# Patient Record
Sex: MALE | Race: WHITE | NOT HISPANIC OR LATINO | Employment: UNEMPLOYED | URBAN - METROPOLITAN AREA
[De-identification: names, ages, dates, MRNs, and addresses within clinical notes are randomized per-mention and may not be internally consistent; named-entity substitution may affect disease eponyms.]

---

## 2019-02-22 ENCOUNTER — APPOINTMENT (OUTPATIENT)
Dept: RADIOLOGY | Facility: IMAGING CENTER | Age: 34
End: 2019-02-22
Attending: NURSE PRACTITIONER
Payer: COMMERCIAL

## 2019-02-22 ENCOUNTER — OFFICE VISIT (OUTPATIENT)
Dept: URGENT CARE | Facility: CLINIC | Age: 34
End: 2019-02-22
Payer: COMMERCIAL

## 2019-02-22 VITALS
SYSTOLIC BLOOD PRESSURE: 120 MMHG | TEMPERATURE: 97.7 F | DIASTOLIC BLOOD PRESSURE: 74 MMHG | BODY MASS INDEX: 37.89 KG/M2 | WEIGHT: 250 LBS | HEART RATE: 80 BPM | RESPIRATION RATE: 16 BRPM | OXYGEN SATURATION: 96 % | HEIGHT: 68 IN

## 2019-02-22 DIAGNOSIS — S86.912A KNEE STRAIN, LEFT, INITIAL ENCOUNTER: ICD-10-CM

## 2019-02-22 DIAGNOSIS — M25.562 ACUTE PAIN OF LEFT KNEE: ICD-10-CM

## 2019-02-22 DIAGNOSIS — M62.830 SPASM OF BACK MUSCLES: ICD-10-CM

## 2019-02-22 PROCEDURE — 99203 OFFICE O/P NEW LOW 30 MIN: CPT | Performed by: NURSE PRACTITIONER

## 2019-02-22 PROCEDURE — 73564 X-RAY EXAM KNEE 4 OR MORE: CPT | Mod: TC,LT | Performed by: NURSE PRACTITIONER

## 2019-02-22 RX ORDER — LISINOPRIL 40 MG/1
40 TABLET ORAL DAILY
Status: ON HOLD | COMMUNITY
End: 2019-10-16

## 2019-02-22 RX ORDER — INFLUENZA A VIRUS A/IDAHO/07/2018 (H1N1) ANTIGEN (MDCK CELL DERIVED, PROPIOLACTONE INACTIVATED, INFLUENZA A VIRUS A/INDIANA/08/2018 (H3N2) ANTIGEN (MDCK CELL DERIVED, PROPIOLACTONE INACTIVATED), INFLUENZA B VIRUS B/SINGAPORE/INFTT-16-0610/2016 ANTIGEN (MDCK CELL DERIVED, PROPIOLACTONE INACTIVATED), INFLUENZA B VIRUS B/IOWA/06/2017 ANTIGEN (MDCK CELL DERIVED, PROPIOLACTONE INACTIVATED) 15; 15; 15; 15 UG/.5ML; UG/.5ML; UG/.5ML; UG/.5ML
INJECTION, SUSPENSION INTRAMUSCULAR
Refills: 0 | COMMUNITY
Start: 2018-12-15 | End: 2019-05-23

## 2019-02-22 RX ORDER — OMEPRAZOLE 20 MG/1
20 CAPSULE, DELAYED RELEASE ORAL DAILY
COMMUNITY
End: 2019-09-19

## 2019-02-22 RX ORDER — IBUPROFEN 800 MG/1
800 TABLET ORAL EVERY 8 HOURS PRN
Qty: 90 TAB | Refills: 0 | Status: SHIPPED | OUTPATIENT
Start: 2019-02-22 | End: 2019-05-23

## 2019-02-22 RX ORDER — ATENOLOL 100 MG/1
100 TABLET ORAL DAILY
COMMUNITY

## 2019-02-22 RX ORDER — BACLOFEN 10 MG/1
20 TABLET ORAL 2 TIMES DAILY
Qty: 20 TAB | Refills: 0 | Status: SHIPPED | OUTPATIENT
Start: 2019-02-22 | End: 2019-05-23

## 2019-02-22 RX ORDER — DIAZEPAM 5 MG/1
5 TABLET ORAL SEE ADMIN INSTRUCTIONS
Refills: 1 | Status: ON HOLD | COMMUNITY
Start: 2019-02-15 | End: 2019-05-29

## 2019-02-22 ASSESSMENT — ENCOUNTER SYMPTOMS
FEVER: 0
WEAKNESS: 0
SENSORY CHANGE: 0
TINGLING: 0
NAUSEA: 0
VOMITING: 0
BACK PAIN: 1
FOCAL WEAKNESS: 0
CHILLS: 0
ABDOMINAL PAIN: 0

## 2019-02-22 NOTE — PROGRESS NOTES
"Subjective:      Cosme Cabrera is a 33 y.o. male who presents with Back Pain (lower back spasms and pain, hip pain, and (L) knee pain)            HPI New problem. 33 year old male with left knee injury for 3 weeks following twisting while getting out of hot tub. Since that time he has had improvement in pain but it is still swollen and his gait is still off, leading to his next issue. Since this morning he has developed spasms of his left lower back that he rates as 7/10. No radiation of pain and his bowel and bladder function is preserved. He has been taking ibuprofen for his knee pain and has not treatment the back yet.  Patient has no known allergies.  No current outpatient prescriptions on file prior to visit.     No current facility-administered medications on file prior to visit.      Social History     Social History   • Marital status: Single     Spouse name: N/A   • Number of children: N/A   • Years of education: N/A     Occupational History   • Not on file.     Social History Main Topics   • Smoking status: Former Smoker   • Smokeless tobacco: Never Used   • Alcohol use Not on file   • Drug use: Unknown   • Sexual activity: Not on file     Other Topics Concern   • Not on file     Social History Narrative   • No narrative on file     family history is not on file.      Review of Systems   Constitutional: Negative for chills and fever.   Gastrointestinal: Negative for abdominal pain, nausea and vomiting.   Genitourinary: Negative.  Negative for dysuria.   Musculoskeletal: Positive for back pain and joint pain.   Neurological: Negative for tingling, sensory change, focal weakness and weakness.          Objective:     /74 (BP Location: Left arm, Patient Position: Sitting, BP Cuff Size: Large adult)   Pulse 80   Temp 36.5 °C (97.7 °F) (Temporal)   Resp 16   Ht 1.727 m (5' 8\")   Wt 113.4 kg (250 lb)   SpO2 96%   BMI 38.01 kg/m²      Physical Exam   Constitutional: He is oriented to person, " place, and time. He appears well-developed and well-nourished. No distress.   Cardiovascular: Normal rate, regular rhythm and normal heart sounds.    No murmur heard.  Pulmonary/Chest: Effort normal and breath sounds normal.   Musculoskeletal:        Left knee: He exhibits decreased range of motion and swelling. Tenderness found. Medial joint line tenderness noted.        Lumbar back: He exhibits decreased range of motion, tenderness and pain. He exhibits no bony tenderness, no swelling, no edema and no spasm.        Back:    Neurological: He is alert and oriented to person, place, and time.   Skin: Skin is warm and dry.   Psychiatric: He has a normal mood and affect. His behavior is normal. Thought content normal.   Vitals reviewed.              Assessment/Plan:     1. Spasm of back muscles  baclofen (LIORESAL) 10 MG Tab    ibuprofen (MOTRIN) 800 MG Tab   2. Acute pain of left knee  DX-KNEE COMPLETE 4+ LEFT   3. Knee strain, left, initial encounter  REFERRAL TO PHYSICAL THERAPY Reason for Therapy: Eval/Treat/Report     Ibuprofen and muscle relaxer for back pain  X-ray with soft tissue swelling only. No joint effusion, fracture or dislocation  Physical therapy referral per patient request for his knee.  Stretching and ice.  Differential diagnosis, natural history, supportive care, and indications for immediate follow-up discussed at length.

## 2019-02-22 NOTE — LETTER
February 22, 2019        Cosme Cabrera  [address] [place] [address]        Cosme was seen in our clinic today and he is excused from work for Monday, Tuesday and today.  If you have any questions or concerns, please don't hesitate to call.        Sincerely,        YARELIS [email or web address].    Electronically Signed

## 2019-03-04 ENCOUNTER — APPOINTMENT (OUTPATIENT)
Dept: RADIOLOGY | Facility: MEDICAL CENTER | Age: 34
End: 2019-03-04
Attending: EMERGENCY MEDICINE
Payer: COMMERCIAL

## 2019-03-04 ENCOUNTER — HOSPITAL ENCOUNTER (EMERGENCY)
Facility: MEDICAL CENTER | Age: 34
End: 2019-03-04
Attending: EMERGENCY MEDICINE
Payer: COMMERCIAL

## 2019-03-04 VITALS
HEIGHT: 68 IN | HEART RATE: 80 BPM | RESPIRATION RATE: 16 BRPM | SYSTOLIC BLOOD PRESSURE: 157 MMHG | DIASTOLIC BLOOD PRESSURE: 101 MMHG | TEMPERATURE: 97.7 F | WEIGHT: 260.58 LBS | OXYGEN SATURATION: 99 % | BODY MASS INDEX: 39.49 KG/M2

## 2019-03-04 DIAGNOSIS — R82.5 POSITIVE URINE DRUG SCREEN: ICD-10-CM

## 2019-03-04 DIAGNOSIS — R20.2 PARESTHESIAS: ICD-10-CM

## 2019-03-04 LAB
ALBUMIN SERPL BCP-MCNC: 4.1 G/DL (ref 3.2–4.9)
ALBUMIN/GLOB SERPL: 1.4 G/DL
ALP SERPL-CCNC: 63 U/L (ref 30–99)
ALT SERPL-CCNC: 32 U/L (ref 2–50)
AMPHET UR QL SCN: NEGATIVE
ANION GAP SERPL CALC-SCNC: 11 MMOL/L (ref 0–11.9)
AST SERPL-CCNC: 27 U/L (ref 12–45)
BARBITURATES UR QL SCN: NEGATIVE
BASOPHILS # BLD AUTO: 0.3 % (ref 0–1.8)
BASOPHILS # BLD: 0.02 K/UL (ref 0–0.12)
BENZODIAZ UR QL SCN: POSITIVE
BILIRUB SERPL-MCNC: 0.5 MG/DL (ref 0.1–1.5)
BUN SERPL-MCNC: 19 MG/DL (ref 8–22)
BZE UR QL SCN: NEGATIVE
CALCIUM SERPL-MCNC: 8.9 MG/DL (ref 8.5–10.5)
CANNABINOIDS UR QL SCN: POSITIVE
CHLORIDE SERPL-SCNC: 106 MMOL/L (ref 96–112)
CO2 SERPL-SCNC: 24 MMOL/L (ref 20–33)
CREAT SERPL-MCNC: 0.75 MG/DL (ref 0.5–1.4)
EKG IMPRESSION: NORMAL
EOSINOPHIL # BLD AUTO: 0.15 K/UL (ref 0–0.51)
EOSINOPHIL NFR BLD: 2.5 % (ref 0–6.9)
ERYTHROCYTE [DISTWIDTH] IN BLOOD BY AUTOMATED COUNT: 64.9 FL (ref 35.9–50)
ETHANOL BLD-MCNC: 0 G/DL
GLOBULIN SER CALC-MCNC: 2.9 G/DL (ref 1.9–3.5)
GLUCOSE SERPL-MCNC: 126 MG/DL (ref 65–99)
HCT VFR BLD AUTO: 44.6 % (ref 42–52)
HGB BLD-MCNC: 14.9 G/DL (ref 14–18)
IMM GRANULOCYTES # BLD AUTO: 0.04 K/UL (ref 0–0.11)
IMM GRANULOCYTES NFR BLD AUTO: 0.7 % (ref 0–0.9)
LYMPHOCYTES # BLD AUTO: 1.96 K/UL (ref 1–4.8)
LYMPHOCYTES NFR BLD: 32.8 % (ref 22–41)
MAGNESIUM SERPL-MCNC: 2 MG/DL (ref 1.5–2.5)
MCH RBC QN AUTO: 30.2 PG (ref 27–33)
MCHC RBC AUTO-ENTMCNC: 33.4 G/DL (ref 33.7–35.3)
MCV RBC AUTO: 90.3 FL (ref 81.4–97.8)
METHADONE UR QL SCN: NEGATIVE
MONOCYTES # BLD AUTO: 0.63 K/UL (ref 0–0.85)
MONOCYTES NFR BLD AUTO: 10.5 % (ref 0–13.4)
NEUTROPHILS # BLD AUTO: 3.18 K/UL (ref 1.82–7.42)
NEUTROPHILS NFR BLD: 53.2 % (ref 44–72)
NRBC # BLD AUTO: 0 K/UL
NRBC BLD-RTO: 0 /100 WBC
OPIATES UR QL SCN: NEGATIVE
OXYCODONE UR QL SCN: NEGATIVE
PCP UR QL SCN: NEGATIVE
PHOSPHATE SERPL-MCNC: 3.1 MG/DL (ref 2.5–4.5)
PLATELET # BLD AUTO: 264 K/UL (ref 164–446)
PMV BLD AUTO: 8.7 FL (ref 9–12.9)
POTASSIUM SERPL-SCNC: 4 MMOL/L (ref 3.6–5.5)
PROPOXYPH UR QL SCN: NEGATIVE
PROT SERPL-MCNC: 7 G/DL (ref 6–8.2)
RBC # BLD AUTO: 4.94 M/UL (ref 4.7–6.1)
SODIUM SERPL-SCNC: 141 MMOL/L (ref 135–145)
TROPONIN I SERPL-MCNC: <0.01 NG/ML (ref 0–0.04)
WBC # BLD AUTO: 6 K/UL (ref 4.8–10.8)

## 2019-03-04 PROCEDURE — 99284 EMERGENCY DEPT VISIT MOD MDM: CPT

## 2019-03-04 PROCEDURE — 85025 COMPLETE CBC W/AUTO DIFF WBC: CPT

## 2019-03-04 PROCEDURE — 70450 CT HEAD/BRAIN W/O DYE: CPT

## 2019-03-04 PROCEDURE — 72125 CT NECK SPINE W/O DYE: CPT

## 2019-03-04 PROCEDURE — 93005 ELECTROCARDIOGRAM TRACING: CPT | Performed by: EMERGENCY MEDICINE

## 2019-03-04 PROCEDURE — 93005 ELECTROCARDIOGRAM TRACING: CPT

## 2019-03-04 PROCEDURE — 84100 ASSAY OF PHOSPHORUS: CPT

## 2019-03-04 PROCEDURE — 80053 COMPREHEN METABOLIC PANEL: CPT

## 2019-03-04 PROCEDURE — 83735 ASSAY OF MAGNESIUM: CPT

## 2019-03-04 PROCEDURE — 84484 ASSAY OF TROPONIN QUANT: CPT

## 2019-03-04 PROCEDURE — 80307 DRUG TEST PRSMV CHEM ANLYZR: CPT

## 2019-03-04 ASSESSMENT — LIFESTYLE VARIABLES: DO YOU DRINK ALCOHOL: YES

## 2019-03-04 NOTE — ED PROVIDER NOTES
"ED Provider Note    Scribed for London Echavarria M.D. by Aamir Wyatt. 3/4/2019  8:37 AM    Primary care provider: Pcp Pt States None  Means of arrival: Walk-in  History obtained from: Patient  History limited by: None    CHIEF COMPLAINT  Chief Complaint   Patient presents with   • Other       HPI  Cosme Cabrera is a 33 y.o. male with a history of atrial fibrillation who presents to the Emergency Department with multiple complaints. Per patient, since the beginning of the year he has been experiencing multiple episodes of lightheadedness, chills, and body aches. The patient reports that his body aches are generally localized to his upper chest and neck and he describes this as a \"zapping\" sensation. He notes these sensations occasionally start in his upper neck and radiate down into his shoulders, or they can sometimes start in his upper chest and radiate up into his head. He notes that when this occurs he also experiences shortness of breath and intermittently has lightheadedness. He reports that he has been evaluated for this within the last few weeks and admits to having labs, EKG, and a CT scan of his head. Patient states that the CT scan of his brain did not show a brain tumor or multiple sclerosis and his labs were mostly normal. He has not yet had an MRI though or a CT scan of his neck. He admits that he has been taking 800 mg Ibuprofen for his pain and this has been with only mild relief. He also reports that he believes taking the Ibuprofen is causing him to experience nausea but he is unsure. Patient admits that he has been increasingly stressed at work as well and admits that this could be a contributing factor to his symptoms. The patient is otherwise a healthy individual and does not have any other pertinent past medical history. Patient currently works at a marijuana processing facility and admits to working with isopropanol and ethanol regularly. He denies any recent vomiting.     REVIEW OF " "SYSTEMS  Pertinent positives include lightheadedness, chills, body aches, shortness of breath, nausea, stressed.   Pertinent negatives include no vomiting.    All other systems reviewed and negative. See HPI for further details.     PAST MEDICAL HISTORY   has a past medical history of A-fib (HCC) and Hypertension.    SURGICAL HISTORY  patient denies any surgical history    SOCIAL HISTORY  Social History   Substance Use Topics   • Smoking status: Former Smoker   • Smokeless tobacco: Never Used   • Alcohol use Yes      Comment: occ      History   Drug Use   • Types: Inhaled     Comment: marijuana       FAMILY HISTORY  History reviewed. No pertinent family history.    CURRENT MEDICATIONS  Home Medications     Reviewed by Morena Marlow R.N. (Registered Nurse) on 03/04/19 at 0739  Med List Status: Complete   Medication Last Dose Status   atenolol (TENORMIN) 100 MG Tab 3/4/2019 Active   baclofen (LIORESAL) 10 MG Tab 3/3/2019 Active   Cetirizine-Pseudoephedrine (ZYRTEC-D PO) 3/3/2019 Active   diazePAM (VALIUM) 5 MG Tab 3/4/2019 Active   FLUCELVAX QUADRIVALENT 0.5 ML Suspension Prefilled Syringe injection  Active   ibuprofen (MOTRIN) 800 MG Tab 3/3/2019 Active   lisinopril (PRINIVIL, ZESTRIL) 40 MG tablet 3/4/2019 Active   omeprazole (PRILOSEC) 20 MG delayed-release capsule 3/4/2019 Active                ALLERGIES  No Known Allergies    PHYSICAL EXAM  VITAL SIGNS: /101 Comment: second pressure 170/103  Pulse 82   Temp 36.5 °C (97.7 °F) (Temporal)   Resp 16   Ht 1.727 m (5' 8\")   Wt 118.2 kg (260 lb 9.3 oz)   SpO2 97%   BMI 39.62 kg/m²     Nursing note and vitals reviewed.  Constitutional: Well-developed and well-nourished. No distress.   HENT: Head is normocephalic and atraumatic. Oropharynx is clear and moist without exudate or erythema.   Eyes: Pupils are equal, round, and reactive to light. Conjunctiva are normal.   Cardiovascular: Normal rate and regular rhythm. No murmur heard. Normal radial " pulses.  Pulmonary/Chest: Breath sounds normal. No wheezes or rales.   Abdominal: Soft and non-tender. No distention    Musculoskeletal: Extremities exhibit normal range of motion without edema or tenderness.   Neurological: Awake, alert and oriented to person, place, and time. No focal deficits noted.  Skin: Skin is warm and dry. No rash.   Psychiatric: Normal mood and affect. Appropriate for clinical situation.    DIAGNOSTIC STUDIES / PROCEDURES    EKG Interpretation  Results for orders placed or performed during the hospital encounter of 19   EKG (NOW)   Result Value Ref Range    Report       [facility] Emergency Dept.    Test Date:  2019  Pt Name:    JANA LYNN              Department: ER  MRN:        [id number]                      Room:  Gender:     Male                         Technician: [id number]  :        1985                   Requested By:ER TRIAGE PROTOCOL  Order #:    [id number]                    Reading MD: JANESSA BARRAGAN MD    Measurements  Intervals                                Axis  Rate:       87                           P:          71  RI:         168                          QRS:        46  QRSD:       90                           T:          3  QT:         372  QTc:        448    Interpretive Statements  SINUS RHYTHM  BORDERLINE T WAVE ABNORMALITIES  BASELINE WANDER IN LEAD(S) I,III,aVL,aVF  No previous ECG available for comparison    Electronically Signed On 3-4-2019 9:03:36 PST by JANESSA BARRAGAN MD         LABS  Results for orders placed or performed during the hospital encounter of 19   CBC WITH DIFFERENTIAL   Result Value Ref Range    WBC 6.0 4.8 - 10.8 K/uL    RBC 4.94 4.70 - 6.10 M/uL    Hemoglobin 14.9 14.0 - 18.0 g/dL    Hematocrit 44.6 42.0 - 52.0 %    MCV 90.3 81.4 - 97.8 fL    MCH 30.2 27.0 - 33.0 pg    MCHC 33.4 (L) 33.7 - 35.3 g/dL    RDW 64.9 (H) 35.9 - 50.0 fL    Platelet Count 264 164 - 446 K/uL    MPV 8.7 (L) 9.0 - 12.9 fL     Neutrophils-Polys 53.20 44.00 - 72.00 %    Lymphocytes 32.80 22.00 - 41.00 %    Monocytes 10.50 0.00 - 13.40 %    Eosinophils 2.50 0.00 - 6.90 %    Basophils 0.30 0.00 - 1.80 %    Immature Granulocytes 0.70 0.00 - 0.90 %    Nucleated RBC 0.00 /100 WBC    Neutrophils (Absolute) 3.18 1.82 - 7.42 K/uL    Lymphs (Absolute) 1.96 1.00 - 4.80 K/uL    Monos (Absolute) 0.63 0.00 - 0.85 K/uL    Eos (Absolute) 0.15 0.00 - 0.51 K/uL    Baso (Absolute) 0.02 0.00 - 0.12 K/uL    Immature Granulocytes (abs) 0.04 0.00 - 0.11 K/uL    NRBC (Absolute) 0.00 K/uL   COMP METABOLIC PANEL   Result Value Ref Range    Sodium 141 135 - 145 mmol/L    Potassium 4.0 3.6 - 5.5 mmol/L    Chloride 106 96 - 112 mmol/L    Co2 24 20 - 33 mmol/L    Anion Gap 11.0 0.0 - 11.9    Glucose 126 (H) 65 - 99 mg/dL    Bun 19 8 - 22 mg/dL    Creatinine 0.75 0.50 - 1.40 mg/dL    Calcium 8.9 8.5 - 10.5 mg/dL    AST(SGOT) 27 12 - 45 U/L    ALT(SGPT) 32 2 - 50 U/L    Alkaline Phosphatase 63 30 - 99 U/L    Total Bilirubin 0.5 0.1 - 1.5 mg/dL    Albumin 4.1 3.2 - 4.9 g/dL    Total Protein 7.0 6.0 - 8.2 g/dL    Globulin 2.9 1.9 - 3.5 g/dL    A-G Ratio 1.4 g/dL   TROPONIN   Result Value Ref Range    Troponin I <0.01 0.00 - 0.04 ng/mL   DIAGNOSTIC ALCOHOL   Result Value Ref Range    Diagnostic Alcohol 0.00 0.00 g/dL   URINE DRUG SCREEN   Result Value Ref Range    Amphetamines Urine Negative Negative    Barbiturates Negative Negative    Benzodiazepines Positive (A) Negative    Cocaine Metabolite Negative Negative    Methadone Negative Negative    Opiates Negative Negative    Oxycodone Negative Negative    Phencyclidine -Pcp Negative Negative    Propoxyphene Negative Negative    Cannabinoid Metab Positive (A) Negative   MAGNESIUM   Result Value Ref Range    Magnesium 2.0 1.5 - 2.5 mg/dL   PHOSPHORUS   Result Value Ref Range    Phosphorus 3.1 2.5 - 4.5 mg/dL   ESTIMATED GFR   Result Value Ref Range    GFR If African American >60 >60 mL/min/1.73 m 2    GFR If Non African  American >60 >60 mL/min/1.73 m 2   EKG (NOW)   Result Value Ref Range    Report       [facility] Emergency Dept.    Test Date:  2019  Pt Name:    JANA LYNN              Department: ER  MRN:        [id number]                      Room:  Gender:     Male                         Technician: [id number]  :        1985                   Requested By:ER TRIAGE PROTOCOL  Order #:    [id number]                    Reading MD: JANESSA BARRAGAN MD    Measurements  Intervals                                Axis  Rate:       87                           P:          71  NM:         168                          QRS:        46  QRSD:       90                           T:          3  QT:         372  QTc:        448    Interpretive Statements  SINUS RHYTHM  BORDERLINE T WAVE ABNORMALITIES  BASELINE WANDER IN LEAD(S) I,III,aVL,aVF  No previous ECG available for comparison    Electronically Signed On 3-4-2019 9:03:36 PST by JANESSA BARRAGAN MD         All labs reviewed by me.    RADIOLOGY  CT-CSPINE WITHOUT PLUS RECONS   Final Result      No acute findings or significant degenerative disc disease identified.      CT-HEAD W/O   Final Result      No intracranial mass effect or acute hemorrhage.        The radiologist's interpretation of all radiological studies have been reviewed by me.    COURSE & MEDICAL DECISION MAKING  Nursing notes, VS, PMSFHx reviewed in chart.     8:37 AM - Patient seen and examined at bedside.  Ordered CT-head without contrast, CT-Cspine without plus recons, magnesium, phosphorus, urine drug screen, CBC with differential, CMP, troponin, diagnostic alcohol, EKG to evaluate his symptoms. The differential diagnoses include but are not limited to: electrolyte abnormality versus chemical exposure. Patient reports fairly vague symptoms. A generalized work up is warranted at this time and if labs and imaging come back normal then the patient will be referred to a specialist.     Patient  presents today with fairly vague nonspecific symptoms of paresthesias throughout the body.  No evidence of significant electrolyte abnormality.  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  He says that he also consumes cannabis on occasion.    11:32 AM - Patient was reevaluated at bedside. Discussed lab and radiology results with the patient and informed them that he can be discharged home at this time. Patient was advised to return to the ED with any new or worsening symptoms. He understands and verbalizes agreement with the plan of care and will comply.     The patient will return for new or worsening symptoms and is stable at the time of discharge.    The patient is referred to a primary physician for blood pressure management, diabetic screening, and for all other preventative health concerns.    DISPOSITION:  Patient will be discharged home in stable condition.    FOLLOW UP:  Samuel Kan M.D.  [address] [place] [id number] [phone]    Schedule an appointment as soon as possible for a visit   oncall neurologist    [facility], Emergency Dept  [address] [place] [address] [id number] [phone]    If symptoms worsen    The patient was discharged home with an information sheet on paresthesias and told to return immediately for any signs or symptoms listed.  The patient agreed to the discharge precautions and follow-up plan which is documented in EPIC.      FINAL IMPRESSION  1. Paresthesias    2. Positive urine drug screen          Aamir CARDONA (Zita), am scribing for, and in the presence of, London Echavarria M.D..    Electronically signed by: Aamir Márquez), 3/4/2019    London CARDONA M.D. personally performed the services described in this documentation, as scribed by Aamir Wyatt in my presence, and it is both accurate and complete. C.     The note accurately reflects work and decisions made by me.  London Echavarria  3/4/2019   3:32 PM

## 2019-03-04 NOTE — ED NOTES
Pt discharged to home.  IV removed and bandaged.  No noted swelling or redness.  Pt tolerated well.  Catheter intact.  Pt given discharge paperwork and all applicable prescriptions written by provider.  Pt to follow with PCP, when indicated.  Pt verbalizes understanding of discharge teaching and will return to ED if condition worsens.

## 2019-03-04 NOTE — ED NOTES
Pt verbalizes hx of afib with ETOH usage.  Pt states that he has 1/10 chest pain, states sharp sudden pains that go from his heart to his cerebellum.  Pt states seen in ED, PCP, GI, pending cardiology appointment since episodes of pain started on New Years Day.  Pt states this does not feel like anxiety or panic attack, pt states has hx of panic and anxiety attacks.

## 2019-03-04 NOTE — ED TRIAGE NOTES
Ambulates to triage  Chief Complaint   Patient presents with   • Other     Pt said New Year's Day he has been experiencing what he feels like electrical shocks that start in the back of his head and radiate out, but now he feels like it's starting in his med chest and radiating out.  Pt said it he drinks alcohol, they seem to get better in the moment, but are worse the next day.  Denies any CP or SOB, has never had anything like this before.  Denies any trauma prior to these events.  Pt said he was seen in January at [facility] for same.

## 2019-05-23 PROCEDURE — 99285 EMERGENCY DEPT VISIT HI MDM: CPT

## 2019-05-23 RX ORDER — ALPRAZOLAM 0.25 MG/1
0.25 TABLET ORAL 2 TIMES DAILY PRN
COMMUNITY
End: 2019-09-19

## 2019-05-23 RX ORDER — ASPIRIN 81 MG/1
81 TABLET ORAL DAILY
Status: ON HOLD | COMMUNITY
End: 2019-05-29

## 2019-05-23 RX ORDER — FOLIC ACID 0.8 MG
TABLET ORAL
Status: ON HOLD | COMMUNITY
End: 2019-05-25

## 2019-05-24 ENCOUNTER — HOSPITAL ENCOUNTER (EMERGENCY)
Facility: MEDICAL CENTER | Age: 34
End: 2019-05-24
Attending: EMERGENCY MEDICINE
Payer: COMMERCIAL

## 2019-05-24 VITALS
SYSTOLIC BLOOD PRESSURE: 140 MMHG | HEART RATE: 114 BPM | DIASTOLIC BLOOD PRESSURE: 101 MMHG | BODY MASS INDEX: 37.05 KG/M2 | OXYGEN SATURATION: 95 % | HEIGHT: 68 IN | TEMPERATURE: 98 F | WEIGHT: 244.49 LBS | RESPIRATION RATE: 17 BRPM

## 2019-05-24 DIAGNOSIS — F10.920 ALCOHOLIC INTOXICATION WITHOUT COMPLICATION (HCC): ICD-10-CM

## 2019-05-24 DIAGNOSIS — F10.29 ALCOHOL DEPENDENCE WITH UNSPECIFIED ALCOHOL-INDUCED DISORDER (HCC): ICD-10-CM

## 2019-05-24 LAB
EKG IMPRESSION: NORMAL
POC BREATHALIZER: 0.13 PERCENT (ref 0–0.01)
POC BREATHALIZER: 0.25 PERCENT (ref 0–0.01)

## 2019-05-24 PROCEDURE — 93005 ELECTROCARDIOGRAM TRACING: CPT | Performed by: EMERGENCY MEDICINE

## 2019-05-24 PROCEDURE — 99285 EMERGENCY DEPT VISIT HI MDM: CPT

## 2019-05-24 PROCEDURE — 302970 POC BREATHALIZER: Performed by: EMERGENCY MEDICINE

## 2019-05-24 PROCEDURE — 700111 HCHG RX REV CODE 636 W/ 250 OVERRIDE (IP): Performed by: EMERGENCY MEDICINE

## 2019-05-24 PROCEDURE — 700102 HCHG RX REV CODE 250 W/ 637 OVERRIDE(OP): Performed by: EMERGENCY MEDICINE

## 2019-05-24 PROCEDURE — A9270 NON-COVERED ITEM OR SERVICE: HCPCS | Performed by: EMERGENCY MEDICINE

## 2019-05-24 RX ORDER — CHLORDIAZEPOXIDE HYDROCHLORIDE 25 MG/1
50 CAPSULE, GELATIN COATED ORAL ONCE
Status: COMPLETED | OUTPATIENT
Start: 2019-05-24 | End: 2019-05-24

## 2019-05-24 RX ORDER — CHLORDIAZEPOXIDE HYDROCHLORIDE 25 MG/1
50 CAPSULE, GELATIN COATED ORAL 4 TIMES DAILY PRN
Qty: 18 CAP | Refills: 0 | Status: ON HOLD | OUTPATIENT
Start: 2019-05-24 | End: 2019-05-25

## 2019-05-24 RX ORDER — CHLORDIAZEPOXIDE HYDROCHLORIDE 25 MG/1
50 CAPSULE, GELATIN COATED ORAL 4 TIMES DAILY PRN
Qty: 18 CAP | Refills: 0 | Status: SHIPPED | OUTPATIENT
Start: 2019-05-24 | End: 2019-05-24

## 2019-05-24 RX ORDER — ONDANSETRON 4 MG/1
4 TABLET, ORALLY DISINTEGRATING ORAL ONCE
Status: COMPLETED | OUTPATIENT
Start: 2019-05-24 | End: 2019-05-24

## 2019-05-24 RX ADMIN — ONDANSETRON 4 MG: 4 TABLET, ORALLY DISINTEGRATING ORAL at 08:55

## 2019-05-24 RX ADMIN — CHLORDIAZEPOXIDE HYDROCHLORIDE 50 MG: 25 CAPSULE ORAL at 08:54

## 2019-05-24 NOTE — ED PROVIDER NOTES
"ED Provider Note    CHIEF COMPLAINT  Chief Complaint   Patient presents with   • Detox     last drink 3 hours PTA, drank 3 beers and \"fair amount of whiskey\" today.  relapsed one month ago and has been drinking daily since, wants to quit, is also taking valium/xanax daily       HPI  Cosme Cabrera is a 33 y.o. male here for evaluation for detox. The pt states that he has been drinking alcohol daily, for a month.  He states that he is here because ' I need some detox.'  The pt has no fever, no chills, no vomiting, and no cp/sob. The pt states that he has no suicidal ideation or homicidal ideation at this time.  He states he takes valium daily, but only does this in the am, then he drinks. He states stress exacerbates his symptoms, and nothing alleviates this.     PAST MEDICAL HISTORY   has a past medical history of A-fib (HCC); Anxiety; ETOH abuse; and Hypertension.    SOCIAL HISTORY  Social History     Social History Main Topics   • Smoking status: Former Smoker   • Smokeless tobacco: Never Used   • Alcohol use Yes      Comment: daily for last month   • Drug use: Yes     Types: Inhaled      Comment: marijuana   • Sexual activity: Not on file       SURGICAL HISTORY  patient denies any surgical history    CURRENT MEDICATIONS  Home Medications     Reviewed by Leif Garcia R.N. (Registered Nurse) on 05/23/19 at 235  Med List Status: Complete   Medication Last Dose Status   ALPRAZolam (XANAX) 0.25 MG Tab  Active   aspirin (ASA) 81 MG Chew Tab chewable tablet  Active   atenolol (TENORMIN) 100 MG Tab  Active   Cetirizine-Pseudoephedrine (ZYRTEC-D PO)  Active   diazePAM (VALIUM) 5 MG Tab  Active   lisinopril (PRINIVIL, ZESTRIL) 40 MG tablet  Active   Magnesium 500 MG Cap  Active   omeprazole (PRILOSEC) 20 MG delayed-release capsule  Active   ZINC OXIDE PO  Active                ALLERGIES  No Known Allergies    REVIEW OF SYSTEMS  See HPI for further details. Review of systems as above, otherwise all other systems " "are negative.     PHYSICAL EXAM  VITAL SIGNS: /101   Pulse 88   Temp 36.7 °C (98 °F) (Temporal)   Resp 13   Ht 1.727 m (5' 8\")   Wt 110.9 kg (244 lb 7.8 oz)   SpO2 96%   BMI 37.17 kg/m²     Constitutional: Well developed, well nourished. mild acute distress.  HEENT: Normocephalic, atraumatic. MMM  Neck: Supple, Full range of motion   Chest/Pulmonary:  No respiratory distress.  Equal expansion   Musculoskeletal: No deformity, no edema, neurovascular intact.   Neuro: slurred speech, appropriate, cooperative, cranial nerves II-XII grossly intact.  Psych: Normal mood and affect    Results for orders placed or performed during the hospital encounter of 05/24/19   POC BREATHALIZER   Result Value Ref Range    POC Breathalizer 0.250 (A) 0.00 - 0.01 Percent     PROCEDURES     MEDICAL RECORD  I have reviewed patient's medical record and pertinent results are listed above.    COURSE & MEDICAL DECISION MAKING  I have reviewed any medical record information, laboratory studies and radiographic results as noted above.    I you have had any blood pressure issues while here in the emergency department, please see your doctor for a further evaluation or work up.    3:30 AM  The pt arouses easily, and will need to be seen by [facility] for placement into a detox center.  He has valium that he takes on a regular basis, and has enough.  He has no SI/HI at this time, and will need to be evaluated for help.    Differential diagnoses include but not limited to: alcohol abuse    This patient presents with alcohol abuse .  At this time, I have counseled the patient/family regarding their medications, pain control, and follow up.  They will continue their medications, if any, as prescribed.  They will return immediately for any worsening symptoms and/or any other medical concerns.  They will see their doctor, or contact the doctor provided, in 1-2 days for follow up.       FINAL IMPRESSION  Alcohol abuse      Electronically " signed by: Julián Mathew, 5/24/2019 3:26 AM

## 2019-05-24 NOTE — DISCHARGE INSTRUCTIONS
Follow-up with primary care next week for reevaluation, medication management close blood pressure monitoring.  Follow-up with psychiatry as scheduled next week for reevaluation medication management.  Go to [place] or renal behavioral health today for detox assessment.    Librium every 6 hours as needed for alcohol withdrawal symptoms.  Do not drink alcohol with taking this medication.  Do not take Xanax or Valium while taking this medication.    Return to the emergency department for altered mental status, seizure, hallucination, vomiting, suicidal or homicidal ideation or other new concerns.

## 2019-05-24 NOTE — ED PROVIDER NOTES
ED Provider Addendum    0400 -patient signed out to be by my colleague, , for reevaluation final disposition upon clinical sobriety.  Please refer to his note for complete details.  Patient with increased alcohol use, dependence, requesting detox.  No suicidal homicidal ideation.  Plan for outpatient referral upon sobriety.    0630 -repeat breathalyzer 0.13.  Patient is clinically sober.  He is calm and cooperative.  He has been given outpatient detox resource referral from [facility] and plans to go to [place] or renal behavioral health today.  Patient's mother is flying into town and will help arrange this treatment.  Patient will loop her home when legally sober to await her arrival.  He will be given Librium now for anticipated withdrawal although he denies any history for severe withdrawal symptoms or delirium tremens.    8:52 AM patient is clinically and legally sober.  Calm and cooperative.  Denies suicidal homicidal ideation.  Denies hallucination.  No clinical evidence for acute alcohol withdrawal.  Will discharge as planned with close outpatient follow-up.

## 2019-05-24 NOTE — ED NOTES
Discharge instructions and one paper prescription given to patient; patient verbalized understanding. Vital signs WNL w/ exception of tachycardia (ERP aware) prior to discharge. Pt ambulatory with steady gait upon leaving ED.

## 2019-05-24 NOTE — ED TRIAGE NOTES
"Cosme Cabrera  33 y.o.  Chief Complaint   Patient presents with   • Detox     last drink 3 hours PTA, drank 3 beers and \"fair amount of whiskey\" today.  relapsed one month ago and has been drinking daily since, wants to quit, is also taking valium/xanax daily     Patient ambulatory with mildly unsteady gait to triage room with above complaint.  Patient appears in intoxicated.  Patient is twichy in his face which he states happens when he drinks a lot.  States that he has usually been able to detox on his own but since he has been on the valium and xanax together with ETOH he is having trouble.  Patient states he also has been feeling palpitations occasionally today.  Was seen here for the same \"buzzing\" in his chest in March.  States he has psychiatric appt on Tuesday.  Denies SI/HI.      EKG ordered.  Patient escorted to the lobby and instructed to notify staff of any changes in condition.      "

## 2019-05-24 NOTE — ED NOTES
Pt sitting up and reports nausea, tachycardia observed on monitor otherwise VSS; Breathalyzer result: .085; ERP notified.     Verbal order received for 50 librium 4 zofran.

## 2019-05-24 NOTE — DISCHARGE PLANNING
ALERT team note.  1:1 with Mr. Cabrera to offer support and information about detoxing from alcohol and benzodiazapines.  He has family support and has applied for COBRA insurance benefits so he will have the means to go to one of several facilities that offer detox and then IOP tx for rehab services.He accepted the printed resource list of the local tx providers.

## 2019-05-25 ENCOUNTER — HOSPITAL ENCOUNTER (INPATIENT)
Facility: MEDICAL CENTER | Age: 34
LOS: 4 days | DRG: 308 | End: 2019-05-29
Attending: EMERGENCY MEDICINE | Admitting: INTERNAL MEDICINE
Payer: COMMERCIAL

## 2019-05-25 ENCOUNTER — APPOINTMENT (OUTPATIENT)
Dept: RADIOLOGY | Facility: MEDICAL CENTER | Age: 34
DRG: 308 | End: 2019-05-25
Attending: EMERGENCY MEDICINE
Payer: COMMERCIAL

## 2019-05-25 DIAGNOSIS — I27.20 PULMONARY HYPERTENSION (HCC): ICD-10-CM

## 2019-05-25 DIAGNOSIS — F10.930 ALCOHOL WITHDRAWAL SYNDROME WITHOUT COMPLICATION (HCC): ICD-10-CM

## 2019-05-25 DIAGNOSIS — R79.89 ELEVATED TROPONIN: ICD-10-CM

## 2019-05-25 DIAGNOSIS — I48.91 ATRIAL FIBRILLATION WITH RVR (HCC): ICD-10-CM

## 2019-05-25 DIAGNOSIS — I26.02 ACUTE SADDLE PULMONARY EMBOLISM WITH ACUTE COR PULMONALE (HCC): ICD-10-CM

## 2019-05-25 DIAGNOSIS — I95.9 HYPOTENSION, UNSPECIFIED HYPOTENSION TYPE: ICD-10-CM

## 2019-05-25 LAB
ALBUMIN SERPL BCP-MCNC: 4.2 G/DL (ref 3.2–4.9)
ALBUMIN/GLOB SERPL: 1.4 G/DL
ALP SERPL-CCNC: 56 U/L (ref 30–99)
ALT SERPL-CCNC: 55 U/L (ref 2–50)
ANION GAP SERPL CALC-SCNC: 11 MMOL/L (ref 0–11.9)
AST SERPL-CCNC: 31 U/L (ref 12–45)
BASOPHILS # BLD AUTO: 0.6 % (ref 0–1.8)
BASOPHILS # BLD: 0.08 K/UL (ref 0–0.12)
BILIRUB SERPL-MCNC: 0.9 MG/DL (ref 0.1–1.5)
BUN SERPL-MCNC: 21 MG/DL (ref 8–22)
CALCIUM SERPL-MCNC: 9.2 MG/DL (ref 8.5–10.5)
CHLORIDE SERPL-SCNC: 101 MMOL/L (ref 96–112)
CO2 SERPL-SCNC: 20 MMOL/L (ref 20–33)
CREAT SERPL-MCNC: 1.22 MG/DL (ref 0.5–1.4)
EKG IMPRESSION: NORMAL
EKG IMPRESSION: NORMAL
EOSINOPHIL # BLD AUTO: 0.05 K/UL (ref 0–0.51)
EOSINOPHIL NFR BLD: 0.4 % (ref 0–6.9)
ERYTHROCYTE [DISTWIDTH] IN BLOOD BY AUTOMATED COUNT: 51.9 FL (ref 35.9–50)
GLOBULIN SER CALC-MCNC: 2.9 G/DL (ref 1.9–3.5)
GLUCOSE SERPL-MCNC: 98 MG/DL (ref 65–99)
HCT VFR BLD AUTO: 53.1 % (ref 42–52)
HGB BLD-MCNC: 17.8 G/DL (ref 14–18)
IMM GRANULOCYTES # BLD AUTO: 0.04 K/UL (ref 0–0.11)
IMM GRANULOCYTES NFR BLD AUTO: 0.3 % (ref 0–0.9)
LYMPHOCYTES # BLD AUTO: 3.67 K/UL (ref 1–4.8)
LYMPHOCYTES NFR BLD: 29.3 % (ref 22–41)
MAGNESIUM SERPL-MCNC: 1.9 MG/DL (ref 1.5–2.5)
MCH RBC QN AUTO: 32.1 PG (ref 27–33)
MCHC RBC AUTO-ENTMCNC: 33.5 G/DL (ref 33.7–35.3)
MCV RBC AUTO: 95.7 FL (ref 81.4–97.8)
MONOCYTES # BLD AUTO: 1.39 K/UL (ref 0–0.85)
MONOCYTES NFR BLD AUTO: 11.1 % (ref 0–13.4)
NEUTROPHILS # BLD AUTO: 7.29 K/UL (ref 1.82–7.42)
NEUTROPHILS NFR BLD: 58.3 % (ref 44–72)
NRBC # BLD AUTO: 0 K/UL
NRBC BLD-RTO: 0 /100 WBC
PHOSPHATE SERPL-MCNC: 2.1 MG/DL (ref 2.5–4.5)
PLATELET # BLD AUTO: 223 K/UL (ref 164–446)
PMV BLD AUTO: 8.4 FL (ref 9–12.9)
POTASSIUM SERPL-SCNC: 4 MMOL/L (ref 3.6–5.5)
PROT SERPL-MCNC: 7.1 G/DL (ref 6–8.2)
RBC # BLD AUTO: 5.55 M/UL (ref 4.7–6.1)
SODIUM SERPL-SCNC: 132 MMOL/L (ref 135–145)
T4 FREE SERPL-MCNC: 0.91 NG/DL (ref 0.53–1.43)
TROPONIN I SERPL-MCNC: 0.1 NG/ML (ref 0–0.04)
TSH SERPL DL<=0.005 MIU/L-ACNC: 3.84 UIU/ML (ref 0.38–5.33)
WBC # BLD AUTO: 12.5 K/UL (ref 4.8–10.8)

## 2019-05-25 PROCEDURE — 83735 ASSAY OF MAGNESIUM: CPT

## 2019-05-25 PROCEDURE — 85025 COMPLETE CBC W/AUTO DIFF WBC: CPT

## 2019-05-25 PROCEDURE — 99222 1ST HOSP IP/OBS MODERATE 55: CPT | Performed by: INTERNAL MEDICINE

## 2019-05-25 PROCEDURE — 94760 N-INVAS EAR/PLS OXIMETRY 1: CPT

## 2019-05-25 PROCEDURE — 84439 ASSAY OF FREE THYROXINE: CPT

## 2019-05-25 PROCEDURE — 84443 ASSAY THYROID STIM HORMONE: CPT

## 2019-05-25 PROCEDURE — 700105 HCHG RX REV CODE 258: Performed by: EMERGENCY MEDICINE

## 2019-05-25 PROCEDURE — 700102 HCHG RX REV CODE 250 W/ 637 OVERRIDE(OP): Performed by: EMERGENCY MEDICINE

## 2019-05-25 PROCEDURE — 80053 COMPREHEN METABOLIC PANEL: CPT

## 2019-05-25 PROCEDURE — 700111 HCHG RX REV CODE 636 W/ 250 OVERRIDE (IP): Performed by: EMERGENCY MEDICINE

## 2019-05-25 PROCEDURE — 96375 TX/PRO/DX INJ NEW DRUG ADDON: CPT

## 2019-05-25 PROCEDURE — 93005 ELECTROCARDIOGRAM TRACING: CPT

## 2019-05-25 PROCEDURE — 99291 CRITICAL CARE FIRST HOUR: CPT | Performed by: INTERNAL MEDICINE

## 2019-05-25 PROCEDURE — 96374 THER/PROPH/DIAG INJ IV PUSH: CPT

## 2019-05-25 PROCEDURE — 93005 ELECTROCARDIOGRAM TRACING: CPT | Performed by: EMERGENCY MEDICINE

## 2019-05-25 PROCEDURE — 84100 ASSAY OF PHOSPHORUS: CPT

## 2019-05-25 PROCEDURE — 99291 CRITICAL CARE FIRST HOUR: CPT

## 2019-05-25 PROCEDURE — 84484 ASSAY OF TROPONIN QUANT: CPT

## 2019-05-25 PROCEDURE — 700105 HCHG RX REV CODE 258: Performed by: STUDENT IN AN ORGANIZED HEALTH CARE EDUCATION/TRAINING PROGRAM

## 2019-05-25 PROCEDURE — 71045 X-RAY EXAM CHEST 1 VIEW: CPT

## 2019-05-25 PROCEDURE — 770022 HCHG ROOM/CARE - ICU (200)

## 2019-05-25 PROCEDURE — A9270 NON-COVERED ITEM OR SERVICE: HCPCS | Performed by: EMERGENCY MEDICINE

## 2019-05-25 PROCEDURE — 36415 COLL VENOUS BLD VENIPUNCTURE: CPT

## 2019-05-25 PROCEDURE — 700101 HCHG RX REV CODE 250: Performed by: STUDENT IN AN ORGANIZED HEALTH CARE EDUCATION/TRAINING PROGRAM

## 2019-05-25 RX ORDER — PROMETHAZINE HYDROCHLORIDE 25 MG/1
12.5-25 SUPPOSITORY RECTAL EVERY 4 HOURS PRN
Status: DISCONTINUED | OUTPATIENT
Start: 2019-05-25 | End: 2019-05-29 | Stop reason: HOSPADM

## 2019-05-25 RX ORDER — THIAMINE MONONITRATE (VIT B1) 100 MG
100 TABLET ORAL DAILY
Status: DISCONTINUED | OUTPATIENT
Start: 2019-05-26 | End: 2019-05-27

## 2019-05-25 RX ORDER — CALCIUM CHLORIDE 100 MG/ML
1 INJECTION INTRAVENOUS; INTRAVENTRICULAR ONCE
Status: DISCONTINUED | OUTPATIENT
Start: 2019-05-25 | End: 2019-05-25

## 2019-05-25 RX ORDER — BISACODYL 10 MG
10 SUPPOSITORY, RECTAL RECTAL
Status: DISCONTINUED | OUTPATIENT
Start: 2019-05-25 | End: 2019-05-29 | Stop reason: HOSPADM

## 2019-05-25 RX ORDER — PHENOBARBITAL SODIUM 130 MG/ML
130 INJECTION INTRAMUSCULAR; INTRAVENOUS
Status: DISCONTINUED | OUTPATIENT
Start: 2019-05-25 | End: 2019-05-26

## 2019-05-25 RX ORDER — CHLORDIAZEPOXIDE HYDROCHLORIDE 25 MG/1
25 CAPSULE, GELATIN COATED ORAL ONCE
Status: COMPLETED | OUTPATIENT
Start: 2019-05-25 | End: 2019-05-25

## 2019-05-25 RX ORDER — HEPARIN SODIUM 1000 [USP'U]/ML
3800 INJECTION, SOLUTION INTRAVENOUS; SUBCUTANEOUS PRN
Status: DISCONTINUED | OUTPATIENT
Start: 2019-05-25 | End: 2019-05-26

## 2019-05-25 RX ORDER — LORAZEPAM 2 MG/ML
4 INJECTION INTRAMUSCULAR
Status: DISCONTINUED | OUTPATIENT
Start: 2019-05-25 | End: 2019-05-25

## 2019-05-25 RX ORDER — FOLIC ACID 1 MG/1
1 TABLET ORAL DAILY
Status: DISCONTINUED | OUTPATIENT
Start: 2019-05-26 | End: 2019-05-27

## 2019-05-25 RX ORDER — LORAZEPAM 2 MG/ML
2 INJECTION INTRAMUSCULAR
Status: DISCONTINUED | OUTPATIENT
Start: 2019-05-25 | End: 2019-05-25

## 2019-05-25 RX ORDER — MAGNESIUM SULFATE HEPTAHYDRATE 40 MG/ML
2 INJECTION, SOLUTION INTRAVENOUS ONCE
Status: COMPLETED | OUTPATIENT
Start: 2019-05-25 | End: 2019-05-26

## 2019-05-25 RX ORDER — CETIRIZINE HYDROCHLORIDE 10 MG/1
10 TABLET ORAL DAILY
Status: ON HOLD | COMMUNITY
End: 2019-10-16

## 2019-05-25 RX ORDER — PHENOBARBITAL SODIUM 130 MG/ML
260 INJECTION INTRAMUSCULAR; INTRAVENOUS
Status: DISCONTINUED | OUTPATIENT
Start: 2019-05-25 | End: 2019-05-26

## 2019-05-25 RX ORDER — DILTIAZEM HYDROCHLORIDE 5 MG/ML
25 INJECTION INTRAVENOUS ONCE
Status: COMPLETED | OUTPATIENT
Start: 2019-05-25 | End: 2019-05-25

## 2019-05-25 RX ORDER — SODIUM CHLORIDE 9 MG/ML
1000 INJECTION, SOLUTION INTRAVENOUS ONCE
Status: COMPLETED | OUTPATIENT
Start: 2019-05-25 | End: 2019-05-26

## 2019-05-25 RX ORDER — SODIUM CHLORIDE 9 MG/ML
1000 INJECTION, SOLUTION INTRAVENOUS ONCE
Status: COMPLETED | OUTPATIENT
Start: 2019-05-25 | End: 2019-05-25

## 2019-05-25 RX ORDER — LORAZEPAM 2 MG/ML
1 INJECTION INTRAMUSCULAR
Status: DISCONTINUED | OUTPATIENT
Start: 2019-05-25 | End: 2019-05-25

## 2019-05-25 RX ORDER — SODIUM CHLORIDE, SODIUM LACTATE, POTASSIUM CHLORIDE, CALCIUM CHLORIDE 600; 310; 30; 20 MG/100ML; MG/100ML; MG/100ML; MG/100ML
INJECTION, SOLUTION INTRAVENOUS CONTINUOUS
Status: DISCONTINUED | OUTPATIENT
Start: 2019-05-25 | End: 2019-05-27

## 2019-05-25 RX ORDER — LORAZEPAM 2 MG/ML
3 INJECTION INTRAMUSCULAR
Status: DISCONTINUED | OUTPATIENT
Start: 2019-05-25 | End: 2019-05-25

## 2019-05-25 RX ORDER — PROMETHAZINE HYDROCHLORIDE 25 MG/1
12.5-25 TABLET ORAL EVERY 4 HOURS PRN
Status: DISCONTINUED | OUTPATIENT
Start: 2019-05-25 | End: 2019-05-29 | Stop reason: HOSPADM

## 2019-05-25 RX ORDER — FOLIC ACID 1 MG/1
1 TABLET ORAL DAILY
Status: DISCONTINUED | OUTPATIENT
Start: 2019-05-26 | End: 2019-05-25

## 2019-05-25 RX ORDER — POLYETHYLENE GLYCOL 3350 17 G/17G
1 POWDER, FOR SOLUTION ORAL
Status: DISCONTINUED | OUTPATIENT
Start: 2019-05-25 | End: 2019-05-29 | Stop reason: HOSPADM

## 2019-05-25 RX ORDER — AMOXICILLIN 250 MG
2 CAPSULE ORAL 2 TIMES DAILY
Status: DISCONTINUED | OUTPATIENT
Start: 2019-05-26 | End: 2019-05-29 | Stop reason: HOSPADM

## 2019-05-25 RX ORDER — ONDANSETRON 4 MG/1
4 TABLET, ORALLY DISINTEGRATING ORAL EVERY 4 HOURS PRN
Status: DISCONTINUED | OUTPATIENT
Start: 2019-05-25 | End: 2019-05-29 | Stop reason: HOSPADM

## 2019-05-25 RX ORDER — CHLORDIAZEPOXIDE HYDROCHLORIDE 25 MG/1
25 CAPSULE, GELATIN COATED ORAL 4 TIMES DAILY PRN
Status: ON HOLD | COMMUNITY
End: 2019-10-16

## 2019-05-25 RX ORDER — THIAMINE MONONITRATE (VIT B1) 100 MG
100 TABLET ORAL DAILY
Status: DISCONTINUED | OUTPATIENT
Start: 2019-05-25 | End: 2019-05-25

## 2019-05-25 RX ORDER — ONDANSETRON 2 MG/ML
4 INJECTION INTRAMUSCULAR; INTRAVENOUS EVERY 4 HOURS PRN
Status: DISCONTINUED | OUTPATIENT
Start: 2019-05-25 | End: 2019-05-29 | Stop reason: HOSPADM

## 2019-05-25 RX ORDER — HEPARIN SODIUM 1000 [USP'U]/ML
7000 INJECTION, SOLUTION INTRAVENOUS; SUBCUTANEOUS ONCE
Status: COMPLETED | OUTPATIENT
Start: 2019-05-26 | End: 2019-05-26

## 2019-05-25 RX ORDER — LABETALOL HYDROCHLORIDE 5 MG/ML
10 INJECTION, SOLUTION INTRAVENOUS EVERY 4 HOURS PRN
Status: DISCONTINUED | OUTPATIENT
Start: 2019-05-25 | End: 2019-05-29 | Stop reason: HOSPADM

## 2019-05-25 RX ORDER — HYDROXYZINE PAMOATE 25 MG/1
25-50 CAPSULE ORAL
COMMUNITY
End: 2019-09-19

## 2019-05-25 RX ADMIN — POTASSIUM PHOSPHATE, MONOBASIC AND POTASSIUM PHOSPHATE, DIBASIC 30 MMOL: 224; 236 INJECTION, SOLUTION, CONCENTRATE INTRAVENOUS at 23:53

## 2019-05-25 RX ADMIN — SODIUM CHLORIDE 1000 ML: 9 INJECTION, SOLUTION INTRAVENOUS at 20:35

## 2019-05-25 RX ADMIN — CHLORDIAZEPOXIDE HYDROCHLORIDE 25 MG: 25 CAPSULE ORAL at 19:45

## 2019-05-25 RX ADMIN — SODIUM CHLORIDE 1000 ML: 9 INJECTION, SOLUTION INTRAVENOUS at 19:51

## 2019-05-25 RX ADMIN — CALCIUM GLUCONATE 3 G: 98 INJECTION, SOLUTION INTRAVENOUS at 20:41

## 2019-05-25 RX ADMIN — SODIUM CHLORIDE 1000 ML: 900 INJECTION INTRAVENOUS at 21:30

## 2019-05-25 RX ADMIN — DILTIAZEM HYDROCHLORIDE 25 MG: 5 INJECTION INTRAVENOUS at 19:45

## 2019-05-25 ASSESSMENT — PATIENT HEALTH QUESTIONNAIRE - PHQ9
2. FEELING DOWN, DEPRESSED, IRRITABLE, OR HOPELESS: MORE THAN HALF THE DAYS
SUM OF ALL RESPONSES TO PHQ9 QUESTIONS 1 AND 2: 4
6. FEELING BAD ABOUT YOURSELF - OR THAT YOU ARE A FAILURE OR HAVE LET YOURSELF OR YOUR FAMILY DOWN: SEVERAL DAYS
9. THOUGHTS THAT YOU WOULD BE BETTER OFF DEAD, OR OF HURTING YOURSELF: NOT AT ALL
1. LITTLE INTEREST OR PLEASURE IN DOING THINGS: MORE THAN HALF THE DAYS
5. POOR APPETITE OR OVEREATING: SEVERAL DAYS
7. TROUBLE CONCENTRATING ON THINGS, SUCH AS READING THE NEWSPAPER OR WATCHING TELEVISION: SEVERAL DAYS
4. FEELING TIRED OR HAVING LITTLE ENERGY: SEVERAL DAYS
3. TROUBLE FALLING OR STAYING ASLEEP OR SLEEPING TOO MUCH: MORE THAN HALF THE DAYS
SUM OF ALL RESPONSES TO PHQ QUESTIONS 1-9: 12
8. MOVING OR SPEAKING SO SLOWLY THAT OTHER PEOPLE COULD HAVE NOTICED. OR THE OPPOSITE, BEING SO FIGETY OR RESTLESS THAT YOU HAVE BEEN MOVING AROUND A LOT MORE THAN USUAL: MORE THAN HALF THE DAYS

## 2019-05-25 ASSESSMENT — LIFESTYLE VARIABLES
AVERAGE NUMBER OF DAYS PER WEEK YOU HAVE A DRINK CONTAINING ALCOHOL: 5
DOES PATIENT WANT TO TALK TO SOMEONE ABOUT QUITTING: YES
ALCOHOL_USE: YES
TOTAL SCORE: 4
TOTAL SCORE: 4
CONSUMPTION TOTAL: INCOMPLETE
TREMOR: *
TOTAL SCORE: 4
NAUSEA AND VOMITING: NO NAUSEA AND NO VOMITING
EVER FELT BAD OR GUILTY ABOUT YOUR DRINKING: YES
HEADACHE, FULLNESS IN HEAD: NOT PRESENT
HAVE YOU EVER FELT YOU SHOULD CUT DOWN ON YOUR DRINKING: YES
AGITATION: NORMAL ACTIVITY
ON A TYPICAL DAY WHEN YOU DRINK ALCOHOL HOW MANY DRINKS DO YOU HAVE: 6
HAVE PEOPLE ANNOYED YOU BY CRITICIZING YOUR DRINKING: YES
ANXIETY: *
ORIENTATION AND CLOUDING OF SENSORIUM: ORIENTED AND CAN DO SERIAL ADDITIONS
TOTAL SCORE: 4
DOES PATIENT WANT TO STOP DRINKING: YES
EVER_SMOKED: YES
VISUAL DISTURBANCES: NOT PRESENT
EVER HAD A DRINK FIRST THING IN THE MORNING TO STEADY YOUR NERVES TO GET RID OF A HANGOVER: YES
EVER_SMOKED: YES
AUDITORY DISTURBANCES: NOT PRESENT
PAROXYSMAL SWEATS: NO SWEAT VISIBLE

## 2019-05-25 ASSESSMENT — COPD QUESTIONNAIRES
COPD SCREENING SCORE: 3
HAVE YOU SMOKED AT LEAST 100 CIGARETTES IN YOUR ENTIRE LIFE: YES
DO YOU EVER COUGH UP ANY MUCUS OR PHLEGM?: YES, A FEW DAYS A WEEK OR MONTH
DURING THE PAST 4 WEEKS HOW MUCH DID YOU FEEL SHORT OF BREATH: SOME OF THE TIME
DO YOU EVER COUGH UP ANY MUCUS OR PHLEGM?: NO/ONLY WITH OCCASIONAL COLDS OR INFECTIONS
HAVE YOU SMOKED AT LEAST 100 CIGARETTES IN YOUR ENTIRE LIFE: YES
DURING THE PAST 4 WEEKS HOW MUCH DID YOU FEEL SHORT OF BREATH: SOME OF THE TIME

## 2019-05-26 ENCOUNTER — APPOINTMENT (OUTPATIENT)
Dept: CARDIOLOGY | Facility: MEDICAL CENTER | Age: 34
DRG: 308 | End: 2019-05-26
Attending: STUDENT IN AN ORGANIZED HEALTH CARE EDUCATION/TRAINING PROGRAM
Payer: COMMERCIAL

## 2019-05-26 ENCOUNTER — APPOINTMENT (OUTPATIENT)
Dept: CARDIOLOGY | Facility: MEDICAL CENTER | Age: 34
DRG: 308 | End: 2019-05-26
Attending: HOSPITALIST
Payer: COMMERCIAL

## 2019-05-26 ENCOUNTER — APPOINTMENT (OUTPATIENT)
Dept: RADIOLOGY | Facility: MEDICAL CENTER | Age: 34
DRG: 308 | End: 2019-05-26
Attending: INTERNAL MEDICINE
Payer: COMMERCIAL

## 2019-05-26 PROBLEM — I26.92 ACUTE SADDLE PULMONARY EMBOLISM (HCC): Status: ACTIVE | Noted: 2019-05-26

## 2019-05-26 PROBLEM — I48.0 PAROXYSMAL ATRIAL FIBRILLATION (HCC): Status: ACTIVE | Noted: 2019-05-26

## 2019-05-26 PROBLEM — N17.9 AKI (ACUTE KIDNEY INJURY) (HCC): Status: ACTIVE | Noted: 2019-05-26

## 2019-05-26 PROBLEM — R79.89 ELEVATED TROPONIN: Status: ACTIVE | Noted: 2019-05-26

## 2019-05-26 PROBLEM — E87.8 ELECTROLYTE ABNORMALITY: Status: ACTIVE | Noted: 2019-05-26

## 2019-05-26 PROBLEM — F10.939 ALCOHOL WITHDRAWAL (HCC): Status: ACTIVE | Noted: 2019-05-26

## 2019-05-26 PROBLEM — I10 HYPERTENSION: Status: ACTIVE | Noted: 2019-05-26

## 2019-05-26 LAB
ALBUMIN SERPL BCP-MCNC: 4.3 G/DL (ref 3.2–4.9)
ALBUMIN/GLOB SERPL: 1.8 G/DL
ALP SERPL-CCNC: 50 U/L (ref 30–99)
ALT SERPL-CCNC: 51 U/L (ref 2–50)
ANION GAP SERPL CALC-SCNC: 10 MMOL/L (ref 0–11.9)
APTT PPP: 110 SEC (ref 24.7–36)
APTT PPP: 28.5 SEC (ref 24.7–36)
AST SERPL-CCNC: 25 U/L (ref 12–45)
BASOPHILS # BLD AUTO: 0.4 % (ref 0–1.8)
BASOPHILS # BLD: 0.05 K/UL (ref 0–0.12)
BILIRUB SERPL-MCNC: 0.8 MG/DL (ref 0.1–1.5)
BUN SERPL-MCNC: 18 MG/DL (ref 8–22)
CALCIUM SERPL-MCNC: 9.2 MG/DL (ref 8.5–10.5)
CHLORIDE SERPL-SCNC: 104 MMOL/L (ref 96–112)
CO2 SERPL-SCNC: 23 MMOL/L (ref 20–33)
CREAT SERPL-MCNC: 1.12 MG/DL (ref 0.5–1.4)
EOSINOPHIL # BLD AUTO: 0.01 K/UL (ref 0–0.51)
EOSINOPHIL NFR BLD: 0.1 % (ref 0–6.9)
ERYTHROCYTE [DISTWIDTH] IN BLOOD BY AUTOMATED COUNT: 52 FL (ref 35.9–50)
GLOBULIN SER CALC-MCNC: 2.4 G/DL (ref 1.9–3.5)
GLUCOSE BLD-MCNC: 103 MG/DL (ref 65–99)
GLUCOSE SERPL-MCNC: 104 MG/DL (ref 65–99)
HCT VFR BLD AUTO: 49.2 % (ref 42–52)
HGB BLD-MCNC: 16.9 G/DL (ref 14–18)
IMM GRANULOCYTES # BLD AUTO: 0.04 K/UL (ref 0–0.11)
IMM GRANULOCYTES NFR BLD AUTO: 0.3 % (ref 0–0.9)
LV EJECT FRACT  99904: 55
LYMPHOCYTES # BLD AUTO: 2.69 K/UL (ref 1–4.8)
LYMPHOCYTES NFR BLD: 22.3 % (ref 22–41)
MAGNESIUM SERPL-MCNC: 1.8 MG/DL (ref 1.5–2.5)
MCH RBC QN AUTO: 33.1 PG (ref 27–33)
MCHC RBC AUTO-ENTMCNC: 34.3 G/DL (ref 33.7–35.3)
MCV RBC AUTO: 96.3 FL (ref 81.4–97.8)
MONOCYTES # BLD AUTO: 0.92 K/UL (ref 0–0.85)
MONOCYTES NFR BLD AUTO: 7.6 % (ref 0–13.4)
NEUTROPHILS # BLD AUTO: 8.36 K/UL (ref 1.82–7.42)
NEUTROPHILS NFR BLD: 69.3 % (ref 44–72)
NRBC # BLD AUTO: 0 K/UL
NRBC BLD-RTO: 0 /100 WBC
PHOSPHATE SERPL-MCNC: 3.6 MG/DL (ref 2.5–4.5)
PLATELET # BLD AUTO: 196 K/UL (ref 164–446)
PMV BLD AUTO: 8.7 FL (ref 9–12.9)
POTASSIUM SERPL-SCNC: 4.2 MMOL/L (ref 3.6–5.5)
PROT SERPL-MCNC: 6.7 G/DL (ref 6–8.2)
RBC # BLD AUTO: 5.11 M/UL (ref 4.7–6.1)
SODIUM SERPL-SCNC: 137 MMOL/L (ref 135–145)
TROPONIN I SERPL-MCNC: 0.06 NG/ML (ref 0–0.04)
WBC # BLD AUTO: 12.1 K/UL (ref 4.8–10.8)

## 2019-05-26 PROCEDURE — A9270 NON-COVERED ITEM OR SERVICE: HCPCS | Performed by: STUDENT IN AN ORGANIZED HEALTH CARE EDUCATION/TRAINING PROGRAM

## 2019-05-26 PROCEDURE — 700102 HCHG RX REV CODE 250 W/ 637 OVERRIDE(OP): Performed by: INTERNAL MEDICINE

## 2019-05-26 PROCEDURE — A9270 NON-COVERED ITEM OR SERVICE: HCPCS | Performed by: INTERNAL MEDICINE

## 2019-05-26 PROCEDURE — 770022 HCHG ROOM/CARE - ICU (200)

## 2019-05-26 PROCEDURE — 99233 SBSQ HOSP IP/OBS HIGH 50: CPT | Performed by: INTERNAL MEDICINE

## 2019-05-26 PROCEDURE — 3E03317 INTRODUCTION OF OTHER THROMBOLYTIC INTO PERIPHERAL VEIN, PERCUTANEOUS APPROACH: ICD-10-PCS | Performed by: HOSPITALIST

## 2019-05-26 PROCEDURE — 85730 THROMBOPLASTIN TIME PARTIAL: CPT | Mod: 91

## 2019-05-26 PROCEDURE — 700117 HCHG RX CONTRAST REV CODE 255: Performed by: INTERNAL MEDICINE

## 2019-05-26 PROCEDURE — 700111 HCHG RX REV CODE 636 W/ 250 OVERRIDE (IP): Mod: JW | Performed by: INTERNAL MEDICINE

## 2019-05-26 PROCEDURE — 83735 ASSAY OF MAGNESIUM: CPT

## 2019-05-26 PROCEDURE — 700101 HCHG RX REV CODE 250: Performed by: INTERNAL MEDICINE

## 2019-05-26 PROCEDURE — 700105 HCHG RX REV CODE 258: Performed by: STUDENT IN AN ORGANIZED HEALTH CARE EDUCATION/TRAINING PROGRAM

## 2019-05-26 PROCEDURE — 80053 COMPREHEN METABOLIC PANEL: CPT

## 2019-05-26 PROCEDURE — 82962 GLUCOSE BLOOD TEST: CPT

## 2019-05-26 PROCEDURE — 99292 CRITICAL CARE ADDL 30 MIN: CPT | Performed by: INTERNAL MEDICINE

## 2019-05-26 PROCEDURE — 93306 TTE W/DOPPLER COMPLETE: CPT

## 2019-05-26 PROCEDURE — 93306 TTE W/DOPPLER COMPLETE: CPT | Mod: 26 | Performed by: INTERNAL MEDICINE

## 2019-05-26 PROCEDURE — 85610 PROTHROMBIN TIME: CPT

## 2019-05-26 PROCEDURE — 71275 CT ANGIOGRAPHY CHEST: CPT

## 2019-05-26 PROCEDURE — 700111 HCHG RX REV CODE 636 W/ 250 OVERRIDE (IP): Performed by: STUDENT IN AN ORGANIZED HEALTH CARE EDUCATION/TRAINING PROGRAM

## 2019-05-26 PROCEDURE — 84100 ASSAY OF PHOSPHORUS: CPT

## 2019-05-26 PROCEDURE — 84484 ASSAY OF TROPONIN QUANT: CPT

## 2019-05-26 PROCEDURE — 700111 HCHG RX REV CODE 636 W/ 250 OVERRIDE (IP): Performed by: INTERNAL MEDICINE

## 2019-05-26 PROCEDURE — 700117 HCHG RX CONTRAST REV CODE 255: Performed by: STUDENT IN AN ORGANIZED HEALTH CARE EDUCATION/TRAINING PROGRAM

## 2019-05-26 PROCEDURE — 99291 CRITICAL CARE FIRST HOUR: CPT | Performed by: INTERNAL MEDICINE

## 2019-05-26 PROCEDURE — 700102 HCHG RX REV CODE 250 W/ 637 OVERRIDE(OP): Performed by: STUDENT IN AN ORGANIZED HEALTH CARE EDUCATION/TRAINING PROGRAM

## 2019-05-26 PROCEDURE — 700105 HCHG RX REV CODE 258: Performed by: INTERNAL MEDICINE

## 2019-05-26 PROCEDURE — 85025 COMPLETE CBC W/AUTO DIFF WBC: CPT

## 2019-05-26 RX ORDER — HEPARIN SODIUM 1000 [USP'U]/ML
4000 INJECTION, SOLUTION INTRAVENOUS; SUBCUTANEOUS PRN
Status: DISCONTINUED | OUTPATIENT
Start: 2019-05-26 | End: 2019-05-27

## 2019-05-26 RX ORDER — CHLORDIAZEPOXIDE HYDROCHLORIDE 25 MG/1
50 CAPSULE, GELATIN COATED ORAL ONCE
Status: COMPLETED | OUTPATIENT
Start: 2019-05-26 | End: 2019-05-26

## 2019-05-26 RX ORDER — CHLORDIAZEPOXIDE HYDROCHLORIDE 25 MG/1
25 CAPSULE, GELATIN COATED ORAL EVERY 6 HOURS PRN
Status: DISCONTINUED | OUTPATIENT
Start: 2019-05-26 | End: 2019-05-27

## 2019-05-26 RX ORDER — MAGNESIUM SULFATE HEPTAHYDRATE 40 MG/ML
2 INJECTION, SOLUTION INTRAVENOUS ONCE
Status: COMPLETED | OUTPATIENT
Start: 2019-05-26 | End: 2019-05-26

## 2019-05-26 RX ORDER — SODIUM CHLORIDE 9 MG/ML
INJECTION, SOLUTION INTRAVENOUS ONCE
Status: COMPLETED | OUTPATIENT
Start: 2019-05-26 | End: 2019-05-26

## 2019-05-26 RX ORDER — OMEPRAZOLE 20 MG/1
20 CAPSULE, DELAYED RELEASE ORAL DAILY
Status: DISCONTINUED | OUTPATIENT
Start: 2019-05-26 | End: 2019-05-29 | Stop reason: HOSPADM

## 2019-05-26 RX ADMIN — SODIUM CHLORIDE, POTASSIUM CHLORIDE, SODIUM LACTATE AND CALCIUM CHLORIDE: 600; 310; 30; 20 INJECTION, SOLUTION INTRAVENOUS at 10:05

## 2019-05-26 RX ADMIN — THERA TABS 1 TABLET: TAB at 04:37

## 2019-05-26 RX ADMIN — IOHEXOL 100 ML: 350 INJECTION, SOLUTION INTRAVENOUS at 10:35

## 2019-05-26 RX ADMIN — PHENOBARBITAL SODIUM 260 MG: 130 INJECTION INTRAMUSCULAR; INTRAVENOUS at 04:30

## 2019-05-26 RX ADMIN — HEPARIN SODIUM 1450 UNITS: 5000 INJECTION, SOLUTION INTRAVENOUS at 03:14

## 2019-05-26 RX ADMIN — HEPARIN SODIUM 1000 UNITS/HR: 5000 INJECTION, SOLUTION INTRAVENOUS at 16:23

## 2019-05-26 RX ADMIN — MAGNESIUM SULFATE IN WATER 2 G: 40 INJECTION, SOLUTION INTRAVENOUS at 10:05

## 2019-05-26 RX ADMIN — CHLORDIAZEPOXIDE HYDROCHLORIDE 50 MG: 25 CAPSULE ORAL at 10:02

## 2019-05-26 RX ADMIN — CHLORDIAZEPOXIDE HYDROCHLORIDE 25 MG: 25 CAPSULE ORAL at 15:06

## 2019-05-26 RX ADMIN — ONDANSETRON 4 MG: 2 INJECTION INTRAMUSCULAR; INTRAVENOUS at 01:24

## 2019-05-26 RX ADMIN — PHENOBARBITAL SODIUM 130 MG: 130 INJECTION INTRAMUSCULAR; INTRAVENOUS at 03:03

## 2019-05-26 RX ADMIN — HEPARIN SODIUM 7000 UNITS: 1000 INJECTION, SOLUTION INTRAVENOUS; SUBCUTANEOUS at 02:15

## 2019-05-26 RX ADMIN — OMEPRAZOLE 20 MG: 20 CAPSULE, DELAYED RELEASE ORAL at 16:18

## 2019-05-26 RX ADMIN — ALTEPLASE 40 MG: KIT at 12:35

## 2019-05-26 RX ADMIN — THIAMINE HYDROCHLORIDE: 100 INJECTION, SOLUTION INTRAMUSCULAR; INTRAVENOUS at 01:30

## 2019-05-26 RX ADMIN — HUMAN ALBUMIN MICROSPHERES AND PERFLUTREN 3 ML: 10; .22 INJECTION, SOLUTION INTRAVENOUS at 10:56

## 2019-05-26 RX ADMIN — HEPARIN SODIUM 4000 UNITS: 1000 INJECTION, SOLUTION INTRAVENOUS; SUBCUTANEOUS at 16:22

## 2019-05-26 RX ADMIN — FOLIC ACID 1 MG: 1 TABLET ORAL at 04:37

## 2019-05-26 RX ADMIN — MAGNESIUM SULFATE IN WATER 2 G: 40 INJECTION, SOLUTION INTRAVENOUS at 01:31

## 2019-05-26 RX ADMIN — ALTEPLASE 10 MG: KIT at 12:30

## 2019-05-26 RX ADMIN — Medication 100 MG: at 04:37

## 2019-05-26 RX ADMIN — ASPIRIN 81 MG: 81 TABLET, COATED ORAL at 10:02

## 2019-05-26 RX ADMIN — SODIUM CHLORIDE: 9 INJECTION, SOLUTION INTRAVENOUS at 14:35

## 2019-05-26 RX ADMIN — SODIUM CHLORIDE, POTASSIUM CHLORIDE, SODIUM LACTATE AND CALCIUM CHLORIDE: 600; 310; 30; 20 INJECTION, SOLUTION INTRAVENOUS at 22:57

## 2019-05-26 ASSESSMENT — ENCOUNTER SYMPTOMS
NERVOUS/ANXIOUS: 0
VOMITING: 0
NAUSEA: 0
TREMORS: 1
GASTROINTESTINAL NEGATIVE: 1
SPEECH CHANGE: 0
CHILLS: 0
DEPRESSION: 0
BACK PAIN: 0
HALLUCINATIONS: 0
SHORTNESS OF BREATH: 0
WHEEZING: 0
MYALGIAS: 0
COUGH: 0
INSOMNIA: 1
DIZZINESS: 0
HEARTBURN: 0
HEADACHES: 0
NERVOUS/ANXIOUS: 1
FLANK PAIN: 0
EYES NEGATIVE: 1
SHORTNESS OF BREATH: 1
SPUTUM PRODUCTION: 0
FATIGUE: 0
WEAKNESS: 0
FOCAL WEAKNESS: 0
WEAKNESS: 1
BLURRED VISION: 0
CARDIOVASCULAR NEGATIVE: 1
FEVER: 0
BRUISES/BLEEDS EASILY: 0
PALPITATIONS: 1
ABDOMINAL PAIN: 0
SEIZURES: 0
SORE THROAT: 0

## 2019-05-26 ASSESSMENT — LIFESTYLE VARIABLES
ORIENTATION AND CLOUDING OF SENSORIUM: ORIENTED AND CAN DO SERIAL ADDITIONS
VISUAL DISTURBANCES: NOT PRESENT
PAROXYSMAL SWEATS: NO SWEAT VISIBLE
AGITATION: NORMAL ACTIVITY
TREMOR: *
TOTAL SCORE: 6
AUDITORY DISTURBANCES: NOT PRESENT
TREMOR: *
AUDITORY DISTURBANCES: NOT PRESENT
ANXIETY: *
SUBSTANCE_ABUSE: 1
ANXIETY: *
NAUSEA AND VOMITING: NO NAUSEA AND NO VOMITING
VISUAL DISTURBANCES: NOT PRESENT
ORIENTATION AND CLOUDING OF SENSORIUM: ORIENTED AND CAN DO SERIAL ADDITIONS
HEADACHE, FULLNESS IN HEAD: NOT PRESENT
PAROXYSMAL SWEATS: NO SWEAT VISIBLE
AGITATION: *
HEADACHE, FULLNESS IN HEAD: NOT PRESENT
ORIENTATION AND CLOUDING OF SENSORIUM: ORIENTED AND CAN DO SERIAL ADDITIONS
PAROXYSMAL SWEATS: NO SWEAT VISIBLE
AGITATION: *
NAUSEA AND VOMITING: NO NAUSEA AND NO VOMITING
ORIENTATION AND CLOUDING OF SENSORIUM: ORIENTED AND CAN DO SERIAL ADDITIONS
TOTAL SCORE: 4
TOTAL SCORE: 4
TREMOR: *
ANXIETY: *
ANXIETY: *
AGITATION: NORMAL ACTIVITY
PAROXYSMAL SWEATS: NO SWEAT VISIBLE
AUDITORY DISTURBANCES: NOT PRESENT
HEADACHE, FULLNESS IN HEAD: NOT PRESENT
NAUSEA AND VOMITING: NO NAUSEA AND NO VOMITING
NAUSEA AND VOMITING: NO NAUSEA AND NO VOMITING
TREMOR: *
VISUAL DISTURBANCES: NOT PRESENT
AUDITORY DISTURBANCES: NOT PRESENT
HEADACHE, FULLNESS IN HEAD: NOT PRESENT
VISUAL DISTURBANCES: NOT PRESENT
TOTAL SCORE: 8

## 2019-05-26 NOTE — PROGRESS NOTES
Cardiology Follow Up Progress Note    Date of Service  5/26/2019    Attending Physician  Luis Raza M.D.    Chief Complaint   Atrial fibrillation    Interim Events  Patient is upset this morning.  Wants to go home.  Wants to know about anticoagulation. 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  He was on aspirin as an outpatient.  Wants to be off of the heparin drip.    Patient underwent a CT scan this morning and was found to have extensive bilateral pulmonary emboli.  Patient denies any chest discomfort.    Review of Systems  Review of Systems   Constitutional: Negative for fatigue.   Respiratory: Negative for shortness of breath.    Cardiovascular: Negative for chest pain.   Gastrointestinal: Negative for abdominal pain.   Neurological: Negative for dizziness.   All other systems reviewed and are negative.    Vital signs in last 24 hours  Temp:  [36 °C (96.8 °F)-37 °C (98.6 °F)] 36.8 °C (98.2 °F)  Pulse:  [44-98] 89  Resp:  [14-46] 25  BP: ()/(42-81) 122/81  SpO2:  [88 %-100 %] 98 %    Physical Exam  Physical Exam   Constitutional: He is oriented to person, place, and time. He appears well-developed and well-nourished. No distress.   Eyes: Conjunctivae are normal.   Neck: Normal range of motion. Neck supple.   Cardiovascular: Normal rate, regular rhythm and intact distal pulses.  Exam reveals no gallop and no friction rub.    No murmur heard.  Pulmonary/Chest: Effort normal and breath sounds normal. He has no wheezes. He has no rales.   Abdominal: Soft. There is no tenderness.   Musculoskeletal: He exhibits no edema.   Neurological: He is alert and oriented to person, place, and time.   Skin: Skin is warm and dry. He is not diaphoretic.   Psychiatric: He has a normal mood and affect.   Nursing note and vitals reviewed.      Lab Review  Lab Results   Component Value Date/Time    WBC 12.1 (H) 05/26/2019 02:15 AM    RBC 5.11 05/26/2019 02:15 AM    HEMOGLOBIN 16.9 05/26/2019  02:15 AM    HEMATOCRIT 49.2 05/26/2019 02:15 AM    MCV 96.3 05/26/2019 02:15 AM    MCH 33.1 (H) 05/26/2019 02:15 AM    MCHC 34.3 05/26/2019 02:15 AM    MPV 8.7 (L) 05/26/2019 02:15 AM      Lab Results   Component Value Date/Time    SODIUM 137 05/26/2019 02:15 AM    POTASSIUM 4.2 05/26/2019 02:15 AM    CHLORIDE 104 05/26/2019 02:15 AM    CO2 23 05/26/2019 02:15 AM    GLUCOSE 104 (H) 05/26/2019 02:15 AM    BUN 18 05/26/2019 02:15 AM    CREATININE 1.12 05/26/2019 02:15 AM      Lab Results   Component Value Date/Time    ASTSGOT 25 05/26/2019 02:15 AM    ALTSGPT 51 (H) 05/26/2019 02:15 AM     Lab Results   Component Value Date/Time    TROPONINI 0.06 (H) 05/26/2019 02:15 AM       Labs reviewed and showed normal creatinine.  Normal hemoglobin.    Cardiac Imaging and Procedures Review    Telemetry reviewed and showed sinus rhythm since the patient has been in the ICU.    Echocardiogram performed today was personally reviewed and per my interpretation showed normal LV function.  Dilated RV with reduced function.  RVSP in the mid 50s.  Concern for Salcedo's sign.    Assessment/Plan    Atrial fibrillation:  Bilateral pulmonary emboli:  RV strain:  Elevated troponin:    Patient's atrial fibrillation is likely a secondary to his underlying alcoholism in the setting of bilateral acute pulmonary emboli.  I have discussed the echocardiographic findings with the [facility] resident, Dr. Lizarraga personally.  Patient does demonstrate echocardiographic evidence of right ventricular strain.  Will defer to primary team about TPA administration.  When I initially evaluated the patient, he was already in sinus rhythm.  His CHADS-Vasc score is only one for hypertension.  I therefore discontinue his heparin drip and start him on aspirin instead. With his new diagnosis of pulmonary emboli, he will be on anticoagulation and I defer this decision to the primary team.  We have discussed with the patient about the importance of quitting alcohol to  prevent recurrent atrial fibrillation.    His mildly elevated troponin are likely demand ischemia in the setting of bilateral pulmonary emboli and RV strain.  I would recommend that he be continued on aspirin while being on anticoagulation.    Will sign off. Please call with questions. Thank you.     Latasha Crook MD  Cardiologist  [facility]

## 2019-05-26 NOTE — ASSESSMENT & PLAN NOTE
Known hx afib  May need to be on long term anticoagulation  May be exac by PE  Restarted atenolol  Started xarelto

## 2019-05-26 NOTE — ASSESSMENT & PLAN NOTE
- Drinks about 1/2-3/4 of a pint of whiskey along with 2-3 beers daily for the last 2 months  - Last drink around 5 days prior to admission, agitated, no other signs of withdrawal  - Phenobarbital protocol briefly after admission  - Rally bag on admit  - CIWA discontinued as no signs of withdrawal.    Plan:  - On thiamine/folate/multivitamins  - Seizure/fall/aspiration precautions  - PCP to continue cessation counseling.

## 2019-05-26 NOTE — PROGRESS NOTES
AAO x 4. VSS. Pt demonstrtaing increased agitation as the night progresses. Pt states his facial tic/tremor that he does not recognize and is new for him. Expressing concerns about taking Ativan due to minimal effect according to patient. MD contacted, Ativan changed to phenobarbital. Pt states he wants to go home, complains of not having toilet in room. Pt encouraged to continue use of pheno and he expresses concerns, pt re-educated about purpose of med. Pt refused EKG tech when staff came to room.   Heparin infusi

## 2019-05-26 NOTE — ASSESSMENT & PLAN NOTE
- Borderline low BP, bradycardia after IV diltiazem in the ED, BP improved with fluids.  - On home atenolol, lisinopril.  - Advised the need for treatment of sleep apnea.

## 2019-05-26 NOTE — ED NOTES
Pt became diaphoretic, and cool. HR slowed down after diltiazem to 40-50 range.   ERP called to room  Repeat EKG performed.   Second PIV established.   Pt placed on pads, crash cart into room.  Pressure bags applied to fluids.   Charge RN aware of pt.  Pt aware of situation and included in POC. Pt remained A&O4.

## 2019-05-26 NOTE — ASSESSMENT & PLAN NOTE
Per cardiology, likely due to demand ischemia from atrial fibrillation in setting of alcoholism and bilateral PE.  Associated with new T wave inversions but no chest pain, troponin trended down.

## 2019-05-26 NOTE — PROGRESS NOTES
Med Rec Updated and Complete per Pt at bedside with permission to do so in front of family/visitor  Allergies Reviewed  No PO ABX last 30 days.    Pt taking LD ASA daily.    Pt knows medication history well.

## 2019-05-26 NOTE — CONSULTS
"Cardiology Consult Note    DOS: 5/25/2019    Consulting physician: Leif Hilliard    Chief complaint/Reason for consult: PAF    HPI:  Patient is a 34 yo M with history of anxiety disorder on valium, HTN and PAF on atenolol, and alcohol abuse. For last several months he has been using daily alcohol and in the last week he has been binge drinking on a daily basis. Never had alcohol withdrawal before he says when he was a social drinker. Night before last he came into the ED wanting to \"detox\" and having mild withdrawal symptoms. He was discharged with librium. He came back with symptoms of dizziness, shortness of breath, and something \"stuck in the throat\". These are symptoms typical of his AF episodes and he was found to be in AF with RVR. He was given IV dilt in the ER and subsequently stayed in AF but with slow ventricular response in the 40s, now with borderline BPs. He says this is his third episode of AF. Recently moved to [facility] where he established care recently with Paramjit Ervin cardiologist. He says both his prior episodes associated with EtOH use.    ROS (+ highlighted in red):  Constitutional: Fevers/chills/fatigue/weightloss  HEENT: Blurry vision/eye pain/sore throat/hearing loss  Respiratory: Shortness of breath/cough  Cardiovascular: Chest pain/palpitations/edema/orthopnea/syncope  GI: Nausea/vomitting/diarrhea  MSK: Arthralgias/myagias/muscle weakness  Skin: Rash/sores  Neurological: Numbness/tremors/vertigo  Endocrine: Excessive thirst/polyuria/cold intolerance/heat intolerance  Psych: Depression/anxiety    Past Medical History:   Diagnosis Date   • A-fib ([facility])    • Anxiety    • Depression    • ETOH abuse    • Hypertension        History reviewed. No pertinent surgical history.    Social History     Social History   • Marital status: Single     Spouse name: N/A   • Number of children: N/A   • Years of education: N/A     Occupational History   • Not on file.     Social History Main Topics   • " Smoking status: Former Smoker   • Smokeless tobacco: Never Used   • Alcohol use Yes      Comment: quit 2 days ago. former drinker x 12 yrs   • Drug use: Yes     Types: Inhaled      Comment: marijuana   • Sexual activity: Not on file     Other Topics Concern   • Not on file     Social History Narrative   • No narrative on file       History reviewed. No pertinent family history.    No Known Allergies    Current Facility-Administered Medications   Medication Dose Route Frequency Provider Last Rate Last Dose   • calcium GLUConate 3 g in D5W 250 mL IVPB  3 g Intravenous Once Leif Hilliard M.D.   3 g at 05/25/19 2041   • NS infusion 1,000 mL  1,000 mL Intravenous Once Leif Hilliard M.D.       • [START ON 5/26/2019] senna-docusate (PERICOLACE or SENOKOT S) 8.6-50 MG per tablet 2 Tab  2 Tab Oral BID Marielos Ramírez M.D.        And   • polyethylene glycol/lytes (MIRALAX) PACKET 1 Packet  1 Packet Oral QDAY PRN Marielos Ramírez M.D.        And   • magnesium hydroxide (MILK OF MAGNESIA) suspension 30 mL  30 mL Oral QDAY PRN Marielos Ramírez M.D.        And   • bisacodyl (DULCOLAX) suppository 10 mg  10 mg Rectal QDAY PRN Marielos Ramírez M.D.       • Respiratory Care per Protocol   Nebulization Continuous RT Marielos Ramírez M.D.       • labetalol (NORMODYNE,TRANDATE) injection 10 mg  10 mg Intravenous Q4HRS PRN Marielos Ramírez M.D.       • ondansetron (ZOFRAN) syringe/vial injection 4 mg  4 mg Intravenous Q4HRS PRN Marielos Ramírez M.D.       • ondansetron (ZOFRAN ODT) dispertab 4 mg  4 mg Oral Q4HRS PRN Marielos Ramírez M.D.       • promethazine (PHENERGAN) tablet 12.5-25 mg  12.5-25 mg Oral Q4HRS PRN Marielos Ramírez M.D.       • promethazine (PHENERGAN) suppository 12.5-25 mg  12.5-25 mg Rectal Q4HRS PRN Marielos Ramírez M.D.       • prochlorperazine (COMPAZINE) injection 5-10 mg  5-10 mg Intravenous Q4HRS PRN Marielos Ramírez M.D.       • LORazepam (ATIVAN) injection 4  mg  4 mg Intravenous Q15 MIN PRN Marielos Ramírez M.D.        Or   • LORazepam (ATIVAN) injection 3 mg  3 mg Intravenous Q15 MIN PRN Marielos Ramírez M.D.        Or   • LORazepam (ATIVAN) injection 2 mg  2 mg Intravenous Q15 MIN PRN Marielos Ramírez M.D.        Or   • LORazepam (ATIVAN) injection 1 mg  1 mg Intravenous Q15 MIN PRN Marielos Ramírez M.D.       • thiamine tablet 100 mg  100 mg Oral DAILY Marielos Ramírez M.D.        And   • [START ON 5/26/2019] multivitamin (THERAGRAN) tablet 1 Tab  1 Tab Oral DAILY Marielos Ramírez M.D.        And   • [START ON 5/26/2019] folic acid (FOLVITE) tablet 1 mg  1 mg Oral DAILY Marielos Ramírez M.D.       • magnesium oxide (MAG-OX) tablet 400 mg  400 mg Oral BID Marielos Ramírez M.D.       • lactated ringers infusion   Intravenous Continuous Marielos Ramírez M.D.       • apixaban (ELIQUIS) tablet 5 mg  5 mg Oral BID Franklyn Alvarado M.D.         Current Outpatient Prescriptions   Medication Sig Dispense Refill   • chlordiazePOXIDE (LIBRIUM) 25 MG Cap Take 2 Caps by mouth 4 times a day as needed for Anxiety for up to 3 days. 18 Cap 0   • ALPRAZolam (XANAX) 0.25 MG Tab Take 0.25 mg by mouth 2 times a day as needed for Anxiety.     • aspirin (ASA) 81 MG Chew Tab chewable tablet Take 81 mg by mouth every day.     • Magnesium 500 MG Cap Take  by mouth.     • ZINC OXIDE PO Take  by mouth.     • diazePAM (VALIUM) 5 MG Tab Take 5 mg by mouth 2 times a day as needed for Anxiety.  1   • atenolol (TENORMIN) 100 MG Tab Take 100 mg by mouth every day.     • lisinopril (PRINIVIL, ZESTRIL) 40 MG tablet Take 40 mg by mouth every day.     • Cetirizine-Pseudoephedrine (ZYRTEC-D PO) Take  by mouth.     • omeprazole (PRILOSEC) 20 MG delayed-release capsule Take 20 mg by mouth every day.         Physical Exam:  Vitals:    05/25/19 2104 05/25/19 2122 05/25/19 2130 05/25/19 2145   BP:       Pulse: (!) 51 (!) 48 (!) 53 68   Resp: (!) 46 (!) 22 (!) 26 (!) 23   Temp:        TempSrc:       SpO2: 100% 100% 100% 95%   Weight:       Height:         General appearance: NAD, conversant   Eyes: anicteric sclerae, moist conjunctivae; no lid-lag; PERRLA  HENT: Atraumatic; oropharynx clear with moist mucous membranes and no mucosal ulcerations; normal hard and soft palate  Neck: Trachea midline; FROM, supple, no thyromegaly or lymphadenopathy  Lungs: CTA, with normal respiratory effort and no intercostal retractions  CV: irregular, shai, no MRGs, no JVD   Abdomen: Soft, non-tender; no masses or HSM  Extremities: No peripheral edema or extremity lymphadenopathy  Skin: clammy, normal turgor and texture; no rash, ulcers or subcutaneous nodules  Psych: Appropriate affect, alert and oriented to person, place and time    Data:  Labs reviewed    CXR interpreted by me:  WNL    EKG interpreted by me:   AF w SVR     Prior EKG showing AF w RVR    Impression/Plan:  1)PAF with SVR  2)EtOH abuse/withdrawal  3)HTN  4)Mild LAZARO    -Agree that most of his mild withdrawal symptoms seem to be over   -Given his hemodynamically borderline SVR I would avoid anymore ronna agents  -Agree with IV fluids  -The diltiazem I think is wearing off as his BP is getting better and rate has somewhat improved  -Hopefully his AF resolves spontaneously, I would like to avoid AAD use (I.e. Amio) as it might slow him further right now  -If still in AF in AM, can consider DCCV, I will initiate OAC for him in preparation for this     Franklyn Alvarado MD

## 2019-05-26 NOTE — PROGRESS NOTES
[facility] ICU Progress Note      Admit Date: 5/25/2019  LOS: 1    Resident(s): Neel Lizarraga  Attending: ARI NEWELL/ Dr. Raza    Date & Time:   5/26/2019   1:46 PM       Patient ID:    Name:             Cosme Cabrera   YOB: 1985  Age:                 33 y.o.  male   MRN:               [id number]    ID:  Patient is a 33-year-old man who comes to the ED complaining of a few hours of not being able to swallow well.  He was just in the ED yesterday for alcohol detox.  He was discharged with Librium from the ED.  He does have a history of anxiety disorder on Valium, hypertension and paroxysmal atrial fibrillation on atenolol.  He has a history of about 2 months of daily alcohol abuse.  He drinks about 1/2-1/3 a pint of whiskey daily along with 2-3 beers.  His last drink was Thursday evening.  In the ED, patient was found to be in atrial fibrillation with RVR.  He was given IV diltiazem 25 mg IV, and subsequently his heart rate dropped to the 40s, and BP in the 70s/40s >> increased to 90s/60s with IV fluid boluses.  Patient states that he did take his atenolol this morning.  Cardiology was consulted, and if patient does not spontaneously returned to sinus rhythm, they will cardiovert in the morning.  They are starting anticoagulation in preparation.    Consultants:  PMA: Dr. Raza     Interval Events:  No signs of alcohol withdrawal  On benzo's outpatient, changed to PRN  D/C phenobarb ggt  On 5L oxymask  Checked CT-A chest, showed extensive b/l PE's  STAT echo with some right heart strain, unable to determine of acute or chronic, patient has been having left leg swelling ,chest pressure, SOB for several months now  Discussed options with patient, explained risks, opted for low dose tPa and heparin  Transition to oral anticoagulant tomorrow  Afib resolved spontaneously  Continue ICU care    ROS  Constitutional: Negative for fever and malaise/fatigue.   HENT: Negative for hearing loss.    Eyes:  "Negative for blurred vision.   Respiratory: Negative for cough and shortness of breath.    Cardiovascular: Positive for palpitations. Negative for chest pain and leg swelling.   Gastrointestinal: Negative for abdominal pain, heartburn and nausea.   Genitourinary: Negative for dysuria and frequency.   Musculoskeletal: Negative for joint pain and myalgias.   Skin: Negative for rash.   Neurological: Negative for dizziness, weakness and headaches.   Psychiatric/Behavioral: Positive for substance abuse. Negative for depression and suicidal ideas. The patient is nervous/anxious.        PHYSICAL EXAM  Vitals:    05/26/19 0800 05/26/19 1002 05/26/19 1230 05/26/19 1245   BP:  122/81     Pulse: 85 85 89 90   Resp: (!) 23 (!) 21 19 14   Temp: 36.8 °C (98.2 °F)      TempSrc: Tympanic      SpO2: 95%  97% 95%   Weight:       Height:         Body mass index is 37.36 kg/m².  /81   Pulse 90   Temp 36.8 °C (98.2 °F) (Tympanic)   Resp 14   Ht 1.73 m (5' 8.11\")   Wt 111.8 kg (246 lb 7.6 oz)   SpO2 95%   BMI 37.36 kg/m²   O2 therapy: Pulse Oximetry: 95 %, O2 (LPM): 5, O2 Delivery: Oxymask    Physical Exam  Constitutional: He is oriented to person, place, and time and well-developed, well-nourished, and in no distress. On supplemental O2 by oxymask.  Head: Normocephalic and atraumatic.   Eyes: EOM are normal. No scleral icterus.   Neck: Neck supple.   Cardiovascular:   No murmur heard.  Pulmonary/Chest: No respiratory distress. He has no wheezes. He has no rales.   Abdominal: Bowel sounds are normal. He exhibits no distension. There is no tenderness.   Musculoskeletal: He exhibits no edema.   Neurological: He is alert and oriented to person, place, and time. He exhibits normal muscle tone.   Skin: No rash noted.   Psychiatric: Mood, memory, affect and judgment normal.     Respiratory:     Respiration: 14, Pulse Oximetry: 95 %    Chest Tube Drains:          HemoDynamics:  Pulse: 90, Heart Rate (Monitored): 90 Blood Pressure: " 122/81, NIBP: 121/85        Fluids:        Intake/Output Summary (Last 24 hours) at 19 1346  Last data filed at 19 0500   Gross per 24 hour   Intake          4067.96 ml   Output             1600 ml   Net          2467.96 ml       Weight: 111.8 kg (246 lb 7.6 oz)  Body mass index is 37.36 kg/m².    Recent Labs      19   SODIUM  132*  137   POTASSIUM  4.0  4.2   CHLORIDE  101  104   CO2  20  23   BUN  21  18   CREATININE  1.22  1.12   MAGNESIUM  1.9  1.8   PHOSPHORUS  2.1*  3.6   CALCIUM  9.2  9.2       GI/Nutrition:  Recent Labs      19   ALTSGPT  55*  51*   ASTSGOT  31  25   ALKPHOSPHAT  56  50   TBILIRUBIN  0.9  0.8   GLUCOSE  98  104*       Heme:  Recent Labs      19   0813   RBC  5.55  5.11   --    HEMOGLOBIN  17.8  16.9   --    HEMATOCRIT  53.1*  49.2   --    PLATELETCT  223  196   --    APTT   --   28.5  110.0*       Infectious Disease:  Temp  Av.2 °C (97.1 °F)  Min: 36 °C (96.8 °F)  Max: 36.8 °C (98.2 °F)  Recent Labs      19   WBC  12.5*  12.1*   NEUTSPOLYS  58.30  69.30   LYMPHOCYTES  29.30  22.30   MONOCYTES  11.10  7.60   EOSINOPHILS  0.40  0.10   BASOPHILS  0.60  0.40   ASTSGOT  31  25   ALTSGPT  55*  51*   ALKPHOSPHAT  56  50   TBILIRUBIN  0.9  0.8       Meds:  • aspirin EC  81 mg     • chlordiazePOXIDE  25 mg     • alteplase  40 mg      Followed by   • NS       • heparin  4,000 Units      And   • heparin       • senna-docusate  2 Tab      And   • polyethylene glycol/lytes  1 Packet      And   • magnesium hydroxide  30 mL      And   • bisacodyl  10 mg     • Respiratory Care per Protocol       • labetalol  10 mg     • ondansetron  4 mg     • ondansetron  4 mg     • promethazine  12.5-25 mg     • promethazine  12.5-25 mg     • prochlorperazine  5-10 mg     • magnesium oxide  400 mg     • LR   Stopped (19 1006)   • Pharmacy       • thiamine  100 mg      And    • multivitamin  1 Tab      And   • folic acid  1 mg          Procedures:  tPa 5/26    Imaging:  EC-ECHOCARDIOGRAM COMPLETE W/ CONT   Final Result      CT-CTA CHEST PULMONARY ARTERY W/ RECONS   Final Result      1.  Extensive bilateral pulmonary emboli including main pulmonary artery.      2.  Elevated RV LV ratio.         Results called to Dr. Raygoza      DX-CHEST-PORTABLE (1 VIEW)   Final Result      Negative single view of the chest.          Problem and Plan:       Extensive bilateral Pulmonary Embolus   Assessment & Plan    Seen on CT-A chest  On supplemental O2   Give low dose thrombolytic therapy after telling patient of risks  Heparin ggt overnight  Transition to PO anticoagulation tomorrow        Paroxysmal atrial fibrillation (HCC)   Assessment & Plan    RESOLVED  Likely been caused by PE + alcohol  Received 20 mg IV diltiazem, and HR dropped to 40s with borderline low BP  UDS positive for cannabis and benzos  Cardiology consulted, placed on heparin ggt for possible cardiovesion  Back into NSR overnight, D/C heaprin ggt  ECHO EF 55%, increased RV pressure, hard to tell if acute or chronic     Alcohol withdrawal (HCC)   Assessment & Plan    Drinks about 1/2-3/4 of a pint of whiskey along with 2-3 beers daily for the last 2 months  Discharged from ED 1 day prior admision on Librium, to detox at home.  Phenobarbital D/C'ed  S/p Rally bag  Thiamine/folate/multivitamins  Seizure/fall/aspiration precautions  Back on home librium PRN     LAZARO (acute kidney injury) (HCC)   Assessment & Plan    Improving  2 L IV bolus  On maintenance IV fluid  Avoid nephrotoxins  Monitor     Electrolyte abnormality   Assessment & Plan    Magnesium, phosphate epleted with 2 g IV  Monitor     Elevated troponin   Assessment & Plan    Likely due to demand ischemia from atrial fibrillation and PE  Associated with new T wave inversions  D/C heparin     Hypertension   Assessment & Plan    Borderline low after IV diltiazem in the ED  Hold  atenolol  Received 2 L IV fluid bolus  IV labetalol PRN         DISPO: RICU    CODE STATUS: Full    Quality Measures:  Srivastava Catheter: None  DVT Prophylaxis: Heparin ggt  Ulcer Prophylaxis: None  Antibiotics: None  Lines: PIV

## 2019-05-26 NOTE — PROGRESS NOTES
"Critical Care Progress Note    Date of admission  5/25/2019    Chief Complaint \"feels poorly and anxious\"      Hospital Course  33 y.o. male who presented 5/25/2019 with a past medical history significant for alcohol abuse complicated by paroxysmal atrial fibrillation whenever he drinks too much, anxiety, depression, hypertension who was seen in the emergency department on Thursday night for alcohol withdrawal desiring assistance in quitting.  He was discharged on Librium and was currently in normal sinus rhythm at that time.  He went home and went to bed and when he awoke the next morning he went out to work in the yard and noticed generalized malaise, fatigue and shortness of breath with palpitations.  The shortness of breath progressively worsened to the point that he could barely walk 25 feet.  He decided to seek medical attention and presented to the emergency department where he was found to be in atrial fibrillation with rapid ventricular response.  He was given 25 mg of IV diltiazem which unfortunately dropped his blood pressure to 70/40 and his heart rate down to 40 in sinus bradycardia without complete heart block.  Patient notes that he had taken 100 mg of oral atenolol earlier in the day.  Cardiology was consulted in the ED and recommended IV fluids and anticoagulation in preparation for possible defibrillation in the morning.  Patient states he is seen cardiology in [place] for this in the past and was not started on any medications as it seemed to only occur when he was going through alcohol withdrawal.    Interval Problem Update  Reviewed last 24 hour events:  Requiring 5 L of oxygen to maintain sats of 92 this morning when walking desaturates into the mid 80s  Chest x-ray unremarkable  Based on the above I obtained a stat CT a of the chest.  Patient was noted to have submassive clot or large burden of clot as an saddle emboli.  Echocardiogram shows likely acute RV strain  Patient given low-dose " TPA 50 mg  Started on heparin with in anticipation of starting apixaban tomorrow.  Patient converted to sinus rhythm with heart rate of 80-90 no further antiarrhythmics given blood pressure 1 100-1 30 over over 70  Respiratory rate 18-24  No clinical evidence of active withdrawal, patient is a tremulous not diaphoretic afebrile not tachycardic.  However he states he feels anxious and is withdrawing from the chronic benzodiazepines he is been taking for 12 months.  Patient asked to be on a benzodiazepine as needed schedule and not given routinely  Echo as noted above RV strain some pulmonary hypertension please see full reading cardiology note    Review of Systems  Review of Systems   Constitutional: Positive for malaise/fatigue. Negative for chills and fever.   HENT: Negative.    Eyes: Negative.    Respiratory: Positive for shortness of breath.    Cardiovascular: Negative.    Gastrointestinal: Negative.    Genitourinary: Negative.    Skin: Negative.    Neurological: Positive for weakness.        She feels anxious with butterflies but no palpitations tremor history of seizures   Psychiatric/Behavioral: The patient is nervous/anxious and has insomnia.         Vital Signs for last 24 hours   Temp:  [36 °C (96.8 °F)-36.8 °C (98.2 °F)] 36.8 °C (98.2 °F)  Pulse:  [44-98] 90  Resp:  [14-46] 14  BP: ()/(42-81) 122/81  SpO2:  [88 %-100 %] 95 %    Hemodynamic parameters for last 24 hours       Respiratory Information for the last 24 hours       Physical Exam   Physical Exam   Constitutional: He is oriented to person, place, and time. He appears well-developed and well-nourished.   HENT:   Head: Normocephalic and atraumatic.   Eyes: Pupils are equal, round, and reactive to light. Conjunctivae are normal.   Neck: Neck supple.   Cardiovascular: Normal rate and regular rhythm.    Pulmonary/Chest: Effort normal and breath sounds normal.   Abdominal: Soft. Bowel sounds are normal.   Musculoskeletal: Normal range of motion.    Neurological: He is alert and oriented to person, place, and time.   Skin: Skin is warm and dry.   Psychiatric:   Patient perseverates on his benzodiazepines and sedative drugs that he has been getting for the past 12 months.  Adamant to ensure those continue.   Nursing note and vitals reviewed.      Medications  Current Facility-Administered Medications   Medication Dose Route Frequency Provider Last Rate Last Dose   • aspirin EC (ECOTRIN) tablet 81 mg  81 mg Oral DAILY Latasha Crook M.D.   81 mg at 05/26/19 1002   • chlordiazePOXIDE (LIBRIUM) capsule 25 mg  25 mg Oral Q6HRS PRN Neel Lizarraga M.D.       • alteplase (ACTIVASE) SOLR 40 mg  40 mg Intravenous Once Neel Lizarraga M.D.   40 mg at 05/26/19 1235    Followed by   • NS infusion   Intravenous Once Neel Lizarraga M.D.       • heparin injection 4,000 Units  4,000 Units Intravenous PRN Neel Lizarraga M.D.        And   • heparin infusion 25,000 units in 500 ml 0.45% nacl   Intravenous Continuous Neel Lizarraga M.D.       • senna-docusate (PERICOLACE or SENOKOT S) 8.6-50 MG per tablet 2 Tab  2 Tab Oral BID Marielos Ramírez M.D.   Stopped at 05/26/19 0600    And   • polyethylene glycol/lytes (MIRALAX) PACKET 1 Packet  1 Packet Oral QDAY PRN Marielos Ramírez M.D.        And   • magnesium hydroxide (MILK OF MAGNESIA) suspension 30 mL  30 mL Oral QDAY PRN Marielos Ramírez M.D.        And   • bisacodyl (DULCOLAX) suppository 10 mg  10 mg Rectal QDAY PRN Marielos Ramírez M.D.       • Respiratory Care per Protocol   Nebulization Continuous RT Marielos Ramírez M.D.       • labetalol (NORMODYNE,TRANDATE) injection 10 mg  10 mg Intravenous Q4HRS PRN Marielos Ramírez M.D.       • ondansetron (ZOFRAN) syringe/vial injection 4 mg  4 mg Intravenous Q4HRS PRN Marielos Ramírez M.D.   4 mg at 05/26/19 0124   • ondansetron (ZOFRAN ODT) dispertab 4 mg  4 mg Oral Q4HRS PRN Marielos Ramírez M.D.       • promethazine (PHENERGAN) tablet  12.5-25 mg  12.5-25 mg Oral Q4HRS PRN Marielos Ramírez M.D.       • promethazine (PHENERGAN) suppository 12.5-25 mg  12.5-25 mg Rectal Q4HRS PRN Marielos Ramírez M.D.       • prochlorperazine (COMPAZINE) injection 5-10 mg  5-10 mg Intravenous Q4HRS PRN Marielos Ramírez M.D.       • magnesium oxide (MAG-OX) tablet 400 mg  400 mg Oral BID Marielos Ramírez M.D.       • lactated ringers infusion   Intravenous Continuous Marielos Ramírez M.D.   Stopped at 05/26/19 1006   • Pharmacy Consult Request - Benzodiazepine review   Other PHARMACY TO DOSE Jeremy M Gonda, M.D.       • thiamine tablet 100 mg  100 mg Oral DAILY Jeremy M Gonda, M.D.   100 mg at 05/26/19 0437    And   • multivitamin (THERAGRAN) tablet 1 Tab  1 Tab Oral DAILY Jeremy M Gonda, M.D.   1 Tab at 05/26/19 0437    And   • folic acid (FOLVITE) tablet 1 mg  1 mg Oral DAILY Jeremy M Gonda, M.D.   1 mg at 05/26/19 0437       Fluids    Intake/Output Summary (Last 24 hours) at 05/26/19 1344  Last data filed at 05/26/19 0500   Gross per 24 hour   Intake          4067.96 ml   Output             1600 ml   Net          2467.96 ml       Laboratory      Recent Labs      05/25/19 1938 05/26/19 0215   TROPONINI  0.10*  0.06*     Recent Labs      05/25/19 1938 05/26/19 0215   SODIUM  132*  137   POTASSIUM  4.0  4.2   CHLORIDE  101  104   CO2  20  23   BUN  21  18   CREATININE  1.22  1.12   MAGNESIUM  1.9  1.8   PHOSPHORUS  2.1*  3.6   CALCIUM  9.2  9.2     Recent Labs      05/25/19 1938 05/26/19 0215   ALTSGPT  55*  51*   ASTSGOT  31  25   ALKPHOSPHAT  56  50   TBILIRUBIN  0.9  0.8   GLUCOSE  98  104*     Recent Labs      05/25/19 1938 05/26/19 0215   WBC  12.5*  12.1*   NEUTSPOLYS  58.30  69.30   LYMPHOCYTES  29.30  22.30   MONOCYTES  11.10  7.60   EOSINOPHILS  0.40  0.10   BASOPHILS  0.60  0.40   ASTSGOT  31  25   ALTSGPT  55*  51*   ALKPHOSPHAT  56  50   TBILIRUBIN  0.9  0.8     Recent Labs      05/25/19 1938 05/26/19   0217   05/26/19   0813   RBC  5.55  5.11   --    HEMOGLOBIN  17.8  16.9   --    HEMATOCRIT  53.1*  49.2   --    PLATELETCT  223  196   --    APTT   --   28.5  110.0*       Imaging  X-Ray:  I have personally reviewed the images and compared with prior images.    Assessment/Plan  * Paroxysmal atrial fibrillation (HCC)   Assessment & Plan    Likely due to holiday heart/alcohol -was in rapid ventricular response  Check TSH/T4  Check magnesium and replete if low  Echocardiography  Cardiology consultation  Heparin infusion with plans for for possible cardioversion in the morning  Alcohol cessation education strongly encouraged     Alcohol withdrawal (HCC)   Assessment & Plan    Phenobarbital protocol monitoring RASS  Rally bag  Alcohol cessation education/resources to be provided     LAZARO (acute kidney injury) (HCC)   Assessment & Plan    Likely secondary to perfusion/ATN  Monitor creatinine and urine output closely  IV fluid hydration  Avoid nephrotoxins     Electrolyte abnormality   Assessment & Plan    Replete potassium, phosphorus, magnesium and monitor closely     Elevated troponin   Assessment & Plan    Likely secondary to demand ischemia with new T wave inversions on EKG  Trend  Heparin drip     Hypertension   Assessment & Plan    Controlled  Resume beta-blocker when bradycardia resolves, lisinopril          VTE:  Heparin  Ulcer: Not Indicated  Lines: None    I have performed a physical exam and reviewed and updated ROS and Plan today (5/26/2019). In review of yesterday's note (5/25/2019), there are no changes except as documented above.     Discussed patient condition and risk of morbidity and/or mortality with Hospitalist, RN, RT, Pharmacy, Dietary, [employer] [facility] resident, Charge nurse / hot rounds and Patient  The patient remains critically ill.  Critical care time = 80 minutes in directly providing and coordinating critical care and extensive data review.  No time overlap and excludes procedures.

## 2019-05-26 NOTE — H&P
Internal Medicine Admitting History and Physical    Note Author: Marielos Ramírez M.D.       Name Cosme Cabrera     1985   Age/Sex 33 y.o. male   MRN [id number]   Code Status Full     After 5PM or if no immediate response to page, please call for cross-coverage  Attending/Team: Masoud/Gonda See Patient List for primary contact information  Call [phone] to page    1st Call - Dr. Lizarraga         Chief Complaint:   Lump in his throat    HPI:  Patient is a 33-year-old man who comes to the ED complaining of a few hours of not being able to swallow well.  He was just in the ED yesterday for alcohol detox.  He was discharged with Librium from the ED.  He does have a history of anxiety disorder on Valium, hypertension and paroxysmal atrial fibrillation on atenolol.  He has a history of about 2 months of daily alcohol abuse.  He drinks about 1/2-1/3 a pint of whiskey daily along with 2-3 beers.  His last drink was Thursday evening.  In the ED, patient was found to be in atrial fibrillation with RVR.  He was given IV diltiazem 25 mg IV, and subsequently his heart rate dropped to the 40s, and BP in the 70s/40s >> increased to 90s/60s with IV fluid boluses.  Patient states that he did take his atenolol this morning.  Cardiology was consulted, and if patient does not spontaneously returned to sinus rhythm, they will cardiovert in the morning.  They are starting anticoagulation in preparation.    Review of Systems   Constitutional: Negative for fever and malaise/fatigue.   HENT: Negative for hearing loss.    Eyes: Negative for blurred vision.   Respiratory: Negative for cough and shortness of breath.    Cardiovascular: Positive for palpitations. Negative for chest pain and leg swelling.        Lump in his throat   Gastrointestinal: Negative for abdominal pain, heartburn and nausea.   Genitourinary: Negative for dysuria and frequency.   Musculoskeletal: Negative for joint pain and myalgias.   Skin: Negative  for rash.   Neurological: Negative for dizziness, weakness and headaches.   Psychiatric/Behavioral: Positive for substance abuse. Negative for depression and suicidal ideas. The patient is nervous/anxious.              Past Medical History anxiety on Valium, hypertension on atenolol, paroxysmal atrial fibrillation.    Past Surgical History:  History reviewed. No pertinent surgical history.    Current Outpatient Medications:  Home Medications     Reviewed by Amrita Mesa R.N. (Registered Nurse) on 05/25/19 at 2300  Med List Status: Complete   Medication Last Dose Status   ALPRAZolam (XANAX) 0.25 MG Tab <week Active   aspirin 81 MG EC tablet 5/25/2019 Active   atenolol (TENORMIN) 100 MG Tab 5/25/2019 Active   cetirizine (ZYRTEC) 10 MG Tab 5/25/2019 Active   chlordiazePOXIDE (LIBRIUM) 25 MG Cap 5/25/2019 Active   diazePAM (VALIUM) 5 MG Tab 5/25/2019 Active   hydrOXYzine pamoate (VISTARIL) 25 MG Cap 5/25/2019 Active   lisinopril (PRINIVIL, ZESTRIL) 40 MG tablet 5/25/2019 Active   MAGNESIUM PO 5/25/2019 Active   omeprazole (PRILOSEC) 20 MG delayed-release capsule 5/25/2019 Active   ZINC OXIDE PO 5/25/2019 Active                Medication Allergy/Sensitivities:  Allergies   Allergen Reactions   • Cardizem      Bradycardia in the 30's. Reaction noted on 5/25/19 when admitted for ETOH/hypotension/tachycardia. Pt had atenolol and lisinpril same day as administration.         Family History (mandatory)   Family History   Problem Relation Age of Onset   • Diabetes Father    • Hypertension Father    • Alcohol/Drug Brother    • Hypertension Paternal Grandmother        Social History (mandatory)   Social History     Social History   • Marital status: Single     Spouse name: N/A   • Number of children: N/A   • Years of education: N/A     Occupational History   • Not on file.     Social History Main Topics   • Smoking status: Former Smoker   • Smokeless tobacco: Never Used   • Alcohol use Yes      Comment: quit 2 days ago. former  "drinker x 12 yrs   • Drug use: Yes     Types: Inhaled      Comment: marijuana   • Sexual activity: Not on file     Other Topics Concern   • Not on file     Social History Narrative   • No narrative on file     PCP : Micheal Pack M.D.    Physical Exam     Vitals:    05/25/19 2130 05/25/19 2145 05/25/19 2252 05/25/19 2321   BP:       Pulse: (!) 53 68  87   Resp: (!) 26 (!) 23  20   Temp:   36.1 °C (96.9 °F)    TempSrc:   Tympanic    SpO2: 100% 95%  95%   Weight:   111.8 kg (246 lb 7.6 oz)    Height:   1.73 m (5' 8.11\")      Body mass index is 37.36 kg/m².  BP (!) 70/42   Pulse 87   Temp 36.1 °C (96.9 °F) (Tympanic)   Resp 20   Ht 1.73 m (5' 8.11\")   Wt 111.8 kg (246 lb 7.6 oz)   SpO2 95%   BMI 37.36 kg/m²   O2 therapy: Pulse Oximetry: 95 %, O2 (LPM): 2 (oxymask), O2 Delivery: Oxymask    Physical Exam   Constitutional: He is oriented to person, place, and time and well-developed, well-nourished, and in no distress.   HENT:   Head: Normocephalic and atraumatic.   Eyes: EOM are normal. No scleral icterus.   Neck: Neck supple.   Cardiovascular:   No murmur heard.  Bradycardic.  Irregularly irregular rhythm.   Pulmonary/Chest: No respiratory distress. He has no wheezes. He has no rales.   Abdominal: Bowel sounds are normal. He exhibits no distension. There is no tenderness.   Musculoskeletal: He exhibits no edema.   Neurological: He is alert and oriented to person, place, and time. He exhibits normal muscle tone.   Skin: No rash noted.   Psychiatric: Mood, memory, affect and judgment normal.         Data Review       Old Records Request:   Completed  Current Records review/summary:       Lab Data Review:  Recent Results (from the past 24 hour(s))   EKG    Collection Time: 05/25/19  6:20 PM   Result Value Ref Range    Report       [facility] Emergency Dept.    Test Date:  2019-05-25  Pt Name:    JANA LYNN              Department: ER  MRN:        [id number]                      Room:       GR " 26  Gender:     Male                         Technician: [id number]  :        1985                   Requested By:ER TRIAGE PROTOCOL  Order #:    [id number]                    Reading MD: JEAN PLAZA MD    Measurements  Intervals                                Axis  Rate:       120                          P:  VA:                                      QRS:        49  QRSD:       95                           T:          -13  QT:         323  QTc:        457    Interpretive Statements  Atrial fibrillation  Nonspecific T abnormalities, anterior leads  Compared to ECG 2019 00:04:47  T-wave abnormality now present  Sinus tachycardia no longer present    Electronically Signed On 2019 19:27:11 PDT by JEAN PLAZA MD     CBC w/ Differential    Collection Time: 19  7:38 PM   Result Value Ref Range    WBC 12.5 (H) 4.8 - 10.8 K/uL    RBC 5.55 4.70 - 6.10 M/uL    Hemoglobin 17.8 14.0 - 18.0 g/dL    Hematocrit 53.1 (H) 42.0 - 52.0 %    MCV 95.7 81.4 - 97.8 fL    MCH 32.1 27.0 - 33.0 pg    MCHC 33.5 (L) 33.7 - 35.3 g/dL    RDW 51.9 (H) 35.9 - 50.0 fL    Platelet Count 223 164 - 446 K/uL    MPV 8.4 (L) 9.0 - 12.9 fL    Neutrophils-Polys 58.30 44.00 - 72.00 %    Lymphocytes 29.30 22.00 - 41.00 %    Monocytes 11.10 0.00 - 13.40 %    Eosinophils 0.40 0.00 - 6.90 %    Basophils 0.60 0.00 - 1.80 %    Immature Granulocytes 0.30 0.00 - 0.90 %    Nucleated RBC 0.00 /100 WBC    Neutrophils (Absolute) 7.29 1.82 - 7.42 K/uL    Lymphs (Absolute) 3.67 1.00 - 4.80 K/uL    Monos (Absolute) 1.39 (H) 0.00 - 0.85 K/uL    Eos (Absolute) 0.05 0.00 - 0.51 K/uL    Baso (Absolute) 0.08 0.00 - 0.12 K/uL    Immature Granulocytes (abs) 0.04 0.00 - 0.11 K/uL    NRBC (Absolute) 0.00 K/uL   Complete Metabolic Panel (CMP)    Collection Time: 19  7:38 PM   Result Value Ref Range    Sodium 132 (L) 135 - 145 mmol/L    Potassium 4.0 3.6 - 5.5 mmol/L    Chloride 101 96 - 112 mmol/L    Co2 20 20 - 33 mmol/L    Anion Gap  11.0 0.0 - 11.9    Glucose 98 65 - 99 mg/dL    Bun 21 8 - 22 mg/dL    Creatinine 1.22 0.50 - 1.40 mg/dL    Calcium 9.2 8.5 - 10.5 mg/dL    AST(SGOT) 31 12 - 45 U/L    ALT(SGPT) 55 (H) 2 - 50 U/L    Alkaline Phosphatase 56 30 - 99 U/L    Total Bilirubin 0.9 0.1 - 1.5 mg/dL    Albumin 4.2 3.2 - 4.9 g/dL    Total Protein 7.1 6.0 - 8.2 g/dL    Globulin 2.9 1.9 - 3.5 g/dL    A-G Ratio 1.4 g/dL   Troponin STAT    Collection Time: 19  7:38 PM   Result Value Ref Range    Troponin I 0.10 (H) 0.00 - 0.04 ng/mL   Magnesium    Collection Time: 19  7:38 PM   Result Value Ref Range    Magnesium 1.9 1.5 - 2.5 mg/dL   Phosphorus    Collection Time: 19  7:38 PM   Result Value Ref Range    Phosphorus 2.1 (L) 2.5 - 4.5 mg/dL   ESTIMATED GFR    Collection Time: 19  7:38 PM   Result Value Ref Range    GFR If African American >60 >60 mL/min/1.73 m 2    GFR If Non African American >60 >60 mL/min/1.73 m 2   TSH    Collection Time: 19  7:38 PM   Result Value Ref Range    TSH 3.840 0.380 - 5.330 uIU/mL   FREE THYROXINE    Collection Time: 19  7:38 PM   Result Value Ref Range    Free T-4 0.91 0.53 - 1.43 ng/dL   EKG    Collection Time: 19  8:17 PM   Result Value Ref Range    Report       [facility] Emergency Dept.    Test Date:  2019  Pt Name:    JANA LYNN              Department: ER  MRN:        [id number]                      Room:       RD 12  Gender:     Male                         Technician: [id number]  :        1985                   Requested By:JEAN PLAZA  Order #:    [id number]                    Reading MD: JEAN PLAZA MD    Measurements  Intervals                                Axis  Rate:       44                           P:  MD:                                      QRS:        59  QRSD:       86                           T:          0  QT:         504  QTc:        432    Interpretive Statements  ATRIAL FLUTTER, A-RATE 330  BRADYCARDIA  WITH IRREGULAR RATE  ABNORMAL T, CONSIDER ISCHEMIA, ANTERIOR LEADS  Compared to ECG 05/25/2019 18:20:27  Possible ischemia now present  Atrial fibrillation no longer present  T-wave abnormality still present    Electronically Signed On 5- 21:09:23 PDT by JEAN MELO MD         Imaging/Procedures Review:    Independant Imaging Review: Completed  DX-CHEST-PORTABLE (1 VIEW)   Final Result      Negative single view of the chest.      EC-ECHOCARDIOGRAM COMPLETE W/ CONT    (Results Pending)     EKG:   EKG Independent Review: Completed  QTc:457, HR: 120, atrial fibrillation, new T wave inversions    Records reviewed and summarized in current documentation :  Yes  [employer] teaching service handout given to patient:  No         Assessment/Plan     * Paroxysmal atrial fibrillation (HCC)   Assessment & Plan    Presented with RVR  Received 20 mg IV diltiazem, and HR dropped to 40s with borderline low BP  UDS positive for cannabis and benzos  Cardiology consulted, appreciate recs  ECHO  Ordered TSH  N.p.o. for possible cardioversion  IV heparin initiated     Alcohol withdrawal (HCC)   Assessment & Plan    Drinks about 1/2-3/4 of a pint of whiskey along with 2-3 beers daily for the last 2 months  Discharged from ED yesterday on Librium, to detox at home.  Phenobarbital protocol  Optimize electrolytes  Rally bag  Thiamine/folate/multivitamins  Seizure/fall/aspiration precautions     LAZARO (acute kidney injury) (HCC)   Assessment & Plan    2 L IV bolus  On maintenance IV fluid  Avoid nephrotoxins  Monitor     Electrolyte abnormality   Assessment & Plan    Borderline low magnesium - repleted with 2 g IV  Repleted phosphate     Elevated troponin   Assessment & Plan    Per cardiology, likely due to demand ischemia from atrial fibrillation  Associated with new T wave inversions  Trend troponin and EKG  Cardiology on board  On IV heparin for possible cardioversion     Hypertension   Assessment & Plan    Borderline low after  IV diltiazem in the ED  Hold atenolol  Received 2 L IV fluid bolus  IV labetalol PRN         Anticipated Hospital stay:  >2 midnights    Quality Measures  Quality-Core Measures   Reviewed items::  EKG reviewed, Labs reviewed, Medications reviewed and Radiology images reviewed  Srivastava catheter::  No Srivastava  DVT prophylaxis pharmacological::  Heparin    PCP: Micheal Pack M.D.

## 2019-05-26 NOTE — ED TRIAGE NOTES
"Pt recently detoxed from alcohol x 2 days ago and started on Librium. Pt states \"I woke up this morning feeling weak, dizzy, nauseous, SOB, w chest tightness.\" pt states \"last time I detoxed, I went into AFIB.\"   "

## 2019-05-26 NOTE — ED PROVIDER NOTES
ED Provider Note    Scribed for Leif Hilliard M.D. by Radha Torres. 5/25/2019, 6:55 PM.    Primary care provider: Micheal Pack M.D.  Means of arrival: Wheel chair  History obtained from: Patient  History limited by: None     CHIEF COMPLAINT  Chief Complaint   Patient presents with   • Shortness of Breath   • Nausea   • Dizziness       HPI  Cosme Cabrera is a 33 y.o. male with a past medical history of atrial fibrillation, depression, alcohol abuse, and hypertension who presents to the Emergency Department for evaluation of atrial fibrillation onset this morning. Patient reports daily alcohol use for the last 2 months. He was seen in this ED 2 days ago for severe alcohol intoxication and was discharged with Librium for alcohol withdrawal and states waking up this morning and felt he was in atrial fibrillation. Patient states his onset of atrial fibrillation is related to when he is going through alcohol withdrawal. He reports associated tremors, cold sweats, and shortness of breath. Patient states he drank a 6-pack of IPA and fifths of whiskey. Patient denies nausea, chest pain, leg swelling, or calf pain. Denies history of PE, DVT, or MI. He is followed by a cardiologist in [facility].      REVIEW OF SYSTEMS  Pertinent positives include alcohol withdrawal, atrial fibrillation, tremors, diaphoresis, shortness of breath.   Pertinent negatives include nausea, chest pain, leg swelling, or calf pain.   All other systems negative.    PAST MEDICAL HISTORY   has a past medical history of A-fib (HCC); Anxiety; At risk for sleep apnea; Depression; ETOH abuse; Hypertension; and Panic attack due to exceptional stress.    SURGICAL HISTORY  patient denies any surgical history    SOCIAL HISTORY  Social History   Substance Use Topics   • Smoking status: Former Smoker   • Smokeless tobacco: Never Used   • Alcohol use Yes      Comment: quit 2 days ago. former drinker x 12 yrs      History   Drug Use   • Types:  "Inhaled     Comment: marijuana       FAMILY HISTORY  Family History   Problem Relation Age of Onset   • Diabetes Father    • Hypertension Father    • Alcohol/Drug Brother    • Hypertension Paternal Grandmother        CURRENT MEDICATIONS  No current facility-administered medications on file prior to encounter.      Current Outpatient Prescriptions on File Prior to Encounter   Medication Sig Dispense Refill   • ALPRAZolam (XANAX) 0.25 MG Tab Take 0.25 mg by mouth 2 times a day as needed (Panic Attack).     • aspirin 81 MG EC tablet Take 81 mg by mouth every day.     • ZINC OXIDE PO Take 1 Dose by mouth every day. Unknown OTC Strength     • diazePAM (VALIUM) 5 MG Tab Take 5 mg by mouth See Admin Instructions. Take 5mg every morning  Take additional 5mg as needed for Anxiety  1   • atenolol (TENORMIN) 100 MG Tab Take 100 mg by mouth every day.     • lisinopril (PRINIVIL, ZESTRIL) 40 MG tablet Take 40 mg by mouth every day.     • omeprazole (PRILOSEC) 20 MG delayed-release capsule Take 20 mg by mouth every day.       ALLERGIES  Allergies   Allergen Reactions   • Cardizem      Bradycardia in the 30's. Reaction noted on 5/25/19 when admitted for ETOH/hypotension/tachycardia. Pt had atenolol and lisinpril same day as administration.       PHYSICAL EXAM  VITAL SIGNS: /77   Pulse 67   Temp 36.1 °C (96.9 °F) (Temporal)   Resp 18   Ht 1.727 m (5' 8\")   Wt 110.8 kg (244 lb 4.3 oz)   SpO2 90%   BMI 37.14 kg/m²     Constitutional: Well developed, Well nourished, Mild distress.   HENT: Normocephalic, Atraumatic, Oropharynx moist.   Eyes: Conjunctiva normal, No discharge.   Neck: Supple, No stridor.   Cardiovascular: Tachycardic, Irregularly irregular, No murmurs, equal pulses.   Pulmonary: Normal breath sounds, No respiratory distress, No wheezing, No rales, No rhonchi.  Chest: No chest wall tenderness or deformity.   Abdomen: Obese, Soft, No tenderness, No masses, no rebound, no guarding.   Back: No CVA tenderness. "   Musculoskeletal: No major deformities noted, No calf pain, No calf tenderness, Appears symmetric.   Skin: Warm, Dry, No erythema, No rash.   Neurologic: Alert & oriented x 3, Normal motor function,  No focal deficits noted. Slight tremors to tongue and hands.   Psychiatric: Affect normal, Judgment normal, Mood normal.     LABS  Results for orders placed or performed during the hospital encounter of 05/25/19   CBC w/ Differential   Result Value Ref Range    WBC 12.5 (H) 4.8 - 10.8 K/uL    RBC 5.55 4.70 - 6.10 M/uL    Hemoglobin 17.8 14.0 - 18.0 g/dL    Hematocrit 53.1 (H) 42.0 - 52.0 %    MCV 95.7 81.4 - 97.8 fL    MCH 32.1 27.0 - 33.0 pg    MCHC 33.5 (L) 33.7 - 35.3 g/dL    RDW 51.9 (H) 35.9 - 50.0 fL    Platelet Count 223 164 - 446 K/uL    MPV 8.4 (L) 9.0 - 12.9 fL    Neutrophils-Polys 58.30 44.00 - 72.00 %    Lymphocytes 29.30 22.00 - 41.00 %    Monocytes 11.10 0.00 - 13.40 %    Eosinophils 0.40 0.00 - 6.90 %    Basophils 0.60 0.00 - 1.80 %    Immature Granulocytes 0.30 0.00 - 0.90 %    Nucleated RBC 0.00 /100 WBC    Neutrophils (Absolute) 7.29 1.82 - 7.42 K/uL    Lymphs (Absolute) 3.67 1.00 - 4.80 K/uL    Monos (Absolute) 1.39 (H) 0.00 - 0.85 K/uL    Eos (Absolute) 0.05 0.00 - 0.51 K/uL    Baso (Absolute) 0.08 0.00 - 0.12 K/uL    Immature Granulocytes (abs) 0.04 0.00 - 0.11 K/uL    NRBC (Absolute) 0.00 K/uL   Complete Metabolic Panel (CMP)   Result Value Ref Range    Sodium 132 (L) 135 - 145 mmol/L    Potassium 4.0 3.6 - 5.5 mmol/L    Chloride 101 96 - 112 mmol/L    Co2 20 20 - 33 mmol/L    Anion Gap 11.0 0.0 - 11.9    Glucose 98 65 - 99 mg/dL    Bun 21 8 - 22 mg/dL    Creatinine 1.22 0.50 - 1.40 mg/dL    Calcium 9.2 8.5 - 10.5 mg/dL    AST(SGOT) 31 12 - 45 U/L    ALT(SGPT) 55 (H) 2 - 50 U/L    Alkaline Phosphatase 56 30 - 99 U/L    Total Bilirubin 0.9 0.1 - 1.5 mg/dL    Albumin 4.2 3.2 - 4.9 g/dL    Total Protein 7.1 6.0 - 8.2 g/dL    Globulin 2.9 1.9 - 3.5 g/dL    A-G Ratio 1.4 g/dL   Troponin STAT   Result  Value Ref Range    Troponin I 0.10 (H) 0.00 - 0.04 ng/mL   Magnesium   Result Value Ref Range    Magnesium 1.9 1.5 - 2.5 mg/dL   Phosphorus   Result Value Ref Range    Phosphorus 2.1 (L) 2.5 - 4.5 mg/dL   ESTIMATED GFR   Result Value Ref Range    GFR If African American >60 >60 mL/min/1.73 m 2    GFR If Non African American >60 >60 mL/min/1.73 m 2   TSH   Result Value Ref Range    TSH 3.840 0.380 - 5.330 uIU/mL   FREE THYROXINE   Result Value Ref Range    Free T-4 0.91 0.53 - 1.43 ng/dL   EKG   Result Value Ref Range    Report       [facility].    Test Date:  2019  Pt Name:    JANA LYNN              Department: ER  MRN:        [id number]                      Room:        26  Gender:     Male                         Technician: [id number]  :        1985                   Requested By:ER TRIAGE PROTOCOL  Order #:    [id number]                    Reading MD: JEAN PLAZA MD    Measurements  Intervals                                Axis  Rate:       120                          P:  MD:                                      QRS:        49  QRSD:       95                           T:          -13  QT:         323  QTc:        457    Interpretive Statements  Atrial fibrillation  Nonspecific T abnormalities, anterior leads  Compared to ECG 2019 00:04:47  T-wave abnormality now present  Sinus tachycardia no longer present    Electronically Signed On 2019 19:27:11 PDT by EJAN PLAZA MD     EKG   Result Value Ref Range    Report       [facility] Emergency Dept.    Test Date:  2019  Pt Name:    JANA LYNN              Department: ER  MRN:        [id number]                      Room:       RD 12  Gender:     Male                         Technician: [id number]  :        1985                   Requested By:JEAN PLAZA  Order #:    [id number]                    Reading MD: JEAN PLAZA  MD    Measurements  Intervals                                Axis  Rate:       44                           P:  RI:                                      QRS:        59  QRSD:       86                           T:          0  QT:         504  QTc:        432    Interpretive Statements  ATRIAL FLUTTER, A-RATE 330  BRADYCARDIA WITH IRREGULAR RATE  ABNORMAL T, CONSIDER ISCHEMIA, ANTERIOR LEADS  Compared to ECG 05/25/2019 18:20:27  Possible ischemia now present  Atrial fibrillation no longer present  T-wave abnormality still present    Electronically Signed On 5- 21:09:23 PDT by JEAN MELO MD     All labs reviewed by me.    EKG  12 Lead EKG interpreted by me shows atrial fibrillation at a rate of 120. Axis normal. No ST elevations. T wave inversions in V1, V2, V3, and lead III. Old EKG from 5/24/19 shows new T wave inversions in anterior leads. Final impression: Nonspecific T wave abnormality in anterior leads.    Repeat 12 Lead EKG interpreted by me shows new atrial flutter at a rate of 44. Axis normal. No ST elevations. T wave inversions in anterior leads. Old EKG from 5/24/19 shows new T wave inversions in anterior leads. Final impression: Nonspecific T wave abnormality in anterior leads with new atrial flutter.     RADIOLOGY  DX-CHEST-PORTABLE (1 VIEW)   Final Result      Negative single view of the chest.      EC-ECHOCARDIOGRAM COMPLETE W/ CONT    (Results Pending)     The radiologist's interpretation of all radiological studies have been reviewed by me.    COURSE & MEDICAL DECISION MAKING  Pertinent Labs & Imaging studies reviewed. (See chart for details)    6:55 PM Patient seen and examined at bedside. Patient will be treated with 25 mg Librium, 100 mg Cardizem in D5W 100 mL, and 25 mg Diltiazem. The patient will receive IV fluids for tachycardia. Ordered DX chest, EKG, CBC, CMP, Troponin, Magensium, and Phosphorus to evaluate his symptoms. The differential diagnoses include but are not limited  to: Paroxysmal atrial fibrillation, Alcohol withdrawal, Electrolyte abnormality, Dehydration     8:18 PM Patient reevaluated at bedside. He is bradycardic at a rate of 44 and is diaphoretic. He is mentating. Patient will be treated with 3 g calcium gluconate in D5W 250 mL and additional IV fluids. Discontinued 100 mg Diltiazem in D5W 100 mL. Paged Dr. Alvarado, Cardiology.     8:28 PM Consulted Dr. Alvarado, Cardiology, who agreed to consult patient.     9:00 PM Patient reevaluated at bedside. His heart rate has improved to the 50s. Paged [facility].     9:05 PM Consulted [facility], who agreed to admit patient.     9:43 PM Patient reevaluated at bedside. He is feeling better upon reevaluation. His vitals have improved at a heart rate of 63. He agrees with plan of care for admission.     CRITICAL CARE  I provided critical care services, which included medication orders, frequent reevaluations of the patient's condition and response to treatment, ordering and reviewing test results, and discussing the case with various consultants.  The critical care time associated with the care of the patient was 33 minutes. Review chart for interventions. This time is exclusive of any other billable procedures.     Medical Decision Making: At this point time I think the patient most likely has possible ischemic event.  He presented with A. fib with RVR unfortunately after just a standard dose of diltiazem as a loading dose to control his rate he went into bradycardia and hypotensive.  He responded to IV fluids and calcium.  He did not require any pacing.  At this point time patient patient will be admitted to ICU for careful monitoring as well as troponin trending of his troponin.  Patient does appear to be in mild alcohol withdrawal.  He was given Librium for the    DISPOSITION:  Patient will be admitted to [facility], in critical condition.     FINAL IMPRESSION  1. Atrial fibrillation with RVR (HCC)    2. Alcohol  withdrawal syndrome without complication (HCC)    3. Hypotension, unspecified hypotension type    4. Elevated troponin       Critical care time of 33 minutes. This time is exclusive of any other billable procedures.     Radha CARDONA (Zita), am scribing for, and in the presence of, Leif Hilliard M.D.  Electronically signed by: Radha Torres ([place]), 5/25/2019  Leif CARDONA M.D. personally performed the services described in this documentation, as scribed by Radha Torres in my presence, and it is both accurate and complete. C.     The note accurately reflects work and decisions made by me.  Leif Hilliard  5/26/2019  12:52 AM

## 2019-05-26 NOTE — ASSESSMENT & PLAN NOTE
Stopped drinking Thursday 5 23  Worsening tremors/diaphoresis per patient  Complicated by chronic benzos and possible withdrawal  Mvt/thiamine/folic acid  Extensive discussion with patient  sched librium as above and ciwa protocol with ativan, once stable and ensured no withdrawal will consider transition to home valium.    Psych consult

## 2019-05-26 NOTE — CONSULTS
Critical Care Consultation    Date of consult: 5/25/2019    Referring Physician  REGGIE Francis*    Reason for Consultation  Palpitations, ETOH w/d    History of Presenting Illness  33 y.o. male who presented 5/25/2019 with a past medical history significant for alcohol abuse complicated by paroxysmal atrial fibrillation whenever he drinks too much, anxiety, depression, hypertension who was seen in the emergency department on Thursday night for alcohol withdrawal desiring assistance in quitting.  He was discharged on Librium and was currently in normal sinus rhythm at that time.  He went home and went to bed and when he awoke the next morning he went out to work in the yard and noticed generalized malaise, fatigue and shortness of breath with palpitations.  The shortness of breath progressively worsened to the point that he could barely walk 25 feet.  He decided to seek medical attention and presented to the emergency department where he was found to be in atrial fibrillation with rapid ventricular response.  He was given 25 mg of IV diltiazem which unfortunately dropped his blood pressure to 70/40 and his heart rate down to 40 in sinus bradycardia without complete heart block.  Patient notes that he had taken 100 mg of oral atenolol earlier in the day.  Cardiology was consulted in the ED and recommended IV fluids and anticoagulation in preparation for possible defibrillation in the morning.  Patient states he is seen cardiology in [place] for this in the past and was not started on any medications as it seemed to only occur when he was going through alcohol withdrawal.    Code Status  Full Code    Review of Systems  Review of Systems   Constitutional: Positive for malaise/fatigue. Negative for chills and fever.   HENT: Negative for nosebleeds and sore throat.    Eyes: Negative for blurred vision.   Respiratory: Positive for shortness of breath. Negative for cough, sputum production and wheezing.     Cardiovascular: Positive for palpitations. Negative for chest pain and leg swelling.   Gastrointestinal: Negative for abdominal pain, melena, nausea and vomiting.   Genitourinary: Negative for flank pain and hematuria.   Musculoskeletal: Negative for back pain.   Skin: Negative for rash.   Neurological: Positive for tremors. Negative for dizziness, speech change, focal weakness, seizures, weakness and headaches.   Endo/Heme/Allergies: Does not bruise/bleed easily.   Psychiatric/Behavioral: Positive for substance abuse. Negative for depression and hallucinations. The patient is not nervous/anxious.    All other systems reviewed and are negative.      Past Medical History   has a past medical history of A-fib (HCC); Anxiety; Depression; ETOH abuse; and Hypertension.    Surgical History   has no past surgical history on file. -Tonsillectomy    Family History  family history is not on file. -Diabetes, hypertension, alcoholism    Social History   reports that he has quit smoking. He has never used smokeless tobacco. He reports that he drinks alcohol. He reports that he uses drugs, including Inhaled. -Alcohol use is approximately 4-8 beers per day with one point of vodka as well    Medications  Home Medications     Reviewed by Amrita Mesa R.N. (Registered Nurse) on 05/25/19 at 2300  Med List Status: Complete   Medication Last Dose Status   ALPRAZolam (XANAX) 0.25 MG Tab <week Active   aspirin 81 MG EC tablet 5/25/2019 Active   atenolol (TENORMIN) 100 MG Tab 5/25/2019 Active   cetirizine (ZYRTEC) 10 MG Tab 5/25/2019 Active   chlordiazePOXIDE (LIBRIUM) 25 MG Cap 5/25/2019 Active   diazePAM (VALIUM) 5 MG Tab 5/25/2019 Active   hydrOXYzine pamoate (VISTARIL) 25 MG Cap 5/25/2019 Active   lisinopril (PRINIVIL, ZESTRIL) 40 MG tablet 5/25/2019 Active   MAGNESIUM PO 5/25/2019 Active   omeprazole (PRILOSEC) 20 MG delayed-release capsule 5/25/2019 Active   ZINC OXIDE PO 5/25/2019 Active              Current Facility-Administered  Medications   Medication Dose Route Frequency Provider Last Rate Last Dose   • NS infusion 1,000 mL  1,000 mL Intravenous Once Leif Hilliard M.D.       • [START ON 5/26/2019] senna-docusate (PERICOLACE or SENOKOT S) 8.6-50 MG per tablet 2 Tab  2 Tab Oral BID Marielos Ramírez M.D.        And   • polyethylene glycol/lytes (MIRALAX) PACKET 1 Packet  1 Packet Oral QDAY PRN Marielos Ramírez M.D.        And   • magnesium hydroxide (MILK OF MAGNESIA) suspension 30 mL  30 mL Oral QDAY PRN Marielos Ramírez M.D.        And   • bisacodyl (DULCOLAX) suppository 10 mg  10 mg Rectal QDAY PRN Marielos Ramírez M.D.       • Respiratory Care per Protocol   Nebulization Continuous RT Marielos Ramírez M.D.       • labetalol (NORMODYNE,TRANDATE) injection 10 mg  10 mg Intravenous Q4HRS PRN Marielos Ramírez M.D.       • ondansetron (ZOFRAN) syringe/vial injection 4 mg  4 mg Intravenous Q4HRS PRN Marielos Ramírez M.D.       • ondansetron (ZOFRAN ODT) dispertab 4 mg  4 mg Oral Q4HRS PRN Marielos Ramírez M.D.       • promethazine (PHENERGAN) tablet 12.5-25 mg  12.5-25 mg Oral Q4HRS PRN Marielos Ramírez M.D.       • promethazine (PHENERGAN) suppository 12.5-25 mg  12.5-25 mg Rectal Q4HRS PRN Marielos Ramírez M.D.       • prochlorperazine (COMPAZINE) injection 5-10 mg  5-10 mg Intravenous Q4HRS PRN Marielos Ramírez M.D.       • LORazepam (ATIVAN) injection 4 mg  4 mg Intravenous Q15 MIN PRN Marielos Ramírez M.D.        Or   • LORazepam (ATIVAN) injection 3 mg  3 mg Intravenous Q15 MIN PRN Marielos Ramírez M.D.        Or   • LORazepam (ATIVAN) injection 2 mg  2 mg Intravenous Q15 MIN PRN Marielos Ramírez M.D.        Or   • LORazepam (ATIVAN) injection 1 mg  1 mg Intravenous Q15 MIN PRN Marielos Ramírez M.D.       • thiamine tablet 100 mg  100 mg Oral DAILY Marielos Ramírez M.D.        And   • [START ON 5/26/2019] multivitamin (THERAGRAN) tablet 1 Tab  1 Tab Oral DAILY Marielos Ramírez,  M.D.        And   • [START ON 5/26/2019] folic acid (FOLVITE) tablet 1 mg  1 mg Oral DAILY Marielos Ramírez M.D.       • magnesium oxide (MAG-OX) tablet 400 mg  400 mg Oral BID Marielos Ramírez M.D.       • lactated ringers infusion   Intravenous Continuous Marielos Ramírez M.D.       • apixaban (ELIQUIS) tablet 5 mg  5 mg Oral BID Franklyn Alvarado M.D.         Current Outpatient Prescriptions   Medication Sig Dispense Refill   • chlordiazePOXIDE (LIBRIUM) 25 MG Cap Take 2 Caps by mouth 4 times a day as needed for Anxiety for up to 3 days. 18 Cap 0   • ALPRAZolam (XANAX) 0.25 MG Tab Take 0.25 mg by mouth 2 times a day as needed for Anxiety.     • aspirin (ASA) 81 MG Chew Tab chewable tablet Take 81 mg by mouth every day.     • Magnesium 500 MG Cap Take  by mouth.     • ZINC OXIDE PO Take  by mouth.     • diazePAM (VALIUM) 5 MG Tab Take 5 mg by mouth 2 times a day as needed for Anxiety.  1   • atenolol (TENORMIN) 100 MG Tab Take 100 mg by mouth every day.     • lisinopril (PRINIVIL, ZESTRIL) 40 MG tablet Take 40 mg by mouth every day.     • Cetirizine-Pseudoephedrine (ZYRTEC-D PO) Take  by mouth.     • omeprazole (PRILOSEC) 20 MG delayed-release capsule Take 20 mg by mouth every day.         Allergies  No Known Allergies    Vital Signs last 24 hours  Temp:  [36.1 °C (96.9 °F)] 36.1 °C (96.9 °F)  Pulse:  [44-68] 68  Resp:  [18-46] 23  BP: ()/(42-77) 70/42  SpO2:  [90 %-100 %] 95 %    Physical Exam  Physical Exam   Constitutional: He is oriented to person, place, and time. He appears well-developed and well-nourished. No distress.   HENT:   Head: Normocephalic and atraumatic.   Nose: Nose normal.   Mouth/Throat: Oropharynx is clear and moist.   Eyes: Pupils are equal, round, and reactive to light. Conjunctivae are normal. No scleral icterus.   Neck: Neck supple. No JVD present. No tracheal deviation present.   Cardiovascular: Intact distal pulses.  An irregularly irregular rhythm present.  Occasional  extrasystoles are present. Tachycardia present.  PMI is not displaced.  Exam reveals no distant heart sounds and no decreased pulses.    No murmur heard.  Pulmonary/Chest: Effort normal and breath sounds normal. No respiratory distress. He has no wheezes. He has no rales.   Abdominal: Soft. Bowel sounds are normal. He exhibits no distension. There is no tenderness. There is no rebound.   Musculoskeletal: He exhibits no edema or tenderness.   Neurological: He is alert and oriented to person, place, and time. No cranial nerve deficit. He exhibits normal muscle tone.   Minimal tremor   Skin: Skin is warm and dry. He is not diaphoretic. No pallor.   Psychiatric: His behavior is normal. Thought content normal.   Anxious   Nursing note and vitals reviewed.      Fluids    Intake/Output Summary (Last 24 hours) at 19  Last data filed at 19   Gross per 24 hour   Intake             1000 ml   Output                0 ml   Net             1000 ml       Laboratory  Recent Results (from the past 48 hour(s))   POC BREATHALIZER    Collection Time: 19  2:01 AM   Result Value Ref Range    POC Breathalizer 0.250 (A) 0.00 - 0.01 Percent   POC BREATHALIZER    Collection Time: 19  6:13 AM   Result Value Ref Range    POC Breathalizer 0.133 (A) 0.00 - 0.01 Percent   EKG    Collection Time: 19  6:20 PM   Result Value Ref Range    Report       [facility] Emergency Dept.    Test Date:  2019  Pt Name:    JANA LYNN              Department: ER  MRN:        [id number]                      Room:       [facility]  Gender:     Male                         Technician: [id number]  :        1985                   Requested By:ER TRIAGE PROTOCOL  Order #:    [id number]                    Jaime MD: JEAN PLAZA MD    Measurements  Intervals                                Axis  Rate:       120                          P:  NE:                                      QRS:         49  QRSD:       95                           T:          -13  QT:         323  QTc:        457    Interpretive Statements  Atrial fibrillation  Nonspecific T abnormalities, anterior leads  Compared to ECG 05/24/2019 00:04:47  T-wave abnormality now present  Sinus tachycardia no longer present    Electronically Signed On 5- 19:27:11 PDT by JEAN PLAZA MD     CBC w/ Differential    Collection Time: 05/25/19  7:38 PM   Result Value Ref Range    WBC 12.5 (H) 4.8 - 10.8 K/uL    RBC 5.55 4.70 - 6.10 M/uL    Hemoglobin 17.8 14.0 - 18.0 g/dL    Hematocrit 53.1 (H) 42.0 - 52.0 %    MCV 95.7 81.4 - 97.8 fL    MCH 32.1 27.0 - 33.0 pg    MCHC 33.5 (L) 33.7 - 35.3 g/dL    RDW 51.9 (H) 35.9 - 50.0 fL    Platelet Count 223 164 - 446 K/uL    MPV 8.4 (L) 9.0 - 12.9 fL    Neutrophils-Polys 58.30 44.00 - 72.00 %    Lymphocytes 29.30 22.00 - 41.00 %    Monocytes 11.10 0.00 - 13.40 %    Eosinophils 0.40 0.00 - 6.90 %    Basophils 0.60 0.00 - 1.80 %    Immature Granulocytes 0.30 0.00 - 0.90 %    Nucleated RBC 0.00 /100 WBC    Neutrophils (Absolute) 7.29 1.82 - 7.42 K/uL    Lymphs (Absolute) 3.67 1.00 - 4.80 K/uL    Monos (Absolute) 1.39 (H) 0.00 - 0.85 K/uL    Eos (Absolute) 0.05 0.00 - 0.51 K/uL    Baso (Absolute) 0.08 0.00 - 0.12 K/uL    Immature Granulocytes (abs) 0.04 0.00 - 0.11 K/uL    NRBC (Absolute) 0.00 K/uL   Complete Metabolic Panel (CMP)    Collection Time: 05/25/19  7:38 PM   Result Value Ref Range    Sodium 132 (L) 135 - 145 mmol/L    Potassium 4.0 3.6 - 5.5 mmol/L    Chloride 101 96 - 112 mmol/L    Co2 20 20 - 33 mmol/L    Anion Gap 11.0 0.0 - 11.9    Glucose 98 65 - 99 mg/dL    Bun 21 8 - 22 mg/dL    Creatinine 1.22 0.50 - 1.40 mg/dL    Calcium 9.2 8.5 - 10.5 mg/dL    AST(SGOT) 31 12 - 45 U/L    ALT(SGPT) 55 (H) 2 - 50 U/L    Alkaline Phosphatase 56 30 - 99 U/L    Total Bilirubin 0.9 0.1 - 1.5 mg/dL    Albumin 4.2 3.2 - 4.9 g/dL    Total Protein 7.1 6.0 - 8.2 g/dL    Globulin 2.9 1.9 - 3.5 g/dL    A-G  Ratio 1.4 g/dL   Troponin STAT    Collection Time: 19  7:38 PM   Result Value Ref Range    Troponin I 0.10 (H) 0.00 - 0.04 ng/mL   Magnesium    Collection Time: 19  7:38 PM   Result Value Ref Range    Magnesium 1.9 1.5 - 2.5 mg/dL   Phosphorus    Collection Time: 19  7:38 PM   Result Value Ref Range    Phosphorus 2.1 (L) 2.5 - 4.5 mg/dL   ESTIMATED GFR    Collection Time: 19  7:38 PM   Result Value Ref Range    GFR If African American >60 >60 mL/min/1.73 m 2    GFR If Non African American >60 >60 mL/min/1.73 m 2   TSH    Collection Time: 19  7:38 PM   Result Value Ref Range    TSH 3.840 0.380 - 5.330 uIU/mL   FREE THYROXINE    Collection Time: 19  7:38 PM   Result Value Ref Range    Free T-4 0.91 0.53 - 1.43 ng/dL   EKG    Collection Time: 19  8:17 PM   Result Value Ref Range    Report       [facility] Emergency Dept.    Test Date:  2019  Pt Name:    JANA LYNN              Department: ER  MRN:        [id number]                      Room:        12  Gender:     Male                         Technician: [id number]  :        1985                   Requested By:JEAN PLAZA  Order #:    [id number]                    Reading MD: JEAN PLAZA MD    Measurements  Intervals                                Axis  Rate:       44                           P:  ME:                                      QRS:        59  QRSD:       86                           T:          0  QT:         504  QTc:        432    Interpretive Statements  ATRIAL FLUTTER, A-RATE 330  BRADYCARDIA WITH IRREGULAR RATE  ABNORMAL T, CONSIDER ISCHEMIA, ANTERIOR LEADS  Compared to ECG 2019 18:20:27  Possible ischemia now present  Atrial fibrillation no longer present  T-wave abnormality still present    Electronically Signed On 2019 21:09:23 PDT by JEAN MELO MD         Imaging  DX-CHEST-PORTABLE (1 VIEW)   Final Result      Negative single view of  the chest.      EC-ECHOCARDIOGRAM COMPLETE W/ CONT    (Results Pending)    *Personally reviewed chest x-ray and compared to prior showing enlarged cardiac silhouette but no acute cardia pulmonary process   *Personally reviewed EKG showing H fibrillation with rapid ventricular response with a heart rate of 130, inferior anterior T wave inversions   *Personally reviewed EKG #2 showing slow A. fib with heart rate of 44 with persistent T wave inversions anteriorly inferiorly    Assessment/Plan  * Paroxysmal atrial fibrillation (HCC)   Assessment & Plan    Likely due to holiday heart/alcohol -was in rapid ventricular response  Check TSH/T4  Check magnesium and replete if low  Echocardiography  Cardiology consultation  Heparin infusion with plans for for possible cardioversion in the morning  Alcohol cessation education strongly encouraged     Alcohol withdrawal (HCC)   Assessment & Plan    Phenobarbital protocol monitoring RASS  Rally bag  Alcohol cessation education/resources to be provided     LAZARO (acute kidney injury) ([facility])   Assessment & Plan    Likely secondary to perfusion/ATN  Monitor creatinine and urine output closely  IV fluid hydration  Avoid nephrotoxins     Electrolyte abnormality   Assessment & Plan    Replete potassium, phosphorus, magnesium and monitor closely     Elevated troponin   Assessment & Plan    Likely secondary to demand ischemia with new T wave inversions on EKG  Trend  Heparin drip     Hypertension   Assessment & Plan    Controlled  Resume beta-blocker when bradycardia resolves, lisinopril         Discussed patient condition and risk of morbidity and/or mortality with Family, RN, RT, Pharmacy, [employer] [facility] resident, Charge nurse / hot rounds, Patient, cardiology and Emergency physician.    The patient remains critically ill.  Critical care time = 38 minutes in directly providing and coordinating critical care and extensive data review.  No time overlap and excludes procedures.

## 2019-05-26 NOTE — ASSESSMENT & PLAN NOTE
- Presented to hospital in afib w/ RVR  - Received 20 mg IV diltiazem, and HR dropped to 40s with borderline low BP  - Spontaneously resolved   - H/o daily alcohol use, UDS positive for cannabis and benzos, patient very high anxiety  - Echo showed EF 55%, right ventricular strain, mod dilated right ventricle  - Cardiology assessed, recommended ASA along with anticoagulation in setting of concurrent bilateral PE.  - Continue Xarelto as an outpatient   - Outpatient cards follow-up.

## 2019-05-26 NOTE — ED NOTES
Pt to R12 via wilberto. Crash cart at bedside, HR 50, BP 70/40 manual.   Family at bedside. Pt appears anxious.

## 2019-05-27 PROBLEM — F41.9 ANXIETY: Status: ACTIVE | Noted: 2019-05-27

## 2019-05-27 LAB
ANION GAP SERPL CALC-SCNC: 8 MMOL/L (ref 0–11.9)
APTT PPP: 33.7 SEC (ref 24.7–36)
APTT PPP: >240 SEC (ref 24.7–36)
BASOPHILS # BLD AUTO: 0.5 % (ref 0–1.8)
BASOPHILS # BLD: 0.05 K/UL (ref 0–0.12)
BUN SERPL-MCNC: 10 MG/DL (ref 8–22)
CALCIUM SERPL-MCNC: 8.4 MG/DL (ref 8.5–10.5)
CHLORIDE SERPL-SCNC: 106 MMOL/L (ref 96–112)
CO2 SERPL-SCNC: 23 MMOL/L (ref 20–33)
CREAT SERPL-MCNC: 0.87 MG/DL (ref 0.5–1.4)
EOSINOPHIL # BLD AUTO: 0.44 K/UL (ref 0–0.51)
EOSINOPHIL NFR BLD: 4.6 % (ref 0–6.9)
ERYTHROCYTE [DISTWIDTH] IN BLOOD BY AUTOMATED COUNT: 51.7 FL (ref 35.9–50)
GLUCOSE SERPL-MCNC: 108 MG/DL (ref 65–99)
HCT VFR BLD AUTO: 45.4 % (ref 42–52)
HGB BLD-MCNC: 15.2 G/DL (ref 14–18)
IMM GRANULOCYTES # BLD AUTO: 0.03 K/UL (ref 0–0.11)
IMM GRANULOCYTES NFR BLD AUTO: 0.3 % (ref 0–0.9)
INR PPP: 1.41 (ref 0.87–1.13)
LYMPHOCYTES # BLD AUTO: 3.04 K/UL (ref 1–4.8)
LYMPHOCYTES NFR BLD: 31.5 % (ref 22–41)
MAGNESIUM SERPL-MCNC: 2 MG/DL (ref 1.5–2.5)
MCH RBC QN AUTO: 32.6 PG (ref 27–33)
MCHC RBC AUTO-ENTMCNC: 33.5 G/DL (ref 33.7–35.3)
MCV RBC AUTO: 97.4 FL (ref 81.4–97.8)
MONOCYTES # BLD AUTO: 0.88 K/UL (ref 0–0.85)
MONOCYTES NFR BLD AUTO: 9.1 % (ref 0–13.4)
NEUTROPHILS # BLD AUTO: 5.22 K/UL (ref 1.82–7.42)
NEUTROPHILS NFR BLD: 54 % (ref 44–72)
NRBC # BLD AUTO: 0 K/UL
NRBC BLD-RTO: 0 /100 WBC
PLATELET # BLD AUTO: 153 K/UL (ref 164–446)
PMV BLD AUTO: 8.7 FL (ref 9–12.9)
POTASSIUM SERPL-SCNC: 3.9 MMOL/L (ref 3.6–5.5)
PROTHROMBIN TIME: 17.4 SEC (ref 12–14.6)
RBC # BLD AUTO: 4.66 M/UL (ref 4.7–6.1)
SODIUM SERPL-SCNC: 137 MMOL/L (ref 135–145)
WBC # BLD AUTO: 9.7 K/UL (ref 4.8–10.8)

## 2019-05-27 PROCEDURE — 85025 COMPLETE CBC W/AUTO DIFF WBC: CPT

## 2019-05-27 PROCEDURE — 700102 HCHG RX REV CODE 250 W/ 637 OVERRIDE(OP): Performed by: INTERNAL MEDICINE

## 2019-05-27 PROCEDURE — A9270 NON-COVERED ITEM OR SERVICE: HCPCS | Performed by: INTERNAL MEDICINE

## 2019-05-27 PROCEDURE — 770020 HCHG ROOM/CARE - TELE (206)

## 2019-05-27 PROCEDURE — 99291 CRITICAL CARE FIRST HOUR: CPT | Performed by: INTERNAL MEDICINE

## 2019-05-27 PROCEDURE — 85730 THROMBOPLASTIN TIME PARTIAL: CPT

## 2019-05-27 PROCEDURE — 80048 BASIC METABOLIC PNL TOTAL CA: CPT

## 2019-05-27 PROCEDURE — 83735 ASSAY OF MAGNESIUM: CPT

## 2019-05-27 RX ORDER — LISINOPRIL 20 MG/1
40 TABLET ORAL DAILY
Status: DISCONTINUED | OUTPATIENT
Start: 2019-05-27 | End: 2019-05-29 | Stop reason: HOSPADM

## 2019-05-27 RX ORDER — ATENOLOL 50 MG/1
100 TABLET ORAL DAILY
Status: DISCONTINUED | OUTPATIENT
Start: 2019-05-27 | End: 2019-05-29 | Stop reason: HOSPADM

## 2019-05-27 RX ORDER — LORAZEPAM 2 MG/1
4 TABLET ORAL
Status: DISCONTINUED | OUTPATIENT
Start: 2019-05-27 | End: 2019-05-28

## 2019-05-27 RX ORDER — LORAZEPAM 2 MG/ML
2 INJECTION INTRAMUSCULAR
Status: DISCONTINUED | OUTPATIENT
Start: 2019-05-27 | End: 2019-05-28

## 2019-05-27 RX ORDER — LORAZEPAM 2 MG/ML
0.5 INJECTION INTRAMUSCULAR EVERY 4 HOURS PRN
Status: DISCONTINUED | OUTPATIENT
Start: 2019-05-27 | End: 2019-05-28

## 2019-05-27 RX ORDER — POTASSIUM CHLORIDE 20 MEQ/1
40 TABLET, EXTENDED RELEASE ORAL ONCE
Status: COMPLETED | OUTPATIENT
Start: 2019-05-27 | End: 2019-05-27

## 2019-05-27 RX ORDER — FOLIC ACID 1 MG/1
1 TABLET ORAL DAILY
Status: DISCONTINUED | OUTPATIENT
Start: 2019-05-28 | End: 2019-05-29 | Stop reason: HOSPADM

## 2019-05-27 RX ORDER — DIAZEPAM 5 MG/1
5 TABLET ORAL EVERY MORNING
Status: DISCONTINUED | OUTPATIENT
Start: 2019-05-27 | End: 2019-05-27

## 2019-05-27 RX ORDER — LORAZEPAM 2 MG/ML
1.5 INJECTION INTRAMUSCULAR
Status: DISCONTINUED | OUTPATIENT
Start: 2019-05-27 | End: 2019-05-28

## 2019-05-27 RX ORDER — LORAZEPAM 2 MG/ML
1 INJECTION INTRAMUSCULAR
Status: DISCONTINUED | OUTPATIENT
Start: 2019-05-27 | End: 2019-05-28

## 2019-05-27 RX ORDER — M-VIT,TX,IRON,MINS/CALC/FOLIC 27MG-0.4MG
1 TABLET ORAL DAILY
Status: DISCONTINUED | OUTPATIENT
Start: 2019-05-28 | End: 2019-05-29 | Stop reason: HOSPADM

## 2019-05-27 RX ORDER — LORAZEPAM 0.5 MG/1
0.5 TABLET ORAL EVERY 4 HOURS PRN
Status: DISCONTINUED | OUTPATIENT
Start: 2019-05-27 | End: 2019-05-28

## 2019-05-27 RX ORDER — THIAMINE MONONITRATE (VIT B1) 100 MG
100 TABLET ORAL DAILY
Status: DISCONTINUED | OUTPATIENT
Start: 2019-05-28 | End: 2019-05-29 | Stop reason: HOSPADM

## 2019-05-27 RX ORDER — LISINOPRIL 40 MG/1
40 TABLET ORAL DAILY
Status: DISCONTINUED | OUTPATIENT
Start: 2019-05-27 | End: 2019-05-27

## 2019-05-27 RX ORDER — HYDROXYZINE PAMOATE 25 MG/1
25-50 CAPSULE ORAL
Status: DISCONTINUED | OUTPATIENT
Start: 2019-05-27 | End: 2019-05-27

## 2019-05-27 RX ORDER — LORAZEPAM 1 MG/1
1 TABLET ORAL EVERY 4 HOURS PRN
Status: DISCONTINUED | OUTPATIENT
Start: 2019-05-27 | End: 2019-05-28

## 2019-05-27 RX ORDER — CHLORDIAZEPOXIDE HYDROCHLORIDE 25 MG/1
50 CAPSULE, GELATIN COATED ORAL EVERY 6 HOURS
Status: COMPLETED | OUTPATIENT
Start: 2019-05-27 | End: 2019-05-28

## 2019-05-27 RX ORDER — CHLORDIAZEPOXIDE HYDROCHLORIDE 25 MG/1
25 CAPSULE, GELATIN COATED ORAL EVERY 6 HOURS
Status: COMPLETED | OUTPATIENT
Start: 2019-05-28 | End: 2019-05-29

## 2019-05-27 RX ORDER — LORAZEPAM 2 MG/1
2 TABLET ORAL
Status: DISCONTINUED | OUTPATIENT
Start: 2019-05-27 | End: 2019-05-28

## 2019-05-27 RX ORDER — DIAZEPAM 5 MG/1
5 TABLET ORAL
Status: DISCONTINUED | OUTPATIENT
Start: 2019-05-27 | End: 2019-05-27

## 2019-05-27 RX ADMIN — LISINOPRIL 40 MG: 20 TABLET ORAL at 10:02

## 2019-05-27 RX ADMIN — FOLIC ACID 1 MG: 1 TABLET ORAL at 06:00

## 2019-05-27 RX ADMIN — OMEPRAZOLE 20 MG: 20 CAPSULE, DELAYED RELEASE ORAL at 06:00

## 2019-05-27 RX ADMIN — Medication 100 MG: at 06:00

## 2019-05-27 RX ADMIN — CHLORDIAZEPOXIDE HYDROCHLORIDE 50 MG: 25 CAPSULE ORAL at 18:48

## 2019-05-27 RX ADMIN — CHLORDIAZEPOXIDE HYDROCHLORIDE 25 MG: 25 CAPSULE ORAL at 00:56

## 2019-05-27 RX ADMIN — ATENOLOL 100 MG: 100 TABLET ORAL at 10:12

## 2019-05-27 RX ADMIN — CHLORDIAZEPOXIDE HYDROCHLORIDE 25 MG: 25 CAPSULE ORAL at 07:01

## 2019-05-27 RX ADMIN — RIVAROXABAN 15 MG: 15 TABLET, FILM COATED ORAL at 12:32

## 2019-05-27 RX ADMIN — RIVAROXABAN 15 MG: 15 TABLET, FILM COATED ORAL at 18:55

## 2019-05-27 RX ADMIN — HEPARIN SODIUM 4000 UNITS: 1000 INJECTION, SOLUTION INTRAVENOUS; SUBCUTANEOUS at 08:36

## 2019-05-27 RX ADMIN — POTASSIUM CHLORIDE 40 MEQ: 1500 TABLET, EXTENDED RELEASE ORAL at 10:02

## 2019-05-27 RX ADMIN — ASPIRIN 81 MG: 81 TABLET, COATED ORAL at 06:00

## 2019-05-27 RX ADMIN — CHLORDIAZEPOXIDE HYDROCHLORIDE 50 MG: 25 CAPSULE ORAL at 23:32

## 2019-05-27 RX ADMIN — CHLORDIAZEPOXIDE HYDROCHLORIDE 50 MG: 25 CAPSULE ORAL at 12:34

## 2019-05-27 RX ADMIN — THERA TABS 1 TABLET: TAB at 06:00

## 2019-05-27 ASSESSMENT — LIFESTYLE VARIABLES
ANXIETY: NO ANXIETY (AT EASE)
TOTAL SCORE: 4
EVER FELT BAD OR GUILTY ABOUT YOUR DRINKING: YES
ANXIETY: MILDLY ANXIOUS
HAVE PEOPLE ANNOYED YOU BY CRITICIZING YOUR DRINKING: YES
PAROXYSMAL SWEATS: NO SWEAT VISIBLE
ORIENTATION AND CLOUDING OF SENSORIUM: ORIENTED AND CAN DO SERIAL ADDITIONS
DOES PATIENT WANT TO TALK TO SOMEONE ABOUT QUITTING: YES
AUDITORY DISTURBANCES: NOT PRESENT
TOTAL SCORE: 0
TOTAL SCORE: 1
ON A TYPICAL DAY WHEN YOU DRINK ALCOHOL HOW MANY DRINKS DO YOU HAVE: 12
EVER HAD A DRINK FIRST THING IN THE MORNING TO STEADY YOUR NERVES TO GET RID OF A HANGOVER: YES
TREMOR: NO TREMOR
HAVE YOU EVER FELT YOU SHOULD CUT DOWN ON YOUR DRINKING: YES
HEADACHE, FULLNESS IN HEAD: NOT PRESENT
ALCOHOL_USE: YES
TOTAL SCORE: 4
PAROXYSMAL SWEATS: NO SWEAT VISIBLE
AVERAGE NUMBER OF DAYS PER WEEK YOU HAVE A DRINK CONTAINING ALCOHOL: 7
DOES PATIENT WANT TO STOP DRINKING: YES
AGITATION: NORMAL ACTIVITY
HEADACHE, FULLNESS IN HEAD: NOT PRESENT
TOTAL SCORE: 4
TREMOR: NO TREMOR
VISUAL DISTURBANCES: NOT PRESENT
CONSUMPTION TOTAL: POSITIVE
VISUAL DISTURBANCES: NOT PRESENT
ORIENTATION AND CLOUDING OF SENSORIUM: ORIENTED AND CAN DO SERIAL ADDITIONS
NAUSEA AND VOMITING: NO NAUSEA AND NO VOMITING
AUDITORY DISTURBANCES: NOT PRESENT
AGITATION: NORMAL ACTIVITY
NAUSEA AND VOMITING: NO NAUSEA AND NO VOMITING
HOW MANY TIMES IN THE PAST YEAR HAVE YOU HAD 5 OR MORE DRINKS IN A DAY: 180

## 2019-05-27 ASSESSMENT — PATIENT HEALTH QUESTIONNAIRE - PHQ9
8. MOVING OR SPEAKING SO SLOWLY THAT OTHER PEOPLE COULD HAVE NOTICED. OR THE OPPOSITE, BEING SO FIGETY OR RESTLESS THAT YOU HAVE BEEN MOVING AROUND A LOT MORE THAN USUAL: NOT AT ALL
6. FEELING BAD ABOUT YOURSELF - OR THAT YOU ARE A FAILURE OR HAVE LET YOURSELF OR YOUR FAMILY DOWN: NOT AL ALL
2. FEELING DOWN, DEPRESSED, IRRITABLE, OR HOPELESS: NOT AT ALL
1. LITTLE INTEREST OR PLEASURE IN DOING THINGS: NOT AT ALL
SUM OF ALL RESPONSES TO PHQ QUESTIONS 1-9: 0
7. TROUBLE CONCENTRATING ON THINGS, SUCH AS READING THE NEWSPAPER OR WATCHING TELEVISION: NOT AT ALL
3. TROUBLE FALLING OR STAYING ASLEEP OR SLEEPING TOO MUCH: NOT AT ALL
4. FEELING TIRED OR HAVING LITTLE ENERGY: NOT AT ALL
SUM OF ALL RESPONSES TO PHQ9 QUESTIONS 1 AND 2: 0
9. THOUGHTS THAT YOU WOULD BE BETTER OFF DEAD, OR OF HURTING YOURSELF: NOT AT ALL
5. POOR APPETITE OR OVEREATING: NOT AT ALL

## 2019-05-27 ASSESSMENT — ENCOUNTER SYMPTOMS
POLYDIPSIA: 0
STRIDOR: 0
PALPITATIONS: 0
HEMOPTYSIS: 0
HEARTBURN: 0
MYALGIAS: 0
NERVOUS/ANXIOUS: 0
FOCAL WEAKNESS: 0
TINGLING: 0
SPEECH CHANGE: 0
BRUISES/BLEEDS EASILY: 0
CHILLS: 0
DIZZINESS: 0
DIAPHORESIS: 1
HEADACHES: 0
BLURRED VISION: 0
BLOOD IN STOOL: 0
NECK PAIN: 0
PHOTOPHOBIA: 0
DOUBLE VISION: 0
SPUTUM PRODUCTION: 0
VOMITING: 0
SENSORY CHANGE: 0
BACK PAIN: 0
SHORTNESS OF BREATH: 1
WHEEZING: 0
COUGH: 0
SINUS PAIN: 0
WEIGHT LOSS: 0
DEPRESSION: 0
FEVER: 0

## 2019-05-27 ASSESSMENT — COGNITIVE AND FUNCTIONAL STATUS - GENERAL
SUGGESTED CMS G CODE MODIFIER MOBILITY: CH
SUGGESTED CMS G CODE MODIFIER DAILY ACTIVITY: CH
MOBILITY SCORE: 24
DAILY ACTIVITIY SCORE: 24

## 2019-05-27 NOTE — PROGRESS NOTES
Patient noticed bruising on left lateral chest, under breast area.  Bruising also noted on right upper arm near IV site.  Dr. Gonda notified.   Harmony Stephens R.N.

## 2019-05-27 NOTE — PROGRESS NOTES
[facility] Progress Note      Admit Date: 5/25/2019  LOS: 2    Resident(s): Neel Lizarraga  Attending: ARI NEWELL/ Dr. Almaraz    Date & Time:   5/27/2019   12:27 PM       Patient ID:    Name:             Cosme Cabrera   YOB: 1985  Age:                 33 y.o.  male   MRN:               [id number]    ID:  Patient is a 33-year-old man who comes to the ED complaining of a few hours of not being able to swallow well.  He was just in the ED yesterday for alcohol detox.  He was discharged with Librium from the ED.  He does have a history of anxiety disorder on Valium, hypertension and paroxysmal atrial fibrillation on atenolol.  He has a history of about 2 months of daily alcohol abuse.  He drinks about 1/2-1/3 a pint of whiskey daily along with 2-3 beers.  His last drink was Thursday evening.  In the ED, patient was found to be in atrial fibrillation with RVR.  He was given IV diltiazem 25 mg IV, and subsequently his heart rate dropped to the 40s, and BP in the 70s/40s >> increased to 90s/60s with IV fluid boluses.  Patient states that he did take his atenolol this morning.  Cardiology was consulted, and if patient does not spontaneously returned to sinus rhythm, they will cardiovert in the morning.  They are starting anticoagulation in preparation.    Consultants:  PMA: Dr. Raza, Dr. Almaraz     Interval Events:  No signs of alcohol withdrawal  Patient high anxiety  Down to 2L supplemental O2, from 6L  S/p low dose tPa  On heparin ggt this AM  Transition to oral anticoagulant today, pt agrees to xarelto  Restart home BP meds  Restart home anxiety meds  Still on oxygen  Needs social work    ROS  Constitutional: Negative for fever and malaise/fatigue.   HENT: Negative for hearing loss.    Eyes: Negative for blurred vision.   Respiratory: Negative for cough and shortness of breath.    Cardiovascular: Positive for palpitations. Negative for chest pain and leg swelling.   Gastrointestinal: Negative for  "abdominal pain, heartburn and nausea.   Genitourinary: Negative for dysuria and frequency.   Musculoskeletal: Negative for joint pain and myalgias.   Skin: Negative for rash.   Neurological: Negative for dizziness, weakness and headaches.   Psychiatric/Behavioral: Positive for substance abuse. Negative for depression and suicidal ideas. The patient is nervous/anxious.        PHYSICAL EXAM  Vitals:    05/27/19 0800 05/27/19 0900 05/27/19 1000 05/27/19 1100   BP:       Pulse: 92 (!) 125 (!) 103 (!) 104   Resp: (!) 21 18 (!) 24 20   Temp: 36.8 °C (98.2 °F)      TempSrc:       SpO2: 95% 92% 93% 95%   Weight:       Height:         Body mass index is 37.36 kg/m².  /81   Pulse (!) 104   Temp 36.8 °C (98.2 °F)   Resp 20   Ht 1.73 m (5' 8.11\")   Wt 111.8 kg (246 lb 7.6 oz)   SpO2 95%   BMI 37.36 kg/m²   O2 therapy: Pulse Oximetry: 95 %, O2 (LPM): 2, O2 Delivery: Oxymask    Physical Exam  Constitutional: He is oriented to person, place, and time and well-developed, well-nourished, and in no distress. On supplemental O2 by NC. Obese.  Head: Normocephalic and atraumatic.   Eyes: EOM are normal. No scleral icterus.   Neck: Neck supple.   Cardiovascular:   No murmur heard.  Pulmonary/Chest: No respiratory distress. He has no wheezes. He has no rales.   Abdominal: Bowel sounds are normal. He exhibits no distension. There is no tenderness.   Musculoskeletal: He exhibits no edema.   Neurological: He is alert and oriented to person, place, and time. He exhibits normal muscle tone.   Skin: No rash noted.   Psychiatric: Mood, memory, affect and judgment normal. Anxious.    Respiratory:     Respiration: 20, Pulse Oximetry: 95 %    Chest Tube Drains:          HemoDynamics:  Pulse: (!) 104, Heart Rate (Monitored): (!) 104 NIBP: 126/83        Fluids:    Date 05/27/19 0700 - 05/28/19 0659   Shift 3456-5943 8822-9447 2944-2879 24 Hour Total   I  N  T  A  K  E   P.O. 240   240      P.O. 240   240    I.V. 207.1   207.1      " Heparin Volume 41.1   41.1      Volume (mL) (lactated ringers infusion) 166   166    Shift Total 447.1   447.1   O  U  T  P  U  T   Shift Total       .1   447.1        Intake/Output Summary (Last 24 hours) at 19 1346  Last data filed at 19 0500   Gross per 24 hour   Intake          4067.96 ml   Output             1600 ml   Net          2467.96 ml          Body mass index is 37.36 kg/m².    Recent Labs      195   SODIUM  132*  137  137   POTASSIUM  4.0  4.2  3.9   CHLORIDE  101  104  106   CO2  20  23  23   BUN  21  18  10   CREATININE  1.22  1.12  0.87   MAGNESIUM  1.9  1.8  2.0   PHOSPHORUS  2.1*  3.6   --    CALCIUM  9.2  9.2  8.4*       GI/Nutrition:  Recent Labs      19   ALTSGPT  55*  51*   --    ASTSGOT  31  25   --    ALKPHOSPHAT  56  50   --    TBILIRUBIN  0.9  0.8   --    GLUCOSE  98  104*  108*       Heme:  Recent Labs      19   0813  19   0730   RBC  5.55   --   5.11   --    --   4.66*   --    HEMOGLOBIN  17.8   --   16.9   --    --   15.2   --    HEMATOCRIT  53.1*   --   49.2   --    --   45.4   --    PLATELETCT  223   --   196   --    --   153*   --    PROTHROMBTM   --    --    --    --   17.4*   --    --    APTT   --    < >  28.5  110.0*  >240.0*   --   33.7   INR   --    --    --    --   1.41*   --    --     < > = values in this interval not displayed.       Infectious Disease:  Temp  Av.7 °C (98 °F)  Min: 36.2 °C (97.1 °F)  Max: 37.1 °C (98.7 °F)  Recent Labs      1927/19   0405   WBC  12.5*  12.1*  9.7   NEUTSPOLYS  58.30  69.30  54.00   LYMPHOCYTES  29.30  22.30  31.50   MONOCYTES  11.10  7.60  9.10   EOSINOPHILS  0.40  0.10  4.60   BASOPHILS  0.60  0.40  0.50   ASTSGOT  31  25   --    ALTSGPT  55*  51*   --    ALKPHOSPHAT  56  50   --    TBILIRUBIN  0.9  0.8   --         Meds:  • lisinopril  40 mg     • pneumococcal vaccine  0.5 mL     • atenolol  100 mg     • [START ON 5/28/2019] folic acid  1 mg     • [START ON 5/28/2019] thiamine  100 mg     • [START ON 5/28/2019] therapeutic multivitamin-minerals  1 Tab     • diazePAM  5 mg     • hydrOXYzine pamoate  25-50 mg     • diazePAM  5 mg     • rivaroxaban  15 mg      Followed by   • [START ON 6/17/2019] rivaroxaban  20 mg     • aspirin EC  81 mg     • omeprazole  20 mg     • senna-docusate  2 Tab      And   • polyethylene glycol/lytes  1 Packet      And   • magnesium hydroxide  30 mL      And   • bisacodyl  10 mg     • Respiratory Care per Protocol       • labetalol  10 mg     • ondansetron  4 mg     • ondansetron  4 mg     • promethazine  12.5-25 mg     • promethazine  12.5-25 mg     • prochlorperazine  5-10 mg          Procedures:  tPa 5/26    Imaging:  EC-ECHOCARDIOGRAM COMPLETE W/ CONT   Final Result      CT-CTA CHEST PULMONARY ARTERY W/ RECONS   Final Result      1.  Extensive bilateral pulmonary emboli including main pulmonary artery.      2.  Elevated RV LV ratio.         Results called to Dr. Raygoza      DX-CHEST-PORTABLE (1 VIEW)   Final Result      Negative single view of the chest.          Problem and Plan:       Extensive bilateral Pulmonary Embolus   Assessment & Plan    - Seen on CT-A chest  - On supplemental O2   - Give low dose thrombolytic therapy after telling patient of risks  - Heparin ggt overnight  - Transition to PO anticoagulation tomorrow        Paroxysmal atrial fibrillation (HCC)   Assessment & Plan    - Presented to hospital in afib w/ RVR  - Received 20 mg IV diltiazem, and HR dropped to 40s with borderline low BP  - Spontaneously resolved   - UDS positive for cannabis and benzos, patietn very high anxiety  - Echo showed EF 55%, right ventricular strain, mod dilated right ventricle  - Cardiology assessed, recommended ASA, signed off     Alcohol withdrawal (HCC)   Assessment & Plan    - Drinks about  1/2-3/4 of a pint of whiskey along with 2-3 beers daily for the last 2 months  - Last drink around 5 days prior, agitated, no other signs withdrawal  - Phenobarbital protocol briefly after admission  - Rally bag on admit  - Give thiamine/folate/multivitamins  - Seizure/fall/aspiration precautions     LAZARO (acute kidney injury) (HCC)   Assessment & Plan    2 L IV bolus  On maintenance IV fluid  Avoid nephrotoxins  Monitor        Elevated troponin   Assessment & Plan    - Per cardiology, likely due to demand ischemia from atrial fibrillation  - Associated with new T wave inversions  - Trend troponin and EKG  - Cardiology on board  - On IV heparin for possible cardioversion     Hypertension   Assessment & Plan    - Borderline low after IV diltiazem in the ED  - Lisinopril, atenolol held on admission due to bradycardia, hypotension  - Received 2 L IV fluid bolus in ED  - Receiving fluids with heparin ggt  - Restart home meds  - IV labetalol PRN         DISPO: RICU    CODE STATUS: Full    Quality Measures:  Srivastava Catheter: None  DVT Prophylaxis: Heparin ggt, xarelto  Ulcer Prophylaxis: None  Antibiotics: None  Lines: PIV

## 2019-05-27 NOTE — ASSESSMENT & PLAN NOTE
Extensive discussion with patien tregarding outpt regimen for anxiety  Changed to sched valium as he takes at home, patient deferred, says not adequate for currently level of anxiety and withdrawal.  Will scheduled librium 50 mg qid for 24 hours, then 25 mg qid for 24 hours (started 5/27/19)  ciwa with ativan prn  Stop valium  Hold vistaril and xanax, per discussion seems that patient has been escalating vistaril recently  Follows with [place] psychiatrist, he is ok with being seen by psychiatrist here for adjustment of medications especially in regards to discharge

## 2019-05-27 NOTE — DISCHARGE PLANNING
Medical Social Work    Referral: Rx(s)    Intervention: Jesús faxed rx for Xarelto to pt's pharmacy, BAC ON [facility] off [address], requesting co-pay amount. [facility] prescriptions placed back in pt's chart.     Plan: Await response from pt's pharmacy and verify prior authorization is not required.

## 2019-05-27 NOTE — PROGRESS NOTES
2 RN skin check complete with BOWEN [place].   Devices in place EKG leads, BP cuff, pulse ox, oxymask, SCDs, PIV x 3.  Skin assessed under devices: yes.  Confirmed pressure ulcers found on: none.  Bruising on left lateral chest under breast, RUE near IV site.  New potential pressure ulcers noted on: none.  The following interventions in place: Q shift 2 RN skin check, turn Q 2 (or have patient turn self to relieve pressure), rotate device sites, pillows for positioning.  Harmony Stephens R.N.

## 2019-05-27 NOTE — PROGRESS NOTES
At 1345 neuro check, pt noted with 5mm R pupil and 4mm L pupil, pupils previously equal during neuro checks. Dr Raza notified. No other neuro deficits noted

## 2019-05-27 NOTE — ASSESSMENT & PLAN NOTE
- Submassive PE with large clot burden on CT-A  - Some right heart strain seen on echo, unable to distinguish acute vs chronic  - Given low dose tPa 5/26/2019  - Transitioned from heparin ggt to Xarelto  - O2 requirement decreased, but still requires O2 on walk test.  - Continue Xarelto and Oxygen as an outpatient  - PCP, cards to follow up to determine duration of anticoagulation, further testing for possible secondary cause.  - F/u with pulmonology. He needs a repeat Echo 3 months post discharge

## 2019-05-27 NOTE — PROGRESS NOTES
[facility] ICU Transfer Note                                                                              Attending: Dr. Rufina Ceja  Resident: Neel Lizarraga  Date of admission: 5/25/2019  Date of transferring out from ICU:  05/27/19        Primary Diagnosis:   Extensive bilateral pulmonary embolus     Secondary diagnoses:  Paroxysmal a fib  Alcohol withdrawal  Elevated troponin  Hypertension  Anxiety        HPI and ICU Course Summary (Brief Narrative):  33 year old man with a PMH of anxiety disorder on valium, hypertension and paroxysmal atrial fibrillation on atenolol presented to the ED complaining of not being able to swallow well. Patient has been known to abuse alcohol and recently presented to the ED with complaints of alcohol detox.  He was discharged with Librium from the ED at that time. In the ED, patient was found to be in atrial fibrillation with RVR, given IV diltiazem 25 mg IV with HR subsequently dropping to the 40s, and BP in the 70s/40s. Cardiology assessed patient on admission and place him on a heparin ggt in case cardioversion was needed, however, patient spontaneously cardioverted back into normal sinus rhtyhm. He was found to be significantly hypoxic on hospital day 2 and a CT-A chest was performed which showed extensive bilateral pulmonary emboli. An echo revealed right heart strain and his troponins were elevated. Patient was offered low dose tPa and heparin ggt and he elected for the tPa which was given on 5/26. On 5/27 the patient was transitioned to xarelto with pre-authorization scripts pending (help from social work). An outpatient anticoagulation clinic referral was placed. Patient continued to require oxygen, but this was improved from 6L by NC to 2L by NC. He will likely need a home oxygen evaluation prior to discharge. Anti-hypertensive medications were continued prior to transfer from ICU to floor.        Important Events in the ICU:   - Central Line: None  - Intubation: NA  -  Extubated NA  - Pressors: None  - Srivastava catheter: None  - Tube feeding: None  - Antibiotics: None  - Other procedures: Thrombolytic 5/26        Labs and imaging studies to be continued with their indications:  - CBC: Monitor hgb, watch anemia, platelets signs bleeding on xarelto  - CMP or BMP: Monitor kidney function     Things to follow:   - H&H daily  - Make sure xarelto is covered  - Watch for signs of withdrawal  - Assess oxygen needs, may need oxygen walking test

## 2019-05-27 NOTE — CARE PLAN
Problem: Communication  Goal: The ability to communicate needs accurately and effectively will improve  Outcome: PROGRESSING AS EXPECTED  Patient less irritable, able to voice feelings and communicate in effective ways.      Problem: Knowledge Deficit  Goal: Knowledge of the prescribed therapeutic regimen will improve  Outcome: PROGRESSING AS EXPECTED  Patient understands the therapeutic regimen at this time.  Heparin drip, monitored with labs.

## 2019-05-27 NOTE — CARE PLAN
Problem: Safety  Goal: Will remain free from injury  Outcome: PROGRESSING AS EXPECTED  Bed is locked and in lowest position with treaded socks on and call light within reach. Patient educated regarding safety. Verbalizes understanding and calls appropriately for assistance. Educated on risks related to tpa    Problem: Knowledge Deficit  Goal: Knowledge of disease process/condition, treatment plan, diagnostic tests, and medications will improve  Outcome: PROGRESSING AS EXPECTED  Pt educated on POC, current medications and doses and disease process. All questions answered.

## 2019-05-27 NOTE — ASSESSMENT & PLAN NOTE
-Patient planning on tapering off via Librium, has supply at home from ED visit earlier in May 2019.  -Outpatient follow-up with psych NP Nieves Mercado on 5/30/2019.

## 2019-05-27 NOTE — PROGRESS NOTES
"Critical Care Progress Note    Date of admission  5/25/2019    Chief Complaint \"feels poorly and anxious\"      Hospital Course  33 y.o. male who presented 5/25/2019 with a past medical history significant for alcohol abuse complicated by paroxysmal atrial fibrillation whenever he drinks too much, anxiety, depression, hypertension who was seen in the emergency department on Thursday night for alcohol withdrawal desiring assistance in quitting.  He was discharged on Librium and was currently in normal sinus rhythm at that time.  He went home and went to bed and when he awoke the next morning he went out to work in the yard and noticed generalized malaise, fatigue and shortness of breath with palpitations.  The shortness of breath progressively worsened to the point that he could barely walk 25 feet.  He decided to seek medical attention and presented to the emergency department where he was found to be in atrial fibrillation with rapid ventricular response.  He was given 25 mg of IV diltiazem which unfortunately dropped his blood pressure to 70/40 and his heart rate down to 40 in sinus bradycardia without complete heart block.  Patient notes that he had taken 100 mg of oral atenolol earlier in the day.  Cardiology was consulted in the ED and recommended IV fluids and anticoagulation in preparation for possible defibrillation in the morning.  Patient states he is seen cardiology in [place] for this in the past and was not started on any medications as it seemed to only occur when he was going through alcohol withdrawal.    Interval Problem Update  Reviewed last 24 hour events:  Heparin 500 uhr  Afebrile  ptt low  On o2 supplementation  Start oral ac  xarelto  prilosec  LR at 83 ml/hr  Stop mg ox  tpa window at 12:30 pm  afib hx  ? Withdrawals  Atenolol added  Extensive discussion with patient regarding outpt regimen for anxiety  Changed to sched valium for rounds, patient deferred.  Will scheduled librium 50 mg qid " for 24 hours, then 25 mg qid for 24 hours  ciwa with ativan prn  Stop valium  Hold vistaril and xanax, per discussion seems that patient has been escalating vistaril recently  Follows with [place] psychiatrist, he is ok with being seen by psychiatrist here for adjustment of medications especially in regards to discharge    Review of Systems  Review of Systems   Constitutional: Positive for diaphoresis. Negative for chills, fever, malaise/fatigue and weight loss.   HENT: Negative for congestion and sinus pain.    Eyes: Negative for blurred vision, double vision and photophobia.   Respiratory: Positive for shortness of breath. Negative for cough, hemoptysis, sputum production, wheezing and stridor.    Cardiovascular: Negative for chest pain, palpitations and leg swelling.   Gastrointestinal: Negative for blood in stool, heartburn, melena and vomiting.   Genitourinary: Negative for dysuria and urgency.   Musculoskeletal: Negative for back pain, myalgias and neck pain.   Skin: Negative for itching and rash.   Neurological: Negative for dizziness, tingling, sensory change, speech change, focal weakness and headaches.   Endo/Heme/Allergies: Negative for polydipsia. Does not bruise/bleed easily.   Psychiatric/Behavioral: Negative for depression. The patient is not nervous/anxious.         Anxiety        Vital Signs for last 24 hours   Temp:  [36.2 °C (97.1 °F)-37.1 °C (98.7 °F)] 36.2 °C (97.1 °F)  Pulse:  [] 90  Resp:  [13-44] 22  BP: (122)/(81) 122/81  SpO2:  [89 %-99 %] 96 %    Hemodynamic parameters for last 24 hours       Respiratory Information for the last 24 hours       Physical Exam   Physical Exam   Constitutional: He is oriented to person, place, and time. He appears distressed.   HENT:   Head: Normocephalic and atraumatic.   Right Ear: External ear normal.   Left Ear: External ear normal.   Mouth/Throat: No oropharyngeal exudate.   Eyes: Pupils are equal, round, and reactive to light. Conjunctivae and  EOM are normal. Right eye exhibits no discharge. Left eye exhibits no discharge.   Neck: No JVD present. No tracheal deviation present.   Cardiovascular: Normal rate, regular rhythm and normal heart sounds.    No murmur heard.  Pulmonary/Chest: Breath sounds normal. No stridor. He is in respiratory distress. He has no wheezes. He has no rales. He exhibits no tenderness.   On oxygen   Abdominal: He exhibits no mass. There is no tenderness. There is no rebound and no guarding.   Musculoskeletal: He exhibits no edema, tenderness or deformity.   Neurological: He is alert and oriented to person, place, and time. He displays normal reflexes. No cranial nerve deficit. Coordination normal.   Bilateral hand tremors   Skin: Skin is warm. No rash noted. He is diaphoretic. No erythema.   Nursing note and vitals reviewed.      Medications  Current Facility-Administered Medications   Medication Dose Route Frequency Provider Last Rate Last Dose   • aspirin EC (ECOTRIN) tablet 81 mg  81 mg Oral DAILY Latasha Crook M.D.   81 mg at 05/27/19 0600   • chlordiazePOXIDE (LIBRIUM) capsule 25 mg  25 mg Oral Q6HRS PRN Neel Lizarraga M.D.   25 mg at 05/27/19 0701   • heparin injection 4,000 Units  4,000 Units Intravenous PRN Neel Lizarraga M.D.   4,000 Units at 05/26/19 1622    And   • heparin infusion 25,000 units in 500 ml 0.45% nacl   Intravenous Continuous Neel Lizarraga M.D. 16 mL/hr at 05/27/19 0100 800 Units/hr at 05/27/19 0100   • omeprazole (PRILOSEC) capsule 20 mg  20 mg Oral DAILY Neel Lizarraga M.D.   20 mg at 05/27/19 0600   • senna-docusate (PERICOLACE or SENOKOT S) 8.6-50 MG per tablet 2 Tab  2 Tab Oral BID Marielos Ramírez M.D.   Stopped at 05/26/19 0600    And   • polyethylene glycol/lytes (MIRALAX) PACKET 1 Packet  1 Packet Oral QDAY PRN Marielos Ramírez M.D.        And   • magnesium hydroxide (MILK OF MAGNESIA) suspension 30 mL  30 mL Oral QDAY PRN Marielos Ramírez M.D.        And   • bisacodyl  (DULCOLAX) suppository 10 mg  10 mg Rectal QDAY PRN Marielos Ramírez M.D.       • Respiratory Care per Protocol   Nebulization Continuous RT Marielos Ramírez M.D.       • labetalol (NORMODYNE,TRANDATE) injection 10 mg  10 mg Intravenous Q4HRS PRN Marielos Ramírez M.D.       • ondansetron (ZOFRAN) syringe/vial injection 4 mg  4 mg Intravenous Q4HRS PRN Marielos Ramírez M.D.   4 mg at 05/26/19 0124   • ondansetron (ZOFRAN ODT) dispertab 4 mg  4 mg Oral Q4HRS PRN Marielos Ramírez M.D.       • promethazine (PHENERGAN) tablet 12.5-25 mg  12.5-25 mg Oral Q4HRS PRN Marielos Ramírez M.D.       • promethazine (PHENERGAN) suppository 12.5-25 mg  12.5-25 mg Rectal Q4HRS PRN Marielos Ramírez M.D.       • prochlorperazine (COMPAZINE) injection 5-10 mg  5-10 mg Intravenous Q4HRS PRN Marielos Ramírez M.D.       • magnesium oxide (MAG-OX) tablet 400 mg  400 mg Oral BID Marielos Ramríez M.D.   Stopped at 05/27/19 0600   • lactated ringers infusion   Intravenous Continuous Marielos Ramírez M.D. 83 mL/hr at 05/26/19 2257     • Pharmacy Consult Request - Benzodiazepine review   Other PHARMACY TO DOSE Jeremy M Gonda, M.D.       • thiamine tablet 100 mg  100 mg Oral DAILY Jeremy M Gonda, M.D.   100 mg at 05/27/19 0600    And   • multivitamin (THERAGRAN) tablet 1 Tab  1 Tab Oral DAILY Jeremy M Gonda, M.D.   1 Tab at 05/27/19 0600    And   • folic acid (FOLVITE) tablet 1 mg  1 mg Oral DAILY Jeremy M Gonda, M.D.   1 mg at 05/27/19 0600       Fluids    Intake/Output Summary (Last 24 hours) at 05/27/19 0714  Last data filed at 05/27/19 0600   Gross per 24 hour   Intake          3553.33 ml   Output             1575 ml   Net          1978.33 ml       Laboratory      Recent Labs      05/25/19 1938 05/26/19 0215   TROPONINI  0.10*  0.06*     Recent Labs      05/25/19 1938 05/26/19 0215 05/27/19   0405   SODIUM  132*  137  137   POTASSIUM  4.0  4.2  3.9   CHLORIDE  101  104  106   CO2  20  23  23   BUN  21   18  10   CREATININE  1.22  1.12  0.87   MAGNESIUM  1.9  1.8  2.0   PHOSPHORUS  2.1*  3.6   --    CALCIUM  9.2  9.2  8.4*     Recent Labs      05/25/19 1938 05/26/19 0215 05/27/19   0405   ALTSGPT  55*  51*   --    ASTSGOT  31  25   --    ALKPHOSPHAT  56  50   --    TBILIRUBIN  0.9  0.8   --    GLUCOSE  98  104*  108*     Recent Labs      05/25/19 1938 05/26/19 0215 05/27/19   0405   WBC  12.5*  12.1*  9.7   NEUTSPOLYS  58.30  69.30  54.00   LYMPHOCYTES  29.30  22.30  31.50   MONOCYTES  11.10  7.60  9.10   EOSINOPHILS  0.40  0.10  4.60   BASOPHILS  0.60  0.40  0.50   ASTSGOT  31  25   --    ALTSGPT  55*  51*   --    ALKPHOSPHAT  56  50   --    TBILIRUBIN  0.9  0.8   --      Recent Labs      05/25/19 1938 05/26/19 0215 05/26/19 0813 05/26/19 2224 05/27/19   0405   RBC  5.55  5.11   --    --   4.66*   HEMOGLOBIN  17.8  16.9   --    --   15.2   HEMATOCRIT  53.1*  49.2   --    --   45.4   PLATELETCT  223  196   --    --   153*   PROTHROMBTM   --    --    --   17.4*   --    APTT   --   28.5  110.0*  >240.0*   --    INR   --    --    --   1.41*   --        Imaging  X-Ray:  I have personally reviewed the images and compared with prior images.    Assessment/Plan  * Paroxysmal atrial fibrillation (HCC)   Assessment & Plan    Known hx afib  May need to be on long term anticoagulation  May be exac by PE  Restarted atenolol  Started xarelto     Alcohol withdrawal (HCC)   Assessment & Plan    Stopped drinking Thursday 5 23  Worsening tremors/diaphoresis per patient  Complicated by chronic benzos and possible withdrawal  Mvt/thiamine/folic acid  Extensive discussion with patient  sched librium as above and ciwa protocol with ativan, once stable and ensured no withdrawal will consider transition to home valium.    Psych consult       Anxiety   Assessment & Plan    Extensive discussion with patien tregarding outpt regimen for anxiety  Changed to sched valium as he takes at home, patient deferred, says not  adequate for currently level of anxiety and withdrawal.  Will scheduled librium 50 mg qid for 24 hours, then 25 mg qid for 24 hours (started 5/27/19)  ciwa with ativan prn  Stop valium  Hold vistaril and xanax, per discussion seems that patient has been escalating vistaril recently  Follows with [place] psychiatrist, he is ok with being seen by psychiatrist here for adjustment of medications especially in regards to discharge       Acute saddle pulmonary embolism (HCC)   Assessment & Plan    Received tpa  24 hour window this afternoon  On heparin drip  No current signs of bleeding     LAZARO (acute kidney injury) (HCC)   Assessment & Plan    Follow closely  Daily chem  Avoid nephrotoxic medication     Electrolyte abnormality   Assessment & Plan    Replete potassium, phosphorus, magnesium and monitor closely     Elevated troponin   Assessment & Plan    Submassive PE  Likely demand ischemia  On heparin drip     Hypertension   Assessment & Plan    Restarted atenolol          VTE:  Heparin  Ulcer: Not Indicated  Lines: None    I have performed a physical exam and reviewed and updated ROS and Plan today (5/27/2019). In review of yesterday's note (5/26/2019), there are no changes except as documented above.     Discussed patient condition and risk of morbidity and/or mortality with Hospitalist, RN, RT, Pharmacy, Dietary, [employer] resident, Charge nurse / hot rounds and Patient     The patient remains critically ill.  On heparin drip for submassive PE within 24 hours post TPA window, high risk for bleeding or respiratory failure without critical care management and monitoring.  Managing heparin drip with sequential ptt labs and adjusting appropriately.  Critical care time = 38 minutes in directly providing and coordinating critical care and extensive data review.  No time overlap and excludes procedures.

## 2019-05-27 NOTE — DIETARY
"Nutrition services: Day 2 of admit.  Cosme Cabrera is a 33 y.o. male with admitting DX of atrial fibrillation and alcohol withdrawal.  Consult received for poor PO intake and unintended weight loss.     Visited pt bedside - family member was present. Pt appeared well-nourished, with no visible signs of muscle wasting. Pt seemed irritated and was very unhappy with his restroom situation - stating that he does not like using a bed pan, but that he isn't allowed to get up to use the restroom either. Pt reported that he frequents the restroom at home, and that he is limiting his PO intake while here, because he can't get up to use the restroom. Otherwise, pt reported currently having an appetite. PTA pt was eating one meal per day. Pt reported a usual body weight between 240-250#, but that his weight normally fluctuates up/down. Pt did not want any snacks sent, as his family member is bringing him snacks.     Assessment:  Height: 173 cm (5' 8.11\")  Weight: 111.8 kg (246 lb 7.6 oz)  Body mass index is 37.36 kg/m². (Class II Obesity)  Diet/Intake: Regular diet/ PO intake 50-75% meals per ADL's    Evaluation:   1. Hx of anxiety, hypertension, paroxysmal atrial fibrillation   2. Labs: Glucose 108   3. Meds: folic acid, multivitamin, thiamine, magnesium oxide, Zofran   4. Pt admit weight of 110.8 kg (244#) is within reported usual body weight range of 240-250#. Pt noted fluid +4.5L since admit.  5. No wounds noted in chart.      Malnutrition Risk: No risk identified at this time.    Recommendations/Plan:  1. Encourage intake of meals.  2. Document intake of all meals as % taken in ADL's to provide interdisciplinary communication across all shifts.   3. Monitor weight.  4. Nutrition rep will continue to see patient for ongoing meal and snack preferences.     RD available as needed.       "

## 2019-05-27 NOTE — CARE PLAN
Problem: Infection  Goal: Will remain free from infection    Intervention: Implement standard precautions and perform hand washing before and after patient contact  In place      Problem: Bowel/Gastric:  Goal: Normal bowel function is maintained or improved    Intervention: Educate patient and significant other/support system about diet, fluid intake, medications and activity to promote bowel function  Pt provided education

## 2019-05-28 ENCOUNTER — PATIENT OUTREACH (OUTPATIENT)
Dept: HEALTH INFORMATION MANAGEMENT | Facility: OTHER | Age: 34
End: 2019-05-28

## 2019-05-28 PROBLEM — I27.20 PULMONARY HYPERTENSION (HCC): Status: ACTIVE | Noted: 2019-05-28

## 2019-05-28 LAB
ANION GAP SERPL CALC-SCNC: 8 MMOL/L (ref 0–11.9)
BASOPHILS # BLD AUTO: 0.4 % (ref 0–1.8)
BASOPHILS # BLD: 0.05 K/UL (ref 0–0.12)
BUN SERPL-MCNC: 10 MG/DL (ref 8–22)
CALCIUM SERPL-MCNC: 8.6 MG/DL (ref 8.5–10.5)
CHLORIDE SERPL-SCNC: 107 MMOL/L (ref 96–112)
CO2 SERPL-SCNC: 23 MMOL/L (ref 20–33)
CREAT SERPL-MCNC: 0.96 MG/DL (ref 0.5–1.4)
EOSINOPHIL # BLD AUTO: 0.41 K/UL (ref 0–0.51)
EOSINOPHIL NFR BLD: 3.6 % (ref 0–6.9)
ERYTHROCYTE [DISTWIDTH] IN BLOOD BY AUTOMATED COUNT: 51.4 FL (ref 35.9–50)
GLUCOSE SERPL-MCNC: 116 MG/DL (ref 65–99)
HCT VFR BLD AUTO: 43.4 % (ref 42–52)
HGB BLD-MCNC: 14.5 G/DL (ref 14–18)
IMM GRANULOCYTES # BLD AUTO: 0.05 K/UL (ref 0–0.11)
IMM GRANULOCYTES NFR BLD AUTO: 0.4 % (ref 0–0.9)
LYMPHOCYTES # BLD AUTO: 2.8 K/UL (ref 1–4.8)
LYMPHOCYTES NFR BLD: 24.7 % (ref 22–41)
MCH RBC QN AUTO: 32.6 PG (ref 27–33)
MCHC RBC AUTO-ENTMCNC: 33.4 G/DL (ref 33.7–35.3)
MCV RBC AUTO: 97.5 FL (ref 81.4–97.8)
MONOCYTES # BLD AUTO: 0.95 K/UL (ref 0–0.85)
MONOCYTES NFR BLD AUTO: 8.4 % (ref 0–13.4)
NEUTROPHILS # BLD AUTO: 7.06 K/UL (ref 1.82–7.42)
NEUTROPHILS NFR BLD: 62.5 % (ref 44–72)
NRBC # BLD AUTO: 0 K/UL
NRBC BLD-RTO: 0 /100 WBC
PLATELET # BLD AUTO: 168 K/UL (ref 164–446)
PMV BLD AUTO: 8.9 FL (ref 9–12.9)
POTASSIUM SERPL-SCNC: 4 MMOL/L (ref 3.6–5.5)
RBC # BLD AUTO: 4.45 M/UL (ref 4.7–6.1)
SODIUM SERPL-SCNC: 138 MMOL/L (ref 135–145)
WBC # BLD AUTO: 11.3 K/UL (ref 4.8–10.8)

## 2019-05-28 PROCEDURE — A9270 NON-COVERED ITEM OR SERVICE: HCPCS | Performed by: INTERNAL MEDICINE

## 2019-05-28 PROCEDURE — 700102 HCHG RX REV CODE 250 W/ 637 OVERRIDE(OP): Performed by: INTERNAL MEDICINE

## 2019-05-28 PROCEDURE — 770020 HCHG ROOM/CARE - TELE (206)

## 2019-05-28 PROCEDURE — 36415 COLL VENOUS BLD VENIPUNCTURE: CPT

## 2019-05-28 PROCEDURE — 99232 SBSQ HOSP IP/OBS MODERATE 35: CPT | Performed by: INTERNAL MEDICINE

## 2019-05-28 PROCEDURE — 80048 BASIC METABOLIC PNL TOTAL CA: CPT

## 2019-05-28 PROCEDURE — 85025 COMPLETE CBC W/AUTO DIFF WBC: CPT

## 2019-05-28 PROCEDURE — 99233 SBSQ HOSP IP/OBS HIGH 50: CPT | Mod: GC | Performed by: HOSPITALIST

## 2019-05-28 RX ADMIN — Medication 100 MG: at 05:02

## 2019-05-28 RX ADMIN — MULTIPLE VITAMINS W/ MINERALS TAB 1 TABLET: TAB at 05:02

## 2019-05-28 RX ADMIN — FOLIC ACID 1 MG: 1 TABLET ORAL at 05:02

## 2019-05-28 RX ADMIN — ATENOLOL 100 MG: 100 TABLET ORAL at 05:02

## 2019-05-28 RX ADMIN — LISINOPRIL 40 MG: 20 TABLET ORAL at 05:02

## 2019-05-28 RX ADMIN — CHLORDIAZEPOXIDE HYDROCHLORIDE 25 MG: 25 CAPSULE ORAL at 23:36

## 2019-05-28 RX ADMIN — CHLORDIAZEPOXIDE HYDROCHLORIDE 50 MG: 25 CAPSULE ORAL at 05:02

## 2019-05-28 RX ADMIN — OMEPRAZOLE 20 MG: 20 CAPSULE, DELAYED RELEASE ORAL at 05:02

## 2019-05-28 RX ADMIN — RIVAROXABAN 15 MG: 15 TABLET, FILM COATED ORAL at 18:29

## 2019-05-28 RX ADMIN — CHLORDIAZEPOXIDE HYDROCHLORIDE 25 MG: 25 CAPSULE ORAL at 11:38

## 2019-05-28 RX ADMIN — CHLORDIAZEPOXIDE HYDROCHLORIDE 25 MG: 25 CAPSULE ORAL at 18:29

## 2019-05-28 RX ADMIN — RIVAROXABAN 15 MG: 15 TABLET, FILM COATED ORAL at 09:22

## 2019-05-28 ASSESSMENT — LIFESTYLE VARIABLES
VISUAL DISTURBANCES: NOT PRESENT
AGITATION: NORMAL ACTIVITY
VISUAL DISTURBANCES: NOT PRESENT
TOTAL SCORE: 0
AUDITORY DISTURBANCES: NOT PRESENT
AGITATION: NORMAL ACTIVITY
ORIENTATION AND CLOUDING OF SENSORIUM: ORIENTED AND CAN DO SERIAL ADDITIONS
ANXIETY: NO ANXIETY (AT EASE)
TREMOR: NO TREMOR
ORIENTATION AND CLOUDING OF SENSORIUM: ORIENTED AND CAN DO SERIAL ADDITIONS
AUDITORY DISTURBANCES: NOT PRESENT
PAROXYSMAL SWEATS: NO SWEAT VISIBLE
HEADACHE, FULLNESS IN HEAD: NOT PRESENT
NAUSEA AND VOMITING: NO NAUSEA AND NO VOMITING
PAROXYSMAL SWEATS: NO SWEAT VISIBLE
NAUSEA AND VOMITING: NO NAUSEA AND NO VOMITING
HEADACHE, FULLNESS IN HEAD: NOT PRESENT
TREMOR: NO TREMOR
NAUSEA AND VOMITING: NO NAUSEA AND NO VOMITING
PAROXYSMAL SWEATS: NO SWEAT VISIBLE
ANXIETY: NO ANXIETY (AT EASE)
TOTAL SCORE: 0
AGITATION: NORMAL ACTIVITY
TOTAL SCORE: 0
VISUAL DISTURBANCES: NOT PRESENT
TREMOR: NO TREMOR
HEADACHE, FULLNESS IN HEAD: NOT PRESENT
ORIENTATION AND CLOUDING OF SENSORIUM: ORIENTED AND CAN DO SERIAL ADDITIONS
AUDITORY DISTURBANCES: NOT PRESENT
ANXIETY: NO ANXIETY (AT EASE)

## 2019-05-28 ASSESSMENT — ENCOUNTER SYMPTOMS
HEMOPTYSIS: 0
NECK PAIN: 0
HEADACHES: 0
SHORTNESS OF BREATH: 1
PHOTOPHOBIA: 0
HALLUCINATIONS: 0
CHILLS: 0
BLURRED VISION: 0
SINUS PAIN: 0
WHEEZING: 0
NERVOUS/ANXIOUS: 0
MYALGIAS: 0
SPEECH CHANGE: 0
DIZZINESS: 0
NAUSEA: 0
POLYDIPSIA: 0
FEVER: 0
BLOOD IN STOOL: 0
VOMITING: 0
BRUISES/BLEEDS EASILY: 0
HEARTBURN: 0
TINGLING: 0
DEPRESSION: 0
ABDOMINAL PAIN: 0
DIAPHORESIS: 0
INSOMNIA: 0
BACK PAIN: 0
COUGH: 0
PALPITATIONS: 0
STRIDOR: 0
WEIGHT LOSS: 0
DOUBLE VISION: 0
EYE PAIN: 0
SPUTUM PRODUCTION: 0
SENSORY CHANGE: 0
SORE THROAT: 0
FOCAL WEAKNESS: 0

## 2019-05-28 NOTE — FACE TO FACE
"Face to Face Note  -  Durable Medical Equipment    Tamia Larson M.D. - NPI: [id number]  I certify that this patient is under my care and that they had a durable medical equipment(DME)face to face encounter by myself that meets the physician DME face-to-face encounter requirements with this patient on:    Date of encounter:   Patient:                    MRN:                       YOB: 2019  Cosme Cabrera  [id number]  1985     The encounter with the patient was in whole, or in part, for the following medical condition, which is the primary reason for durable medical equipment:  Other - Pulmonary hypertension    I certify that, based on my findings, the following durable medical equipment is medically necessary:  Oxygen.    HOME O2 Saturation Measurements:(Values must be present for Home Oxygen orders)  Room air sat at rest: 88  Room air sat with amb: 85  With liters of O2: 1, O2 sat at rest with O2: 94  With Liters of O2: 2, O2 sat with amb with O2 : 92  Is the patient mobile?: Yes    My Clinical findings support the need for the above equipment due to:  Hypoxia    Supporting Symptoms: The patient requires supplemental oxygen, as the following interventions have been tried with limited or no improvement: \"Ambulation with oximetry and \"Incentive spirometry    "

## 2019-05-28 NOTE — DISCHARGE SUMMARY
Internal Medicine Discharge Summary  Note Author: Rodney Youngblood M.D.       Name Cosme Cabrera 1985   Age/Sex 33 y.o. male   MRN [id number]         Admit Date:  2019       Discharge Date:   19  Service:   [facility] and [facility] yellow Team  Attending Physician(s):   Edvin Almaraz MD/ Marcelo Young M.D.  Senior Resident(s):   Rodney Youngblood MD  S[place]vester Resident(s):   Tamia Larson MD  PCP: Micheal Pack M.D.      Primary Diagnosis:   Extensive bilateral pulmonary embolism    Secondary Diagnoses:                Principal Problem:    Paroxysmal atrial fibrillation (HCC) POA: Unknown  Active Problems:    Alcohol withdrawal (HCC) POA: Unknown    Hypertension POA: Unknown    Elevated troponin POA: Unknown    Electrolyte abnormality POA: Unknown    LAZARO (acute kidney injury) (HCC) POA: Unknown    Acute saddle pulmonary embolism (HCC) POA: Unknown      Overview: Submassive PE with large clot burden burden in the pulmonary arteries plus       acute right heart strain.  Patient meets indications for low-dose TPA.  He       has no hemodynamic compromise and only mild to moderate respiratory       compromise with hypoxia.  He does not meet criteria for full dose TPA with       his hemodynamic instability.  He does not appear to have any notable risk       factors for bleeding.  After a detailed discussion of risks and benefits       and discussing the options of watchful waiting low-dose TPA versus EKOS       patient elected for low-dose TPA which I support.  If patient's clinical       course is uneventful post TPA he will get the heparin per protocol and       then tomorrow he can be switched over to apixaban for outpatient       management.  Given there was no clear precipitant to this PE patient may       be a good candidate for lifelong anticoagulation.    Anxiety POA: Unknown  Resolved Problems:    * No resolved hospital problems. *      Hospital Summary (Brief Narrative):        Cosme Cabrera is a 33 year old man with a PMH of anxiety disorder on valium, hypertension and paroxysmal atrial fibrillation on atenolol presented to the ED complaining of not being able to swallow well. Patient has been known to abuse alcohol and recently presented to the ED with complaints of alcohol detox.  He was discharged with Librium from the ED at that time. In the ED, patient was found to be in atrial fibrillation with RVR, given IV diltiazem 25 mg IV with HR subsequently dropping to the 40s, and BP in the 70s/40s. Cardiology assessed patient on admission and place him on a heparin ggt in case cardioversion was needed, however, [facility] He was found to be significantly hypoxic on hospital day 2 and a CT-A chest was performed which showed extensive bilateral pulmonary emboli. An echo revealed right heart strain and his troponins were elevated. Patient was offered low dose tPa and heparin ggt and he elected for the tPa which was given on 5/26. On 5/27 the patient was transitioned to xarelto with pre-authorization scripts pending (help from social work). An outpatient anticoagulation clinic referral was placed. Patient continued to require oxygen, but this was improved from 6L by NC to 2L by NC. He will likely need a home oxygen evaluation prior to discharge. Anti-hypertensive medications were continued prior to transfer from ICU to floor.    On medical floor, he continue to improve clinically, however he continue to require oxygen especially upon ambulation, which was conformed through home oxygen evaluation. His vitals remained stable as he had an uneventful course while on medical floor pending approval of Rivaroxaban and home oxygen. He was discharged home in stable conditions on home oxygen and Xarelto and advised to follow with his PCP, Cardiology and Pulmonology closely.       Patient /Hospital Summary (Details -- Problem Oriented) :           Acute saddle pulmonary embolism (HCC)   Assessment & Plan    - Submassive PE with large clot burden on CT-A  - Some right heart strain seen on echo, unable to distinguish acute vs chronic  - Given low dose tPa 5/26/2019  - Transitioned from heparin ggt to Xarelto  - O2 requirement decreased, but still requires O2 on walk test.  - Continue Xarelto and Oxygen as an outpatient  - PCP, cards to follow up to determine duration of anticoagulation, further testing for possible secondary cause.  - F/u with pulmonology. He needs a repeat Echo 3 months post discharge       * Paroxysmal atrial fibrillation (HCC)   Assessment & Plan    - Presented to hospital in afib w/ RVR  - Received 20 mg IV diltiazem, and HR dropped to 40s with borderline low BP  - Spontaneously resolved   - H/o daily alcohol use, UDS positive for cannabis and benzos, patient very high anxiety  - Echo showed EF 55%, right ventricular strain, mod dilated right ventricle  - Cardiology assessed, recommended ASA along with anticoagulation in setting of concurrent bilateral PE.  - Continue Xarelto as an outpatient   - Outpatient cards follow-up.     Pulmonary hypertension (HCC)   Assessment & Plan    -RVSP 51 mmHg on echo in setting of extensive bilateral PE.  -Requiring O2 on walk test.  -Discharge on home oxygen,.     Anxiety   Assessment & Plan    -Patient planning on tapering off via Librium, has supply at home from ED visit earlier in May 2019.  -Outpatient follow-up with psych NP Nieves Mercado on 5/30/2019.     Electrolyte abnormality   Assessment & Plan    Replete PRN - goal K>4, Mg>2, phos>2.5.     Elevated troponin   Assessment & Plan    Per cardiology, likely due to demand ischemia from atrial fibrillation in setting of alcoholism and bilateral PE.  Associated with new T wave inversions but no chest pain, troponin trended down.     Alcohol withdrawal (HCC)   Assessment & Plan    - Drinks about 1/2-3/4 of a pint of whiskey along with 2-3 beers  daily for the last 2 months  - Last drink around 5 days prior to admission, agitated, no other signs of withdrawal  - Phenobarbital protocol briefly after admission  - Rally bag on admit  - CIWA discontinued as no signs of withdrawal.    Plan:  - On thiamine/folate/multivitamins  - Seizure/fall/aspiration precautions  - PCP to continue cessation counseling.     Hypertension   Assessment & Plan    - Borderline low BP, bradycardia after IV diltiazem in the ED, BP improved with fluids.  - On home atenolol, lisinopril.  - Advised the need for treatment of sleep apnea.      LAZARO (acute kidney injury) (HCC)   Assessment & Plan    Resolved with rehydration.         Consultants:     Pulmonology  Cardiology    Procedures:        None    Imaging/ Testing:      EC-ECHOCARDIOGRAM COMPLETE W/ CONT   Final Result      CT-CTA CHEST PULMONARY ARTERY W/ RECONS   Final Result      1.  Extensive bilateral pulmonary emboli including main pulmonary artery.      2.  Elevated RV LV ratio.         Results called to Dr. Idalmis GÓMEZ-CHEST-PORTABLE (1 VIEW)   Final Result      Negative single view of the chest.          Discharge Medications:         Medication Reconciliation: Completed       Medication List      START taking these medications      Instructions   folic acid 1 MG Tabs  Start taking on:  5/30/2019  Commonly known as:  FOLVITE   Take 1 Tab by mouth every day.  Dose:  1 mg     rivaroxaban 20 MG Tabs tablet  Start taking on:  6/17/2019  Commonly known as:  XARELTO   Take 1 Tab by mouth with dinner.  Dose:  20 mg     therapeutic multivitamin-minerals Tabs  Start taking on:  5/30/2019   Take 1 Tab by mouth every day.  Dose:  1 Tab     thiamine 100 MG tablet  Start taking on:  5/30/2019  Commonly known as:  THIAMINE   Take 1 Tab by mouth every day.  Dose:  100 mg        CONTINUE taking these medications      Instructions   ALPRAZolam 0.25 MG Tabs  Commonly known as:  XANAX   Take 0.25 mg by mouth 2 times a day as needed (Panic  Attack).  Dose:  0.25 mg     atenolol 100 MG Tabs  Commonly known as:  TENORMIN   Take 100 mg by mouth every day.  Dose:  100 mg     cetirizine 10 MG Tabs  Commonly known as:  ZYRTEC   Take 10 mg by mouth every day.  Dose:  10 mg     chlordiazePOXIDE 25 MG Caps  Commonly known as:  LIBRIUM   Take 50 mg by mouth 4 times a day as needed for Anxiety.  Dose:  50 mg     hydrOXYzine pamoate 25 MG Caps  Commonly known as:  VISTARIL   Take 25-50 mg by mouth 1 time daily as needed (Sleep or Anxiety).  Dose:  25-50 mg     lisinopril 40 MG tablet  Commonly known as:  PRINIVIL, ZESTRIL   Take 40 mg by mouth every day.  Dose:  40 mg     MAGNESIUM PO   Take 1 Dose by mouth every day. Unknown OTC Strength  Dose:  1 Dose     omeprazole 20 MG delayed-release capsule  Commonly known as:  PRILOSEC   Take 20 mg by mouth every day.  Dose:  20 mg     ZINC OXIDE PO   Take 1 Dose by mouth every day. Unknown OTC Strength  Dose:  1 Dose        STOP taking these medications    aspirin 81 MG EC tablet     diazePAM 5 MG Tabs  Commonly known as:  VALIUM              Disposition:   Home    Diet:   Regular    Activity:   As tolerated     Instructions:      The patient was instructed to return to the ER in the event of worsening symptoms. I have counseled the patient on the importance of compliance and the patient has agreed to proceed with all medical recommendations and follow up plan indicated above.   The patient understands that all medications come with benefits and risks. Risks may include permanent injury or death and these risks can be minimized with close reassessment and monitoring.        Primary Care Provider:    Micheal Pack M.D.  Discharge summary faxed to primary care provider:  Completed  Copy of discharge summary given to the patient: Completed      Follow up appointment details :      Future Appointments  Date Time Provider Department Center   5/29/2019 10:00 AM [facility] [address]   5/30/2019 7:40 AM  Renetta Arenas M.D. [address]   6/7/2019 7:40 AM Shira Winkler P.A.-C. [facility] None   8/20/2019 7:20 AM John Sosa M.D. [address]     [facility]   [phone]    The office will be calling you directly to schedule a new patient appointment. If you do not receive a call within 2 days please call to arrange an appointment.       Pending Studies:        None    Time spent on discharge day patient visit, preparing discharge paperwork and arranging for patient follow up.    Summary of follow up issues:   Follow up with psychiatry for managing anxiety medication.   F/u with cardiology for Afib  PCP to address hypercoagulability workup      Discharge Time (Minutes) :    30 mins   Hospital Course Type:  Inpatient Stay >2 midnights      Condition on Discharge    ______________________________________________________________________    Interval history/exam for day of discharge:    Patient seen and examined at bedside. No new complaints overnight. Stated improving oxygenation and mobility. Home oxygen and Xarelto were approved. Vitals remained stable. He is to be discharged today.     Physical Exam   Constitutional: He is oriented to person, place, and time. No distress.   Obese   HENT:   Head: Normocephalic and atraumatic.   Eyes: Conjunctivae and EOM are normal.   Neck: Normal range of motion. Neck supple.   Cardiovascular: Normal rate and regular rhythm.  Exam reveals no gallop and no friction rub.    No murmur heard.  Pulmonary/Chest: Effort normal and breath sounds normal. No respiratory distress. He has no wheezes. He has no rales.   Abdominal: Soft. There is no tenderness. There is no rebound and no guarding.   Musculoskeletal: Normal range of motion. He exhibits no edema or tenderness.   Neurological: He is alert and oriented to person, place, and time.   Skin: Skin is warm and dry. He is not diaphoretic.   Psychiatric: Mood and affect normal.     Most Recent Labs:    Lab  Results   Component Value Date/Time    WBC 11.3 (H) 05/28/2019 02:51 AM    RBC 4.45 (L) 05/28/2019 02:51 AM    HEMOGLOBIN 14.5 05/28/2019 02:51 AM    HEMATOCRIT 43.4 05/28/2019 02:51 AM    MCV 97.5 05/28/2019 02:51 AM    MCH 32.6 05/28/2019 02:51 AM    MCHC 33.4 (L) 05/28/2019 02:51 AM    MPV 8.9 (L) 05/28/2019 02:51 AM    NEUTSPOLYS 62.50 05/28/2019 02:51 AM    LYMPHOCYTES 24.70 05/28/2019 02:51 AM    MONOCYTES 8.40 05/28/2019 02:51 AM    EOSINOPHILS 3.60 05/28/2019 02:51 AM    BASOPHILS 0.40 05/28/2019 02:51 AM      Lab Results   Component Value Date/Time    SODIUM 138 05/28/2019 02:51 AM    POTASSIUM 4.0 05/28/2019 02:51 AM    CHLORIDE 107 05/28/2019 02:51 AM    CO2 23 05/28/2019 02:51 AM    GLUCOSE 116 (H) 05/28/2019 02:51 AM    BUN 10 05/28/2019 02:51 AM    CREATININE 0.96 05/28/2019 02:51 AM      Lab Results   Component Value Date/Time    ALTSGPT 51 (H) 05/26/2019 02:15 AM    ASTSGOT 25 05/26/2019 02:15 AM    ALKPHOSPHAT 50 05/26/2019 02:15 AM    TBILIRUBIN 0.8 05/26/2019 02:15 AM    ALBUMIN 4.3 05/26/2019 02:15 AM    GLOBULIN 2.4 05/26/2019 02:15 AM    INR 1.41 (H) 05/26/2019 10:24 PM     Lab Results   Component Value Date/Time    PROTHROMBTM 17.4 (H) 05/26/2019 10:24 PM    INR 1.41 (H) 05/26/2019 10:24 PM

## 2019-05-28 NOTE — ASSESSMENT & PLAN NOTE
-RVSP 51 mmHg on echo in setting of extensive bilateral PE.  -Requiring O2 on walk test.  -Discharge on home oxygen,.

## 2019-05-28 NOTE — PROGRESS NOTES
Internal Medicine Interval Note  Note Author: Tamia Larson M.D.     Name Cosme Cabrera     1985   Age/Sex 33 y.o. male   MRN [id number]   Code Status Full     After 5PM or if no immediate response to page, please call for cross-coverage  Attending/Team: Dr. Young / Janna See Patient List for primary contact information  Call [phone] to page    1st Call - Day Intern (R1):   Dr. Larson 2nd Call - Day Sr. Resident (R2/R3):   Dr. Youngblood         Reason for interval visit  (Principal Problem)   Extensive bilateral pulmonary embolus      Interval Problem Daily Status Update  (24 hours, problem oriented, brief subjective history, new lab/imaging data pertinent to that problem)   -No acute events overnight, transferred from ICU in stable condition.  Patient continues to feel improved, still on 1L O2.  Home O2 walk test perform, patient still requires O2 on exertion.  SW assisting with Xarelto approval, O2 set up.  -Patient states trying to wean self off of anxiety meds via Librium - states has 16 tabs Librium 25 mg (from ED visit several days ago), Xanax 3 tabs, Valium 12 tabs at home.  Counseled regarding dangers of mixing medications, patient states plans to only continuing taking Librium to taper off, has appointment with outpatient psych NP Nieves Mercado on 2019.  Is requesting transfer of PCP, cards, psych care to [place] though since moved here -  has assisted with scheduling future appointments / providing info.  -Patient states has been feeling unwell since 2019 - noticed LLE sensation changes back in 2018, progressive shortness of breath since January.  States some weight loss since that time due to aforementioned symptoms / malaise, but denies night sweats, chills, fever.  Uncle has lymphoma, sister has Crohn's, grandmother had blood clots after prolonged air travel, but otherwise no prevalent cancer, autoimmune disease or clotting disease in family.    Review of  Systems   Constitutional: Negative for chills and fever.   HENT: Negative for sinus pain and sore throat.    Eyes: Negative for photophobia and pain.   Respiratory: Negative for cough.         Shortness of breath, improving   Cardiovascular: Negative for chest pain and palpitations.   Gastrointestinal: Negative for abdominal pain, nausea and vomiting.   Genitourinary: Negative for dysuria and hematuria.   Musculoskeletal: Negative for myalgias and neck pain.   Skin: Negative for itching and rash.   Neurological: Negative for dizziness and headaches.   Psychiatric/Behavioral: Negative for hallucinations. The patient does not have insomnia.        Disposition/Barriers to discharge:   Clinical improvement    Consultants/Specialty  Cardiology  Critical care  PCP: Micheal Pack M.D.      Quality Measures  Quality-Core Measures   Reviewed items::  Labs reviewed, Radiology images reviewed, Medications reviewed and EKG reviewed  Srivastava catheter::  No Srivastava  DVT: Xarelto.          Physical Exam       Vitals:    05/27/19 1800 05/28/19 0000 05/28/19 0400 05/28/19 0800   BP: 124/77 126/79 122/80 148/90   Pulse: 97 76 88 91   Resp: 20 18 18 20   Temp: 36.5 °C (97.7 °F) 35.9 °C (96.7 °F) 36.3 °C (97.4 °F) 36.7 °C (98.1 °F)   TempSrc: Temporal Temporal Temporal Temporal   SpO2: 97% 96% 92% 95%   Weight:       Height:         Body mass index is 37.36 kg/m².    Oxygen Therapy:  Pulse Oximetry: 95 %, O2 (LPM): 1, O2 Delivery: None (Room Air)    Physical Exam   Constitutional: He is oriented to person, place, and time. No distress.   Obese   HENT:   Head: Normocephalic and atraumatic.   Eyes: Conjunctivae and EOM are normal.   Neck: Normal range of motion. Neck supple.   Cardiovascular: Normal rate and regular rhythm.  Exam reveals no gallop and no friction rub.    No murmur heard.  Pulmonary/Chest: Effort normal and breath sounds normal. No respiratory distress. He has no wheezes. He has no rales.   Abdominal: Soft. There is no  tenderness. There is no rebound and no guarding.   Musculoskeletal: Normal range of motion. He exhibits no edema or tenderness.   Neurological: He is alert and oriented to person, place, and time.   Skin: Skin is warm and dry. He is not diaphoretic.   Psychiatric: Mood and affect normal.       Assessment/Plan     * Paroxysmal atrial fibrillation (HCC)   Assessment & Plan    - Presented to hospital in afib w/ RVR  - Received 20 mg IV diltiazem, and HR dropped to 40s with borderline low BP  - Spontaneously resolved   - H/o daily alcohol use, UDS positive for cannabis and benzos, patient very high anxiety  - Echo showed EF 55%, right ventricular strain, mod dilated right ventricle  - Cardiology assessed, recommended ASA along with anticoagulation in setting of concurrent bilateral PE.  - On Xarelto - SW assisting with Xarelto approval.  - Outpatient cards follow-up.     Acute saddle pulmonary embolism (HCC)   Assessment & Plan    - Submassive PE with large clot burden on CT-A  - Some right heart strain seen on echo, unable to distinguish acute vs chronic  - Given low dose tPa 5/26/2019  - Transitioned from heparin ggt to Xarelto  - O2 requirement decreased, but still requires O2 on walk test.  - SW assisting with Xarelto approval, O2 set up.  - PCP, cards to follow up to determine duration of anticoagulation, further testing for possible secondary cause.       Pulmonary hypertension (HCC)- (present on admission)   Assessment & Plan    -RVSP 51 mmHg on echo in setting of extensive bilateral PE.  -Requiring O2 on walk test.  -SW assisting with O2 approval.     Anxiety   Assessment & Plan    -Patient planning on tapering off via Librium, has supply at home from ED visit earlier in May 2019.  -Outpatient follow-up with psych NP Nieves Mercado on 5/30/2019.     Electrolyte abnormality   Assessment & Plan    Replete PRN - goal K>4, Mg>2, phos>2.5.     Elevated troponin   Assessment & Plan    Per cardiology, likely due to  demand ischemia from atrial fibrillation in setting of alcoholism and bilateral PE.  Associated with new T wave inversions but no chest pain, troponin trended down.     Alcohol withdrawal (HCC)   Assessment & Plan    - Drinks about 1/2-3/4 of a pint of whiskey along with 2-3 beers daily for the last 2 months  - Last drink around 5 days prior to admission, agitated, no other signs of withdrawal  - Phenobarbital protocol briefly after admission  - Rally bag on admit  - CIWA discontinued as no signs of withdrawal.    Plan:  - On thiamine/folate/multivitamins  - Seizure/fall/aspiration precautions  - PCP to continue cessation counseling.     Hypertension   Assessment & Plan    - Borderline low BP, bradycardia after IV diltiazem in the ED, BP improved with fluids.  - On home atenolol, lisinopril.     LAZARO (acute kidney injury) (HCC)   Assessment & Plan    Resolved with rehydration.

## 2019-05-28 NOTE — DISCHARGE PLANNING
Received Choice form at [phone]  Agency/Facility Name: [facility]  Referral sent per Choice form @ [phone]

## 2019-05-28 NOTE — CARE PLAN
Problem: Communication  Goal: The ability to communicate needs accurately and effectively will improve    Intervention: Educate patient and significant other/support system about the plan of care, procedures, treatments, medications and allow for questions  Patient is educated of disease process and condition. Treatment team has included patient in plan of care. All medications indications and side effects are explained. Patient is encouraged to ask questions. Patient indicates understanding.

## 2019-05-28 NOTE — PROGRESS NOTES
Assumed care of patient, bedside report received from day shift RN.   Patient denies CP, no SOB. Patient is stable with no complaints at this time.   All needs met, fall precautions in place, bed low and locked, call light in reach.  Discussed plan of care with patient, answered any questions.  Will continue to monitor patient closely.

## 2019-05-28 NOTE — DISCHARGE PLANNING
Pt and bedside nurse notified of samples for Xarelto scripts waiting for pt at [facility] pharmacy on [address]. Prescriptions given to mother. Approximate cash price of oxygen per month, about $180, told to pt. Pt states he is going to call someone he works with about the oxygen.

## 2019-05-29 VITALS
HEIGHT: 68 IN | SYSTOLIC BLOOD PRESSURE: 135 MMHG | WEIGHT: 248.9 LBS | HEART RATE: 74 BPM | TEMPERATURE: 97.4 F | RESPIRATION RATE: 16 BRPM | DIASTOLIC BLOOD PRESSURE: 99 MMHG | OXYGEN SATURATION: 98 % | BODY MASS INDEX: 37.72 KG/M2

## 2019-05-29 LAB
ANION GAP SERPL CALC-SCNC: 9 MMOL/L (ref 0–11.9)
BASOPHILS # BLD AUTO: 0.5 % (ref 0–1.8)
BASOPHILS # BLD: 0.05 K/UL (ref 0–0.12)
BUN SERPL-MCNC: 14 MG/DL (ref 8–22)
CALCIUM SERPL-MCNC: 9 MG/DL (ref 8.5–10.5)
CHLORIDE SERPL-SCNC: 107 MMOL/L (ref 96–112)
CO2 SERPL-SCNC: 23 MMOL/L (ref 20–33)
CREAT SERPL-MCNC: 0.84 MG/DL (ref 0.5–1.4)
EOSINOPHIL # BLD AUTO: 0.31 K/UL (ref 0–0.51)
EOSINOPHIL NFR BLD: 3.2 % (ref 0–6.9)
ERYTHROCYTE [DISTWIDTH] IN BLOOD BY AUTOMATED COUNT: 50.4 FL (ref 35.9–50)
GLUCOSE SERPL-MCNC: 108 MG/DL (ref 65–99)
HCT VFR BLD AUTO: 44.2 % (ref 42–52)
HGB BLD-MCNC: 14.7 G/DL (ref 14–18)
IMM GRANULOCYTES # BLD AUTO: 0.03 K/UL (ref 0–0.11)
IMM GRANULOCYTES NFR BLD AUTO: 0.3 % (ref 0–0.9)
LYMPHOCYTES # BLD AUTO: 2.89 K/UL (ref 1–4.8)
LYMPHOCYTES NFR BLD: 29.7 % (ref 22–41)
MAGNESIUM SERPL-MCNC: 1.8 MG/DL (ref 1.5–2.5)
MCH RBC QN AUTO: 32.1 PG (ref 27–33)
MCHC RBC AUTO-ENTMCNC: 33.3 G/DL (ref 33.7–35.3)
MCV RBC AUTO: 96.5 FL (ref 81.4–97.8)
MONOCYTES # BLD AUTO: 0.99 K/UL (ref 0–0.85)
MONOCYTES NFR BLD AUTO: 10.2 % (ref 0–13.4)
NEUTROPHILS # BLD AUTO: 5.45 K/UL (ref 1.82–7.42)
NEUTROPHILS NFR BLD: 56.1 % (ref 44–72)
NRBC # BLD AUTO: 0.02 K/UL
NRBC BLD-RTO: 0.2 /100 WBC
PLATELET # BLD AUTO: 176 K/UL (ref 164–446)
PMV BLD AUTO: 8.9 FL (ref 9–12.9)
POTASSIUM SERPL-SCNC: 3.7 MMOL/L (ref 3.6–5.5)
RBC # BLD AUTO: 4.58 M/UL (ref 4.7–6.1)
SODIUM SERPL-SCNC: 139 MMOL/L (ref 135–145)
WBC # BLD AUTO: 9.7 K/UL (ref 4.8–10.8)

## 2019-05-29 PROCEDURE — 700102 HCHG RX REV CODE 250 W/ 637 OVERRIDE(OP): Performed by: INTERNAL MEDICINE

## 2019-05-29 PROCEDURE — 99239 HOSP IP/OBS DSCHRG MGMT >30: CPT | Mod: GC | Performed by: HOSPITALIST

## 2019-05-29 PROCEDURE — A9270 NON-COVERED ITEM OR SERVICE: HCPCS | Performed by: STUDENT IN AN ORGANIZED HEALTH CARE EDUCATION/TRAINING PROGRAM

## 2019-05-29 PROCEDURE — 700102 HCHG RX REV CODE 250 W/ 637 OVERRIDE(OP): Performed by: STUDENT IN AN ORGANIZED HEALTH CARE EDUCATION/TRAINING PROGRAM

## 2019-05-29 PROCEDURE — A9270 NON-COVERED ITEM OR SERVICE: HCPCS | Performed by: INTERNAL MEDICINE

## 2019-05-29 PROCEDURE — 85025 COMPLETE CBC W/AUTO DIFF WBC: CPT

## 2019-05-29 PROCEDURE — 83735 ASSAY OF MAGNESIUM: CPT

## 2019-05-29 PROCEDURE — 80048 BASIC METABOLIC PNL TOTAL CA: CPT

## 2019-05-29 PROCEDURE — 36415 COLL VENOUS BLD VENIPUNCTURE: CPT

## 2019-05-29 RX ORDER — FOLIC ACID 1 MG/1
1 TABLET ORAL DAILY
Qty: 30 TAB | Refills: 3 | Status: SHIPPED | OUTPATIENT
Start: 2019-05-30 | End: 2019-09-19

## 2019-05-29 RX ORDER — LANOLIN ALCOHOL/MO/W.PET/CERES
100 CREAM (GRAM) TOPICAL DAILY
Qty: 30 TAB | Refills: 5 | Status: SHIPPED | OUTPATIENT
Start: 2019-05-30 | End: 2019-09-19

## 2019-05-29 RX ORDER — HYDROXYZINE HYDROCHLORIDE 25 MG/1
25 TABLET, FILM COATED ORAL
Status: COMPLETED | OUTPATIENT
Start: 2019-05-29 | End: 2019-05-29

## 2019-05-29 RX ORDER — M-VIT,TX,IRON,MINS/CALC/FOLIC 27MG-0.4MG
1 TABLET ORAL DAILY
Qty: 30 TAB | Refills: 3 | Status: SHIPPED | OUTPATIENT
Start: 2019-05-30 | End: 2019-09-19

## 2019-05-29 RX ADMIN — HYDROXYZINE HYDROCHLORIDE 25 MG: 25 TABLET ORAL at 01:02

## 2019-05-29 RX ADMIN — CHLORDIAZEPOXIDE HYDROCHLORIDE 25 MG: 25 CAPSULE ORAL at 05:36

## 2019-05-29 RX ADMIN — OMEPRAZOLE 20 MG: 20 CAPSULE, DELAYED RELEASE ORAL at 05:36

## 2019-05-29 RX ADMIN — RIVAROXABAN 15 MG: 15 TABLET, FILM COATED ORAL at 05:35

## 2019-05-29 RX ADMIN — Medication 100 MG: at 05:36

## 2019-05-29 RX ADMIN — MULTIPLE VITAMINS W/ MINERALS TAB 1 TABLET: TAB at 05:36

## 2019-05-29 RX ADMIN — FOLIC ACID 1 MG: 1 TABLET ORAL at 05:36

## 2019-05-29 RX ADMIN — ATENOLOL 100 MG: 100 TABLET ORAL at 05:36

## 2019-05-29 RX ADMIN — LISINOPRIL 40 MG: 20 TABLET ORAL at 05:35

## 2019-05-29 ASSESSMENT — LIFESTYLE VARIABLES
ANXIETY: NO ANXIETY (AT EASE)
AUDITORY DISTURBANCES: NOT PRESENT
TREMOR: NO TREMOR
PAROXYSMAL SWEATS: NO SWEAT VISIBLE
TOTAL SCORE: 0
HEADACHE, FULLNESS IN HEAD: NOT PRESENT
AGITATION: NORMAL ACTIVITY
VISUAL DISTURBANCES: NOT PRESENT
ORIENTATION AND CLOUDING OF SENSORIUM: ORIENTED AND CAN DO SERIAL ADDITIONS
NAUSEA AND VOMITING: NO NAUSEA AND NO VOMITING

## 2019-05-29 ASSESSMENT — ENCOUNTER SYMPTOMS
SINUS PAIN: 0
PALPITATIONS: 0
NECK PAIN: 0
NAUSEA: 0
SORE THROAT: 0
FEVER: 0
CHILLS: 0
HEADACHES: 0
COUGH: 0
INSOMNIA: 0
HALLUCINATIONS: 0
ABDOMINAL PAIN: 0
DIZZINESS: 0
EYE PAIN: 0
MYALGIAS: 0
VOMITING: 0
PHOTOPHOBIA: 0

## 2019-05-29 NOTE — PROGRESS NOTES
Internal Medicine Interval Note  Note Author: Rodney Youngblood M.D.     Name Cosme Cabrera     1985   Age/Sex 33 y.o. male   MRN [id number]   Code Status Full     After 5PM or if no immediate response to page, please call for cross-coverage  Attending/Team: Dr. Young / Janna See Patient List for primary contact information  Call [phone] to page    1st Call - Day Intern (R1):   Dr. Larson 2nd Call - Day Sr. Resident (R2/R3):   Dr. Youngblood         Reason for interval visit  (Principal Problem)   Extensive bilateral pulmonary embolus      Interval Problem Daily Status Update  (24 hours, problem oriented, brief subjective history, new lab/imaging data pertinent to that problem)   - No acute events overnight. Still requiring 1 L o2 overnight, however oxygenation is improving.  - Couple anxiety attacks medicated per MAR.   - Evaluated by pulmonology yesterday and advised for close outpatient follow ups.  - Oxygen and Xarelto arranged. Plan for DC today.     Review of Systems   Constitutional: Negative for chills and fever.   HENT: Negative for sinus pain and sore throat.    Eyes: Negative for photophobia and pain.   Respiratory: Negative for cough.         Shortness of breath, improving   Cardiovascular: Negative for chest pain and palpitations.   Gastrointestinal: Negative for abdominal pain, nausea and vomiting.   Genitourinary: Negative for dysuria and hematuria.   Musculoskeletal: Negative for myalgias and neck pain.   Skin: Negative for itching and rash.   Neurological: Negative for dizziness and headaches.   Psychiatric/Behavioral: Negative for hallucinations. The patient does not have insomnia.        Disposition/Barriers to discharge:   Clinical improvement    Consultants/Specialty  Cardiology  Critical care  PCP: Micheal Pack M.D.      Quality Measures  Quality-Core Measures   Reviewed items::  Labs reviewed, Radiology images reviewed, Medications reviewed and EKG reviewed  Srivastava catheter::   No Srivastava  DVT: Xarelto.          Physical Exam       Vitals:    05/28/19 1833 05/28/19 2000 05/29/19 0000 05/29/19 0400   BP:  147/99 141/93 148/96   Pulse:  99 97 90   Resp: 16 18 18 18   Temp:  36.4 °C (97.6 °F) 36.8 °C (98.3 °F) 36.8 °C (98.2 °F)   TempSrc:  Temporal Temporal Temporal   SpO2: 93% 98% 95% 97%   Weight:  112.9 kg (248 lb 14.4 oz)     Height:         Body mass index is 37.72 kg/m². Weight: 112.9 kg (248 lb 14.4 oz)  Oxygen Therapy:  Pulse Oximetry: 97 %, O2 (LPM): 1, O2 Delivery: Oxymask    Physical Exam   Constitutional: He is oriented to person, place, and time. No distress.   Obese   HENT:   Head: Normocephalic and atraumatic.   Eyes: Conjunctivae and EOM are normal.   Neck: Normal range of motion. Neck supple.   Cardiovascular: Normal rate and regular rhythm.  Exam reveals no gallop and no friction rub.    No murmur heard.  Pulmonary/Chest: Effort normal and breath sounds normal. No respiratory distress. He has no wheezes. He has no rales.   Abdominal: Soft. There is no tenderness. There is no rebound and no guarding.   Musculoskeletal: Normal range of motion. He exhibits no edema or tenderness.   Neurological: He is alert and oriented to person, place, and time.   Skin: Skin is warm and dry. He is not diaphoretic.   Psychiatric: Mood and affect normal.       Assessment/Plan     * Paroxysmal atrial fibrillation (HCC)   Assessment & Plan    - Presented to hospital in afib w/ RVR  - Received 20 mg IV diltiazem, and HR dropped to 40s with borderline low BP  - Spontaneously resolved   - H/o daily alcohol use, UDS positive for cannabis and benzos, patient very high anxiety  - Echo showed EF 55%, right ventricular strain, mod dilated right ventricle  - Cardiology assessed, recommended ASA along with anticoagulation in setting of concurrent bilateral PE.  - Continue Xarelto as an outpatient   - Outpatient cards follow-up.     Acute saddle pulmonary embolism (HCC)   Assessment & Plan    - Submassive PE  with large clot burden on CT-A  - Some right heart strain seen on echo, unable to distinguish acute vs chronic  - Given low dose tPa 5/26/2019  - Transitioned from heparin ggt to Xarelto  - O2 requirement decreased, but still requires O2 on walk test.  - Continue Xarelto and Oxygen as an outpatient  - PCP, cards to follow up to determine duration of anticoagulation, further testing for possible secondary cause.  - F/u with pulmonology. He needs a repeat Echo 3 months post discharge       Pulmonary hypertension (HCC)- (present on admission)   Assessment & Plan    -RVSP 51 mmHg on echo in setting of extensive bilateral PE.  -Requiring O2 on walk test.  -Discharge on home oxygen,.     Anxiety   Assessment & Plan    -Patient planning on tapering off via Librium, has supply at home from ED visit earlier in May 2019.  -Outpatient follow-up with psych NP Nieves Mercado on 5/30/2019.     Electrolyte abnormality   Assessment & Plan    Replete PRN - goal K>4, Mg>2, phos>2.5.     Elevated troponin   Assessment & Plan    Per cardiology, likely due to demand ischemia from atrial fibrillation in setting of alcoholism and bilateral PE.  Associated with new T wave inversions but no chest pain, troponin trended down.     Alcohol withdrawal (HCC)   Assessment & Plan    - Drinks about 1/2-3/4 of a pint of whiskey along with 2-3 beers daily for the last 2 months  - Last drink around 5 days prior to admission, agitated, no other signs of withdrawal  - Phenobarbital protocol briefly after admission  - Rally bag on admit  - CIWA discontinued as no signs of withdrawal.    Plan:  - On thiamine/folate/multivitamins  - Seizure/fall/aspiration precautions  - PCP to continue cessation counseling.     Hypertension   Assessment & Plan    - Borderline low BP, bradycardia after IV diltiazem in the ED, BP improved with fluids.  - On home atenolol, lisinopril.  - Advised the need for treatment of sleep apnea.      LAZARO (acute kidney injury) ([facility])    Assessment & Plan    Resolved with rehydration.

## 2019-05-29 NOTE — PROGRESS NOTES
"Critical Care Progress Note    Date of admission  5/25/2019    Chief Complaint \"feels poorly and anxious\"      Hospital Course  33 y.o. male who presented 5/25/2019 with a past medical history significant for alcohol abuse complicated by paroxysmal atrial fibrillation whenever he drinks too much, anxiety, depression, hypertension who was seen in the emergency department on Thursday night for alcohol withdrawal desiring assistance in quitting.  He was discharged on Librium and was currently in normal sinus rhythm at that time.  He went home and went to bed and when he awoke the next morning he went out to work in the yard and noticed generalized malaise, fatigue and shortness of breath with palpitations.  The shortness of breath progressively worsened to the point that he could barely walk 25 feet.  He decided to seek medical attention and presented to the emergency department where he was found to be in atrial fibrillation with rapid ventricular response.  He was given 25 mg of IV diltiazem which unfortunately dropped his blood pressure to 70/40 and his heart rate down to 40 in sinus bradycardia without complete heart block.  Patient notes that he had taken 100 mg of oral atenolol earlier in the day.  Cardiology was consulted in the ED and recommended IV fluids and anticoagulation in preparation for possible defibrillation in the morning.  Patient states he is seen cardiology in [place] for this in the past and was not started on any medications as it seemed to only occur when he was going through alcohol withdrawal.    Interval Problem Update  Reviewed last 24 hour events:  Transferred out of icu   On 0 to 1 lNC  sats 97% at rest but on ambulation drops down to 85% RA and needs 2 L  Only wants to follow up with his new PCP  Very frustrated with the medical system and delay in the dx of his PE    Review of Systems  Review of Systems   Constitutional: Negative for chills, diaphoresis, fever, malaise/fatigue and " weight loss.   HENT: Negative for congestion and sinus pain.    Eyes: Negative for blurred vision, double vision and photophobia.   Respiratory: Positive for shortness of breath. Negative for cough, hemoptysis, sputum production, wheezing and stridor.    Cardiovascular: Negative for chest pain, palpitations and leg swelling.   Gastrointestinal: Negative for blood in stool, heartburn, melena and vomiting.   Genitourinary: Negative for dysuria and urgency.   Musculoskeletal: Negative for back pain, myalgias and neck pain.   Skin: Negative for itching and rash.   Neurological: Negative for dizziness, tingling, sensory change, speech change, focal weakness and headaches.   Endo/Heme/Allergies: Negative for polydipsia. Does not bruise/bleed easily.   Psychiatric/Behavioral: Negative for depression. The patient is not nervous/anxious.         Vital Signs for last 24 hours   Temp:  [35.9 °C (96.7 °F)-36.7 °C (98.1 °F)] 36.4 °C (97.6 °F)  Pulse:  [75-99] 99  Resp:  [16-22] 18  BP: (122-148)/(60-99) 147/99  SpO2:  [92 %-98 %] 98 %    Physical Exam   Physical Exam   Constitutional: He is oriented to person, place, and time. No distress.   HENT:   Head: Normocephalic and atraumatic.   Right Ear: External ear normal.   Left Ear: External ear normal.   Mouth/Throat: No oropharyngeal exudate.   Eyes: Conjunctivae and EOM are normal. Right eye exhibits no discharge. Left eye exhibits no discharge.   Pupils very dilated 7 mm   Neck: No JVD present. No tracheal deviation present.   Cardiovascular: Normal rate, regular rhythm and normal heart sounds.    No murmur heard.  Pulmonary/Chest: Breath sounds normal. No stridor. No respiratory distress. He has no wheezes. He has no rales. He exhibits no tenderness.   On oxygen   Abdominal: He exhibits no mass. There is no tenderness. There is no rebound and no guarding.   Musculoskeletal: He exhibits no edema, tenderness or deformity.   Neurological: He is alert and oriented to person,  place, and time. He displays normal reflexes. No cranial nerve deficit. Coordination normal.   Skin: Skin is warm. No rash noted. He is not diaphoretic. No erythema.   Nursing note and vitals reviewed.      Medications  Current Facility-Administered Medications   Medication Dose Route Frequency Provider Last Rate Last Dose   • lisinopril (PRINIVIL) tablet 40 mg  40 mg Oral DAILY eNel Lizarraga M.D.   40 mg at 05/28/19 0502   • pneumococcal vaccine (PNEUMOVAX-23) injection 25 mcg  0.5 mL Intramuscular Once PRN Sergei Almaraz       • atenolol (TENORMIN) tablet 100 mg  100 mg Oral DAILY Neel Lizarraga M.D.   100 mg at 05/28/19 0502   • folic acid (FOLVITE) tablet 1 mg  1 mg Oral DAILY Neel Lizarraga M.D.   1 mg at 05/28/19 0502   • thiamine tablet 100 mg  100 mg Oral DAILY REGGIE HermosilloDCornell   100 mg at 05/28/19 0502   • therapeutic multivitamin-minerals (THERAGRAN-M) tablet 1 Tab  1 Tab Oral DAILY Neel Lizarraga M.D.   1 Tab at 05/28/19 0502   • rivaroxaban (XARELTO) tablet 15 mg  15 mg Oral BID WITH MEALS Neel Lizarraga M.D.   15 mg at 05/28/19 1829    Followed by   • [START ON 6/17/2019] rivaroxaban (XARELTO) tablet 20 mg  20 mg Oral PM MEAL Neel Lizarraga M.D.       • chlordiazePOXIDE (LIBRIUM) capsule 25 mg  25 mg Oral Q6HRS Sergei Almaraz   25 mg at 05/28/19 1829   • omeprazole (PRILOSEC) capsule 20 mg  20 mg Oral DAILY Neel Lizarraga M.D.   20 mg at 05/28/19 0502   • senna-docusate (PERICOLACE or SENOKOT S) 8.6-50 MG per tablet 2 Tab  2 Tab Oral BID Marielos Ramírez M.D.   Stopped at 05/26/19 0600    And   • polyethylene glycol/lytes (MIRALAX) PACKET 1 Packet  1 Packet Oral QDAY PRN Marielos Ramírez M.D.        And   • magnesium hydroxide (MILK OF MAGNESIA) suspension 30 mL  30 mL Oral QDAY PRN Marielos Ramírez M.D.        And   • bisacodyl (DULCOLAX) suppository 10 mg  10 mg Rectal QDAY PRN Marielos Ramírez M.D.       • Respiratory Care per Protocol   Nebulization  Continuous RT Marielos Ramírez M.D.       • labetalol (NORMODYNE,TRANDATE) injection 10 mg  10 mg Intravenous Q4HRS PRN Marielos Ramírez M.D.       • ondansetron (ZOFRAN) syringe/vial injection 4 mg  4 mg Intravenous Q4HRS PRN Marielos Ramírez M.D.   4 mg at 05/26/19 0124   • ondansetron (ZOFRAN ODT) dispertab 4 mg  4 mg Oral Q4HRS PRN Marielos Ramírez M.D.       • promethazine (PHENERGAN) tablet 12.5-25 mg  12.5-25 mg Oral Q4HRS PRN Marielos Ramírez M.D.       • promethazine (PHENERGAN) suppository 12.5-25 mg  12.5-25 mg Rectal Q4HRS PRN Marielos Ramírez M.D.       • prochlorperazine (COMPAZINE) injection 5-10 mg  5-10 mg Intravenous Q4HRS PRN Marielos Ramírez M.D.           Fluids  No intake or output data in the 24 hours ending 05/28/19 2102    Laboratory      Recent Labs      05/26/19 0215   TROPONINI  0.06*     Recent Labs      05/26/19 0215 05/27/19 0405 05/28/19 0251   SODIUM  137  137  138   POTASSIUM  4.2  3.9  4.0   CHLORIDE  104  106  107   CO2  23  23  23   BUN  18  10  10   CREATININE  1.12  0.87  0.96   MAGNESIUM  1.8  2.0   --    PHOSPHORUS  3.6   --    --    CALCIUM  9.2  8.4*  8.6     Recent Labs      05/26/19 0215 05/27/19 0405 05/28/19   0251   ALTSGPT  51*   --    --    ASTSGOT  25   --    --    ALKPHOSPHAT  50   --    --    TBILIRUBIN  0.8   --    --    GLUCOSE  104*  108*  116*     Recent Labs      05/26/19 0215 05/27/19 0405 05/28/19   0251   WBC  12.1*  9.7  11.3*   NEUTSPOLYS  69.30  54.00  62.50   LYMPHOCYTES  22.30  31.50  24.70   MONOCYTES  7.60  9.10  8.40   EOSINOPHILS  0.10  4.60  3.60   BASOPHILS  0.40  0.50  0.40   ASTSGOT  25   --    --    ALTSGPT  51*   --    --    ALKPHOSPHAT  50   --    --    TBILIRUBIN  0.8   --    --      Recent Labs      05/26/19   0215  05/26/19   0813  05/26/19   2224  05/27/19   0405  05/27/19   0730  05/28/19   0251   RBC  5.11   --    --   4.66*   --   4.45*   HEMOGLOBIN  16.9   --    --   15.2   --   14.5    HEMATOCRIT  49.2   --    --   45.4   --   43.4   PLATELETCT  196   --    --   153*   --   168   PROTHROMBTM   --    --   17.4*   --    --    --    APTT  28.5  110.0*  >240.0*   --   33.7   --    INR   --    --   1.41*   --    --    --        Imaging  X-Ray:  I have personally reviewed the images and compared with prior images.    Assessment/Plan  * Paroxysmal atrial fibrillation (HCC)   Assessment & Plan    Known hx afib  May be exac by PE  atenolol  Started xarelto   Acute saddle pulmonary embolism (HCC)   Assessment & Plan    Received tpa  Transitioned to xarelto  Has made it clear that he only wants one physician and needs to be his PCP  Explained the follow up and repeat ECHO that should be done at 3 months  Off anticoagulation, Unprovoked PE so yearly risk is 5-7% off anticoagulation  10% the first year      Pulmonary could follow him up as outpt but pt has declined and wants follow up with his PCP only.  Recommendations as above  Recommend to stay on O2 and reassess in 2 weeks with a napoles walk    All the above explained to the patient and his mother    I have performed a physical exam and reviewed and updated ROS and Plan today (5/28/2019). In review of yesterday's note (5/27/2019), there are no changes except as documented above.

## 2019-05-29 NOTE — PROGRESS NOTES
Handoff report received. Assumed patient care. Patient sitting up in bed with MD at bedside.  Denied pain. Patient not in distress, AAOX 4.  Safety precautions in place. Call light and personal belongings within reach. Educated to call for assistance if needed.

## 2019-05-29 NOTE — PROGRESS NOTES
Pt became very upset after speaking with his medical team this morning because he felt they did not listen to him and were keeping him another day.    Walking O2, pt desatted to 85 on room air, had to titirate up to 2L to get him above 88 while walking. Pt foing IS, up to 3000 when commercials come on.    Calmer this afternoon.    No Acute events    SR 84-89    WA 0.14  QRS 0.10  QT 0.32

## 2019-05-29 NOTE — PROGRESS NOTES
Pt discharged to home with relative. Discharge instructions reviewed, and signed. Pt escorted out with RN and home oxygen.

## 2019-05-29 NOTE — DISCHARGE INSTRUCTIONS
Discharge Instructions    Discharged to home by car with relative. Discharged via wheelchair, hospital escort: Yes.  Special equipment needed: Oxygen    Be sure to schedule a follow-up appointment with your primary care doctor or any specialists as instructed.     Discharge Plan:   Diet Plan: Discussed  Activity Level: Discussed  Smoking Cessation Offered: Patient Refused  Confirmed Follow up Appointment: Appointment Scheduled  Confirmed Symptoms Management: Discussed  Medication Reconciliation Updated: Yes  Pneumococcal Vaccine Administered/Refused: Not given - Patient refused pneumococcal vaccine  Influenza Vaccine Indication: Not indicated: Previously immunized this influenza season and > 8 years of age    I understand that a diet low in cholesterol, fat, and sodium is recommended for good health. Unless I have been given specific instructions below for another diet, I accept this instruction as my diet prescription.       Special Instructions: None    · Is patient discharged on Warfarin / Coumadin?   No     Depression / Suicide Risk    As you are discharged from this [facility] facility, it is important to learn how to keep safe from harming yourself.    Recognize the warning signs:  · Abrupt changes in personality, positive or negative- including increase in energy   · Giving away possessions  · Change in eating patterns- significant weight changes-  positive or negative  · Change in sleeping patterns- unable to sleep or sleeping all the time   · Unwillingness or inability to communicate  · Depression  · Unusual sadness, discouragement and loneliness  · Talk of wanting to die  · Neglect of personal appearance   · Rebelliousness- reckless behavior  · Withdrawal from people/activities they love  · Confusion- inability to concentrate     If you or a loved one observes any of these behaviors or has concerns about self-harm, here's what you can do:  · Talk about it- your feelings and reasons for harming  yourself  · Remove any means that you might use to hurt yourself (examples: pills, rope, extension cords, firearm)  · Get professional help from the community (Mental Health, Substance Abuse, psychological counseling)  · Do not be alone:Call your Safe Contact- someone whom you trust who will be there for you.  · Call your local CRISIS HOTLINE 343-9666 or 860-992-4283  · Call your local Children's Mobile Crisis Response Team [place] (618) 914-4078 or wwwReclutec  · Call the toll free National Suicide Prevention Hotlines   · National Suicide Prevention Lifeline 523-595-IJNX (4946)  National Hope Line Network 800-SUICIDE (272-6651)      Atrial Fibrillation  Introduction  Atrial fibrillation is a type of heartbeat that is irregular or fast (rapid). If you have this condition, your heart keeps quivering in a weird (chaotic) way. This condition can make it so your heart cannot pump blood normally. Having this condition gives a person more risk for stroke, heart failure, and other heart problems. There are different types of atrial fibrillation. Talk with your doctor to learn about the type that you have.  Follow these instructions at home:  · Take over-the-counter and prescription medicines only as told by your doctor.  · If your doctor prescribed a blood-thinning medicine, take it exactly as told. Taking too much of it can cause bleeding. If you do not take enough of it, you will not have the protection that you need against stroke and other problems.  · Do not use any tobacco products. These include cigarettes, chewing tobacco, and e-cigarettes. If you need help quitting, ask your doctor.  · If you have apnea (obstructive sleep apnea), manage it as told by your doctor.  · Do not drink alcohol.  · Do not drink beverages that have caffeine. These include coffee, soda, and tea.  · Maintain a healthy weight. Do not use diet pills unless your doctor says they are safe for you. Diet pills may make heart  problems worse.  · Follow diet instructions as told by your doctor.  · Exercise regularly as told by your doctor.  · Keep all follow-up visits as told by your doctor. This is important.  Contact a doctor if:  · You notice a change in the speed, rhythm, or strength of your heartbeat.  · You are taking a blood-thinning medicine and you notice more bruising.  · You get tired more easily when you move or exercise.  Get help right away if:  · You have pain in your chest or your belly (abdomen).  · You have sweating or weakness.  · You feel sick to your stomach (nauseous).  · You notice blood in your throw up (vomit), poop (stool), or pee (urine).  · You are short of breath.  · You suddenly have swollen feet and ankles.  · You feel dizzy.  · Your suddenly get weak or numb in your face, arms, or legs, especially if it happens on one side of your body.  · You have trouble talking, trouble understanding, or both.  · Your face or your eyelid droops on one side.  These symptoms may be an emergency. Do not wait to see if the symptoms will go away. Get medical help right away. Call your local emergency services (911 in the [place] Do not drive yourself to the hospital.   This information is not intended to replace advice given to you by your health care provider. Make sure you discuss any questions you have with your health care provider.  Document Released: 09/26/2009 Document Revised: 05/25/2017 Document Reviewed: 04/13/2016  © 2017 [employer]      Pulmonary Embolism  A pulmonary embolism (PE) is a sudden blockage or decrease of blood flow in one lung or both lungs. Most blockages come from a blood clot that travels from the legs or the pelvis to the lungs. PE is a dangerous and potentially life-threatening condition if it is not treated right away.  What are the causes?  A pulmonary embolism occurs most commonly when a blood clot travels from one of your veins to your lungs. Rarely, PE is caused by air, fat, amniotic fluid, or  part of a tumor traveling through your veins to your lungs.  What increases the risk?  A PE is more likely to develop in:  · People who smoke.  · People who are older, especially over 60 years of age.  · People who are overweight (obese).  · People who sit or lie still for a long time, such as during long-distance travel (over 4 hours), bed rest, hospitalization, or during recovery from certain medical conditions like a stroke.  · People who do not engage in much physical activity (sedentary lifestyle).  · People who have chronic breathing disorders.  · People who have a personal or family history of blood clots or blood clotting disease.  · People who have peripheral vascular disease (PVD), diabetes, or some types of cancer.  · People who have heart disease, especially if the person had a recent heart attack or has congestive heart failure.  · People who have neurological diseases that affect the legs (leg paresis).  · People who have had a traumatic injury, such as breaking a hip or leg.  · People who have recently had major or lengthy surgery, especially on the hip, knee, or abdomen.  · People who have had a central line placed inside a large vein.  · People who take medicines that contain the hormone estrogen. These include birth control pills and hormone replacement therapy.  · Pregnancy or during childbirth or the postpartum period.  What are the signs or symptoms?  The symptoms of a PE usually start suddenly and include:  · Shortness of breath while active or at rest.  · Coughing or coughing up blood or blood-tinged mucus.  · Chest pain that is often worse with deep breaths.  · Rapid or irregular heartbeat.  · Feeling light-headed or dizzy.  · Fainting.  · Feeling anxious.  · Sweating.  There may also be pain and swelling in a leg if that is where the blood clot started.  These symptoms may represent a serious problem that is an emergency. Do not wait to see if the symptoms will go away. Get medical help  right away. Call your local emergency services (911 in the [place] Do not drive yourself to the hospital.   How is this diagnosed?  Your health care provider will take a medical history and perform a physical exam. You may also have other tests, including:  · Blood tests to assess the clotting properties of your blood, assess oxygen levels in your blood, and find blood clots.  · Imaging tests, such as CT, ultrasound, MRI, X-ray, and other tests to see if you have clots anywhere in your body.  · An electrocardiogram (ECG) to look for heart strain from blood clots in the lungs.  How is this treated?  The main goals of PE treatment are:  · To stop a blood clot from growing larger.  · To stop new blood clots from forming.  The type of treatment that you receive depends on many factors, such as the cause of your PE, your risk for bleeding or developing more clots, and other medical conditions that you have. Sometimes, a combination of treatments is necessary.  This condition may be treated with:  · Medicines, including newer oral blood thinners (anticoagulants), warfarin, low molecular weight heparins, thrombolytics, or heparins.  · Wearing compression stockings or using different types of devices.  · Surgery (rare) to remove the blood clot or to place a filter in your abdomen to stop the blood clot from traveling to your lungs.  Treatments for a PE are often divided into immediate treatment, long-term treatment (up to 3 months after PE), and extended treatment (more than 3 months after PE). Your treatment may continue for several months. This is called maintenance therapy, and it is used to prevent the forming of new blood clots. You can work with your health care provider to choose the treatment program that is best for you.  What are anticoagulants?   Anticoagulants are medicines that treat PEs. They can stop current blood clots from growing and stop new clots from forming. They cannot dissolve existing clots. Your  body dissolves clots by itself over time. Anticoagulants are given by mouth, by injection, or through an IV tube.  What are thrombolytics?   Thrombolytics are clot-dissolving medicines that are used to dissolve a PE. They carry a high risk of bleeding, so they tend to be used only in severe cases or if you have very low blood pressure.  Follow these instructions at home:  If you are taking a newer oral anticoagulant:  · Take the medicine every single day at the same time each day.  · Understand what foods and drugs interact with this medicine.  · Understand that there are no regular blood tests required when using this medicine.  · Understand the side effects of this medicine, including excessive bruising or bleeding. Ask your health care provider or pharmacist about other possible side effects.  If you are taking warfarin:  · Understand how to take warfarin and know which foods can affect how warfarin works in your body.  · Understand that it is dangerous to take too much or too little warfarin. Too much warfarin increases the risk of bleeding. Too little warfarin continues to allow the risk for blood clots.  · Follow your PT and INR blood testing schedule. The PT and INR results allow your health care provider to adjust your dose of warfarin. It is very important that you have your PT and INR tested as often as told by your health care provider.  · Avoid major changes in your diet, or tell your health care provider before you change your diet. Arrange a visit with a registered dietitian to answer your questions. Many foods, especially foods that are high in vitamin K, can interfere with warfarin and affect the PT and INR results. Eat a consistent amount of foods that are high in vitamin K, such as:  ¨ Spinach, kale, broccoli, cabbage, nasreen greens, turnip greens, [place] sprouts, peas, cauliflower, seaweed, and parsley.  ¨ Beef liver and pork liver.  ¨ Green tea.  ¨ Soybean oil.  · Tell your health care  provider about any and all medicines, vitamins, and supplements that you take, including aspirin and other over-the-counter anti-inflammatory medicines. Be especially cautious with aspirin and anti-inflammatory medicines. Do not take those before you ask your health care provider if it is safe to do so. This is important because many medicines can interfere with warfarin and affect the PT and INR results.  · Do not start or stop taking any over-the-counter or prescription medicine unless your health care provider or pharmacist tells you to do so.  If you take warfarin, you will also need to do these things:  · Hold pressure over cuts for longer than usual.  · Tell your dentist and other health care providers that you are taking warfarin before you have any procedures in which bleeding may occur.  · Avoid alcohol or drink very small amounts. Tell your health care provider if you change your alcohol intake.  · Do not use tobacco products, including cigarettes, chewing tobacco, and e-cigarettes. If you need help quitting, ask your health care provider.  · Avoid contact sports.  General instructions  · Take over-the-counter and prescription medicines only as told by your health care provider. Anticoagulant medicines can have side effects, including easy bruising and difficulty stopping bleeding. If you are prescribed an anticoagulant, you will also need to do these things:  ¨ Hold pressure over cuts for longer than usual.  ¨ Tell your dentist and other health care providers that you are taking anticoagulants before you have any procedures in which bleeding may occur.  ¨ Avoid contact sports.  · Wear a medical alert bracelet or carry a medical alert card that says you have had a PE.  · Ask your health care provider how soon you can go back to your normal activities. Stay active to prevent new blood clots from forming.  · Make sure to exercise while traveling or when you have been sitting or standing for a long period of  time. It is very important to exercise. Exercise your legs by walking or by tightening and relaxing your leg muscles often. Take frequent walks.  · Wear compression stockings as told by your health care provider to help prevent more blood clots from forming.  · Do not use tobacco products, including cigarettes, chewing tobacco, and e-cigarettes. If you need help quitting, ask your health care provider.  · Keep all follow-up appointments with your health care provider. This is important.  How is this prevented?  Take these actions to decrease your risk of developing another PE:  · Exercise regularly. For at least 30 minutes every day, engage in:  ¨ Activity that involves moving your arms and legs.  ¨ Activity that encourages good blood flow through your body by increasing your heart rate.  · Exercise your arms and legs every hour during long-distance travel (over 4 hours). Drink plenty of water and avoid drinking alcohol while traveling.  · Avoid sitting or lying in bed for long periods of time without moving your legs.  · Maintain a weight that is appropriate for your height. Ask your health care provider what weight is healthy for you.  · If you are a woman who is over 35 years of age, avoid unnecessary use of medicines that contain estrogen. These include birth control pills.  · Do not smoke, especially if you take estrogen medicines. If you need help quitting, ask your health care provider.  · If you are at very high risk for PE, wear compression stockings.  · If you recently had a PE, have regularly scheduled ultrasound testing on your legs to check for new blood clots.  If you are hospitalized, prevention measures may include:  · Early walking after surgery, as soon as your health care provider says that it is safe.  · Receiving anticoagulants to prevent blood clots. If you cannot take anticoagulants, other options may be available, such as wearing compression stockings or using different types of devices.  Get  help right away if:  · You have new or increased pain, swelling, or redness in an arm or leg.  · You have numbness or tingling in an arm or leg.  · You have shortness of breath while active or at rest.  · You have chest pain.  · You have a rapid or irregular heartbeat.  · You feel light-headed or dizzy.  · You cough up blood.  · You notice blood in your vomit, bowel movement, or urine.  · You have a fever.  These symptoms may represent a serious problem that is an emergency. Do not wait to see if the symptoms will go away. Get medical help right away. Call your local emergency services (911 in the [place] Do not drive yourself to the hospital.   This information is not intended to replace advice given to you by your health care provider. Make sure you discuss any questions you have with your health care provider.  Document Released: 12/15/2001 Document Revised: 05/25/2017 Document Reviewed: 04/13/2016  [employer] Patient Education © 2017 [employer]      Oxygen Use at Home  Oxygen can be prescribed for home use. The prescription will show the flow rate. This is how much oxygen is to be used per minute. This will be listed in liters per minute (LPM or L/M). A liter is a metric measurement of volume.  You will use oxygen therapy as directed. It can be used while exercising, sleeping, or at rest. You may need oxygen continuously. Your health care provider may order a blood oxygen test (arterial blood gas or pulse oximetry test) that will show what your oxygen level is. Your health care provider will use these measurements to learn about your needs and follow your progress.  Home oxygen therapy is commonly used on patients with various lung (pulmonary) related conditions. Some of these conditions include:  · Asthma.  · Lung cancer.  · Pneumonia.  · Emphysema.  · Chronic bronchitis.  · Cystic fibrosis.  · Other lung diseases.  · Pulmonary fibrosis.  · Occupational lung disease.  · Heart failure.  · Chronic  obstructive pulmonary disease (COPD).  3 COMMON WAYS OF PROVIDING OXYGEN THERAPY  · Gas: The gas form of oxygen is put into variously sized cylinders or tanks. The cylinders or oxygen tanks contain compressed oxygen. The cylinder is equipped with a regulator that controls the flow rate. Because the flow of oxygen out of the cylinder is constant, an oxygen conserving device may be attached to the system to avoid waste. This device releases the gas only when you inhale and cuts it off when you exhale. Oxygen can be provided in a small cylinder that can be carried with you. Large tanks are heavy and are only for stationary use. After use, empty tanks must be exchanged for full tanks.  · Liquid: The liquid form of oxygen is put into a container similar to a thermos. When released, the liquid converts to a gas and you breathe it in just like the compressed gas. This storage method takes up less space than the compressed gas cylinder, and you can transfer the liquid to a small, portable vessel at home. Liquid oxygen is more expensive than the compressed gas, and the vessel vents when not in use. An oxygen conserving device may be built into the vessel to conserve the oxygen. Liquid oxygen is very cold, around 297° below zero.  · Oxygen concentrator: This medical device filters oxygen from room air and gives almost 100% oxygen to the patient. Oxygen concentrators are powered by electricity. Benefits of this system are:  ¨ It does not need to be resupplied.  ¨ It is not as costly as liquid oxygen.  ¨ Extra tubing permits the user to move around easier.  There are several types of small, portable oxygen systems available which can help you remain active and mobile. You must have a cylinder of oxygen as a backup in the event of a power failure. Advise your electric power company that you are on oxygen therapy in order to get priority service when there is a power failure.  OXYGEN DELIVERY DEVICES  There are 3 common ways to  "deliver oxygen to your body.  · Nasal cannula. This is a 2-pronged device inserted in the nostrils that is connected to tubing carrying the oxygen. The tubing can rest on the ears or be attached to the frame of eyeglasses.  · Mask. People who need a high flow of oxygen generally use a mask.  · Transtracheal catheter. Transtracheal oxygen therapy requires the insertion of a small, flexible tube (catheter) in the windpipe (trachea). This catheter is held in place by a necklace. Since transtracheal oxygen bypasses the mouth, nose, and throat, a humidifier is absolutely required at flow rates of 1 LPM or greater.  OXYGEN USE SAFETY TIPS  · Never smoke while using oxygen. Oxygen does not burn or explode, but flammable materials will burn faster in the presence of oxygen.  · Keep a fire extinguisher close by. Let your fire department know that you have oxygen in your home.  · Warn visitors not to smoke near you when you are using oxygen. Put up \"no smoking\" signs in your home where you most often use the oxygen.  · When you go to a restaurant with your portable oxygen source, ask to be seated in the nonsmoking section.  · Stay at least 5 feet away from gas stoves, candles, lighted fireplaces, or other heat sources.  · Do not use materials that burn easily (flammable) while using your oxygen.  · If you use an oxygen cylinder, make sure it is secured to some fixed object or in a stand. If you use liquid oxygen, make sure the vessel is kept upright to keep the oxygen from pouring out. Liquid oxygen is so cold it can hurt your skin.  · If you use an oxygen concentrator, call your electric company so you will be given priority service if your power goes out. Avoid using extension cords, if possible.  · Regularly test your smoke detectors at home to make sure they work. If you receive care in your home from a nurse or other health care provider, he or she may also check to make sure your smoke detectors work.  GUIDELINES FOR " CLEANING YOUR EQUIPMENT  · Wash the nasal prongs with a liquid soap. Thoroughly rinse them once or twice a week.  · Replace the prongs every 2 to 4 weeks. If you have an infection (cold, pneumonia) change them when you are well.  · Your health care provider will give you instructions on how to clean your transtracheal catheter.  · The humidifier bottle should be washed with soap and warm water and rinsed thoroughly between each refill. Air-dry the bottle before filling it with sterile or distilled water. The bottle and its top should be disinfected after they are cleaned.  · If you use an oxygen concentrator, unplug the unit. Then wipe down the cabinet with a damp cloth and dry it daily. The air filter should be cleaned at least twice a week.  · Follow your home [employer] equipment and service company's directions for cleaning the compressor filter.  HOME CARE INSTRUCTIONS   · Do not change the flow of oxygen unless directed by your health care provider.  · Do not use alcohol or other sedating drugs unless instructed. They slow your breathing rate.  · Do not use materials that burn easily (flammable) while using your oxygen.  · Always keep a spare tank of oxygen. Plan ahead for holidays when you may not be able to get a prescription filled.  · Use water-based lubricants on your lips or nostrils. Do not use an oil-based product like petroleum jelly.  · To prevent your cheeks or the skin behind your ears from becoming irritated, tuck some gauze under the tubing.  · If you have persistent redness under your nose, call your health care provider.  · When you no longer need oxygen, your doctor will have the oxygen discontinued. Oxygen is not addicting or habit forming.  · Use the oxygen as instructed. Too much oxygen can be harmful and too little will not give you the benefit you need.  · Shortness of breath is not always from a lack of oxygen. If your oxygen level is not the cause of your shortness of breath, taking oxygen  will not help.  SEEK MEDICAL CARE IF:   · You have frequent headaches.  · You have shortness of breath or a lasting cough.  · You have anxiety.  · You are confused.  · You are drowsy or sleepy all the time.  · You develop an illness which aggravates your breathing.  · You cannot exercise.  · You are restless.  · You have blue lips or fingernails.  · You have difficult or irregular breathing and it is getting worse.  · You have a fever.     This information is not intended to replace advice given to you by your health care provider. Make sure you discuss any questions you have with your health care provider.     Document Released: 03/09/2005 Document Revised: 01/08/2016 Document Reviewed: 07/30/2014  [employer] Interactive Patient Education ©2016 [employer]

## 2019-05-30 ENCOUNTER — TELEPHONE (OUTPATIENT)
Dept: VASCULAR LAB | Facility: MEDICAL CENTER | Age: 34
End: 2019-05-30

## 2019-05-30 NOTE — TELEPHONE ENCOUNTER
Left VM for pt regarding referral to anticoagulation from  Dr. Lizarraga for Xarelto. Asked pt to please call back to establish care.     Sudha Nguyen, JujuD

## 2019-05-31 ENCOUNTER — PATIENT OUTREACH (OUTPATIENT)
Dept: HEALTH INFORMATION MANAGEMENT | Facility: OTHER | Age: 34
End: 2019-05-31

## 2019-06-06 ENCOUNTER — OFFICE VISIT (OUTPATIENT)
Dept: MEDICAL GROUP | Facility: MEDICAL CENTER | Age: 34
End: 2019-06-06
Payer: COMMERCIAL

## 2019-06-06 VITALS
DIASTOLIC BLOOD PRESSURE: 86 MMHG | TEMPERATURE: 97.4 F | RESPIRATION RATE: 16 BRPM | HEIGHT: 68 IN | HEART RATE: 75 BPM | BODY MASS INDEX: 38.65 KG/M2 | OXYGEN SATURATION: 95 % | SYSTOLIC BLOOD PRESSURE: 134 MMHG | WEIGHT: 255 LBS

## 2019-06-06 DIAGNOSIS — F10.11 HISTORY OF ALCOHOL ABUSE: ICD-10-CM

## 2019-06-06 DIAGNOSIS — I48.0 PAROXYSMAL ATRIAL FIBRILLATION (HCC): ICD-10-CM

## 2019-06-06 DIAGNOSIS — I10 ESSENTIAL HYPERTENSION: ICD-10-CM

## 2019-06-06 DIAGNOSIS — Z79.01 ANTICOAGULATED BY ANTICOAGULATION TREATMENT: ICD-10-CM

## 2019-06-06 DIAGNOSIS — Z09 HOSPITAL DISCHARGE FOLLOW-UP: ICD-10-CM

## 2019-06-06 DIAGNOSIS — I26.92 ACUTE SADDLE PULMONARY EMBOLISM WITHOUT ACUTE COR PULMONALE (HCC): ICD-10-CM

## 2019-06-06 DIAGNOSIS — F41.9 ANXIETY: ICD-10-CM

## 2019-06-06 PROCEDURE — 99204 OFFICE O/P NEW MOD 45 MIN: CPT | Performed by: FAMILY MEDICINE

## 2019-06-06 RX ORDER — TRAZODONE HYDROCHLORIDE 50 MG/1
50 TABLET ORAL 2 TIMES DAILY
COMMUNITY
End: 2019-09-19

## 2019-06-06 ASSESSMENT — PATIENT HEALTH QUESTIONNAIRE - PHQ9: CLINICAL INTERPRETATION OF PHQ2 SCORE: 0

## 2019-06-06 NOTE — PATIENT INSTRUCTIONS
If you need further assistance, or have any questions; concerns or lingering symptoms before seeing your Primary Care Provider or specialist.     Do not hesitate to contact us.     Please contact us at the Post-Hospital Follow Up Program at [phone] and ask for the Medical Assistant for Dr Arenas.   Our offices hours are Monday-Friday 8 am-5 pm.

## 2019-06-06 NOTE — PROGRESS NOTES
"Chief Complaint   Patient presents with   • Hospital Follow-up       Subjective:   Csome Cabrera is a 33 y.o. male who presents today for hospital follow-up.  Chronic illnesses include hypertension and paroxysmal atrial fibrillation, CHADSVASc 1, episodes are associated with heavy alcohol use.    Hospitalized 5/25 for atrial fibrillation with RVR, bilateral pulmonary emboli with evidence of right heart strain on echocardiogram requiring tPA.  He was discharged on Xarelto and home oxygen.    Today he says his breathing has improved, is \"barely winded going up stairs now\". Walking during the day he has very minimal SOB. Has not had any palpitations or throat tightness which is his normal symptoms when PAF episodes. HR in 80-70s. Pulse ox at home runs usually above 94%, he has supplemental O2 at home but not using.     He denies history of blood clots, his grandmother had a blood clot but sounds like it was provoked. No other family history of hypercoagulable diseases.     In addition to the above he has a history of sleep apnea, used to use CPAP, but lost weight and stopped using CPAP.     He has been drinking daily, >1drink per day, for the last year. Hasn't had a drink since 5/24/19. Uses cannabis daily.       Past Medical History:   Diagnosis Date   • A-fib (HCC)    • Anxiety    • At risk for sleep apnea    • Depression    • ETOH abuse    • Hypertension    • Panic attack due to exceptional stress      History reviewed. No pertinent surgical history.  Family History   Problem Relation Age of Onset   • Diabetes Father    • Hypertension Father    • Alcohol/Drug Brother    • Hypertension Paternal Grandmother      Social History     Social History   • Marital status: Single     Spouse name: N/A   • Number of children: N/A   • Years of education: N/A     Occupational History   • Not on file.     Social History Main Topics   • Smoking status: Former Smoker   • Smokeless tobacco: Never Used   • Alcohol use Yes      " Comment: quit 2 days ago. former drinker x 12 yrs   • Drug use: Yes     Types: Inhaled      Comment: marijuana   • Sexual activity: Not on file     Other Topics Concern   • Not on file     Social History Narrative   • No narrative on file     Allergies   Allergen Reactions   • Cardizem      Bradycardia in the 30's. Reaction noted on 5/25/19 when admitted for ETOH/hypotension/tachycardia. Pt had atenolol and lisinpril same day as administration.     Outpatient Encounter Prescriptions as of 6/7/2019   Medication Sig Dispense Refill   • traZODone (DESYREL) 50 MG Tab Take 50 mg by mouth 2 times a day.     • [START ON 6/17/2019] rivaroxaban (XARELTO) 20 MG Tab tablet Take 1 Tab by mouth with dinner. 30 Tab 5   • folic acid (FOLVITE) 1 MG Tab Take 1 Tab by mouth every day. 30 Tab 3   • therapeutic multivitamin-minerals (THERAGRAN-M) Tab Take 1 Tab by mouth every day. 30 Tab 3   • thiamine (THIAMINE) 100 MG tablet Take 1 Tab by mouth every day. 30 Tab 5   • cetirizine (ZYRTEC) 10 MG Tab Take 10 mg by mouth every day.     • chlordiazePOXIDE (LIBRIUM) 25 MG Cap Take 50 mg by mouth 4 times a day as needed for Anxiety.     • MAGNESIUM PO Take 1 Dose by mouth every day. Unknown OTC Strength     • hydrOXYzine pamoate (VISTARIL) 25 MG Cap Take 25-50 mg by mouth 1 time daily as needed (Sleep or Anxiety).     • ALPRAZolam (XANAX) 0.25 MG Tab Take 0.25 mg by mouth 2 times a day as needed (Panic Attack).     • ZINC OXIDE PO Take 1 Dose by mouth every day. Unknown OTC Strength     • atenolol (TENORMIN) 100 MG Tab Take 100 mg by mouth every day.     • lisinopril (PRINIVIL, ZESTRIL) 40 MG tablet Take 40 mg by mouth every day.     • omeprazole (PRILOSEC) 20 MG delayed-release capsule Take 20 mg by mouth every day.       No facility-administered encounter medications on file as of 6/7/2019.      Review of Systems   Constitutional: Negative for chills, fever and weight loss.   HENT: Positive for nosebleeds.    Respiratory: Positive for  "shortness of breath (improving). Negative for cough, hemoptysis and sputum production.    Cardiovascular: Negative for chest pain, palpitations, orthopnea, leg swelling and PND.   Gastrointestinal: Negative for blood in stool, melena and nausea.   Genitourinary: Negative for hematuria.   Neurological: Negative for dizziness and loss of consciousness.   Endo/Heme/Allergies: Does not bruise/bleed easily.   Psychiatric/Behavioral: Positive for substance abuse (history of ETOH abuse).        Objective:   /76 (BP Location: Left arm, Patient Position: Sitting, BP Cuff Size: Adult)   Pulse 80   Ht 1.727 m (5' 8\")   Wt 116.5 kg (256 lb 12.8 oz)   SpO2 95%   BMI 39.05 kg/m²     Physical Exam   Constitutional: He is oriented to person, place, and time. He appears well-developed and well-nourished. No distress.   Well-appearing male, obese BMI 39   HENT:   Head: Normocephalic and atraumatic.   Mouth/Throat: Oropharynx is clear and moist.   Eyes: Conjunctivae are normal.   Neck: No JVD (JVP at clavicle) present.   Cardiovascular: Normal rate, regular rhythm and intact distal pulses.    No murmur heard.  Pulmonary/Chest: Effort normal and breath sounds normal. No respiratory distress. He has no wheezes. He has no rales.   Good air movement throughout lung fields   Abdominal: Soft. Bowel sounds are normal. He exhibits no distension. There is no tenderness.   Large abdominal circumference   Musculoskeletal: He exhibits no edema.   Neurological: He is alert and oriented to person, place, and time. No cranial nerve deficit.   Skin: Skin is warm and dry. He is not diaphoretic. No cyanosis. There is pallor. Nails show no clubbing.   Psychiatric: Judgment normal.   Vitals reviewed.       CTA 5/26/19  1.  Extensive bilateral pulmonary emboli including main pulmonary artery.  2.  Elevated RV LV ratio.    Transthoracic echocardiogram 5/25/2019  CONCLUSIONS  No prior study is available for comparison.   Left ventricular systolic " function is normal. Left ventricular ejection   fraction is visually estimated to be 55%.  Indeterminate diastolic function.  Flattened septum in systole and diastole consistent with RV pressure   and volume overload.  Moderately dilated right ventricle. Reduced right ventricular systolic   function.  Evidence of Salcedo's sign with hyperdynamic apex and free wall   dysfunction suggestive of RV strain as seen with acute pulmonary   Embolism.  Echocardiographic criteria for RV strain is present.    The left ventricle was normal in size and thickness. Left ventricular   systolic function is normal. Flattened septum in systole and diastole   consistent with RV pressure and volume overload. Left ventricular   ejection fraction is visually estimated to be 55%. Indeterminate   diastolic function.  3 mL of contrast was used to enhance visualization   of the endocardial border. Existing IV was used.    Moderately dilated right ventricle. Reduced right ventricular systolic   function.evidence of Salcedo's sign with hyperdynamic apex and free   wall dysfunction suggestive of RV strain as seen with acute pulmonary   embolism.    Enlarged right atrium. Inferior vena cava is not well visualized.    Left atrium not well visualized.    Structurally normal mitral valve without significant stenosis or regurgitation.    Aortic sclerosis without stenosis. No aortic insufficiency.    Structurally normal tricuspid valve without significant stenosis. Trace   tricuspid regurgitation. Estimated right ventricular systolic pressure   is 51 mmHg + JVP.    The pulmonic valve is not well visualized. No pulmonic insufficiency.    Normal pericardium without effusion.    The aortic root is normal.    Assessment:     1. Acute saddle pulmonary embolism with acute cor pulmonale (HCC)  EC-ECHOCARDIOGRAM LTD W/O CONT   2. Pulmonary hypertension (HCC)  EC-ECHOCARDIOGRAM LTD W/O CONT   3. Paroxysmal atrial fibrillation (HCC)     4. Alcohol withdrawal  syndrome without complication (HCC)     5. Essential hypertension     6. Class 2 obesity due to excess calories without serious comorbidity with body mass index (BMI) of 39.0 to 39.9 in adult     7. ALLI (obstructive sleep apnea)         Medical Decision Making:  Today's Assessment / Status / Plan:   History of saddle pulmonary embolism with right heart strain   -Diagnosed 5/26/2019  - shows clinical improvement today  - continue Xarelto, OAC and possible hematologic work up managed by Dr. Pack, PCP  - No clinical signs of CHF, repeat echo in 4mo to monitor right heart    Atrial fibrillation, paroxysmal  -episodes associated with ETOH use  -[id number] 1 for hypertension  - does not need to continue anticoagulation for PAF once course is completed for PE    Hypertension, controlled  - lisinopril 40mg  - atenolol 100mg. Discussed switching his to metoprolol succinate, he is more comfortable with atenolol, we can readdress this at next visit    Obesity, BMI 39  - declines weight management referral, would like to try on his own at this time.    ALLI  - has CPAP at home, wants to continue to lose weight.   - repeat echo    History of ETOH abuse  - encouraged continued cessation, he follows with a mental health provider    Plan: echo and follow up in 6mo, sooner if problems.     Collaborating MD: Dr. Elliott.

## 2019-06-06 NOTE — PROGRESS NOTES
Subjective:     Cosme Cabrera is a 33 y.o. male who presents for Hospital Follow-up.  Chart and discharge summary reviewed.   Date of discharge 5/29/2019.  48- hour post discharge RN call completed on 5/31/2019 and documented in the medical record by Jeannie Eddy RN:   POST DISCHARGE CALL:  Discharge Date:5/29/2019   Date of Outreach Call: 5/31/2019  8:46 AM  Now that you're home, how are you doing? Good  Comment:Pt reports he is feeling better. Denies any  current problems. States he is wearing oxygen as directed.  Do you have questions about your medications? No    Did you fill your medications? Yes    Do you have a follow-up appointment scheduled?Yes  Comment:Post discharge program 6/6/19    Discharging Department: Telemetry 7    Number of Attempts: 1  Current or previous attempts completed within two business days of discharge? Yes  Provided education regarding treatment plan, medication, self-management, ADLs? Yes  Has patient completed Advance Directive? If yes, advise them to bring to appointment. No  Care Manager phone number provided? Yes  Is there anything else I can help you with? No        HPI: Recently hospitalized for atrial fibrillation with rapid ventricular response and hypotensive and bradycardic response to IV diltiazem.  He also had shortness of breath and was found to be hypoxic on hospital day 2.  CTA of the chest showed extensive bilateral pulmonary emboli.  He was treated with TPA on 5/26/2019.  The following day he was transitioned to Xarelto.  The patient had also been seen in the ER the day prior to admission and treated for alcohol detox.  He has a history of significant anxiety and is treated by a psychiatrist.  He notes that he was using the alcohol to numb the pain in his chest that he had been having for several months and had sought help from various doctors.  He was discharged on oxygen but has been using it as needed.  He keeps close tabs on his oxygen saturation  levels.  Other medications include atenolol as well as lisinopril for hypertension.    Since returning home, patient reports feeling better.  He reports no trouble staying off of alcohol.  He takes Librium.  His psychiatrist recently added trazodone which she says has decreased his need for hydroxyzine.  He has Xanax available but only takes it as needed.  The patient is well educated with a PhD in organic/physical chemistry.  He works in the cannabis industry.    The patient has questions regarding hospitalization or discharge: All of his questions were answered  The patient denies weakness; denies difficulty taking care of self at home.  Patient reports taking medications as instructed.    Current Outpatient Prescriptions   Medication Sig Dispense Refill   • traZODone (DESYREL) 50 MG Tab Take 50 mg by mouth 2 times a day.     • [START ON 6/17/2019] rivaroxaban (XARELTO) 20 MG Tab tablet Take 1 Tab by mouth with dinner. 30 Tab 5   • folic acid (FOLVITE) 1 MG Tab Take 1 Tab by mouth every day. 30 Tab 3   • therapeutic multivitamin-minerals (THERAGRAN-M) Tab Take 1 Tab by mouth every day. 30 Tab 3   • thiamine (THIAMINE) 100 MG tablet Take 1 Tab by mouth every day. 30 Tab 5   • cetirizine (ZYRTEC) 10 MG Tab Take 10 mg by mouth every day.     • chlordiazePOXIDE (LIBRIUM) 25 MG Cap Take 50 mg by mouth 4 times a day as needed for Anxiety.     • MAGNESIUM PO Take 1 Dose by mouth every day. Unknown OTC Strength     • hydrOXYzine pamoate (VISTARIL) 25 MG Cap Take 25-50 mg by mouth 1 time daily as needed (Sleep or Anxiety).     • ALPRAZolam (XANAX) 0.25 MG Tab Take 0.25 mg by mouth 2 times a day as needed (Panic Attack).     • ZINC OXIDE PO Take 1 Dose by mouth every day. Unknown OTC Strength     • atenolol (TENORMIN) 100 MG Tab Take 100 mg by mouth every day.     • lisinopril (PRINIVIL, ZESTRIL) 40 MG tablet Take 40 mg by mouth every day.     • omeprazole (PRILOSEC) 20 MG delayed-release capsule Take 20 mg by mouth every  "day.       No current facility-administered medications for this visit.        Allergies:   Cardizem    Social History:  Social History   Substance Use Topics   • Smoking status: Former Smoker   • Smokeless tobacco: Never Used   • Alcohol use Yes      Comment: quit 2 days ago. former drinker x 12 yrs        Family History:  Family History   Problem Relation Age of Onset   • Diabetes Father    • Hypertension Father    • Alcohol/Drug Brother    • Hypertension Paternal Grandmother        Past Medical History:   Diagnosis Date   • A-fib (HCC)    • Anxiety    • At risk for sleep apnea    • Depression    • ETOH abuse    • Hypertension    • Panic attack due to exceptional stress        Surgical History  History reviewed. No pertinent surgical history.    ROS:    Constitutional:  Denies fever, chills, night sweats, fatigue or malaise  HENT: Denies head congestion, ear pain or drainage, decreased hearing, sore throat  Eyes: Denies visual changes, eye drainage or redness, eye pain  Neck: Denies pain, swollen glands, decreased range of motion  Lungs: Denies shortness of breath, wheezing, cough  Cardiovascular: Denies chest pain, orthopnea, lower extremity edema, palpitations  Abdominal: Denies abdominal pain, change in bowel or bladder habits, nausea or vomiting  Musculoskeletal: Denies back pain, joint pains, decreased range of motion  Endocrine: Denies unexplained weight changes, heat or cold intolerance, hair loss, polyuria or polydipsia  Neurological: Denies dizziness, headache, confusion, focal weakness or numbness, memory loss  Psychiatric: He is treated for anxiety.       Objective:     /86 (BP Location: Left arm, Patient Position: Sitting, BP Cuff Size: Adult)   Pulse 75   Temp 36.3 °C (97.4 °F) (Temporal)   Resp 16   Ht 1.727 m (5' 8\")   Wt 115.7 kg (255 lb)   SpO2 95%      Physical Exam:    GEN:  Alert, oriented, in no distress  HEENT:  PERRLA, EOMI  NECK;  Supple without cervical adenopathy   LUNGS:  " Clear to auscultation without rales, rhonci, or wheezes.  CV:  Heart RRR without murmurs or S3 or S4  EXT:  Without cyanosis, clubbing, or edema.  NEURO:  Cranial nerves II through XII intact.  Motor function and sensation intact.  SKIN: No rashes or suspicious lesions.  PSYCH:  Behavior is appropriate.      Assessment and Plan:     1. Hospital follow-up:   Hospitalization and results reviewed with patient. High risk conditions requiring teaching or care coordination were identified and addressed.The patient demonstrate understanding of admission and underlying conditions. The patient understands discharge instructions and when to seek medical attention. Medications reviewed including instructions regarding high risk medications, dosing and side effects.    The patient is able to safely adhere to ADL/IADL, treatment and medication regimen, self-manage of high-risk conditions? Yes  The patient requires physical therapy/home health/DME referral? No   The patient requires referral to care coordination/behavioral health/social work?  No   Patient requires referral for pharmacy consult? No      2. Acute saddle pulmonary embolism without acute cor pulmonale (HCC)  -He is on day 9 of Xarelto 15 mg twice a day and will be switching to 20 mg after day 21.  He has an appointment with vascular medicine on 6/11/2019    3. Paroxysmal atrial fibrillation (HCC)  -Currently in sinus rhythm.  He is continuing Xarelto, atenolol  -He has a cardiology follow-up tomorrow.    4. Anticoagulated by anticoagulation treatment  -On Xarelto as noted above.  He reports no problems with this.    5. Essential hypertension  -He is continuing lisinopril.    6. Anxiety  -He is continuing psychiatric treatment.    7. History of Alcohol Abuse  -He does not feel he will have a problem staying off of alcohol since he was using it primarily for pain control and says otherwise he does not even like it.  He continues to work with his  psychiatrist.      Medication Reconciliation  Medication list at end of encounter:   Current Outpatient Prescriptions   Medication Sig Dispense Refill   • traZODone (DESYREL) 50 MG Tab Take 50 mg by mouth 2 times a day.     • [START ON 6/17/2019] rivaroxaban (XARELTO) 20 MG Tab tablet Take 1 Tab by mouth with dinner. 30 Tab 5   • folic acid (FOLVITE) 1 MG Tab Take 1 Tab by mouth every day. 30 Tab 3   • therapeutic multivitamin-minerals (THERAGRAN-M) Tab Take 1 Tab by mouth every day. 30 Tab 3   • thiamine (THIAMINE) 100 MG tablet Take 1 Tab by mouth every day. 30 Tab 5   • cetirizine (ZYRTEC) 10 MG Tab Take 10 mg by mouth every day.     • chlordiazePOXIDE (LIBRIUM) 25 MG Cap Take 50 mg by mouth 4 times a day as needed for Anxiety.     • MAGNESIUM PO Take 1 Dose by mouth every day. Unknown OTC Strength     • hydrOXYzine pamoate (VISTARIL) 25 MG Cap Take 25-50 mg by mouth 1 time daily as needed (Sleep or Anxiety).     • ALPRAZolam (XANAX) 0.25 MG Tab Take 0.25 mg by mouth 2 times a day as needed (Panic Attack).     • ZINC OXIDE PO Take 1 Dose by mouth every day. Unknown OTC Strength     • atenolol (TENORMIN) 100 MG Tab Take 100 mg by mouth every day.     • lisinopril (PRINIVIL, ZESTRIL) 40 MG tablet Take 40 mg by mouth every day.     • omeprazole (PRILOSEC) 20 MG delayed-release capsule Take 20 mg by mouth every day.       No current facility-administered medications for this visit.        Primary care follow-up:    Recommended followup:  with Micheal Pack M.D. He says he has an appointment at the end of the month and will stick with him as his PCP even though he lives in [place].    Future Appointments       Provider Department Center    6/7/2019 7:40 AM Shira Winkler P.A.-C. [facility]     6/11/2019 7:45 AM [facility] EXAM 4 [facility]      8/20/2019 7:20 AM John Sosa M.D. [facility] [address] [address]          Patient Instruction  Patient provided with educational material on discharge diagnosis and management of symptoms/red flags. Patient instructed to keep follow-up appointments and to bring written questions and and actual medications to each office visit. Patient instructed to call PCP/specialist with any problems/questions/concerns. Patient verbalizes understanding and has no further questions at this time.

## 2019-06-07 ENCOUNTER — OFFICE VISIT (OUTPATIENT)
Dept: CARDIOLOGY | Facility: MEDICAL CENTER | Age: 34
End: 2019-06-07
Payer: COMMERCIAL

## 2019-06-07 VITALS
DIASTOLIC BLOOD PRESSURE: 76 MMHG | WEIGHT: 256.8 LBS | BODY MASS INDEX: 38.92 KG/M2 | OXYGEN SATURATION: 95 % | HEIGHT: 68 IN | HEART RATE: 80 BPM | SYSTOLIC BLOOD PRESSURE: 128 MMHG

## 2019-06-07 DIAGNOSIS — F10.930 ALCOHOL WITHDRAWAL SYNDROME WITHOUT COMPLICATION (HCC): ICD-10-CM

## 2019-06-07 DIAGNOSIS — I27.20 PULMONARY HYPERTENSION (HCC): ICD-10-CM

## 2019-06-07 DIAGNOSIS — E66.09 CLASS 2 OBESITY DUE TO EXCESS CALORIES WITHOUT SERIOUS COMORBIDITY WITH BODY MASS INDEX (BMI) OF 39.0 TO 39.9 IN ADULT: ICD-10-CM

## 2019-06-07 DIAGNOSIS — G47.33 OSA (OBSTRUCTIVE SLEEP APNEA): ICD-10-CM

## 2019-06-07 DIAGNOSIS — I48.0 PAROXYSMAL ATRIAL FIBRILLATION (HCC): ICD-10-CM

## 2019-06-07 DIAGNOSIS — I10 ESSENTIAL HYPERTENSION: ICD-10-CM

## 2019-06-07 DIAGNOSIS — I26.02 ACUTE SADDLE PULMONARY EMBOLISM WITH ACUTE COR PULMONALE (HCC): ICD-10-CM

## 2019-06-07 PROCEDURE — 99214 OFFICE O/P EST MOD 30 MIN: CPT | Performed by: PHYSICIAN ASSISTANT

## 2019-06-07 ASSESSMENT — ENCOUNTER SYMPTOMS
DIZZINESS: 0
HEMOPTYSIS: 0
SPUTUM PRODUCTION: 0
BRUISES/BLEEDS EASILY: 0
SHORTNESS OF BREATH: 1
CHILLS: 0
FEVER: 0
WEIGHT LOSS: 0
BLOOD IN STOOL: 0
LOSS OF CONSCIOUSNESS: 0
NAUSEA: 0
PALPITATIONS: 0
COUGH: 0
ORTHOPNEA: 0
PND: 0

## 2019-06-07 ASSESSMENT — LIFESTYLE VARIABLES: SUBSTANCE_ABUSE: 1

## 2019-06-07 NOTE — LETTER
"     [facility]   [address], [address]  [place] [address]  Phone: [phone]  Fax: [phone]              Cosme Cabrera  1985    Encounter Date: 6/7/2019    Shira Winkler P.A.-C.          PROGRESS NOTE:  No chief complaint on file.      Subjective:   Cosme Cabrera is a 33 y.o. male who presents today for hospital follow-up.  Chronic illnesses include hypertension and paroxysmal atrial fibrillation, CHADSVASc 1, episodes are associated with heavy alcohol use.    Hospitalized 5/25 for atrial fibrillation with RVR, bilateral pulmonary emboli with evidence of right heart strain on echocardiogram requiring tPA.  He was discharged on Xarelto and home oxygen.    Today he says his breathing has improved, is \"barely winded going up stairs now\". Walking during the day he has very minimal SOB. Has not had any palpitations or throat tightness which is his normal symptoms when PAF episodes. HR in 80-70s. Pulse ox at home runs usually above 94%, he has supplemental O2 at home but not using.     He denies history of blood clots, his grandmother had a blood clot but sounds like it was provoked. No other family history of hypercoagulable diseases.     In addition to the above he has a history of sleep apnea, used to use CPAP, but lost weight and stopped using CPAP.     He has been drinking daily, >1drink per day, for the last year. Hasn't had a drink since 5/24/19. Uses cannabis daily.       Past Medical History:   Diagnosis Date   • A-fib (HCC)    • Anxiety    • At risk for sleep apnea    • Depression    • ETOH abuse    • Hypertension    • Panic attack due to exceptional stress      No past surgical history on file.  Family History   Problem Relation Age of Onset   • Diabetes Father    • Hypertension Father    • Alcohol/Drug Brother    • Hypertension Paternal Grandmother      Social History     Social History   • Marital status: Single     Spouse name: N/A   • " Number of children: N/A   • Years of education: N/A     Occupational History   • Not on file.     Social History Main Topics   • Smoking status: Former Smoker   • Smokeless tobacco: Never Used   • Alcohol use Yes      Comment: quit 2 days ago. former drinker x 12 yrs   • Drug use: Yes     Types: Inhaled      Comment: marijuana   • Sexual activity: Not on file     Other Topics Concern   • Not on file     Social History Narrative   • No narrative on file     Allergies   Allergen Reactions   • Cardizem      Bradycardia in the 30's. Reaction noted on 5/25/19 when admitted for ETOH/hypotension/tachycardia. Pt had atenolol and lisinpril same day as administration.     Outpatient Encounter Prescriptions as of 6/7/2019   Medication Sig Dispense Refill   • traZODone (DESYREL) 50 MG Tab Take 50 mg by mouth 2 times a day.     • [START ON 6/17/2019] rivaroxaban (XARELTO) 20 MG Tab tablet Take 1 Tab by mouth with dinner. 30 Tab 5   • folic acid (FOLVITE) 1 MG Tab Take 1 Tab by mouth every day. 30 Tab 3   • therapeutic multivitamin-minerals (THERAGRAN-M) Tab Take 1 Tab by mouth every day. 30 Tab 3   • thiamine (THIAMINE) 100 MG tablet Take 1 Tab by mouth every day. 30 Tab 5   • cetirizine (ZYRTEC) 10 MG Tab Take 10 mg by mouth every day.     • chlordiazePOXIDE (LIBRIUM) 25 MG Cap Take 50 mg by mouth 4 times a day as needed for Anxiety.     • MAGNESIUM PO Take 1 Dose by mouth every day. Unknown OTC Strength     • hydrOXYzine pamoate (VISTARIL) 25 MG Cap Take 25-50 mg by mouth 1 time daily as needed (Sleep or Anxiety).     • ALPRAZolam (XANAX) 0.25 MG Tab Take 0.25 mg by mouth 2 times a day as needed (Panic Attack).     • ZINC OXIDE PO Take 1 Dose by mouth every day. Unknown OTC Strength     • atenolol (TENORMIN) 100 MG Tab Take 100 mg by mouth every day.     • lisinopril (PRINIVIL, ZESTRIL) 40 MG tablet Take 40 mg by mouth every day.     • omeprazole (PRILOSEC) 20 MG delayed-release capsule Take 20 mg by mouth every day.       No  facility-administered encounter medications on file as of 6/7/2019.      Review of Systems   Constitutional: Negative for chills, fever and weight loss.   HENT: Positive for nosebleeds.    Respiratory: Positive for shortness of breath (improving). Negative for cough, hemoptysis and sputum production.    Cardiovascular: Negative for chest pain, palpitations, orthopnea, leg swelling and PND.   Gastrointestinal: Negative for blood in stool, melena and nausea.   Genitourinary: Negative for hematuria.   Neurological: Negative for dizziness and loss of consciousness.   Endo/Heme/Allergies: Does not bruise/bleed easily.   Psychiatric/Behavioral: Positive for substance abuse (history of ETOH abuse).        Objective:   There were no vitals taken for this visit.    Physical Exam   Constitutional: He is oriented to person, place, and time. He appears well-developed and well-nourished. No distress.   Well-appearing male, obese BMI 39   HENT:   Head: Normocephalic and atraumatic.   Mouth/Throat: Oropharynx is clear and moist.   Eyes: Conjunctivae are normal.   Neck: No JVD (JVP at clavicle) present.   Cardiovascular: Normal rate, regular rhythm and intact distal pulses.    No murmur heard.  Pulmonary/Chest: Effort normal and breath sounds normal. No respiratory distress. He has no wheezes. He has no rales.   Good air movement throughout lung fields   Abdominal: Soft. Bowel sounds are normal. He exhibits no distension. There is no tenderness.   Large abdominal circumference   Musculoskeletal: He exhibits no edema.   Neurological: He is alert and oriented to person, place, and time. No cranial nerve deficit.   Skin: Skin is warm and dry. He is not diaphoretic. No cyanosis. There is pallor. Nails show no clubbing.   Psychiatric: Judgment normal.   Vitals reviewed.       CTA 5/26/19  1.  Extensive bilateral pulmonary emboli including main pulmonary artery.  2.  Elevated RV LV ratio.    Transthoracic echocardiogram  5/25/2019  CONCLUSIONS  No prior study is available for comparison.   Left ventricular systolic function is normal. Left ventricular ejection   fraction is visually estimated to be 55%.  Indeterminate diastolic function.  Flattened septum in systole and diastole consistent with RV pressure   and volume overload.  Moderately dilated right ventricle. Reduced right ventricular systolic   function.  Evidence of Salcedo's sign with hyperdynamic apex and free wall   dysfunction suggestive of RV strain as seen with acute pulmonary   Embolism.  Echocardiographic criteria for RV strain is present.    The left ventricle was normal in size and thickness. Left ventricular   systolic function is normal. Flattened septum in systole and diastole   consistent with RV pressure and volume overload. Left ventricular   ejection fraction is visually estimated to be 55%. Indeterminate   diastolic function.  3 mL of contrast was used to enhance visualization   of the endocardial border. Existing IV was used.    Moderately dilated right ventricle. Reduced right ventricular systolic   function.evidence of Salcedo's sign with hyperdynamic apex and free   wall dysfunction suggestive of RV strain as seen with acute pulmonary   embolism.    Enlarged right atrium. Inferior vena cava is not well visualized.    Left atrium not well visualized.    Structurally normal mitral valve without significant stenosis or regurgitation.    Aortic sclerosis without stenosis. No aortic insufficiency.    Structurally normal tricuspid valve without significant stenosis. Trace   tricuspid regurgitation. Estimated right ventricular systolic pressure   is 51 mmHg + JVP.    The pulmonic valve is not well visualized. No pulmonic insufficiency.    Normal pericardium without effusion.    The aortic root is normal.    Assessment:     1. Acute saddle pulmonary embolism with acute cor pulmonale (HCC)     2. Pulmonary hypertension (HCC)     3. Paroxysmal atrial  fibrillation (HCC)     4. Alcohol withdrawal syndrome without complication (HCC)     5. Essential hypertension         Medical Decision Making:  Today's Assessment / Status / Plan:   History of saddle pulmonary embolism with right heart strain   -Diagnosed 5/26/2019  - shows clinical improvement today  - continue Xarelto, OAC and possible hematologic work up managed by Dr. Pack, PCP  - No clinical signs of CHF, repeat echo in 4mo to monitor right heart    Atrial fibrillation, paroxysmal  -episodes associated with ETOH use  -[id number] 1 for hypertension  - does not need to continue anticoagulation for PAF once course is completed for PE    Hypertension, controlled  - lisinopril 40mg  - atenolol 100mg. Discussed switching his to metoprolol succinate, he is more comfortable with atenolol, we can readdress this at next visit    Obesity, BMI 39  - declines weight management referral, would like to try on his own at this time.    ALLI  - has CPAP at home, wants to continue to lose weight.   - repeat echo    History of ETOH abuse  - encouraged continued cessation, he follows with a mental health provider    Plan: echo and follow up in 6mo, sooner if problems.     Collaborating MD: Dr. Elliott.       Micheal Pack M.D.  [address] [place] [id number]  VIA Facsimile: [phone]

## 2019-09-19 ENCOUNTER — APPOINTMENT (OUTPATIENT)
Dept: CARDIOLOGY | Facility: MEDICAL CENTER | Age: 34
DRG: 175 | End: 2019-09-19
Attending: EMERGENCY MEDICINE
Payer: COMMERCIAL

## 2019-09-19 ENCOUNTER — APPOINTMENT (OUTPATIENT)
Dept: RADIOLOGY | Facility: MEDICAL CENTER | Age: 34
DRG: 175 | End: 2019-09-19
Attending: INTERNAL MEDICINE
Payer: COMMERCIAL

## 2019-09-19 ENCOUNTER — APPOINTMENT (OUTPATIENT)
Dept: RADIOLOGY | Facility: MEDICAL CENTER | Age: 34
DRG: 175 | End: 2019-09-19
Attending: EMERGENCY MEDICINE
Payer: COMMERCIAL

## 2019-09-19 ENCOUNTER — HOSPITAL ENCOUNTER (INPATIENT)
Facility: MEDICAL CENTER | Age: 34
LOS: 27 days | DRG: 175 | End: 2019-10-16
Attending: EMERGENCY MEDICINE | Admitting: INTERNAL MEDICINE
Payer: COMMERCIAL

## 2019-09-19 DIAGNOSIS — F10.10 ETOH ABUSE: ICD-10-CM

## 2019-09-19 DIAGNOSIS — I26.02 ACUTE SADDLE PULMONARY EMBOLISM WITH ACUTE COR PULMONALE (HCC): ICD-10-CM

## 2019-09-19 DIAGNOSIS — F19.951 SUBSTANCE-INDUCED PSYCHOTIC DISORDER WITH HALLUCINATIONS (HCC): ICD-10-CM

## 2019-09-19 DIAGNOSIS — R53.1 WEAKNESS: ICD-10-CM

## 2019-09-19 DIAGNOSIS — F25.9 SCHIZOAFFECTIVE DISORDER, UNSPECIFIED TYPE (HCC): ICD-10-CM

## 2019-09-19 DIAGNOSIS — I27.20 PULMONARY HYPERTENSION (HCC): ICD-10-CM

## 2019-09-19 LAB
ALBUMIN SERPL BCP-MCNC: 3.9 G/DL (ref 3.2–4.9)
ALBUMIN/GLOB SERPL: 1.1 G/DL
ALP SERPL-CCNC: 57 U/L (ref 30–99)
ALT SERPL-CCNC: 42 U/L (ref 2–50)
AMMONIA PLAS-SCNC: 37 UMOL/L (ref 11–45)
AMPHET UR QL SCN: NEGATIVE
ANION GAP SERPL CALC-SCNC: 10 MMOL/L (ref 0–11.9)
APPEARANCE UR: CLEAR
APTT PPP: 30.3 SEC (ref 24.7–36)
APTT PPP: 69.4 SEC (ref 24.7–36)
AST SERPL-CCNC: 18 U/L (ref 12–45)
BARBITURATES UR QL SCN: NEGATIVE
BASOPHILS # BLD AUTO: 0 % (ref 0–1.8)
BASOPHILS # BLD: 0 K/UL (ref 0–0.12)
BENZODIAZ UR QL SCN: POSITIVE
BILIRUB SERPL-MCNC: 0.6 MG/DL (ref 0.1–1.5)
BILIRUB UR QL STRIP.AUTO: NEGATIVE
BUN SERPL-MCNC: 27 MG/DL (ref 8–22)
BZE UR QL SCN: NEGATIVE
CALCIUM SERPL-MCNC: 9.2 MG/DL (ref 8.5–10.5)
CANNABINOIDS UR QL SCN: POSITIVE
CHLORIDE SERPL-SCNC: 110 MMOL/L (ref 96–112)
CHOLEST SERPL-MCNC: 173 MG/DL (ref 100–199)
CK SERPL-CCNC: 304 U/L (ref 0–154)
CO2 SERPL-SCNC: 22 MMOL/L (ref 20–33)
COLOR UR: YELLOW
CREAT SERPL-MCNC: 0.99 MG/DL (ref 0.5–1.4)
EKG IMPRESSION: NORMAL
EOSINOPHIL # BLD AUTO: 0.1 K/UL (ref 0–0.51)
EOSINOPHIL NFR BLD: 1 % (ref 0–6.9)
ERYTHROCYTE [DISTWIDTH] IN BLOOD BY AUTOMATED COUNT: 68.1 FL (ref 35.9–50)
EST. AVERAGE GLUCOSE BLD GHB EST-MCNC: 143 MG/DL
ETHANOL BLD-MCNC: 0 G/DL
GLOBULIN SER CALC-MCNC: 3.4 G/DL (ref 1.9–3.5)
GLUCOSE SERPL-MCNC: 97 MG/DL (ref 65–99)
GLUCOSE UR STRIP.AUTO-MCNC: NEGATIVE MG/DL
HBA1C MFR BLD: 6.6 % (ref 0–5.6)
HCT VFR BLD AUTO: 42.4 % (ref 42–52)
HDLC SERPL-MCNC: 22 MG/DL
HGB BLD-MCNC: 14.3 G/DL (ref 14–18)
INR PPP: 1.15 (ref 0.87–1.13)
KETONES UR STRIP.AUTO-MCNC: ABNORMAL MG/DL
LACTATE BLD-SCNC: 1.6 MMOL/L (ref 0.5–2)
LDLC SERPL CALC-MCNC: 126 MG/DL
LEUKOCYTE ESTERASE UR QL STRIP.AUTO: NEGATIVE
LV EJECT FRACT MOD 4C 99902: 62.41
LYMPHOCYTES # BLD AUTO: 3.84 K/UL (ref 1–4.8)
LYMPHOCYTES NFR BLD: 40 % (ref 22–41)
MAGNESIUM SERPL-MCNC: 2.4 MG/DL (ref 1.5–2.5)
MANUAL DIFF BLD: NORMAL
MCH RBC QN AUTO: 31.5 PG (ref 27–33)
MCHC RBC AUTO-ENTMCNC: 33.7 G/DL (ref 33.7–35.3)
MCV RBC AUTO: 93.4 FL (ref 81.4–97.8)
METHADONE UR QL SCN: NEGATIVE
MICRO URNS: ABNORMAL
MONOCYTES # BLD AUTO: 0.19 K/UL (ref 0–0.85)
MONOCYTES NFR BLD AUTO: 2 % (ref 0–13.4)
MORPHOLOGY BLD-IMP: NORMAL
NEUTROPHILS # BLD AUTO: 5.47 K/UL (ref 1.82–7.42)
NEUTROPHILS NFR BLD: 57 % (ref 44–72)
NITRITE UR QL STRIP.AUTO: NEGATIVE
NRBC # BLD AUTO: 0 K/UL
NRBC BLD-RTO: 0 /100 WBC
NT-PROBNP SERPL IA-MCNC: 1821 PG/ML (ref 0–125)
OPIATES UR QL SCN: NEGATIVE
OXYCODONE UR QL SCN: NEGATIVE
PCP UR QL SCN: NEGATIVE
PH UR STRIP.AUTO: 6 [PH] (ref 5–8)
PHOSPHATE SERPL-MCNC: 3.7 MG/DL (ref 2.5–4.5)
PLATELET # BLD AUTO: 308 K/UL (ref 164–446)
PLATELET BLD QL SMEAR: NORMAL
PMV BLD AUTO: 9.2 FL (ref 9–12.9)
POTASSIUM SERPL-SCNC: 4.1 MMOL/L (ref 3.6–5.5)
PROPOXYPH UR QL SCN: NEGATIVE
PROT SERPL-MCNC: 7.3 G/DL (ref 6–8.2)
PROT UR QL STRIP: NEGATIVE MG/DL
PROTHROMBIN TIME: 14.9 SEC (ref 12–14.6)
RBC # BLD AUTO: 4.54 M/UL (ref 4.7–6.1)
RBC BLD AUTO: NORMAL
RBC UR QL AUTO: NEGATIVE
SODIUM SERPL-SCNC: 142 MMOL/L (ref 135–145)
SP GR UR STRIP.AUTO: >=1.045
TRIGL SERPL-MCNC: 126 MG/DL (ref 0–149)
TROPONIN T SERPL-MCNC: 45 NG/L (ref 6–19)
TSH SERPL DL<=0.005 MIU/L-ACNC: 1.95 UIU/ML (ref 0.38–5.33)
UROBILINOGEN UR STRIP.AUTO-MCNC: 1 MG/DL
WBC # BLD AUTO: 9.6 K/UL (ref 4.8–10.8)

## 2019-09-19 PROCEDURE — 51798 US URINE CAPACITY MEASURE: CPT

## 2019-09-19 PROCEDURE — 700117 HCHG RX CONTRAST REV CODE 255: Performed by: EMERGENCY MEDICINE

## 2019-09-19 PROCEDURE — 83880 ASSAY OF NATRIURETIC PEPTIDE: CPT

## 2019-09-19 PROCEDURE — 99223 1ST HOSP IP/OBS HIGH 75: CPT | Performed by: INTERNAL MEDICINE

## 2019-09-19 PROCEDURE — 700105 HCHG RX REV CODE 258: Performed by: INTERNAL MEDICINE

## 2019-09-19 PROCEDURE — 70450 CT HEAD/BRAIN W/O DYE: CPT

## 2019-09-19 PROCEDURE — 82550 ASSAY OF CK (CPK): CPT

## 2019-09-19 PROCEDURE — 94760 N-INVAS EAR/PLS OXIMETRY 1: CPT

## 2019-09-19 PROCEDURE — 81003 URINALYSIS AUTO W/O SCOPE: CPT

## 2019-09-19 PROCEDURE — 85610 PROTHROMBIN TIME: CPT

## 2019-09-19 PROCEDURE — 700102 HCHG RX REV CODE 250 W/ 637 OVERRIDE(OP): Performed by: EMERGENCY MEDICINE

## 2019-09-19 PROCEDURE — 71045 X-RAY EXAM CHEST 1 VIEW: CPT

## 2019-09-19 PROCEDURE — 80061 LIPID PANEL: CPT

## 2019-09-19 PROCEDURE — 93005 ELECTROCARDIOGRAM TRACING: CPT | Performed by: EMERGENCY MEDICINE

## 2019-09-19 PROCEDURE — 700111 HCHG RX REV CODE 636 W/ 250 OVERRIDE (IP): Performed by: INTERNAL MEDICINE

## 2019-09-19 PROCEDURE — 85730 THROMBOPLASTIN TIME PARTIAL: CPT

## 2019-09-19 PROCEDURE — 82140 ASSAY OF AMMONIA: CPT

## 2019-09-19 PROCEDURE — 93970 EXTREMITY STUDY: CPT

## 2019-09-19 PROCEDURE — 700102 HCHG RX REV CODE 250 W/ 637 OVERRIDE(OP): Performed by: STUDENT IN AN ORGANIZED HEALTH CARE EDUCATION/TRAINING PROGRAM

## 2019-09-19 PROCEDURE — 99223 1ST HOSP IP/OBS HIGH 75: CPT | Mod: GC | Performed by: PSYCHIATRY & NEUROLOGY

## 2019-09-19 PROCEDURE — A9270 NON-COVERED ITEM OR SERVICE: HCPCS | Performed by: STUDENT IN AN ORGANIZED HEALTH CARE EDUCATION/TRAINING PROGRAM

## 2019-09-19 PROCEDURE — 36415 COLL VENOUS BLD VENIPUNCTURE: CPT

## 2019-09-19 PROCEDURE — 700105 HCHG RX REV CODE 258: Performed by: EMERGENCY MEDICINE

## 2019-09-19 PROCEDURE — 84100 ASSAY OF PHOSPHORUS: CPT

## 2019-09-19 PROCEDURE — 700102 HCHG RX REV CODE 250 W/ 637 OVERRIDE(OP): Performed by: INTERNAL MEDICINE

## 2019-09-19 PROCEDURE — 304561 HCHG STAT O2

## 2019-09-19 PROCEDURE — 84443 ASSAY THYROID STIM HORMONE: CPT

## 2019-09-19 PROCEDURE — A9270 NON-COVERED ITEM OR SERVICE: HCPCS | Performed by: INTERNAL MEDICINE

## 2019-09-19 PROCEDURE — 83605 ASSAY OF LACTIC ACID: CPT

## 2019-09-19 PROCEDURE — 71275 CT ANGIOGRAPHY CHEST: CPT

## 2019-09-19 PROCEDURE — 84484 ASSAY OF TROPONIN QUANT: CPT

## 2019-09-19 PROCEDURE — 80307 DRUG TEST PRSMV CHEM ANLYZR: CPT

## 2019-09-19 PROCEDURE — A9270 NON-COVERED ITEM OR SERVICE: HCPCS | Performed by: EMERGENCY MEDICINE

## 2019-09-19 PROCEDURE — 85007 BL SMEAR W/DIFF WBC COUNT: CPT

## 2019-09-19 PROCEDURE — 99291 CRITICAL CARE FIRST HOUR: CPT | Performed by: INTERNAL MEDICINE

## 2019-09-19 PROCEDURE — 85027 COMPLETE CBC AUTOMATED: CPT

## 2019-09-19 PROCEDURE — 99291 CRITICAL CARE FIRST HOUR: CPT

## 2019-09-19 PROCEDURE — 83036 HEMOGLOBIN GLYCOSYLATED A1C: CPT

## 2019-09-19 PROCEDURE — 93308 TTE F-UP OR LMTD: CPT | Mod: 26 | Performed by: INTERNAL MEDICINE

## 2019-09-19 PROCEDURE — 770022 HCHG ROOM/CARE - ICU (200)

## 2019-09-19 PROCEDURE — 93308 TTE F-UP OR LMTD: CPT

## 2019-09-19 PROCEDURE — 80053 COMPREHEN METABOLIC PANEL: CPT

## 2019-09-19 PROCEDURE — 83735 ASSAY OF MAGNESIUM: CPT

## 2019-09-19 RX ORDER — FOLIC ACID 1 MG/1
1 TABLET ORAL DAILY
Status: DISCONTINUED | OUTPATIENT
Start: 2019-09-19 | End: 2019-10-16 | Stop reason: HOSPADM

## 2019-09-19 RX ORDER — HEPARIN SODIUM 1000 [USP'U]/ML
7000 INJECTION, SOLUTION INTRAVENOUS; SUBCUTANEOUS ONCE
Status: COMPLETED | OUTPATIENT
Start: 2019-09-19 | End: 2019-09-19

## 2019-09-19 RX ORDER — SODIUM CHLORIDE 9 MG/ML
1000 INJECTION, SOLUTION INTRAVENOUS ONCE
Status: COMPLETED | OUTPATIENT
Start: 2019-09-19 | End: 2019-09-19

## 2019-09-19 RX ORDER — ACETAMINOPHEN 325 MG/1
650 TABLET ORAL EVERY 6 HOURS PRN
Status: DISCONTINUED | OUTPATIENT
Start: 2019-09-19 | End: 2019-10-16 | Stop reason: HOSPADM

## 2019-09-19 RX ORDER — HEPARIN SODIUM 5000 [USP'U]/100ML
INJECTION, SOLUTION INTRAVENOUS CONTINUOUS
Status: DISCONTINUED | OUTPATIENT
Start: 2019-09-19 | End: 2019-09-21

## 2019-09-19 RX ORDER — LORAZEPAM 2 MG/ML
1 INJECTION INTRAMUSCULAR EVERY 4 HOURS PRN
Status: DISCONTINUED | OUTPATIENT
Start: 2019-09-19 | End: 2019-09-20

## 2019-09-19 RX ORDER — RISPERIDONE 0.5 MG/1
0.5 TABLET ORAL 2 TIMES DAILY
Status: DISCONTINUED | OUTPATIENT
Start: 2019-09-19 | End: 2019-09-20

## 2019-09-19 RX ORDER — SODIUM CHLORIDE, SODIUM LACTATE, POTASSIUM CHLORIDE, CALCIUM CHLORIDE 600; 310; 30; 20 MG/100ML; MG/100ML; MG/100ML; MG/100ML
INJECTION, SOLUTION INTRAVENOUS CONTINUOUS
Status: DISCONTINUED | OUTPATIENT
Start: 2019-09-19 | End: 2019-09-21

## 2019-09-19 RX ORDER — RISPERIDONE 1 MG/1
1 TABLET ORAL ONCE
Status: COMPLETED | OUTPATIENT
Start: 2019-09-19 | End: 2019-09-19

## 2019-09-19 RX ORDER — ONDANSETRON 2 MG/ML
4 INJECTION INTRAMUSCULAR; INTRAVENOUS EVERY 4 HOURS PRN
Status: DISCONTINUED | OUTPATIENT
Start: 2019-09-19 | End: 2019-10-16 | Stop reason: HOSPADM

## 2019-09-19 RX ORDER — HEPARIN SODIUM 1000 [USP'U]/ML
3800 INJECTION, SOLUTION INTRAVENOUS; SUBCUTANEOUS PRN
Status: DISCONTINUED | OUTPATIENT
Start: 2019-09-19 | End: 2019-09-21

## 2019-09-19 RX ORDER — ONDANSETRON 4 MG/1
4 TABLET, ORALLY DISINTEGRATING ORAL EVERY 4 HOURS PRN
Status: DISCONTINUED | OUTPATIENT
Start: 2019-09-19 | End: 2019-10-16 | Stop reason: HOSPADM

## 2019-09-19 RX ORDER — LABETALOL HYDROCHLORIDE 5 MG/ML
10 INJECTION, SOLUTION INTRAVENOUS EVERY 4 HOURS PRN
Status: DISCONTINUED | OUTPATIENT
Start: 2019-09-19 | End: 2019-10-15

## 2019-09-19 RX ADMIN — RISPERIDONE 1 MG: 1 TABLET ORAL at 14:51

## 2019-09-19 RX ADMIN — SODIUM CHLORIDE 1000 ML: 9 INJECTION, SOLUTION INTRAVENOUS at 12:18

## 2019-09-19 RX ADMIN — RISPERIDONE 0.5 MG: 0.5 TABLET, FILM COATED ORAL at 17:41

## 2019-09-19 RX ADMIN — HEPARIN SODIUM 1450 UNITS/HR: 5000 INJECTION, SOLUTION INTRAVENOUS at 17:16

## 2019-09-19 RX ADMIN — THERA TABS 1 TABLET: TAB at 17:41

## 2019-09-19 RX ADMIN — SODIUM CHLORIDE, POTASSIUM CHLORIDE, SODIUM LACTATE AND CALCIUM CHLORIDE: 600; 310; 30; 20 INJECTION, SOLUTION INTRAVENOUS at 17:33

## 2019-09-19 RX ADMIN — FOLIC ACID 1 MG: 1 TABLET ORAL at 17:41

## 2019-09-19 RX ADMIN — HEPARIN SODIUM 7000 UNITS: 1000 INJECTION, SOLUTION INTRAVENOUS; SUBCUTANEOUS at 17:17

## 2019-09-19 RX ADMIN — THIAMINE HYDROCHLORIDE 500 MG: 100 INJECTION, SOLUTION INTRAMUSCULAR; INTRAVENOUS at 17:48

## 2019-09-19 RX ADMIN — IOHEXOL 60 ML: 350 INJECTION, SOLUTION INTRAVENOUS at 14:30

## 2019-09-19 ASSESSMENT — ENCOUNTER SYMPTOMS
CONSTITUTIONAL NEGATIVE: 1
STRIDOR: 0
EYE DISCHARGE: 0
MEMORY LOSS: 1
FOCAL WEAKNESS: 0
NERVOUS/ANXIOUS: 1
HALLUCINATIONS: 1
NAUSEA: 1
DOUBLE VISION: 0
FALLS: 0
BACK PAIN: 0
DIZZINESS: 1
BLURRED VISION: 0
EYE REDNESS: 0
RESPIRATORY NEGATIVE: 1
SPUTUM PRODUCTION: 0
HEADACHES: 0
BLOOD IN STOOL: 0
WHEEZING: 0
WEAKNESS: 0
FLANK PAIN: 0
NAUSEA: 0
NECK PAIN: 0
MUSCULOSKELETAL NEGATIVE: 1
DIARRHEA: 1
EYES NEGATIVE: 1
SPEECH CHANGE: 0
CONSTIPATION: 0
INSOMNIA: 1
LOSS OF CONSCIOUSNESS: 1
DIAPHORESIS: 0
MYALGIAS: 0
DIAPHORESIS: 1
EYE PAIN: 0
DEPRESSION: 1
TREMORS: 0
TINGLING: 0
SHORTNESS OF BREATH: 1
SENSORY CHANGE: 0
FEVER: 0
SEIZURES: 0
ABDOMINAL PAIN: 1
NEUROLOGICAL NEGATIVE: 1
BRUISES/BLEEDS EASILY: 0
VOMITING: 0
CHILLS: 0
HEMOPTYSIS: 0
FALLS: 1
COUGH: 0
ABDOMINAL PAIN: 0
HEARTBURN: 0
PND: 0
PALPITATIONS: 0

## 2019-09-19 ASSESSMENT — PATIENT HEALTH QUESTIONNAIRE - PHQ9
3. TROUBLE FALLING OR STAYING ASLEEP OR SLEEPING TOO MUCH: NEARLY EVERY DAY
SUM OF ALL RESPONSES TO PHQ9 QUESTIONS 1 AND 2: 5
5. POOR APPETITE OR OVEREATING: MORE THAN HALF THE DAYS
1. LITTLE INTEREST OR PLEASURE IN DOING THINGS: NEARLY EVERY DAY
8. MOVING OR SPEAKING SO SLOWLY THAT OTHER PEOPLE COULD HAVE NOTICED. OR THE OPPOSITE, BEING SO FIGETY OR RESTLESS THAT YOU HAVE BEEN MOVING AROUND A LOT MORE THAN USUAL: SEVERAL DAYS
7. TROUBLE CONCENTRATING ON THINGS, SUCH AS READING THE NEWSPAPER OR WATCHING TELEVISION: NEARLY EVERY DAY
6. FEELING BAD ABOUT YOURSELF - OR THAT YOU ARE A FAILURE OR HAVE LET YOURSELF OR YOUR FAMILY DOWN: SEVERAL DAYS
2. FEELING DOWN, DEPRESSED, IRRITABLE, OR HOPELESS: MORE THAN HALF THE DAYS
4. FEELING TIRED OR HAVING LITTLE ENERGY: NEARLY EVERY DAY
9. THOUGHTS THAT YOU WOULD BE BETTER OFF DEAD, OR OF HURTING YOURSELF: NOT AT ALL
SUM OF ALL RESPONSES TO PHQ QUESTIONS 1-9: 18

## 2019-09-19 ASSESSMENT — LIFESTYLE VARIABLES
DOES PATIENT WANT TO STOP DRINKING: NO
AVERAGE NUMBER OF DAYS PER WEEK YOU HAVE A DRINK CONTAINING ALCOHOL: 0
HAVE YOU EVER FELT YOU SHOULD CUT DOWN ON YOUR DRINKING: NO
TOTAL SCORE: 0
ALCOHOL_USE: NO
DOES PATIENT WANT TO STOP DRINKING: NO
HOW MANY TIMES IN THE PAST YEAR HAVE YOU HAD 5 OR MORE DRINKS IN A DAY: 0
HAVE PEOPLE ANNOYED YOU BY CRITICIZING YOUR DRINKING: NO
DO YOU DRINK ALCOHOL: NO
ON A TYPICAL DAY WHEN YOU DRINK ALCOHOL HOW MANY DRINKS DO YOU HAVE: 0
EVER HAD A DRINK FIRST THING IN THE MORNING TO STEADY YOUR NERVES TO GET RID OF A HANGOVER: NO
EVER FELT BAD OR GUILTY ABOUT YOUR DRINKING: NO
TOTAL SCORE: 0
SUBSTANCE_ABUSE: 1
TOTAL SCORE: 0
CONSUMPTION TOTAL: NEGATIVE

## 2019-09-19 ASSESSMENT — COGNITIVE AND FUNCTIONAL STATUS - GENERAL
SUGGESTED CMS G CODE MODIFIER MOBILITY: CH
DAILY ACTIVITIY SCORE: 24
SUGGESTED CMS G CODE MODIFIER DAILY ACTIVITY: CH
MOBILITY SCORE: 24
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
## 2019-09-19 NOTE — H&P
19.Hospital Medicine History & Physical Note    Date of Service  9/19/2019    Primary Care Physician  Micheal Pack M.D.    Consultants  Pulmonology / Critical care  Psychiatry     Code Status  FULL CODE     Chief Complaint  Psychiatric issues     History of Presenting Illness  34 y.o. male who presented 9/19/2019 for psychiatric evaluation.     Patient has history of acute pulmonary embolism requiring hospitalization in May 2019.  Previously history of anxiety disorder, hypertension and paroxysmal atrial fibrillation requiring beta-blocker therapy.  History of alcoholism and alcohol abuse.  During previous hospitalization because of his submassive PE, he received low-dose TPA.  He was discharged on oral Xarelto.  Following discharge I see that he did follow-up with cardiology team and PCP in June 2019.  I am uncertain if he maintains subsequent follow-up or not.  Today patient was brought to the emergency department because of underlying psychiatric concerns at home.  He has been using mushrooms, alcohol and marijuana.  Hospital medicine consulted because of underlying psychiatric issues and findings of submassive PE again.     Upon evaluation in the emergency department, patient is aware of being in the hospital, aware of the month, not completely aware of the date, year.  He is intermittently shaking.  Patient reports being seen altered, having abnormal thoughts.  He is noted to have a very abnormal affect.  He reports that he has not been this goofy.  Reports that he has a PhD in organic chemistry.  He reports that he has not been compliant with his Xarelto, has been sporadically taking it and missing doses very frequently.  He has been having hallucinations at home, seeing people who have been following him around the home and trying to have sexual activity with him.  He has never had any psychiatric history in the past.  Reports that over the last few days he has been very wobbly, unable to walk straight and  has had a tendency to fall.  He denies having any fever or chills.  No expressive aphasia, dysarthria.  No headaches.  He does report having chest pain, this worsens with breathing in.  Strong pleuritic component to his chest pain.  Overall a very poor historian with respect to characterizing his pain, rating it on a 10 point scale, providing further input regarding his pain.  He is noted to be hypoxemic requiring oxygen.  He denies having any cough or hemoptysis.  Reports having intermittent abdominal pain and some diarrhea for the last 2 weeks.  He does feel short of breath.  Denies using methamphetamines, intravenous drugs.    Overall a very poor historian.    I have discussed the case with the emergency room physician, consulting physician Dr. Lepe from pulmonology and critical care and with nursing staff.  It appears that this is not normal for patient, usually is a very well organized ely and this presentation is completely abnormal for him.  Because of his abnormal behavior at home, EMS was called and he was brought to the emergency department.    Review of Systems  Review of Systems   Constitutional: Positive for diaphoresis and malaise/fatigue. Negative for chills and fever.   HENT: Negative for hearing loss and tinnitus.    Eyes: Negative for blurred vision and double vision.   Respiratory: Positive for shortness of breath. Negative for cough, hemoptysis, sputum production and wheezing.    Cardiovascular: Positive for chest pain. Negative for palpitations and PND.   Gastrointestinal: Positive for abdominal pain and diarrhea. Negative for blood in stool, constipation, heartburn, melena, nausea and vomiting.   Genitourinary: Negative for dysuria, flank pain, frequency, hematuria and urgency.   Musculoskeletal: Negative for back pain, falls, joint pain, myalgias and neck pain.   Skin: Negative for itching and rash.   Neurological: Positive for dizziness and loss of consciousness. Negative for tingling,  tremors, sensory change, speech change, focal weakness, seizures, weakness and headaches.   Psychiatric/Behavioral: Positive for depression, hallucinations, memory loss and substance abuse. Negative for suicidal ideas. The patient is nervous/anxious and has insomnia.        Past Medical History   has a past medical history of A-fib (HCC), Anxiety, At risk for sleep apnea, Depression, ETOH abuse, Hypertension, and Panic attack due to exceptional stress.    Surgical History  Negative     Family History  family history includes Alcohol/Drug in his brother; Diabetes in his father; Hypertension in his father and paternal grandmother.     Social History   reports that he has quit smoking. He has never used smokeless tobacco. He reports that he drinks alcohol. He reports that he has current or past drug history. Drug: Inhaled.    Allergies  Allergies   Allergen Reactions   • Cardizem      Bradycardia in the 30's. Reaction noted on 5/25/19 when admitted for ETOH/hypotension/tachycardia. Pt had atenolol and lisinpril same day as administration.       Medications  Prior to Admission Medications   Prescriptions Last Dose Informant Patient Reported? Taking?   atenolol (TENORMIN) 100 MG Tab 9/19/2019 at AM Patient's Home Pharmacy Yes No   Sig: Take 100 mg by mouth every day.   cetirizine (ZYRTEC) 10 MG Tab 9/19/2019 at AM Patient's Home Pharmacy Yes No   Sig: Take 10 mg by mouth every day.   chlordiazePOXIDE (LIBRIUM) 25 MG Cap FEW DAYS AGO at [facility] Patient's Home Pharmacy Yes No   Sig: Take 25 mg by mouth 4 times a day as needed for Anxiety.   lisinopril (PRINIVIL, ZESTRIL) 40 MG tablet 9/19/2019 at AM Patient's Home Pharmacy Yes No   Sig: Take 40 mg by mouth every day.   rivaroxaban (XARELTO) 20 MG Tab tablet 9/17/2019 at PM Patient's Home Pharmacy No No   Sig: Take 1 Tab by mouth with dinner.      Facility-Administered Medications: None       Physical Exam  Temp:  [37.3 °C (99.1 °F)] 37.3 °C (99.1 °F)  Pulse:  [85-87] 87  Resp:   [18-20] 20  BP: (114-139)/(60-93) 139/93  SpO2:  [91 %-95 %] 95 %    Physical Exam   Constitutional: He appears well-developed and well-nourished. No distress.   HENT:   Head: Normocephalic.   Mouth/Throat: Oropharynx is clear and moist. No oropharyngeal exudate.   Eyes: Pupils are equal, round, and reactive to light. Conjunctivae and EOM are normal. No scleral icterus.   Neck: JVD (minimal) present. No thyromegaly present.   Cardiovascular: Normal rate, regular rhythm and normal heart sounds.   No murmur heard.  Pulmonary/Chest: No stridor. No respiratory distress. He has no wheezes.   Diminished in bases   Abdominal: Soft. Bowel sounds are normal. He exhibits no distension. There is no tenderness. There is no rebound.   Musculoskeletal: He exhibits edema (minimal). He exhibits no tenderness.   Lymphadenopathy:     He has no cervical adenopathy.   Neurological: He is alert. He has normal reflexes. No cranial nerve deficit.   Abnormal affect.  Intermittently shaking.  Follows command.  Nearly alert and oriented x4 with help.  No focal neurological deficits.   Skin: Skin is warm and dry. He is not diaphoretic. No erythema.   Psychiatric:   Profoundly anxious, shaking with abnormal effect.  No active delusions, hallucinations seen.       Laboratory:  Recent Labs     09/19/19  1200   WBC 9.6   RBC 4.54*   HEMOGLOBIN 14.3   HEMATOCRIT 42.4   MCV 93.4   MCH 31.5   MCHC 33.7   RDW 68.1*   PLATELETCT 308   MPV 9.2     Recent Labs     09/19/19  1200   SODIUM 142   POTASSIUM 4.1   CHLORIDE 110   CO2 22   GLUCOSE 97   BUN 27*   CREATININE 0.99   CALCIUM 9.2     Recent Labs     09/19/19  1200   ALTSGPT 42   ASTSGOT 18   ALKPHOSPHAT 57   TBILIRUBIN 0.6   GLUCOSE 97     Recent Labs     09/19/19  1200   APTT 30.3   INR 1.15*     Recent Labs     09/19/19  1200   NTPROBNP 1821*         Recent Labs     09/19/19  1200   TROPONINT 45*       Urinalysis:    No results found     Imaging:  EC-ECHOCARDIOGRAM LTD W/O CONT   Final Result     "  CT-CTA CHEST PULMONARY ARTERY W/ RECONS   Final Result   Addendum 1 of 1   These findings were discussed with HEATHER MELGOZA on 9/19/2019 2:16 PM.      Final      DX-CHEST-PORTABLE (1 VIEW)   Final Result      No acute cardiopulmonary abnormality.      CT-HEAD W/O   Final Result      No CT evidence of acute infarct, hemorrhage or mass. No acute intracranial injury.      US-EXTREMITY VENOUS LOWER BILAT    (Results Pending)   MR-BRAIN-W/O    (Results Pending)         Assessment/Plan:  I anticipate this patient will require at least two midnights for appropriate medical management, necessitating inpatient admission.    Acute saddle pulmonary embolism (HCC)- (present on admission)  Assessment & Plan  Recurrent, in the setting of noncompliance with anticoagulation therapy    CTA PE revealing \"Extensive acute bilateral pulmonary emboli with saddle embolus noted, as well as findings concerning for RV strain\"    Pending echocardiogram for right ventricular function at this time    Consultation to pulmonology, critical care for evaluation of TPA, catheter guided thrombolytic therapy    Patient has been falling at home, history of paroxysmal A. fib, drug use and unable to rule out an acute stroke or traumatic/hemorrhagic process.  Uncertain regarding the benefits of administration of TPA, catheter guided TPA therapy given unable to completely assess the underlying risk associated with hemorrhage.  I would defer this decision to pulmonology/critical care team, at this time I have discussed with Dr. Lepe and no plans for administration of TPA, he would further review the case.    Meanwhile we will initiate the patient on intravenous heparin with bolus and re-bolus doses as clinically appropriate.    Patient will be admitted in critical condition to the intensive care unit  Echocardiogram, DVT duplex, MRI brain has been requested by me    For now we will avoid antihypertensive therapy    Will need age-appropriate cancer " screening and hypercoagulable work-up with vascular medicine, hematology in 3 months of hospital discharge in the outpatient setting.  Lifelong anticoagulation will be recommended.    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  Q waves in inferior leads.    Likely from underlying pulmonary embolism, right ventricular strain.    Echocardiogram requested for further evaluation  Interval troponin tomorrow  Close clinical monitoring in the intensive care unit  Patient on anticoagulation    Pulmonary hypertension (HCC)- (present on admission)  Assessment & Plan  Known history of, now with recurrent PE  Echocardiogram pending    Anxiety- (present on admission)  Assessment & Plan  On Librium at home, for now will order IV as needed Ativan    Essential hypertension- (present on admission)  Assessment & Plan  Holding antihypertensive therapy in the setting of acute pulmonary embolism      VTE prophylaxis: Therapeutic anticoagulation

## 2019-09-19 NOTE — ASSESSMENT & PLAN NOTE
History of alcohol abuse in the past with alcohol withdrawal  Supplement vitamins  Observe for evidence of withdrawal, okay so far, paranoia more from a psychiatric standpoint then EtOH withdrawal suspected  Consider dexmedetomidine infusion for DTs as a first choice

## 2019-09-19 NOTE — CONSULTS
"PULMONARY AND CRITICAL CARE MEDICINE CONSULTATION    Date of Consultation:  9/19/2019    Requesting Physician:  Moon Roman MD    Consulting Physician:  James Hou MD    Reason for Consultation:  Evaluation and management of acute submassive pulmonary embolism    Chief Complaint:  Shortness of breath and chest pain    History of Present Illness:    I was kindly asked to see and evaluate Cosme Cabrera, a 34 y.o. male for evaluation and management of the above problem.    The history is obtained from the patient as well as from healthcare providers and the medical record.  This gentleman does not provide the best history due to disorientation and psychosis.  This gentleman has a history of submassive pulmonary embolism in May 2019, anxiety, hypertension, paroxysmal atrial fibrillation and alcohol abuse with documented blood alcohol levels as high as 0.25 in the past.  He was brought into the emergency room today with psychosis.  He states that people have been following him around at his home with an agenda.  That agenda is to have sexual activity with him.  He is aware that his thinking has been \"off.\"  He admits that he has been using psychedelic mushrooms and whippits as well as marijuana.  He is disoriented to time and cannot really give me a history of his duration of use or how long his kunz has been going on, but it has been several days at least.  He is complaining of some shortness of breath and right-sided pleuritic chest pain.  He is supposed to be taking rivaroxaban for history of pulmonary embolism, but admits that he is noncompliant with this medication and takes it only intermittently.  He believes that he is missed at least 5 days of therapy in the last week.  He also tells me that for several weeks he has been unsteady and has been falling.  He admits that he has hit his head on several falls and further admits that sometime in the last couple of weeks, he hit his head so hard that " he lost consciousness.  CT imaging on presentation today reveals submassive pulmonary embolism with a saddle pulmonary embolism.    Medications Prior to Admission:    No current facility-administered medications on file prior to encounter.      Current Outpatient Medications on File Prior to Encounter   Medication Sig Dispense Refill   • rivaroxaban (XARELTO) 20 MG Tab tablet Take 1 Tab by mouth with dinner. 30 Tab 5   • cetirizine (ZYRTEC) 10 MG Tab Take 10 mg by mouth every day.     • chlordiazePOXIDE (LIBRIUM) 25 MG Cap Take 25 mg by mouth 4 times a day as needed for Anxiety.     • atenolol (TENORMIN) 100 MG Tab Take 100 mg by mouth every day.     • lisinopril (PRINIVIL, ZESTRIL) 40 MG tablet Take 40 mg by mouth every day.         Current Medications:      Current Facility-Administered Medications:   •  risperiDONE (RISPERDAL) tablet 0.5 mg, 0.5 mg, Oral, BID, Luis Rodriguez M.D.  •  heparin injection 7,000 Units, 7,000 Units, Intravenous, Once **AND** heparin injection 3,800 Units, 3,800 Units, Intravenous, PRN **AND** heparin infusion 25,000 units in 500 mL 0.45% NACL, , Intravenous, Continuous **AND** Protocol 440 Heparin Weight Based DO NOT GIVE ANY HEPARIN BOLUS TO STROKE PATIENT, , , CONTINUOUS **AND** Protocol 440 Heparin Weight Based Discontinue Enoxaparin (Lovenox), Dabigatran (Pradaxa), Rivaroxaban (Xarelto), Apixaban (Eliquis), Edoxaban (Savaysa, Lixiana), Fondaparinux (Arixtra) and Argatroban prior to heparin administration, , , CONTINUOUS **AND** Protocol 440 Heparin Weight Based Draw baseline aPTT, PT, and platelet count if not already done, , , CONTINUOUS **AND** Protocol 440 Heparin Weight Based Draw aPTT 6 hours after beginning infusion. , , , CONTINUOUS **AND** Protocol 440 Heparin Weight Based Draw Platelet count every three days. Contact MD if platelet is 50% lower than baseline count., , , CONTINUOUS **AND** Protocol 440 Heparin Weight Based Record Patient Data, , , CONTINUOUS **AND**  Protocol 440 Heparin Weight Based INSTRUCTIONS, , , CONTINUOUS **AND** Protocol 440 Heparin Weight Based Review aPTT results 6 hours after infusion is begun as detailed, , , CONTINUOUS **AND** Protocol 440 Heparin Weight Based Adjust heparin to maintain aPTT between 55-96 sec, , , CONTINUOUS **AND** Protocol 440 Heparin Weight Based Order aPTT 6 hours after any rate change or hold until aPTT is therapeutic (55-96 seconds), , , CONTINUOUS **AND** Protocol 440 Heparin Weight Based Documentation and verification, , , CONTINUOUS, Lita Brantley M.D.  •  Respiratory Care per Protocol, , Nebulization, Continuous RT, Lita Brantley M.D.  •  lactated ringers infusion, , Intravenous, Continuous, Lita Brantley M.D.  •  acetaminophen (TYLENOL) tablet 650 mg, 650 mg, Oral, Q6HRS PRN, Lita Brantley M.D.  •  labetalol (NORMODYNE,TRANDATE) injection 10 mg, 10 mg, Intravenous, Q4HRS PRN, Lita Brantley M.D.  •  ondansetron (ZOFRAN) syringe/vial injection 4 mg, 4 mg, Intravenous, Q4HRS PRN, Lita Brantley M.D.  •  ondansetron (ZOFRAN ODT) dispertab 4 mg, 4 mg, Oral, Q4HRS PRN, Lita Brantley M.D.  •  LORazepam (ATIVAN) injection 1 mg, 1 mg, Intravenous, Q4HRS PRN, Lita Brantley M.D.    Current Outpatient Medications:   •  rivaroxaban (XARELTO) 20 MG Tab tablet, Take 1 Tab by mouth with dinner., Disp: 30 Tab, Rfl: 5  •  cetirizine (ZYRTEC) 10 MG Tab, Take 10 mg by mouth every day., Disp: , Rfl:   •  chlordiazePOXIDE (LIBRIUM) 25 MG Cap, Take 25 mg by mouth 4 times a day as needed for Anxiety., Disp: , Rfl:   •  atenolol (TENORMIN) 100 MG Tab, Take 100 mg by mouth every day., Disp: , Rfl:   •  lisinopril (PRINIVIL, ZESTRIL) 40 MG tablet, Take 40 mg by mouth every day., Disp: , Rfl:     Allergies:    Cardizem    Past Surgical History:    No past surgical history on file.    Past Medical History:    Past Medical History:   Diagnosis Date   • A-fib (HCC)    • Anxiety    • At risk for sleep apnea    • Depression    • ETOH abuse    • Hypertension    •  Panic attack due to exceptional stress        Social History:    Social History     Socioeconomic History   • Marital status: Single     Spouse name: Not on file   • Number of children: Not on file   • Years of education: Not on file   • Highest education level: Not on file   Occupational History   • Not on file   Social Needs   • Financial resource strain: Not on file   • Food insecurity:     Worry: Not on file     Inability: Not on file   • Transportation needs:     Medical: Not on file     Non-medical: Not on file   Tobacco Use   • Smoking status: Former Smoker   • Smokeless tobacco: Never Used   Substance and Sexual Activity   • Alcohol use: Yes     Comment: quit 2 days ago. former drinker x 12 yrs   • Drug use: Yes     Types: Inhaled     Comment: marijuana   • Sexual activity: Not on file   Lifestyle   • Physical activity:     Days per week: Not on file     Minutes per session: Not on file   • Stress: Not on file   Relationships   • Social connections:     Talks on phone: Not on file     Gets together: Not on file     Attends Gnosticist service: Not on file     Active member of club or organization: Not on file     Attends meetings of clubs or organizations: Not on file     Relationship status: Not on file   • Intimate partner violence:     Fear of current or ex partner: Not on file     Emotionally abused: Not on file     Physically abused: Not on file     Forced sexual activity: Not on file   Other Topics Concern   • Not on file   Social History Narrative   • Not on file       Family History:    Family History   Problem Relation Age of Onset   • Diabetes Father    • Hypertension Father    • Alcohol/Drug Brother    • Hypertension Paternal Grandmother        Review of System:    Review of Systems   Constitutional: Negative for chills, diaphoresis and fever.   HENT: Negative for ear discharge and tinnitus.    Eyes: Negative for pain, discharge and redness.   Respiratory: Positive for shortness of breath. Negative  "for hemoptysis and stridor.    Cardiovascular: Positive for chest pain. Negative for leg swelling.   Gastrointestinal: Negative for abdominal pain, nausea and vomiting.   Genitourinary: Negative for dysuria, hematuria and urgency.   Musculoskeletal: Positive for falls. Negative for myalgias and neck pain.   Skin: Negative for rash.   Neurological: Negative for speech change, focal weakness and headaches.   Endo/Heme/Allergies: Does not bruise/bleed easily.   Psychiatric/Behavioral: Positive for hallucinations and substance abuse.       Physical Examination:    /76   Pulse 80   Temp 37.3 °C (99.1 °F) (Tympanic)   Resp 20   Ht 1.727 m (5' 8\")   Wt 113.4 kg (250 lb)   SpO2 96%   BMI 38.01 kg/m²   Physical Exam   HENT:   Head: Normocephalic and atraumatic.   Right Ear: External ear normal.   Left Ear: External ear normal.   Nose: Nose normal.   Mouth/Throat: No oropharyngeal exudate.   Eyes: Pupils are equal, round, and reactive to light. Conjunctivae are normal. Right eye exhibits no discharge. Left eye exhibits no discharge.   Neck: Normal range of motion. Neck supple. No tracheal deviation present.   Cardiovascular: Intact distal pulses. Exam reveals no gallop.   No murmur heard.  Sinus rhythm   Pulmonary/Chest: No stridor. He has no wheezes. He has no rales.   Abdominal: Soft. Bowel sounds are normal. He exhibits no distension. There is no tenderness. There is no rebound.   Musculoskeletal: He exhibits no edema or tenderness.   No clubbing or cyanosis   Neurological:   He is awake and alert.  He demonstrates paranoia.  He is hypervigilant.  He moves all 4 extremities.   Skin: Skin is warm and dry. No rash noted. He is not diaphoretic. No erythema.   Psychiatric:   He demonstrates paranoia and hypervigilance.       Laboratory Data:        Recent Labs     09/19/19  1200   WBC 9.6   RBC 4.54*   HEMOGLOBIN 14.3   HEMATOCRIT 42.4   MCV 93.4   MCH 31.5   MCHC 33.7   RDW 68.1*   PLATELETCT 308   MPV 9.2 "     Recent Labs     09/19/19  1200   SODIUM 142   POTASSIUM 4.1   CHLORIDE 110   CO2 22   GLUCOSE 97   BUN 27*   CREATININE 0.99   CALCIUM 9.2         Recent Labs     09/19/19  1200   CPKTOTAL 304*           Imaging:    I personally viewed the CXR and CT scan images as well as reviewed the radiology interpretation reports.    EC-ECHOCARDIOGRAM LTD W/O CONT   Final Result      CT-CTA CHEST PULMONARY ARTERY W/ RECONS   Final Result   Addendum 1 of 1   These findings were discussed with HEATHER MELGOZA on 9/19/2019 2:16 PM.      Final      DX-CHEST-PORTABLE (1 VIEW)   Final Result      No acute cardiopulmonary abnormality.      CT-HEAD W/O   Final Result      No CT evidence of acute infarct, hemorrhage or mass. No acute intracranial injury.      US-EXTREMITY VENOUS LOWER BILAT    (Results Pending)   MR-BRAIN-W/O    (Results Pending)       Assessment and Plan:    Acute saddle pulmonary embolism (HCC)- (present on admission)  Assessment & Plan  Acute submassive pulmonary embolism - no hypotension has been present  He is noncompliant with his rivaroxaban  Echocardiogram with evidence of right heart strain  Elevated troponin  He reports recent multiple falls and striking his head - he reports losing consciousness as a result at least once in the last couple of weeks - CT scan of the head negative for acute pathology  In my opinion, the risks of intracranial hemorrhage outweigh the benefits of a submassive dose of alteplase or catheter directed thrombolytic therapy with EKOS  Also, given his altered mental status, I feel he would have increased difficulty being compliant with proper positioning during EKOS therapy  Start full anticoagulation with heparin on the weight-based protocol  Check lower extremity venous Doppler study for DVT    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)- (present on admission)  Assessment & Plan  He has been on quite a little kunz of psychedelic mushrooms, marijuana and  "\"whippits\" (inhaled nitrous oxide)  Check urine drug screen  Blood alcohol negative  History of EtOH abuse - observe for evidence of alcohol withdrawal    Elevated troponin- (present on admission)  Assessment & Plan  Due to acute pulmonary embolism    ETOH abuse- (present on admission)  Assessment & Plan  History of alcohol abuse in the past with alcohol withdrawal  Supplement vitamins  Observe for evidence of withdrawal    Essential hypertension- (present on admission)  Assessment & Plan  Hold atenolol, lisinopril for now  Monitor blood pressure    This gentleman will be admitted to the hospital with an acute submassive pulmonary embolism with associated right ventricular dysfunction.  Ideally, he would receive 50 mg of IV alteplase or catheter directed thrombolysis using EKOS.  This gentleman has been on a several day kunz using psychedelic mushrooms, \"whippits\" and marijuana.  He admits to multiple falls striking his head.  He has even lost consciousness at least once in the last couple of weeks as a result of hitting his head.  Even though his CT scan of the head shows no acute pathology, I am not willing to risk his life with a fatal intracranial hemorrhage with thrombolytic therapy.  Primum non nocere.  He will be admitted to the ICU and fully anticoagulated with heparin on the weight-based protocol.  He requires very close observation for the development of hypotension and worsening cardiovascular system dysfunction.  I have assessed and reassessed his respiratory status, blood pressure, hemodynamics, cardiovascular status and neurologic status.  He is at increased risk for worsening cardiovascular and respiratory system dysfunction.    High risk of deterioration and worsening vital organ dysfunction and death without the above critical care interventions.    Thank you for allowing me to participate in the care of this gentleman.  I will continue to follow him with great interest.    Critical Care Time: 35 " minutes  [id number]  No time overlap  Time excludes procedures  Discussed with RN, RT, ER physician, hospitalist    James Hou MD  Pulmonary and Critical Care Medicine

## 2019-09-19 NOTE — ASSESSMENT & PLAN NOTE
Patient with acute psychosis prior to presentation  Believed to be secondary to the use of mushrooms and marijuana  Psychiatry team following.  MRI brain negative  Legal  Hold lifted by psychiatry  Hallucination resolved.

## 2019-09-19 NOTE — ED TRIAGE NOTES
"Pt arrived EMS after his mom called. Pt states he's been up for 3 days and has blacked out and can not remember much. He states he was doing mushrooms and \"whipits\" and has drank some but can't remember exactly what happened. Pt is intermittently shaking and states \"this has been going on for a while now\".       ..  Vitals:    09/19/19 0951   BP: 114/60   Pulse: 85   Resp: 18   Temp: 37.3 °C (99.1 °F)   SpO2: 93%             "

## 2019-09-19 NOTE — ASSESSMENT & PLAN NOTE
Hold atenolol, lisinopril for now  Monitor blood pressure  Resume BP meds when clinically appropriate

## 2019-09-19 NOTE — ASSESSMENT & PLAN NOTE
"He has been on quite a little kunz of psychedelic mushrooms, marijuana and \"whippits\" (inhaled nitrous oxide)  UDS positive for benzodiazepines and cannabinoids  Blood alcohol negative  History of EtOH abuse - observe for evidence of alcohol withdrawal  Clinically improved  If does develop DTs consider dexmedetomidine infusion  Ongoing Risperdal therapy with higher dose at bedtime  Follow-up with psychiatry in a.m.  "

## 2019-09-19 NOTE — ASSESSMENT & PLAN NOTE
Intermediate/high risk pulmonary embolism, hemodynamically stable with right heart strain first identified in 5/2019. Provoking factors are sedentary lifestyle, states he can spend 20+ hours in bed or longer when recovering from intoxication. Inconsistently compliant with his rivaroxaban. Patient currently on Xarelto. Lower extremity venous Doppler study for DVT is negative  Plan  - Patient planning to relocate to [place] when acute issues resolve  - Informed him and mother that will need full-dose therapeutic anticoagulation for additional three months, then transition to half-dose Xarelto based on [facility]-CHOICE study for long-term VTE prophylaxis as VTE was relatively unprovoked  - Will need high altitude simulation study prior to air travel  - Repeat TTE in 3-4 months, if persistently symptomatic and easily dyspneic will need VQ scan to eval for CTEPH

## 2019-09-19 NOTE — ASSESSMENT & PLAN NOTE
On Librium previously  Psych consulted.   9/25 risperdal stopped for possible NMS, his anxiety is now increased  Psych adjusting seroquel and cogentin.   Probably causing tachycardia.

## 2019-09-19 NOTE — ASSESSMENT & PLAN NOTE
"Recurrent, in the setting of noncompliance with anticoagulation therapy  CTA PE revealing \"Extensive acute bilateral pulmonary emboli with saddle embolus noted, as well as findings concerning for RV strain\"  Recurrence likely secondary to poor compliance of medication  continue Xarelto  Echocardiogram with RV strain, DVT study negative  "

## 2019-09-19 NOTE — ED NOTES
Med Rec completed per patient and home pharmacy ([facility])   Allergies reviewed  No ORAL antibiotics in last 14 days
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
## 2019-09-19 NOTE — ASSESSMENT & PLAN NOTE
Abnormal EKG with right ventricular dysfunction, ST depressions and T wave inversions from V1 to V4.  Q waves in inferior leads.    Likely from underlying pulmonary embolism, right ventricular strain.    Close clinical monitoring in the intensive care unit  Patient on anticoagulation

## 2019-09-20 ENCOUNTER — APPOINTMENT (OUTPATIENT)
Dept: RADIOLOGY | Facility: MEDICAL CENTER | Age: 34
DRG: 175 | End: 2019-09-20
Attending: INTERNAL MEDICINE
Payer: COMMERCIAL

## 2019-09-20 PROBLEM — E66.01 MORBID OBESITY (HCC): Status: ACTIVE | Noted: 2019-09-20

## 2019-09-20 PROBLEM — J96.01 ACUTE HYPOXEMIC RESPIRATORY FAILURE (HCC): Status: ACTIVE | Noted: 2019-09-20

## 2019-09-20 PROBLEM — G47.33 OSA (OBSTRUCTIVE SLEEP APNEA): Status: ACTIVE | Noted: 2019-09-20

## 2019-09-20 LAB
ACTION RANGE TRIGGERED IACRT: NO
ALBUMIN SERPL BCP-MCNC: 3.9 G/DL (ref 3.2–4.9)
ALBUMIN/GLOB SERPL: 1.6 G/DL
ALP SERPL-CCNC: 52 U/L (ref 30–99)
ALT SERPL-CCNC: 32 U/L (ref 2–50)
ANION GAP SERPL CALC-SCNC: 10 MMOL/L (ref 0–11.9)
APTT PPP: 46.3 SEC (ref 24.7–36)
APTT PPP: 47.2 SEC (ref 24.7–36)
APTT PPP: 72.1 SEC (ref 24.7–36)
AST SERPL-CCNC: 16 U/L (ref 12–45)
BASE EXCESS BLDA CALC-SCNC: -2 MMOL/L (ref -4–3)
BASOPHILS # BLD AUTO: 0.3 % (ref 0–1.8)
BASOPHILS # BLD: 0.03 K/UL (ref 0–0.12)
BILIRUB SERPL-MCNC: 0.4 MG/DL (ref 0.1–1.5)
BODY TEMPERATURE: ABNORMAL DEGREES
BUN SERPL-MCNC: 20 MG/DL (ref 8–22)
CALCIUM SERPL-MCNC: 8.7 MG/DL (ref 8.5–10.5)
CHLORIDE SERPL-SCNC: 111 MMOL/L (ref 96–112)
CK SERPL-CCNC: 207 U/L (ref 0–154)
CO2 BLDA-SCNC: 23 MMOL/L (ref 20–33)
CO2 SERPL-SCNC: 21 MMOL/L (ref 20–33)
CREAT SERPL-MCNC: 0.86 MG/DL (ref 0.5–1.4)
EOSINOPHIL # BLD AUTO: 0.09 K/UL (ref 0–0.51)
EOSINOPHIL NFR BLD: 0.9 % (ref 0–6.9)
ERYTHROCYTE [DISTWIDTH] IN BLOOD BY AUTOMATED COUNT: 71.1 FL (ref 35.9–50)
GLOBULIN SER CALC-MCNC: 2.4 G/DL (ref 1.9–3.5)
GLUCOSE SERPL-MCNC: 89 MG/DL (ref 65–99)
HCO3 BLDA-SCNC: 22.2 MMOL/L (ref 17–25)
HCT VFR BLD AUTO: 40.6 % (ref 42–52)
HGB BLD-MCNC: 12.8 G/DL (ref 14–18)
HOROWITZ INDEX BLDA+IHG-RTO: 281 MM[HG]
IMM GRANULOCYTES # BLD AUTO: 0.11 K/UL (ref 0–0.11)
IMM GRANULOCYTES NFR BLD AUTO: 1.2 % (ref 0–0.9)
INST. QUALIFIED PATIENT IIQPT: YES
LYMPHOCYTES # BLD AUTO: 3.16 K/UL (ref 1–4.8)
LYMPHOCYTES NFR BLD: 33.1 % (ref 22–41)
MAGNESIUM SERPL-MCNC: 2.1 MG/DL (ref 1.5–2.5)
MCH RBC QN AUTO: 30.5 PG (ref 27–33)
MCHC RBC AUTO-ENTMCNC: 31.5 G/DL (ref 33.7–35.3)
MCV RBC AUTO: 96.9 FL (ref 81.4–97.8)
MONOCYTES # BLD AUTO: 1.07 K/UL (ref 0–0.85)
MONOCYTES NFR BLD AUTO: 11.2 % (ref 0–13.4)
NEUTROPHILS # BLD AUTO: 5.08 K/UL (ref 1.82–7.42)
NEUTROPHILS NFR BLD: 53.3 % (ref 44–72)
NRBC # BLD AUTO: 0.03 K/UL
NRBC BLD-RTO: 0.3 /100 WBC
O2/TOTAL GAS SETTING VFR VENT: 36 %
PCO2 BLDA: 35.4 MMHG (ref 26–37)
PH BLDA: 7.41 [PH] (ref 7.4–7.5)
PHOSPHATE SERPL-MCNC: 4 MG/DL (ref 2.5–4.5)
PLATELET # BLD AUTO: 299 K/UL (ref 164–446)
PMV BLD AUTO: 9.1 FL (ref 9–12.9)
PO2 BLDA: 101 MMHG (ref 64–87)
POTASSIUM SERPL-SCNC: 4.1 MMOL/L (ref 3.6–5.5)
PROT SERPL-MCNC: 6.3 G/DL (ref 6–8.2)
RBC # BLD AUTO: 4.19 M/UL (ref 4.7–6.1)
SAO2 % BLDA: 98 % (ref 93–99)
SODIUM SERPL-SCNC: 142 MMOL/L (ref 135–145)
SPECIMEN DRAWN FROM PATIENT: ABNORMAL
TROPONIN T SERPL-MCNC: 49 NG/L (ref 6–19)
WBC # BLD AUTO: 9.5 K/UL (ref 4.8–10.8)

## 2019-09-20 PROCEDURE — A9270 NON-COVERED ITEM OR SERVICE: HCPCS | Performed by: PSYCHIATRY & NEUROLOGY

## 2019-09-20 PROCEDURE — 36600 WITHDRAWAL OF ARTERIAL BLOOD: CPT

## 2019-09-20 PROCEDURE — 83735 ASSAY OF MAGNESIUM: CPT

## 2019-09-20 PROCEDURE — 82803 BLOOD GASES ANY COMBINATION: CPT

## 2019-09-20 PROCEDURE — 70551 MRI BRAIN STEM W/O DYE: CPT

## 2019-09-20 PROCEDURE — 700111 HCHG RX REV CODE 636 W/ 250 OVERRIDE (IP): Performed by: INTERNAL MEDICINE

## 2019-09-20 PROCEDURE — 80053 COMPREHEN METABOLIC PANEL: CPT

## 2019-09-20 PROCEDURE — 82550 ASSAY OF CK (CPK): CPT

## 2019-09-20 PROCEDURE — 700102 HCHG RX REV CODE 250 W/ 637 OVERRIDE(OP): Performed by: INTERNAL MEDICINE

## 2019-09-20 PROCEDURE — 700102 HCHG RX REV CODE 250 W/ 637 OVERRIDE(OP): Performed by: STUDENT IN AN ORGANIZED HEALTH CARE EDUCATION/TRAINING PROGRAM

## 2019-09-20 PROCEDURE — A9270 NON-COVERED ITEM OR SERVICE: HCPCS | Performed by: INTERNAL MEDICINE

## 2019-09-20 PROCEDURE — 700101 HCHG RX REV CODE 250: Performed by: INTERNAL MEDICINE

## 2019-09-20 PROCEDURE — A9270 NON-COVERED ITEM OR SERVICE: HCPCS | Performed by: STUDENT IN AN ORGANIZED HEALTH CARE EDUCATION/TRAINING PROGRAM

## 2019-09-20 PROCEDURE — 770022 HCHG ROOM/CARE - ICU (200)

## 2019-09-20 PROCEDURE — 700111 HCHG RX REV CODE 636 W/ 250 OVERRIDE (IP)

## 2019-09-20 PROCEDURE — 99233 SBSQ HOSP IP/OBS HIGH 50: CPT | Performed by: HOSPITALIST

## 2019-09-20 PROCEDURE — 93970 EXTREMITY STUDY: CPT | Mod: 26 | Performed by: INTERNAL MEDICINE

## 2019-09-20 PROCEDURE — 84484 ASSAY OF TROPONIN QUANT: CPT

## 2019-09-20 PROCEDURE — 700105 HCHG RX REV CODE 258: Performed by: INTERNAL MEDICINE

## 2019-09-20 PROCEDURE — 71045 X-RAY EXAM CHEST 1 VIEW: CPT

## 2019-09-20 PROCEDURE — 85025 COMPLETE CBC W/AUTO DIFF WBC: CPT

## 2019-09-20 PROCEDURE — 84100 ASSAY OF PHOSPHORUS: CPT

## 2019-09-20 PROCEDURE — 99291 CRITICAL CARE FIRST HOUR: CPT | Performed by: INTERNAL MEDICINE

## 2019-09-20 PROCEDURE — 99232 SBSQ HOSP IP/OBS MODERATE 35: CPT | Performed by: PSYCHIATRY & NEUROLOGY

## 2019-09-20 PROCEDURE — 700102 HCHG RX REV CODE 250 W/ 637 OVERRIDE(OP): Performed by: PSYCHIATRY & NEUROLOGY

## 2019-09-20 PROCEDURE — 85730 THROMBOPLASTIN TIME PARTIAL: CPT | Mod: 91

## 2019-09-20 RX ORDER — DIPHENHYDRAMINE HYDROCHLORIDE 50 MG/ML
25 INJECTION INTRAMUSCULAR; INTRAVENOUS ONCE
Status: COMPLETED | OUTPATIENT
Start: 2019-09-20 | End: 2019-09-20

## 2019-09-20 RX ORDER — RISPERIDONE 0.5 MG/1
0.5 TABLET ORAL EVERY MORNING
Status: DISCONTINUED | OUTPATIENT
Start: 2019-09-21 | End: 2019-09-22

## 2019-09-20 RX ORDER — HALOPERIDOL 5 MG/ML
INJECTION INTRAMUSCULAR
Status: COMPLETED
Start: 2019-09-20 | End: 2019-09-20

## 2019-09-20 RX ORDER — RISPERIDONE 1 MG/1
1 TABLET ORAL EVERY EVENING
Status: DISCONTINUED | OUTPATIENT
Start: 2019-09-20 | End: 2019-09-22

## 2019-09-20 RX ORDER — HALOPERIDOL 5 MG/ML
5 INJECTION INTRAMUSCULAR ONCE
Status: COMPLETED | OUTPATIENT
Start: 2019-09-20 | End: 2019-09-20

## 2019-09-20 RX ADMIN — HEPARIN SODIUM 3800 UNITS: 1000 INJECTION, SOLUTION INTRAVENOUS; SUBCUTANEOUS at 05:50

## 2019-09-20 RX ADMIN — ONDANSETRON 4 MG: 2 INJECTION INTRAMUSCULAR; INTRAVENOUS at 19:05

## 2019-09-20 RX ADMIN — FOLIC ACID 1 MG: 1 TABLET ORAL at 06:00

## 2019-09-20 RX ADMIN — LORAZEPAM 1 MG: 2 INJECTION INTRAMUSCULAR; INTRAVENOUS at 06:33

## 2019-09-20 RX ADMIN — LORAZEPAM 1 MG: 2 INJECTION INTRAMUSCULAR; INTRAVENOUS at 02:30

## 2019-09-20 RX ADMIN — RISPERIDONE 1 MG: 1 TABLET ORAL at 17:38

## 2019-09-20 RX ADMIN — RISPERIDONE 0.5 MG: 0.5 TABLET, FILM COATED ORAL at 06:00

## 2019-09-20 RX ADMIN — THIAMINE HYDROCHLORIDE 500 MG: 100 INJECTION, SOLUTION INTRAMUSCULAR; INTRAVENOUS at 05:09

## 2019-09-20 RX ADMIN — HEPARIN SODIUM 3800 UNITS: 1000 INJECTION, SOLUTION INTRAVENOUS; SUBCUTANEOUS at 13:53

## 2019-09-20 RX ADMIN — HEPARIN SODIUM 1650 UNITS/HR: 5000 INJECTION, SOLUTION INTRAVENOUS at 09:42

## 2019-09-20 RX ADMIN — ACETAMINOPHEN 650 MG: 325 TABLET, FILM COATED ORAL at 19:05

## 2019-09-20 RX ADMIN — HALOPERIDOL 5 MG: 5 INJECTION INTRAMUSCULAR at 03:40

## 2019-09-20 RX ADMIN — HALOPERIDOL LACTATE 5 MG: 5 INJECTION, SOLUTION INTRAMUSCULAR at 03:40

## 2019-09-20 RX ADMIN — HEPARIN SODIUM 1850 UNITS/HR: 5000 INJECTION, SOLUTION INTRAVENOUS at 22:48

## 2019-09-20 RX ADMIN — THERA TABS 1 TABLET: TAB at 06:00

## 2019-09-20 RX ADMIN — LABETALOL HYDROCHLORIDE 10 MG: 5 INJECTION INTRAVENOUS at 19:06

## 2019-09-20 RX ADMIN — DIPHENHYDRAMINE HYDROCHLORIDE 25 MG: 50 INJECTION INTRAMUSCULAR; INTRAVENOUS at 02:54

## 2019-09-20 RX ADMIN — SODIUM CHLORIDE, POTASSIUM CHLORIDE, SODIUM LACTATE AND CALCIUM CHLORIDE: 600; 310; 30; 20 INJECTION, SOLUTION INTRAVENOUS at 11:00

## 2019-09-20 ASSESSMENT — COPD QUESTIONNAIRES
DURING THE PAST 4 WEEKS HOW MUCH DID YOU FEEL SHORT OF BREATH: MOST  OR ALL OF THE TIME
DO YOU EVER COUGH UP ANY MUCUS OR PHLEGM?: YES, A FEW DAYS A WEEK OR MONTH
COPD SCREENING SCORE: 7
HAVE YOU SMOKED AT LEAST 100 CIGARETTES IN YOUR ENTIRE LIFE: YES

## 2019-09-20 ASSESSMENT — LIFESTYLE VARIABLES
SUBSTANCE_ABUSE: 1
EVER_SMOKED: YES

## 2019-09-20 ASSESSMENT — ENCOUNTER SYMPTOMS
HEADACHES: 0
COUGH: 1
VOMITING: 0
NAUSEA: 0
SHORTNESS OF BREATH: 1
NERVOUS/ANXIOUS: 1
ABDOMINAL PAIN: 0

## 2019-09-20 NOTE — PROGRESS NOTES
[facility] Medicine Daily Progress Note    Date of Service  9/20/2019    Chief Complaint  34 y.o. male admitted 9/19/2019 with history of psychiatric disease, pulmonary embolism, substance abuse, alcohol abuse, obstructive sleep apnea, obesity, initially being evaluated for psychiatric reasons, referred to admission secondary to chest pain, dyspnea.  Patient found to have extensive pulmonary embolism recurrence.    Hospital Course    Admitted with pulmonary embolism, dyspnea, substance abuse      Interval Problem Update  Patient seen and examined today. ICU Care  Care and plan discussed in IDT/Hot rounds.  Lines and assistive devices reviewed.    Patient tolerating treatment and therapies.  All Data, Medication data reviewed.  Case discussed with nursing as available.  Plan of Care reviewed with patient and notified of changes.  9/20 patient found in ICU, somnolent, mother at bedside, received Ativan earlier, MRI negative for acute changes secondary to recent falls, sinus tachycardia in the 120s, blood pressure 100-1 60, T-max 99.5, tolerating diet, still tachypnea and dyspnea on 4 L nasal cannula.  Psychiatric evaluation noted.  Consultants/Specialty  Pulmonary critical care, psychiatry    Code Status  Full code    Disposition  ICU until further improved    Review of Systems  Review of Systems   Unable to perform ROS: Medical condition   Respiratory: Positive for cough and shortness of breath.    Cardiovascular: Positive for chest pain.   Psychiatric/Behavioral: Positive for substance abuse. The patient is nervous/anxious.         Physical Exam  Temp:  [36.5 °C (97.7 °F)-37.4 °C (99.4 °F)] 36.9 °C (98.5 °F)  Pulse:  [] 110  Resp:  [15-41] 24  BP: (100-161)/() 139/78  SpO2:  [90 %-99 %] 93 %    Physical Exam   Constitutional: He appears well-developed and well-nourished. He appears lethargic. He has a sickly appearance. Nasal cannula in place.   HENT:   Head: Normocephalic.   Eyes: Pupils are equal, round,  and reactive to light.   Neck: Normal range of motion.   Cardiovascular: Normal rate, regular rhythm, normal heart sounds and intact distal pulses.   Capillary refill <3 secs   Pulmonary/Chest: Effort normal. Tachypnea noted. No respiratory distress. He has rhonchi.   Abdominal: Soft. Bowel sounds are normal.   Genitourinary: Rectum normal and penis normal.   Musculoskeletal: Normal range of motion.   Neurological: He appears lethargic. No cranial nerve deficit or sensory deficit.   Skin: Skin is warm and dry. No cyanosis.   Psychiatric: His affect is blunt. His speech is delayed. He is slowed. Cognition and memory are impaired.   Nursing note and vitals reviewed.      Fluids    Intake/Output Summary (Last 24 hours) at 9/20/2019 1640  Last data filed at 9/20/2019 1600  Gross per 24 hour   Intake 4264.65 ml   Output 2600 ml   Net 1664.65 ml       Laboratory  Recent Labs     09/19/19  1200 09/20/19  0356   WBC 9.6 9.5   RBC 4.54* 4.19*   HEMOGLOBIN 14.3 12.8*   HEMATOCRIT 42.4 40.6*   MCV 93.4 96.9   MCH 31.5 30.5   MCHC 33.7 31.5*   RDW 68.1* 71.1*   PLATELETCT 308 299   MPV 9.2 9.1     Recent Labs     09/19/19  1200 09/20/19  0356   SODIUM 142 142   POTASSIUM 4.1 4.1   CHLORIDE 110 111   CO2 22 21   GLUCOSE 97 89   BUN 27* 20   CREATININE 0.99 0.86   CALCIUM 9.2 8.7     Recent Labs     09/19/19  1200 09/19/19  2253 09/20/19  0453 09/20/19  1215   APTT 30.3 69.4* 46.3* 47.2*   INR 1.15*  --   --   --          Recent Labs     09/19/19  1737   TRIGLYCERIDE 126   HDL 22*   *       Imaging  DX-CHEST-PORTABLE (1 VIEW)   Final Result         1.  Pulmonary edema and/or infiltrates.   2.  Cardiomegaly      MR-BRAIN-W/O   Final Result      1.  No acute intracranial abnormality.   2.  Sphenoid and posterior right ethmoid sinus disease.      US-EXTREMITY VENOUS LOWER BILAT   Final Result      EC-ECHOCARDIOGRAM LTD W/O CONT   Final Result      CT-CTA CHEST PULMONARY ARTERY W/ RECONS   Final Result   Addendum 1 of 1  "  These findings were discussed with HEATHER MELGOZA on 9/19/2019 2:16 PM.      Final      DX-CHEST-PORTABLE (1 VIEW)   Final Result      No acute cardiopulmonary abnormality.      CT-HEAD W/O   Final Result      No CT evidence of acute infarct, hemorrhage or mass. No acute intracranial injury.           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  Q waves in inferior leads.    Likely from underlying pulmonary embolism, right ventricular strain.    Close clinical monitoring in the intensive care unit  Patient on anticoagulation    ETOH abuse- (present on admission)  Assessment & Plan  History of, monitor for withdrawal    ALLI (obstructive sleep apnea)  Assessment & Plan  History of, pulmonary following    Morbid obesity (HCC)  Assessment & Plan  Outpatient weight loss program would be indicated    Pulmonary hypertension (HCC)- (present on admission)  Assessment & Plan  Known history of, now with recurrent " PE  Echocardiogram noted    Anxiety- (present on admission)  Assessment & Plan  On Librium at home, for now will order IV as needed Ativan    Essential hypertension- (present on admission)  Assessment & Plan  Holding antihypertensive therapy in the setting of acute pulmonary embolism     Plan  Continue with heparin, may change to Lovenox  Close observation of respiratory status and cardiac status  Watch for withdrawal symptoms  Psychiatry evaluation on follow-up  See orders  Acute complex high risk  VTE prophylaxis: Lovenox    I have performed a physical exam and reviewed and updated ROS and Plan today . In review of yesterday's note , there are no changes except as documented above.

## 2019-09-20 NOTE — ASSESSMENT & PLAN NOTE
Patient obese and leads a sedentary life style. Mentions history of ALLI and been on CPAP but not using it.  Plan  -Outpatient sleep study   -Pulmonary follow up

## 2019-09-20 NOTE — DIETARY
NUTRITION SERVICES: BMI - Pt with BMI >40 (=Body mass index is 40.09 kg/m².), morbid obesity. Weight loss counseling not appropriate in acute care setting. RECOMMEND - Referral to outpatient nutrition services for weight management after D/C.

## 2019-09-20 NOTE — CARE PLAN
Problem: Knowledge Deficit  Goal: Knowledge of disease process/condition, treatment plan, diagnostic tests, and medications will improve  Outcome: PROGRESSING AS EXPECTED  Note:   Pt and Family oriented to unit routine. Pt and family educated regarding activity, diet, meds and plan of care; questions answered; verbalized understanding. Pt and Family denies having further needs at this time.      Problem: Fluid Volume:  Goal: Will maintain balanced intake and output  Outcome: PROGRESSING AS EXPECTED     Problem: Psychosocial Needs:  Goal: Level of anxiety will decrease  Outcome: PROGRESSING AS EXPECTED  Intervention: Identify and develop with patient strategies to cope with anxiety triggers  Note:   Documented in flowsheet

## 2019-09-20 NOTE — ASSESSMENT & PLAN NOTE
Secondary to pulmonary embolus, recurrent. RVSP moderate to severely elevated in May of this year. Follow up ECHO did not calculate RVSP due to strain  Plan  - TTE after 3-4 months of therapeutic anticoagulation  - VQ scan to evaluate for CTEPH if persistently symptomatic and/or TTE remains abnormal

## 2019-09-20 NOTE — ASSESSMENT & PLAN NOTE
BMI 40.1  Sedentary lifestyle  ROS for ALLI positive  Monitor for sleep apnea and the need for auto BiPAP  Weight loss to be encouraged long-term  Dietary consultation prior to discharge if he is willing to participate  Patient states he has had a prior sleep test which he flunked and was started on CPAP which he is noncompliant with

## 2019-09-20 NOTE — PSYCHIATRY
"PSYCHIATRIC FOLLOW-UP:(established)  Reason for admission: Psychosis                   Reason for consult: Psychosis       Requesting Physician/APN:            *HPI:  Patient was found awake, but drowsy, having breakfast. He does not remember being agitated last night and receiving medications. He says he continues to feel that there is a group of people watching and wanting to have sex with him, which is making him scared. During evaluation pt was seen playing with something imaginary in his hand, throwing it on his tray and then looking for it. When asked what was in his had his said \"sand\". Although pt is scared, he feels safe in the hospital. Denied any AVH. Denied any SI/HI.           Medical ROS (as pertinent): pt is endorsing some SOB and palpitations, fatigue and weakness. denied any other symptoms at this time.                        *Psychiatric Examination:  Vitals:   Vitals:    09/20/19 1500   BP: 139/78   Pulse: (!) 110   Resp: (!) 24   Temp:    SpO2: 93%       General Appearance: 34 y.o.male appears stated age, appearing drowsy, calm, cooperative  Moderately kempt and clean.  Behavior: Calm, cooperative and engaged with interview.  Appropriate eye contact.  No aggressive behaviors.  Abnormal Movements: No abnormal movements, muscle spasms or choreiform movements.  Mild tremors are present.  Gait and Posture: lying in bed  Speech: slurred  Thought Process:  linear  Associations: None  Abnormal or Psychotic Thoughts: Denies AH, reports VH; delusional   Judgement and Insight: poor/poor  Orientation: not fully assessed, oriented to person and place  Recent and Remote Memory: Diminished  Attention Span and Concentration: intact during evaluation despite of feeling drowsy   Language: English  Fund of Knowledge: Appropriate  Mood:\"scared\"  Affect: drowsy, Dysthymic. Guarded. congruent with stated mood.  SI/HI: Denies SI and HI.     Current Facility-Administered Medications   Medication Dose Route " Frequency Provider Last Rate Last Dose   • risperiDONE (RISPERDAL) tablet 0.5 mg  0.5 mg Oral BID Luis Rodriguez M.D.   0.5 mg at 09/20/19 0600   • heparin injection 3,800 Units  3,800 Units Intravenous PRN Lita Brantley M.D.   3,800 Units at 09/20/19 1353    And   • heparin infusion 25,000 units in 500 mL 0.45% NACL   Intravenous Continuous Lita Brantley M.D. 37 mL/hr at 09/20/19 1354 1,850 Units/hr at 09/20/19 1354   • Respiratory Care per Protocol   Nebulization Continuous RT Lita Brantley M.D.       • lactated ringers infusion   Intravenous Continuous Vinh Armstrong M.D. 30 mL/hr at 09/20/19 1100     • acetaminophen (TYLENOL) tablet 650 mg  650 mg Oral Q6HRS PRN Lita Brantley M.D.       • labetalol (NORMODYNE,TRANDATE) injection 10 mg  10 mg Intravenous Q4HRS PRN Lita Brantley M.D.       • ondansetron (ZOFRAN) syringe/vial injection 4 mg  4 mg Intravenous Q4HRS PRN Lita Brantley M.D.       • ondansetron (ZOFRAN ODT) dispertab 4 mg  4 mg Oral Q4HRS PRN Lita Brantley M.D.       • thiamine (B-1) 500 mg in D5W 100 mL IVPB  500 mg Intravenous DAILY James Hou M.D. 200 mL/hr at 09/20/19 0509 500 mg at 09/20/19 0509   • folic acid (FOLVITE) tablet 1 mg  1 mg Oral DAILY James Hou M.D.   1 mg at 09/20/19 0600   • multivitamin (THERAGRAN) tablet 1 Tab  1 Tab Oral DAILY James Hou M.D.   1 Tab at 09/20/19 0600         Recent Results (from the past 24 hour(s))   MAGNESIUM    Collection Time: 09/19/19  5:37 PM   Result Value Ref Range    Magnesium 2.4 1.5 - 2.5 mg/dL   PHOSPHORUS    Collection Time: 09/19/19  5:37 PM   Result Value Ref Range    Phosphorus 3.7 2.5 - 4.5 mg/dL   TSH    Collection Time: 09/19/19  5:37 PM   Result Value Ref Range    TSH 1.950 0.380 - 5.330 uIU/mL   Lipid Profile    Collection Time: 09/19/19  5:37 PM   Result Value Ref Range    Cholesterol,Tot 173 100 - 199 mg/dL    Triglycerides 126 0 - 149 mg/dL    HDL 22 (A) >=40 mg/dL     (H) <100 mg/dL   URINALYSIS  (UA)    Collection Time: 09/19/19  7:55 PM   Result Value Ref Range    Color Yellow     Character Clear     Specific Gravity >=1.045 (A) <1.035    Ph 6.0 5.0 - 8.0    Glucose Negative Negative mg/dL    Ketones Trace (A) Negative mg/dL    Protein Negative Negative mg/dL    Bilirubin Negative Negative    Urobilinogen, Urine 1.0 Negative    Nitrite Negative Negative    Leukocyte Esterase Negative Negative    Occult Blood Negative Negative    Micro Urine Req see below    URINE DRUG SCREEN    Collection Time: 09/19/19  7:55 PM   Result Value Ref Range    Amphetamines Urine Negative Negative    Barbiturates Negative Negative    Benzodiazepines Positive (A) Negative    Cocaine Metabolite Negative Negative    Methadone Negative Negative    Opiates Negative Negative    Oxycodone Negative Negative    Phencyclidine -Pcp Negative Negative    Propoxyphene Negative Negative    Cannabinoid Metab Positive (A) Negative   APTT    Collection Time: 09/19/19 10:53 PM   Result Value Ref Range    APTT 69.4 (H) 24.7 - 36.0 sec   TROPONIN    Collection Time: 09/20/19  3:56 AM   Result Value Ref Range    Troponin T 49 (H) 6 - 19 ng/L   CBC with Differential    Collection Time: 09/20/19  3:56 AM   Result Value Ref Range    WBC 9.5 4.8 - 10.8 K/uL    RBC 4.19 (L) 4.70 - 6.10 M/uL    Hemoglobin 12.8 (L) 14.0 - 18.0 g/dL    Hematocrit 40.6 (L) 42.0 - 52.0 %    MCV 96.9 81.4 - 97.8 fL    MCH 30.5 27.0 - 33.0 pg    MCHC 31.5 (L) 33.7 - 35.3 g/dL    RDW 71.1 (H) 35.9 - 50.0 fL    Platelet Count 299 164 - 446 K/uL    MPV 9.1 9.0 - 12.9 fL    Neutrophils-Polys 53.30 44.00 - 72.00 %    Lymphocytes 33.10 22.00 - 41.00 %    Monocytes 11.20 0.00 - 13.40 %    Eosinophils 0.90 0.00 - 6.90 %    Basophils 0.30 0.00 - 1.80 %    Immature Granulocytes 1.20 (H) 0.00 - 0.90 %    Nucleated RBC 0.30 /100 WBC    Neutrophils (Absolute) 5.08 1.82 - 7.42 K/uL    Lymphs (Absolute) 3.16 1.00 - 4.80 K/uL    Monos (Absolute) 1.07 (H) 0.00 - 0.85 K/uL    Eos (Absolute) 0.09  0.00 - 0.51 K/uL    Baso (Absolute) 0.03 0.00 - 0.12 K/uL    Immature Granulocytes (abs) 0.11 0.00 - 0.11 K/uL    NRBC (Absolute) 0.03 K/uL   Comp Metabolic Panel (CMP)    Collection Time: 09/20/19  3:56 AM   Result Value Ref Range    Sodium 142 135 - 145 mmol/L    Potassium 4.1 3.6 - 5.5 mmol/L    Chloride 111 96 - 112 mmol/L    Co2 21 20 - 33 mmol/L    Anion Gap 10.0 0.0 - 11.9    Glucose 89 65 - 99 mg/dL    Bun 20 8 - 22 mg/dL    Creatinine 0.86 0.50 - 1.40 mg/dL    Calcium 8.7 8.5 - 10.5 mg/dL    AST(SGOT) 16 12 - 45 U/L    ALT(SGPT) 32 2 - 50 U/L    Alkaline Phosphatase 52 30 - 99 U/L    Total Bilirubin 0.4 0.1 - 1.5 mg/dL    Albumin 3.9 3.2 - 4.9 g/dL    Total Protein 6.3 6.0 - 8.2 g/dL    Globulin 2.4 1.9 - 3.5 g/dL    A-G Ratio 1.6 g/dL   Magnesium    Collection Time: 09/20/19  3:56 AM   Result Value Ref Range    Magnesium 2.1 1.5 - 2.5 mg/dL   PHOSPHORUS    Collection Time: 09/20/19  3:56 AM   Result Value Ref Range    Phosphorus 4.0 2.5 - 4.5 mg/dL   CREATINE KINASE    Collection Time: 09/20/19  3:56 AM   Result Value Ref Range    CPK Total 207 (H) 0 - 154 U/L   ESTIMATED GFR    Collection Time: 09/20/19  3:56 AM   Result Value Ref Range    GFR If African American >60 >60 mL/min/1.73 m 2    GFR If Non African American >60 >60 mL/min/1.73 m 2   APTT    Collection Time: 09/20/19  4:53 AM   Result Value Ref Range    APTT 46.3 (H) 24.7 - 36.0 sec   ISTAT ARTERIAL BLOOD GAS    Collection Time: 09/20/19  5:01 AM   Result Value Ref Range    Ph 7.405 7.400 - 7.500    Pco2 35.4 26.0 - 37.0 mmHg    Po2 101 (H) 64 - 87 mmHg    Tco2 23 20 - 33 mmol/L    S02 98 93 - 99 %    Hco3 22.2 17.0 - 25.0 mmol/L    BE -2 -4 - 3 mmol/L    Body Temp see below degrees    O2 Therapy 36 %    iPF Ratio 281     Specimen Arterial     Action Range Triggered NO     Inst. Qualified Patient YES    APTT    Collection Time: 09/20/19 12:15 PM   Result Value Ref Range    APTT 47.2 (H) 24.7 - 36.0 sec     Assessment: Continues to be  psychotic    Dx:Unspecified psychosis     Medical:  Paroxysmal A. fib, pulmonary embolus, ethanol withdrawal possibly.       Plan:  Legal hold n/a  Increase Risperdal to 0.5mg PO AM and 1.5m PO PM for psychosis  Will continue to follow

## 2019-09-20 NOTE — PROGRESS NOTES
"Critical Care Progress Note    Date of admission  9/19/2019    Chief Complaint  34 y.o. male admitted 9/19/2019 with psychosis, SOB and R sided CP.    Hospital Course    The history is obtained from the patient as well as from healthcare providers and the medical record.  This gentleman does not provide the best history due to disorientation and psychosis.  This gentleman has a history of submassive pulmonary embolism in May 2019, anxiety, hypertension, paroxysmal atrial fibrillation and alcohol abuse with documented blood alcohol levels as high as 0.25 in the past.  He was brought into the emergency room today with psychosis.  He states that people have been following him around at his home with an agenda.  That agenda is to have sexual activity with him.  He is aware that his thinking has been \"off.\"  He admits that he has been using psychedelic mushrooms and whippits as well as marijuana.  He is disoriented to time and cannot really give me a history of his duration of use or how long his kunz has been going on, but it has been several days at least.  He is complaining of some shortness of breath and right-sided pleuritic chest pain.  He is supposed to be taking rivaroxaban for history of pulmonary embolism, but admits that he is noncompliant with this medication and takes it only intermittently.  He believes that he is missed at least 5 days of therapy in the last week.  He also tells me that for several weeks he has been unsteady and has been falling.  He admits that he has hit his head on several falls and further admits that sometime in the last couple of weeks, he hit his head so hard that he lost consciousness.  CT imaging on presentation today reveals submassive pulmonary embolism with a saddle pulmonary embolism.      Interval Problem Update  Reviewed last 24 hour events:    Somnolent this AM - received one dose Ativan IV an hour ago  Arouses with stimuli - lethargic Ox2  Admits to snoring and EDS - ALLI " ?  Sats 93 on RA  ET CO2   High 20s  ABG 7.41/35/101  MRI brain done at midnight revealed no acute intracranial abnormality, there was some sphenoid and posterior right ethmoid sinus disease  ST 120s  SBp 100-160s  Tm 99.5  Reg diet ok  Srivastava for retention - UO good    Heparin 1650  4 lpm NC  ABG noted  CXR SSLLLA - poor quality film  Noninvasive vascular studies of lower extremities do not reveal DVT    Change to Lovenox full dose  Assess what long-term oral agent we need to use in the next day or 2 and balance that versus his risk of bleeding complication or noncompliance she already is exhibited  Reduce IVF  Repeat chest x-ray in a.m.  Blood gas as needed  Monitor for need for intubation    Neurologic status is improved overall he is now responding and talking although he still dyspneic at times  End-tidal CO2 unchanged  Oxygen saturation unchanged still low flow O2  Work of breathing still increased but acceptable, he does much better when he is upright but likes to slide down flat in bed  I repositioned the patient in bed with assistance of the nurse    Mother arrived flying in from the [place]  Updated her Re: His condition and current treatment plan at great length    Review of Systems  Review of Systems   Respiratory: Positive for shortness of breath.    Cardiovascular: Negative for chest pain.   Gastrointestinal: Negative for abdominal pain, nausea (resolved) and vomiting.   Neurological: Negative for headaches.   Sleep:  Positive for EDS and Snoring    Lethargic but answered some questions    Vital Signs for last 24 hours   Temp:  [36.5 °C (97.7 °F)-37.4 °C (99.4 °F)] 36.9 °C (98.5 °F)  Pulse:  [] 118  Resp:  [15-35] 21  BP: (100-156)/() 138/91  SpO2:  [90 %-99 %] 95 %    Hemodynamic parameters for last 24 hours       Respiratory Information for the last 24 hours       Physical Exam   Physical Exam   Constitutional: He appears well-developed and well-nourished. He appears lethargic. No  distress.   HENT:   Head: Normocephalic.   Mouth/Throat: Oropharynx is clear and moist.   M&P IV airway   Eyes: Pupils are equal, round, and reactive to light. EOM are normal. No scleral icterus.   Neck: Neck supple. No JVD present.   Obese neck   Cardiovascular: Normal rate, regular rhythm and intact distal pulses. Exam reveals no gallop and no friction rub.   No murmur heard.  Pulmonary/Chest: No respiratory distress. He has no wheezes. He has rhonchi (few scattered). He has no rales.   Abdominal: Soft. Bowel sounds are normal. There is no tenderness. There is no rigidity, no guarding and no CVA tenderness.   obese   Neurological: He appears lethargic. He displays no atrophy. No cranial nerve deficit or sensory deficit. He exhibits normal muscle tone. GCS eye subscore is 4. GCS verbal subscore is 4. GCS motor subscore is 6.   Skin: He is not diaphoretic. No cyanosis. Nails show no clubbing.   Psychiatric: His mood appears not anxious. His speech is delayed. He is not agitated.       Medications  Current Facility-Administered Medications   Medication Dose Route Frequency Provider Last Rate Last Dose   • risperiDONE (RISPERDAL) tablet 0.5 mg  0.5 mg Oral BID Luis Rodriguez M.D.   0.5 mg at 09/20/19 0600   • heparin injection 3,800 Units  3,800 Units Intravenous PRN Lita Brantley M.D.   3,800 Units at 09/20/19 0550    And   • heparin infusion 25,000 units in 500 mL 0.45% NACL   Intravenous Continuous Lita Brantley M.D. 33 mL/hr at 09/20/19 0942 1,650 Units/hr at 09/20/19 0942   • Respiratory Care per Protocol   Nebulization Continuous RT Lita Brantley M.D.       • lactated ringers infusion   Intravenous Continuous Vinh Armstrong M.D. 30 mL/hr at 09/20/19 1100     • acetaminophen (TYLENOL) tablet 650 mg  650 mg Oral Q6HRS PRN Lita Brantley M.D.       • labetalol (NORMODYNE,TRANDATE) injection 10 mg  10 mg Intravenous Q4HRS PRN Lita Brantley M.D.       • ondansetron (ZOFRAN) syringe/vial injection 4 mg  4 mg  Intravenous Q4HRS PRN Lita Brantley M.D.       • ondansetron (ZOFRAN ODT) dispertab 4 mg  4 mg Oral Q4HRS PRN Lita Brantley M.D.       • thiamine (B-1) 500 mg in D5W 100 mL IVPB  500 mg Intravenous DAILY James Hou M.D. 200 mL/hr at 09/20/19 0509 500 mg at 09/20/19 0509   • folic acid (FOLVITE) tablet 1 mg  1 mg Oral DAILY James oHu M.D.   1 mg at 09/20/19 0600   • multivitamin (THERAGRAN) tablet 1 Tab  1 Tab Oral DAILY James Hou M.D.   1 Tab at 09/20/19 0600       Fluids    Intake/Output Summary (Last 24 hours) at 9/20/2019 1312  Last data filed at 9/20/2019 1200  Gross per 24 hour   Intake 4944.25 ml   Output 2450 ml   Net 2494.25 ml       Laboratory  Recent Labs     09/20/19  0501   ISTATAPH 7.405   ISTATAPCO2 35.4   ISTATAPO2 101*   ISTATATCO2 23   JGOLIBG8UNP 98   ISTATARTHCO3 22.2   ISTATARTBE -2   ISTATTEMP see below   ISTATFIO2 36   ISTATSPEC Arterial     Recent Labs     09/19/19  1200 09/20/19  0356   CPKTOTAL 304* 207*     Recent Labs     09/19/19  1200 09/19/19  1737 09/20/19  0356   SODIUM 142  --  142   POTASSIUM 4.1  --  4.1   CHLORIDE 110  --  111   CO2 22  --  21   BUN 27*  --  20   CREATININE 0.99  --  0.86   MAGNESIUM  --  2.4 2.1   PHOSPHORUS  --  3.7 4.0   CALCIUM 9.2  --  8.7     Recent Labs     09/19/19  1200 09/20/19  0356   ALTSGPT 42 32   ASTSGOT 18 16   ALKPHOSPHAT 57 52   TBILIRUBIN 0.6 0.4   GLUCOSE 97 89     Recent Labs     09/19/19  1200 09/20/19  0356   WBC 9.6 9.5   NEUTSPOLYS 57.00 53.30   LYMPHOCYTES 40.00 33.10   MONOCYTES 2.00 11.20   EOSINOPHILS 1.00 0.90   BASOPHILS 0.00 0.30   ASTSGOT 18 16   ALTSGPT 42 32   ALKPHOSPHAT 57 52   TBILIRUBIN 0.6 0.4     Recent Labs     09/19/19  1200 09/19/19  2253 09/20/19  0356 09/20/19  0453 09/20/19  1215   RBC 4.54*  --  4.19*  --   --    HEMOGLOBIN 14.3  --  12.8*  --   --    HEMATOCRIT 42.4  --  40.6*  --   --    PLATELETCT 308  --  299  --   --    PROTHROMBTM 14.9*  --   --   --   --    APTT 30.3 69.4*   --  46.3* 47.2*   INR 1.15*  --   --   --   --        Imaging  X-Ray:  I have personally reviewed the images and compared with prior images.  EKG:  I have personally reviewed the images and compared with prior images.  CT:    Reviewed  Echo:   Reviewed    Assessment/Plan  * Acute saddle pulmonary embolism (HCC)- (present on admission)  Assessment & Plan  Acute submassive pulmonary embolism - the pressure remains acceptable without any hypotension  He is noncompliant with his rivaroxaban  Echocardiogram with evidence of right heart strain  Elevated troponin  He reported in the ER a history of recent multiple falls and striking his head - he reports losing consciousness as a result at least once in the last couple of weeks - CT scan of the head negative for acute pathology as well as MRI  In my opinion, the risks of intracranial hemorrhage outweigh the benefits of a submassive dose of alteplase or catheter directed thrombolytic therapy with EKOS  Also, given his altered mental status, I feel he would have increased difficulty being compliant with proper positioning during EKOS therapy  Start full anticoagulation with heparin on the weight-based protocol, convert to full dose therapeutic Lovenox when current bag heparin complete  Lower extremity venous Doppler study for DVT is negative    Acute hypoxemic respiratory failure (HCC)  Assessment & Plan  Secondary to acute pulmonary embolus with pulmonary hypertension query acute on chronic and multifactorial  History of PE 5/2019  History of compliance issues with rivaroxaban and falls per patient  RT protocols  Keep patient upright at 45 to 60 degrees, he seems to do better at that position  Monitor for the need for intubation and mechanical ventilation  Follow-up chest x-ray as needed  Monitor for need for auto BiPAP for ALLI she is contributing    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)- (present on admission)  Assessment & Plan  He has been on  "quite a little kunz of psychedelic mushrooms, marijuana and \"whippits\" (inhaled nitrous oxide)  UDS positive for benzodiazepines and cannabinoids  Blood alcohol negative  History of EtOH abuse - observe for evidence of alcohol withdrawal    Elevated troponin- (present on admission)  Assessment & Plan  Due to acute pulmonary embolism  Monitor    ETOH abuse- (present on admission)  Assessment & Plan  History of alcohol abuse in the past with alcohol withdrawal  Supplement vitamins  Observe for evidence of withdrawal    ALLI (obstructive sleep apnea)  Assessment & Plan  History and exam suggestive  Monitor for the need for auto BiPAP  After discharge he probably would benefit from a sleep evaluation if he is willing to participate  Weight loss to be encouraged    Morbid obesity (HCC)  Assessment & Plan  BMI 40.1  Sedentary lifestyle  ROS for ALLI positive  Monitor for sleep apnea need for auto BiPAP  Weight loss to be encouraged  Dietary consultation prior to discharge if he is willing to participate    Pulmonary hypertension (HCC)- (present on admission)  Assessment & Plan  Secondary to pulmonary embolus  RVSP moderate to severely elevated in May of this year, not estimated with 9/19 echo right-sided abnormalities including strain and Salcedo sign are noted again  Query component of ALLI and chronic hypoxemia contributing?    Anxiety- (present on admission)  Assessment & Plan  Acute on chronic  Query related to substance use  Query underlying neuropsychiatric process  Over reaction to Ativan, this medication has been removed from the MAR  Monitor for the need for psychiatric evaluation    Essential hypertension- (present on admission)  Assessment & Plan  Hold atenolol, lisinopril for now  Monitor blood pressure  Minutes clinically appropriate       VTE:  Heparin  Ulcer: Not Indicated  Lines: None    I have performed a physical exam and reviewed and updated ROS and Plan today (9/20/2019). In review of yesterday's note " (9/19/2019), there are no changes except as documented above.     Discussed patient condition and risk of morbidity and/or mortality with Hospitalist, Family, RN, RT, Pharmacy, , Charge nurse / hot rounds and Patient     The patient remains critically ill.  Critical care time = 65 minutes in directly providing and coordinating critical care and extensive data review.  No time overlap and excludes procedures.

## 2019-09-20 NOTE — PROGRESS NOTES
1950 - Made call to Dr. Ceja regarding patient having acute urinary retention.  Stood at bedside with patient several times attempting to use urinal and patient was unable to pee.  States he is very uncomfortable.  Bladder scan result showed >999mL in bladder.  Gave Dr. Ceja brief description of patient situation, at this time MD ordered manzano catheter to be placed.

## 2019-09-20 NOTE — PROGRESS NOTES
Two RN skin check completed with BOWEN Nesbitt.  Large scab / abrasion with open and dry patches on the mid upper back. JUANY.   Large scab over the right lower quadrant of abdomen extending into the back/rib area with a large bruise around it.   Scabs on the right foot and abrasions scattered over the body.  Contact dermatitis over the upper body due to medical equipment/electrodes.   Pt turns / repositions in bed on his own.

## 2019-09-20 NOTE — PROGRESS NOTES
Dr. Ceja at bedside to assess patient still with respiratory distress and extreme agitation.  At this time MD orders ABG and chest x-ray STAT

## 2019-09-20 NOTE — PROGRESS NOTES
12 Hour Chart Check  Monitor Summary - Sinus Rhythm/Sinus Tachycardia 80 - 120s 0.20 / 0.08 / 0.38

## 2019-09-20 NOTE — CARE PLAN
Problem: Knowledge Deficit  Goal: Knowledge of disease process/condition, treatment plan, diagnostic tests, and medications will improve  Outcome: PROGRESSING SLOWER THAN EXPECTED  Goal: Knowledge of the prescribed therapeutic regimen will improve  Outcome: PROGRESSING SLOWER THAN EXPECTED     At this time patient A&O x4 but has been intermittently forgetful of teaching and requiring reorientation to items like call light.  Continuing to reassess education needs and retention of information.    Problem: Urinary Elimination:  Goal: Ability to reestablish a normal urinary elimination pattern will improve  Outcome: PROGRESSING SLOWER THAN EXPECTED     Patient experienced acute urinary retention without relief after several attempts to stand and void.  Pt required manzano catheter placement per MD order.  At this time patient more comfortable.    Problem: Communication  Goal: The ability to communicate needs accurately and effectively will improve  Outcome: PROGRESSING AS EXPECTED     Problem: Safety  Goal: Will remain free from injury  Outcome: PROGRESSING AS EXPECTED  Goal: Will remain free from falls  Outcome: PROGRESSING AS EXPECTED     Problem: Infection  Goal: Will remain free from infection  Outcome: PROGRESSING AS EXPECTED     Problem: Pain Management  Goal: Pain level will decrease to patient's comfort goal  Outcome: PROGRESSING AS EXPECTED

## 2019-09-20 NOTE — ASSESSMENT & PLAN NOTE
Acute on chronic  Query related to substance use  Definite underlying neuropsychiatric process with significant component of paranoia today again  Over reaction to Ativan, this medication has been removed from the MAR  Trazodone may have worsened his paranoia as well  Anxiety still better but paranoia fernanda the issue today and last night  Psychiatric follow-up today, call out to team does try to see him early and assisted pharmacological management  Continue adjusting Risperdal dose, consider increasing dose  Avoid benzodiazepines for sleep even his ALLI and big reaction to Ativan  Patient uses hydroxyzine or Benadryl at home and apparently that helps

## 2019-09-20 NOTE — PROGRESS NOTES
Pt arrived to the unit via gurney escorted by this RN on ZOLL. VSS. Assessment complete. A&O x 4. No signs of distress noted at this time. Pt presents very paranoid, nervous.  SR 80's. 's.   Heparin drip started. MRI ordered. Questionnaire complete.   Pt states RLQ pain. Pt is oriented to the unit, call light, phone system. Fall precautions in place and appropriate signs in place. Call light within reach. Bed is locked and in the lowest position. Bed alarm in place. Pt is educated regarding fall precautions and importance of calling the stuff for assistance. Pt denies any additional needs at this time. Will continue to monitor.

## 2019-09-20 NOTE — PROGRESS NOTES
"0240 - MD paged to bedside regarding patient now getting more and more agitated.  Patient adamantly stating he wants to go home.  Patient is confused to events and situation around him, currently blaming staff for giving him potassium in his IV; which is heparin and BOWEN Ledesma and I show this to patient and he states \"yes I read heparin\" yet still insists we are giving him potassium.  Pt makes call to his mother regarding leaving the hospital. I gave mother brief description of patient's current condition at the hospital and we end the conversation.  MD at bedside, pt is more appropriate, received IV ativan.  Patient stating he does not want to go home now, MD orders x1 25mg Bendaryl to be given IV now as patient quite tremulous and anxious.      0336 - Paged MD again as patient continues being severely agitated, confused to everything but his name and birthday, now requiring restraints for his own safety. Patient was continually attempting to pull out lines, pull out manzano catheter, obviously hallucinating and confused to surroundings and events. Patient tachypneic rate 35+, intermittently tachycardic with high blood pressure.  Breathing is labored, grunting at times, EtCO2 reads 25, fighting all interventions by nursing staff. O2 sats >94% on 5L oxymask. MD orders x1 IV haldol to be given now and order for restraints.      "

## 2019-09-20 NOTE — ASSESSMENT & PLAN NOTE
Stable. Secondary to intermediate/high risk pulmonary embolus with pulmonary hypertension. History of compliance issues with rivaroxaban and falls when intoxicated  Plan  -RT/O2 protocols  -Keep patient upright at 45 to 60 degrees, he seems to do better at that position, continue  -Continue to encourage mobilization, OOB, incentive spirometry  -agree with PT/OT referral, will likely need SNF placement for PT given deconditioning

## 2019-09-21 LAB
ALBUMIN SERPL BCP-MCNC: 3.3 G/DL (ref 3.2–4.9)
ALBUMIN/GLOB SERPL: 1 G/DL
ALP SERPL-CCNC: 58 U/L (ref 30–99)
ALT SERPL-CCNC: 33 U/L (ref 2–50)
ANION GAP SERPL CALC-SCNC: 13 MMOL/L (ref 0–11.9)
APTT PPP: 58.9 SEC (ref 24.7–36)
AST SERPL-CCNC: 24 U/L (ref 12–45)
BASOPHILS # BLD AUTO: 0.3 % (ref 0–1.8)
BASOPHILS # BLD: 0.03 K/UL (ref 0–0.12)
BILIRUB SERPL-MCNC: 0.3 MG/DL (ref 0.1–1.5)
BUN SERPL-MCNC: 11 MG/DL (ref 8–22)
CALCIUM SERPL-MCNC: 9.1 MG/DL (ref 8.4–10.2)
CHLORIDE SERPL-SCNC: 108 MMOL/L (ref 96–112)
CO2 SERPL-SCNC: 23 MMOL/L (ref 20–33)
CREAT SERPL-MCNC: 0.82 MG/DL (ref 0.5–1.4)
EOSINOPHIL # BLD AUTO: 0.3 K/UL (ref 0–0.51)
EOSINOPHIL NFR BLD: 3.2 % (ref 0–6.9)
ERYTHROCYTE [DISTWIDTH] IN BLOOD BY AUTOMATED COUNT: 67.3 FL (ref 35.9–50)
GLOBULIN SER CALC-MCNC: 3.3 G/DL (ref 1.9–3.5)
GLUCOSE SERPL-MCNC: 114 MG/DL (ref 65–99)
HCT VFR BLD AUTO: 41 % (ref 42–52)
HGB BLD-MCNC: 13.1 G/DL (ref 14–18)
IMM GRANULOCYTES # BLD AUTO: 0.09 K/UL (ref 0–0.11)
IMM GRANULOCYTES NFR BLD AUTO: 1 % (ref 0–0.9)
LYMPHOCYTES # BLD AUTO: 2.77 K/UL (ref 1–4.8)
LYMPHOCYTES NFR BLD: 29.3 % (ref 22–41)
MAGNESIUM SERPL-MCNC: 2.3 MG/DL (ref 1.5–2.5)
MCH RBC QN AUTO: 30.1 PG (ref 27–33)
MCHC RBC AUTO-ENTMCNC: 32 G/DL (ref 33.7–35.3)
MCV RBC AUTO: 94.3 FL (ref 81.4–97.8)
MONOCYTES # BLD AUTO: 0.97 K/UL (ref 0–0.85)
MONOCYTES NFR BLD AUTO: 10.3 % (ref 0–13.4)
NEUTROPHILS # BLD AUTO: 5.29 K/UL (ref 1.82–7.42)
NEUTROPHILS NFR BLD: 55.9 % (ref 44–72)
NRBC # BLD AUTO: 0 K/UL
NRBC BLD-RTO: 0 /100 WBC
PHOSPHATE SERPL-MCNC: 4.4 MG/DL (ref 2.5–4.5)
PLATELET # BLD AUTO: 246 K/UL (ref 164–446)
PMV BLD AUTO: 9.1 FL (ref 9–12.9)
POTASSIUM SERPL-SCNC: 5 MMOL/L (ref 3.6–5.5)
PROT SERPL-MCNC: 6.6 G/DL (ref 6–8.2)
RBC # BLD AUTO: 4.35 M/UL (ref 4.7–6.1)
SODIUM SERPL-SCNC: 144 MMOL/L (ref 135–145)
WBC # BLD AUTO: 9.5 K/UL (ref 4.8–10.8)

## 2019-09-21 PROCEDURE — 80053 COMPREHEN METABOLIC PANEL: CPT

## 2019-09-21 PROCEDURE — 84100 ASSAY OF PHOSPHORUS: CPT

## 2019-09-21 PROCEDURE — 83735 ASSAY OF MAGNESIUM: CPT

## 2019-09-21 PROCEDURE — 700102 HCHG RX REV CODE 250 W/ 637 OVERRIDE(OP): Performed by: INTERNAL MEDICINE

## 2019-09-21 PROCEDURE — 99233 SBSQ HOSP IP/OBS HIGH 50: CPT | Performed by: INTERNAL MEDICINE

## 2019-09-21 PROCEDURE — 700111 HCHG RX REV CODE 636 W/ 250 OVERRIDE (IP): Performed by: INTERNAL MEDICINE

## 2019-09-21 PROCEDURE — 700102 HCHG RX REV CODE 250 W/ 637 OVERRIDE(OP): Performed by: PSYCHIATRY & NEUROLOGY

## 2019-09-21 PROCEDURE — 85025 COMPLETE CBC W/AUTO DIFF WBC: CPT

## 2019-09-21 PROCEDURE — 770020 HCHG ROOM/CARE - TELE (206)

## 2019-09-21 PROCEDURE — A9270 NON-COVERED ITEM OR SERVICE: HCPCS | Performed by: INTERNAL MEDICINE

## 2019-09-21 PROCEDURE — 700105 HCHG RX REV CODE 258: Performed by: INTERNAL MEDICINE

## 2019-09-21 PROCEDURE — 99233 SBSQ HOSP IP/OBS HIGH 50: CPT | Performed by: HOSPITALIST

## 2019-09-21 PROCEDURE — A9270 NON-COVERED ITEM OR SERVICE: HCPCS | Performed by: PSYCHIATRY & NEUROLOGY

## 2019-09-21 PROCEDURE — 85730 THROMBOPLASTIN TIME PARTIAL: CPT

## 2019-09-21 RX ADMIN — THERA TABS 1 TABLET: TAB at 05:14

## 2019-09-21 RX ADMIN — ENOXAPARIN SODIUM 120 MG: 150 INJECTION SUBCUTANEOUS at 17:11

## 2019-09-21 RX ADMIN — FOLIC ACID 1 MG: 1 TABLET ORAL at 05:14

## 2019-09-21 RX ADMIN — RISPERIDONE 1 MG: 1 TABLET ORAL at 18:24

## 2019-09-21 RX ADMIN — RISPERIDONE 0.5 MG: 0.5 TABLET ORAL at 05:14

## 2019-09-21 RX ADMIN — ENOXAPARIN SODIUM 120 MG: 150 INJECTION SUBCUTANEOUS at 11:13

## 2019-09-21 RX ADMIN — THIAMINE HYDROCHLORIDE 500 MG: 100 INJECTION, SOLUTION INTRAMUSCULAR; INTRAVENOUS at 05:14

## 2019-09-21 ASSESSMENT — ENCOUNTER SYMPTOMS
ABDOMINAL PAIN: 0
HEMOPTYSIS: 0
VOMITING: 0
SENSORY CHANGE: 0
PALPITATIONS: 0
GASTROINTESTINAL NEGATIVE: 1
BACK PAIN: 0
NERVOUS/ANXIOUS: 1
EYES NEGATIVE: 1
HEADACHES: 0
COUGH: 0
FOCAL WEAKNESS: 0
MUSCULOSKELETAL NEGATIVE: 1
NAUSEA: 0
CHILLS: 0
MEMORY LOSS: 1
SPEECH CHANGE: 0
FEVER: 0
SORE THROAT: 0
SHORTNESS OF BREATH: 1
NEUROLOGICAL NEGATIVE: 1
COUGH: 1

## 2019-09-21 ASSESSMENT — LIFESTYLE VARIABLES: SUBSTANCE_ABUSE: 1

## 2019-09-21 NOTE — CARE PLAN
Problem: Pain Management  Goal: Pain level will decrease to patient's comfort goal  Outcome: PROGRESSING AS EXPECTED  Intervention: Educate and implement non-pharmacologic comfort measures. Examples: relaxation, distration, play therapy, activity therapy, massage, etc.  Note:   Pt denies pain. Will reassess pain level at least Q4H and as needed.       Problem: Mobility  Goal: Risk for activity intolerance will decrease  Outcome: PROGRESSING AS EXPECTED  Intervention: Assess and monitor signs of activity intolerance  Note:   Pt tolerated mobilizing and sitting in chair today

## 2019-09-21 NOTE — PROGRESS NOTES
"Critical Care Progress Note    Date of admission  9/19/2019    Chief Complaint  34 y.o. male admitted 9/19/2019 with psychosis, SOB and R sided CP.    Hospital Course    The history is obtained from the patient as well as from healthcare providers and the medical record.  This gentleman does not provide the best history due to disorientation and psychosis.  This gentleman has a history of submassive pulmonary embolism in May 2019, anxiety, hypertension, paroxysmal atrial fibrillation and alcohol abuse with documented blood alcohol levels as high as 0.25 in the past.  He was brought into the emergency room today with psychosis.  He states that people have been following him around at his home with an agenda.  That agenda is to have sexual activity with him.  He is aware that his thinking has been \"off.\"  He admits that he has been using psychedelic mushrooms and whippits as well as marijuana.  He is disoriented to time and cannot really give me a history of his duration of use or how long his kunz has been going on, but it has been several days at least.  He is complaining of some shortness of breath and right-sided pleuritic chest pain.  He is supposed to be taking rivaroxaban for history of pulmonary embolism, but admits that he is noncompliant with this medication and takes it only intermittently.  He believes that he is missed at least 5 days of therapy in the last week.  He also tells me that for several weeks he has been unsteady and has been falling.  He admits that he has hit his head on several falls and further admits that sometime in the last couple of weeks, he hit his head so hard that he lost consciousness.  CT imaging on presentation today reveals submassive pulmonary embolism with a saddle pulmonary embolism.      Interval Problem Update  Reviewed last 24 hour events:    A&O x3-4  SR/ST  SBp 90-160s  One PRN Bp med  Afebrile  Taking PO  Srivastava - retention  Good UO  Heparin 1850  LR 50  Mobilized  No " bleeding clinically  2 lpm NC  No IS yet    HeplocK IV fluids  Mobilize  Lovenox full dose, stop heparin, discontinue after Shaista loaded  Start NOAC, monitor for safety  For safety keep in ICU 1 more day     YESTERDAY  Somnolent this AM - received one dose Ativan IV an hour ago  Arouses with stimuli - lethargic Ox2  Admits to snoring and EDS - ALLI ?  Sats 93 on RA  ET CO2   High 20s  ABG 7.41/35/101  MRI brain done at midnight revealed no acute intracranial abnormality, there was some sphenoid and posterior right ethmoid sinus disease  ST 120s  SBp 100-160s  Tm 99.5  Reg diet ok  Srivastava for retention - UO good    Heparin 1650  4 lpm NC  ABG noted  CXR SSLLLA - poor quality film  Noninvasive vascular studies of lower extremities do not reveal DVT    Change to Lovenox full dose  Assess what long-term oral agent we need to use in the next day or 2 and balance that versus his risk of bleeding complication or noncompliance she already is exhibited  Reduce IVF  Repeat chest x-ray in a.m.  Blood gas as needed  Monitor for need for intubation    Neurologic status is improved overall he is now responding and talking although he still dyspneic at times  End-tidal CO2 unchanged  Oxygen saturation unchanged still low flow O2  Work of breathing still increased but acceptable, he does much better when he is upright but likes to slide down flat in bed  I repositioned the patient in bed with assistance of the nurse    Mother arrived [place] in from the [place]  Updated her Re: His condition and current treatment plan at great length    Review of Systems  Review of Systems   Constitutional: Positive for malaise/fatigue. Negative for chills and fever.   HENT: Negative for congestion and sore throat.    Respiratory: Positive for shortness of breath. Negative for cough and hemoptysis.    Cardiovascular: Negative for chest pain and palpitations.   Gastrointestinal: Negative for abdominal pain, nausea (resolved) and vomiting.    Genitourinary: Negative.    Musculoskeletal: Negative for back pain.   Neurological: Negative for sensory change, speech change, focal weakness and headaches.   Psychiatric/Behavioral: Positive for memory loss and substance abuse. The patient is nervous/anxious.    Sleep:  Positive for EDS and Snoring    Lethargic but answered some questions    Vital Signs for last 24 hours   Temp:  [36.6 °C (97.8 °F)-37.7 °C (99.9 °F)] 37.7 °C (99.9 °F)  Pulse:  [] 93  Resp:  [19-28] 24  BP: (109-169)/(58-99) 160/93  SpO2:  [91 %-99 %] 95 %    Hemodynamic parameters for last 24 hours       Respiratory Information for the last 24 hours       Physical Exam   Physical Exam   Constitutional: He appears well-developed and well-nourished. He appears lethargic. He is cooperative.  Non-toxic appearance. No distress. Nasal cannula in place.   HENT:   Head: Normocephalic and atraumatic.   Mouth/Throat: Oropharynx is clear and moist.   M&P IV airway   Eyes: Pupils are equal, round, and reactive to light. EOM are normal. No scleral icterus.   Neck: Neck supple. No JVD present.   Obese neck   Cardiovascular: Normal rate, regular rhythm and intact distal pulses.  No extrasystoles are present. Exam reveals no gallop and no friction rub.   No murmur heard.  Pulmonary/Chest: No respiratory distress. He has no wheezes. He has rhonchi (Improved). He has no rales.   Abdominal: Soft. Bowel sounds are normal. There is no tenderness. There is no rigidity, no guarding and no CVA tenderness.   obese   Musculoskeletal: He exhibits no edema.   Neurological: He appears lethargic. He displays no atrophy. No cranial nerve deficit or sensory deficit. He exhibits normal muscle tone. GCS eye subscore is 4. GCS verbal subscore is 4. GCS motor subscore is 6.   Skin: Skin is warm and dry. No rash noted. He is not diaphoretic. No cyanosis. Nails show no clubbing.   Psychiatric: His speech is normal. His mood appears not anxious. He is not agitated. Cognition  and memory are impaired.       Medications  Current Facility-Administered Medications   Medication Dose Route Frequency Provider Last Rate Last Dose   • enoxaparin (LOVENOX) inj 120 mg  1 mg/kg Subcutaneous Q12HRS Vinh Armstrong M.D.   120 mg at 09/21/19 1711   • risperiDONE (RISPERDAL) tablet 1 mg  1 mg Oral Q EVENING Lauren Nelson M.D.   Stopped at 09/21/19 2100   • risperiDONE (RISPERDAL) tablet 0.5 mg  0.5 mg Oral QAM Lauren Nelson M.D.   0.5 mg at 09/21/19 0514   • Respiratory Care per Protocol   Nebulization Continuous RT Lita Brantley M.D.       • acetaminophen (TYLENOL) tablet 650 mg  650 mg Oral Q6HRS PRN Lita Brantley M.D.   650 mg at 09/20/19 1905   • labetalol (NORMODYNE,TRANDATE) injection 10 mg  10 mg Intravenous Q4HRS PRN Lita Brantley M.D.   10 mg at 09/20/19 1906   • ondansetron (ZOFRAN) syringe/vial injection 4 mg  4 mg Intravenous Q4HRS PRN Lita Brantley M.D.   4 mg at 09/20/19 1905   • ondansetron (ZOFRAN ODT) dispertab 4 mg  4 mg Oral Q4HRS PRN Lita Brantley M.D.       • folic acid (FOLVITE) tablet 1 mg  1 mg Oral DAILY James Hou M.D.   1 mg at 09/21/19 0514   • multivitamin (THERAGRAN) tablet 1 Tab  1 Tab Oral DAILY James Hou M.D.   1 Tab at 09/21/19 0514       Fluids    Intake/Output Summary (Last 24 hours) at 9/21/2019 2115  Last data filed at 9/21/2019 1800  Gross per 24 hour   Intake 1730.55 ml   Output 3325 ml   Net -1594.45 ml       Laboratory  Recent Labs     09/20/19  0501   ISTATAPH 7.405   ISTATAPCO2 35.4   ISTATAPO2 101*   ISTATATCO2 23   IYUKWFS7SSB 98   ISTATARTHCO3 22.2   ISTATARTBE -2   ISTATTEMP see below   ISTATFIO2 36   ISTATSPEC Arterial     Recent Labs     09/19/19  1200 09/20/19  0356   CPKTOTAL 304* 207*     Recent Labs     09/19/19  1200 09/19/19  1737 09/20/19  0356 09/21/19  0524   SODIUM 142  --  142 144   POTASSIUM 4.1  --  4.1 5.0   CHLORIDE 110  --  111 108   CO2 22  --  21 23   BUN 27*  --  20 11   CREATININE 0.99  --  0.86  0.82   MAGNESIUM  --  2.4 2.1 2.3   PHOSPHORUS  --  3.7 4.0 4.4   CALCIUM 9.2  --  8.7 9.1     Recent Labs     09/19/19  1200 09/20/19  0356 09/21/19  0524   ALTSGPT 42 32 33   ASTSGOT 18 16 24   ALKPHOSPHAT 57 52 58   TBILIRUBIN 0.6 0.4 0.3   GLUCOSE 97 89 114*     Recent Labs     09/19/19  1200 09/20/19  0356 09/21/19  0524   WBC 9.6 9.5 9.5   NEUTSPOLYS 57.00 53.30 55.90   LYMPHOCYTES 40.00 33.10 29.30   MONOCYTES 2.00 11.20 10.30   EOSINOPHILS 1.00 0.90 3.20   BASOPHILS 0.00 0.30 0.30   ASTSGOT 18 16 24   ALTSGPT 42 32 33   ALKPHOSPHAT 57 52 58   TBILIRUBIN 0.6 0.4 0.3     Recent Labs     09/19/19  1200  09/20/19  0356  09/20/19  1215 09/20/19  1911 09/21/19  0130 09/21/19  0524   RBC 4.54*  --  4.19*  --   --   --   --  4.35*   HEMOGLOBIN 14.3  --  12.8*  --   --   --   --  13.1*   HEMATOCRIT 42.4  --  40.6*  --   --   --   --  41.0*   PLATELETCT 308  --  299  --   --   --   --  246   PROTHROMBTM 14.9*  --   --   --   --   --   --   --    APTT 30.3   < >  --    < > 47.2* 72.1* 58.9*  --    INR 1.15*  --   --   --   --   --   --   --     < > = values in this interval not displayed.       Imaging  X-Ray:  I have personally reviewed the images and compared with prior images.  EKG:  I have personally reviewed the images and compared with prior images.  CT:    Reviewed  Echo:   Reviewed    Assessment/Plan  * Acute saddle pulmonary embolism (HCC)- (present on admission)  Assessment & Plan  Acute submassive pulmonary embolism - blood pressure remains acceptable without any hypotension  He has been noncompliant with his rivaroxaban, mother [place] until he has improved  Echocardiogram with evidence of right heart strain, suggest follow-up in a few months  Elevated troponin, secondary to demand ischemia  He reported in the ER a history of recent multiple falls and striking his head - he reports losing consciousness as a result at least once in the last couple of weeks - CT scan of  "the head negative for acute pathology as well as MRI  In my opinion, the risks of intracranial hemorrhage outweigh the benefits of a submassive dose of alteplase or catheter directed thrombolytic therapy with EKOS  Also, given his altered mental status, I feel he would have increased difficulty being compliant with proper positioning during EKOS therapy  Continue full anticoagulation with heparin on the weight-based protocol, convert to full dose therapeutic Lovenox when current bag heparin complete, shortage of heparin per pharmacy  Start NOAC and bridge with Lovenox  Lower extremity venous Doppler study for DVT is negative  Ongoing daily safety eval to assess appropriateness of chronic anticoagulation, patient is at risk for noncompliance issues but bleeding issues, reviewed with patient and his mother    Acute hypoxemic respiratory failure (HCC)  Assessment & Plan  Secondary to acute pulmonary embolus with pulmonary hypertension query acute on chronic and multifactorial  History of PE 5/2019  History of compliance issues with rivaroxaban and falls per patient  RT protocols  Keep patient upright at 45 to 60 degrees, he seems to do better at that position, continue  Monitor for the need for intubation and mechanical ventilation, looks improved  Follow-up chest x-ray as needed  Monitor for need for auto BiPAP for ALLI is contributing    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)- (present on admission)  Assessment & Plan  He has been on quite a little kunz of psychedelic mushrooms, marijuana and \"whippits\" (inhaled nitrous oxide)  UDS positive for benzodiazepines and cannabinoids  Blood alcohol negative  History of EtOH abuse - observe for evidence of alcohol withdrawal  Clinically improved  If does develop DTs consider dexmedetomidine infusion    Elevated troponin- (present on admission)  Assessment & Plan  Due to acute pulmonary embolism/demand ischemia  Monitor    ETOH abuse- (present on " admission)  Assessment & Plan  History of alcohol abuse in the past with alcohol withdrawal  Supplement vitamins  Observe for evidence of withdrawal  Consider dexmedetomidine infusion for DTs as a first choice    ALLI (obstructive sleep apnea)  Assessment & Plan  History and exam suggestive  Monitor for the need for auto BiPAP  Patient states he had a prior sleep test and was set up on CPAP but he does not use it  After discharge he probably would benefit from a sleep evaluation if he is willing to participate as a follow-up to reinitiate therapy  Weight loss to be encouraged    Morbid obesity (HCC)  Assessment & Plan  BMI 40.1  Sedentary lifestyle  ROS for ALLI positive  Monitor for sleep apnea and the need for auto BiPAP  Weight loss to be encouraged long-term  Dietary consultation prior to discharge if he is willing to participate  Patient states he has had a prior sleep test which he flunked and was started on CPAP which she is noncompliant with    Pulmonary hypertension (HCC)- (present on admission)  Assessment & Plan  Secondary to pulmonary embolus  RVSP moderate to severely elevated in May of this year, not estimated with 9/19 echo right-sided abnormalities including strain and Salcedo sign are noted again  Query component of ALLI and chronic hypoxemia contributing?  Given abnormal findings would suggest follow-up echocardiogram in a couple of months and if neurologically and clinically improved but still has pulmonary hypertension patient may need further therapy although at this point he would not be a candidate in my mind for referral to [facility] for embolectomy    Anxiety- (present on admission)  Assessment & Plan  Acute on chronic  Query related to substance use  Query underlying neuropsychiatric process  Over reaction to Ativan, this medication has been removed from the MAR  Improved  Monitor for the need for psychiatric evaluation    Essential hypertension- (present on admission)  Assessment & Plan  Hold  atenolol, lisinopril for now  Monitor blood pressure  Resume BP meds when clinically appropriate       VTE:  Heparin  Ulcer: Not Indicated  Lines: None    I have performed a physical exam and reviewed and updated ROS and Plan today (9/21/2019). In review of yesterday's note (9/20/2019), there are no changes except as documented above.     Discussed patient condition and risk of morbidity and/or mortality with Hospitalist, RN, RT, Pharmacy, Charge nurse / hot rounds and Patient

## 2019-09-21 NOTE — PROGRESS NOTES
[facility] Medicine Daily Progress Note    Date of Service  9/21/2019    Chief Complaint  34 y.o. male admitted 9/19/2019 with history of psychiatric disease, pulmonary embolism, substance abuse, alcohol abuse, obstructive sleep apnea, obesity, initially being evaluated for psychiatric reasons, referred to admission secondary to chest pain, dyspnea.  Patient found to have extensive pulmonary embolism recurrence.    Hospital Course    Admitted with pulmonary embolism, dyspnea, substance abuse      Interval Problem Update  Patient seen and examined today. ICU Care  Care and plan discussed in IDT/Hot rounds.  Lines and assistive devices reviewed.    Patient tolerating treatment and therapies.  All Data, Medication data reviewed.  Case discussed with nursing as available.  Plan of Care reviewed with patient and notified of changes.  9/20 patient found in ICU, somnolent, mother at bedside, received Ativan earlier, MRI negative for acute changes secondary to recent falls, sinus tachycardia in the 120s, blood pressure 100-1 60, T-max 99.5, tolerating diet, still tachypnea and dyspnea on 4 L nasal cannula.  Psychiatric evaluation noted.  9/21, the patient is improved, sinus rhythm sinus tach, blood pressure between 90 and 160, afebrile, eating better, had Srivastava for retention, good urine output, remains on heparin drip, 2 L nasal cannula, less tachypnea, discussed options for ongoing anticoagulation with the patient and mom at the bedside.  Plans are to move to [place] with the parents.  Consultants/Specialty  Pulmonary critical care, psychiatry    Code Status  Full code    Disposition  ICU, improved for telemetry transfer    Review of Systems  Review of Systems   Constitutional: Positive for malaise/fatigue.   HENT: Negative.    Eyes: Negative.    Respiratory: Positive for cough and shortness of breath.    Cardiovascular: Positive for chest pain.   Gastrointestinal: Negative.    Genitourinary: Negative.    Musculoskeletal:  Negative.    Skin: Negative.    Neurological: Negative.    Endo/Heme/Allergies: Negative.    Psychiatric/Behavioral: Positive for substance abuse. The patient is nervous/anxious.    All other systems reviewed and are negative.       Physical Exam  Temp:  [36.6 °C (97.8 °F)-37.7 °C (99.9 °F)] 37.7 °C (99.9 °F)  Pulse:  [] 93  Resp:  [19-29] 24  BP: (109-179)/(57-99) 160/93  SpO2:  [91 %-99 %] 95 %    Physical Exam   Constitutional: He is oriented to person, place, and time. He appears well-developed and well-nourished. He appears lethargic. He has a sickly appearance. Nasal cannula in place.   HENT:   Head: Normocephalic.   Eyes: Pupils are equal, round, and reactive to light.   Neck: Normal range of motion.   Cardiovascular: Normal rate, regular rhythm, normal heart sounds and intact distal pulses.   Capillary refill <3 secs   Pulmonary/Chest: Effort normal. Tachypnea noted. No respiratory distress. He has rhonchi.   Abdominal: Soft. Bowel sounds are normal.   Genitourinary: Rectum normal and penis normal.   Musculoskeletal: Normal range of motion.   Neurological: He is oriented to person, place, and time. He appears lethargic. No cranial nerve deficit or sensory deficit.   Skin: Skin is warm and dry. No cyanosis.   Psychiatric: His affect is blunt. His speech is delayed. He is slowed. Cognition and memory are impaired.   Nursing note and vitals reviewed.      Fluids    Intake/Output Summary (Last 24 hours) at 9/21/2019 1627  Last data filed at 9/21/2019 1600  Gross per 24 hour   Intake 1998.55 ml   Output 2935 ml   Net -936.45 ml       Laboratory  Recent Labs     09/19/19  1200 09/20/19  0356 09/21/19  0524   WBC 9.6 9.5 9.5   RBC 4.54* 4.19* 4.35*   HEMOGLOBIN 14.3 12.8* 13.1*   HEMATOCRIT 42.4 40.6* 41.0*   MCV 93.4 96.9 94.3   MCH 31.5 30.5 30.1   MCHC 33.7 31.5* 32.0*   RDW 68.1* 71.1* 67.3*   PLATELETCT 308 299 246   MPV 9.2 9.1 9.1     Recent Labs     09/19/19  1200 09/20/19  0356 09/21/19  0524  "  SODIUM 142 142 144   POTASSIUM 4.1 4.1 5.0   CHLORIDE 110 111 108   CO2 22 21 23   GLUCOSE 97 89 114*   BUN 27* 20 11   CREATININE 0.99 0.86 0.82   CALCIUM 9.2 8.7 9.1     Recent Labs     09/19/19  1200  09/20/19  1215 09/20/19  1911 09/21/19  0130   APTT 30.3   < > 47.2* 72.1* 58.9*   INR 1.15*  --   --   --   --     < > = values in this interval not displayed.         Recent Labs     09/19/19  1737   TRIGLYCERIDE 126   HDL 22*   *       Imaging  DX-CHEST-PORTABLE (1 VIEW)   Final Result         1.  Pulmonary edema and/or infiltrates.   2.  Cardiomegaly      MR-BRAIN-W/O   Final Result      1.  No acute intracranial abnormality.   2.  Sphenoid and posterior right ethmoid sinus disease.      US-EXTREMITY VENOUS LOWER BILAT   Final Result      EC-ECHOCARDIOGRAM LTD W/O CONT   Final Result      CT-CTA CHEST PULMONARY ARTERY W/ RECONS   Final Result   Addendum 1 of 1   These findings were discussed with HEATHER MELGOZA on 9/19/2019 2:16 PM.      Final      DX-CHEST-PORTABLE (1 VIEW)   Final Result      No acute cardiopulmonary abnormality.      CT-HEAD W/O   Final Result      No CT evidence of acute infarct, hemorrhage or mass. No acute intracranial injury.           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  Q waves in inferior leads.    Likely from underlying pulmonary embolism, right ventricular strain.    Close clinical monitoring in the intensive care unit  Patient on anticoagulation    ETOH abuse- (present on admission)  Assessment & Plan  History of, monitor for withdrawal    ALLI (obstructive sleep apnea)  Assessment & Plan  History of, pulmonary following    Morbid obesity (HCC)  Assessment & Plan  Outpatient weight loss program would be indicated    Pulmonary hypertension (HCC)- (present on admission)  Assessment & Plan  Known history of, now with recurrent PE  Echocardiogram noted    Anxiety- (present on admission)  Assessment & Plan  On Librium at home, for now will order IV as needed Ativan    Essential hypertension- (present on admission)  Assessment & Plan  Holding antihypertensive therapy in the setting of acute pulmonary embolism     Plan  Change to Lovenox and eventually to oral anticoagulation  Close observation of respiratory status and cardiac status  Watch for withdrawal symptoms, psychiatry following  Psychiatry evaluation and follow-up, continue Risperdal  See orders  Acute complex high risk  Medically stabilized for transfer to telemetry  VTE prophylaxis: Lovenox    I have performed a physical exam and reviewed and updated ROS and Plan today . In review of yesterday's note , there are no changes except as documented above.

## 2019-09-21 NOTE — CARE PLAN
Problem: Communication  Goal: The ability to communicate needs accurately and effectively will improve  Outcome: PROGRESSING SLOWER THAN EXPECTED     Patient with intermittent confusion to events/situation, not always able to clearly communicate needs.  Continuing to reassess patient's understanding and reinforcing teaching as well as reminding patient of events and current plan of care.  Ensuring pt's needs are met and that he feels secure.     Problem: Respiratory:  Goal: Respiratory status will improve  Outcome: PROGRESSING SLOWER THAN EXPECTED     Patient complains of dyspnea on exertion, intermittent labored breathing, frequent tachypnea.  Lung fields diminished throughout.  Encouraging patient to do deep breathing/coughing and to notify staff if breathing becomes more labored.  Encouraging patient to allow staff to assist patient in activities that could cause extreme exertion.  On 4L nasal cannula.     Problem: Safety  Goal: Will remain free from injury  Outcome: PROGRESSING AS EXPECTED  Goal: Will remain free from falls  Outcome: PROGRESSING AS EXPECTED     Problem: Infection  Goal: Will remain free from infection  Outcome: PROGRESSING AS EXPECTED     Problem: Bowel/Gastric:  Goal: Normal bowel function is maintained or improved  Outcome: PROGRESSING AS EXPECTED  Goal: Will not experience complications related to bowel motility  Outcome: PROGRESSING AS EXPECTED     Problem: Fluid Volume:  Goal: Will maintain balanced intake and output  Outcome: PROGRESSING AS EXPECTED

## 2019-09-21 NOTE — PROGRESS NOTES
Monitor Summary : Sinus Rhythm - Sinus Tachycardia 80 - 110s 0.16 / 0.08 / 0.34  12 hour chart check

## 2019-09-22 LAB
ALBUMIN SERPL BCP-MCNC: 4.1 G/DL (ref 3.2–4.9)
ALBUMIN/GLOB SERPL: 1.5 G/DL
ALP SERPL-CCNC: 52 U/L (ref 30–99)
ALT SERPL-CCNC: 33 U/L (ref 2–50)
ANION GAP SERPL CALC-SCNC: 9 MMOL/L (ref 0–11.9)
AST SERPL-CCNC: 16 U/L (ref 12–45)
BASOPHILS # BLD AUTO: 0.2 % (ref 0–1.8)
BASOPHILS # BLD: 0.02 K/UL (ref 0–0.12)
BILIRUB SERPL-MCNC: 0.6 MG/DL (ref 0.1–1.5)
BUN SERPL-MCNC: 11 MG/DL (ref 8–22)
CALCIUM SERPL-MCNC: 9.6 MG/DL (ref 8.5–10.5)
CHLORIDE SERPL-SCNC: 106 MMOL/L (ref 96–112)
CO2 SERPL-SCNC: 26 MMOL/L (ref 20–33)
CREAT SERPL-MCNC: 0.75 MG/DL (ref 0.5–1.4)
EOSINOPHIL # BLD AUTO: 0.25 K/UL (ref 0–0.51)
EOSINOPHIL NFR BLD: 2.4 % (ref 0–6.9)
ERYTHROCYTE [DISTWIDTH] IN BLOOD BY AUTOMATED COUNT: 65.2 FL (ref 35.9–50)
GLOBULIN SER CALC-MCNC: 2.7 G/DL (ref 1.9–3.5)
GLUCOSE SERPL-MCNC: 102 MG/DL (ref 65–99)
HCT VFR BLD AUTO: 43.5 % (ref 42–52)
HGB BLD-MCNC: 14.3 G/DL (ref 14–18)
IMM GRANULOCYTES # BLD AUTO: 0.06 K/UL (ref 0–0.11)
IMM GRANULOCYTES NFR BLD AUTO: 0.6 % (ref 0–0.9)
LYMPHOCYTES # BLD AUTO: 2.47 K/UL (ref 1–4.8)
LYMPHOCYTES NFR BLD: 23.5 % (ref 22–41)
MAGNESIUM SERPL-MCNC: 2.1 MG/DL (ref 1.5–2.5)
MCH RBC QN AUTO: 30.8 PG (ref 27–33)
MCHC RBC AUTO-ENTMCNC: 32.9 G/DL (ref 33.7–35.3)
MCV RBC AUTO: 93.5 FL (ref 81.4–97.8)
MONOCYTES # BLD AUTO: 1.02 K/UL (ref 0–0.85)
MONOCYTES NFR BLD AUTO: 9.7 % (ref 0–13.4)
NEUTROPHILS # BLD AUTO: 6.71 K/UL (ref 1.82–7.42)
NEUTROPHILS NFR BLD: 63.6 % (ref 44–72)
NRBC # BLD AUTO: 0 K/UL
NRBC BLD-RTO: 0 /100 WBC
PHOSPHATE SERPL-MCNC: 4 MG/DL (ref 2.5–4.5)
PLATELET # BLD AUTO: 317 K/UL (ref 164–446)
PMV BLD AUTO: 8.7 FL (ref 9–12.9)
POTASSIUM SERPL-SCNC: 4.2 MMOL/L (ref 3.6–5.5)
PROT SERPL-MCNC: 6.8 G/DL (ref 6–8.2)
RBC # BLD AUTO: 4.65 M/UL (ref 4.7–6.1)
SODIUM SERPL-SCNC: 141 MMOL/L (ref 135–145)
WBC # BLD AUTO: 10.5 K/UL (ref 4.8–10.8)

## 2019-09-22 PROCEDURE — 99232 SBSQ HOSP IP/OBS MODERATE 35: CPT | Performed by: HOSPITALIST

## 2019-09-22 PROCEDURE — 700102 HCHG RX REV CODE 250 W/ 637 OVERRIDE(OP): Performed by: FAMILY MEDICINE

## 2019-09-22 PROCEDURE — 700102 HCHG RX REV CODE 250 W/ 637 OVERRIDE(OP): Performed by: PSYCHIATRY & NEUROLOGY

## 2019-09-22 PROCEDURE — A9270 NON-COVERED ITEM OR SERVICE: HCPCS | Performed by: INTERNAL MEDICINE

## 2019-09-22 PROCEDURE — 700102 HCHG RX REV CODE 250 W/ 637 OVERRIDE(OP): Performed by: HOSPITALIST

## 2019-09-22 PROCEDURE — 84100 ASSAY OF PHOSPHORUS: CPT

## 2019-09-22 PROCEDURE — 80053 COMPREHEN METABOLIC PANEL: CPT

## 2019-09-22 PROCEDURE — 700111 HCHG RX REV CODE 636 W/ 250 OVERRIDE (IP): Performed by: INTERNAL MEDICINE

## 2019-09-22 PROCEDURE — A9270 NON-COVERED ITEM OR SERVICE: HCPCS | Performed by: HOSPITALIST

## 2019-09-22 PROCEDURE — 83735 ASSAY OF MAGNESIUM: CPT

## 2019-09-22 PROCEDURE — 99233 SBSQ HOSP IP/OBS HIGH 50: CPT | Performed by: INTERNAL MEDICINE

## 2019-09-22 PROCEDURE — 700102 HCHG RX REV CODE 250 W/ 637 OVERRIDE(OP): Performed by: INTERNAL MEDICINE

## 2019-09-22 PROCEDURE — A9270 NON-COVERED ITEM OR SERVICE: HCPCS | Performed by: PSYCHIATRY & NEUROLOGY

## 2019-09-22 PROCEDURE — A9270 NON-COVERED ITEM OR SERVICE: HCPCS | Performed by: FAMILY MEDICINE

## 2019-09-22 PROCEDURE — 85025 COMPLETE CBC W/AUTO DIFF WBC: CPT

## 2019-09-22 PROCEDURE — 770020 HCHG ROOM/CARE - TELE (206)

## 2019-09-22 RX ORDER — THIAMINE MONONITRATE (VIT B1) 100 MG
100 TABLET ORAL DAILY
Status: DISCONTINUED | OUTPATIENT
Start: 2019-09-22 | End: 2019-09-23

## 2019-09-22 RX ORDER — HYDROXYZINE HYDROCHLORIDE 10 MG/1
30 TABLET, FILM COATED ORAL
Status: ON HOLD | COMMUNITY
End: 2019-10-16

## 2019-09-22 RX ORDER — RISPERIDONE 1 MG/1
1 TABLET ORAL EVERY MORNING
Status: DISCONTINUED | OUTPATIENT
Start: 2019-09-23 | End: 2019-09-24

## 2019-09-22 RX ORDER — HALOPERIDOL 5 MG/1
5 TABLET ORAL EVERY 6 HOURS PRN
Status: DISCONTINUED | OUTPATIENT
Start: 2019-09-22 | End: 2019-09-23

## 2019-09-22 RX ORDER — RISPERIDONE 1 MG/1
2 TABLET ORAL EVERY EVENING
Status: DISCONTINUED | OUTPATIENT
Start: 2019-09-22 | End: 2019-09-24

## 2019-09-22 RX ORDER — DIPHENHYDRAMINE HYDROCHLORIDE 50 MG/ML
25 INJECTION INTRAMUSCULAR; INTRAVENOUS
Status: DISCONTINUED | OUTPATIENT
Start: 2019-09-22 | End: 2019-09-27

## 2019-09-22 RX ORDER — TRAZODONE HYDROCHLORIDE 50 MG/1
50-100 TABLET ORAL
Status: DISCONTINUED | OUTPATIENT
Start: 2019-09-22 | End: 2019-09-23

## 2019-09-22 RX ORDER — RISPERIDONE 1 MG/1
1 TABLET ORAL 2 TIMES DAILY
Status: DISCONTINUED | OUTPATIENT
Start: 2019-09-22 | End: 2019-09-22

## 2019-09-22 RX ADMIN — RISPERIDONE 0.5 MG: 0.5 TABLET ORAL at 05:28

## 2019-09-22 RX ADMIN — TRAZODONE HYDROCHLORIDE 100 MG: 50 TABLET ORAL at 21:51

## 2019-09-22 RX ADMIN — RISPERIDONE 2 MG: 1 TABLET ORAL at 21:51

## 2019-09-22 RX ADMIN — THERA TABS 1 TABLET: TAB at 05:28

## 2019-09-22 RX ADMIN — ENOXAPARIN SODIUM 120 MG: 150 INJECTION SUBCUTANEOUS at 05:28

## 2019-09-22 RX ADMIN — Medication 100 MG: at 07:57

## 2019-09-22 RX ADMIN — DIPHENHYDRAMINE HYDROCHLORIDE 25 MG: 50 INJECTION INTRAMUSCULAR; INTRAVENOUS at 23:34

## 2019-09-22 RX ADMIN — ENOXAPARIN SODIUM 120 MG: 150 INJECTION SUBCUTANEOUS at 17:57

## 2019-09-22 RX ADMIN — FOLIC ACID 1 MG: 1 TABLET ORAL at 05:28

## 2019-09-22 ASSESSMENT — ENCOUNTER SYMPTOMS
VOMITING: 0
SENSORY CHANGE: 0
ABDOMINAL PAIN: 0
FOCAL WEAKNESS: 0
NAUSEA: 0
HEMOPTYSIS: 0
MEMORY LOSS: 1
HEADACHES: 0
HALLUCINATIONS: 0
MUSCULOSKELETAL NEGATIVE: 1
SHORTNESS OF BREATH: 1
COUGH: 1
PALPITATIONS: 0
NERVOUS/ANXIOUS: 1
EYES NEGATIVE: 1
SORE THROAT: 0
FEVER: 0
NEUROLOGICAL NEGATIVE: 1
COUGH: 0
BACK PAIN: 0
CHILLS: 0
SPEECH CHANGE: 0
GASTROINTESTINAL NEGATIVE: 1

## 2019-09-22 ASSESSMENT — LIFESTYLE VARIABLES: SUBSTANCE_ABUSE: 1

## 2019-09-22 NOTE — PROGRESS NOTES
"Educated patient on necessity to remove manzano catheter due to infection risk.  Patient became verbally aggressive stating, \"no, you will not remove it\" with an assertive demeanor.  Patient stated that he feels he is urinating too frequently to have it removed.  This RN informed him that the catheter may create the sensation of needing to urinate, despite his relatively normal urine output.  Patient began stating \"this is fucked up.\" Patients mother remained bedside quietly.  Manzano catheter was not removed due to refusal by patient.  "

## 2019-09-22 NOTE — PROGRESS NOTES
"Critical Care Progress Note    Date of admission  9/19/2019    Chief Complaint  34 y.o. male admitted 9/19/2019 with psychosis, SOB and R sided CP.    Hospital Course    The history is obtained from the patient as well as from healthcare providers and the medical record.  This gentleman does not provide the best history due to disorientation and psychosis.  This gentleman has a history of submassive pulmonary embolism in May 2019, anxiety, hypertension, paroxysmal atrial fibrillation and alcohol abuse with documented blood alcohol levels as high as 0.25 in the past.  He was brought into the emergency room today with psychosis.  He states that people have been following him around at his home with an agenda.  That agenda is to have sexual activity with him.  He is aware that his thinking has been \"off.\"  He admits that he has been using psychedelic mushrooms and whippits as well as marijuana.  He is disoriented to time and cannot really give me a history of his duration of use or how long his kunz has been going on, but it has been several days at least.  He is complaining of some shortness of breath and right-sided pleuritic chest pain.  He is supposed to be taking rivaroxaban for history of pulmonary embolism, but admits that he is noncompliant with this medication and takes it only intermittently.  He believes that he is missed at least 5 days of therapy in the last week.  He also tells me that for several weeks he has been unsteady and has been falling.  He admits that he has hit his head on several falls and further admits that sometime in the last couple of weeks, he hit his head so hard that he lost consciousness.  CT imaging on presentation today reveals submassive pulmonary embolism with a saddle pulmonary embolism.      Interval Problem Update  Reviewed last 24 hour events:    A&O x4  Confused/anxiousat times  SR 70-90s  SBp 130-140s  UO good  I/Os neg 240cc  Tm 99.9  WBC 10.5  plt 317, Hgb 14.3  3.5 lpm " NC O2 - no desats with sleep?  IS 1250  Risperdal  Full dose Lovenox    Thiamine repletion  Start NOAC?  PT/OT eval and treat  Psych F/u tomorrow    D/w Mom at length times several during the afternoon at the bedside  Patient's paranoia is waxed and waned today, requesting a sleeping pill, apparently takes hydroxyzine at home?,  Med not on med rec form  Increase Risperdal dosing    Mother still planning on moving the patient back to [place] to assist in his recovery and care     YESTERDAY  A&O x3-4  SR/ST  SBp 90-160s  One PRN Bp med  Afebrile  Taking PO  Srivastava - retention  Good UO  Heparin 1850  LR 50  Mobilized  No bleeding clinically  2 lpm NC  No IS yet    HeplocK IV fluids  Mobilize  Lovenox full dose, stop heparin, discontinue after Shaista loaded  Start NOAC, monitor for safety  For safety keep in ICU 1 more day    Review of Systems  Review of Systems   Constitutional: Positive for malaise/fatigue (better). Negative for chills and fever.   HENT: Negative for congestion, nosebleeds and sore throat.    Respiratory: Positive for shortness of breath (resolving). Negative for cough and hemoptysis.    Cardiovascular: Negative for chest pain and palpitations.   Gastrointestinal: Negative for abdominal pain, nausea (resolved) and vomiting.   Genitourinary: Negative.    Musculoskeletal: Negative for back pain.   Neurological: Negative for sensory change, speech change, focal weakness and headaches.   Psychiatric/Behavioral: Positive for memory loss and substance abuse. Negative for hallucinations (denies). The patient is nervous/anxious.    Sleep:  Positive for EDS and Snoring      Vital Signs for last 24 hours   Temp:  [36.5 °C (97.7 °F)] 36.5 °C (97.7 °F)  Pulse:  [76-96] 78  Resp:  [18-28] 22  BP: (137-148)/(88-93) 148/93  SpO2:  [90 %-96 %] 96 %    Hemodynamic parameters for last 24 hours       Respiratory Information for the last 24 hours       Physical Exam   Physical Exam   Constitutional: He appears  well-developed and well-nourished. He is cooperative.  Non-toxic appearance. No distress. Nasal cannula in place.   HENT:   Head: Normocephalic and atraumatic.   Mouth/Throat: Oropharynx is clear and moist.   M&P IV airway   Eyes: Pupils are equal, round, and reactive to light. EOM are normal. No scleral icterus.   Neck: Neck supple. No JVD present.   Obese neck   Cardiovascular: Normal rate, regular rhythm and intact distal pulses.  No extrasystoles are present. Exam reveals no gallop and no friction rub.   No murmur heard.  Pulmonary/Chest: No respiratory distress. He has no wheezes. He has rhonchi (Improved). He has no rales.   Abdominal: Soft. Bowel sounds are normal. There is no tenderness. There is no rigidity, no guarding and no CVA tenderness.   obese   Musculoskeletal: He exhibits no edema.   Neurological: He is alert. He displays no atrophy. No cranial nerve deficit or sensory deficit. He exhibits normal muscle tone. GCS eye subscore is 4. GCS verbal subscore is 5. GCS motor subscore is 6.   Skin: Skin is warm and dry. No rash noted. He is not diaphoretic. No cyanosis. Nails show no clubbing.   Psychiatric: His speech is normal. His mood appears not anxious. He is not agitated. Thought content is paranoid (???). Cognition and memory are impaired.   Verbally aggressive   Vitals reviewed.      Medications  Current Facility-Administered Medications   Medication Dose Route Frequency Provider Last Rate Last Dose   • thiamine tablet 100 mg  100 mg Oral DAILY iVnh Armstrong M.D.   100 mg at 09/22/19 0757   • [START ON 9/23/2019] risperiDONE (RISPERDAL) tablet 1 mg  1 mg Oral QAM Rainer Butt M.D.       • risperiDONE (RISPERDAL) tablet 2 mg  2 mg Oral Q EVENING Rainer Butt M.D.       • haloperidol (HALDOL) tablet 5 mg  5 mg Oral Q6HRS PRN Rainer Butt M.D.       • enoxaparin (LOVENOX) inj 120 mg  1 mg/kg Subcutaneous Q12HRS Vinh Armstrong M.D.   120 mg at 09/22/19 0584   • Respiratory  Care per Protocol   Nebulization Continuous RT Lita Brantley M.D.       • acetaminophen (TYLENOL) tablet 650 mg  650 mg Oral Q6HRS PRN Lita Brantley M.D.   650 mg at 09/20/19 1905   • labetalol (NORMODYNE,TRANDATE) injection 10 mg  10 mg Intravenous Q4HRS PRN Lita Brantley M.D.   10 mg at 09/20/19 1906   • ondansetron (ZOFRAN) syringe/vial injection 4 mg  4 mg Intravenous Q4HRS PRN Lita Brantley M.D.   4 mg at 09/20/19 1905   • ondansetron (ZOFRAN ODT) dispertab 4 mg  4 mg Oral Q4HRS PRN Lita Brantley M.D.       • folic acid (FOLVITE) tablet 1 mg  1 mg Oral DAILY James Hou M.D.   1 mg at 09/22/19 0528   • multivitamin (THERAGRAN) tablet 1 Tab  1 Tab Oral DAILY James Hou M.D.   1 Tab at 09/22/19 0528       Fluids    Intake/Output Summary (Last 24 hours) at 9/22/2019 1635  Last data filed at 9/22/2019 1400  Gross per 24 hour   Intake --   Output 2675 ml   Net -2675 ml       Laboratory  Recent Labs     09/20/19  0501   ISTATAPH 7.405   ISTATAPCO2 35.4   ISTATAPO2 101*   ISTATATCO2 23   KSEFYDG3DPH 98   ISTATARTHCO3 22.2   ISTATARTBE -2   ISTATTEMP see below   ISTATFIO2 36   ISTATSPEC Arterial     Recent Labs     09/20/19  0356   CPKTOTAL 207*     Recent Labs     09/20/19  0356 09/21/19  0524 09/22/19  0540   SODIUM 142 144 141   POTASSIUM 4.1 5.0 4.2   CHLORIDE 111 108 106   CO2 21 23 26   BUN 20 11 11   CREATININE 0.86 0.82 0.75   MAGNESIUM 2.1 2.3 2.1   PHOSPHORUS 4.0 4.4 4.0   CALCIUM 8.7 9.1 9.6     Recent Labs     09/20/19  0356 09/21/19  0524 09/22/19  0540   ALTSGPT 32 33 33   ASTSGOT 16 24 16   ALKPHOSPHAT 52 58 52   TBILIRUBIN 0.4 0.3 0.6   GLUCOSE 89 114* 102*     Recent Labs     09/20/19  0356 09/21/19  0524 09/22/19  0540   WBC 9.5 9.5 10.5   NEUTSPOLYS 53.30 55.90 63.60   LYMPHOCYTES 33.10 29.30 23.50   MONOCYTES 11.20 10.30 9.70   EOSINOPHILS 0.90 3.20 2.40   BASOPHILS 0.30 0.30 0.20   ASTSGOT 16 24 16   ALTSGPT 32 33 33   ALKPHOSPHAT 52 58 52   TBILIRUBIN 0.4 0.3 0.6     Recent Labs      09/20/19  0356  09/20/19  1215 09/20/19  1911 09/21/19  0130 09/21/19  0524 09/22/19  0540   RBC 4.19*  --   --   --   --  4.35* 4.65*   HEMOGLOBIN 12.8*  --   --   --   --  13.1* 14.3   HEMATOCRIT 40.6*  --   --   --   --  41.0* 43.5   PLATELETCT 299  --   --   --   --  246 317   APTT  --    < > 47.2* 72.1* 58.9*  --   --     < > = values in this interval not displayed.       Imaging  X-Ray:  I have personally reviewed the images and compared with prior images.  EKG:  I have personally reviewed the images and compared with prior images.  CT:    Reviewed  Echo:   Reviewed    Assessment/Plan  * Acute saddle pulmonary embolism (HCC)- (present on admission)  Assessment & Plan  Acute submassive pulmonary embolism - blood pressure remains acceptable without any hypotension  Noncompliant with his rivaroxaban, mother [place] until he has improved  Echocardiogram with evidence of right heart strain, suggest follow-up in a few months  Elevated troponin, secondary to demand ischemia  He reported in the ER a history of recent multiple falls and striking his head - he reports losing consciousness as a result at least once in the last couple of weeks - CT scan of the head negative for acute pathology as well as MRI  In my opinion, the risks of intracranial hemorrhage outweigh the benefits of a submassive dose of alteplase or catheter directed thrombolytic therapy with EKOS  Also, given his altered mental status, I feel he would have increased difficulty being compliant with proper positioning during EKOS therapy  Continue full anticoagulation with heparin on the weight-based protocol, convert to full dose therapeutic Lovenox when current bag heparin complete, shortage of heparin per pharmacy  Start NOAC and bridge with Lovenox  Lower extremity venous Doppler study for DVT is negative  Ongoing daily safety eval to assess appropriateness of chronic anticoagulation, patient is at risk for  "noncompliance issues but bleeding issues, reviewed with patient and his mother again    Acute hypoxemic respiratory failure (HCC)  Assessment & Plan  Secondary to acute pulmonary embolus with pulmonary hypertension query acute on chronic and multifactorial  Patient remains on O2, increase requirements today, suspect related to immobility/atelectasis  History of PE 5/2019  History of compliance issues with rivaroxaban and falls per patient  RT protocols  Keep patient upright at 45 to 60 degrees, he seems to do better at that position, continue  Monitor for the need for intubation and mechanical ventilation, looks improved  Follow-up chest x-ray as needed  Monitor for need for auto BiPAP for ALLI is contributing  Encourage mobilization  Push incentive spirometry and PEP for atelectasis    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    Elevated troponin- (present on admission)  Assessment & Plan  Due to acute pulmonary embolism/demand ischemia  Monitor    ETOH abuse- (present on admission)  Assessment & Plan  History of alcohol abuse in the past with alcohol withdrawal  Supplement vitamins  Observe for evidence of withdrawal, okay so far  Consider dexmedetomidine infusion for DTs as a first choice    ALLI (obstructive sleep apnea)  Assessment & Plan  History and exam suggestive  Monitor for the need for auto BiPAP  Patient states he had a prior sleep test and was set up on CPAP but he does not use it  After discharge he probably would benefit " from a sleep evaluation if he is willing to participate as a follow-up to reinitiate therapy  Weight loss encouraged at length    Morbid obesity (HCC)  Assessment & Plan  BMI 40.1  Sedentary lifestyle  ROS for ALLI positive  Monitor for sleep apnea and the need for auto BiPAP  Weight loss to be encouraged long-term  Dietary consultation prior to discharge if he is willing to participate  Patient states he has had a prior sleep test which he flunked and was started on CPAP which he is noncompliant with    Pulmonary hypertension (HCC)- (present on admission)  Assessment & Plan  Secondary to pulmonary embolus, recurrent  RVSP moderate to severely elevated in May of this year, not estimated with 9/19 echo right-sided abnormalities including strain and Salcedo sign are noted again  Query component of ALLI and chronic hypoxemia contributing?  Strongly suspect   Given abnormal findings would suggest follow-up echocardiogram in a couple of months and if neurologically and clinically improved but still has pulmonary hypertension patient may need further therapy although at this point he would not be a candidate in my mind for referral to [facility] for embolectomy    Anxiety- (present on admission)  Assessment & Plan  Acute on chronic  Query related to substance use  Definite underlying neuropsychiatric process with significant component of paranoia today  Over reaction to Ativan, this medication has been removed from the MAR  Anxiety still better but paranoia the issue today  Psychiatric follow-up tomorrow  Continue adjusting Risperdal dose  They need additional agent for sleep at bedtime, with his respiratory and sleep apnea issues would avoid benzodiazepines    Essential hypertension- (present on admission)  Assessment & Plan  Hold atenolol, lisinopril for now  Monitor blood pressure  Resume BP meds when clinically appropriate       VTE:  Heparin  Ulcer: Not Indicated  Lines: None    I have performed a physical exam and  reviewed and updated ROS and Plan today (9/22/2019). In review of yesterday's note (9/21/2019), there are no changes except as documented above.     Discussed patient condition and risk of morbidity and/or mortality with Hospitalist, RN, RT, Pharmacy, Charge nurse / hot rounds and Patient

## 2019-09-22 NOTE — PROGRESS NOTES
[facility] Medicine Daily Progress Note    Date of Service  9/22/2019    Chief Complaint  34 y.o. male admitted 9/19/2019 with history of psychiatric disease, pulmonary embolism, substance abuse, alcohol abuse, obstructive sleep apnea, obesity, initially being evaluated for psychiatric reasons, referred to admission secondary to chest pain, dyspnea.  Patient found to have extensive pulmonary embolism recurrence.    Hospital Course    Admitted with pulmonary embolism, dyspnea, substance abuse      Interval Problem Update  Patient seen and examined today. ICU Care  Care and plan discussed in IDT/Hot rounds.  Lines and assistive devices reviewed.    Patient tolerating treatment and therapies.  All Data, Medication data reviewed.  Case discussed with nursing as available.  Plan of Care reviewed with patient and notified of changes.  9/20 patient found in ICU, somnolent, mother at bedside, received Ativan earlier, MRI negative for acute changes secondary to recent falls, sinus tachycardia in the 120s, blood pressure 100-1 60, T-max 99.5, tolerating diet, still tachypnea and dyspnea on 4 L nasal cannula.  Psychiatric evaluation noted.  9/21, the patient is improved, sinus rhythm sinus tach, blood pressure between 90 and 160, afebrile, eating better, had Srivastava for retention, good urine output, remains on heparin drip, 2 L nasal cannula, less tachypnea, discussed options for ongoing anticoagulation with the patient and mom at the bedside.  Plans are to move to [place] with the parents.  9/22 the patient awake and alert, significantly anxious and confused at times, sinus rhythm in the 70s to 90s, blood pressure between 130 and 140, urine output adequate, afebrile, I-S at 1250, on Risperdal, full dose Lovenox, adjusted psychotropic medication, PRN Haldol  Consultants/Specialty  Pulmonary critical care, psychiatry    Code Status  Full code    Disposition  ICU, improved for telemetry transfer    Review of Systems  Review of  Systems   Constitutional: Positive for malaise/fatigue.   HENT: Negative.    Eyes: Negative.    Respiratory: Positive for cough and shortness of breath.    Cardiovascular: Positive for chest pain.   Gastrointestinal: Negative.    Genitourinary: Negative.    Musculoskeletal: Negative.    Skin: Negative.    Neurological: Negative.    Endo/Heme/Allergies: Negative.    Psychiatric/Behavioral: Positive for substance abuse. The patient is nervous/anxious.    All other systems reviewed and are negative.       Physical Exam  Temp:  [36.5 °C (97.7 °F)] 36.5 °C (97.7 °F)  Pulse:  [76-96] 78  Resp:  [18-28] 22  BP: (137-148)/(88-93) 148/93  SpO2:  [90 %-96 %] 96 %    Physical Exam   Constitutional: He is oriented to person, place, and time. He appears well-developed and well-nourished. He appears lethargic. He has a sickly appearance. Nasal cannula in place.   HENT:   Head: Normocephalic.   Eyes: Pupils are equal, round, and reactive to light.   Neck: Normal range of motion.   Cardiovascular: Normal rate, regular rhythm, normal heart sounds and intact distal pulses.   Capillary refill <3 secs   Pulmonary/Chest: Effort normal. Tachypnea noted. No respiratory distress. He has rhonchi.   Abdominal: Soft. Bowel sounds are normal.   Genitourinary: Rectum normal and penis normal.   Musculoskeletal: Normal range of motion.   Neurological: He is oriented to person, place, and time. He appears lethargic. No cranial nerve deficit or sensory deficit.   Skin: Skin is warm and dry. No cyanosis.   Psychiatric: His mood appears anxious. His affect is blunt. His speech is rapid and/or pressured. Cognition and memory are impaired.   Nursing note and vitals reviewed.      Fluids    Intake/Output Summary (Last 24 hours) at 9/22/2019 1607  Last data filed at 9/22/2019 1400  Gross per 24 hour   Intake --   Output 2675 ml   Net -2675 ml       Laboratory  Recent Labs     09/20/19  0356 09/21/19  0524 09/22/19  0540   WBC 9.5 9.5 10.5   RBC 4.19*  "4.35* 4.65*   HEMOGLOBIN 12.8* 13.1* 14.3   HEMATOCRIT 40.6* 41.0* 43.5   MCV 96.9 94.3 93.5   MCH 30.5 30.1 30.8   MCHC 31.5* 32.0* 32.9*   RDW 71.1* 67.3* 65.2*   PLATELETCT 299 246 317   MPV 9.1 9.1 8.7*     Recent Labs     09/20/19  0356 09/21/19  0524 09/22/19  0540   SODIUM 142 144 141   POTASSIUM 4.1 5.0 4.2   CHLORIDE 111 108 106   CO2 21 23 26   GLUCOSE 89 114* 102*   BUN 20 11 11   CREATININE 0.86 0.82 0.75   CALCIUM 8.7 9.1 9.6     Recent Labs     09/20/19  1215 09/20/19  1911 09/21/19  0130   APTT 47.2* 72.1* 58.9*         Recent Labs     09/19/19  1737   TRIGLYCERIDE 126   HDL 22*   *       Imaging  DX-CHEST-PORTABLE (1 VIEW)   Final Result         1.  Pulmonary edema and/or infiltrates.   2.  Cardiomegaly      MR-BRAIN-W/O   Final Result      1.  No acute intracranial abnormality.   2.  Sphenoid and posterior right ethmoid sinus disease.      US-EXTREMITY VENOUS LOWER BILAT   Final Result      EC-ECHOCARDIOGRAM LTD W/O CONT   Final Result      CT-CTA CHEST PULMONARY ARTERY W/ RECONS   Final Result   Addendum 1 of 1   These findings were discussed with HEATHER MELGOZA on 9/19/2019 2:16 PM.      Final      DX-CHEST-PORTABLE (1 VIEW)   Final Result      No acute cardiopulmonary abnormality.      CT-HEAD W/O   Final Result      No CT evidence of acute infarct, hemorrhage or mass. No acute intracranial injury.           Assessment/Plan  * Acute saddle pulmonary embolism (HCC)- (present on admission)  Assessment & Plan  Recurrent, in the setting of noncompliance with anticoagulation therapy  CTA PE revealing \"Extensive acute bilateral pulmonary emboli with saddle embolus noted, as well as findings concerning for RV strain\"  Recurrence likely secondary to poor compliance of medication  Heparin drip  Echocardiogram noted  DVT study negative    Acute hypoxemic respiratory failure (HCC)  Assessment & Plan  Secondary acute pulmonary embolism  Heparinization  Titrate oxygen as tolerated    Substance-induced " psychotic disorder with hallucinations (HCC)- (present on admission)  Assessment & Plan  Patient with acute psychosis prior to presentation  Believed to be secondary to the use of mushrooms and marijuana    Psychiatry team consulted, further evaluations and medical management per psychiatric team  MRI brain negative    Elevated troponin- (present on admission)  Assessment & Plan  Abnormal EKG with right ventricular dysfunction, ST depressions and T wave inversions from V1 to V4.  Q waves in inferior leads.    Likely from underlying pulmonary embolism, right ventricular strain.    Close clinical monitoring in the intensive care unit  Patient on anticoagulation    ETOH abuse- (present on admission)  Assessment & Plan  History of, monitor for withdrawal    ALLI (obstructive sleep apnea)  Assessment & Plan  History of, pulmonary following    Morbid obesity (HCC)  Assessment & Plan  Outpatient weight loss program would be indicated    Pulmonary hypertension (HCC)- (present on admission)  Assessment & Plan  Known history of, now with recurrent PE  Echocardiogram noted    Anxiety- (present on admission)  Assessment & Plan  On Librium at home,   Psychiatry evaluating, thought to have degree of psychosis, adjust Risperdal  Haldol as needed    Essential hypertension- (present on admission)  Assessment & Plan  Holding antihypertensive therapy in the setting of acute pulmonary embolism     Plan  Change to Lovenox and eventually to oral anticoagulation  Close observation of respiratory status and cardiac status  Watch for withdrawal symptoms, psychiatry following  Psychiatry evaluation and follow-up, continue Risperdal, titrate, Haldol  Consider anxiolytics  See orders    Medically stabilized for transfer to telemetry  VTE prophylaxis: Lovenox    I have performed a physical exam and reviewed and updated ROS and Plan today . In review of yesterday's note , there are no changes except as documented above.

## 2019-09-22 NOTE — PROGRESS NOTES
"Patient given shower per patient request.  This RN and tech were present.  Came off leads with the permission of Dr. Thornton.  During shower, patient began to express the delusions that the shower water was urine.  This RN rationalized with him and explained that it was clean water.  At the end of the shower, the patient stated that the shower water was \"maple syrup\".  "

## 2019-09-22 NOTE — PROGRESS NOTES
"Patient anxious and paranoid, stating people are going to \"come out of the bathroom\".  Attempting to climb out of bed and telling individuals to \"get away\".  Pointed at another patient and stated that he \"was going to come out of the bathroom\".  Mother called to bedside, patients mentation improving with her presence.  States that he \"feels better\" with her there.  "

## 2019-09-22 NOTE — CARE PLAN
Problem: Communication  Goal: The ability to communicate needs accurately and effectively will improve  Outcome: PROGRESSING AS EXPECTED  Note:   Pt oriented to call light, able to communicate needs effectively.       Problem: Safety  Goal: Will remain free from falls  Outcome: PROGRESSING AS EXPECTED  Note:   Pt remains free from falls at this time.  HD fall risk assessment completed, appropriate fall precautions in place.

## 2019-09-23 LAB
ALBUMIN SERPL BCP-MCNC: 4 G/DL (ref 3.2–4.9)
ALBUMIN/GLOB SERPL: 1.5 G/DL
ALP SERPL-CCNC: 54 U/L (ref 30–99)
ALT SERPL-CCNC: 43 U/L (ref 2–50)
ANION GAP SERPL CALC-SCNC: 10 MMOL/L (ref 0–11.9)
AST SERPL-CCNC: 21 U/L (ref 12–45)
BASOPHILS # BLD AUTO: 0.2 % (ref 0–1.8)
BASOPHILS # BLD: 0.02 K/UL (ref 0–0.12)
BILIRUB SERPL-MCNC: 0.4 MG/DL (ref 0.1–1.5)
BUN SERPL-MCNC: 19 MG/DL (ref 8–22)
CALCIUM SERPL-MCNC: 9.4 MG/DL (ref 8.5–10.5)
CHLORIDE SERPL-SCNC: 105 MMOL/L (ref 96–112)
CO2 SERPL-SCNC: 27 MMOL/L (ref 20–33)
CREAT SERPL-MCNC: 0.86 MG/DL (ref 0.5–1.4)
EOSINOPHIL # BLD AUTO: 0.14 K/UL (ref 0–0.51)
EOSINOPHIL NFR BLD: 1.4 % (ref 0–6.9)
ERYTHROCYTE [DISTWIDTH] IN BLOOD BY AUTOMATED COUNT: 65.9 FL (ref 35.9–50)
GLOBULIN SER CALC-MCNC: 2.7 G/DL (ref 1.9–3.5)
GLUCOSE SERPL-MCNC: 104 MG/DL (ref 65–99)
HCT VFR BLD AUTO: 42.8 % (ref 42–52)
HGB BLD-MCNC: 14 G/DL (ref 14–18)
IMM GRANULOCYTES # BLD AUTO: 0.07 K/UL (ref 0–0.11)
IMM GRANULOCYTES NFR BLD AUTO: 0.7 % (ref 0–0.9)
LYMPHOCYTES # BLD AUTO: 2.56 K/UL (ref 1–4.8)
LYMPHOCYTES NFR BLD: 25 % (ref 22–41)
MAGNESIUM SERPL-MCNC: 2.3 MG/DL (ref 1.5–2.5)
MCH RBC QN AUTO: 31.1 PG (ref 27–33)
MCHC RBC AUTO-ENTMCNC: 32.7 G/DL (ref 33.7–35.3)
MCV RBC AUTO: 95.1 FL (ref 81.4–97.8)
MONOCYTES # BLD AUTO: 0.98 K/UL (ref 0–0.85)
MONOCYTES NFR BLD AUTO: 9.6 % (ref 0–13.4)
NEUTROPHILS # BLD AUTO: 6.47 K/UL (ref 1.82–7.42)
NEUTROPHILS NFR BLD: 63.1 % (ref 44–72)
NRBC # BLD AUTO: 0 K/UL
NRBC BLD-RTO: 0 /100 WBC
PHOSPHATE SERPL-MCNC: 4.1 MG/DL (ref 2.5–4.5)
PLATELET # BLD AUTO: 318 K/UL (ref 164–446)
PMV BLD AUTO: 8.7 FL (ref 9–12.9)
POTASSIUM SERPL-SCNC: 4.3 MMOL/L (ref 3.6–5.5)
PROT SERPL-MCNC: 6.7 G/DL (ref 6–8.2)
RBC # BLD AUTO: 4.5 M/UL (ref 4.7–6.1)
SODIUM SERPL-SCNC: 142 MMOL/L (ref 135–145)
WBC # BLD AUTO: 10.2 K/UL (ref 4.8–10.8)

## 2019-09-23 PROCEDURE — 99233 SBSQ HOSP IP/OBS HIGH 50: CPT | Performed by: HOSPITALIST

## 2019-09-23 PROCEDURE — 700111 HCHG RX REV CODE 636 W/ 250 OVERRIDE (IP): Performed by: INTERNAL MEDICINE

## 2019-09-23 PROCEDURE — 84100 ASSAY OF PHOSPHORUS: CPT

## 2019-09-23 PROCEDURE — 80053 COMPREHEN METABOLIC PANEL: CPT

## 2019-09-23 PROCEDURE — 51798 US URINE CAPACITY MEASURE: CPT

## 2019-09-23 PROCEDURE — 700101 HCHG RX REV CODE 250: Performed by: INTERNAL MEDICINE

## 2019-09-23 PROCEDURE — 770020 HCHG ROOM/CARE - TELE (206)

## 2019-09-23 PROCEDURE — 85025 COMPLETE CBC W/AUTO DIFF WBC: CPT

## 2019-09-23 PROCEDURE — 83735 ASSAY OF MAGNESIUM: CPT

## 2019-09-23 PROCEDURE — 97162 PT EVAL MOD COMPLEX 30 MIN: CPT

## 2019-09-23 PROCEDURE — 99233 SBSQ HOSP IP/OBS HIGH 50: CPT | Performed by: INTERNAL MEDICINE

## 2019-09-23 PROCEDURE — 700102 HCHG RX REV CODE 250 W/ 637 OVERRIDE(OP): Performed by: INTERNAL MEDICINE

## 2019-09-23 PROCEDURE — A9270 NON-COVERED ITEM OR SERVICE: HCPCS | Performed by: INTERNAL MEDICINE

## 2019-09-23 PROCEDURE — 700102 HCHG RX REV CODE 250 W/ 637 OVERRIDE(OP): Performed by: HOSPITALIST

## 2019-09-23 PROCEDURE — A9270 NON-COVERED ITEM OR SERVICE: HCPCS | Performed by: HOSPITALIST

## 2019-09-23 RX ORDER — BISACODYL 10 MG
10 SUPPOSITORY, RECTAL RECTAL
Status: DISCONTINUED | OUTPATIENT
Start: 2019-09-23 | End: 2019-09-24

## 2019-09-23 RX ORDER — TAMSULOSIN HYDROCHLORIDE 0.4 MG/1
0.4 CAPSULE ORAL
Status: DISCONTINUED | OUTPATIENT
Start: 2019-09-23 | End: 2019-10-16 | Stop reason: HOSPADM

## 2019-09-23 RX ORDER — POLYETHYLENE GLYCOL 3350 17 G/17G
1 POWDER, FOR SOLUTION ORAL
Status: DISCONTINUED | OUTPATIENT
Start: 2019-09-23 | End: 2019-09-24

## 2019-09-23 RX ORDER — AMOXICILLIN 250 MG
2 CAPSULE ORAL 2 TIMES DAILY
Status: DISCONTINUED | OUTPATIENT
Start: 2019-09-23 | End: 2019-09-24

## 2019-09-23 RX ADMIN — RISPERIDONE 2 MG: 1 TABLET ORAL at 22:44

## 2019-09-23 RX ADMIN — TAMSULOSIN HYDROCHLORIDE 0.4 MG: 0.4 CAPSULE ORAL at 15:19

## 2019-09-23 RX ADMIN — LABETALOL HYDROCHLORIDE 10 MG: 5 INJECTION INTRAVENOUS at 03:06

## 2019-09-23 RX ADMIN — FOLIC ACID 1 MG: 1 TABLET ORAL at 05:55

## 2019-09-23 RX ADMIN — SENNOSIDES, DOCUSATE SODIUM 2 TABLET: 50; 8.6 TABLET, FILM COATED ORAL at 12:31

## 2019-09-23 RX ADMIN — THERA TABS 1 TABLET: TAB at 05:55

## 2019-09-23 RX ADMIN — ACETAMINOPHEN 650 MG: 325 TABLET, FILM COATED ORAL at 12:31

## 2019-09-23 RX ADMIN — DIPHENHYDRAMINE HYDROCHLORIDE 25 MG: 50 INJECTION INTRAMUSCULAR; INTRAVENOUS at 22:44

## 2019-09-23 RX ADMIN — RISPERIDONE 1 MG: 1 TABLET ORAL at 05:55

## 2019-09-23 RX ADMIN — ENOXAPARIN SODIUM 120 MG: 150 INJECTION SUBCUTANEOUS at 05:55

## 2019-09-23 RX ADMIN — LABETALOL HYDROCHLORIDE 10 MG: 5 INJECTION INTRAVENOUS at 23:32

## 2019-09-23 RX ADMIN — SENNOSIDES, DOCUSATE SODIUM 2 TABLET: 50; 8.6 TABLET, FILM COATED ORAL at 17:39

## 2019-09-23 RX ADMIN — Medication 100 MG: at 05:55

## 2019-09-23 RX ADMIN — LABETALOL HYDROCHLORIDE 10 MG: 5 INJECTION INTRAVENOUS at 12:16

## 2019-09-23 RX ADMIN — RIVAROXABAN 15 MG: 15 TABLET, FILM COATED ORAL at 17:39

## 2019-09-23 ASSESSMENT — ENCOUNTER SYMPTOMS
FOCAL WEAKNESS: 0
SORE THROAT: 0
GASTROINTESTINAL NEGATIVE: 1
FEVER: 0
MUSCULOSKELETAL NEGATIVE: 1
NAUSEA: 0
CHILLS: 0
HEMOPTYSIS: 0
HEADACHES: 0
HALLUCINATIONS: 0
EYES NEGATIVE: 1
BACK PAIN: 0
SENSORY CHANGE: 0
PALPITATIONS: 0
NERVOUS/ANXIOUS: 1
COUGH: 0
VOMITING: 0
NEUROLOGICAL NEGATIVE: 1
SHORTNESS OF BREATH: 1
COUGH: 1
ABDOMINAL PAIN: 0
SPEECH CHANGE: 0
MEMORY LOSS: 1
INSOMNIA: 1

## 2019-09-23 ASSESSMENT — LIFESTYLE VARIABLES: SUBSTANCE_ABUSE: 1

## 2019-09-23 ASSESSMENT — COGNITIVE AND FUNCTIONAL STATUS - GENERAL
MOVING FROM LYING ON BACK TO SITTING ON SIDE OF FLAT BED: A LITTLE
MOBILITY SCORE: 17
STANDING UP FROM CHAIR USING ARMS: A LITTLE
SUGGESTED CMS G CODE MODIFIER MOBILITY: CK
TURNING FROM BACK TO SIDE WHILE IN FLAT BAD: A LITTLE
WALKING IN HOSPITAL ROOM: A LITTLE
MOVING TO AND FROM BED TO CHAIR: A LITTLE
CLIMB 3 TO 5 STEPS WITH RAILING: A LOT

## 2019-09-23 ASSESSMENT — GAIT ASSESSMENTS
GAIT LEVEL OF ASSIST: MINIMAL ASSIST
ASSISTIVE DEVICE: FRONT WHEEL WALKER
DISTANCE (FEET): 4

## 2019-09-23 NOTE — THERAPY
"Pt admitted w/ saddle pulmonary embolism.  He has a significant hx of ETOH and recent drug use, including mushrooms.  He was living alone in [place], but plan is to d/c home to [place] w/ his mother.  Today he needs assist to sit edge of bed.  Once sitting he can maintain w/o assist.  Min assist to stand.  In standing he is able to walk in place and perform small knee bends, w/ his knees consistently buckling w/ each step/therex.  Fortunately he is able to correct and prevent falling w/ the fww.  He was returned to supine.  PT will follow and address goals to improve functional mobility and indep.    Physical Therapy Evaluation completed.   Bed Mobility:  Supine to Sit: (P) Minimal Assist  Transfers: Sit to Stand: (P) Minimal Assist  Gait: Level Of Assist: (P) Minimal Assist with Front-Wheel Walker       Plan of Care: Will benefit from Physical Therapy 3 times per week  Discharge Recommendations: Equipment: Will Continue to Assess for Equipment Needs. Post-acute therapy   Recommend home health transitional care for continued physical therapy services.     See \"Rehab Therapy-Acute\" Patient Summary Report for complete documentation.     "

## 2019-09-23 NOTE — PROGRESS NOTES
"Critical Care Progress Note    Date of admission  9/19/2019    Chief Complaint  34 y.o. male admitted 9/19/2019 with psychosis, SOB and R sided CP.    Hospital Course    The history is obtained from the patient as well as from healthcare providers and the medical record.  This gentleman does not provide the best history due to disorientation and psychosis.  This gentleman has a history of submassive pulmonary embolism in May 2019, anxiety, hypertension, paroxysmal atrial fibrillation and alcohol abuse with documented blood alcohol levels as high as 0.25 in the past.  He was brought into the emergency room today with psychosis.  He states that people have been following him around at his home with an agenda.  That agenda is to have sexual activity with him.  He is aware that his thinking has been \"off.\"  He admits that he has been using psychedelic mushrooms and whippits as well as marijuana.  He is disoriented to time and cannot really give me a history of his duration of use or how long his kunz has been going on, but it has been several days at least.  He is complaining of some shortness of breath and right-sided pleuritic chest pain.  He is supposed to be taking rivaroxaban for history of pulmonary embolism, but admits that he is noncompliant with this medication and takes it only intermittently.  He believes that he is missed at least 5 days of therapy in the last week.  He also tells me that for several weeks he has been unsteady and has been falling.  He admits that he has hit his head on several falls and further admits that sometime in the last couple of weeks, he hit his head so hard that he lost consciousness.  CT imaging on presentation today reveals submassive pulmonary embolism with a saddle pulmonary embolism.      Interval Problem Update  Reviewed last 24 hour events:    A&O x4  Worse paranoia and insomnia last night - better this AM  SR/ST 90-100s  SBp 130-170s  UO ok  Tm 99  O2 3-4 lpm NC  IS " 1250  Risperdal  Full dose Lovenox  Rxn to trazadone?    Continue trazodone  Discontinue Haldol  Call to psychiatry to get them to urgently see him this a.m. to help participate in pharmacological management for this patient's psychosis/paranoia    Long discussion with patient and his mother are he current diagnoses and treatment plans.  We also discussed options to modify therapy for sleep, paranoia force ALLI and for pulmonary emboli.  Plan is still for the patient to move back [facility] with his mother when he is medically ready to get out of the hospital.     YESTERDAY  A&O x4  Confused/anxiousat times  SR 70-90s  SBp 130-140s  UO good  I/Os neg 240cc  Tm 99.9  WBC 10.5  plt 317, Hgb 14.3  3.5 lpm NC O2 - no desats with sleep?  IS 1250  Risperdal  Full dose Lovenox    Thiamine repletion  Start NOAC?  PT/OT eval and treat  Psych F/u tomorrow    D/w Mom at length times several during the afternoon at the bedside  Patient's paranoia is waxed and waned today, requesting a sleeping pill, apparently takes hydroxyzine at home?,  Med not on med rec form  Increase Risperdal dosing    Mother still planning on moving the patient back to [place] to assist in his recovery and care    Review of Systems  Review of Systems   Constitutional: Positive for malaise/fatigue (Did not sleep well last night). Negative for chills and fever.   HENT: Negative for congestion, nosebleeds and sore throat.    Respiratory: Positive for shortness of breath (Approaching baseline). Negative for cough and hemoptysis.    Cardiovascular: Negative for chest pain and palpitations.   Gastrointestinal: Negative for abdominal pain, nausea (resolved) and vomiting.   Genitourinary: Negative.    Musculoskeletal: Negative for back pain.   Neurological: Negative for sensory change, speech change, focal weakness and headaches.   Psychiatric/Behavioral: Positive for memory loss and substance abuse. Negative for hallucinations (denies) and suicidal ideas.  The patient is nervous/anxious and has insomnia.         Fearful/experiencing significant paranoia   Sleep:  Positive for EDS and Snoring      Vital Signs for last 24 hours   Temp:  [36.9 °C (98.4 °F)-37.2 °C (99 °F)] 36.9 °C (98.4 °F)  Pulse:  [] 84  Resp:  [14-28] 16  BP: (130-177)/() 177/112  SpO2:  [92 %-95 %] 93 %    Hemodynamic parameters for last 24 hours       Respiratory Information for the last 24 hours       Physical Exam   Physical Exam   Constitutional: He appears well-developed and well-nourished. He is cooperative.  Non-toxic appearance. No distress. Nasal cannula in place.   HENT:   Head: Normocephalic and atraumatic.   Mouth/Throat: Oropharynx is clear and moist.   M&P IV airway   Eyes: Pupils are equal, round, and reactive to light. EOM are normal. No scleral icterus.   Neck: Neck supple. No JVD present.   Obese neck   Cardiovascular: Normal rate, regular rhythm and intact distal pulses.  No extrasystoles are present. Exam reveals no gallop and no friction rub.   No murmur heard.  Sinus rhythm   Pulmonary/Chest: No respiratory distress. He has no wheezes. He has rhonchi (Improved again). He has no rales.   Abdominal: Soft. Bowel sounds are normal. There is no tenderness. There is no rigidity, no guarding and no CVA tenderness.   obese   Musculoskeletal: He exhibits no edema.   Neurological: He is alert. He displays no atrophy. No cranial nerve deficit or sensory deficit. He exhibits normal muscle tone. GCS eye subscore is 4. GCS verbal subscore is 5. GCS motor subscore is 6.   Skin: Skin is warm and dry. No rash noted. He is not diaphoretic. No cyanosis. Nails show no clubbing.   Psychiatric: His speech is normal. His mood appears not anxious. He is not agitated. Thought content is paranoid (???). Cognition and memory are impaired (Improved).   Tearful   Vitals reviewed.      Medications  Current Facility-Administered Medications   Medication Dose Route Frequency Provider Last Rate Last  Dose   • senna-docusate (PERICOLACE or SENOKOT S) 8.6-50 MG per tablet 2 Tab  2 Tab Oral BID Rainer Butt M.D.   2 Tab at 09/23/19 1231    And   • polyethylene glycol/lytes (MIRALAX) PACKET 1 Packet  1 Packet Oral QDAY PRN Rainer Butt M.D.        And   • magnesium hydroxide (MILK OF MAGNESIA) suspension 30 mL  30 mL Oral QDAY PRN Rainer Butt M.D.        And   • bisacodyl (DULCOLAX) suppository 10 mg  10 mg Rectal QDAY PRN Rainer Butt M.D.       • rivaroxaban (XARELTO) tablet 15 mg  15 mg Oral BID WITH MEALS Rainer Butt M.D.        Followed by   • [START ON 10/14/2019] rivaroxaban (XARELTO) tablet 20 mg  20 mg Oral PM MEAL Rainer Butt M.D.       • tamsulosin (FLOMAX) capsule 0.4 mg  0.4 mg Oral AFTER BREAKFAST Rainer Butt M.D.       • risperiDONE (RISPERDAL) tablet 1 mg  1 mg Oral QAM Rainer Butt M.D.   1 mg at 09/23/19 0555   • risperiDONE (RISPERDAL) tablet 2 mg  2 mg Oral Q EVENING Rainer Butt M.D.   2 mg at 09/22/19 2151   • diphenhydrAMINE (BENADRYL) injection 25 mg  25 mg Intravenous QHS PRN Domingo Ceja M.D.   25 mg at 09/22/19 2334   • Respiratory Care per Protocol   Nebulization Continuous RT Lita Brantley M.D.       • acetaminophen (TYLENOL) tablet 650 mg  650 mg Oral Q6HRS PRN Lita Brantley M.D.   650 mg at 09/23/19 1231   • labetalol (NORMODYNE,TRANDATE) injection 10 mg  10 mg Intravenous Q4HRS PRN Lita Brantley M.D.   10 mg at 09/23/19 1216   • ondansetron (ZOFRAN) syringe/vial injection 4 mg  4 mg Intravenous Q4HRS PRN Lita Brantley M.D.   4 mg at 09/20/19 1905   • ondansetron (ZOFRAN ODT) dispertab 4 mg  4 mg Oral Q4HRS PRN Lita Brantley M.D.       • folic acid (FOLVITE) tablet 1 mg  1 mg Oral DAILY James Hou M.D.   1 mg at 09/23/19 0555   • multivitamin (THERAGRAN) tablet 1 Tab  1 Tab Oral DAILY James Hou M.D.   1 Tab at 09/23/19 0555       Fluids    Intake/Output Summary (Last 24 hours) at 9/23/2019  1442  Last data filed at 9/23/2019 1300  Gross per 24 hour   Intake --   Output 400 ml   Net -400 ml       Laboratory          Recent Labs     09/21/19 0524 09/22/19  0540 09/23/19  0600   SODIUM 144 141 142   POTASSIUM 5.0 4.2 4.3   CHLORIDE 108 106 105   CO2 23 26 27   BUN 11 11 19   CREATININE 0.82 0.75 0.86   MAGNESIUM 2.3 2.1 2.3   PHOSPHORUS 4.4 4.0 4.1   CALCIUM 9.1 9.6 9.4     Recent Labs     09/21/19 0524 09/22/19  0540 09/23/19  0600   ALTSGPT 33 33 43   ASTSGOT 24 16 21   ALKPHOSPHAT 58 52 54   TBILIRUBIN 0.3 0.6 0.4   GLUCOSE 114* 102* 104*     Recent Labs     09/21/19 0524 09/22/19  0540 09/23/19  0600   WBC 9.5 10.5 10.2   NEUTSPOLYS 55.90 63.60 63.10   LYMPHOCYTES 29.30 23.50 25.00   MONOCYTES 10.30 9.70 9.60   EOSINOPHILS 3.20 2.40 1.40   BASOPHILS 0.30 0.20 0.20   ASTSGOT 24 16 21   ALTSGPT 33 33 43   ALKPHOSPHAT 58 52 54   TBILIRUBIN 0.3 0.6 0.4     Recent Labs     09/20/19  1911 09/21/19  0130 09/21/19 0524 09/22/19  0540 09/23/19  0600   RBC  --   --  4.35* 4.65* 4.50*   HEMOGLOBIN  --   --  13.1* 14.3 14.0   HEMATOCRIT  --   --  41.0* 43.5 42.8   PLATELETCT  --   --  246 317 318   APTT 72.1* 58.9*  --   --   --        Imaging  X-Ray:  I have personally reviewed the images and compared with prior images.  EKG:  I have personally reviewed the images and compared with prior images.  CT:    Reviewed  Echo:   Reviewed    Assessment/Plan  * Acute saddle pulmonary embolism (HCC)- (present on admission)  Assessment & Plan  Acute submassive pulmonary embolism - blood pressure remains acceptable without any hypotension  Noncompliant with his rivaroxaban, mother [place] he has improved  Echocardiogram with evidence of right heart strain, suggest follow-up in a few months  Elevated troponin, secondary to demand ischemia  He reported in the ER a history of recent multiple falls and striking his head - he reports losing consciousness as a result at least once  "in the last couple of weeks - CT scan of the head negative for acute pathology as well as MRI  In my opinion, the risks of intracranial hemorrhage outweigh the benefits of a submassive dose of alteplase or catheter directed thrombolytic therapy with EKOS  Also, given his altered mental status, I feel he would have increased difficulty being compliant with proper positioning during EKOS therapy  Continue full anticoagulation with heparin on the weight-based protocol, convert to full dose therapeutic Lovenox when current bag heparin complete, shortage of heparin per pharmacy  Start NOAC and bridge with Lovenox  Lower extremity venous Doppler study for DVT is negative  Ongoing daily safety eval to assess appropriateness of chronic anticoagulation, patient is at risk for noncompliance issues but bleeding issues, reviewed with patient and his mother again    Acute hypoxemic respiratory failure (HCC)  Assessment & Plan  Secondary to acute pulmonary embolus with pulmonary hypertension query acute on chronic and multifactorial  History of PE 5/2019  Atelectasis contributing  History of compliance issues with rivaroxaban and falls per patient  RT protocols  Keep patient upright at 45 to 60 degrees, he seems to do better at that position, continue  Follow-up chest x-ray as needed  Monitor for need for auto BiPAP for ALLI, significant desats have not been seen so far  Encourage mobilization  Push incentive spirometry and PEP for atelectasis    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    Elevated troponin- (present on admission)  Assessment & Plan  Due to acute pulmonary embolism/demand ischemia  Monitor    ETOH abuse- (present on admission)  Assessment & Plan  History of alcohol abuse in the past with alcohol withdrawal  Supplement vitamins  Observe for evidence of withdrawal, okay so far, paranoia more from a psychiatric standpoint then EtOH withdrawal suspected  Consider dexmedetomidine infusion for DTs as a first choice    ALLI (obstructive sleep apnea)  Assessment & Plan  History and exam suggestive  Monitor for the need for auto BiPAP  Patient states he had a prior sleep test and was set up on CPAP but he does not use it  After discharge he probably would benefit from a sleep evaluation if he is willing to participate as a follow-up to reinitiate therapy  Weight loss encouraged at length    Morbid obesity (HCC)  Assessment & Plan  BMI 40.1  Sedentary lifestyle  ROS for ALLI positive  Monitor for sleep apnea and the need for auto BiPAP  Weight loss to be encouraged long-term  Dietary consultation prior to discharge if he is willing to participate  Patient states he has had a prior sleep test which he flunked and was started on CPAP which he is noncompliant with    Pulmonary hypertension (HCC)- (present on admission)  Assessment & Plan  Secondary to pulmonary embolus, recurrent  RVSP moderate to severely elevated in May of this year, not estimated with 9/19 echo right-sided abnormalities including strain and Salcedo sign are noted again  Query component of ALLI and chronic hypoxemia contributing?  Strongly suspect   Given abnormal findings would suggest follow-up echocardiogram in a couple of months and if neurologically and clinically improved but still has pulmonary hypertension patient may need further therapy although at this point he would not be a candidate in my mind for referral to [facility] for embolectomy    Anxiety- (present on  admission)  Assessment & Plan  Acute on chronic  Query related to substance use  Definite underlying neuropsychiatric process with significant component of paranoia today again  Over reaction to Ativan, this medication has been removed from the MAR  Trazodone may have worsened his paranoia as well  Anxiety still better but paranoia fernanda the issue today and last night  Psychiatric follow-up today, call out to team does try to see him early and assisted pharmacological management  Continue adjusting Risperdal dose, consider increasing dose  Avoid benzodiazepines for sleep even his ALLI and big reaction to Ativan  Patient uses hydroxyzine or Benadryl at home and apparently that helps    Essential hypertension- (present on admission)  Assessment & Plan  Hold atenolol, lisinopril for now  Monitor blood pressure  Resume BP meds when clinically appropriate       VTE:  Heparin  Ulcer: Not Indicated  Lines: None    I have performed a physical exam and reviewed and updated ROS and Plan today (9/23/2019). In review of yesterday's note (9/22/2019), there are no changes except as documented above.     Discussed patient condition and risk of morbidity and/or mortality with Hospitalist, RN, RT, Pharmacy, Charge nurse / hot rounds and Patient

## 2019-09-24 PROBLEM — K59.01 SLOW TRANSIT CONSTIPATION: Status: ACTIVE | Noted: 2019-09-24

## 2019-09-24 PROBLEM — E11.9 TYPE 2 DIABETES MELLITUS WITHOUT COMPLICATION, WITHOUT LONG-TERM CURRENT USE OF INSULIN (HCC): Status: ACTIVE | Noted: 2019-09-24

## 2019-09-24 LAB
ALBUMIN SERPL BCP-MCNC: 4 G/DL (ref 3.2–4.9)
ALBUMIN/GLOB SERPL: 1.5 G/DL
ALP SERPL-CCNC: 51 U/L (ref 30–99)
ALT SERPL-CCNC: 45 U/L (ref 2–50)
ANION GAP SERPL CALC-SCNC: 9 MMOL/L (ref 0–11.9)
AST SERPL-CCNC: 22 U/L (ref 12–45)
BASOPHILS # BLD AUTO: 0.2 % (ref 0–1.8)
BASOPHILS # BLD: 0.02 K/UL (ref 0–0.12)
BILIRUB SERPL-MCNC: 0.6 MG/DL (ref 0.1–1.5)
BUN SERPL-MCNC: 17 MG/DL (ref 8–22)
CALCIUM SERPL-MCNC: 9.2 MG/DL (ref 8.5–10.5)
CHLORIDE SERPL-SCNC: 105 MMOL/L (ref 96–112)
CK SERPL-CCNC: 60 U/L (ref 0–154)
CO2 SERPL-SCNC: 26 MMOL/L (ref 20–33)
CREAT SERPL-MCNC: 0.84 MG/DL (ref 0.5–1.4)
EOSINOPHIL # BLD AUTO: 0.17 K/UL (ref 0–0.51)
EOSINOPHIL NFR BLD: 1.7 % (ref 0–6.9)
ERYTHROCYTE [DISTWIDTH] IN BLOOD BY AUTOMATED COUNT: 65.6 FL (ref 35.9–50)
GLOBULIN SER CALC-MCNC: 2.6 G/DL (ref 1.9–3.5)
GLUCOSE SERPL-MCNC: 115 MG/DL (ref 65–99)
HCT VFR BLD AUTO: 42.7 % (ref 42–52)
HGB BLD-MCNC: 13.9 G/DL (ref 14–18)
IMM GRANULOCYTES # BLD AUTO: 0.06 K/UL (ref 0–0.11)
IMM GRANULOCYTES NFR BLD AUTO: 0.6 % (ref 0–0.9)
LYMPHOCYTES # BLD AUTO: 2.25 K/UL (ref 1–4.8)
LYMPHOCYTES NFR BLD: 22 % (ref 22–41)
MAGNESIUM SERPL-MCNC: 2.3 MG/DL (ref 1.5–2.5)
MCH RBC QN AUTO: 30.8 PG (ref 27–33)
MCHC RBC AUTO-ENTMCNC: 32.6 G/DL (ref 33.7–35.3)
MCV RBC AUTO: 94.5 FL (ref 81.4–97.8)
MONOCYTES # BLD AUTO: 1.03 K/UL (ref 0–0.85)
MONOCYTES NFR BLD AUTO: 10.1 % (ref 0–13.4)
NEUTROPHILS # BLD AUTO: 6.7 K/UL (ref 1.82–7.42)
NEUTROPHILS NFR BLD: 65.4 % (ref 44–72)
NRBC # BLD AUTO: 0 K/UL
NRBC BLD-RTO: 0 /100 WBC
PHOSPHATE SERPL-MCNC: 3.9 MG/DL (ref 2.5–4.5)
PLATELET # BLD AUTO: 299 K/UL (ref 164–446)
PMV BLD AUTO: 8.6 FL (ref 9–12.9)
POTASSIUM SERPL-SCNC: 4.5 MMOL/L (ref 3.6–5.5)
PROT SERPL-MCNC: 6.6 G/DL (ref 6–8.2)
RBC # BLD AUTO: 4.52 M/UL (ref 4.7–6.1)
SODIUM SERPL-SCNC: 140 MMOL/L (ref 135–145)
WBC # BLD AUTO: 10.2 K/UL (ref 4.8–10.8)

## 2019-09-24 PROCEDURE — A9270 NON-COVERED ITEM OR SERVICE: HCPCS | Performed by: INTERNAL MEDICINE

## 2019-09-24 PROCEDURE — 700102 HCHG RX REV CODE 250 W/ 637 OVERRIDE(OP): Performed by: HOSPITALIST

## 2019-09-24 PROCEDURE — 36415 COLL VENOUS BLD VENIPUNCTURE: CPT

## 2019-09-24 PROCEDURE — 99232 SBSQ HOSP IP/OBS MODERATE 35: CPT | Performed by: PSYCHIATRY & NEUROLOGY

## 2019-09-24 PROCEDURE — 770020 HCHG ROOM/CARE - TELE (206)

## 2019-09-24 PROCEDURE — 82550 ASSAY OF CK (CPK): CPT

## 2019-09-24 PROCEDURE — A9270 NON-COVERED ITEM OR SERVICE: HCPCS | Performed by: HOSPITALIST

## 2019-09-24 PROCEDURE — 700111 HCHG RX REV CODE 636 W/ 250 OVERRIDE (IP): Performed by: INTERNAL MEDICINE

## 2019-09-24 PROCEDURE — 83735 ASSAY OF MAGNESIUM: CPT

## 2019-09-24 PROCEDURE — 99232 SBSQ HOSP IP/OBS MODERATE 35: CPT | Performed by: INTERNAL MEDICINE

## 2019-09-24 PROCEDURE — 80053 COMPREHEN METABOLIC PANEL: CPT

## 2019-09-24 PROCEDURE — 99232 SBSQ HOSP IP/OBS MODERATE 35: CPT | Mod: GC | Performed by: INTERNAL MEDICINE

## 2019-09-24 PROCEDURE — 700102 HCHG RX REV CODE 250 W/ 637 OVERRIDE(OP): Performed by: INTERNAL MEDICINE

## 2019-09-24 PROCEDURE — 84100 ASSAY OF PHOSPHORUS: CPT

## 2019-09-24 PROCEDURE — 85025 COMPLETE CBC W/AUTO DIFF WBC: CPT

## 2019-09-24 RX ORDER — LISINOPRIL 10 MG/1
10 TABLET ORAL
Status: DISCONTINUED | OUTPATIENT
Start: 2019-09-24 | End: 2019-10-16 | Stop reason: HOSPADM

## 2019-09-24 RX ORDER — AMOXICILLIN 250 MG
2 CAPSULE ORAL 2 TIMES DAILY
Status: DISCONTINUED | OUTPATIENT
Start: 2019-09-24 | End: 2019-10-16 | Stop reason: HOSPADM

## 2019-09-24 RX ORDER — POLYETHYLENE GLYCOL 3350 17 G/17G
1 POWDER, FOR SOLUTION ORAL DAILY
Status: DISCONTINUED | OUTPATIENT
Start: 2019-09-24 | End: 2019-10-16 | Stop reason: HOSPADM

## 2019-09-24 RX ORDER — BISACODYL 10 MG
10 SUPPOSITORY, RECTAL RECTAL
Status: DISCONTINUED | OUTPATIENT
Start: 2019-09-24 | End: 2019-10-16 | Stop reason: HOSPADM

## 2019-09-24 RX ADMIN — LABETALOL HYDROCHLORIDE 10 MG: 5 INJECTION INTRAVENOUS at 08:31

## 2019-09-24 RX ADMIN — POLYETHYLENE GLYCOL 3350 1 PACKET: 17 POWDER, FOR SOLUTION ORAL at 13:46

## 2019-09-24 RX ADMIN — TAMSULOSIN HYDROCHLORIDE 0.4 MG: 0.4 CAPSULE ORAL at 08:21

## 2019-09-24 RX ADMIN — RIVAROXABAN 15 MG: 15 TABLET, FILM COATED ORAL at 08:21

## 2019-09-24 RX ADMIN — FOLIC ACID 1 MG: 1 TABLET ORAL at 05:49

## 2019-09-24 RX ADMIN — RISPERIDONE 1 MG: 1 TABLET ORAL at 05:49

## 2019-09-24 RX ADMIN — DIPHENHYDRAMINE HYDROCHLORIDE 25 MG: 50 INJECTION INTRAMUSCULAR; INTRAVENOUS at 23:41

## 2019-09-24 RX ADMIN — THERA TABS 1 TABLET: TAB at 05:49

## 2019-09-24 RX ADMIN — SENNOSIDES, DOCUSATE SODIUM 2 TABLET: 50; 8.6 TABLET, FILM COATED ORAL at 17:17

## 2019-09-24 RX ADMIN — RIVAROXABAN 15 MG: 15 TABLET, FILM COATED ORAL at 17:18

## 2019-09-24 RX ADMIN — SENNOSIDES, DOCUSATE SODIUM 2 TABLET: 50; 8.6 TABLET, FILM COATED ORAL at 05:49

## 2019-09-24 RX ADMIN — LISINOPRIL 10 MG: 10 TABLET ORAL at 13:44

## 2019-09-24 ASSESSMENT — ENCOUNTER SYMPTOMS
NAUSEA: 0
HALLUCINATIONS: 0
CARDIOVASCULAR NEGATIVE: 1
EYES NEGATIVE: 1
CONSTIPATION: 1
VOMITING: 0
GASTROINTESTINAL NEGATIVE: 1
CONSTITUTIONAL NEGATIVE: 1
ABDOMINAL PAIN: 1
NEUROLOGICAL NEGATIVE: 1
SHORTNESS OF BREATH: 1
TINGLING: 1
NERVOUS/ANXIOUS: 1
FOCAL WEAKNESS: 0

## 2019-09-24 NOTE — CARE PLAN
Problem: Infection  Goal: Will remain free from infection  Outcome: PROGRESSING AS EXPECTED  Note:   Standard precautions in place. Education provided to patient. Hand hygiene being preformed correctly prior to and after patient contact by staff.       Problem: Skin Integrity  Goal: Risk for impaired skin integrity will decrease  Outcome: PROGRESSING AS EXPECTED  Note:   Education provided to patient regarding interventions in place to prevent skin breakdown. Patient verbalizes understanding.   Interventions in place: pressure redistribution mattress, pillows in use for support/positioning, moisturizer, silicone NC tubing, and wound care

## 2019-09-24 NOTE — PSYCHIATRY
"PSYCHIATRIC FOLLOW-UP:(established)  Reason for admission: Psychosis                   Reason for consult: Psychosis       Requesting Physician/APN:            *HPI:    Mr. Cabrera is a 34 y.o. Male, with a history of major depressive disorder, PTSD, alcohol use disorder and pulmonary embolism who presents  with heavy use of mushrooms and nitrous oxide in the past week, paranoid delusions and a new pulmonary embolism.     Interval history  Last seen by psych on 9/20. He was still paranoid at that time and risperdal was increased. Had intermittent confusion on 9/20. Remained disoriented. Was complaining of CP and SOB at that time. He revealed had been falling and hit his head so hard he had LOC within the last few weeks.   9/21: was lethargic, sickly appearing. Coughing and still c/o CP and SOB. Tachycardia noted.   9/22 more awake and alert. CP and SOB improving. Paranoia was noted by staff. He became aggressive when staff wanted to remove the manzano stating he was urinating too much and needed it. It was not removed. By midday he started thinking people were \"coming out of the bathroom\". He tried to get out of bed to make them go away. He had a delusion that the shower water was uring and RN intervened, by the end of the shower he stated it was \"maple syrup\". Trazodone thought to be worsening his paranoia and anxiety. Risperdal increased to 1m qam and 2mg qhs.   9/23 Manzano had to be reinserted due to urinary retention.       Today he is diaphoretic and sick appearing. He has some mild rigidity.  This is not how he appeared the first time I saw him, it is very concerning. Ordering CPK for the rigidity, possible reaction to risperdal.       Soc hx:   Mom is planning to move him back to [place] when he recovers.       *Psychiatric Examination:  Vitals:   Vitals:    09/24/19 0800   BP: (!) 176/106   Pulse: 80   Resp: 18   Temp: 37.7 °C (99.8 °F)   SpO2: 91%       General Appearance: 34 y.o.male appears stated " "age, looks terrified. He is diaphoretic.   Behavior: anxious, staring, intense eye contact.   Abnormal Movements: No abnormal movements, muscle spasms or choreiform movements.  Mild rigidity. No tremors   Gait and Posture: lying in bed  Speech: sparse. Sounds scared   Thought Process:  seems more confused.   Associations: No loose associations   Abnormal or Psychotic Thoughts: Denies AH, reports VH; delusional   Judgement and Insight: poor/poor  Orientation: not fully assessed, oriented to person and place  Recent and Remote Memory: Diminished  Attention Span and Concentration: intact during evaluation despite of feeling drowsy   Language: English  Fund of Knowledge: Appropriate  Mood:\"scared\"  Affect: drowsy, Dysthymic. Guarded. congruent with stated mood.  SI/HI: Denies SI and HI.     Current Facility-Administered Medications   Medication Dose Route Frequency Provider Last Rate Last Dose   • senna-docusate (PERICOLACE or SENOKOT S) 8.6-50 MG per tablet 2 Tab  2 Tab Oral BID Rainer Butt M.D.   2 Tab at 09/24/19 0549    And   • polyethylene glycol/lytes (MIRALAX) PACKET 1 Packet  1 Packet Oral QDAY PRN Rainer Butt M.D.        And   • magnesium hydroxide (MILK OF MAGNESIA) suspension 30 mL  30 mL Oral QDAY PRN Rainer Butt M.D.        And   • bisacodyl (DULCOLAX) suppository 10 mg  10 mg Rectal QDAY PRN Rainer Butt M.D.       • rivaroxaban (XARELTO) tablet 15 mg  15 mg Oral BID WITH MEALS Rainer Butt M.D.   15 mg at 09/24/19 0821    Followed by   • [START ON 10/14/2019] rivaroxaban (XARELTO) tablet 20 mg  20 mg Oral PM MEAL Rainer Butt M.D.       • tamsulosin (FLOMAX) capsule 0.4 mg  0.4 mg Oral AFTER BREAKFAST Rainer Butt M.D.   0.4 mg at 09/24/19 0821   • risperiDONE (RISPERDAL) tablet 1 mg  1 mg Oral QAM Rainer Butt M.D.   1 mg at 09/24/19 0549   • risperiDONE (RISPERDAL) tablet 2 mg  2 mg Oral Q EVENING Rainer Butt M.D.   2 mg at 09/23/19 6468 "   • diphenhydrAMINE (BENADRYL) injection 25 mg  25 mg Intravenous QHS PRN Domingo Ceja M.D.   25 mg at 09/23/19 2244   • Respiratory Care per Protocol   Nebulization Continuous RT Lita Brantley M.D.       • acetaminophen (TYLENOL) tablet 650 mg  650 mg Oral Q6HRS PRN Lita Brantley M.D.   650 mg at 09/23/19 1231   • labetalol (NORMODYNE,TRANDATE) injection 10 mg  10 mg Intravenous Q4HRS PRN Lita Brantley M.D.   10 mg at 09/24/19 0831   • ondansetron (ZOFRAN) syringe/vial injection 4 mg  4 mg Intravenous Q4HRS PRN Lita Brantley M.D.   4 mg at 09/20/19 1905   • ondansetron (ZOFRAN ODT) dispertab 4 mg  4 mg Oral Q4HRS PRN Lita Brantley M.D.       • folic acid (FOLVITE) tablet 1 mg  1 mg Oral DAILY James Hou M.D.   1 mg at 09/24/19 0549   • multivitamin (THERAGRAN) tablet 1 Tab  1 Tab Oral DAILY James Hou M.D.   1 Tab at 09/24/19 0549         Recent Results (from the past 24 hour(s))   CBC with Differential    Collection Time: 09/24/19  2:48 AM   Result Value Ref Range    WBC 10.2 4.8 - 10.8 K/uL    RBC 4.52 (L) 4.70 - 6.10 M/uL    Hemoglobin 13.9 (L) 14.0 - 18.0 g/dL    Hematocrit 42.7 42.0 - 52.0 %    MCV 94.5 81.4 - 97.8 fL    MCH 30.8 27.0 - 33.0 pg    MCHC 32.6 (L) 33.7 - 35.3 g/dL    RDW 65.6 (H) 35.9 - 50.0 fL    Platelet Count 299 164 - 446 K/uL    MPV 8.6 (L) 9.0 - 12.9 fL    Neutrophils-Polys 65.40 44.00 - 72.00 %    Lymphocytes 22.00 22.00 - 41.00 %    Monocytes 10.10 0.00 - 13.40 %    Eosinophils 1.70 0.00 - 6.90 %    Basophils 0.20 0.00 - 1.80 %    Immature Granulocytes 0.60 0.00 - 0.90 %    Nucleated RBC 0.00 /100 WBC    Neutrophils (Absolute) 6.70 1.82 - 7.42 K/uL    Lymphs (Absolute) 2.25 1.00 - 4.80 K/uL    Monos (Absolute) 1.03 (H) 0.00 - 0.85 K/uL    Eos (Absolute) 0.17 0.00 - 0.51 K/uL    Baso (Absolute) 0.02 0.00 - 0.12 K/uL    Immature Granulocytes (abs) 0.06 0.00 - 0.11 K/uL    NRBC (Absolute) 0.00 K/uL   Comp Metabolic Panel (CMP)    Collection Time: 09/24/19  2:48 AM    Result Value Ref Range    Sodium 140 135 - 145 mmol/L    Potassium 4.5 3.6 - 5.5 mmol/L    Chloride 105 96 - 112 mmol/L    Co2 26 20 - 33 mmol/L    Anion Gap 9.0 0.0 - 11.9    Glucose 115 (H) 65 - 99 mg/dL    Bun 17 8 - 22 mg/dL    Creatinine 0.84 0.50 - 1.40 mg/dL    Calcium 9.2 8.5 - 10.5 mg/dL    AST(SGOT) 22 12 - 45 U/L    ALT(SGPT) 45 2 - 50 U/L    Alkaline Phosphatase 51 30 - 99 U/L    Total Bilirubin 0.6 0.1 - 1.5 mg/dL    Albumin 4.0 3.2 - 4.9 g/dL    Total Protein 6.6 6.0 - 8.2 g/dL    Globulin 2.6 1.9 - 3.5 g/dL    A-G Ratio 1.5 g/dL   Magnesium    Collection Time: 09/24/19  2:48 AM   Result Value Ref Range    Magnesium 2.3 1.5 - 2.5 mg/dL   PHOSPHORUS    Collection Time: 09/24/19  2:48 AM   Result Value Ref Range    Phosphorus 3.9 2.5 - 4.5 mg/dL   ESTIMATED GFR    Collection Time: 09/24/19  2:48 AM   Result Value Ref Range    GFR If African American >60 >60 mL/min/1.73 m 2    GFR If Non African American >60 >60 mL/min/1.73 m 2     Assessment: Continues to be psychotic    Dx:Unspecified psychosis  Polysubstance abuse  Alcohol use disorder      Medical:  Paroxysmal A. fib,   pulmonary embolus,   Acute hypoxemic respiratory failure  ethanol withdrawal  Elevated troponin  ALLI    Plan:  Very concerned about his presentation today. He is hot, sweaty, some rigidity, having trouble using his hands and speaking.   Could be infectious vs early symptoms of NMS vs continued withdrawal  Withdrawal unikely at this point, his WBC is also not elevated.     Ordering CPK.   Holding risperdal for now.     Will evaluate tomorrow.

## 2019-09-24 NOTE — PROGRESS NOTES
Handoff report received. Assumed patient care. Patient resting in bed with mother at bedside.  Denied current pain. Patient not in distress, AAOX 4. Bed alarm is on. Safety precautions in place. Call light and personal belongings within reach. Educated to call for assistance if needed.

## 2019-09-24 NOTE — PROGRESS NOTES
[facility] Medicine Daily Progress Note    Date of Service  9/24/2019    Chief Complaint  34 y.o. male admitted 9/19/2019 with hallucinations    Hospital Course    Past medical history of depression, PTSD, anxiety, substance abuse of mushrooms, nitrous oxide, marijuana, alcohol, paroxysmal A. fib, recent diagnosis of PE May 2019.  He presented with hallucinations and had stopped taking his medications including Xarelto.  He had also fallen multiple times.  He was found hypoxic and CTA showed extensive acute bilateral pulmonary emboli with saddle embolus.  He was admitted to the ICU and felt his risk of intracranial hemorrhage outweighed submassive dose of alteplase or EKOS.  He was started on heparin infusion.  Lower extremity Doppler was negative for DVT.  TTE showed severe right heart strain.  Psychiatry was consulted for his hallucinations and he was started on Risperdal with improvement.  Patient remained stabilized and transitioned to Xarelto.  He was hypertensive requiring IV medications so he was started on a lower lower dose of lisinopril and his atenolol was stopped, given his right heart strain.      Interval Problem Update  Sinus on telemetry  Hypertensive requiring multiple doses of IV labetalol.  I will start on low-dose lisinopril and monitor blood pressure.  He says his shortness of breath and chest pain are better.  He complains of numbness in his hands and feet for last several weeks, denies weakness or pain.  He also complains of constipation and abdominal cramping and bloating.  His mom says hallucinations are improved.    Consultants/Specialty  Psychiatry  Critical care  Pulmonology    Code Status  Full Code      Disposition  Case coordination to discuss discharge planning with mother  Will need home O2 ordered  Will need anticoagulation clinic  PT OT    Review of Systems  Review of Systems   Constitutional: Positive for malaise/fatigue.   HENT: Positive for congestion.    Respiratory: Positive for  shortness of breath (improved).    Cardiovascular: Negative for chest pain.   Gastrointestinal: Positive for abdominal pain and constipation. Negative for nausea and vomiting.   Genitourinary: Negative for dysuria.   Neurological: Positive for tingling. Negative for focal weakness.   Psychiatric/Behavioral: Negative for hallucinations. The patient is nervous/anxious.         Physical Exam  Temp:  [36.4 °C (97.6 °F)-37.7 °C (99.8 °F)] 37 °C (98.6 °F)  Pulse:  [] 107  Resp:  [18-22] 22  BP: (153-176)/(106-112) 160/112  SpO2:  [90 %-95 %] 90 %    Physical Exam   Constitutional: He is oriented to person, place, and time. He appears well-developed. No distress.   Obesity   HENT:   Head: Normocephalic.   Mouth/Throat: Oropharynx is clear and moist.   Eyes: Conjunctivae are normal. Right eye exhibits no discharge. Left eye exhibits no discharge.   Cardiovascular: Normal rate and regular rhythm.   Pulmonary/Chest: He has no wheezes. He has no rales.   Diminished breath sounds throughout   Abdominal: Soft. There is no tenderness. There is no rebound and no guarding.   Musculoskeletal: He exhibits no edema.   Neurological: He is alert and oriented to person, place, and time.   Skin: Skin is dry. He is not diaphoretic.   Mildly diaphoretic   Psychiatric:   Flat affect   Nursing note and vitals reviewed.      Fluids    Intake/Output Summary (Last 24 hours) at 9/24/2019 1609  Last data filed at 9/24/2019 1507  Gross per 24 hour   Intake 480 ml   Output 2000 ml   Net -1520 ml       Laboratory  Recent Labs     09/22/19  0540 09/23/19  0600 09/24/19  0248   WBC 10.5 10.2 10.2   RBC 4.65* 4.50* 4.52*   HEMOGLOBIN 14.3 14.0 13.9*   HEMATOCRIT 43.5 42.8 42.7   MCV 93.5 95.1 94.5   MCH 30.8 31.1 30.8   MCHC 32.9* 32.7* 32.6*   RDW 65.2* 65.9* 65.6*   PLATELETCT 317 318 299   MPV 8.7* 8.7* 8.6*     Recent Labs     09/22/19  0540 09/23/19  0600 09/24/19  0248   SODIUM 141 142 140   POTASSIUM 4.2 4.3 4.5   CHLORIDE 106 105 105   CO2  "26 27 26   GLUCOSE 102* 104* 115*   BUN 11 19 17   CREATININE 0.75 0.86 0.84   CALCIUM 9.6 9.4 9.2                   Imaging  DX-CHEST-PORTABLE (1 VIEW)   Final Result         1.  Pulmonary edema and/or infiltrates.   2.  Cardiomegaly      MR-BRAIN-W/O   Final Result      1.  No acute intracranial abnormality.   2.  Sphenoid and posterior right ethmoid sinus disease.      US-EXTREMITY VENOUS LOWER BILAT   Final Result      EC-ECHOCARDIOGRAM LTD W/O CONT   Final Result      CT-CTA CHEST PULMONARY ARTERY W/ RECONS   Final Result   Addendum 1 of 1   These findings were discussed with HEATHER MELGOZA on 9/19/2019 2:16 PM.      Final      DX-CHEST-PORTABLE (1 VIEW)   Final Result      No acute cardiopulmonary abnormality.      CT-HEAD W/O   Final Result      No CT evidence of acute infarct, hemorrhage or mass. No acute intracranial injury.           Assessment/Plan  * Acute saddle pulmonary embolism (HCC)- (present on admission)  Assessment & Plan  Recurrent, in the setting of noncompliance with anticoagulation therapy  CTA PE revealing \"Extensive acute bilateral pulmonary emboli with saddle embolus noted, as well as findings concerning for RV strain\"  Recurrence likely secondary to poor compliance of medication  Heparin drip transitioned to Xarelto  Echocardiogram with RV strain, caution aggressive blood pressure changes  DVT study negative    Acute hypoxemic respiratory failure (HCC)  Assessment & Plan  Secondary acute pulmonary embolism  Heparinization  Titrate oxygen as tolerated    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  Q waves in inferior leads.    Likely from underlying pulmonary embolism, right ventricular strain.    Close clinical monitoring in the intensive care unit  Patient on anticoagulation    ETOH abuse- (present on admission)  Assessment & Plan  History of, monitor for withdrawal    Type 2 diabetes mellitus without complication, without long-term current use of insulin (HCC)  Assessment & Plan  New diagnosis, A1c 6.6%  Diabetes education ordered  Could be contributing to peripheral neuropathy, will also check vitamin B12    Slow transit constipation  Assessment & Plan  Daily Senokot and MiraLAX  Bowel regimen ordered    ALLI (obstructive sleep apnea)  Assessment & Plan  History of, pulmonary following    Morbid obesity (HCC)  Assessment & Plan  Outpatient weight loss program would be indicated    Pulmonary hypertension (HCC)- (present on admission)  Assessment & Plan  Known history of, now with recurrent PE  Echocardiogram noted    Anxiety- (present on admission)  Assessment & Plan  On Librium at home,   Psychiatry evaluating, thought to have degree of psychosis, adjust Risperdal  Haldol as needed    Essential hypertension- (present on admission)  Assessment & Plan  Hypertensive requiring multiple doses of IV labetalol.  I will start on low-dose lisinopril and monitor blood pressure.        VTE prophylaxis: xarelto

## 2019-09-24 NOTE — PROGRESS NOTES
Pt transported to [facility] on cardiac monitoring via wheelchair with this RN and tech.  Report given to receiving RN, STEVEN at this time, all questions answered by this RN.  Patient mother present.  Bed alarm set.

## 2019-09-24 NOTE — PROGRESS NOTES
Bladder scan >999. Hospitalist paged. Per Dr. Jimmy Chacko, insert manzano cath for urinary retention. Will follow orders appropriately.

## 2019-09-24 NOTE — PROGRESS NOTES
Bedside report received from day RN. Patient's plan of care reviewed. Patient found resting in bed, family at bedside. A&Ox4. VSS, on 3 L via silicone NC. No signs of distress, declines pain at this time. All needs met. Safety precautions in place. Tele box on. Bed in lowest/locked position. Bed alarm on. Call light and personal belongings within reach. Will continue to monitor.

## 2019-09-24 NOTE — PROGRESS NOTES
"Pulmonary Critical Care Progress Note        Chief Complaint:     \"34 y.o. male admitted 9/19/2019 with psychosis, SOB and R sided CP.\"    History of Present Illness:     \"The history is obtained from the patient as well as from healthcare providers and the medical record. This gentleman does not provide the best history due to disorientation and psychosis. This gentleman has a history of submassive pulmonary embolism in May 2019, anxiety, hypertension, paroxysmal atrial fibrillation and alcohol abuse with documented blood alcohol levels as high as 0.25 in the past. He was brought into the emergency room today with psychosis. He states that people have been following him around at his home with an agenda. That agenda is to have sexual activity with him. He is aware that his thinking has been \"off.\" He admits that he has been using psychedelic mushrooms and whippits as well as marijuana. He is disoriented to time and cannot really give me a history of his duration of use or how long his kunz has been going on, but it has been several days at least. He is complaining of some shortness of breath and right-sided pleuritic chest pain. He is supposed to be taking rivaroxaban for history of pulmonary embolism, but admits that he is noncompliant with this medication and takes it only intermittently. He believes that he is missed at least 5 days of therapy in the last week. He also tells me that for several weeks he has been unsteady and has been falling. He admits that he has hit his head on several falls and further admits that sometime in the last couple of weeks, he hit his head so hard that he lost consciousness. CT imaging on presentation today reveals submassive pulmonary embolism with a saddle pulmonary embolism. \"     Interval Events:  24 hour interval history reviewed    Patient still appears to be slightly delayed in responses. Patient currently on Xarelto and tolerating well. Oxygen requirements " decreasing.    Review of Systems   Constitutional: Negative.    HENT: Negative.    Eyes: Negative.    Respiratory: Positive for shortness of breath.    Cardiovascular: Negative.    Gastrointestinal: Negative.    Genitourinary: Negative.    Skin: Negative.    Neurological: Negative.      Physical Exam   Constitutional: He is oriented to person, place, and time. He appears well-developed and well-nourished. He is active and cooperative. He has a sickly appearance. No distress. Nasal cannula in place.   HENT:   Head: Normocephalic and atraumatic.   Eyes: EOM are normal.   Cardiovascular: Normal rate, regular rhythm and normal heart sounds.   Pulmonary/Chest: Effort normal and breath sounds normal. No stridor. No respiratory distress.   Abdominal: Soft. Bowel sounds are normal.   Musculoskeletal: He exhibits no edema.   Neurological: He is alert and oriented to person, place, and time. No cranial nerve deficit. Coordination normal.   Skin: He is diaphoretic.         Fluids:  Intake/Output       09/22/19 0700 - 09/23/19 0659 09/23/19 0700 - 09/24/19 0659 09/24/19 0700 - 09/25/19 0659      [id number] 9923-0046 Total 2523-5411 4565-0566 Total 1858-0334 4781-9265 Total       Intake    P.O.  --  -- --  --  480 480  --  -- --    P.O. -- -- -- -- 480 480 -- -- --    Total Intake -- -- -- -- 480 480 -- -- --       Output    Urine  2475  -- 2475  400  1500 1900  --  -- --    Urine Void (mL) 250 -- 250 400 -- 400 -- -- --    Output (mL) ([REMOVED] Urethral Catheter Temperature probe) 2225 -- 2225 -- -- -- -- -- --    Output (mL) (Urethral Catheter Latex 16 Fr.) -- -- -- -- 1500 1500 -- -- --    Total Output 2475 -- 2475 400 1500 1900 -- -- --       Net I/O     -2475 -- -2475 -400 -1020 -1420 -- -- --        Weight: 120.2 kg (264 lb 15.9 oz)  Recent Labs     09/22/19  0540 09/23/19  0600 09/24/19  0248   SODIUM 141 142 140   POTASSIUM 4.2 4.3 4.5   CHLORIDE 106 105 105   CO2 26 27 26   BUN 11 19 17   CREATININE 0.75 0.86 0.84    MAGNESIUM 2.1 2.3 2.3   PHOSPHORUS 4.0 4.1 3.9   CALCIUM 9.6 9.4 9.2     Liver Function  Recent Labs     19  0540 19  0619  0248   ALTSGPT 33 43 45   ASTSGOT 16 21 22   ALKPHOSPHAT 52 54 51   TBILIRUBIN 0.6 0.4 0.6   GLUCOSE 102* 104* 115*       Heme:  Recent Labs     19  0519  0248   RBC 4.65* 4.50* 4.52*   HEMOGLOBIN 14.3 14.0 13.9*   HEMATOCRIT 43.5 42.8 42.7   PLATELETCT 317 318 299       Infectious Disease:  Temp  Av.9 °C (98.4 °F)  Min: 36.4 °C (97.6 °F)  Max: 37.7 °C (99.8 °F)  Micro: reviewed  Recent Labs     19  0248   WBC 10.5 10.2 10.2   NEUTSPOLYS 63.60 63.10 65.40   LYMPHOCYTES 23.50 25.00 22.00   MONOCYTES 9.70 9.60 10.10   EOSINOPHILS 2.40 1.40 1.70   BASOPHILS 0.20 0.20 0.20   ASTSGOT 16 21 22   ALTSGPT 33 43 45   ALKPHOSPHAT 52 54 51   TBILIRUBIN 0.6 0.4 0.6     Current Facility-Administered Medications   Medication Dose Frequency Provider Last Rate Last Dose   • lisinopril (PRINIVIL) 10 MG tablet 10 mg  10 mg Q DAY Edwardo Dillon M.D.   10 mg at 19 1344   • polyethylene glycol/lytes (MIRALAX) PACKET 1 Packet  1 Packet DAILY Edwardo Dillon M.D.   1 Packet at 19 1346    And   • senna-docusate (PERICOLACE or SENOKOT S) 8.6-50 MG per tablet 2 Tab  2 Tab BID Edwardo Dillon M.D.        And   • magnesium hydroxide (MILK OF MAGNESIA) suspension 30 mL  30 mL QDAY PRN Edwardo Dillon M.D.        And   • bisacodyl (DULCOLAX) suppository 10 mg  10 mg QDAY PRN Edwardo Dillon M.D.       • rivaroxaban (XARELTO) tablet 15 mg  15 mg BID WITH MEALS Rainer Butt M.D.   15 mg at 19 08    Followed by   • [START ON 10/14/2019] rivaroxaban (XARELTO) tablet 20 mg  20 mg PM MEAL Rainer Butt M.D.       • tamsulosin (FLOMAX) capsule 0.4 mg  0.4 mg AFTER BREAKFAST Rainer Butt M.D.   0.4 mg at 19 08   • risperiDONE (RISPERDAL) tablet 1 mg  1 mg QAM Rainer Butt M.D.   1 mg at 19 0549   •  risperiDONE (RISPERDAL) tablet 2 mg  2 mg Q EVENING Rainer Butt M.D.   2 mg at 09/23/19 2244   • diphenhydrAMINE (BENADRYL) injection 25 mg  25 mg QHS PRN Domingo Ceja M.D.   25 mg at 09/23/19 2244   • Respiratory Care per Protocol   Continuous RT Lita Brantley M.D.       • acetaminophen (TYLENOL) tablet 650 mg  650 mg Q6HRS PRN Lita Brantley M.D.   650 mg at 09/23/19 1231   • labetalol (NORMODYNE,TRANDATE) injection 10 mg  10 mg Q4HRS PRN Lita Brantley M.D.   10 mg at 09/24/19 0831   • ondansetron (ZOFRAN) syringe/vial injection 4 mg  4 mg Q4HRS PRN Lita Brantley M.D.   4 mg at 09/20/19 1905   • ondansetron (ZOFRAN ODT) dispertab 4 mg  4 mg Q4HRS PRN Lita Brantley M.D.       • folic acid (FOLVITE) tablet 1 mg  1 mg DAILY James Hou M.D.   1 mg at 09/24/19 0549   • multivitamin (THERAGRAN) tablet 1 Tab  1 Tab DAILY James Hou M.D.   1 Tab at 09/24/19 0549     Last reviewed on 9/19/2019  3:42 PM by Sudha Benoit, [facility]      Assessment / Plan  * Acute saddle pulmonary embolism (HCC)- (present on admission)  Assessment & Plan  Submassive pulmonary embolism, hemodynamically stable with right heart strain. Noncompliant with his rivaroxaban. PAtient currently on Xarelto. Lower extremity venous Doppler study for DVT is negative  Plan  -Patient to continue anticoagulation  -Follow up in pulmonary clinic in 10 weeks    Acute hypoxemic respiratory failure (HCC)  Assessment & Plan  Improving. Secondary to submassive pulmonary embolus with pulmonary hypertension. History of compliance issues with rivaroxaban and falls per patient.   Plan  -RT/O2 protocols  -Keep patient upright at 45 to 60 degrees, he seems to do better at that position, continue  -IS   -Encourage mobilization    Elevated troponin- (present on admission)  Assessment & Plan  Type II MI, demand ischemia    ALLI (obstructive sleep apnea)  Assessment & Plan  Patient obese and leads a sedentary life style. Mentions history of ALLI and been  on CPAP but not using it.  Plan  -Outpatient sleep study   -Pulmonary follow up    Pulmonary hypertension (HCC)- (present on admission)  Assessment & Plan  Secondary to pulmonary embolus, recurrent. RVSP moderate to severely elevated in May of this year. Follow up ECHO did not calculate RVSP due to strain  Plan  -Patient would likely benefit from ECHO in the future

## 2019-09-24 NOTE — PROGRESS NOTES
[facility] Medicine Daily Progress Note    Date of Service  9/23/2019    Chief Complaint  34 y.o. male admitted 9/19/2019 with history of psychiatric disease, pulmonary embolism, substance abuse, alcohol abuse, obstructive sleep apnea, obesity, initially being evaluated for psychiatric reasons, referred to admission secondary to chest pain, dyspnea.  Patient found to have extensive pulmonary embolism recurrence.    Hospital Course    Admitted with pulmonary embolism, dyspnea, substance abuse      Interval Problem Update  Patient seen and examined today. ICU Care  Care and plan discussed in IDT/Hot rounds.  Lines and assistive devices reviewed.    Patient tolerating treatment and therapies.  All Data, Medication data reviewed.  Case discussed with nursing as available.  Plan of Care reviewed with patient and notified of changes.  9/20 patient found in ICU, somnolent, mother at bedside, received Ativan earlier, MRI negative for acute changes secondary to recent falls, sinus tachycardia in the 120s, blood pressure 100-1 60, T-max 99.5, tolerating diet, still tachypnea and dyspnea on 4 L nasal cannula.  Psychiatric evaluation noted.  9/21, the patient is improved, sinus rhythm sinus tach, blood pressure between 90 and 160, afebrile, eating better, had Srivastava for retention, good urine output, remains on heparin drip, 2 L nasal cannula, less tachypnea, discussed options for ongoing anticoagulation with the patient and mom at the bedside.  Plans are to move to [place] with the parents.  9/22 the patient awake and alert, significantly anxious and confused at times, sinus rhythm in the 70s to 90s, blood pressure between 130 and 140, urine output adequate, afebrile, I-S at 1250, on Risperdal, full dose Lovenox, adjusted psychotropic medication, PRN Haldol  9/23 the patient is afebrile, alert and oriented x4 but confused at times and paranoid and anxious, sinus rhythm, blood pressure 130-140, good urine output, on 3.5 L per  nasal cannula, no dyspnea, no chest pain, on Risperdal, transitioning to oral anticoagulation, PT OT eval, psych evaluation and follow-up requested.  Consultants/Specialty  Pulmonary critical care, psychiatry    Code Status  Full code    Disposition  ICU, improved for telemetry transfer    Review of Systems  Review of Systems   Constitutional: Positive for malaise/fatigue.   HENT: Negative.    Eyes: Negative.    Respiratory: Positive for cough and shortness of breath.    Cardiovascular: Positive for chest pain.   Gastrointestinal: Negative.    Genitourinary: Negative.    Musculoskeletal: Negative.    Skin: Negative.    Neurological: Negative.    Endo/Heme/Allergies: Negative.    Psychiatric/Behavioral: Positive for substance abuse. The patient is nervous/anxious.    All other systems reviewed and are negative.       Physical Exam  Temp:  [36.7 °C (98 °F)-37.2 °C (99 °F)] 36.9 °C (98.4 °F)  Pulse:  [] 101  Resp:  [16-28] 24  BP: (130-177)/() 153/99  SpO2:  [92 %-96 %] 95 %    Physical Exam   Constitutional: He is oriented to person, place, and time. He appears well-developed and well-nourished. He appears lethargic. He has a sickly appearance. Nasal cannula in place.   HENT:   Head: Normocephalic.   Eyes: Pupils are equal, round, and reactive to light.   Neck: Normal range of motion.   Cardiovascular: Normal rate, regular rhythm, normal heart sounds and intact distal pulses.   Capillary refill <3 secs   Pulmonary/Chest: Effort normal. Tachypnea noted. No respiratory distress. He has rhonchi.   Abdominal: Soft. Bowel sounds are normal.   Genitourinary: Rectum normal and penis normal.   Musculoskeletal: Normal range of motion.   Neurological: He is oriented to person, place, and time. He appears lethargic. No cranial nerve deficit or sensory deficit.   Skin: Skin is warm and dry. No cyanosis.   Psychiatric: His mood appears anxious. His affect is blunt. His speech is rapid and/or pressured. Cognition and  "memory are impaired.   Nursing note and vitals reviewed.      Fluids    Intake/Output Summary (Last 24 hours) at 9/23/2019 1711  Last data filed at 9/23/2019 1300  Gross per 24 hour   Intake --   Output 400 ml   Net -400 ml       Laboratory  Recent Labs     09/21/19  0524 09/22/19  0540 09/23/19  0600   WBC 9.5 10.5 10.2   RBC 4.35* 4.65* 4.50*   HEMOGLOBIN 13.1* 14.3 14.0   HEMATOCRIT 41.0* 43.5 42.8   MCV 94.3 93.5 95.1   MCH 30.1 30.8 31.1   MCHC 32.0* 32.9* 32.7*   RDW 67.3* 65.2* 65.9*   PLATELETCT 246 317 318   MPV 9.1 8.7* 8.7*     Recent Labs     09/21/19  0524 09/22/19  0540 09/23/19  0600   SODIUM 144 141 142   POTASSIUM 5.0 4.2 4.3   CHLORIDE 108 106 105   CO2 23 26 27   GLUCOSE 114* 102* 104*   BUN 11 11 19   CREATININE 0.82 0.75 0.86   CALCIUM 9.1 9.6 9.4     Recent Labs     09/20/19  1911 09/21/19  0130   APTT 72.1* 58.9*               Imaging  DX-CHEST-PORTABLE (1 VIEW)   Final Result         1.  Pulmonary edema and/or infiltrates.   2.  Cardiomegaly      MR-BRAIN-W/O   Final Result      1.  No acute intracranial abnormality.   2.  Sphenoid and posterior right ethmoid sinus disease.      US-EXTREMITY VENOUS LOWER BILAT   Final Result      EC-ECHOCARDIOGRAM LTD W/O CONT   Final Result      CT-CTA CHEST PULMONARY ARTERY W/ RECONS   Final Result   Addendum 1 of 1   These findings were discussed with HEATHER MELGOZA on 9/19/2019 2:16 PM.      Final      DX-CHEST-PORTABLE (1 VIEW)   Final Result      No acute cardiopulmonary abnormality.      CT-HEAD W/O   Final Result      No CT evidence of acute infarct, hemorrhage or mass. No acute intracranial injury.           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  Q waves in inferior leads.    Likely from underlying pulmonary embolism, right ventricular strain.    Close clinical monitoring in the intensive care unit  Patient on anticoagulation    ETOH abuse- (present on admission)  Assessment & Plan  History of, monitor for withdrawal    ALLI (obstructive sleep apnea)  Assessment & Plan  History of, pulmonary following    Morbid obesity (HCC)  Assessment & Plan  Outpatient weight loss program would be indicated    Pulmonary hypertension (HCC)- (present on admission)  Assessment & Plan  Known history of, now with recurrent PE  Echocardiogram noted    Anxiety- (present on admission)  Assessment & Plan  On Librium at home,   Psychiatry evaluating, thought to have degree of psychosis, adjust Risperdal  Haldol as needed    Essential hypertension- (present on admission)  Assessment & Plan  Holding antihypertensive therapy in the setting of acute pulmonary embolism     Plan  Change to Xarelto  Close observation of respiratory status, respiratory follow-up  Significant anxiety, paranoia, psych follow-up requested  Prolonged QT, careful with affecting medication regimen  Urinary retention likely transient, controlled Flomax  See orders    Medically stabilized for  transfer to telemetry  VTE prophylaxis: Xarelto    I have performed a physical exam and reviewed and updated ROS and Plan today . In review of yesterday's note , there are no changes except as documented above.

## 2019-09-24 NOTE — CARE PLAN
Problem: Venous Thromboembolism (VTW)/Deep Vein Thrombosis (DVT) Prevention:  Goal: Patient will participate in Venous Thrombosis (VTE)/Deep Vein Thrombosis (DVT)Prevention Measures  Outcome: PROGRESSING AS EXPECTED   Monitoring for bleeds and clots. No signs of bleeding. Will continuing administering xarelto.    Problem: Knowledge Deficit  Goal: Knowledge of disease process/condition, treatment plan, diagnostic tests, and medications will improve  Outcome: PROGRESSING AS EXPECTED  Pt educated on POC for the day, disease process, upcoming tests, and medication information. Will continue to answer questions and educate pt as necessary.

## 2019-09-25 ENCOUNTER — APPOINTMENT (OUTPATIENT)
Dept: RADIOLOGY | Facility: MEDICAL CENTER | Age: 34
DRG: 175 | End: 2019-09-25
Attending: INTERNAL MEDICINE
Payer: COMMERCIAL

## 2019-09-25 PROBLEM — R53.1 WEAKNESS: Status: ACTIVE | Noted: 2019-09-25

## 2019-09-25 PROBLEM — E53.8 VITAMIN B12 DEFICIENCY: Status: ACTIVE | Noted: 2019-09-25

## 2019-09-25 LAB
ALBUMIN SERPL BCP-MCNC: 4.1 G/DL (ref 3.2–4.9)
ALBUMIN/GLOB SERPL: 1.6 G/DL
ALP SERPL-CCNC: 51 U/L (ref 30–99)
ALT SERPL-CCNC: 57 U/L (ref 2–50)
ANION GAP SERPL CALC-SCNC: 8 MMOL/L (ref 0–11.9)
ANISOCYTOSIS BLD QL SMEAR: ABNORMAL
AST SERPL-CCNC: 21 U/L (ref 12–45)
BASOPHILS # BLD AUTO: 0.2 % (ref 0–1.8)
BASOPHILS # BLD: 0.03 K/UL (ref 0–0.12)
BILIRUB SERPL-MCNC: 0.6 MG/DL (ref 0.1–1.5)
BUN SERPL-MCNC: 15 MG/DL (ref 8–22)
CALCIUM SERPL-MCNC: 9.4 MG/DL (ref 8.5–10.5)
CHLORIDE SERPL-SCNC: 101 MMOL/L (ref 96–112)
CO2 SERPL-SCNC: 26 MMOL/L (ref 20–33)
COMMENT 1642: NORMAL
CREAT SERPL-MCNC: 0.79 MG/DL (ref 0.5–1.4)
EOSINOPHIL # BLD AUTO: 0.19 K/UL (ref 0–0.51)
EOSINOPHIL NFR BLD: 1.6 % (ref 0–6.9)
ERYTHROCYTE [DISTWIDTH] IN BLOOD BY AUTOMATED COUNT: 65.2 FL (ref 35.9–50)
GLOBULIN SER CALC-MCNC: 2.6 G/DL (ref 1.9–3.5)
GLUCOSE SERPL-MCNC: 116 MG/DL (ref 65–99)
HCT VFR BLD AUTO: 44.9 % (ref 42–52)
HGB BLD-MCNC: 14.7 G/DL (ref 14–18)
IMM GRANULOCYTES # BLD AUTO: 0.09 K/UL (ref 0–0.11)
IMM GRANULOCYTES NFR BLD AUTO: 0.7 % (ref 0–0.9)
LYMPHOCYTES # BLD AUTO: 2.04 K/UL (ref 1–4.8)
LYMPHOCYTES NFR BLD: 16.9 % (ref 22–41)
MAGNESIUM SERPL-MCNC: 2.2 MG/DL (ref 1.5–2.5)
MCH RBC QN AUTO: 30.8 PG (ref 27–33)
MCHC RBC AUTO-ENTMCNC: 32.7 G/DL (ref 33.7–35.3)
MCV RBC AUTO: 93.9 FL (ref 81.4–97.8)
MONOCYTES # BLD AUTO: 1.19 K/UL (ref 0–0.85)
MONOCYTES NFR BLD AUTO: 9.8 % (ref 0–13.4)
MORPHOLOGY BLD-IMP: NORMAL
NEUTROPHILS # BLD AUTO: 8.56 K/UL (ref 1.82–7.42)
NEUTROPHILS NFR BLD: 70.8 % (ref 44–72)
NRBC # BLD AUTO: 0 K/UL
NRBC BLD-RTO: 0 /100 WBC
PHOSPHATE SERPL-MCNC: 3.7 MG/DL (ref 2.5–4.5)
PLATELET # BLD AUTO: 312 K/UL (ref 164–446)
PLATELET BLD QL SMEAR: NORMAL
PMV BLD AUTO: 8.6 FL (ref 9–12.9)
POTASSIUM SERPL-SCNC: 4.5 MMOL/L (ref 3.6–5.5)
PROT SERPL-MCNC: 6.7 G/DL (ref 6–8.2)
RBC # BLD AUTO: 4.78 M/UL (ref 4.7–6.1)
RBC BLD AUTO: PRESENT
SODIUM SERPL-SCNC: 135 MMOL/L (ref 135–145)
VIT B12 SERPL-MCNC: 129 PG/ML (ref 211–911)
WBC # BLD AUTO: 12.1 K/UL (ref 4.8–10.8)

## 2019-09-25 PROCEDURE — 71045 X-RAY EXAM CHEST 1 VIEW: CPT

## 2019-09-25 PROCEDURE — 84100 ASSAY OF PHOSPHORUS: CPT

## 2019-09-25 PROCEDURE — A9270 NON-COVERED ITEM OR SERVICE: HCPCS | Performed by: INTERNAL MEDICINE

## 2019-09-25 PROCEDURE — 99232 SBSQ HOSP IP/OBS MODERATE 35: CPT | Mod: GC | Performed by: INTERNAL MEDICINE

## 2019-09-25 PROCEDURE — 770020 HCHG ROOM/CARE - TELE (206)

## 2019-09-25 PROCEDURE — 36415 COLL VENOUS BLD VENIPUNCTURE: CPT

## 2019-09-25 PROCEDURE — 99232 SBSQ HOSP IP/OBS MODERATE 35: CPT | Performed by: PSYCHIATRY & NEUROLOGY

## 2019-09-25 PROCEDURE — 700111 HCHG RX REV CODE 636 W/ 250 OVERRIDE (IP): Performed by: INTERNAL MEDICINE

## 2019-09-25 PROCEDURE — 85025 COMPLETE CBC W/AUTO DIFF WBC: CPT

## 2019-09-25 PROCEDURE — 99232 SBSQ HOSP IP/OBS MODERATE 35: CPT | Performed by: INTERNAL MEDICINE

## 2019-09-25 PROCEDURE — A9270 NON-COVERED ITEM OR SERVICE: HCPCS | Performed by: HOSPITALIST

## 2019-09-25 PROCEDURE — 80053 COMPREHEN METABOLIC PANEL: CPT

## 2019-09-25 PROCEDURE — 700102 HCHG RX REV CODE 250 W/ 637 OVERRIDE(OP): Performed by: PSYCHIATRY & NEUROLOGY

## 2019-09-25 PROCEDURE — 82607 VITAMIN B-12: CPT

## 2019-09-25 PROCEDURE — 83735 ASSAY OF MAGNESIUM: CPT

## 2019-09-25 PROCEDURE — 700102 HCHG RX REV CODE 250 W/ 637 OVERRIDE(OP): Performed by: INTERNAL MEDICINE

## 2019-09-25 PROCEDURE — 700111 HCHG RX REV CODE 636 W/ 250 OVERRIDE (IP): Performed by: PSYCHIATRY & NEUROLOGY

## 2019-09-25 PROCEDURE — A9270 NON-COVERED ITEM OR SERVICE: HCPCS | Performed by: PSYCHIATRY & NEUROLOGY

## 2019-09-25 PROCEDURE — 700102 HCHG RX REV CODE 250 W/ 637 OVERRIDE(OP): Performed by: HOSPITALIST

## 2019-09-25 RX ORDER — BENZTROPINE MESYLATE 1 MG/ML
1 INJECTION INTRAMUSCULAR; INTRAVENOUS 2 TIMES DAILY PRN
Status: DISCONTINUED | OUTPATIENT
Start: 2019-09-25 | End: 2019-09-26

## 2019-09-25 RX ORDER — CYANOCOBALAMIN 1000 UG/ML
1000 INJECTION, SOLUTION INTRAMUSCULAR; SUBCUTANEOUS
Status: DISCONTINUED | OUTPATIENT
Start: 2019-09-25 | End: 2019-10-16 | Stop reason: HOSPADM

## 2019-09-25 RX ORDER — CHOLECALCIFEROL (VITAMIN D3) 125 MCG
1000 CAPSULE ORAL DAILY
Status: DISCONTINUED | OUTPATIENT
Start: 2019-09-25 | End: 2019-09-25

## 2019-09-25 RX ORDER — BENZTROPINE MESYLATE 1 MG/ML
1 INJECTION INTRAMUSCULAR; INTRAVENOUS ONCE
Status: COMPLETED | OUTPATIENT
Start: 2019-09-25 | End: 2019-09-25

## 2019-09-25 RX ORDER — CHOLECALCIFEROL (VITAMIN D3) 125 MCG
1000 CAPSULE ORAL DAILY
Status: DISCONTINUED | OUTPATIENT
Start: 2019-09-26 | End: 2019-09-25

## 2019-09-25 RX ORDER — QUETIAPINE FUMARATE 25 MG/1
50 TABLET, FILM COATED ORAL NIGHTLY
Status: DISCONTINUED | OUTPATIENT
Start: 2019-09-25 | End: 2019-09-26

## 2019-09-25 RX ADMIN — DIPHENHYDRAMINE HYDROCHLORIDE 25 MG: 50 INJECTION INTRAMUSCULAR; INTRAVENOUS at 22:15

## 2019-09-25 RX ADMIN — LABETALOL HYDROCHLORIDE 10 MG: 5 INJECTION INTRAVENOUS at 03:57

## 2019-09-25 RX ADMIN — FOLIC ACID 1 MG: 1 TABLET ORAL at 05:00

## 2019-09-25 RX ADMIN — QUETIAPINE FUMARATE 50 MG: 25 TABLET ORAL at 22:14

## 2019-09-25 RX ADMIN — BENZTROPINE MESYLATE 1 MG: 1 INJECTION INTRAMUSCULAR; INTRAVENOUS at 16:52

## 2019-09-25 RX ADMIN — RIVAROXABAN 15 MG: 15 TABLET, FILM COATED ORAL at 18:29

## 2019-09-25 RX ADMIN — MAGNESIUM HYDROXIDE 30 ML: 400 SUSPENSION ORAL at 05:00

## 2019-09-25 RX ADMIN — LISINOPRIL 10 MG: 10 TABLET ORAL at 05:00

## 2019-09-25 RX ADMIN — CYANOCOBALAMIN 1000 MCG: 1000 INJECTION, SOLUTION INTRAMUSCULAR; SUBCUTANEOUS at 18:29

## 2019-09-25 RX ADMIN — LABETALOL HYDROCHLORIDE 10 MG: 5 INJECTION INTRAVENOUS at 04:57

## 2019-09-25 RX ADMIN — TAMSULOSIN HYDROCHLORIDE 0.4 MG: 0.4 CAPSULE ORAL at 09:11

## 2019-09-25 RX ADMIN — THERA TABS 1 TABLET: TAB at 05:00

## 2019-09-25 RX ADMIN — RIVAROXABAN 15 MG: 15 TABLET, FILM COATED ORAL at 09:11

## 2019-09-25 RX ADMIN — CYANOCOBALAMIN TAB 500 MCG 1000 MCG: 500 TAB at 13:42

## 2019-09-25 ASSESSMENT — ENCOUNTER SYMPTOMS
EYES NEGATIVE: 1
VOMITING: 0
DIAPHORESIS: 1
NAUSEA: 0
NEUROLOGICAL NEGATIVE: 1
GASTROINTESTINAL NEGATIVE: 1
RESPIRATORY NEGATIVE: 1
HALLUCINATIONS: 1
ABDOMINAL PAIN: 1
CONSTITUTIONAL NEGATIVE: 1
CONSTIPATION: 1
TINGLING: 1
FOCAL WEAKNESS: 0
NERVOUS/ANXIOUS: 1
CARDIOVASCULAR NEGATIVE: 1
SHORTNESS OF BREATH: 0

## 2019-09-25 NOTE — PROGRESS NOTES
Hospital Medicine Daily Progress Note    Date of Service  9/25/2019    Chief Complaint  34 y.o. male admitted 9/19/2019 with hallucinations    Hospital Course    Past medical history of depression, PTSD, anxiety, substance abuse of mushrooms, nitrous oxide, marijuana, alcohol, paroxysmal A. fib, recent diagnosis of PE May 2019.  He presented with hallucinations and had stopped taking his medications including Xarelto.  He had also fallen multiple times.  He was found hypoxic and CTA showed extensive acute bilateral pulmonary emboli with saddle embolus.  He was admitted to the ICU and felt his risk of intracranial hemorrhage outweighed submassive dose of alteplase or EKOS.  He was started on heparin infusion.  Lower extremity Doppler was negative for DVT.  TTE showed severe right heart strain.  Psychiatry was consulted for his hallucinations and he was started on Risperdal with improvement.  Patient remained stabilized and transitioned to Xarelto.  He was hypertensive requiring IV medications so he was started on a lower lower dose of lisinopril and his atenolol was stopped, given his right heart strain.      Interval Problem Update  Hypertension improving  He was able to sit at his bed for meals but had to lay down after, still weak.  He still feels sweaty.  Mother says his hallucinations and paranoia is returning.  He denies chest pain or shortness of breath.  He still has not had a bowel movement.  He would like to try an enema.    Consultants/Specialty  Psychiatry  Critical care  Pulmonology    Code Status  Full Code      Disposition  Case coordination to discuss discharge planning with mother  Will need home O2 ordered  Will need anticoagulation clinic  OT ordered  PT - HH    Review of Systems  Review of Systems   Constitutional: Positive for diaphoresis and malaise/fatigue.   HENT: Positive for congestion.    Respiratory: Negative for shortness of breath.    Cardiovascular: Negative for chest pain.    Gastrointestinal: Positive for abdominal pain and constipation. Negative for nausea and vomiting.   Genitourinary: Negative for dysuria.   Neurological: Positive for tingling. Negative for focal weakness.   Psychiatric/Behavioral: Positive for hallucinations. The patient is nervous/anxious.         Physical Exam  Temp:  [36.2 °C (97.1 °F)-36.8 °C (98.3 °F)] 36.8 °C (98.3 °F)  Pulse:  [] 112  Resp:  [17-20] 17  BP: (138-160)/() 138/91  SpO2:  [91 %-96 %] 96 %    Physical Exam   Constitutional: He is oriented to person, place, and time. He appears well-developed. No distress.   Obesity   HENT:   Head: Normocephalic.   Mouth/Throat: Oropharynx is clear and moist.   Eyes: Conjunctivae are normal.   Cardiovascular: Normal rate and regular rhythm.   Pulmonary/Chest: He has no wheezes. He has no rales.   Diminished breath sounds throughout   Abdominal: Soft. There is no tenderness. There is no rebound and no guarding.   Musculoskeletal: He exhibits no edema.   Neurological: He is alert and oriented to person, place, and time.   Skin: Skin is warm. He is diaphoretic.   Psychiatric:   Flat affect   Nursing note and vitals reviewed.      Fluids    Intake/Output Summary (Last 24 hours) at 9/25/2019 1706  Last data filed at 9/25/2019 0900  Gross per 24 hour   Intake 360 ml   Output 690 ml   Net -330 ml       Laboratory  Recent Labs     09/23/19  0600 09/24/19  0248 09/25/19  0305   WBC 10.2 10.2 12.1*   RBC 4.50* 4.52* 4.78   HEMOGLOBIN 14.0 13.9* 14.7   HEMATOCRIT 42.8 42.7 44.9   MCV 95.1 94.5 93.9   MCH 31.1 30.8 30.8   MCHC 32.7* 32.6* 32.7*   RDW 65.9* 65.6* 65.2*   PLATELETCT 318 299 312   MPV 8.7* 8.6* 8.6*     Recent Labs     09/23/19  0600 09/24/19  0248 09/25/19  0305   SODIUM 142 140 135   POTASSIUM 4.3 4.5 4.5   CHLORIDE 105 105 101   CO2 27 26 26   GLUCOSE 104* 115* 116*   BUN 19 17 15   CREATININE 0.86 0.84 0.79   CALCIUM 9.4 9.2 9.4                   Imaging  DX-CHEST-PORTABLE (1 VIEW)   Final  "Result         1.  Pulmonary edema and/or infiltrates.   2.  Cardiomegaly      MR-BRAIN-W/O   Final Result      1.  No acute intracranial abnormality.   2.  Sphenoid and posterior right ethmoid sinus disease.      US-EXTREMITY VENOUS LOWER BILAT   Final Result      EC-ECHOCARDIOGRAM LTD W/O CONT   Final Result      CT-CTA CHEST PULMONARY ARTERY W/ RECONS   Final Result   Addendum 1 of 1   These findings were discussed with HEATHER MELGOZA on 9/19/2019 2:16 PM.      Final      DX-CHEST-PORTABLE (1 VIEW)   Final Result      No acute cardiopulmonary abnormality.      CT-HEAD W/O   Final Result      No CT evidence of acute infarct, hemorrhage or mass. No acute intracranial injury.           Assessment/Plan  * Acute saddle pulmonary embolism (HCC)- (present on admission)  Assessment & Plan  Recurrent, in the setting of noncompliance with anticoagulation therapy  CTA PE revealing \"Extensive acute bilateral pulmonary emboli with saddle embolus noted, as well as findings concerning for RV strain\"  Recurrence likely secondary to poor compliance of medication  Heparin drip transitioned to Xarelto  Echocardiogram with RV strain, caution aggressive blood pressure changes  DVT study negative    Acute hypoxemic respiratory failure (HCC)  Assessment & Plan  Secondary acute pulmonary embolism  Titrate oxygen as tolerated    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  Q waves in inferior leads.    Likely from underlying pulmonary embolism, right ventricular strain.    Close clinical " monitoring in the intensive care unit  Patient on anticoagulation    ETOH abuse- (present on admission)  Assessment & Plan  History of, monitor for withdrawal    Weakness  Assessment & Plan  I spoke with psychiatry, who notes patient's current weakness has progressed from when he first admitted.  CPK was normal  Psychiatry will try Cogentin  Possible toxicity from the nitrous oxide.  I spoke with Dr. Byrd with neurology, he agreed with continuing with vitamin B12 supplementation.  He can have EMG testing done as outpatient after 2 weeks of stopping nitrous oxide.  PT OT ordered for reevaluation    Vitamin B12 deficiency  Assessment & Plan  With reported peripheral tingling  Supplement ordered    Type 2 diabetes mellitus without complication, without long-term current use of insulin (HCC)  Assessment & Plan  New diagnosis, A1c 6.6%  Diabetes education ordered  Could be contributing to peripheral neuropathy    Slow transit constipation  Assessment & Plan  Daily Senokot and MiraLAX  Bowel regimen ordered  Enema PRN    ALLI (obstructive sleep apnea)  Assessment & Plan  History of, pulmonary following    Morbid obesity (HCC)  Assessment & Plan  Outpatient weight loss program would be indicated    Pulmonary hypertension (HCC)- (present on admission)  Assessment & Plan  Known history of, now with recurrent PE  Echocardiogram noted    Anxiety- (present on admission)  Assessment & Plan  On Librium previously  Psychiatry evaluating  9/25 risperdal stopped for possible NMS, his anxiety is now increased    Essential hypertension- (present on admission)  Assessment & Plan  9/24 - Hypertensive requiring multiple doses of IV labetalol.  I will start on low-dose lisinopril and monitor blood pressure.   9/25 - HTN improving       VTE prophylaxis: xarelto

## 2019-09-25 NOTE — CARE PLAN
Problem: Communication  Goal: The ability to communicate needs accurately and effectively will improve  Outcome: PROGRESSING AS EXPECTED     Problem: Safety  Goal: Will remain free from falls  Outcome: PROGRESSING AS EXPECTED     Problem: Infection  Goal: Will remain free from infection  Outcome: PROGRESSING AS EXPECTED     Problem: Venous Thromboembolism (VTW)/Deep Vein Thrombosis (DVT) Prevention:  Goal: Patient will participate in Venous Thrombosis (VTE)/Deep Vein Thrombosis (DVT)Prevention Measures  Outcome: PROGRESSING AS EXPECTED     Problem: Safety  Goal: Will remain free from injury  Outcome: PROGRESSING SLOWER THAN EXPECTED     Problem: Bowel/Gastric:  Goal: Normal bowel function is maintained or improved  Outcome: PROGRESSING SLOWER THAN EXPECTED  Goal: Will not experience complications related to bowel motility  Outcome: PROGRESSING SLOWER THAN EXPECTED

## 2019-09-25 NOTE — PROGRESS NOTES
BP 1 hour recheck 151/102, HR 98. Second dose of labetalol given per orders. Will continue to monitor and recheck BP.
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
## 2019-09-25 NOTE — CARE PLAN
Problem: Communication  Goal: The ability to communicate needs accurately and effectively will improve  Outcome: PROGRESSING AS EXPECTED  Note:   Patient encouraged to voice all feelings/questions/concerns. All needs met at this time. Call light within reach.      Problem: Bowel/Gastric:  Goal: Normal bowel function is maintained or improved  Note:   Patient's last BM was 09/19/2019. Has received multiple doses of stool softener, advanced to miralax per bowl protocol. Will monitor for BM or advance to milk of mag with morning medications. Patient encourage to sit at edge of bed or chair and ambulate to help with bowel motility, as well as drinking fluids. Will continue to monitor.

## 2019-09-25 NOTE — PROGRESS NOTES
Bedside report received from day RN. Patient's plan of care reviewed. Patient found resting in bed, A&Ox4, slow to respond. Family at bedside. VSS, on 1.5L via NC. No signs of distress, declines pain at this time. All needs met. Safety precautions in place. Tele box on. Bed in lowest/locked position. Bed alarm on. Call light and personal belongings within reach. Will continue to monitor.
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
## 2019-09-26 ENCOUNTER — APPOINTMENT (OUTPATIENT)
Dept: RADIOLOGY | Facility: MEDICAL CENTER | Age: 34
DRG: 175 | End: 2019-09-26
Attending: INTERNAL MEDICINE
Payer: COMMERCIAL

## 2019-09-26 LAB
ALBUMIN SERPL BCP-MCNC: 4.3 G/DL (ref 3.2–4.9)
ALBUMIN/GLOB SERPL: 1.9 G/DL
ALP SERPL-CCNC: 49 U/L (ref 30–99)
ALT SERPL-CCNC: 46 U/L (ref 2–50)
ANION GAP SERPL CALC-SCNC: 10 MMOL/L (ref 0–11.9)
AST SERPL-CCNC: 16 U/L (ref 12–45)
BASOPHILS # BLD AUTO: 0.3 % (ref 0–1.8)
BASOPHILS # BLD: 0.04 K/UL (ref 0–0.12)
BILIRUB SERPL-MCNC: 0.7 MG/DL (ref 0.1–1.5)
BUN SERPL-MCNC: 18 MG/DL (ref 8–22)
CALCIUM SERPL-MCNC: 9.1 MG/DL (ref 8.5–10.5)
CHLORIDE SERPL-SCNC: 101 MMOL/L (ref 96–112)
CO2 SERPL-SCNC: 26 MMOL/L (ref 20–33)
CREAT SERPL-MCNC: 0.71 MG/DL (ref 0.5–1.4)
EOSINOPHIL # BLD AUTO: 0.2 K/UL (ref 0–0.51)
EOSINOPHIL NFR BLD: 1.6 % (ref 0–6.9)
ERYTHROCYTE [DISTWIDTH] IN BLOOD BY AUTOMATED COUNT: 65.8 FL (ref 35.9–50)
GLOBULIN SER CALC-MCNC: 2.3 G/DL (ref 1.9–3.5)
GLUCOSE SERPL-MCNC: 101 MG/DL (ref 65–99)
HCT VFR BLD AUTO: 43.9 % (ref 42–52)
HGB BLD-MCNC: 14.5 G/DL (ref 14–18)
IMM GRANULOCYTES # BLD AUTO: 0.09 K/UL (ref 0–0.11)
IMM GRANULOCYTES NFR BLD AUTO: 0.7 % (ref 0–0.9)
LYMPHOCYTES # BLD AUTO: 3.01 K/UL (ref 1–4.8)
LYMPHOCYTES NFR BLD: 24.5 % (ref 22–41)
MCH RBC QN AUTO: 31.2 PG (ref 27–33)
MCHC RBC AUTO-ENTMCNC: 33 G/DL (ref 33.7–35.3)
MCV RBC AUTO: 94.4 FL (ref 81.4–97.8)
MONOCYTES # BLD AUTO: 1.38 K/UL (ref 0–0.85)
MONOCYTES NFR BLD AUTO: 11.2 % (ref 0–13.4)
NEUTROPHILS # BLD AUTO: 7.59 K/UL (ref 1.82–7.42)
NEUTROPHILS NFR BLD: 61.7 % (ref 44–72)
NRBC # BLD AUTO: 0 K/UL
NRBC BLD-RTO: 0 /100 WBC
PLATELET # BLD AUTO: 284 K/UL (ref 164–446)
PMV BLD AUTO: 8.7 FL (ref 9–12.9)
POTASSIUM SERPL-SCNC: 4.3 MMOL/L (ref 3.6–5.5)
PROT SERPL-MCNC: 6.6 G/DL (ref 6–8.2)
RBC # BLD AUTO: 4.65 M/UL (ref 4.7–6.1)
SODIUM SERPL-SCNC: 137 MMOL/L (ref 135–145)
WBC # BLD AUTO: 12.3 K/UL (ref 4.8–10.8)

## 2019-09-26 PROCEDURE — A9270 NON-COVERED ITEM OR SERVICE: HCPCS | Performed by: HOSPITALIST

## 2019-09-26 PROCEDURE — 99232 SBSQ HOSP IP/OBS MODERATE 35: CPT | Performed by: INTERNAL MEDICINE

## 2019-09-26 PROCEDURE — 97535 SELF CARE MNGMENT TRAINING: CPT

## 2019-09-26 PROCEDURE — A9270 NON-COVERED ITEM OR SERVICE: HCPCS | Performed by: INTERNAL MEDICINE

## 2019-09-26 PROCEDURE — 700102 HCHG RX REV CODE 250 W/ 637 OVERRIDE(OP): Performed by: HOSPITALIST

## 2019-09-26 PROCEDURE — 770020 HCHG ROOM/CARE - TELE (206)

## 2019-09-26 PROCEDURE — 85025 COMPLETE CBC W/AUTO DIFF WBC: CPT

## 2019-09-26 PROCEDURE — 99231 SBSQ HOSP IP/OBS SF/LOW 25: CPT | Mod: GC | Performed by: INTERNAL MEDICINE

## 2019-09-26 PROCEDURE — A9270 NON-COVERED ITEM OR SERVICE: HCPCS | Performed by: STUDENT IN AN ORGANIZED HEALTH CARE EDUCATION/TRAINING PROGRAM

## 2019-09-26 PROCEDURE — 700102 HCHG RX REV CODE 250 W/ 637 OVERRIDE(OP): Performed by: STUDENT IN AN ORGANIZED HEALTH CARE EDUCATION/TRAINING PROGRAM

## 2019-09-26 PROCEDURE — 36415 COLL VENOUS BLD VENIPUNCTURE: CPT

## 2019-09-26 PROCEDURE — 99232 SBSQ HOSP IP/OBS MODERATE 35: CPT | Mod: GC | Performed by: PSYCHIATRY & NEUROLOGY

## 2019-09-26 PROCEDURE — 700111 HCHG RX REV CODE 636 W/ 250 OVERRIDE (IP): Performed by: STUDENT IN AN ORGANIZED HEALTH CARE EDUCATION/TRAINING PROGRAM

## 2019-09-26 PROCEDURE — 700102 HCHG RX REV CODE 250 W/ 637 OVERRIDE(OP): Performed by: INTERNAL MEDICINE

## 2019-09-26 PROCEDURE — 97166 OT EVAL MOD COMPLEX 45 MIN: CPT

## 2019-09-26 PROCEDURE — 80053 COMPREHEN METABOLIC PANEL: CPT

## 2019-09-26 PROCEDURE — 700111 HCHG RX REV CODE 636 W/ 250 OVERRIDE (IP): Performed by: PSYCHIATRY & NEUROLOGY

## 2019-09-26 PROCEDURE — 97530 THERAPEUTIC ACTIVITIES: CPT

## 2019-09-26 RX ORDER — BENZTROPINE MESYLATE 1 MG/ML
1 INJECTION INTRAMUSCULAR; INTRAVENOUS 2 TIMES DAILY
Status: DISCONTINUED | OUTPATIENT
Start: 2019-09-26 | End: 2019-09-27

## 2019-09-26 RX ORDER — QUETIAPINE FUMARATE 25 MG/1
100 TABLET, FILM COATED ORAL 2 TIMES DAILY
Status: DISCONTINUED | OUTPATIENT
Start: 2019-09-26 | End: 2019-09-28

## 2019-09-26 RX ADMIN — SENNOSIDES, DOCUSATE SODIUM 2 TABLET: 50; 8.6 TABLET, FILM COATED ORAL at 05:28

## 2019-09-26 RX ADMIN — QUETIAPINE FUMARATE 100 MG: 25 TABLET ORAL at 17:18

## 2019-09-26 RX ADMIN — THERA TABS 1 TABLET: TAB at 05:28

## 2019-09-26 RX ADMIN — TAMSULOSIN HYDROCHLORIDE 0.4 MG: 0.4 CAPSULE ORAL at 09:29

## 2019-09-26 RX ADMIN — BENZTROPINE MESYLATE 1 MG: 1 INJECTION INTRAMUSCULAR; INTRAVENOUS at 05:35

## 2019-09-26 RX ADMIN — RIVAROXABAN 15 MG: 15 TABLET, FILM COATED ORAL at 17:19

## 2019-09-26 RX ADMIN — FOLIC ACID 1 MG: 1 TABLET ORAL at 05:28

## 2019-09-26 RX ADMIN — LISINOPRIL 10 MG: 10 TABLET ORAL at 05:28

## 2019-09-26 RX ADMIN — RIVAROXABAN 15 MG: 15 TABLET, FILM COATED ORAL at 09:29

## 2019-09-26 RX ADMIN — SENNOSIDES, DOCUSATE SODIUM 2 TABLET: 50; 8.6 TABLET, FILM COATED ORAL at 17:18

## 2019-09-26 RX ADMIN — BENZTROPINE MESYLATE 1 MG: 1 INJECTION INTRAMUSCULAR; INTRAVENOUS at 17:27

## 2019-09-26 ASSESSMENT — ENCOUNTER SYMPTOMS
TINGLING: 1
NAUSEA: 0
GASTROINTESTINAL NEGATIVE: 1
CARDIOVASCULAR NEGATIVE: 1
NERVOUS/ANXIOUS: 1
VOMITING: 0
EYES NEGATIVE: 1
HALLUCINATIONS: 1
ABDOMINAL PAIN: 1
RESPIRATORY NEGATIVE: 1
NEUROLOGICAL NEGATIVE: 1
CONSTITUTIONAL NEGATIVE: 1
DIAPHORESIS: 1
FOCAL WEAKNESS: 0
SHORTNESS OF BREATH: 0

## 2019-09-26 ASSESSMENT — COGNITIVE AND FUNCTIONAL STATUS - GENERAL
PERSONAL GROOMING: A LOT
MOVING FROM LYING ON BACK TO SITTING ON SIDE OF FLAT BED: UNABLE
CLIMB 3 TO 5 STEPS WITH RAILING: A LOT
TURNING FROM BACK TO SIDE WHILE IN FLAT BAD: A LITTLE
DRESSING REGULAR LOWER BODY CLOTHING: A LOT
STANDING UP FROM CHAIR USING ARMS: A LITTLE
HELP NEEDED FOR BATHING: A LOT
MOBILITY SCORE: 14
EATING MEALS: A LITTLE
SUGGESTED CMS G CODE MODIFIER DAILY ACTIVITY: CL
MOVING TO AND FROM BED TO CHAIR: A LITTLE
SUGGESTED CMS G CODE MODIFIER MOBILITY: CL
TOILETING: A LOT
DRESSING REGULAR UPPER BODY CLOTHING: A LOT
DAILY ACTIVITIY SCORE: 13
WALKING IN HOSPITAL ROOM: A LOT

## 2019-09-26 ASSESSMENT — GAIT ASSESSMENTS
DEVIATION: SHUFFLED GAIT
GAIT LEVEL OF ASSIST: MINIMAL ASSIST
ASSISTIVE DEVICE: FRONT WHEEL WALKER
DISTANCE (FEET): 3

## 2019-09-26 ASSESSMENT — ACTIVITIES OF DAILY LIVING (ADL): TOILETING: INDEPENDENT

## 2019-09-26 NOTE — PROGRESS NOTES
Bedside report received from day RN. Patient's plan of care reviewed. Patient found resting in bed, A&Ox4. Family at bedside. VSS on 1.5L via NC. No signs of distress, declines pain at this time. All needs met. Safety precautions in place. Tele box on. Bed in lowest/locked position. Bed alarm on. Call light and personal belongings within reach. Will continue to monitor.

## 2019-09-26 NOTE — PSYCHIATRY
PSYCHIATRIC FOLLOW UP:    Reason for Admission: Psychiatric Issues, Paranoia    Requesting Physician: Edwardo Dillon M.D.  Supervising Physician:Sahra Hartmann M.D.    Subjective:  Patient is a 34 y.o. male with history of MDD, PTSD, alcohol use disorder, and pulmonary embolism who presents to the hospital after heavy use of mushrooms and nitrous oxide resulting in paranoid delusions.    On interview, patient is minimally engaged he is sitting in a chair looking forward, patient was then engaged in physical therapy exercises which she exhibited full range of motion in all limbs.  He reports that he is physically still feeling very poorly and was awoken this morning at 3 AM for blood draw and was unable to go back to sleep.  Patient's mom is in the room she reports that he was significantly stiffer this morning prior to be given Cogentin.  She states that he had a rough night and was talking to himself throughout the night.  She also reports a worsening of his paranoia, he does not want her to leave the room.    Patient continues to be very weak.  strength is significantly decreased       Medical Review of Systems:  Constitutional: Positive for diaphoresis and malaise/fatigue   HENT: Negative for hearing loss, sore throat, neck pain and positive for congestion   Eyes: Negative for blurred vision, pain, redness.   Respiratory: Negative for cough, shortness of breath, wheezing   Cardiovascular: Negative for chest pain, palpitations  Gastrointestinal: Postive for abdominal pain and constipation. Negative for nausea and vomiting  Genitourinary: Negative for dysuria, urgency, frequency, hematuria  Musculoskeletal: Negative for myalgias,  joint pain  Skin: Negative for itching and rash.  Neurological: Lightheadness upon standing.   Psychiatric/Behavioral: See above for psych review of systems      Psychiatric Examination:   Vitals: /92   Pulse 100   Temp 36.4 °C (97.5 °F) (Temporal)   Resp 18   Ht  "1.727 m (5' 8\")   Wt 118 kg (260 lb 2.3 oz)   SpO2 96%   BMI 39.55 kg/m²   Musculoskeletal: Decreased  strength, when trying to pull patients arm he immediately stopped resisting despite multiple instructions  Appearance: well-developed, well-nourished, appears stated age, disheveled and unkempt, disorganized, poor eye contact and irritable  Thoughts: Patient did not report paranoa but mom reports paranoia and responding to internal stimuli , linear, incoherent, not goal-oriented and disorganized  Speech: Prosody noted, used short sentences.   Mood: \"Like shit\"   Affect: dysthymic, flat and congruent with mood  SI/HI: Denies SI and HI  Alert/Oriented: Alert   Memory: no gross impairment in immediate, recent, or remote memory  Fund of Knowledge: adequate  Insight/Judgement into symptoms: poor  Neurological Testing: MMSE not performed during this encounter    Medications (currently prescribed at [facility]):    Current Facility-Administered Medications:   •  cyanocobalamin (VITAMIN B-12) injection 1,000 mcg, 1,000 mcg, Intramuscular, Q30 DAYS, Edwardo Dillon M.D., 1,000 mcg at 09/25/19 1829  •  benztropine mesylate (COGENTIN) injection 1 mg, 1 mg, Intravenous, BID PRN, Sahra Hartmann M.D., 1 mg at 09/26/19 0535  •  QUEtiapine (SEROQUEL) tablet 50 mg, 50 mg, Oral, Nightly, Sahra Hartmann M.D., 50 mg at 09/25/19 2214  •  lisinopril (PRINIVIL) 10 MG tablet 10 mg, 10 mg, Oral, Q DAY, Edwardo Dillon M.D., 10 mg at 09/26/19 0528  •  senna-docusate (PERICOLACE or SENOKOT S) 8.6-50 MG per tablet 2 Tab, 2 Tab, Oral, BID, 2 Tab at 09/26/19 0528 **AND** polyethylene glycol/lytes (MIRALAX) PACKET 1 Packet, 1 Packet, Oral, DAILY, 1 Packet at 09/24/19 1346 **AND** magnesium hydroxide (MILK OF MAGNESIA) suspension 30 mL, 30 mL, Oral, QDAY PRN, 30 mL at 09/25/19 0500 **AND** bisacodyl (DULCOLAX) suppository 10 mg, 10 mg, Rectal, QDAY PRN, Edwardo Dillon M.D.  •  rivaroxaban (XARELTO) tablet 15 mg, 15 mg, Oral, BID " WITH MEALS, 15 mg at 09/26/19 0929 **FOLLOWED BY** [START ON 10/14/2019] rivaroxaban (XARELTO) tablet 20 mg, 20 mg, Oral, PM MEAL, Rainer Butt M.D.  •  tamsulosin (FLOMAX) capsule 0.4 mg, 0.4 mg, Oral, AFTER BREAKFAST, Rainer Butt M.D., 0.4 mg at 09/26/19 0929  •  diphenhydrAMINE (BENADRYL) injection 25 mg, 25 mg, Intravenous, QHS PRN, Domingo Ceja M.D., 25 mg at 09/25/19 2215  •  Respiratory Care per Protocol, , Nebulization, Continuous RT, Lita Brantley M.D.  •  acetaminophen (TYLENOL) tablet 650 mg, 650 mg, Oral, Q6HRS PRN, Lita Brantley M.D., 650 mg at 09/23/19 1231  •  labetalol (NORMODYNE,TRANDATE) injection 10 mg, 10 mg, Intravenous, Q4HRS PRN, Edwardo Dillon M.D., 10 mg at 09/25/19 0457  •  ondansetron (ZOFRAN) syringe/vial injection 4 mg, 4 mg, Intravenous, Q4HRS PRN, Lita Brantley M.D., 4 mg at 09/20/19 1905  •  ondansetron (ZOFRAN ODT) dispertab 4 mg, 4 mg, Oral, Q4HRS PRN, Lita Brantley M.D.  •  folic acid (FOLVITE) tablet 1 mg, 1 mg, Oral, DAILY, James Hou M.D., 1 mg at 09/26/19 0528  •  multivitamin (THERAGRAN) tablet 1 Tab, 1 Tab, Oral, DAILY, James Hou M.D., 1 Tab at 09/26/19 0528  Labs:  Recent Labs     09/24/19  0248 09/25/19  0305 09/26/19  0310   WBC 10.2 12.1* 12.3*   RBC 4.52* 4.78 4.65*   HEMOGLOBIN 13.9* 14.7 14.5   HEMATOCRIT 42.7 44.9 43.9   MCV 94.5 93.9 94.4   MCH 30.8 30.8 31.2   MCHC 32.6* 32.7* 33.0*   RDW 65.6* 65.2* 65.8*   PLATELETCT 299 312 284   MPV 8.6* 8.6* 8.7*     Recent Labs     09/24/19  0248 09/25/19  0305 09/26/19  0310   SODIUM 140 135 137   POTASSIUM 4.5 4.5 4.3   CHLORIDE 105 101 101   CO2 26 26 26   GLUCOSE 115* 116* 101*   BUN 17 15 18   CREATININE 0.84 0.79 0.71   CALCIUM 9.2 9.4 9.1                           Assessment:  Mr. Cabrrea is a 34-year-old male with a history of pulmonary embolus and alcohol use disorder admitted for psychosis unspecified.  He is not capacitated to leave AGAINST MEDICAL ADVICE due to fluctuating  cognition.       Plan:  1.  Psychosis unspecified  -Increase to Quetiapine 100mg PO BID with cogentin   -Will schedule Cogentin 1mg PO BID with Quetiapine     -Patient incapacitated to leave AGAINST MEDICAL ADVICE due to fluctuating cognition. Will start legal hold.  -Given heart condition, would consider using Haldol and benzodiazepines sparingly due to fear of exacerbating his condition.             2.  Medical issues: Paroxysmal A. fib, pulmonary embolus, ethanol withdrawal possibly.     -Defer to medical team for further treatment.                Legal hold: Started legal hold 9/26/19  Other:              Sitter: yes if mother is not present               Visitors: Mother is okay               Phone calls: Per nursing discretion              Personal items (specify): Per nursing discretion  *Medication risk/benefit/expections discussed  *Will Follow  *Thank you for the consult
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
## 2019-09-26 NOTE — THERAPY
"Pt seen for PT tx session, Mom present throughout therapy session and very supportive. Pt presented somewhat fidgety with increased RR. Pt required Mod - Min A to complete functional mobility and transfer to bedside chair. Unable to progress ambulation beyond bedside chair due to impaired balance and and safety awareness. Pt required frequent seated rest breaks during mobility and ther ex tasks and prompting to focus on slow, deep breaths. Pt will continue to benefit from acute PT interventions.     Physical Therapy Treatment completed.   Bed Mobility:  Supine to Sit: Minimal Assist(HOB elevated, mostly for safety due to impulsivity)  Transfers: Sit to Stand: Minimal Assist; Mod A to complete safe transfer to bedside chair  Gait: Level Of Assist: Minimal Assist with Front-Wheel Walker       Plan of Care: Will benefit from Physical Therapy 3 times per week  Discharge Recommendations: Equipment: Will Continue to Assess for Equipment Needs. Post-acute therapy: Recommend post-acute placement for continued physical therapy services prior to discharge home. Patient can tolerate post-acute therapies at a 5x/week frequency.          See \"Rehab Therapy-Acute\" Patient Summary Report for complete documentation.       "

## 2019-09-26 NOTE — PROGRESS NOTES
Diabetes education: Met with mom and pt briefly. Please see consult note.  Plan: Pt not appropriate for more discussion at this time. No medications nor finger sticks and blood sugars more inline with pre diabetes. Handouts not given as pt was anxious and not appropriate for discussion. Please reorder diabetes education if needed and appropriate.

## 2019-09-26 NOTE — CONSULTS
"Diabetes education: Met with pt and mother this afternoon. Pt had just met with psych and per Mom was \"paranoid\".   Pt was admitted with blood sugar of 97 and Hg a1c of 6.6%.  Pt has glucose reading of 97, 89, 114, 102, 104, 115, and 116. Pt is not getting finger sticks. Pt does not meet the criteria for diabetes with only A1c of 6.6%. More like pre diabetes.  Mom states his dad has type two diabetes. Reviewed the difference between type two and pre diabetes, and needs to manage.  Plan: Pt not appropriate for more discussion at this time. No medications nor finger sticks and blood sugars more inline with pre diabetes. Handouts not given as pt was anxious and not appropriate for discussion. Please reorder diabetes education if needed and appropriate.  "

## 2019-09-26 NOTE — CARE PLAN
Problem: Safety  Goal: Will remain free from injury  Outcome: PROGRESSING AS EXPECTED  Goal: Will remain free from falls  Outcome: PROGRESSING AS EXPECTED     Problem: Infection  Goal: Will remain free from infection  Outcome: PROGRESSING AS EXPECTED     Problem: Communication  Goal: The ability to communicate needs accurately and effectively will improve  Outcome: PROGRESSING SLOWER THAN EXPECTED     Problem: Bowel/Gastric:  Goal: Normal bowel function is maintained or improved  Outcome: PROGRESSING SLOWER THAN EXPECTED  Goal: Will not experience complications related to bowel motility  Outcome: PROGRESSING SLOWER THAN EXPECTED

## 2019-09-26 NOTE — CARE PLAN
Problem: Safety  Goal: Will remain free from falls  Outcome: PROGRESSING AS EXPECTED  Note:   Safety precautions in place. Education provided to patient, verbalized understanding. Bed in lowest/locked position. Bed alarm on. Room close to nurses station. Call light and personal belongings within reach. Will continue to monitor.       Problem: Venous Thromboembolism (VTW)/Deep Vein Thrombosis (DVT) Prevention:  Goal: Patient will participate in Venous Thrombosis (VTE)/Deep Vein Thrombosis (DVT)Prevention Measures  Outcome: PROGRESSING AS EXPECTED  Note:   Patient educated on VTE prophylaxis including a pharmacologic method. Ambulation and range of motion encouraged and medication orders followed. Education on the importance of VTE prophylaxis due to decreased mobility while in the hospital. Verbalized understanding, no additional questions at this time.

## 2019-09-26 NOTE — PROGRESS NOTES
Hospital Medicine Daily Progress Note    Date of Service  9/26/2019    Chief Complaint  34 y.o. male admitted 9/19/2019 with hallucinations    Hospital Course    Past medical history of depression, PTSD, anxiety, substance abuse of mushrooms, nitrous oxide, marijuana, alcohol, paroxysmal A. fib, recent diagnosis of PE May 2019.  He presented with hallucinations and had stopped taking his medications including Xarelto.  He had also fallen multiple times.  He was found hypoxic and CTA showed extensive acute bilateral pulmonary emboli with saddle embolus.  He was admitted to the ICU and felt his risk of intracranial hemorrhage outweighed submassive dose of alteplase or EKOS.  He was started on heparin infusion.  Lower extremity Doppler was negative for DVT.  TTE showed severe right heart strain.  Psychiatry was consulted for his hallucinations and he was started on Risperdal with improvement.  Patient remained stabilized and transitioned to Xarelto.  He was hypertensive requiring IV medications so he was started on a lower lower dose of lisinopril and his atenolol was stopped, given his right heart strain.      Interval Problem Update  He still feels very paranoid, he does not want his mother to leave his side.  They declined a sitter at this time.    He was given Cogentin this morning for some hand cramps, somewhat improved.  He says his sweating feels less.  He was able to ambulate to a bedside chair with PT today.  He had several bowel movements yesterday but complains of abdominal pain mostly to his right side today.  Denies vomiting, he has been eating fine.  He is okay with voiding trial today.    Consultants/Specialty  Psychiatry  Critical care  Pulmonology    Code Status  Full Code      Disposition  Case coordination to discuss discharge planning with mother  SNF  Will need anticoagulation clinic    Review of Systems  Review of Systems   Constitutional: Positive for diaphoresis and malaise/fatigue.   HENT:  Positive for congestion.    Respiratory: Negative for shortness of breath.    Cardiovascular: Negative for chest pain.   Gastrointestinal: Positive for abdominal pain. Negative for nausea and vomiting.   Genitourinary: Negative for dysuria.   Neurological: Positive for tingling. Negative for focal weakness.   Psychiatric/Behavioral: Positive for hallucinations. The patient is nervous/anxious.         Physical Exam  Temp:  [36.2 °C (97.1 °F)-36.9 °C (98.4 °F)] 36.6 °C (97.8 °F)  Pulse:  [100-118] 101  Resp:  [18-20] 18  BP: (135-160)/() 143/83  SpO2:  [95 %-97 %] 96 %    Physical Exam   Constitutional: He is oriented to person, place, and time. He appears well-developed. No distress.   Obesity   HENT:   Head: Normocephalic.   Mouth/Throat: Oropharynx is clear and moist.   Eyes: Conjunctivae are normal.   Cardiovascular: Normal rate and regular rhythm.   Pulmonary/Chest: He has no wheezes. He has no rales.   Tachypneic, diminished breath sounds throughout   Abdominal: Soft. Bowel sounds are normal. He exhibits no distension (Mild to moderate). There is tenderness (Left lower quadrant). There is no rebound and no guarding.   Musculoskeletal: He exhibits no edema.   Neurological: He is alert and oriented to person, place, and time.   4 out of 5 strength upper and lower extremities   Skin: Skin is warm. He is diaphoretic.   Psychiatric: His mood appears anxious.   Flat affect   Nursing note and vitals reviewed.      Fluids    Intake/Output Summary (Last 24 hours) at 9/26/2019 1336  Last data filed at 9/26/2019 1130  Gross per 24 hour   Intake 600 ml   Output 3050 ml   Net -2450 ml       Laboratory  Recent Labs     09/24/19  0248 09/25/19  0305 09/26/19  0310   WBC 10.2 12.1* 12.3*   RBC 4.52* 4.78 4.65*   HEMOGLOBIN 13.9* 14.7 14.5   HEMATOCRIT 42.7 44.9 43.9   MCV 94.5 93.9 94.4   MCH 30.8 30.8 31.2   MCHC 32.6* 32.7* 33.0*   RDW 65.6* 65.2* 65.8*   PLATELETCT 299 312 284   MPV 8.6* 8.6* 8.7*     Recent Labs      "09/24/19  0248 09/25/19  0305 09/26/19  0310   SODIUM 140 135 137   POTASSIUM 4.5 4.5 4.3   CHLORIDE 105 101 101   CO2 26 26 26   GLUCOSE 115* 116* 101*   BUN 17 15 18   CREATININE 0.84 0.79 0.71   CALCIUM 9.2 9.4 9.1                   Imaging  DX-CHEST-PORTABLE (1 VIEW)   Final Result      Mild lung base atelectasis with edema not excluded.      DX-CHEST-PORTABLE (1 VIEW)   Final Result         1.  Pulmonary edema and/or infiltrates.   2.  Cardiomegaly      MR-BRAIN-W/O   Final Result      1.  No acute intracranial abnormality.   2.  Sphenoid and posterior right ethmoid sinus disease.      US-EXTREMITY VENOUS LOWER BILAT   Final Result      EC-ECHOCARDIOGRAM LTD W/O CONT   Final Result      CT-CTA CHEST PULMONARY ARTERY W/ RECONS   Final Result   Addendum 1 of 1   These findings were discussed with HEATHER MELGOZA on 9/19/2019 2:16 PM.      Final      DX-CHEST-PORTABLE (1 VIEW)   Final Result      No acute cardiopulmonary abnormality.      CT-HEAD W/O   Final Result      No CT evidence of acute infarct, hemorrhage or mass. No acute intracranial injury.      [id number] VIEW    (Results Pending)        Assessment/Plan  * Acute saddle pulmonary embolism (HCC)- (present on admission)  Assessment & Plan  Recurrent, in the setting of noncompliance with anticoagulation therapy  CTA PE revealing \"Extensive acute bilateral pulmonary emboli with saddle embolus noted, as well as findings concerning for RV strain\"  Recurrence likely secondary to poor compliance of medication  Heparin drip transitioned to Xarelto  Echocardiogram with RV strain, caution aggressive blood pressure changes  DVT study negative    Acute hypoxemic respiratory failure (HCC)  Assessment & Plan  Secondary acute pulmonary embolism  Titrate oxygen as tolerated    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  Q waves in inferior leads.    Likely from underlying pulmonary embolism, right ventricular strain.    Close clinical monitoring in the intensive care unit  Patient on anticoagulation    ETOH abuse- (present on admission)  Assessment & Plan  History of, monitor for withdrawal    Weakness  Assessment & Plan  I spoke with psychiatry, who notes patient's current weakness has progressed from when he first admitted.  CPK was normal  Psychiatry stopped risperidone, had some improvement with Cogentin  Possible toxicity from the nitrous oxide.  I spoke with Dr. Byrd with neurology, he agreed with continuing with vitamin B12 supplementation.  He can have EMG testing done as outpatient after 2 weeks of stopping nitrous oxide.  PT OT ordered for reevaluation, plan for SNF    Vitamin B12 deficiency  Assessment & Plan  With reported peripheral tingling  Supplement ordered    Type 2 diabetes mellitus without complication, without long-term current use of insulin ([facility])  Assessment & Plan  New diagnosis, A1c 6.6%  Diabetes education ordered  Could be contributing to peripheral neuropathy    Slow transit constipation  Assessment & Plan  Daily Senokot and MiraLAX  Bowel regimen ordered  Enema PRN  9/26 -ongoing abdominal pain, KUB ordered    ALLI (obstructive sleep apnea)  Assessment & Plan  History of, pulmonary following    Morbid obesity (HCC)  Assessment & Plan  Outpatient weight loss program would be indicated    Pulmonary hypertension (HCC)- (present on admission)  Assessment & Plan  Known history of, now with recurrent PE  Echocardiogram noted    Anxiety- (present on  admission)  Assessment & Plan  On Librium previously  Psychiatry evaluating, he was placed on a hold.  Bedside sitter as needed.  9/25 risperdal stopped for possible NMS, his anxiety is now increased    Essential hypertension- (present on admission)  Assessment & Plan  9/24 - Hypertensive requiring multiple doses of IV labetalol.  I will start on low-dose lisinopril and monitor blood pressure.   9/25 - HTN improving       VTE prophylaxis: xarelto

## 2019-09-26 NOTE — PROGRESS NOTES
Patient becoming very paranoid and anxiuos this morning, able to identify the door being left open during medication administration and being woken up multiple times over the night for labs and vitals increased patient feeling of not being safe in the hospital. This RN and patient's mother spend time calming patient, cueing deep, slow breathing, and discussing triggers with solutions. Patient still feeling paranoid and anxious, however improved. Will leave note outside patient's door to contact nurse prior to entering in order to cluster care better and monitor patient.

## 2019-09-26 NOTE — DISCHARGE PLANNING
LSW contacted [facility] to request a Medicaid screening. Pt has been unemployed for approximately 3 months and might qualify for Medicaid.

## 2019-09-26 NOTE — DISCHARGE PLANNING
Anticipated Discharge Disposition: SNF    Action: LSW met with Pt and Pts mother, Aga at bedside to complete SNF Choice.  Pt and Aga chose to do a blanket Choice for [facility] and then make their decision once they have found who has accepted the Pt.     LSW faxed SNF Choice to [facility]      Barriers to Discharge: Medical clearance, SNF acceptance    Plan: LSW will continue to follow and assist as needed

## 2019-09-26 NOTE — PROGRESS NOTES
"Pulmonary Critical Care Progress Note        Chief Complaint:     \"34 y.o. male admitted 9/19/2019 with psychosis, SOB and R sided CP.\"    History of Present Illness:     \"The history is obtained from the patient as well as from healthcare providers and the medical record. This gentleman does not provide the best history due to disorientation and psychosis. This gentleman has a history of submassive pulmonary embolism in May 2019, anxiety, hypertension, paroxysmal atrial fibrillation and alcohol abuse with documented blood alcohol levels as high as 0.25 in the past. He was brought into the emergency room today with psychosis. He states that people have been following him around at his home with an agenda. That agenda is to have sexual activity with him. He is aware that his thinking has been \"off.\" He admits that he has been using psychedelic mushrooms and whippits as well as marijuana. He is disoriented to time and cannot really give me a history of his duration of use or how long his kunz has been going on, but it has been several days at least. He is complaining of some shortness of breath and right-sided pleuritic chest pain. He is supposed to be taking rivaroxaban for history of pulmonary embolism, but admits that he is noncompliant with this medication and takes it only intermittently. He believes that he is missed at least 5 days of therapy in the last week. He also tells me that for several weeks he has been unsteady and has been falling. He admits that he has hit his head on several falls and further admits that sometime in the last couple of weeks, he hit his head so hard that he lost consciousness. CT imaging on presentation today reveals submassive pulmonary embolism with a saddle pulmonary embolism. \"      Interval Events:    9/24: atient still appears to be slightly delayed in responses. Patient currently on Xarelto and tolerating well. Oxygen requirements decreasing.    9/25 : Patient appears " diaphoretic and sickly, he states he has no issues breathing today. Mother is concerned about breathing due to moaning. Patient attempting to color but has difficulty holding color pencil in hand.     9/26: Patient appears the same as yesterday, slight diaphoresis but describes no breathing abnormality.     Review of Systems   Constitutional: Negative.    HENT: Negative.    Eyes: Negative.    Respiratory: Negative.    Cardiovascular: Negative.    Gastrointestinal: Negative.    Genitourinary: Negative.    Skin: Negative.    Neurological: Negative.      Physical Exam   Constitutional: He is oriented to person, place, and time. He appears well-developed and well-nourished. He is active and cooperative. He has a sickly appearance. No distress. Nasal cannula in place.   HENT:   Head: Normocephalic and atraumatic.   Eyes: EOM are normal.   Cardiovascular: Normal rate, regular rhythm and normal heart sounds.   Pulmonary/Chest: Effort normal and breath sounds normal. No stridor. No respiratory distress.   Abdominal: Soft. Bowel sounds are normal.   Musculoskeletal: He exhibits no edema.   Neurological: He is alert and oriented to person, place, and time. No cranial nerve deficit. Coordination normal.   Constructional apraxia   Skin: He is diaphoretic.   Psychiatric: His affect is labile. His speech is delayed. He is slowed.         Fluids:  Intake/Output       09/24/19 0700 - 09/25/19 0659 09/25/19 0700 - 09/26/19 0659 09/26/19 0700 - 09/27/19 0659      5933-4523 6560-2033 Total 0833-9305 3493-8570 Total 9868-5581 0369-6094 Total       Intake    P.O.  --  120 120  240  240 480  360  -- 360    P.O. -- 120 120 240 240 480 360 -- 360    Total Intake -- 120 120 240 240 480 360 -- 360       Output    Urine  1500  690 2190  1250  1350 2600  450  -- 450    Urine Void (mL) 500 -- 500 -- -- -- -- -- --    Output (mL) ([REMOVED] Urethral Catheter Latex 16 Fr.) 2142 753 8342 1250 1350 2600 450 -- 450    Stool  --  -- --  --  -- --  --   -- --    Number of Times Stooled -- -- -- 3 x -- 3 x -- -- --    Total Output 8968 353 5093 1250 1350 2600 450 -- 450       Net I/O     -1500 -570 -2070 -1010 -1110 - -90 -- -90           Recent Labs     19   SODIUM 140 135 137   POTASSIUM 4.5 4.5 4.3   CHLORIDE 105 101 101   CO2    BUN 17 15 18   CREATININE 0.84 0.79 0.71   MAGNESIUM 2.3 2.2  --    PHOSPHORUS 3.9 3.7  --    CALCIUM 9.2 9.4 9.1     Liver Function  Recent Labs     19   ALTSGPT 45 57* 46   ASTSGOT 22 21 16   ALKPHOSPHAT 51 51 49   TBILIRUBIN 0.6 0.6 0.7   GLUCOSE 115* 116* 101*       Heme:  Recent Labs     19   RBC 4.52* 4.78 4.65*   HEMOGLOBIN 13.9* 14.7 14.5   HEMATOCRIT 42.7 44.9 43.9   PLATELETCT 299 312 284       Infectious Disease:  Temp  Av.5 °C (97.7 °F)  Min: 36.2 °C (97.1 °F)  Max: 36.9 °C (98.4 °F)  Micro: reviewed  Recent Labs     19   WBC 10.2 12.1* 12.3*   NEUTSPOLYS 65.40 70.80 61.70   LYMPHOCYTES 22.00 16.90* 24.50   MONOCYTES 10.10 9.80 11.20   EOSINOPHILS 1.70 1.60 1.60   BASOPHILS 0.20 0.20 0.30   ASTSGOT 22 21 16   ALTSGPT 45 57* 46   ALKPHOSPHAT 51 51 49   TBILIRUBIN 0.6 0.6 0.7     Current Facility-Administered Medications   Medication Dose Frequency Provider Last Rate Last Dose   • cyanocobalamin (VITAMIN B-12) injection 1,000 mcg  1,000 mcg Q30 DAYS Edwardo Dillon M.D.   1,000 mcg at 19 1829   • benztropine mesylate (COGENTIN) injection 1 mg  1 mg BID PRN Sahra Hartmann M.D.   1 mg at 19 0535   • QUEtiapine (SEROQUEL) tablet 50 mg  50 mg Nightly Sahra Hartmann M.D.   50 mg at 19 2214   • lisinopril (PRINIVIL) 10 MG tablet 10 mg  10 mg Q DAY Edwardo Dillon M.D.   10 mg at 19 0528   • polyethylene glycol/lytes (MIRALAX) PACKET 1 Packet  1 Packet DAILY Edwardo Dillon M.D.   1 Packet at 19 1346    And    • senna-docusate (PERICOLACE or SENOKOT S) 8.6-50 MG per tablet 2 Tab  2 Tab BID Edwardo Dillon M.D.   2 Tab at 09/26/19 0528    And   • magnesium hydroxide (MILK OF MAGNESIA) suspension 30 mL  30 mL QDAY PRN Edwardo Dillon M.D.   30 mL at 09/25/19 0500    And   • bisacodyl (DULCOLAX) suppository 10 mg  10 mg QDAY PRN Edwardo Dillon M.D.       • rivaroxaban (XARELTO) tablet 15 mg  15 mg BID WITH MEALS Rainer Butt M.D.   15 mg at 09/26/19 0929    Followed by   • [START ON 10/14/2019] rivaroxaban (XARELTO) tablet 20 mg  20 mg PM MEAL Rainer Butt M.D.       • tamsulosin (FLOMAX) capsule 0.4 mg  0.4 mg AFTER BREAKFAST Rainer Butt M.D.   0.4 mg at 09/26/19 0929   • diphenhydrAMINE (BENADRYL) injection 25 mg  25 mg QHS PRN Domingo Ceja M.D.   25 mg at 09/25/19 2215   • Respiratory Care per Protocol   Continuous RT Lita Brantley M.D.       • acetaminophen (TYLENOL) tablet 650 mg  650 mg Q6HRS PRN Lita Brantley M.D.   650 mg at 09/23/19 1231   • labetalol (NORMODYNE,TRANDATE) injection 10 mg  10 mg Q4HRS PRN Edwardo Dillon M.D.   10 mg at 09/25/19 0457   • ondansetron (ZOFRAN) syringe/vial injection 4 mg  4 mg Q4HRS PRN Lita Brantley M.D.   4 mg at 09/20/19 1905   • ondansetron (ZOFRAN ODT) dispertab 4 mg  4 mg Q4HRS PRN Lita Brantley M.D.       • folic acid (FOLVITE) tablet 1 mg  1 mg DAILY James Hou M.D.   1 mg at 09/26/19 0528   • multivitamin (THERAGRAN) tablet 1 Tab  1 Tab DAILY James Hou M.D.   1 Tab at 09/26/19 0528     Last reviewed on 9/19/2019  3:42 PM by Sudha Benoit, T      Assessment / Plan  * Acute saddle pulmonary embolism (HCC)- (present on admission)  Assessment & Plan  Submassive pulmonary embolism, hemodynamically stable with right heart strain. Noncompliant with his rivaroxaban. Patient currently on Xarelto. Lower extremity venous Doppler study for DVT is negative  Plan  -Patient to continue anticoagulation for 3 months  -Follow up in pulmonary clinic in 10  weeks    Acute hypoxemic respiratory failure (HCC)  Assessment & Plan  Stable. Secondary to submassive pulmonary embolus with pulmonary hypertension. History of compliance issues with rivaroxaban and falls  Plan  -RT/O2 protocols  -Keep patient upright at 45 to 60 degrees, he seems to do better at that position, continue  -IS   -Encourage mobilization    Elevated troponin- (present on admission)  Assessment & Plan  Type II MI, demand ischemia    ALLI (obstructive sleep apnea)  Assessment & Plan  Patient obese and leads a sedentary life style. Mentions history of ALLI and been on CPAP but not using it.  Plan  -Outpatient sleep study   -Pulmonary follow up    Pulmonary hypertension (HCC)- (present on admission)  Assessment & Plan  Secondary to pulmonary embolus, recurrent. RVSP moderate to severely elevated in May of this year. Follow up ECHO did not calculate RVSP due to strain  Plan  -Patient would likely benefit from ECHO in the future

## 2019-09-26 NOTE — THERAPY
"Occupational Therapy Evaluation completed.   Functional Status:  Pt is a 35yo male admitted for psychosis found to have saddle PE. PMHx of PEs, HTN, anxiety, afib, noncompliance with meds, and alcohol/mushroom/marijuana/whip-it use. Pt appeared fearful and req mother be w/in eyesight throughout session. Per psych, continues to have hallucinations. Supine>sit with min A and HOB elevated. Max A for LB dressing. Sit>stand with min A and FWW. Txf to chair with mod A and x2ppl for safety; demo'd poor technique and safety awareness req v/cs. Educated on UB AROM exercises and demo'd with good understanding req v/cs for proper technique; provided with handout. Also provided with yellow sponge for hand strengthening; educated on  exercise and finger isolation exercises. Demo'd difficulty with BUE coordination and strength; so provided with red built up  for using utensils, req min A for eating. Left seated in chair with mother in room. Req extra time and demo'd delayed responses throughout session; groans when breathing throughout session, but no c/o SOB or pain. Per mother, pt will be living with mother upon d/c in [place]. Currently limited by decreased functional mobility, activity tolerance, cognition, sensation, strength, coordination, balance which are currently affecting pt's ability to complete ADLs/IADLs at baseline. Currently recommend acute OT, and Recommend post-acute placement for additional occupational therapy services prior to discharge home. Patient can tolerate post-acute therapies at a 5x/week frequency.    Plan of Care: Will benefit from Occupational Therapy 3 times per week  Discharge Recommendations:  Equipment: Will Continue to Assess for Equipment Needs. Post-acute therapy: Recommend post-acute placement for additional occupational therapy services prior to discharge home. Patient can tolerate post-acute therapies at a 5x/week frequency.      See \"Rehab Therapy-Acute\" Patient Summary Report " for complete documentation.

## 2019-09-26 NOTE — ASSESSMENT & PLAN NOTE
I spoke with psychiatry, who notes patient's current weakness has progressed from when he first admitted.  CPK was normal  Psychiatry stopped risperidone, had some improvement with Cogentin  Possible toxicity from the nitrous oxide. per Dr. Byrd  neurology, he agreed with continuing with vitamin B12 supplementation.  He can have EMG testing done as outpatient after 2 weeks of stopping nitrous oxide    Legal hold off  Pending acute inpatient rehab acceptance by insurance  Stable.

## 2019-09-26 NOTE — DISCHARGE PLANNING
Received Choice form at 1300  [facility]  Referral sent per Choice form @ 1300

## 2019-09-27 LAB
ALBUMIN SERPL BCP-MCNC: 4.2 G/DL (ref 3.2–4.9)
ALBUMIN/GLOB SERPL: 1.6 G/DL
ALP SERPL-CCNC: 49 U/L (ref 30–99)
ALT SERPL-CCNC: 42 U/L (ref 2–50)
ANION GAP SERPL CALC-SCNC: 8 MMOL/L (ref 0–11.9)
AST SERPL-CCNC: 12 U/L (ref 12–45)
BASOPHILS # BLD AUTO: 0.2 % (ref 0–1.8)
BASOPHILS # BLD: 0.03 K/UL (ref 0–0.12)
BILIRUB SERPL-MCNC: 0.6 MG/DL (ref 0.1–1.5)
BUN SERPL-MCNC: 23 MG/DL (ref 8–22)
CALCIUM SERPL-MCNC: 9.3 MG/DL (ref 8.5–10.5)
CHLORIDE SERPL-SCNC: 104 MMOL/L (ref 96–112)
CO2 SERPL-SCNC: 23 MMOL/L (ref 20–33)
CREAT SERPL-MCNC: 0.69 MG/DL (ref 0.5–1.4)
EOSINOPHIL # BLD AUTO: 0.19 K/UL (ref 0–0.51)
EOSINOPHIL NFR BLD: 1.4 % (ref 0–6.9)
ERYTHROCYTE [DISTWIDTH] IN BLOOD BY AUTOMATED COUNT: 66.8 FL (ref 35.9–50)
GLOBULIN SER CALC-MCNC: 2.6 G/DL (ref 1.9–3.5)
GLUCOSE SERPL-MCNC: 107 MG/DL (ref 65–99)
HCT VFR BLD AUTO: 44.1 % (ref 42–52)
HGB BLD-MCNC: 14.1 G/DL (ref 14–18)
IMM GRANULOCYTES # BLD AUTO: 0.1 K/UL (ref 0–0.11)
IMM GRANULOCYTES NFR BLD AUTO: 0.7 % (ref 0–0.9)
LYMPHOCYTES # BLD AUTO: 2.68 K/UL (ref 1–4.8)
LYMPHOCYTES NFR BLD: 19.6 % (ref 22–41)
MCH RBC QN AUTO: 30.4 PG (ref 27–33)
MCHC RBC AUTO-ENTMCNC: 32 G/DL (ref 33.7–35.3)
MCV RBC AUTO: 95 FL (ref 81.4–97.8)
MONOCYTES # BLD AUTO: 1.41 K/UL (ref 0–0.85)
MONOCYTES NFR BLD AUTO: 10.3 % (ref 0–13.4)
NEUTROPHILS # BLD AUTO: 9.29 K/UL (ref 1.82–7.42)
NEUTROPHILS NFR BLD: 67.8 % (ref 44–72)
NRBC # BLD AUTO: 0 K/UL
NRBC BLD-RTO: 0 /100 WBC
PLATELET # BLD AUTO: 283 K/UL (ref 164–446)
PMV BLD AUTO: 8.5 FL (ref 9–12.9)
POTASSIUM SERPL-SCNC: 4.1 MMOL/L (ref 3.6–5.5)
PROCALCITONIN SERPL-MCNC: 0.08 NG/ML
PROT SERPL-MCNC: 6.8 G/DL (ref 6–8.2)
RBC # BLD AUTO: 4.64 M/UL (ref 4.7–6.1)
SODIUM SERPL-SCNC: 135 MMOL/L (ref 135–145)
WBC # BLD AUTO: 13.7 K/UL (ref 4.8–10.8)

## 2019-09-27 PROCEDURE — 700111 HCHG RX REV CODE 636 W/ 250 OVERRIDE (IP): Performed by: INTERNAL MEDICINE

## 2019-09-27 PROCEDURE — 84145 PROCALCITONIN (PCT): CPT

## 2019-09-27 PROCEDURE — 700111 HCHG RX REV CODE 636 W/ 250 OVERRIDE (IP): Performed by: STUDENT IN AN ORGANIZED HEALTH CARE EDUCATION/TRAINING PROGRAM

## 2019-09-27 PROCEDURE — 770020 HCHG ROOM/CARE - TELE (206)

## 2019-09-27 PROCEDURE — A9270 NON-COVERED ITEM OR SERVICE: HCPCS | Performed by: HOSPITALIST

## 2019-09-27 PROCEDURE — 80053 COMPREHEN METABOLIC PANEL: CPT

## 2019-09-27 PROCEDURE — 99232 SBSQ HOSP IP/OBS MODERATE 35: CPT | Mod: GC | Performed by: PSYCHIATRY & NEUROLOGY

## 2019-09-27 PROCEDURE — A9270 NON-COVERED ITEM OR SERVICE: HCPCS | Performed by: INTERNAL MEDICINE

## 2019-09-27 PROCEDURE — 36415 COLL VENOUS BLD VENIPUNCTURE: CPT

## 2019-09-27 PROCEDURE — 700102 HCHG RX REV CODE 250 W/ 637 OVERRIDE(OP): Performed by: HOSPITALIST

## 2019-09-27 PROCEDURE — 99232 SBSQ HOSP IP/OBS MODERATE 35: CPT | Performed by: INTERNAL MEDICINE

## 2019-09-27 PROCEDURE — 700102 HCHG RX REV CODE 250 W/ 637 OVERRIDE(OP): Performed by: STUDENT IN AN ORGANIZED HEALTH CARE EDUCATION/TRAINING PROGRAM

## 2019-09-27 PROCEDURE — A9270 NON-COVERED ITEM OR SERVICE: HCPCS | Performed by: STUDENT IN AN ORGANIZED HEALTH CARE EDUCATION/TRAINING PROGRAM

## 2019-09-27 PROCEDURE — 85025 COMPLETE CBC W/AUTO DIFF WBC: CPT

## 2019-09-27 PROCEDURE — 700102 HCHG RX REV CODE 250 W/ 637 OVERRIDE(OP): Performed by: INTERNAL MEDICINE

## 2019-09-27 PROCEDURE — 51798 US URINE CAPACITY MEASURE: CPT

## 2019-09-27 RX ORDER — BENZTROPINE MESYLATE 2 MG/1
2 TABLET ORAL NIGHTLY
Status: DISCONTINUED | OUTPATIENT
Start: 2019-09-27 | End: 2019-09-28

## 2019-09-27 RX ORDER — GABAPENTIN 300 MG/1
300 CAPSULE ORAL NIGHTLY PRN
Status: DISCONTINUED | OUTPATIENT
Start: 2019-09-27 | End: 2019-10-16 | Stop reason: HOSPADM

## 2019-09-27 RX ORDER — GABAPENTIN 100 MG/1
CAPSULE ORAL 3 TIMES DAILY
Status: CANCELLED | OUTPATIENT
Start: 2019-09-27

## 2019-09-27 RX ORDER — BENZTROPINE MESYLATE 1 MG/1
1 TABLET ORAL DAILY
Status: DISCONTINUED | OUTPATIENT
Start: 2019-09-27 | End: 2019-09-28

## 2019-09-27 RX ADMIN — BENZTROPINE MESYLATE 1 MG: 1 TABLET ORAL at 15:54

## 2019-09-27 RX ADMIN — DIPHENHYDRAMINE HYDROCHLORIDE 25 MG: 50 INJECTION INTRAMUSCULAR; INTRAVENOUS at 02:26

## 2019-09-27 RX ADMIN — QUETIAPINE FUMARATE 100 MG: 25 TABLET ORAL at 17:25

## 2019-09-27 RX ADMIN — RIVAROXABAN 15 MG: 15 TABLET, FILM COATED ORAL at 08:56

## 2019-09-27 RX ADMIN — TAMSULOSIN HYDROCHLORIDE 0.4 MG: 0.4 CAPSULE ORAL at 08:56

## 2019-09-27 RX ADMIN — RIVAROXABAN 15 MG: 15 TABLET, FILM COATED ORAL at 17:25

## 2019-09-27 RX ADMIN — THERA TABS 1 TABLET: TAB at 03:49

## 2019-09-27 RX ADMIN — BENZTROPINE MESYLATE 1 MG: 1 INJECTION INTRAMUSCULAR; INTRAVENOUS at 03:48

## 2019-09-27 RX ADMIN — BENZTROPINE MESYLATE 2 MG: 2 TABLET ORAL at 21:46

## 2019-09-27 RX ADMIN — FOLIC ACID 1 MG: 1 TABLET ORAL at 03:49

## 2019-09-27 RX ADMIN — SENNOSIDES, DOCUSATE SODIUM 2 TABLET: 50; 8.6 TABLET, FILM COATED ORAL at 03:50

## 2019-09-27 RX ADMIN — QUETIAPINE FUMARATE 100 MG: 25 TABLET ORAL at 03:49

## 2019-09-27 RX ADMIN — SENNOSIDES, DOCUSATE SODIUM 2 TABLET: 50; 8.6 TABLET, FILM COATED ORAL at 17:25

## 2019-09-27 ASSESSMENT — ENCOUNTER SYMPTOMS
NERVOUS/ANXIOUS: 1
NAUSEA: 0
HALLUCINATIONS: 1
DIAPHORESIS: 1
DIARRHEA: 0
TINGLING: 1
COUGH: 0
FOCAL WEAKNESS: 0
VOMITING: 0
ABDOMINAL PAIN: 1
SHORTNESS OF BREATH: 0

## 2019-09-27 NOTE — DISCHARGE PLANNING
[facility]ency[facility]  Outcome: Left message, awaiting call back.    Agency/Facility Name: [facility]  Spoke To: Jaxon  Outcome: Onsite with rafaela to see patient.    @1325  Agency/Facility Name: [facility]  Spoke To: Jaxon  Outcome: Follow up with Rafaela.    Agency[facility]  Spoke To: Indu  Outcome: Accepted bed available Monday or Tuesday.

## 2019-09-27 NOTE — PROGRESS NOTES
Mother and pt refusing the bladder scan. Mother states that pt was woken up too abruptly, and she needed to calm him down and would not let anyone do anything to him, including vitals, until he was calmed down. Educated both mother and pt on importance of the bladder scan and potential damage to his bladder and kidneys and why it was needed, but both Mother and pt still refused. Pt still has not complaints of pain or pressure. Dr. Mari notified.

## 2019-09-27 NOTE — PROGRESS NOTES
Both RNs and CNA with patient to pass morning medications and take VS. Doctor Dillon in room to discuss possibility of sitter while patient's mother is away. No assessment performed on patient because of doctor's prior assessment and patient's ongoing stated paranoia.

## 2019-09-27 NOTE — PROGRESS NOTES
Assumed care this am from night shift RN. Will cluster care today, CNA notified of limited involvement with patient and mother. Will assess patient and pass morning medications together.

## 2019-09-27 NOTE — PROGRESS NOTES
Pt care assumed, VSS, pt assessment complete. Pt AAOx4, but slow to respond and needs prompting from mother, mother at bedside, On 1.5L NC. No signs of acute distress noted at this time. POC discussed with pt and verbalizes no questions. Pt denies any additional needs at this time. Bed in lowest position, bed alarm on, pt educated on fall risk and verbalized understanding, call light within reach, hourly rounding initiated.

## 2019-09-27 NOTE — PROGRESS NOTES
Imaging called due to pt refusing to have x-ray of abdomen. Pt and mother both still refusing and asked to try again tomorrow to see if med adjustment worked.

## 2019-09-27 NOTE — CARE PLAN
Problem: Communication  Goal: The ability to communicate needs accurately and effectively will improve  Outcome: PROGRESSING AS EXPECTED  Note:   Pt will communicate needs. Mother at bedside and helps with communications. Mother has been using the call light effectively to communicate needs to staff. POC discussed and all questions answered.      Problem: Infection  Goal: Will remain free from infection  Outcome: PROGRESSING AS EXPECTED  Note:   Standard precautions implemented. Mother and pt educated on standard precautions and performing hand hygiene upon entering and exiting pt room and before and after pt contact. Verbalized understanding.

## 2019-09-27 NOTE — PROGRESS NOTES
"[facility] Medicine Daily Progress Note    Date of Service  9/27/2019    Chief Complaint  34 y.o. male admitted 9/19/2019 with hallucinations    Hospital Course    Past medical history of depression, PTSD, anxiety, substance abuse of mushrooms, nitrous oxide, marijuana, alcohol, paroxysmal A. fib, recent diagnosis of PE May 2019.  He presented with hallucinations and had stopped taking his medications including Xarelto.  He had also fallen multiple times.  He was found hypoxic and CTA showed extensive acute bilateral pulmonary emboli with saddle embolus.  He was admitted to the ICU and felt his risk of intracranial hemorrhage outweighed submassive dose of alteplase or EKOS.  He was started on heparin infusion.  Lower extremity Doppler was negative for DVT.  TTE showed severe right heart strain.  Psychiatry was consulted for his hallucinations and he was started on Risperdal with improvement.  Patient remained stabilized and transitioned to Xarelto.  He was hypertensive requiring IV medications so he was started on a lower lower dose of lisinopril and his atenolol was stopped, given his right heart strain.      Interval Problem Update  Failed voiding trial, manzano cath placed.   Says he's breathing \"great\"  He still has some abd pain but does not want KUB today  WBC rising. No fever, cough, dysuria, diarrhea.  He still feels very paranoid    Consultants/Specialty  Psychiatry  Critical care  Pulmonology    Code Status  Full Code      Disposition  Case coordination to discuss discharge planning with mother  SNF  Will need anticoagulation clinic    Review of Systems  Review of Systems   Constitutional: Positive for diaphoresis and malaise/fatigue.   HENT: Positive for congestion.    Respiratory: Negative for cough and shortness of breath.    Cardiovascular: Negative for chest pain.   Gastrointestinal: Positive for abdominal pain. Negative for diarrhea, nausea and vomiting.   Genitourinary: Negative for dysuria. "   Neurological: Positive for tingling. Negative for focal weakness.   Psychiatric/Behavioral: Positive for hallucinations. The patient is nervous/anxious.         Physical Exam  Temp:  [36.1 °C (97 °F)-36.7 °C (98.1 °F)] 36.3 °C (97.4 °F)  Pulse:  [] 100  Resp:  [18-22] 18  BP: (102-136)/(80-91) 136/85  SpO2:  [91 %-96 %] 95 %    Physical Exam   Constitutional: He is oriented to person, place, and time. He appears well-developed. No distress.   Obesity   HENT:   Head: Normocephalic.   Mouth/Throat: Oropharynx is clear and moist.   Eyes: Conjunctivae are normal.   Cardiovascular: Normal rate and regular rhythm.   Pulmonary/Chest: He has no wheezes. He has no rales.   Tachypneic, diminished breath sounds throughout   Abdominal: Soft. Bowel sounds are normal. He exhibits no distension (Mild to moderate). There is tenderness (RLQ). There is no rebound and no guarding.   Musculoskeletal: He exhibits no edema.   Neurological: He is alert and oriented to person, place, and time.   4 out of 5 strength upper and lower extremities   Skin: Skin is warm. He is diaphoretic.   Psychiatric: His mood appears anxious.   Nursing note and vitals reviewed.      Fluids    Intake/Output Summary (Last 24 hours) at 9/27/2019 1519  Last data filed at 9/27/2019 0400  Gross per 24 hour   Intake 400 ml   Output 1200 ml   Net -800 ml       Laboratory  Recent Labs     09/25/19  0305 09/26/19  0310 09/27/19  0304   WBC 12.1* 12.3* 13.7*   RBC 4.78 4.65* 4.64*   HEMOGLOBIN 14.7 14.5 14.1   HEMATOCRIT 44.9 43.9 44.1   MCV 93.9 94.4 95.0   MCH 30.8 31.2 30.4   MCHC 32.7* 33.0* 32.0*   RDW 65.2* 65.8* 66.8*   PLATELETCT 312 284 283   MPV 8.6* 8.7* 8.5*     Recent Labs     09/25/19  0305 09/26/19  0310 09/27/19  0304   SODIUM 135 137 135   POTASSIUM 4.5 4.3 4.1   CHLORIDE 101 101 104   CO2 26 26 23   GLUCOSE 116* 101* 107*   BUN 15 18 23*   CREATININE 0.79 0.71 0.69   CALCIUM 9.4 9.1 9.3                   Imaging  DX-CHEST-PORTABLE (1 VIEW)  "  Final Result      Mild lung base atelectasis with edema not excluded.      DX-CHEST-PORTABLE (1 VIEW)   Final Result         1.  Pulmonary edema and/or infiltrates.   2.  Cardiomegaly      MR-BRAIN-W/O   Final Result      1.  No acute intracranial abnormality.   2.  Sphenoid and posterior right ethmoid sinus disease.      US-EXTREMITY VENOUS LOWER BILAT   Final Result      EC-ECHOCARDIOGRAM LTD W/O CONT   Final Result      CT-CTA CHEST PULMONARY ARTERY W/ RECONS   Final Result   Addendum 1 of 1   These findings were discussed with HEATHER MELGOZA on 9/19/2019 2:16 PM.      Final      DX-CHEST-PORTABLE (1 VIEW)   Final Result      No acute cardiopulmonary abnormality.      CT-HEAD W/O   Final Result      No CT evidence of acute infarct, hemorrhage or mass. No acute intracranial injury.           Assessment/Plan  * Acute saddle pulmonary embolism (HCC)- (present on admission)  Assessment & Plan  Recurrent, in the setting of noncompliance with anticoagulation therapy  CTA PE revealing \"Extensive acute bilateral pulmonary emboli with saddle embolus noted, as well as findings concerning for RV strain\"  Recurrence likely secondary to poor compliance of medication  Heparin drip transitioned to Xarelto  Echocardiogram with RV strain, caution aggressive blood pressure changes  DVT study negative    Acute hypoxemic respiratory failure (HCC)  Assessment & Plan  Secondary acute pulmonary embolism  Titrate oxygen as tolerated    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  Q waves in inferior leads.    Likely from underlying pulmonary embolism, right ventricular strain.    Close clinical monitoring in the intensive care unit  Patient on anticoagulation    ETOH abuse- (present on admission)  Assessment & Plan  History of, monitor for withdrawal    Weakness  Assessment & Plan  I spoke with psychiatry, who notes patient's current weakness has progressed from when he first admitted.  CPK was normal  Psychiatry stopped risperidone, had some improvement with Cogentin  Possible toxicity from the nitrous oxide.  I spoke with Dr. Byrd with neurology, he agreed with continuing with vitamin B12 supplementation.  He can have EMG testing done as outpatient after 2 weeks of stopping nitrous oxide.  PT OT ordered for reevaluation, plan for SNF    Vitamin B12 deficiency  Assessment & Plan  With reported peripheral tingling  Supplement ordered    Type 2 diabetes mellitus without complication, without long-term current use of insulin (HCC)  Assessment & Plan  New diagnosis, A1c 6.6%  Diabetes education ordered  Could be contributing to peripheral neuropathy    Slow transit constipation  Assessment & Plan  Daily Senokot and MiraLAX  Bowel regimen ordered  Enema PRN  9/26 -ongoing abdominal pain, KUB ordered  9/27 patient declines KUB today    ALLI (obstructive sleep apnea)  Assessment & Plan  History of, pulmonary following    Morbid obesity (HCC)  Assessment & Plan  Outpatient weight loss program would be indicated    Pulmonary hypertension (HCC)- (present on admission)  Assessment & Plan  Known history of, now with recurrent PE  Echocardiogram noted    Anxiety- (present on admission)  Assessment & Plan  On Librium previously  Now uncontrolled  Psychiatry evaluating, he was placed on a hold.  Bedside sitter as needed.  9/25 risperdal stopped for possible NMS, his anxiety is now increased  9/27 Psych adjusting seroquel and cogentin.     Essential hypertension- (present  on admission)  Assessment & Plan  9/24 - Hypertensive requiring multiple doses of IV labetalol.  I will start on low-dose lisinopril and monitor blood pressure.   9/25 - HTN improving       VTE prophylaxis: xarelto

## 2019-09-27 NOTE — PROGRESS NOTES
Upon doing my hourly rounding, pt states he has not urinated since they pulled the manzano out. I educated pt on why that was not good. We tried exercises and sitting on the side of the bed, but there was no output detected. Bladder scan ordered to see about retention. Pt has no complaints of pain at this time and states he does not feel any pressure. Traction notified of order. Will monitor.

## 2019-09-27 NOTE — PROGRESS NOTES
Spoke at length with Mother over plan of care. Mother and son agreed to have all labs, meds, vitals, and the bladder scan done around 0300. Mother, son, CNA, and this RN all agreed on plan. Communicated plan to lab and traction.

## 2019-09-27 NOTE — DISCHARGE PLANNING
RYDERW sent a tiger text to Dr. Dillon and reported the Pt has been accepted to [facility], but they do not have a bed available until Monday or Tuesday.

## 2019-09-27 NOTE — DISCHARGE PLANNING
LSW faxed Pts legal hold paperwork to [employer].  LSW put Pts legal hold paperwork back in hard chart.

## 2019-09-27 NOTE — CARE PLAN
Problem: Psychosocial Needs:  Goal: Level of anxiety will decrease  Outcome: PROGRESSING AS EXPECTED  Intervention: Identify and develop with patient strategies to cope with anxiety triggers  Note:   Patient being seen by psychiatry and patient's mother present as support system.     Problem: Urinary Elimination:  Goal: Ability to reestablish a normal urinary elimination pattern will improve  Outcome: PROGRESSING AS EXPECTED  Intervention: Evaluate need to continue indwelling urinary catheter  Note:   Patient's acute urinary retention warrants need for Srivastava catheter

## 2019-09-28 ENCOUNTER — APPOINTMENT (OUTPATIENT)
Dept: RADIOLOGY | Facility: MEDICAL CENTER | Age: 34
DRG: 175 | End: 2019-09-28
Attending: INTERNAL MEDICINE
Payer: COMMERCIAL

## 2019-09-28 PROBLEM — R50.9 FEVER: Status: ACTIVE | Noted: 2019-09-28

## 2019-09-28 LAB
ALBUMIN SERPL BCP-MCNC: 4.1 G/DL (ref 3.2–4.9)
ALBUMIN/GLOB SERPL: 1.6 G/DL
ALP SERPL-CCNC: 50 U/L (ref 30–99)
ALT SERPL-CCNC: 36 U/L (ref 2–50)
ANION GAP SERPL CALC-SCNC: 11 MMOL/L (ref 0–11.9)
APPEARANCE UR: CLEAR
AST SERPL-CCNC: 13 U/L (ref 12–45)
BACTERIA #/AREA URNS HPF: ABNORMAL /HPF
BASOPHILS # BLD AUTO: 0.3 % (ref 0–1.8)
BASOPHILS # BLD: 0.04 K/UL (ref 0–0.12)
BILIRUB SERPL-MCNC: 0.5 MG/DL (ref 0.1–1.5)
BILIRUB UR QL STRIP.AUTO: NEGATIVE
BUN SERPL-MCNC: 22 MG/DL (ref 8–22)
CALCIUM SERPL-MCNC: 9.4 MG/DL (ref 8.5–10.5)
CHLORIDE SERPL-SCNC: 105 MMOL/L (ref 96–112)
CO2 SERPL-SCNC: 22 MMOL/L (ref 20–33)
COLOR UR: YELLOW
CREAT SERPL-MCNC: 0.88 MG/DL (ref 0.5–1.4)
EOSINOPHIL # BLD AUTO: 0.3 K/UL (ref 0–0.51)
EOSINOPHIL NFR BLD: 2.4 % (ref 0–6.9)
EPI CELLS #/AREA URNS HPF: NEGATIVE /HPF
ERYTHROCYTE [DISTWIDTH] IN BLOOD BY AUTOMATED COUNT: 65.6 FL (ref 35.9–50)
GLOBULIN SER CALC-MCNC: 2.6 G/DL (ref 1.9–3.5)
GLUCOSE SERPL-MCNC: 101 MG/DL (ref 65–99)
GLUCOSE UR STRIP.AUTO-MCNC: NEGATIVE MG/DL
HCT VFR BLD AUTO: 43.7 % (ref 42–52)
HGB BLD-MCNC: 14.2 G/DL (ref 14–18)
HYALINE CASTS #/AREA URNS LPF: ABNORMAL /LPF
IMM GRANULOCYTES # BLD AUTO: 0.07 K/UL (ref 0–0.11)
IMM GRANULOCYTES NFR BLD AUTO: 0.6 % (ref 0–0.9)
KETONES UR STRIP.AUTO-MCNC: NEGATIVE MG/DL
LEUKOCYTE ESTERASE UR QL STRIP.AUTO: ABNORMAL
LYMPHOCYTES # BLD AUTO: 2.75 K/UL (ref 1–4.8)
LYMPHOCYTES NFR BLD: 22.3 % (ref 22–41)
MCH RBC QN AUTO: 31.1 PG (ref 27–33)
MCHC RBC AUTO-ENTMCNC: 32.5 G/DL (ref 33.7–35.3)
MCV RBC AUTO: 95.8 FL (ref 81.4–97.8)
MICRO URNS: ABNORMAL
MONOCYTES # BLD AUTO: 1.21 K/UL (ref 0–0.85)
MONOCYTES NFR BLD AUTO: 9.8 % (ref 0–13.4)
NEUTROPHILS # BLD AUTO: 7.95 K/UL (ref 1.82–7.42)
NEUTROPHILS NFR BLD: 64.6 % (ref 44–72)
NITRITE UR QL STRIP.AUTO: POSITIVE
NRBC # BLD AUTO: 0 K/UL
NRBC BLD-RTO: 0 /100 WBC
PH UR STRIP.AUTO: 6.5 [PH] (ref 5–8)
PLATELET # BLD AUTO: 278 K/UL (ref 164–446)
PMV BLD AUTO: 8.8 FL (ref 9–12.9)
POTASSIUM SERPL-SCNC: 4.4 MMOL/L (ref 3.6–5.5)
PROT SERPL-MCNC: 6.7 G/DL (ref 6–8.2)
PROT UR QL STRIP: NEGATIVE MG/DL
RBC # BLD AUTO: 4.56 M/UL (ref 4.7–6.1)
RBC # URNS HPF: ABNORMAL /HPF
RBC UR QL AUTO: ABNORMAL
SODIUM SERPL-SCNC: 138 MMOL/L (ref 135–145)
SP GR UR STRIP.AUTO: 1.02
UROBILINOGEN UR STRIP.AUTO-MCNC: 0.2 MG/DL
WBC # BLD AUTO: 12.3 K/UL (ref 4.8–10.8)
WBC #/AREA URNS HPF: ABNORMAL /HPF

## 2019-09-28 PROCEDURE — 700102 HCHG RX REV CODE 250 W/ 637 OVERRIDE(OP): Performed by: STUDENT IN AN ORGANIZED HEALTH CARE EDUCATION/TRAINING PROGRAM

## 2019-09-28 PROCEDURE — A9270 NON-COVERED ITEM OR SERVICE: HCPCS | Performed by: STUDENT IN AN ORGANIZED HEALTH CARE EDUCATION/TRAINING PROGRAM

## 2019-09-28 PROCEDURE — 71045 X-RAY EXAM CHEST 1 VIEW: CPT

## 2019-09-28 PROCEDURE — A9270 NON-COVERED ITEM OR SERVICE: HCPCS | Performed by: INTERNAL MEDICINE

## 2019-09-28 PROCEDURE — 700111 HCHG RX REV CODE 636 W/ 250 OVERRIDE (IP): Performed by: INTERNAL MEDICINE

## 2019-09-28 PROCEDURE — 700105 HCHG RX REV CODE 258: Performed by: INTERNAL MEDICINE

## 2019-09-28 PROCEDURE — 700102 HCHG RX REV CODE 250 W/ 637 OVERRIDE(OP): Performed by: HOSPITALIST

## 2019-09-28 PROCEDURE — 85025 COMPLETE CBC W/AUTO DIFF WBC: CPT

## 2019-09-28 PROCEDURE — 99232 SBSQ HOSP IP/OBS MODERATE 35: CPT | Mod: GC | Performed by: PSYCHIATRY & NEUROLOGY

## 2019-09-28 PROCEDURE — 87186 SC STD MICRODIL/AGAR DIL: CPT

## 2019-09-28 PROCEDURE — A9270 NON-COVERED ITEM OR SERVICE: HCPCS | Performed by: HOSPITALIST

## 2019-09-28 PROCEDURE — 700102 HCHG RX REV CODE 250 W/ 637 OVERRIDE(OP): Performed by: INTERNAL MEDICINE

## 2019-09-28 PROCEDURE — 87077 CULTURE AEROBIC IDENTIFY: CPT

## 2019-09-28 PROCEDURE — 36415 COLL VENOUS BLD VENIPUNCTURE: CPT

## 2019-09-28 PROCEDURE — 99232 SBSQ HOSP IP/OBS MODERATE 35: CPT | Performed by: INTERNAL MEDICINE

## 2019-09-28 PROCEDURE — 80053 COMPREHEN METABOLIC PANEL: CPT

## 2019-09-28 PROCEDURE — 700101 HCHG RX REV CODE 250: Performed by: INTERNAL MEDICINE

## 2019-09-28 PROCEDURE — 770020 HCHG ROOM/CARE - TELE (206)

## 2019-09-28 PROCEDURE — 81001 URINALYSIS AUTO W/SCOPE: CPT

## 2019-09-28 PROCEDURE — 87086 URINE CULTURE/COLONY COUNT: CPT

## 2019-09-28 RX ORDER — QUETIAPINE FUMARATE 200 MG/1
200 TABLET, FILM COATED ORAL EVERY EVENING
Status: DISCONTINUED | OUTPATIENT
Start: 2019-09-28 | End: 2019-10-04

## 2019-09-28 RX ORDER — ENEMA 19; 7 G/133ML; G/133ML
1 ENEMA RECTAL ONCE
Status: COMPLETED | OUTPATIENT
Start: 2019-09-28 | End: 2019-09-28

## 2019-09-28 RX ADMIN — RIVAROXABAN 15 MG: 15 TABLET, FILM COATED ORAL at 08:55

## 2019-09-28 RX ADMIN — CEFTRIAXONE SODIUM 2 G: 2 INJECTION, POWDER, FOR SOLUTION INTRAMUSCULAR; INTRAVENOUS at 15:57

## 2019-09-28 RX ADMIN — SODIUM PHOSPHATE 133 ML: 7; 19 ENEMA RECTAL at 17:43

## 2019-09-28 RX ADMIN — BENZTROPINE MESYLATE 1 MG: 1 TABLET ORAL at 03:27

## 2019-09-28 RX ADMIN — QUETIAPINE FUMARATE 100 MG: 25 TABLET ORAL at 03:27

## 2019-09-28 RX ADMIN — RIVAROXABAN 15 MG: 15 TABLET, FILM COATED ORAL at 17:39

## 2019-09-28 RX ADMIN — SENNOSIDES, DOCUSATE SODIUM 2 TABLET: 50; 8.6 TABLET, FILM COATED ORAL at 03:28

## 2019-09-28 RX ADMIN — TAMSULOSIN HYDROCHLORIDE 0.4 MG: 0.4 CAPSULE ORAL at 08:55

## 2019-09-28 RX ADMIN — QUETIAPINE FUMARATE 200 MG: 200 TABLET, FILM COATED ORAL at 17:38

## 2019-09-28 RX ADMIN — THERA TABS 1 TABLET: TAB at 03:28

## 2019-09-28 RX ADMIN — GABAPENTIN 300 MG: 300 CAPSULE ORAL at 22:22

## 2019-09-28 RX ADMIN — POLYETHYLENE GLYCOL 3350 1 PACKET: 17 POWDER, FOR SOLUTION ORAL at 03:24

## 2019-09-28 RX ADMIN — SENNOSIDES, DOCUSATE SODIUM 2 TABLET: 50; 8.6 TABLET, FILM COATED ORAL at 17:39

## 2019-09-28 RX ADMIN — FOLIC ACID 1 MG: 1 TABLET ORAL at 03:26

## 2019-09-28 ASSESSMENT — ENCOUNTER SYMPTOMS
HALLUCINATIONS: 1
TINGLING: 1
ORTHOPNEA: 0
ABDOMINAL PAIN: 0
NAUSEA: 0
HEADACHES: 0
SPUTUM PRODUCTION: 0
NERVOUS/ANXIOUS: 1
FOCAL WEAKNESS: 0
PSYCHIATRIC NEGATIVE: 1
MYALGIAS: 0
DIZZINESS: 0
WEAKNESS: 1
STRIDOR: 0
WHEEZING: 0
SINUS PAIN: 0
SENSORY CHANGE: 0
COUGH: 0
LOSS OF CONSCIOUSNESS: 0
CHILLS: 0
EYE PAIN: 0
FEVER: 0
DIARRHEA: 0
WEIGHT LOSS: 0
SORE THROAT: 0
SHORTNESS OF BREATH: 0
DIAPHORESIS: 1
VOMITING: 0
EYE DISCHARGE: 0

## 2019-09-28 NOTE — PROGRESS NOTES
Assumed care this am from night shift RN. Will continue to cluster patient care in coordination with CNA. Patient's mother still in room. Will continue to monitor patient's status. Hourly rounding in place.

## 2019-09-28 NOTE — PROGRESS NOTES
[facility] Medicine Daily Progress Note    Date of Service  9/28/2019    Chief Complaint  34 y.o. male admitted 9/19/2019 with hallucinations    Hospital Course    Past medical history of depression, PTSD, anxiety, substance abuse of mushrooms, nitrous oxide, marijuana, alcohol, paroxysmal A. fib, recent diagnosis of PE May 2019.  He presented with hallucinations and had stopped taking his medications including Xarelto.  He had also fallen multiple times.  He was found hypoxic and CTA showed extensive acute bilateral pulmonary emboli with saddle embolus.  He was admitted to the ICU and felt his risk of intracranial hemorrhage outweighed submassive dose of alteplase or EKOS.  He was started on heparin infusion.  Lower extremity Doppler was negative for DVT.  TTE showed severe right heart strain.  Psychiatry was consulted for his hallucinations and he was started on Risperdal with improvement.  Patient remained stabilized and transitioned to Xarelto.  He was hypertensive requiring IV medications so he was started on a lower lower dose of lisinopril and his atenolol was stopped, given his right heart strain.      Interval Problem Update  He says he still feels very anxious and paranoid.  He has itching on his back, mild rash was seen.  Likely from diaphoresis, I discussed with him with sitting up more often.  His strength is improved but still feels weak.  Fever noted, urine has looked dark.  UA and chest x-ray ordered.  He has not had a BM yet.  He denies abdominal pain.    Consultants/Specialty  Psychiatry  Critical care  Pulmonology    Code Status  Full Code      Disposition  Case coordination to discuss discharge planning with mother  SNF  Will need anticoagulation clinic    Review of Systems  Review of Systems   Constitutional: Positive for diaphoresis and malaise/fatigue.   HENT: Negative for congestion and sore throat.    Respiratory: Negative for cough and shortness of breath.    Cardiovascular: Negative for  chest pain.   Gastrointestinal: Negative for abdominal pain, diarrhea, nausea and vomiting.   Genitourinary: Negative for dysuria.   Neurological: Positive for tingling and weakness. Negative for focal weakness.   Psychiatric/Behavioral: Positive for hallucinations. The patient is nervous/anxious.         Physical Exam  Temp:  [36.3 °C (97.3 °F)-38.2 °C (100.7 °F)] 38.2 °C (100.7 °F)  Pulse:  [] 109  Resp:  [16-20] 20  BP: (106-141)/() 110/78  SpO2:  [92 %-98 %] 94 %    Physical Exam   Constitutional: He is oriented to person, place, and time. He appears well-developed. No distress.   Obesity   HENT:   Head: Normocephalic.   Mouth/Throat: Oropharynx is clear and moist.   Eyes: Conjunctivae are normal.   Cardiovascular: Normal rate and regular rhythm.   Pulmonary/Chest: He has no wheezes. He has no rales.   Tachypneic and grunting due to anxiety per mother, diminished breath sounds throughout   Abdominal: Soft. Bowel sounds are normal. He exhibits no distension (Mild to moderate). There is tenderness (Left upper quadrant, suprapubic). There is no rebound and no guarding.   Musculoskeletal: He exhibits no edema.   Neurological: He is alert and oriented to person, place, and time.   4 out of 5 strength upper and lower extremities   Skin: Skin is warm. He is diaphoretic.   Macular rash isolated to his backside.   Psychiatric: His mood appears anxious.   Nursing note and vitals reviewed.      Fluids    Intake/Output Summary (Last 24 hours) at 9/28/2019 1302  Last data filed at 9/28/2019 0900  Gross per 24 hour   Intake 120 ml   Output 2300 ml   Net -2180 ml       Laboratory  Recent Labs     09/26/19  0310 09/27/19  0304 09/28/19  0320   WBC 12.3* 13.7* 12.3*   RBC 4.65* 4.64* 4.56*   HEMOGLOBIN 14.5 14.1 14.2   HEMATOCRIT 43.9 44.1 43.7   MCV 94.4 95.0 95.8   MCH 31.2 30.4 31.1   MCHC 33.0* 32.0* 32.5*   RDW 65.8* 66.8* 65.6*   PLATELETCT 284 283 278   MPV 8.7* 8.5* 8.8*     Recent Labs     09/26/19 0317  "09/27/19  0304 09/28/19  0320   SODIUM 137 135 138   POTASSIUM 4.3 4.1 4.4   CHLORIDE 101 104 105   CO2 26 23 22   GLUCOSE 101* 107* 101*   BUN 18 23* 22   CREATININE 0.71 0.69 0.88   CALCIUM 9.1 9.3 9.4                   Imaging  DX-CHEST-PORTABLE (1 VIEW)   Final Result      Mild lung base atelectasis with edema not excluded.      DX-CHEST-PORTABLE (1 VIEW)   Final Result         1.  Pulmonary edema and/or infiltrates.   2.  Cardiomegaly      MR-BRAIN-W/O   Final Result      1.  No acute intracranial abnormality.   2.  Sphenoid and posterior right ethmoid sinus disease.      US-EXTREMITY VENOUS LOWER BILAT   Final Result      EC-ECHOCARDIOGRAM LTD W/O CONT   Final Result      CT-CTA CHEST PULMONARY ARTERY W/ RECONS   Final Result   Addendum 1 of 1   These findings were discussed with HEATHER MELGOZA on 9/19/2019 2:16 PM.      Final      DX-CHEST-PORTABLE (1 VIEW)   Final Result      No acute cardiopulmonary abnormality.      CT-HEAD W/O   Final Result      No CT evidence of acute infarct, hemorrhage or mass. No acute intracranial injury.      [id number] VIEW    (Results Pending)   DX-CHEST-PORTABLE (1 VIEW)    (Results Pending)        Assessment/Plan  * Acute saddle pulmonary embolism (HCC)- (present on admission)  Assessment & Plan  Recurrent, in the setting of noncompliance with anticoagulation therapy  CTA PE revealing \"Extensive acute bilateral pulmonary emboli with saddle embolus noted, as well as findings concerning for RV strain\"  Recurrence likely secondary to poor compliance of medication  Heparin drip transitioned to Xarelto  Echocardiogram with RV strain, caution aggressive blood pressure changes  DVT study negative    Acute hypoxemic respiratory failure (HCC)  Assessment & Plan  Secondary acute pulmonary embolism  Titrate oxygen as tolerated    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  Q waves in inferior leads.    Likely from underlying pulmonary embolism, right ventricular strain.    Close clinical monitoring in the intensive care unit  Patient on anticoagulation    ETOH abuse- (present on admission)  Assessment & Plan  History of, monitor for withdrawal    Fever  Assessment & Plan  9/28 - Has had leukocytosis, now fever 100.7. He has no specific symptoms. CXR and urine ordered    Weakness  Assessment & Plan  I spoke with psychiatry, who notes patient's current weakness has progressed from when he first admitted.  CPK was normal  Psychiatry stopped risperidone, had some improvement with Cogentin  Possible toxicity from the nitrous oxide.  I spoke with Dr. Byrd with neurology, he agreed with continuing with vitamin B12 supplementation.  He can have EMG testing done as outpatient after 2 weeks of stopping nitrous oxide.  PT OT ordered for reevaluation, plan for SNF    Vitamin B12 deficiency  Assessment & Plan  With reported peripheral tingling  Supplement ordered    Type 2 diabetes mellitus without complication, without long-term current use of insulin ([facility])  Assessment & Plan  New diagnosis, A1c 6.6%  Diabetes education ordered  Could be contributing to peripheral neuropathy    Slow transit constipation  Assessment & Plan  Daily Senokot and MiraLAX  Bowel regimen ordered  Enema PRN  9/26 -ongoing abdominal pain, KUB ordered  9/27 patient declines KUB today  9/28 - unable to do KUB portable apparently    ALLI (obstructive sleep apnea)  Assessment & Plan  History of, pulmonary following    Morbid obesity  (HCC)  Assessment & Plan  Outpatient weight loss program would be indicated    Pulmonary hypertension (HCC)- (present on admission)  Assessment & Plan  Known history of, now with recurrent PE  Echocardiogram noted    Anxiety- (present on admission)  Assessment & Plan  On Librium previously  Now uncontrolled  Psychiatry evaluating, he was placed on a hold.  Bedside sitter as needed.  9/25 risperdal stopped for possible NMS, his anxiety is now increased  Psych adjusting seroquel and cogentin.     Essential hypertension- (present on admission)  Assessment & Plan  9/24 - Hypertensive requiring multiple doses of IV labetalol.  I will start on low-dose lisinopril and monitor blood pressure.   9/25 - HTN improving  9/28 - BP decreasing, lisinopril has been held       VTE prophylaxis: xarelto

## 2019-09-28 NOTE — PROGRESS NOTES
Pulmonary Progress Note    Date of admission  9/19/2019    Chief Complaint  34 y.o. male admitted 9/19/2019 with right-sided chest pain and psychosis.  History of unprovoked intermediate/high risk submassive saddle pulmonary embolism 5/2019 for which he received TPA, paroxysmal atrial fibrillation, ALLI, alcohol and polysubstance abuse.  Brought into the emergency room on 9/19 for psychosis, reporting that people have been following him around at his home.  He admitted to have been using psychedelic mushrooms, whippets and marijuana.  On his last hospitalization, he was discharged on Xarelto which he has not been consistent taking.  He refused follow-up with pulmonary on last hospitalization, stating that he only wanted to follow-up with his PCP.    Hospital Course    Urine drug screen positive for cannabinoids and benzodiazepines on admission.  Serum alcohol level admission.  Repeat CT chest showed persistent bilateral pulmonary emboli.  Lower extremity ultrasound for DVTs was negative.  Has been seen by psychiatry and has been started on antipsychotics and Cogentin and has been deemed incapacitated to leave AGAINST MEDICAL ADVICE.   Patient was restarted on Xarelto downtrending oxygen requirements.     Interval Problem Update  Reviewed last 24 hour events:  Supplemental oxygen off, maintaining adequate saturations greater than 92% on room air  Still having episodes of intermittent sinus tachycardia  Hemodynamically stable  No pain    Review of Systems  Review of Systems   Constitutional: Negative for chills, fever and weight loss.   HENT: Negative for congestion, sinus pain and sore throat.    Eyes: Negative for pain and discharge.   Respiratory: Negative for cough, sputum production, shortness of breath, wheezing and stridor.    Cardiovascular: Negative for chest pain, orthopnea and leg swelling.   Gastrointestinal: Negative for abdominal pain, diarrhea, nausea and vomiting.   Genitourinary: Negative for dysuria,  frequency and urgency.   Musculoskeletal: Negative for myalgias.   Skin: Negative for rash.   Neurological: Negative for dizziness, sensory change, focal weakness, loss of consciousness and headaches.   Psychiatric/Behavioral: Negative.    All other systems reviewed and are negative.     Vital Signs for last 24 hours   Temp:  [36.3 °C (97.3 °F)-36.6 °C (97.9 °F)] 36.6 °C (97.9 °F)  Pulse:  [] 110  Resp:  [16-20] 16  BP: (106-141)/() 136/102  SpO2:  [92 %-98 %] 98 %    Physical Exam   Physical Exam   Constitutional: He is oriented to person, place, and time. He appears well-developed and well-nourished. No distress.   Well-groomed today   HENT:   Mouth/Throat: Oropharynx is clear and moist. No oropharyngeal exudate.   Eyes: Conjunctivae are normal. Right eye exhibits no discharge. Left eye exhibits no discharge. No scleral icterus.   Neck: Normal range of motion. No JVD present. No tracheal deviation present. No thyromegaly present.   Cardiovascular: Normal rate, regular rhythm and normal heart sounds. Exam reveals no friction rub.   No murmur heard.  Pulmonary/Chest: Effort normal. No stridor. No respiratory distress. He has no wheezes. He has no rales.   Rales right base   Abdominal: Soft. Bowel sounds are normal. He exhibits no distension. There is no tenderness. There is no rebound.   Musculoskeletal: Normal range of motion. He exhibits no edema.   Lymphadenopathy:     He has no cervical adenopathy.   Neurological: He is alert and oriented to person, place, and time.   Skin: Skin is warm. No rash noted. No erythema.   Nursing note and vitals reviewed.      Medications  Current Facility-Administered Medications   Medication Dose Route Frequency Provider Last Rate Last Dose   • benztropine (COGENTIN) tablet 1 mg  1 mg Oral DAILY Malou Guillen M.D.   1 mg at 09/28/19 0327   • benztropine (COGENTIN) tablet 2 mg  2 mg Oral Nightly Malou Guillen M.D.   2 mg at 09/27/19 1976   •  gabapentin (NEURONTIN) capsule 300 mg  300 mg Oral HS PRN Malou Guillen M.D.       • QUEtiapine (SEROQUEL) tablet 100 mg  100 mg Oral BID Malou Guillen M.D.   100 mg at 09/28/19 0327   • cyanocobalamin (VITAMIN B-12) injection 1,000 mcg  1,000 mcg Intramuscular Q30 DAYS Edwardo Dillon M.D.   1,000 mcg at 09/25/19 1829   • lisinopril (PRINIVIL) 10 MG tablet 10 mg  10 mg Oral Q DAY Edwardo Dillon M.D.   Stopped at 09/27/19 0600   • polyethylene glycol/lytes (MIRALAX) PACKET 1 Packet  1 Packet Oral DAILY Edwardo Dillon M.D.   1 Packet at 09/28/19 0324    And   • senna-docusate (PERICOLACE or SENOKOT S) 8.6-50 MG per tablet 2 Tab  2 Tab Oral BID Edwardo Dillon M.D.   2 Tab at 09/28/19 0328    And   • magnesium hydroxide (MILK OF MAGNESIA) suspension 30 mL  30 mL Oral QDAY PRN Edwardo Dillon M.D.   30 mL at 09/25/19 0500    And   • bisacodyl (DULCOLAX) suppository 10 mg  10 mg Rectal QDAY PRN Edwardo Dillon M.D.       • rivaroxaban (XARELTO) tablet 15 mg  15 mg Oral BID WITH MEALS Rainer Butt M.D.   15 mg at 09/28/19 0855    Followed by   • [START ON 10/14/2019] rivaroxaban (XARELTO) tablet 20 mg  20 mg Oral PM MEAL Rainer Butt M.D.       • tamsulosin (FLOMAX) capsule 0.4 mg  0.4 mg Oral AFTER BREAKFAST Rainer Butt M.D.   0.4 mg at 09/28/19 0855   • Respiratory Care per Protocol   Nebulization Continuous RT Lita Brantley M.D.       • acetaminophen (TYLENOL) tablet 650 mg  650 mg Oral Q6HRS PRN Lita Brantley M.D.   650 mg at 09/23/19 1231   • labetalol (NORMODYNE,TRANDATE) injection 10 mg  10 mg Intravenous Q4HRS PRN Edwardo Dillon M.D.   10 mg at 09/25/19 0457   • ondansetron (ZOFRAN) syringe/vial injection 4 mg  4 mg Intravenous Q4HRS PRN Lita Brantley M.D.   4 mg at 09/20/19 1905   • ondansetron (ZOFRAN ODT) dispertab 4 mg  4 mg Oral Q4HRS PRN Lita Brantley M.D.       • folic acid (FOLVITE) tablet 1 mg  1 mg Oral DAILY James Hou M.D.   1 mg at 09/28/19 0326   • multivitamin (THERAGRAN)  tablet 1 Tab  1 Tab Oral DAILY James Hou M.D.   1 Tab at 09/28/19 0328     Fluids    Intake/Output Summary (Last 24 hours) at 9/28/2019 1138  Last data filed at 9/28/2019 0900  Gross per 24 hour   Intake 120 ml   Output 2300 ml   Net -2180 ml     Laboratory          Recent Labs     09/26/19  0310 09/27/19  0304 09/28/19  0320   SODIUM 137 135 138   POTASSIUM 4.3 4.1 4.4   CHLORIDE 101 104 105   CO2 26 23 22   BUN 18 23* 22   CREATININE 0.71 0.69 0.88   CALCIUM 9.1 9.3 9.4     Recent Labs     09/26/19  0310 09/27/19  0304 09/28/19  0320   ALTSGPT 46 42 36   ASTSGOT 16 12 13   ALKPHOSPHAT 49 49 50   TBILIRUBIN 0.7 0.6 0.5   GLUCOSE 101* 107* 101*     Recent Labs     09/26/19  0310 09/27/19  0304 09/28/19  0320   WBC 12.3* 13.7* 12.3*   NEUTSPOLYS 61.70 67.80 64.60   LYMPHOCYTES 24.50 19.60* 22.30   MONOCYTES 11.20 10.30 9.80   EOSINOPHILS 1.60 1.40 2.40   BASOPHILS 0.30 0.20 0.30   ASTSGOT 16 12 13   ALTSGPT 46 42 36   ALKPHOSPHAT 49 49 50   TBILIRUBIN 0.7 0.6 0.5     Recent Labs     09/26/19  0310 09/27/19  0304 09/28/19  0320   RBC 4.65* 4.64* 4.56*   HEMOGLOBIN 14.5 14.1 14.2   HEMATOCRIT 43.9 44.1 43.7   PLATELETCT 284 283 278     Imaging  CT:    Reviewed from 9/19 and compared to 5/26/2019: persistent saddle pulm emboli with likely RML and RLL pulm infarct  Rad read:  1.  Extensive acute bilateral pulmonary emboli with saddle embolus noted, as well as findings concerning for RV strain.  2.  Probable pulmonary infarct in the lateral basal RIGHT lower lobe anterior basal RIGHT middle lobe.  Follow-up recommended to ensure resolution.  3.  Minimal RIGHT pleural effusion.  4.  Fatty infiltration of liver.    Echo:   Reviewed 5/25/2019:  Left ventricular systolic function is normal. Left ventricular ejection   fraction is visually estimated to be 55%.  Indeterminate diastolic function.  Flattened septum in systole and diastole consistent with RV pressure   and volume overload.  Moderately dilated right  ventricle. Reduced right ventricular systolic   function.  Evidence of Salcedo's sign with hyperdynamic apex and free wall   dysfunction suggestive of RV strain as seen with acute pulmonary   embolism.    [facility] 9/20/2019: no DVT    Assessment/Plan  * Acute saddle pulmonary embolism (HCC)- (present on admission)  Assessment & Plan  Intermediate/high risk pulmonary embolism, hemodynamically stable with right heart strain first identified in 5/2019. Provoking factors are sedentary lifestyle, states he can spend 20+ hours in bed or longer when recovering from intoxication. Inconsistently compliant with his rivaroxaban. Patient currently on Xarelto. Lower extremity venous Doppler study for DVT is negative  Plan  - Patient planning to relocate to [place] when acute issues resolve  - Informed him and mother that will need therapeutic anticoagulation for additional three months  - Will likely need to transition to half-dose Xarelto after three months based on [facility]-CHOICE study for long-term VTE prophylaxis   - Outpatient high altitude simulation study prior to flying back to [place]  - Repeat TTE in 3-4 months, if persistently symptomatic and easily dyspneic will need VQ scan to eval for CTEPH    Acute hypoxemic respiratory failure (HCC)  Assessment & Plan  Improving. Secondary to intermediate/high risk pulmonary embolus with pulmonary hypertension. History of compliance issues with rivaroxaban and falls when intoxicated  Plan  -RT/O2 protocols  -Keep patient upright at 45 to 60 degrees, he seems to do better at that position, continue  -IS   -Encourage mobilization    Elevated troponin- (present on admission)  Assessment & Plan  Type II MI, demand ischemia    ALLI (obstructive sleep apnea)  Assessment & Plan  Patient obese and leads a sedentary life style. Mentions history of ALLI and been on CPAP but not using it.  Plan  -Outpatient sleep study   -Pulmonary follow up    Pulmonary hypertension (HCC)- (present on  admission)  Assessment & Plan  Secondary to pulmonary embolus, recurrent. RVSP moderate to severely elevated in May of this year. Follow up ECHO did not calculate RVSP due to strain  Plan  - TTE after 3-4 months of therapeutic anticoagulation  - VQ scan to evaluate for CTEPH if persistently symptomatic and/or TTE remains abnormal     VTE:  NOAC  Ulcer: Not Indicated  Lines: None    I have performed a physical exam and reviewed and updated ROS and Plan today (9/28/2019). In review of yesterday's note (9/27/2019), there are no changes except as documented above.     Discussed patient condition and risk of morbidity and/or mortality with RN and mother.  __________  Priyank Tatum MD  Pulmonary and Critical Care Medicine  [facility]

## 2019-09-28 NOTE — PROGRESS NOTES
Discussed with patient and patient's mother plan of care today. Patient is refusing abdominal x-ray, education given regarding necessity of it. Patient weaned off of O2. Patient also wants a sponge bath and to get up to chair.

## 2019-09-28 NOTE — PROGRESS NOTES
Received bedside report from RN, pt care assumed, VSS, pt assessment complete. Pt AAOx4, answered questions with Mother's help but was very slow to answer, On 1.5L NC. No signs of acute distress noted at this time. POC discussed with mother and pt at length so the night could be planned. Mother wants care clustered. Bed in lowest position, bed alarm on, call light within reach.

## 2019-09-28 NOTE — PSYCHIATRY
"PSYCHIATRIC FOLLOW UP:    Reason for Admission: Psychiatric Issues, Paranoia   Requesting Physician: Edwardo Dillon M.D.  Supervising Physician:Sahra Hartmann M.D.    Subjective:  Patient is a 34 y.o. male with history of MDD, PTSD, alcohol use disorder, and pulmonary embolism who presents to the hospital after heavy use of mushrooms and nitrous oxide resulting in paranoid delusions.    On interview, patient continues to report a significant level of paranoia.  He reports that he only trust his mom and Dr. Sandoval however, he is willing to talk to this provider.  In addition to his paranoia he complains of problems with sleep he would like to be able to sleep at night.  Throughout the interview he was making grunting sounds that made it sound like he was having difficulty breathing when asked about his breathing he states that he does it when he feels anxious but is able to take full deep breaths with no problems.  He is upset because recovery is taking longer than he would like however he is willing to remain.  Patient was not upset when he was told he was on a legal hold      Medical Review of Systems:  Constitutional: Negative for fever, chills, weight loss  HENT: Negative for hearing loss, sore throat, neck pain  Eyes: Negative for blurred vision, pain, redness.   Respiratory: Negative for cough, shortness of breath, wheezing   Cardiovascular: Negative for chest pain, palpitations  Gastrointestinal: Negative for nausea, vomiting, diarrhea, constipation, blood in stool  Genitourinary: Negative for dysuria, urgency, frequency, hematuria  Musculoskeletal: Negative for myalgias,  joint pain  Skin: Negative for itching and rash.  Neurological: Positive for tingling.  Negative for focal weakness.  Psychiatric/Behavioral: See above for psych review of systems      Psychiatric Examination:   Vitals: /84   Pulse 97   Temp 36.3 °C (97.4 °F) (Temporal)   Resp 18   Ht 1.727 m (5' 8\")   Wt 118.2 kg (260 " "lb 9.3 oz)   SpO2 92%   BMI 39.62 kg/m²   Musculoskeletal:  strength is improving, he no longer collapses against resistance and is able to resist significantly.  Appearance: well-developed, well-nourished, appears stated age and Appears very anxious, cooperative, engaged, guarded and poor eye contact  Thoughts: paranoid delusions and patient denies SI and HI, linear, coherent, goal-oriented and organized  Speech: Patient speaks in short sentences, prosody is noted  Mood: \"Like Shit\"  Affect: dysthymic, flat and congruent with mood  SI/HI: Denies SI and HI  Alert/Oriented: alert and oriented  Memory: no gross impairment in immediate, recent, or remote memory  Fund of Knowledge: adequate  Insight/Judgement into symptoms: fair  Neurological Testing: MMSE not performed during this encounter    Medications (currently prescribed at [facility]):    Current Facility-Administered Medications:   •  benztropine (COGENTIN) tablet 1 mg, 1 mg, Oral, DAILY, Malou Guillen M.D., 1 mg at 09/27/19 1554  •  benztropine (COGENTIN) tablet 2 mg, 2 mg, Oral, Nightly, Malou Guillen M.D.  •  gabapentin (NEURONTIN) capsule 300 mg, 300 mg, Oral, HS PRN, Malou Guillen M.D.  •  QUEtiapine (SEROQUEL) tablet 100 mg, 100 mg, Oral, BID, Malou Guillen M.D., 100 mg at 09/27/19 1725  •  cyanocobalamin (VITAMIN B-12) injection 1,000 mcg, 1,000 mcg, Intramuscular, Q30 DAYS, Edwardo Dillon M.D., 1,000 mcg at 09/25/19 1829  •  lisinopril (PRINIVIL) 10 MG tablet 10 mg, 10 mg, Oral, Q DAY, Edwardo Dillon M.D., Stopped at 09/27/19 0600  •  senna-docusate (PERICOLACE or SENOKOT S) 8.6-50 MG per tablet 2 Tab, 2 Tab, Oral, BID, 2 Tab at 09/27/19 1725 **AND** polyethylene glycol/lytes (MIRALAX) PACKET 1 Packet, 1 Packet, Oral, DAILY, 1 Packet at 09/24/19 1346 **AND** magnesium hydroxide (MILK OF MAGNESIA) suspension 30 mL, 30 mL, Oral, QDAY PRN, 30 mL at 09/25/19 0500 **AND** bisacodyl (DULCOLAX) suppository 10 mg, 10 mg, " Rectal, QDAY PRN, Edwardo Dillon M.D.  •  rivaroxaban (XARELTO) tablet 15 mg, 15 mg, Oral, BID WITH MEALS, 15 mg at 09/27/19 1725 **FOLLOWED BY** [START ON 10/14/2019] rivaroxaban (XARELTO) tablet 20 mg, 20 mg, Oral, PM MEAL, Rainer Butt M.D.  •  tamsulosin (FLOMAX) capsule 0.4 mg, 0.4 mg, Oral, AFTER BREAKFAST, Rainer Butt M.D., 0.4 mg at 09/27/19 0856  •  Respiratory Care per Protocol, , Nebulization, Continuous RT, Lita Brantley M.D.  •  acetaminophen (TYLENOL) tablet 650 mg, 650 mg, Oral, Q6HRS PRN, Lita Brantley M.D., 650 mg at 09/23/19 1231  •  labetalol (NORMODYNE,TRANDATE) injection 10 mg, 10 mg, Intravenous, Q4HRS PRN, Edwardo Dillon M.D., 10 mg at 09/25/19 0457  •  ondansetron (ZOFRAN) syringe/vial injection 4 mg, 4 mg, Intravenous, Q4HRS PRN, Lita Brantley M.D., 4 mg at 09/20/19 1905  •  ondansetron (ZOFRAN ODT) dispertab 4 mg, 4 mg, Oral, Q4HRS PRN, Lita Brantley M.D.  •  folic acid (FOLVITE) tablet 1 mg, 1 mg, Oral, DAILY, James Hou M.D., 1 mg at 09/27/19 0349  •  multivitamin (THERAGRAN) tablet 1 Tab, 1 Tab, Oral, DAILY, James Hou M.D., 1 Tab at 09/27/19 0349  Labs:  Recent Labs     09/25/19  0305 09/26/19  0310 09/27/19  0304   WBC 12.1* 12.3* 13.7*   RBC 4.78 4.65* 4.64*   HEMOGLOBIN 14.7 14.5 14.1   HEMATOCRIT 44.9 43.9 44.1   MCV 93.9 94.4 95.0   MCH 30.8 31.2 30.4   MCHC 32.7* 33.0* 32.0*   RDW 65.2* 65.8* 66.8*   PLATELETCT 312 284 283   MPV 8.6* 8.7* 8.5*     Recent Labs     09/25/19  0305 09/26/19  0310 09/27/19  0304   SODIUM 135 137 135   POTASSIUM 4.5 4.3 4.1   CHLORIDE 101 101 104   CO2 26 26 23   GLUCOSE 116* 101* 107*   BUN 15 18 23*   CREATININE 0.79 0.71 0.69   CALCIUM 9.4 9.1 9.3                           Assessment:  Assessment:  Mr. Cabrera is a 34-year-old male with a history of pulmonary embolus and alcohol use disorder admitted for psychosis unspecified.  He is not capacitated to leave AGAINST MEDICAL ADVICE due to fluctuating  cognition.         Plan:  1.  Psychosis unspecified  -Continue quetiapine 100mg PO BID with cogentin   -Changed Cogentin to 1 mg every morning and 2 mg nightly to be given with  quetiapine  -Started gabapentin 300 mg p.o. at night as needed for insomnia     -Patient incapacitated to leave AGAINST MEDICAL ADVICE due to fluctuating cognition. Will start legal hold.  -Given heart condition, would consider using Haldol and benzodiazepines sparingly due to fear of exacerbating his condition.        2.  Medical issues: Paroxysmal A. fib, pulmonary embolus, ethanol withdrawal possibly.     -Defer to medical team for further treatment.     Legal hold: Started legal hold 9/26/19, extended  Other:              Sitter: yes if mother is not present               Visitors: Mother is okay               Phone calls: Per nursing discretion              Personal items (specify): Per nursing discretion  *Medication risk/benefit/expections discussed  *Will Follow  *Thank you for the consult

## 2019-09-28 NOTE — PROGRESS NOTES
"Pt was woken up to give cogentin on MAR. Pt became very agitated and started yelling at his mother and myself. Tried to calm pt down by doing breathing exercises, but pt stated that \"the nurse is irritating me and needs to leave.\" Mother and son then both stated that they did not want anyone else coming into the room until labs needed to be drawn in the morning and they wanted all care clustered at that time including vitals and any morning medications. Educated both regarding midnight vitals and timed medications but both wanted care clustered and to be left alone. Mother stated she needed time to calm her son down and did not want any interruptions for a while. This RN left room with Mother's approval. CNA made aware of situation.    "

## 2019-09-28 NOTE — CARE PLAN
Problem: Safety  Goal: Will remain free from falls  Outcome: PROGRESSING AS EXPECTED  Note:   Pt is a high fall risk. Mother sits with pt in room at all times due to paranoia. Mother is aware of fall risk. Bed alarm on. Bed locked and in lowest position.      Problem: Psychosocial Needs:  Goal: Level of anxiety will decrease  Outcome: PROGRESSING AS EXPECTED  Intervention: Identify and develop with patient strategies to cope with anxiety triggers  Note:   Discussed plan for night with pt and mother. All parties agreed upon plan. Meds in place to help with psychosocial needs. Mother at bedside for support.

## 2019-09-28 NOTE — PSYCHIATRY
"PSYCHIATRIC FOLLOW UP:    Reason for Admission: Psychiatric Issues, Paranoia   Requesting Physician: Edwardo Dillon M.D.  Supervising Physician:Sahra Hartmann M.D.    Subjective:  Patient is a 34 y.o. male with history of MDD, PTSD, alcohol use disorder, and pulmonary embolism who presents to the hospital after heavy use of mushrooms and nitrous oxide resulting in paranoid delusions.    On interview, patient continues to report a significant level of paranoia. He reports that he felt somewhat better. He was able to sleep better last night. He continues to make grunting sounds because he feels anxious but is able to take full deep breaths with no problems.  He feels like he is getting stronger.     Medical Review of Systems:  Constitutional: Negative for fever, chills, weight loss  HENT: Negative for hearing loss, sore throat, neck pain  Eyes: Negative for blurred vision, pain, redness.   Respiratory: Negative for cough, shortness of breath, wheezing   Cardiovascular: Negative for chest pain, palpitations  Gastrointestinal: Negative for nausea, vomiting, diarrhea, constipation, blood in stool  Genitourinary: Negative for dysuria, urgency, frequency, hematuria  Musculoskeletal: Negative for myalgias,  joint pain  Skin: Negative for itching and rash.  Neurological: Positive for tingling.  Negative for focal weakness.  Psychiatric/Behavioral: See above for psych review of systems      Psychiatric Examination:   Vitals: /78   Pulse (!) 109 Comment: RN notified  Temp (!) 38.2 °C (100.7 °F) (Temporal) Comment: RN notified  Resp 20   Ht 1.727 m (5' 8\")   Wt 118.2 kg (260 lb 9.3 oz)   SpO2 94%   BMI 39.62 kg/m²   Musculoskeletal:  strength is improving, 4/5 upper and lower extremity strength, he no longer collapses against resistance and is able to resist significantly.  Appearance: well-developed, well-nourished, appears stated age and Appears very anxious, cooperative, engaged, guarded and poor " "eye contact  Thoughts: paranoid delusions and patient denies SI and HI, linear, coherent, goal-oriented and organized  Speech: Patient speaks in short sentences, prosody is noted  Mood: \"ok\"  Affect: dysthymic, flat and congruent with mood  SI/HI: Denies SI and HI  Alert/Oriented: alert and oriented  Memory: no gross impairment in immediate, recent, or remote memory  Fund of Knowledge: adequate  Insight/Judgement into symptoms: fair  Neurological Testing: MMSE not performed during this encounter    Medications (currently prescribed at [facility]):    Current Facility-Administered Medications:   •  cefTRIAXone (ROCEPHIN) 2 g in  mL IVPB, 2 g, Intravenous, Q24HRS, Edwardo Dillon M.D.  •  benztropine (COGENTIN) tablet 1 mg, 1 mg, Oral, DAILY, Malou Guillen M.D., 1 mg at 09/28/19 0327  •  benztropine (COGENTIN) tablet 2 mg, 2 mg, Oral, Nightly, Malou Guillen M.D., 2 mg at 09/27/19 2146  •  gabapentin (NEURONTIN) capsule 300 mg, 300 mg, Oral, HS PRN, Malou Guillen M.D.  •  QUEtiapine (SEROQUEL) tablet 100 mg, 100 mg, Oral, BID, Malou Guillen M.D., 100 mg at 09/28/19 0327  •  cyanocobalamin (VITAMIN B-12) injection 1,000 mcg, 1,000 mcg, Intramuscular, Q30 DAYS, Edwardo Dillon M.D., 1,000 mcg at 09/25/19 1829  •  lisinopril (PRINIVIL) 10 MG tablet 10 mg, 10 mg, Oral, Q DAY, Edwardo Dillon M.D., Stopped at 09/27/19 0600  •  senna-docusate (PERICOLACE or SENOKOT S) 8.6-50 MG per tablet 2 Tab, 2 Tab, Oral, BID, 2 Tab at 09/28/19 0328 **AND** polyethylene glycol/lytes (MIRALAX) PACKET 1 Packet, 1 Packet, Oral, DAILY, 1 Packet at 09/28/19 0324 **AND** magnesium hydroxide (MILK OF MAGNESIA) suspension 30 mL, 30 mL, Oral, QDAY PRN, 30 mL at 09/25/19 0500 **AND** bisacodyl (DULCOLAX) suppository 10 mg, 10 mg, Rectal, QDAY PRN, Edwardo Dillon M.D.  •  rivaroxaban (XARELTO) tablet 15 mg, 15 mg, Oral, BID WITH MEALS, 15 mg at 09/28/19 0855 **FOLLOWED BY** [START ON 10/14/2019] rivaroxaban (XARELTO) " tablet 20 mg, 20 mg, Oral, PM MEAL, Rainer Butt M.D.  •  tamsulosin (FLOMAX) capsule 0.4 mg, 0.4 mg, Oral, AFTER BREAKFAST, Rainer Butt M.D., 0.4 mg at 09/28/19 0855  •  Respiratory Care per Protocol, , Nebulization, Continuous RT, Lita Brantley M.D.  •  acetaminophen (TYLENOL) tablet 650 mg, 650 mg, Oral, Q6HRS PRN, Lita Brantley M.D., 650 mg at 09/23/19 1231  •  labetalol (NORMODYNE,TRANDATE) injection 10 mg, 10 mg, Intravenous, Q4HRS PRN, Edwardo Dillon M.D., 10 mg at 09/25/19 0457  •  ondansetron (ZOFRAN) syringe/vial injection 4 mg, 4 mg, Intravenous, Q4HRS PRN, Lita Brantley M.D., 4 mg at 09/20/19 1905  •  ondansetron (ZOFRAN ODT) dispertab 4 mg, 4 mg, Oral, Q4HRS PRN, Lita Brantley M.D.  •  folic acid (FOLVITE) tablet 1 mg, 1 mg, Oral, DAILY, James Hou M.D., 1 mg at 09/28/19 0326  •  multivitamin (THERAGRAN) tablet 1 Tab, 1 Tab, Oral, DAILY, James Hou M.D., 1 Tab at 09/28/19 0328  Labs:  Recent Labs     09/26/19  0310 09/27/19  0304 09/28/19  0320   WBC 12.3* 13.7* 12.3*   RBC 4.65* 4.64* 4.56*   HEMOGLOBIN 14.5 14.1 14.2   HEMATOCRIT 43.9 44.1 43.7   MCV 94.4 95.0 95.8   MCH 31.2 30.4 31.1   MCHC 33.0* 32.0* 32.5*   RDW 65.8* 66.8* 65.6*   PLATELETCT 284 283 278   MPV 8.7* 8.5* 8.8*     Recent Labs     09/26/19  0310 09/27/19  0304 09/28/19  0320   SODIUM 137 135 138   POTASSIUM 4.3 4.1 4.4   CHLORIDE 101 104 105   CO2 26 23 22   GLUCOSE 101* 107* 101*   BUN 18 23* 22   CREATININE 0.71 0.69 0.88   CALCIUM 9.1 9.3 9.4                           Assessment:  Mr. Cabrera is a 34-year-old male with a history of pulmonary embolus and alcohol use disorder admitted for psychosis unspecified.  He is not capacitated to leave AGAINST MEDICAL ADVICE due to fluctuating cognition.      There was concern about the use of cogentin to help with sleep. Patient is no longer exhibiting signs of extrapyramidal effects.     Patient is concerned about his sleep and paranoia. Will increase  seroquel to from 100 to 150mg during the day and 200mg to help with the sleep and psychosis.      Plan:  1.  Psychosis unspecified  -Increase to quetiapine 150mg PO Qam and 200mg PO nightly with cogentin   -Discontinue cogentin   -Continue gabapentin 300 mg p.o. at night as needed for insomnia     -Patient incapacitated to leave AGAINST MEDICAL ADVICE due to fluctuating cognition. Will start legal hold.  -Given heart condition, would consider using Haldol and benzodiazepines sparingly due to fear of exacerbating his condition.        2.  Medical issues: Paroxysmal A. fib, pulmonary embolus, UTI     -Defer to medical team for further treatment.     Legal hold: Started legal hold 9/26/19, extended  Other:              Sitter: yes if mother is not present               Visitors: Mother is okay               Phone calls: Per nursing discretion              Personal items (specify): Per nursing discretion  *Medication risk/benefit/expections discussed  *Will Follow  *Thank you for the consult

## 2019-09-29 PROBLEM — R79.89 ELEVATED TROPONIN: Status: RESOLVED | Noted: 2019-05-26 | Resolved: 2019-09-29

## 2019-09-29 LAB
ALBUMIN SERPL BCP-MCNC: 3.9 G/DL (ref 3.2–4.9)
ALBUMIN/GLOB SERPL: 1.4 G/DL
ALP SERPL-CCNC: 50 U/L (ref 30–99)
ALT SERPL-CCNC: 35 U/L (ref 2–50)
ANION GAP SERPL CALC-SCNC: 9 MMOL/L (ref 0–11.9)
AST SERPL-CCNC: 13 U/L (ref 12–45)
BASOPHILS # BLD AUTO: 0.5 % (ref 0–1.8)
BASOPHILS # BLD: 0.06 K/UL (ref 0–0.12)
BILIRUB SERPL-MCNC: 0.6 MG/DL (ref 0.1–1.5)
BUN SERPL-MCNC: 16 MG/DL (ref 8–22)
CALCIUM SERPL-MCNC: 9.3 MG/DL (ref 8.5–10.5)
CHLORIDE SERPL-SCNC: 107 MMOL/L (ref 96–112)
CO2 SERPL-SCNC: 22 MMOL/L (ref 20–33)
CREAT SERPL-MCNC: 0.78 MG/DL (ref 0.5–1.4)
EOSINOPHIL # BLD AUTO: 0.25 K/UL (ref 0–0.51)
EOSINOPHIL NFR BLD: 2 % (ref 0–6.9)
ERYTHROCYTE [DISTWIDTH] IN BLOOD BY AUTOMATED COUNT: 61.1 FL (ref 35.9–50)
GLOBULIN SER CALC-MCNC: 2.7 G/DL (ref 1.9–3.5)
GLUCOSE SERPL-MCNC: 98 MG/DL (ref 65–99)
HCT VFR BLD AUTO: 41.8 % (ref 42–52)
HGB BLD-MCNC: 13.6 G/DL (ref 14–18)
IMM GRANULOCYTES # BLD AUTO: 0.05 K/UL (ref 0–0.11)
IMM GRANULOCYTES NFR BLD AUTO: 0.4 % (ref 0–0.9)
LYMPHOCYTES # BLD AUTO: 2.34 K/UL (ref 1–4.8)
LYMPHOCYTES NFR BLD: 18.7 % (ref 22–41)
MCH RBC QN AUTO: 30.2 PG (ref 27–33)
MCHC RBC AUTO-ENTMCNC: 32.5 G/DL (ref 33.7–35.3)
MCV RBC AUTO: 92.9 FL (ref 81.4–97.8)
MONOCYTES # BLD AUTO: 0.99 K/UL (ref 0–0.85)
MONOCYTES NFR BLD AUTO: 7.9 % (ref 0–13.4)
NEUTROPHILS # BLD AUTO: 8.83 K/UL (ref 1.82–7.42)
NEUTROPHILS NFR BLD: 70.5 % (ref 44–72)
NRBC # BLD AUTO: 0 K/UL
NRBC BLD-RTO: 0 /100 WBC
PLATELET # BLD AUTO: 276 K/UL (ref 164–446)
PMV BLD AUTO: 8.6 FL (ref 9–12.9)
POTASSIUM SERPL-SCNC: 4.4 MMOL/L (ref 3.6–5.5)
PROT SERPL-MCNC: 6.6 G/DL (ref 6–8.2)
RBC # BLD AUTO: 4.5 M/UL (ref 4.7–6.1)
SODIUM SERPL-SCNC: 138 MMOL/L (ref 135–145)
WBC # BLD AUTO: 12.5 K/UL (ref 4.8–10.8)

## 2019-09-29 PROCEDURE — 770020 HCHG ROOM/CARE - TELE (206)

## 2019-09-29 PROCEDURE — A9270 NON-COVERED ITEM OR SERVICE: HCPCS | Performed by: INTERNAL MEDICINE

## 2019-09-29 PROCEDURE — 99232 SBSQ HOSP IP/OBS MODERATE 35: CPT | Performed by: INTERNAL MEDICINE

## 2019-09-29 PROCEDURE — 700105 HCHG RX REV CODE 258: Performed by: INTERNAL MEDICINE

## 2019-09-29 PROCEDURE — A9270 NON-COVERED ITEM OR SERVICE: HCPCS | Performed by: STUDENT IN AN ORGANIZED HEALTH CARE EDUCATION/TRAINING PROGRAM

## 2019-09-29 PROCEDURE — 700102 HCHG RX REV CODE 250 W/ 637 OVERRIDE(OP): Performed by: INTERNAL MEDICINE

## 2019-09-29 PROCEDURE — 700102 HCHG RX REV CODE 250 W/ 637 OVERRIDE(OP): Performed by: HOSPITALIST

## 2019-09-29 PROCEDURE — 700111 HCHG RX REV CODE 636 W/ 250 OVERRIDE (IP): Performed by: INTERNAL MEDICINE

## 2019-09-29 PROCEDURE — 80053 COMPREHEN METABOLIC PANEL: CPT

## 2019-09-29 PROCEDURE — 36415 COLL VENOUS BLD VENIPUNCTURE: CPT

## 2019-09-29 PROCEDURE — A9270 NON-COVERED ITEM OR SERVICE: HCPCS | Performed by: HOSPITALIST

## 2019-09-29 PROCEDURE — 85025 COMPLETE CBC W/AUTO DIFF WBC: CPT

## 2019-09-29 PROCEDURE — 700102 HCHG RX REV CODE 250 W/ 637 OVERRIDE(OP): Performed by: STUDENT IN AN ORGANIZED HEALTH CARE EDUCATION/TRAINING PROGRAM

## 2019-09-29 RX ORDER — SULFAMETHOXAZOLE AND TRIMETHOPRIM 800; 160 MG/1; MG/1
1 TABLET ORAL EVERY 12 HOURS
Status: COMPLETED | OUTPATIENT
Start: 2019-09-29 | End: 2019-10-04

## 2019-09-29 RX ORDER — CALCIUM CARBONATE 500 MG/1
500 TABLET, CHEWABLE ORAL 3 TIMES DAILY PRN
Status: DISCONTINUED | OUTPATIENT
Start: 2019-09-29 | End: 2019-10-16 | Stop reason: HOSPADM

## 2019-09-29 RX ADMIN — QUETIAPINE FUMARATE 200 MG: 200 TABLET, FILM COATED ORAL at 18:00

## 2019-09-29 RX ADMIN — RIVAROXABAN 15 MG: 15 TABLET, FILM COATED ORAL at 11:24

## 2019-09-29 RX ADMIN — ANTACID TABLETS 500 MG: 500 TABLET, CHEWABLE ORAL at 11:25

## 2019-09-29 RX ADMIN — SENNOSIDES, DOCUSATE SODIUM 2 TABLET: 50; 8.6 TABLET, FILM COATED ORAL at 04:54

## 2019-09-29 RX ADMIN — TAMSULOSIN HYDROCHLORIDE 0.4 MG: 0.4 CAPSULE ORAL at 11:24

## 2019-09-29 RX ADMIN — SULFAMETHOXAZOLE AND TRIMETHOPRIM 1 TABLET: 800; 160 TABLET ORAL at 18:15

## 2019-09-29 RX ADMIN — FOLIC ACID 1 MG: 1 TABLET ORAL at 04:55

## 2019-09-29 RX ADMIN — LISINOPRIL 10 MG: 10 TABLET ORAL at 04:55

## 2019-09-29 RX ADMIN — SENNOSIDES, DOCUSATE SODIUM 2 TABLET: 50; 8.6 TABLET, FILM COATED ORAL at 17:42

## 2019-09-29 RX ADMIN — ANTACID TABLETS 500 MG: 500 TABLET, CHEWABLE ORAL at 21:31

## 2019-09-29 RX ADMIN — RIVAROXABAN 15 MG: 15 TABLET, FILM COATED ORAL at 21:16

## 2019-09-29 RX ADMIN — QUETIAPINE FUMARATE 150 MG: 200 TABLET ORAL at 04:55

## 2019-09-29 RX ADMIN — GABAPENTIN 300 MG: 300 CAPSULE ORAL at 21:30

## 2019-09-29 RX ADMIN — THERA TABS 1 TABLET: TAB at 04:55

## 2019-09-29 RX ADMIN — CEFTRIAXONE SODIUM 2 G: 2 INJECTION, POWDER, FOR SOLUTION INTRAMUSCULAR; INTRAVENOUS at 15:00

## 2019-09-29 ASSESSMENT — ENCOUNTER SYMPTOMS
ABDOMINAL PAIN: 0
EYE PAIN: 0
SPUTUM PRODUCTION: 0
WHEEZING: 0
HEARTBURN: 1
PSYCHIATRIC NEGATIVE: 1
HALLUCINATIONS: 1
WEAKNESS: 1
SINUS PAIN: 0
DIZZINESS: 0
VOMITING: 0
EYE DISCHARGE: 0
CHILLS: 0
HEADACHES: 0
SHORTNESS OF BREATH: 0
FOCAL WEAKNESS: 0
NERVOUS/ANXIOUS: 1
MYALGIAS: 0
ORTHOPNEA: 0
SORE THROAT: 0
LOSS OF CONSCIOUSNESS: 0
SENSORY CHANGE: 0
COUGH: 0
NAUSEA: 0
FEVER: 0
WEIGHT LOSS: 0
DIARRHEA: 0
DIAPHORESIS: 1
TINGLING: 1
STRIDOR: 0

## 2019-09-29 NOTE — PROGRESS NOTES
Pt. Anxious, received bedside report. Pt. mother at bedside providing emotional support. Pt. Anxious with CNA and RN in room, displayed anxiety increased RR and HR. Pt. And family asked staff to leave the room and return. Pt. Addressed calmly, pt. And family refusing 0000 nuero and vital signs check. Pt. And family reviewed care plan.

## 2019-09-29 NOTE — PROGRESS NOTES
Assumed care this am from night shift RN. Patient asleep with mother in the room. Call light and personal items within reach, bed locked in low position.Hourly rounding in place.   Patient refused 0800 neuro checks

## 2019-09-29 NOTE — PROGRESS NOTES
Pulmonary Progress Note    Date of admission  9/19/2019    Chief Complaint  34 y.o. male admitted 9/19/2019 with right-sided chest pain and psychosis.  History of unprovoked intermediate/high risk submassive saddle pulmonary embolism 5/2019 for which he received TPA, paroxysmal atrial fibrillation, ALLI, alcohol and polysubstance abuse.  Brought into the emergency room on 9/19 for psychosis, reporting that people have been following him around at his home.  He admitted to have been using psychedelic mushrooms, whippets and marijuana.  On his last hospitalization, he was discharged on Xarelto which he has not been consistent taking.  He refused follow-up with pulmonary on last hospitalization, stating that he only wanted to follow-up with his PCP.    Hospital Course    Urine drug screen positive for cannabinoids and benzodiazepines on admission.  Serum alcohol level admission.  Repeat CT chest showed persistent bilateral pulmonary emboli.  Lower extremity ultrasound for DVTs was negative.  Has been seen by psychiatry and has been started on antipsychotics and Cogentin and has been deemed incapacitated to leave AGAINST MEDICAL ADVICE.   Patient was restarted on Xarelto downtrending oxygen requirements.     Interval Problem Update  Reviewed last 24 hour events:  Supplemental oxygen minimal between room air and 1LNC, maintaining adequate saturations greater than 92%  Still having episodes of intermittent sinus tachycardia  Hemodynamically stable  Antipsychotics adjusted  No pain, reporting weakness in the lower extremities, only able to ambulate between bed and chair    Review of Systems  Review of Systems   Constitutional: Negative for chills, fever and weight loss.   HENT: Negative for congestion, sinus pain and sore throat.    Eyes: Negative for pain and discharge.   Respiratory: Negative for cough, sputum production, shortness of breath, wheezing and stridor.    Cardiovascular: Negative for chest pain, orthopnea and  leg swelling.   Gastrointestinal: Negative for abdominal pain, diarrhea, nausea and vomiting.   Genitourinary: Negative for dysuria, frequency and urgency.   Musculoskeletal: Negative for myalgias.   Skin: Negative for rash.   Neurological: Negative for dizziness, sensory change, focal weakness, loss of consciousness and headaches.   Psychiatric/Behavioral: Negative.    All other systems reviewed and are negative.     Vital Signs for last 24 hours   Temp:  [36.5 °C (97.7 °F)-38.2 °C (100.7 °F)] 37.1 °C (98.8 °F)  Pulse:  [] 99  Resp:  [20-24] 22  BP: (110-139)/() 138/98  SpO2:  [94 %-98 %] 98 %    Physical Exam   Physical Exam   Constitutional: He is oriented to person, place, and time. He appears well-developed and well-nourished. No distress.   Lying in bed today  Mild diaphoresis   HENT:   Mouth/Throat: Oropharynx is clear and moist. No oropharyngeal exudate.   Eyes: Conjunctivae are normal. Right eye exhibits no discharge. Left eye exhibits no discharge. No scleral icterus.   Neck: Normal range of motion. No JVD present. No tracheal deviation present. No thyromegaly present.   Cardiovascular: Normal rate, regular rhythm and normal heart sounds. Exam reveals no friction rub.   No murmur heard.  Pulmonary/Chest: Effort normal. No stridor. No respiratory distress. He has no wheezes. He has no rales.   Rales right base  Grunting during expiration   Abdominal: Soft. Bowel sounds are normal. He exhibits no distension. There is no tenderness. There is no rebound.   Musculoskeletal: Normal range of motion. He exhibits no edema.   Lymphadenopathy:     He has no cervical adenopathy.   Neurological: He is alert and oriented to person, place, and time.   Skin: Skin is warm. No rash noted. No erythema.   Nursing note and vitals reviewed.      Medications  Current Facility-Administered Medications   Medication Dose Route Frequency Provider Last Rate Last Dose   • calcium carbonate (TUMS) chewable tab 500 mg  500  mg Oral TID PRN Edwardo Dillon M.D.       • cefTRIAXone (ROCEPHIN) 2 g in  mL IVPB  2 g Intravenous Q24HRS Edwardo Dillon M.D.   Stopped at 09/28/19 1627   • QUEtiapine (SEROQUEL) tablet 200 mg  200 mg Oral Q EVENING Malou Guillen M.D.   200 mg at 09/28/19 1738   • QUEtiapine (SEROQUEL) tablet 150 mg  150 mg Oral QAM Malou Guillen M.D.   150 mg at 09/29/19 0455   • gabapentin (NEURONTIN) capsule 300 mg  300 mg Oral HS PRN Malou Guillen M.D.   300 mg at 09/28/19 2222   • cyanocobalamin (VITAMIN B-12) injection 1,000 mcg  1,000 mcg Intramuscular Q30 DAYS Edwardo Dillon M.D.   1,000 mcg at 09/25/19 1829   • lisinopril (PRINIVIL) 10 MG tablet 10 mg  10 mg Oral Q DAY Edwardo Dillon M.D.   10 mg at 09/29/19 0455   • polyethylene glycol/lytes (MIRALAX) PACKET 1 Packet  1 Packet Oral DAILY Edwardo Dillon M.D.   1 Packet at 09/28/19 0324    And   • senna-docusate (PERICOLACE or SENOKOT S) 8.6-50 MG per tablet 2 Tab  2 Tab Oral BID Edwardo Dillon M.D.   2 Tab at 09/29/19 0454    And   • magnesium hydroxide (MILK OF MAGNESIA) suspension 30 mL  30 mL Oral QDAY PRN Edwardo Dillon M.D.   30 mL at 09/25/19 0500    And   • bisacodyl (DULCOLAX) suppository 10 mg  10 mg Rectal QDAY PRN Edwardo Dillon M.D.   Stopped at 09/28/19 1531   • rivaroxaban (XARELTO) tablet 15 mg  15 mg Oral BID WITH MEALS Rainer Butt M.D.   15 mg at 09/28/19 1739    Followed by   • [START ON 10/14/2019] rivaroxaban (XARELTO) tablet 20 mg  20 mg Oral PM MEAL Rainer Butt M.D.       • tamsulosin (FLOMAX) capsule 0.4 mg  0.4 mg Oral AFTER BREAKFAST Rainer Butt M.D.   0.4 mg at 09/28/19 0855   • Respiratory Care per Protocol   Nebulization Continuous RT Lita Brantley M.D.       • acetaminophen (TYLENOL) tablet 650 mg  650 mg Oral Q6HRS PRN Lita Brantley M.D.   650 mg at 09/23/19 1231   • labetalol (NORMODYNE,TRANDATE) injection 10 mg  10 mg Intravenous Q4HRS PRN Edwardo Dillon M.D.   10 mg at 09/25/19 0457   • ondansetron (ZOFRAN)  syringe/vial injection 4 mg  4 mg Intravenous Q4HRS PRN Lita Brantley M.D.   4 mg at 09/20/19 1905   • ondansetron (ZOFRAN ODT) dispertab 4 mg  4 mg Oral Q4HRS PRN Lita Brantley M.D.       • folic acid (FOLVITE) tablet 1 mg  1 mg Oral DAILY James Hou M.D.   1 mg at 09/29/19 0455   • multivitamin (THERAGRAN) tablet 1 Tab  1 Tab Oral DAILY James Hou M.D.   1 Tab at 09/29/19 0455     Fluids    Intake/Output Summary (Last 24 hours) at 9/29/2019 1017  Last data filed at 9/29/2019 0500  Gross per 24 hour   Intake 1000 ml   Output 2500 ml   Net -1500 ml     Laboratory          Recent Labs     09/27/19  0304 09/28/19  0320 09/29/19  0508   SODIUM 135 138 138   POTASSIUM 4.1 4.4 4.4   CHLORIDE 104 105 107   CO2 23 22 22   BUN 23* 22 16   CREATININE 0.69 0.88 0.78   CALCIUM 9.3 9.4 9.3     Recent Labs     09/27/19  0304 09/28/19  0320 09/29/19  0508   ALTSGPT 42 36 35   ASTSGOT 12 13 13   ALKPHOSPHAT 49 50 50   TBILIRUBIN 0.6 0.5 0.6   GLUCOSE 107* 101* 98     Recent Labs     09/27/19  0304 09/28/19  0320 09/29/19  0508   WBC 13.7* 12.3* 12.5*   NEUTSPOLYS 67.80 64.60 70.50   LYMPHOCYTES 19.60* 22.30 18.70*   MONOCYTES 10.30 9.80 7.90   EOSINOPHILS 1.40 2.40 2.00   BASOPHILS 0.20 0.30 0.50   ASTSGOT 12 13 13   ALTSGPT 42 36 35   ALKPHOSPHAT 49 50 50   TBILIRUBIN 0.6 0.5 0.6     Recent Labs     09/27/19  0304 09/28/19  0320 09/29/19  0508   RBC 4.64* 4.56* 4.50*   HEMOGLOBIN 14.1 14.2 13.6*   HEMATOCRIT 44.1 43.7 41.8*   PLATELETCT 283 278 276     Imaging  CT:    Reviewed from 9/19 and compared to 5/26/2019: persistent saddle pulm emboli with likely RML and RLL pulm infarct  Rad read:  1.  Extensive acute bilateral pulmonary emboli with saddle embolus noted, as well as findings concerning for RV strain.  2.  Probable pulmonary infarct in the lateral basal RIGHT lower lobe anterior basal RIGHT middle lobe.  Follow-up recommended to ensure resolution.  3.  Minimal RIGHT pleural effusion.  4.  Fatty  infiltration of liver.    Echo:   Reviewed 5/25/2019:  Left ventricular systolic function is normal. Left ventricular ejection   fraction is visually estimated to be 55%.  Indeterminate diastolic function.  Flattened septum in systole and diastole consistent with RV pressure   and volume overload.  Moderately dilated right ventricle. Reduced right ventricular systolic   function.  Evidence of Salcedo's sign with hyperdynamic apex and free wall   dysfunction suggestive of RV strain as seen with acute pulmonary   embolism.    [facility] 9/20/2019: no DVT    Assessment/Plan  * Acute saddle pulmonary embolism (HCC)- (present on admission)  Assessment & Plan  Intermediate/high risk pulmonary embolism, hemodynamically stable with right heart strain first identified in 5/2019. Provoking factors are sedentary lifestyle, states he can spend 20+ hours in bed or longer when recovering from intoxication. Inconsistently compliant with his rivaroxaban. Patient currently on Xarelto. Lower extremity venous Doppler study for DVT is negative  Plan  - Patient planning to relocate to [place] when acute issues resolve  - Informed him and mother that will need full-dose therapeutic anticoagulation for additional three months, then transition to half-dose Xarelto based on [facility]-CHOICE study for long-term VTE prophylaxis as VTE was relatively unprovoked  - Will need high altitude simulation study prior to air travel  - Repeat TTE in 3-4 months, if persistently symptomatic and easily dyspneic will need VQ scan to eval for CTEPH    Acute hypoxemic respiratory failure (HCC)  Assessment & Plan  Stable. Secondary to intermediate/high risk pulmonary embolus with pulmonary hypertension. History of compliance issues with rivaroxaban and falls when intoxicated  Plan  -RT/O2 protocols  -Keep patient upright at 45 to 60 degrees, he seems to do better at that position, continue  -Continue to encourage mobilization, OOB, incentive spirometry  -agree  with PT/OT referral, will likely need SNF placement for PT given deconditioning    ALLI (obstructive sleep apnea)  Assessment & Plan  Patient obese and leads a sedentary life style. Mentions history of ALLI and been on CPAP but not using it.  Plan  -Outpatient sleep study   -Pulmonary follow up    Pulmonary hypertension (HCC)- (present on admission)  Assessment & Plan  Secondary to pulmonary embolus, recurrent. RVSP moderate to severely elevated in May of this year. Follow up ECHO did not calculate RVSP due to strain  Plan  - TTE after 3-4 months of therapeutic anticoagulation  - VQ scan to evaluate for CTEPH if persistently symptomatic and/or TTE remains abnormal     VTE:  NOAC  Ulcer: Not Indicated  Lines: None    I have performed a physical exam and reviewed and updated ROS and Plan today (9/29/2019). In review of yesterday's note (9/28/2019), there are no changes except as documented above.     Discussed patient condition and risk of morbidity and/or mortality with RN and mother.  __________  Priyank Tatum MD  Pulmonary and Critical Care Medicine  [facility]

## 2019-09-29 NOTE — PROGRESS NOTES
"Hospital Medicine Daily Progress Note    Date of Service  9/29/2019    Chief Complaint  34 y.o. male admitted 9/19/2019 with hallucinations    Hospital Course    Past medical history of depression, PTSD, anxiety, substance abuse of mushrooms, nitrous oxide, marijuana, alcohol, paroxysmal A. fib, recent diagnosis of PE May 2019.  He presented with hallucinations and had stopped taking his medications including Xarelto.  He had also fallen multiple times.  He was found hypoxic and CTA showed extensive acute bilateral pulmonary emboli with saddle embolus.  He was admitted to the ICU and felt his risk of intracranial hemorrhage outweighed submassive dose of alteplase or EKOS.  He was started on heparin infusion.  Lower extremity Doppler was negative for DVT.  TTE showed severe right heart strain.  Psychiatry was consulted for his hallucinations and he was started on Risperdal with improvement.  Patient remained stabilized and transitioned to Xarelto.  He was hypertensive requiring IV medications so he was started on a lower lower dose of lisinopril and his atenolol was stopped, given his right heart strain.      Interval Problem Update  He had enema yesterday with good BM. He denies abd pain.  He complains of heartburn. When I asked to describe it, he says \"heartburn\" He denies chest pain or SOB.  He says his night was terrible, mother notes ongoing paranoia.    Consultants/Specialty  Psychiatry  Critical care  Pulmonology    Code Status  Full Code      Disposition  Case coordination to discuss discharge planning with mother  SNF  Will need anticoagulation clinic    Review of Systems  Review of Systems   Constitutional: Positive for diaphoresis and malaise/fatigue.   HENT: Negative for congestion and sore throat.    Respiratory: Negative for cough and shortness of breath.    Cardiovascular: Negative for chest pain.   Gastrointestinal: Positive for heartburn. Negative for abdominal pain, diarrhea, nausea and vomiting. "   Genitourinary: Negative for dysuria.   Neurological: Positive for tingling and weakness. Negative for focal weakness.   Psychiatric/Behavioral: Positive for hallucinations. The patient is nervous/anxious.         Physical Exam  Temp:  [36.5 °C (97.7 °F)-37.6 °C (99.7 °F)] 36.6 °C (97.8 °F)  Pulse:  [] 118  Resp:  [19-24] 19  BP: (130-139)/() 134/77  SpO2:  [96 %-98 %] 96 %    Physical Exam   Constitutional: He is oriented to person, place, and time. He appears well-developed. No distress.   Obesity   HENT:   Head: Normocephalic.   Mouth/Throat: Oropharynx is clear and moist.   Eyes: Conjunctivae are normal.   Cardiovascular: Normal rate and regular rhythm.   Pulmonary/Chest: He has no wheezes. He has no rales. He exhibits no tenderness.   Tachypneic and grunting due to anxiety per mother, diminished breath sounds throughout   Abdominal: Soft. Bowel sounds are normal. He exhibits no distension (Mild to moderate). There is tenderness (mild, suprapubic). There is no rebound and no guarding.   Musculoskeletal: He exhibits no edema.   Neurological: He is alert and oriented to person, place, and time.   4 out of 5 strength upper and lower extremities   Skin: Skin is warm. He is diaphoretic (decreased).   Macular rash isolated to his backside.   Psychiatric: His mood appears anxious.   Nursing note and vitals reviewed.      Fluids    Intake/Output Summary (Last 24 hours) at 9/29/2019 1353  Last data filed at 9/29/2019 0500  Gross per 24 hour   Intake 1000 ml   Output 2500 ml   Net -1500 ml       Laboratory  Recent Labs     09/27/19  0304 09/28/19  0320 09/29/19  0508   WBC 13.7* 12.3* 12.5*   RBC 4.64* 4.56* 4.50*   HEMOGLOBIN 14.1 14.2 13.6*   HEMATOCRIT 44.1 43.7 41.8*   MCV 95.0 95.8 92.9   MCH 30.4 31.1 30.2   MCHC 32.0* 32.5* 32.5*   RDW 66.8* 65.6* 61.1*   PLATELETCT 283 278 276   MPV 8.5* 8.8* 8.6*     Recent Labs     09/27/19  0304 09/28/19  0320 09/29/19  0508   SODIUM 135 138 138   POTASSIUM 4.1 4.4  "4.4   CHLORIDE 104 105 107   CO2 23 22 22   GLUCOSE 107* 101* 98   BUN 23* 22 16   CREATININE 0.69 0.88 0.78   CALCIUM 9.3 9.4 9.3                   Imaging  DX-CHEST-PORTABLE (1 VIEW)   Final Result         No acute cardiac or pulmonary abnormality is identified.      DX-CHEST-PORTABLE (1 VIEW)   Final Result      Mild lung base atelectasis with edema not excluded.      DX-CHEST-PORTABLE (1 VIEW)   Final Result         1.  Pulmonary edema and/or infiltrates.   2.  Cardiomegaly      MR-BRAIN-W/O   Final Result      1.  No acute intracranial abnormality.   2.  Sphenoid and posterior right ethmoid sinus disease.      US-EXTREMITY VENOUS LOWER BILAT   Final Result      EC-ECHOCARDIOGRAM LTD W/O CONT   Final Result      CT-CTA CHEST PULMONARY ARTERY W/ RECONS   Final Result   Addendum 1 of 1   These findings were discussed with HEATHER MELGOZA on 9/19/2019 2:16 PM.      Final      DX-CHEST-PORTABLE (1 VIEW)   Final Result      No acute cardiopulmonary abnormality.      CT-HEAD W/O   Final Result      No CT evidence of acute infarct, hemorrhage or mass. No acute intracranial injury.           Assessment/Plan  * Acute saddle pulmonary embolism (HCC)- (present on admission)  Assessment & Plan  Recurrent, in the setting of noncompliance with anticoagulation therapy  CTA PE revealing \"Extensive acute bilateral pulmonary emboli with saddle embolus noted, as well as findings concerning for RV strain\"  Recurrence likely secondary to poor compliance of medication  Heparin drip transitioned to Xarelto  Echocardiogram with RV strain, caution aggressive blood pressure changes  DVT study negative    Acute hypoxemic respiratory failure (HCC)  Assessment & Plan  Secondary acute pulmonary embolism  Titrate oxygen as tolerated    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 He has no specific symptoms. CXR and urine ordered  9/29 - Continue ceftriaxone, urine culture pending    Weakness  Assessment & Plan  I spoke with psychiatry, who notes patient's current weakness has progressed from when he first admitted.  CPK was normal  Psychiatry stopped risperidone, had some improvement with Cogentin  Possible toxicity from the nitrous oxide.  I spoke with Dr. Byrd with neurology, he agreed with continuing with vitamin B12 supplementation.  He can have EMG testing done as outpatient after 2 weeks of stopping nitrous oxide.  PT OT ordered for reevaluation, plan for SNF    Vitamin B12 deficiency  Assessment & Plan  With reported peripheral tingling  Supplement ordered    Type 2 diabetes mellitus without complication, without long-term current use of insulin ([facility])  Assessment & Plan  New diagnosis, A1c 6.6%  Diabetes education ordered  Could be contributing to peripheral neuropathy    Slow transit constipation  Assessment & Plan  Daily Senokot and MiraLAX  Bowel regimen ordered  Enema PRN  9/26 -ongoing abdominal pain, KUB ordered  9/27 patient declines KUB today  9/28 - unable to do KUB portable apparently  9/29 - improving with enema    ALLI (obstructive sleep apnea)  Assessment & Plan  History of, pulmonary following    Morbid obesity (HCC)  Assessment & Plan  Outpatient weight loss program would be indicated    Pulmonary hypertension (HCC)- (present on admission)  Assessment & Plan  Known history of, now with recurrent PE  Echocardiogram noted    Anxiety- (present on admission)  Assessment & Plan  On Librium previously  Now uncontrolled  Psychiatry evaluating, he  was placed on a hold.  Bedside sitter as needed.  9/25 risperdal stopped for possible NMS, his anxiety is now increased  Psych adjusting seroquel and cogentin.     Essential hypertension- (present on admission)  Assessment & Plan  9/24 - Hypertensive requiring multiple doses of IV labetalol.  I will start on low-dose lisinopril and monitor blood pressure.   9/25 - HTN improving  9/28 - BP decreasing, lisinopril has been held  9/29 - BP stable       VTE prophylaxis: xarelto

## 2019-09-29 NOTE — PROGRESS NOTES
Pt. Mother in room with patient at all times, sleeping in. Pt. Asleep at this time. MD aware of pt. Refusals for midnight. Coordinated plan of care agreed on by the patient and family.

## 2019-09-29 NOTE — CARE PLAN
Problem: Communication  Goal: The ability to communicate needs accurately and effectively will improve  Outcome: PROGRESSING AS EXPECTED  Intervention: [facility] patient and significant other/support system to call light to alert staff of needs  Note:   Clustering care for patient in conjunction with patient and patient's mother.     Problem: Safety  Goal: Will remain free from falls  Outcome: PROGRESSING AS EXPECTED  Intervention: Implement fall precautions  Note:   Patient's strip alarm on, mother in room with patient and FWW for ambulation/movement     Problem: Bowel/Gastric:  Goal: Normal bowel function is maintained or improved  Outcome: PROGRESSING AS EXPECTED  Note:   Enema given yesterday which provoked several bowel movements.

## 2019-09-29 NOTE — CARE PLAN
Problem: Bowel/Gastric:  Goal: Normal bowel function is maintained or improved  Note:   Patient complaining of constipation. Fleet enema given with good results.      Problem: Knowledge Deficit  Goal: Knowledge of disease process/condition, treatment plan, diagnostic tests, and medications will improve  Note:   Patient aware of plan of care at this time. Family at bedside. All questions answered.

## 2019-09-29 NOTE — PROGRESS NOTES
RN able to perform Neuro assessment, check vital signs, and empty manzano with care.  able to draw vital signs after. Pt. Continued to show elevated anxiety alleviated by the presence of his mom who is still in the room at this time. Pt. Becomes distressed when more than 1 medical professional is in the room at a time. His mom states that she is worried about him having another episode of paranoia.

## 2019-09-30 LAB
BACTERIA UR CULT: ABNORMAL
BACTERIA UR CULT: ABNORMAL
BASOPHILS # BLD AUTO: 0.3 % (ref 0–1.8)
BASOPHILS # BLD: 0.04 K/UL (ref 0–0.12)
EOSINOPHIL # BLD AUTO: 0.2 K/UL (ref 0–0.51)
EOSINOPHIL NFR BLD: 1.4 % (ref 0–6.9)
ERYTHROCYTE [DISTWIDTH] IN BLOOD BY AUTOMATED COUNT: 61.1 FL (ref 35.9–50)
HCT VFR BLD AUTO: 43.8 % (ref 42–52)
HGB BLD-MCNC: 14.2 G/DL (ref 14–18)
IMM GRANULOCYTES # BLD AUTO: 0.07 K/UL (ref 0–0.11)
IMM GRANULOCYTES NFR BLD AUTO: 0.5 % (ref 0–0.9)
LYMPHOCYTES # BLD AUTO: 2.2 K/UL (ref 1–4.8)
LYMPHOCYTES NFR BLD: 15.7 % (ref 22–41)
MCH RBC QN AUTO: 30.1 PG (ref 27–33)
MCHC RBC AUTO-ENTMCNC: 32.4 G/DL (ref 33.7–35.3)
MCV RBC AUTO: 93 FL (ref 81.4–97.8)
MONOCYTES # BLD AUTO: 1 K/UL (ref 0–0.85)
MONOCYTES NFR BLD AUTO: 7.1 % (ref 0–13.4)
NEUTROPHILS # BLD AUTO: 10.49 K/UL (ref 1.82–7.42)
NEUTROPHILS NFR BLD: 75 % (ref 44–72)
NRBC # BLD AUTO: 0 K/UL
NRBC BLD-RTO: 0 /100 WBC
PLATELET # BLD AUTO: 317 K/UL (ref 164–446)
PMV BLD AUTO: 8.4 FL (ref 9–12.9)
RBC # BLD AUTO: 4.71 M/UL (ref 4.7–6.1)
SIGNIFICANT IND 70042: ABNORMAL
SITE SITE: ABNORMAL
SOURCE SOURCE: ABNORMAL
WBC # BLD AUTO: 14 K/UL (ref 4.8–10.8)

## 2019-09-30 PROCEDURE — 36415 COLL VENOUS BLD VENIPUNCTURE: CPT

## 2019-09-30 PROCEDURE — A9270 NON-COVERED ITEM OR SERVICE: HCPCS | Performed by: STUDENT IN AN ORGANIZED HEALTH CARE EDUCATION/TRAINING PROGRAM

## 2019-09-30 PROCEDURE — A9270 NON-COVERED ITEM OR SERVICE: HCPCS | Performed by: HOSPITALIST

## 2019-09-30 PROCEDURE — 97535 SELF CARE MNGMENT TRAINING: CPT

## 2019-09-30 PROCEDURE — A9270 NON-COVERED ITEM OR SERVICE: HCPCS | Performed by: INTERNAL MEDICINE

## 2019-09-30 PROCEDURE — 97116 GAIT TRAINING THERAPY: CPT

## 2019-09-30 PROCEDURE — 99232 SBSQ HOSP IP/OBS MODERATE 35: CPT | Performed by: INTERNAL MEDICINE

## 2019-09-30 PROCEDURE — 700102 HCHG RX REV CODE 250 W/ 637 OVERRIDE(OP): Performed by: STUDENT IN AN ORGANIZED HEALTH CARE EDUCATION/TRAINING PROGRAM

## 2019-09-30 PROCEDURE — 770020 HCHG ROOM/CARE - TELE (206)

## 2019-09-30 PROCEDURE — 700102 HCHG RX REV CODE 250 W/ 637 OVERRIDE(OP): Performed by: HOSPITALIST

## 2019-09-30 PROCEDURE — 700102 HCHG RX REV CODE 250 W/ 637 OVERRIDE(OP): Performed by: INTERNAL MEDICINE

## 2019-09-30 PROCEDURE — 85025 COMPLETE CBC W/AUTO DIFF WBC: CPT

## 2019-09-30 RX ORDER — DIPHENHYDRAMINE HCL 25 MG
25 TABLET ORAL ONCE
Status: COMPLETED | OUTPATIENT
Start: 2019-09-30 | End: 2019-09-30

## 2019-09-30 RX ORDER — TRAZODONE HYDROCHLORIDE 50 MG/1
50 TABLET ORAL ONCE
Status: COMPLETED | OUTPATIENT
Start: 2019-09-30 | End: 2019-09-30

## 2019-09-30 RX ADMIN — GABAPENTIN 300 MG: 300 CAPSULE ORAL at 20:42

## 2019-09-30 RX ADMIN — DIPHENHYDRAMINE HCL 25 MG: 25 TABLET ORAL at 14:29

## 2019-09-30 RX ADMIN — QUETIAPINE FUMARATE 200 MG: 200 TABLET, FILM COATED ORAL at 17:20

## 2019-09-30 RX ADMIN — RIVAROXABAN 15 MG: 15 TABLET, FILM COATED ORAL at 17:19

## 2019-09-30 RX ADMIN — QUETIAPINE FUMARATE 150 MG: 200 TABLET ORAL at 05:11

## 2019-09-30 RX ADMIN — SENNOSIDES, DOCUSATE SODIUM 2 TABLET: 50; 8.6 TABLET, FILM COATED ORAL at 05:10

## 2019-09-30 RX ADMIN — FOLIC ACID 1 MG: 1 TABLET ORAL at 05:10

## 2019-09-30 RX ADMIN — LISINOPRIL 10 MG: 10 TABLET ORAL at 05:11

## 2019-09-30 RX ADMIN — ANTACID TABLETS 500 MG: 500 TABLET, CHEWABLE ORAL at 22:42

## 2019-09-30 RX ADMIN — SULFAMETHOXAZOLE AND TRIMETHOPRIM 1 TABLET: 800; 160 TABLET ORAL at 17:19

## 2019-09-30 RX ADMIN — THERA TABS 1 TABLET: TAB at 05:11

## 2019-09-30 RX ADMIN — POLYETHYLENE GLYCOL 3350 1 PACKET: 17 POWDER, FOR SOLUTION ORAL at 06:00

## 2019-09-30 RX ADMIN — RIVAROXABAN 15 MG: 15 TABLET, FILM COATED ORAL at 09:08

## 2019-09-30 RX ADMIN — SENNOSIDES, DOCUSATE SODIUM 2 TABLET: 50; 8.6 TABLET, FILM COATED ORAL at 18:00

## 2019-09-30 RX ADMIN — TAMSULOSIN HYDROCHLORIDE 0.4 MG: 0.4 CAPSULE ORAL at 09:08

## 2019-09-30 RX ADMIN — TRAZODONE HYDROCHLORIDE 50 MG: 50 TABLET ORAL at 20:42

## 2019-09-30 RX ADMIN — ACETAMINOPHEN 650 MG: 325 TABLET, FILM COATED ORAL at 17:18

## 2019-09-30 RX ADMIN — SULFAMETHOXAZOLE AND TRIMETHOPRIM 1 TABLET: 800; 160 TABLET ORAL at 05:11

## 2019-09-30 ASSESSMENT — ENCOUNTER SYMPTOMS
SINUS PAIN: 0
DIAPHORESIS: 0
DIARRHEA: 0
WEIGHT LOSS: 0
SHORTNESS OF BREATH: 0
NAUSEA: 0
HEARTBURN: 0
FOCAL WEAKNESS: 0
SPUTUM PRODUCTION: 0
CHILLS: 0
EYE PAIN: 0
TINGLING: 1
SORE THROAT: 0
ORTHOPNEA: 0
FEVER: 1
HEADACHES: 0
VOMITING: 0
STRIDOR: 0
WHEEZING: 0
FEVER: 0
COUGH: 0
ABDOMINAL PAIN: 0
SENSORY CHANGE: 0
EYE DISCHARGE: 0
MYALGIAS: 0
WEAKNESS: 1
LOSS OF CONSCIOUSNESS: 0
PSYCHIATRIC NEGATIVE: 1
NERVOUS/ANXIOUS: 1
DIZZINESS: 0
HALLUCINATIONS: 0

## 2019-09-30 ASSESSMENT — COGNITIVE AND FUNCTIONAL STATUS - GENERAL
TOILETING: A LITTLE
HELP NEEDED FOR BATHING: A LOT
STANDING UP FROM CHAIR USING ARMS: A LITTLE
MOVING FROM LYING ON BACK TO SITTING ON SIDE OF FLAT BED: UNABLE
CLIMB 3 TO 5 STEPS WITH RAILING: TOTAL
DRESSING REGULAR LOWER BODY CLOTHING: A LOT
DAILY ACTIVITIY SCORE: 16
SUGGESTED CMS G CODE MODIFIER DAILY ACTIVITY: CK
DRESSING REGULAR UPPER BODY CLOTHING: A LITTLE
PERSONAL GROOMING: A LITTLE
MOBILITY SCORE: 13
EATING MEALS: A LITTLE
WALKING IN HOSPITAL ROOM: A LOT
MOVING TO AND FROM BED TO CHAIR: A LITTLE
SUGGESTED CMS G CODE MODIFIER MOBILITY: CL
TURNING FROM BACK TO SIDE WHILE IN FLAT BAD: A LITTLE

## 2019-09-30 ASSESSMENT — GAIT ASSESSMENTS
ASSISTIVE DEVICE: FRONT WHEEL WALKER
GAIT LEVEL OF ASSIST: MINIMAL ASSIST
DEVIATION: SHUFFLED GAIT;ATAXIC

## 2019-09-30 NOTE — PROGRESS NOTES
Mother at bedside with pt. continuosly.  Patient continues to be very anxious.  Patient oriented and follows commands. Denies pain.  Complained of some mild heartburn and requested TUMS, which seemed to be effective, pt. No longer complained of heartburn after TUMS was administered.

## 2019-09-30 NOTE — CARE PLAN
Problem: Psychosocial Needs:  Goal: Level of anxiety will decrease  Outcome: PROGRESSING SLOWER THAN EXPECTED  Note:   Pt. Still reporting a high level of anxiety. Though pt. is able to rest comfortably and slept well throughout the night     Problem: Urinary Elimination:  Goal: Ability to reestablish a normal urinary elimination pattern will improve  Outcome: PROGRESSING SLOWER THAN EXPECTED  Note:   Srivastava cath still present

## 2019-09-30 NOTE — PROGRESS NOTES
Pt's mother Aga left to run some errands. JOSE G Burrell at patient's bedside as sitter, no signs of distress, pt resting comfortably in bed. Will continue to monitor.

## 2019-09-30 NOTE — PROGRESS NOTES
Hospital Medicine Daily Progress Note    Date of Service  9/30/2019    Chief Complaint  34 y.o. male admitted 9/19/2019 with hallucinations    Hospital Course    Past medical history of depression, PTSD, anxiety, substance abuse of mushrooms, nitrous oxide, marijuana, alcohol, paroxysmal A. fib, recent diagnosis of PE May 2019.  He presented with hallucinations and had stopped taking his medications including Xarelto.  He had also fallen multiple times.  He was found hypoxic and CTA showed extensive acute bilateral pulmonary emboli with saddle embolus.  He was admitted to the ICU and felt his risk of intracranial hemorrhage outweighed submassive dose of alteplase or EKOS.  He was started on heparin infusion.  Lower extremity Doppler was negative for DVT.  TTE showed severe right heart strain.  Patient remained stabilized and transitioned to Xarelto.  Psychiatry was consulted for his hallucinations and he was started on Risperdal.  However he developed neurological changes concerning for NMS and Risperdal was stopped and he was given Cogentin with some improvement.  Psychiatry started him on Seroquel and has been titrating medication.  He was hypertensive requiring IV medications so he was started on a lower lower dose of lisinopril and his atenolol was stopped, given his right heart strain.  He developed leukocytosis and fever.  Urine culture grew coag negative staph and he was started on course of Bactrim.  Srivastava catheter was removed but he failed voiding trial and was replaced.  He is continued on Flomax.  PT OT evaluated him and recommended SNF.      Interval Problem Update  Patient says he feels much better today.  His hallucinations have improved, though he still feels very anxious.  He is off oxygen and denies shortness of breath.  He denies abdominal pain.  He feels less diaphoretic.  Mother is worried about him going to rehab since she will not be able to stay with  him.    Consultants/Specialty  Psychiatry  Critical care  Pulmonology    Code Status  Full Code      Disposition  Case coordination to discuss discharge planning with mother  SNF once cleared by psychiatry  Will need anticoagulation clinic and follow-up with pulmonology    Review of Systems  Review of Systems   Constitutional: Positive for malaise/fatigue. Negative for diaphoresis.   HENT: Negative for congestion and sore throat.    Respiratory: Negative for cough and shortness of breath.    Cardiovascular: Negative for chest pain.   Gastrointestinal: Negative for abdominal pain, diarrhea, heartburn, nausea and vomiting.   Genitourinary: Negative for dysuria.   Neurological: Positive for tingling and weakness. Negative for focal weakness.   Psychiatric/Behavioral: Negative for hallucinations. The patient is nervous/anxious.         Physical Exam  Temp:  [36.4 °C (97.6 °F)-37.8 °C (100 °F)] 36.4 °C (97.6 °F)  Pulse:  [] 109  Resp:  [16-19] 19  BP: (106-138)/(73-89) 118/80  SpO2:  [93 %-97 %] 96 %    Physical Exam   Constitutional: He is oriented to person, place, and time. He appears well-developed. No distress.   Obesity   HENT:   Head: Normocephalic.   Mouth/Throat: Oropharynx is clear and moist.   Eyes: Conjunctivae are normal.   Cardiovascular: Normal rate and regular rhythm.   Pulmonary/Chest: He has no wheezes. He has no rales. He exhibits no tenderness.   Mildly tachypneic and grunting due to anxiety per mother, diminished breath sounds throughout   Abdominal: Soft. Bowel sounds are normal. He exhibits no distension (Mild to moderate). There is no tenderness. There is no rebound and no guarding.   Musculoskeletal: He exhibits no edema.   Neurological: He is alert and oriented to person, place, and time.   4 out of 5 strength upper and lower extremities   Skin: Skin is warm. He is not diaphoretic.   Decreased macular rash isolated to his backside.   Psychiatric: His mood appears anxious.   Nursing note  "and vitals reviewed.      Fluids    Intake/Output Summary (Last 24 hours) at 9/30/2019 1225  Last data filed at 9/30/2019 0900  Gross per 24 hour   Intake 1360 ml   Output 3000 ml   Net -1640 ml       Laboratory  Recent Labs     09/28/19  0320 09/29/19  0508 09/30/19  0532   WBC 12.3* 12.5* 14.0*   RBC 4.56* 4.50* 4.71   HEMOGLOBIN 14.2 13.6* 14.2   HEMATOCRIT 43.7 41.8* 43.8   MCV 95.8 92.9 93.0   MCH 31.1 30.2 30.1   MCHC 32.5* 32.5* 32.4*   RDW 65.6* 61.1* 61.1*   PLATELETCT 278 276 317   MPV 8.8* 8.6* 8.4*     Recent Labs     09/28/19  0320 09/29/19  0508   SODIUM 138 138   POTASSIUM 4.4 4.4   CHLORIDE 105 107   CO2 22 22   GLUCOSE 101* 98   BUN 22 16   CREATININE 0.88 0.78   CALCIUM 9.4 9.3                   Imaging  DX-CHEST-PORTABLE (1 VIEW)   Final Result         No acute cardiac or pulmonary abnormality is identified.      DX-CHEST-PORTABLE (1 VIEW)   Final Result      Mild lung base atelectasis with edema not excluded.      DX-CHEST-PORTABLE (1 VIEW)   Final Result         1.  Pulmonary edema and/or infiltrates.   2.  Cardiomegaly      MR-BRAIN-W/O   Final Result      1.  No acute intracranial abnormality.   2.  Sphenoid and posterior right ethmoid sinus disease.      US-EXTREMITY VENOUS LOWER BILAT   Final Result      EC-ECHOCARDIOGRAM LTD W/O CONT   Final Result      CT-CTA CHEST PULMONARY ARTERY W/ RECONS   Final Result   Addendum 1 of 1   These findings were discussed with HEATHER MELGOZA on 9/19/2019 2:16 PM.      Final      DX-CHEST-PORTABLE (1 VIEW)   Final Result      No acute cardiopulmonary abnormality.      CT-HEAD W/O   Final Result      No CT evidence of acute infarct, hemorrhage or mass. No acute intracranial injury.           Assessment/Plan  * Acute saddle pulmonary embolism (HCC)- (present on admission)  Assessment & Plan  Recurrent, in the setting of noncompliance with anticoagulation therapy  CTA PE revealing \"Extensive acute bilateral pulmonary emboli with saddle embolus noted, as well as " "findings concerning for RV strain\"  Recurrence likely secondary to poor compliance of medication  Heparin drip transitioned to Xarelto  Echocardiogram with RV strain, caution aggressive blood pressure changes  DVT study negative    Acute hypoxemic respiratory failure (HCC)  Assessment & Plan  Secondary acute pulmonary embolism  Titrate oxygen as tolerated    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 He has no specific symptoms. CXR and urine ordered  9/29 - Continue ceftriaxone, urine culture pending.  Srivastava cath was changed  9/30 -urine growing coag negative staph change to complete course of Bactrim    Weakness  Assessment & Plan  I spoke with psychiatry, who notes patient's current weakness has progressed from when he first admitted.  CPK was normal  Psychiatry stopped risperidone, had some improvement with Cogentin  Possible toxicity from the nitrous oxide.  I spoke with Dr. Byrd with neurology, he agreed with continuing with vitamin B12 supplementation.  He can have EMG testing done as outpatient after 2 weeks of stopping nitrous oxide.  PT OT ordered for reevaluation, plan for SNF    Vitamin B12 deficiency  Assessment & Plan  With reported peripheral tingling  Supplement ordered    Type 2 diabetes mellitus without complication, without long-term current use of insulin ([facility])  Assessment & Plan  New diagnosis, A1c 6.6%  Diabetes education ordered  Could be contributing to peripheral neuropathy    Slow transit " constipation  Assessment & Plan  Daily Senokot and MiraLAX  Bowel regimen ordered  Enema PRN  9/26 -ongoing abdominal pain, KUB ordered  9/27 patient declines KUB today  9/28 - unable to do KUB portable apparently  9/29 - improving with enema    ALLI (obstructive sleep apnea)  Assessment & Plan  History of, pulmonary following    Morbid obesity (HCC)  Assessment & Plan  Outpatient weight loss program would be indicated    Pulmonary hypertension (HCC)- (present on admission)  Assessment & Plan  Known history of, now with recurrent PE  Echocardiogram noted    Anxiety- (present on admission)  Assessment & Plan  On Librium previously  Now uncontrolled  Psychiatry evaluating, he was placed on a hold.  Bedside sitter as needed.  9/25 risperdal stopped for possible NMS, his anxiety is now increased  Psych adjusting seroquel and cogentin.   Dr. Sandoval to see patient tomorrow    Essential hypertension- (present on admission)  Assessment & Plan  9/24 - Hypertensive requiring multiple doses of IV labetalol.  I will start on low-dose lisinopril and monitor blood pressure.   9/25 - HTN improving  9/28 - BP decreasing, lisinopril has been held  9/29 - BP stable  9/30 - BP stable       VTE prophylaxis: xarelto

## 2019-09-30 NOTE — PROGRESS NOTES
Report received from night shift RN, assumed care of pt. Pt asleep in bed at this time with mother at bedside. Tele box on, rhythm verified. Call light within reach, bed locked and in lowest position. All fall precautions and hourly rounding in place. Will continue to monitor.

## 2019-09-30 NOTE — PROGRESS NOTES
12 HR CC   Principal Discharge DX:	Seizure Principal Discharge DX:	Seizure  Secondary Diagnosis:	UTI (urinary tract infection)

## 2019-09-30 NOTE — CARE PLAN
Problem: Safety  Goal: Will remain free from falls  Outcome: PROGRESSING AS EXPECTED  Note:   Pt fall risk assessment completed, pt a moderate fall risk. Pt educated on fall program rationale and verbalized understanding. Bed is locked and in lowest position, bed alarm on, non-skid socks in place. All fall precautions and hourly rounding in place.      Problem: Infection  Goal: Will remain free from infection  Outcome: PROGRESSING AS EXPECTED  Note:   Hand hygiene completed before and after patient care. Pt educated about infection prevention and verbalized understanding. RN follows all protocols for infection prevention.

## 2019-09-30 NOTE — DISCHARGE PLANNING
SW followed up with [facility]. All SNF denied but [facility]. [facility] to follow up on Monday.    Patient is still not medically clear from psych, possible clearance Tuesday by Dr. Sandoval. Family has been informed that [facility] accepted patient.     Patient's mother requested anticipated cost of SNF. SW to follow up with [facility].     [facility] [place] provided SW with [employer] phone number [phone] and SW provided this number to family.

## 2019-10-01 PROBLEM — N30.00 ACUTE CYSTITIS: Status: ACTIVE | Noted: 2019-10-01

## 2019-10-01 LAB
BASOPHILS # BLD AUTO: 0.3 % (ref 0–1.8)
BASOPHILS # BLD: 0.04 K/UL (ref 0–0.12)
EOSINOPHIL # BLD AUTO: 0.11 K/UL (ref 0–0.51)
EOSINOPHIL NFR BLD: 0.7 % (ref 0–6.9)
ERYTHROCYTE [DISTWIDTH] IN BLOOD BY AUTOMATED COUNT: 61.9 FL (ref 35.9–50)
HCT VFR BLD AUTO: 43.5 % (ref 42–52)
HGB BLD-MCNC: 14.5 G/DL (ref 14–18)
IMM GRANULOCYTES # BLD AUTO: 0.09 K/UL (ref 0–0.11)
IMM GRANULOCYTES NFR BLD AUTO: 0.6 % (ref 0–0.9)
LYMPHOCYTES # BLD AUTO: 1.32 K/UL (ref 1–4.8)
LYMPHOCYTES NFR BLD: 8.7 % (ref 22–41)
MCH RBC QN AUTO: 31.3 PG (ref 27–33)
MCHC RBC AUTO-ENTMCNC: 33.3 G/DL (ref 33.7–35.3)
MCV RBC AUTO: 94 FL (ref 81.4–97.8)
MONOCYTES # BLD AUTO: 1.3 K/UL (ref 0–0.85)
MONOCYTES NFR BLD AUTO: 8.6 % (ref 0–13.4)
NEUTROPHILS # BLD AUTO: 12.28 K/UL (ref 1.82–7.42)
NEUTROPHILS NFR BLD: 81.1 % (ref 44–72)
NRBC # BLD AUTO: 0 K/UL
NRBC BLD-RTO: 0 /100 WBC
PLATELET # BLD AUTO: 276 K/UL (ref 164–446)
PMV BLD AUTO: 9.1 FL (ref 9–12.9)
RBC # BLD AUTO: 4.63 M/UL (ref 4.7–6.1)
WBC # BLD AUTO: 15.1 K/UL (ref 4.8–10.8)

## 2019-10-01 PROCEDURE — 700102 HCHG RX REV CODE 250 W/ 637 OVERRIDE(OP): Performed by: STUDENT IN AN ORGANIZED HEALTH CARE EDUCATION/TRAINING PROGRAM

## 2019-10-01 PROCEDURE — A9270 NON-COVERED ITEM OR SERVICE: HCPCS | Performed by: INTERNAL MEDICINE

## 2019-10-01 PROCEDURE — A9270 NON-COVERED ITEM OR SERVICE: HCPCS | Performed by: FAMILY MEDICINE

## 2019-10-01 PROCEDURE — 700102 HCHG RX REV CODE 250 W/ 637 OVERRIDE(OP): Performed by: INTERNAL MEDICINE

## 2019-10-01 PROCEDURE — 770020 HCHG ROOM/CARE - TELE (206)

## 2019-10-01 PROCEDURE — A9270 NON-COVERED ITEM OR SERVICE: HCPCS | Performed by: STUDENT IN AN ORGANIZED HEALTH CARE EDUCATION/TRAINING PROGRAM

## 2019-10-01 PROCEDURE — 700102 HCHG RX REV CODE 250 W/ 637 OVERRIDE(OP): Performed by: HOSPITALIST

## 2019-10-01 PROCEDURE — 36415 COLL VENOUS BLD VENIPUNCTURE: CPT

## 2019-10-01 PROCEDURE — 99232 SBSQ HOSP IP/OBS MODERATE 35: CPT | Performed by: FAMILY MEDICINE

## 2019-10-01 PROCEDURE — A9270 NON-COVERED ITEM OR SERVICE: HCPCS | Performed by: HOSPITALIST

## 2019-10-01 PROCEDURE — 700102 HCHG RX REV CODE 250 W/ 637 OVERRIDE(OP): Performed by: FAMILY MEDICINE

## 2019-10-01 PROCEDURE — 85025 COMPLETE CBC W/AUTO DIFF WBC: CPT

## 2019-10-01 RX ORDER — ATENOLOL 25 MG/1
100 TABLET ORAL DAILY
Status: DISCONTINUED | OUTPATIENT
Start: 2019-10-01 | End: 2019-10-16 | Stop reason: HOSPADM

## 2019-10-01 RX ADMIN — POLYETHYLENE GLYCOL 3350 1 PACKET: 17 POWDER, FOR SOLUTION ORAL at 05:22

## 2019-10-01 RX ADMIN — FOLIC ACID 1 MG: 1 TABLET ORAL at 05:22

## 2019-10-01 RX ADMIN — SULFAMETHOXAZOLE AND TRIMETHOPRIM 1 TABLET: 800; 160 TABLET ORAL at 17:09

## 2019-10-01 RX ADMIN — QUETIAPINE FUMARATE 200 MG: 200 TABLET, FILM COATED ORAL at 19:54

## 2019-10-01 RX ADMIN — RIVAROXABAN 15 MG: 15 TABLET, FILM COATED ORAL at 17:09

## 2019-10-01 RX ADMIN — ANTACID TABLETS 500 MG: 500 TABLET, CHEWABLE ORAL at 15:51

## 2019-10-01 RX ADMIN — SENNOSIDES, DOCUSATE SODIUM 2 TABLET: 50; 8.6 TABLET, FILM COATED ORAL at 05:22

## 2019-10-01 RX ADMIN — QUETIAPINE FUMARATE 150 MG: 200 TABLET ORAL at 05:22

## 2019-10-01 RX ADMIN — SENNOSIDES, DOCUSATE SODIUM 2 TABLET: 50; 8.6 TABLET, FILM COATED ORAL at 17:09

## 2019-10-01 RX ADMIN — SULFAMETHOXAZOLE AND TRIMETHOPRIM 1 TABLET: 800; 160 TABLET ORAL at 05:22

## 2019-10-01 RX ADMIN — LISINOPRIL 10 MG: 10 TABLET ORAL at 05:22

## 2019-10-01 RX ADMIN — ACETAMINOPHEN 650 MG: 325 TABLET, FILM COATED ORAL at 19:54

## 2019-10-01 RX ADMIN — TAMSULOSIN HYDROCHLORIDE 0.4 MG: 0.4 CAPSULE ORAL at 09:28

## 2019-10-01 RX ADMIN — RIVAROXABAN 15 MG: 15 TABLET, FILM COATED ORAL at 08:30

## 2019-10-01 RX ADMIN — ATENOLOL 100 MG: 25 TABLET ORAL at 09:28

## 2019-10-01 RX ADMIN — Medication 10 MG: at 12:06

## 2019-10-01 RX ADMIN — THERA TABS 1 TABLET: TAB at 05:22

## 2019-10-01 ASSESSMENT — ENCOUNTER SYMPTOMS
PALPITATIONS: 0
DIAPHORESIS: 0
TINGLING: 1
BLURRED VISION: 0
FOCAL WEAKNESS: 0
DIARRHEA: 0
DOUBLE VISION: 0
SORE THROAT: 0
ORTHOPNEA: 0
SHORTNESS OF BREATH: 0
CHILLS: 0
FEVER: 0
HALLUCINATIONS: 0
COUGH: 0
HEMOPTYSIS: 0
HEARTBURN: 0
NAUSEA: 0
VOMITING: 0
CONSTIPATION: 0
WEAKNESS: 1
NERVOUS/ANXIOUS: 1
ABDOMINAL PAIN: 0

## 2019-10-01 NOTE — PROGRESS NOTES
Hospital Medicine Daily Progress Note    Date of Service  10/1/2019    Chief Complaint  34 y.o. male admitted 9/19/2019 with hallucinations    Hospital Course    Past medical history of depression, PTSD, anxiety, substance abuse of mushrooms, nitrous oxide, marijuana, alcohol, paroxysmal A. fib, recent diagnosis of PE May 2019.  He presented with hallucinations and had stopped taking his medications including Xarelto.  He had also fallen multiple times.  He was found hypoxic and CTA showed extensive acute bilateral pulmonary emboli with saddle embolus.  He was admitted to the ICU and felt his risk of intracranial hemorrhage outweighed submassive dose of alteplase or EKOS.  He was started on heparin infusion.  Lower extremity Doppler was negative for DVT.  TTE showed severe right heart strain.  Patient remained stabilized and transitioned to Xarelto.  Psychiatry was consulted for his hallucinations and he was started on Risperdal.  However he developed neurological changes concerning for NMS and Risperdal was stopped and he was given Cogentin with some improvement.  Psychiatry started him on Seroquel and has been titrating medication.  He was hypertensive requiring IV medications so he was started on a lower lower dose of lisinopril and his atenolol was stopped, given his right heart strain.  He developed leukocytosis and fever.  Urine culture grew coag negative staph and he was started on course of Bactrim.  Srivastava catheter was removed but he failed voiding trial and was replaced.  He is continued on Flomax.  PT OT evaluated him and recommended SNF.      Interval Problem Update  Patient says he feels much better today.  His hallucinations have improved, though he still feels very anxious.  He is off oxygen and denies shortness of breath.  He denies abdominal pain.  He feels less diaphoretic.  Mother is worried about him going to rehab since she will not be able to stay with him.  10/1: Patient became anxious when I  entered the room with the nursing staff.  Stated that does not like to many people around him.  Saturating well on room air.  Stated that he was unable to sleep well last night.  Afebrile overnight.  No issues overnight per staff.  Consultants/Specialty  Psychiatry  Critical care  Pulmonology    Code Status  Full Code      Disposition  Case coordination to discuss discharge planning with mother  SNF once cleared by psychiatry  Will need anticoagulation clinic and follow-up with pulmonology    Review of Systems  Review of Systems   Constitutional: Positive for malaise/fatigue. Negative for chills, diaphoresis and fever.   HENT: Negative for congestion, ear pain, hearing loss, sore throat and tinnitus.    Eyes: Negative for blurred vision and double vision.   Respiratory: Negative for cough, hemoptysis and shortness of breath.    Cardiovascular: Negative for chest pain, palpitations and orthopnea.   Gastrointestinal: Negative for abdominal pain, constipation, diarrhea, heartburn, nausea and vomiting.   Genitourinary: Negative for dysuria and urgency.   Neurological: Positive for tingling and weakness. Negative for focal weakness.   Psychiatric/Behavioral: Negative for hallucinations. The patient is nervous/anxious.         Physical Exam  Temp:  [36.8 °C (98.2 °F)-37.8 °C (100 °F)] 36.8 °C (98.2 °F)  Pulse:  [] 93  Resp:  [20-23] 20  BP: (109-120)/(68-89) 109/68  SpO2:  [94 %-99 %] 95 %    Physical Exam   Constitutional: He is oriented to person, place, and time. No distress.   Obesity   HENT:   Head: Normocephalic and atraumatic.   Eyes: Pupils are equal, round, and reactive to light. Conjunctivae are normal.   Neck: No tracheal deviation present. No thyromegaly present.   Cardiovascular: Normal rate and regular rhythm. Exam reveals no gallop and no friction rub.   Pulmonary/Chest: He has no wheezes. He has no rales. He exhibits no tenderness.   Mildly tachypneic and grunting due to anxiety per mother,  diminished breath sounds throughout   Abdominal: Soft. Bowel sounds are normal. He exhibits no distension (Mild to moderate). There is no tenderness. There is no rebound.   Musculoskeletal: He exhibits no edema.   Neurological: He is alert and oriented to person, place, and time.   4 out of 5 strength upper and lower extremities   Skin: Skin is warm. He is not diaphoretic.   Decreased macular rash isolated to his backside.   Psychiatric: His mood appears anxious. He expresses impulsivity.   Nursing note and vitals reviewed.      Fluids    Intake/Output Summary (Last 24 hours) at 10/1/2019 1456  Last data filed at 10/1/2019 1400  Gross per 24 hour   Intake 600 ml   Output 3530 ml   Net -2930 ml       Laboratory  Recent Labs     09/29/19  0508 09/30/19  0532 10/01/19  0517   WBC 12.5* 14.0* 15.1*   RBC 4.50* 4.71 4.63*   HEMOGLOBIN 13.6* 14.2 14.5   HEMATOCRIT 41.8* 43.8 43.5   MCV 92.9 93.0 94.0   MCH 30.2 30.1 31.3   MCHC 32.5* 32.4* 33.3*   RDW 61.1* 61.1* 61.9*   PLATELETCT 276 317 276   MPV 8.6* 8.4* 9.1     Recent Labs     09/29/19  0508   SODIUM 138   POTASSIUM 4.4   CHLORIDE 107   CO2 22   GLUCOSE 98   BUN 16   CREATININE 0.78   CALCIUM 9.3                   Imaging  DX-CHEST-PORTABLE (1 VIEW)   Final Result         No acute cardiac or pulmonary abnormality is identified.      DX-CHEST-PORTABLE (1 VIEW)   Final Result      Mild lung base atelectasis with edema not excluded.      DX-CHEST-PORTABLE (1 VIEW)   Final Result         1.  Pulmonary edema and/or infiltrates.   2.  Cardiomegaly      MR-BRAIN-W/O   Final Result      1.  No acute intracranial abnormality.   2.  Sphenoid and posterior right ethmoid sinus disease.      US-EXTREMITY VENOUS LOWER BILAT   Final Result      EC-ECHOCARDIOGRAM LTD W/O CONT   Final Result      CT-CTA CHEST PULMONARY ARTERY W/ RECONS   Final Result   Addendum 1 of 1   These findings were discussed with HEATHER MELGOZA on 9/19/2019 2:16 PM.      Final      DX-CHEST-PORTABLE (1 VIEW)  "  Final Result      No acute cardiopulmonary abnormality.      CT-HEAD W/O   Final Result      No CT evidence of acute infarct, hemorrhage or mass. No acute intracranial injury.           Assessment/Plan  * Acute saddle pulmonary embolism (HCC)- (present on admission)  Assessment & Plan  Recurrent, in the setting of noncompliance with anticoagulation therapy  CTA PE revealing \"Extensive acute bilateral pulmonary emboli with saddle embolus noted, as well as findings concerning for RV strain\"  Recurrence likely secondary to poor compliance of medication  Heparin drip transitioned to Xarelto  Echocardiogram with RV strain, caution aggressive blood pressure changes  DVT study negative    Acute hypoxemic respiratory failure (HCC)  Assessment & Plan  Secondary acute pulmonary embolism  Wean off oxygen as tolerated    Paroxysmal atrial fibrillation (HCC)- (present on admission)  Assessment & Plan  10/1: I restarted his home dose Tenormin.  Continue Xarelto.    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  MRI brain negative    ETOH abuse- (present on admission)  Assessment & Plan  History of, monitor for withdrawal    Acute cystitis- (present on admission)  Assessment & Plan  Urine culture grew strep epidermidis sensitive to Bactrim.  Continue for now.    Fever  Assessment & Plan  9/28 - Has had leukocytosis, now fever 100.7. He has no specific symptoms. CXR and urine ordered  9/29 - Continue ceftriaxone, urine culture pending.  Srivastava cath was changed  9/30 -urine growing coag negative staph change to complete course of Bactrim    Weakness  Assessment & Plan  I spoke with psychiatry, who notes patient's current weakness has progressed from when he first admitted.  CPK was " normal  Psychiatry stopped risperidone, had some improvement with Cogentin  Possible toxicity from the nitrous oxide.  I spoke with Dr. Byrd with neurology, he agreed with continuing with vitamin B12 supplementation.  He can have EMG testing done as outpatient after 2 weeks of stopping nitrous oxide.  PT OT ordered for reevaluation, plan for SNF    Vitamin B12 deficiency  Assessment & Plan  With reported peripheral tingling  Supplement ordered    Type 2 diabetes mellitus without complication, without long-term current use of insulin (HCC)  Assessment & Plan  New diagnosis, A1c 6.6%  Diabetes education ordered  Could be contributing to peripheral neuropathy    Slow transit constipation  Assessment & Plan  Daily Senokot and MiraLAX  Bowel regimen ordered  Enema PRN  9/26 -ongoing abdominal pain, KUB ordered  9/27 patient declines KUB today  9/28 - unable to do KUB portable apparently  9/29 - improving with enema  10/1: No BM for few days. Enema today.     ALLI (obstructive sleep apnea)  Assessment & Plan  History of, pulmonary following    Morbid obesity (HCC)  Assessment & Plan  Outpatient weight loss program would be indicated    Pulmonary hypertension (HCC)- (present on admission)  Assessment & Plan  Known history of, now with recurrent PE  Echocardiogram noted    Anxiety- (present on admission)  Assessment & Plan  On Librium previously  Now uncontrolled  Psychiatry evaluating, he was placed on a hold.  Bedside sitter as needed.  9/25 risperdal stopped for possible NMS, his anxiety is now increased  Psych adjusting seroquel and cogentin.   Dr. Sandoval to see patient tomorrow    Essential hypertension- (present on admission)  Assessment & Plan  9/24 - Hypertensive requiring multiple doses of IV labetalol.  I will start on low-dose lisinopril and monitor blood pressure.   9/25 - HTN improving  9/28 - BP decreasing, lisinopril has been held  9/29 - BP stable  9/30 - BP stable  Plan of care discussed with  multidisciplinary team during rounds.    VTE prophylaxis: xarelto

## 2019-10-01 NOTE — CARE PLAN
Problem: Communication  Goal: The ability to communicate needs accurately and effectively will improve  Outcome: PROGRESSING AS EXPECTED  Note:   Patient encouraged to voice all feelings/questions/concerns. Patient very anxious. Discussed non-pharm solutions to help calm patient prior to sleeping including music, back rubs, warm packs, decreased stimulation, and deep breathing exercises. All needs met at this time. Call light within reach.      Problem: Safety  Goal: Will remain free from falls  Outcome: PROGRESSING AS EXPECTED  Note:   Safety precautions in place. Education provided to patient, verbalized understanding. Bed in lowest/locked position. Bed alarm on. Room close to nurses station. Call light and personal belongings within reach. Will continue to monitor.

## 2019-10-01 NOTE — PROGRESS NOTES
Bedside report received from day RN. Patient's plan of care reviewed. Patient found sitting at bedside, A&Ox4. Mom at bedside. VSS, on 1L via NC. No signs of distress, declines pain at this time, however very anxious and requesting medication. All other needs met. Safety precautions in place. Tele box on. Bed in lowest/locked position. Bed alarm on. Call light and personal belongings within reach. Will continue to monitor.

## 2019-10-01 NOTE — ASSESSMENT & PLAN NOTE
home dose Tenormin.  Continue Xarelto.  Rate is controlled.  ekg showing sinus tachy. Continue monitoring added prn metoprolol.

## 2019-10-01 NOTE — PROGRESS NOTES
Report received from night shift RN, assumed care of pt. Pt resting in bed at this time with mother at bedside. Tele box on, rhythm verified. Call light within reach, bed locked and in lowest position, bed alarm on. All fall precautions and hourly rounding in place. Will continue to monitor.

## 2019-10-01 NOTE — THERAPY
"Occupational Therapy Treatment completed with focus on ADLs, ADL transfers and patient education.  Functional Status:  Min A seated grooming, min A UB dressing, Mod A sit<>stand, Mod A x2 lateral steps at EOB, 3 steps forwards and backwards w/ FWW  Plan of Care: Will benefit from Occupational Therapy 3 times per week  Discharge Recommendations:  Equipment Will Continue to Assess for Equipment Needs. Post-acute therapy Recommend post-acute placement for additional occupational therapy services prior to discharge home. Patient can tolerate post-acute therapies at a 5x/week frequency.    See \"Rehab Therapy-Acute\" Patient Summary Report for complete documentation.       Pt seen for OT tx session. Pt appears to have decreased sensation on R wrist, required cues for placement as he would hold it in fully flexed WBing position at EOB. Pt w/ max anxiety, mother not present at the beginning of session. Pt perseverated on calling his mother and watched the clock until she arrived. Pt required cues for safety and pacing throughout session. Pt demo'd impaired fine and gross motor coordination of B UE's, impaired balance, functional mobility and activity tolerance impacting functional independence. Will continue to follow for Acute OT services. Recommend post acute placement.   "

## 2019-10-01 NOTE — PROGRESS NOTES
Patient refused 0000 and 0400 vitals as well as morning routine labs. Patient states to come back at 0500 for vitals, labs, and manzano care, feeling very anxious right now. Mom at bedside. Will continue to monitor.

## 2019-10-01 NOTE — CARE PLAN
Problem: Knowledge Deficit  Goal: Knowledge of disease process/condition, treatment plan, diagnostic tests, and medications will improve  Outcome: PROGRESSING AS EXPECTED  Note:   Pt educated on disease process, prescribed medications and treatment plan. Pt verbalized understanding and participates in plan of care.      Problem: Psychosocial Needs:  Goal: Level of anxiety will decrease  Outcome: PROGRESSING AS EXPECTED  Note:   Pt resting comfortably in bed with mother at bedside. Pt has unlabored breathing on RA, decreased anxiety from yesterday.

## 2019-10-01 NOTE — THERAPY
"Pt seen for PT tx session. demonstrated improved strength; however, coordination continues to be impaired. Pt very anxious upon PT arrival as Mom was out of the room running errands. Able to calm pt and allow him to work with therapy. Pt required redirection and reassurance throughout therapy session with extra time to rest between activities and focus on breathing. Pt demonstrated improved side stepping at EOB and was able to progress fwd locomotion x2-3 steps with FWW. Pt hesitant to use FWW as it impeeds his rushing leading to greater fall risk; however, once Mom arrived was able to slow down mobility and improve FWW management and safety. Pt will continue to benefit from acute PT interventions. Continue to recommend post acute placement prior to DC home.     Physical Therapy Treatment completed.   Bed Mobility:  Supine to Sit: (seated EOB upon PT arrival)  Transfers: Sit to Stand: Moderate Assist(-> Min A depending on anxiety level and impulsivity)  Gait: Level Of Assist: Minimal Assist with Front-Wheel Walker       Plan of Care: Will benefit from Physical Therapy 3 times per week  Discharge Recommendations: Equipment: TBD.   Post-acute therapy: Recommend post-acute placement for continued physical therapy services prior to discharge home. Patient can tolerate post-acute therapies at a 5x/week frequency.          See \"Rehab Therapy-Acute\" Patient Summary Report for complete documentation.       "

## 2019-10-01 NOTE — ASSESSMENT & PLAN NOTE
Urine culture grew strep epidermidis sensitive to Bactrim.  Leucocytosis resolved   Completed course of antibiotics.

## 2019-10-01 NOTE — PROGRESS NOTES
Pulmonary Progress Note    Date of admission  9/19/2019    Chief Complaint  34 y.o. male admitted 9/19/2019 with right-sided chest pain and psychosis.  History of unprovoked intermediate/high risk submassive saddle pulmonary embolism 5/2019 for which he received TPA, paroxysmal atrial fibrillation, ALLI, alcohol and polysubstance abuse.  Brought into the emergency room on 9/19 for psychosis, reporting that people have been following him around at his home.  He admitted to have been using psychedelic mushrooms, whippets and marijuana.  On his last hospitalization, he was discharged on Xarelto which he has not been consistent taking.  He refused follow-up with pulmonary on last hospitalization, stating that he only wanted to follow-up with his PCP.    Hospital Course    Urine drug screen positive for cannabinoids and benzodiazepines on admission.  Serum alcohol level admission.  Repeat CT chest showed persistent bilateral pulmonary emboli.  Lower extremity ultrasound for DVTs was negative.  Has been seen by psychiatry and has been started on antipsychotics and Cogentin and has been deemed incapacitated to leave AGAINST MEDICAL ADVICE.   Patient was restarted on Xarelto downtrending oxygen requirements.     Interval Problem Update  Reviewed last 24 hour events:  Supplemental oxygen minimal between room air and 1LNC, maintaining adequate saturations greater than 92%  Still having episodes of intermittent sinus tachycardia  Hemodynamically stable  Antipsychotics adjusted  No pain, reporting weakness in the lower extremities, only able to ambulate between bed and chair    Review of Systems  Review of Systems   Constitutional: Negative for chills, fever and weight loss.   HENT: Negative for congestion, sinus pain and sore throat.    Eyes: Negative for pain and discharge.   Respiratory: Negative for cough, sputum production, shortness of breath, wheezing and stridor.    Cardiovascular: Negative for chest pain, orthopnea and  leg swelling.   Gastrointestinal: Negative for abdominal pain, diarrhea, nausea and vomiting.   Genitourinary: Negative for dysuria, frequency and urgency.   Musculoskeletal: Negative for myalgias.   Skin: Negative for rash.   Neurological: Negative for dizziness, sensory change, focal weakness, loss of consciousness and headaches.   Psychiatric/Behavioral: Negative.    All other systems reviewed and are negative.     Vital Signs for last 24 hours   Temp:  [36.4 °C (97.6 °F)-37.8 °C (100 °F)] 37.3 °C (99.1 °F)  Pulse:  [103-112] 112  Resp:  [18-23] 22  BP: (115-128)/(77-89) 115/89  SpO2:  [92 %-99 %] 97 %    Physical Exam   Physical Exam   Constitutional: He is oriented to person, place, and time. He appears well-developed and well-nourished. No distress.   Lying in bed today  Mild diaphoresis   HENT:   Mouth/Throat: Oropharynx is clear and moist. No oropharyngeal exudate.   Eyes: Conjunctivae are normal. Right eye exhibits no discharge. Left eye exhibits no discharge. No scleral icterus.   Neck: Normal range of motion. No JVD present. No tracheal deviation present. No thyromegaly present.   Cardiovascular: Normal rate, regular rhythm and normal heart sounds. Exam reveals no friction rub.   No murmur heard.  Pulmonary/Chest: Effort normal. No stridor. No respiratory distress. He has no wheezes. He has no rales.   Rales right base  Grunting during expiration   Abdominal: Soft. Bowel sounds are normal. He exhibits no distension. There is no tenderness. There is no rebound.   Musculoskeletal: Normal range of motion. He exhibits no edema.   Lymphadenopathy:     He has no cervical adenopathy.   Neurological: He is alert and oriented to person, place, and time.   Skin: Skin is warm. No rash noted. No erythema.   Nursing note and vitals reviewed.      Medications  Current Facility-Administered Medications   Medication Dose Route Frequency Provider Last Rate Last Dose   • calcium carbonate (TUMS) chewable tab 500 mg  500 mg  Oral TID PRN Edwardo Dillon M.D.   500 mg at 09/29/19 2131   • sulfamethoxazole-trimethoprim (BACTRIM DS) 800-160 MG tablet 1 Tab  1 Tab Oral Q12HRS Edwardo Dillon M.D.   1 Tab at 09/30/19 1719   • QUEtiapine (SEROQUEL) tablet 200 mg  200 mg Oral Q EVENING Malou Guillen M.D.   200 mg at 09/30/19 1720   • QUEtiapine (SEROQUEL) tablet 150 mg  150 mg Oral QAM aMlou Guillen M.D.   150 mg at 09/30/19 0511   • gabapentin (NEURONTIN) capsule 300 mg  300 mg Oral HS PRN Malou Guillen M.D.   300 mg at 09/30/19 2042   • cyanocobalamin (VITAMIN B-12) injection 1,000 mcg  1,000 mcg Intramuscular Q30 DAYS Edwardo Dillon M.D.   1,000 mcg at 09/25/19 1829   • lisinopril (PRINIVIL) 10 MG tablet 10 mg  10 mg Oral Q DAY Edwardo Dillon M.D.   10 mg at 09/30/19 0511   • polyethylene glycol/lytes (MIRALAX) PACKET 1 Packet  1 Packet Oral DAILY Edwardo Dillon M.D.   1 Packet at 09/30/19 0600    And   • senna-docusate (PERICOLACE or SENOKOT S) 8.6-50 MG per tablet 2 Tab  2 Tab Oral BID Edwardo Dillon M.D.   2 Tab at 09/30/19 1800    And   • magnesium hydroxide (MILK OF MAGNESIA) suspension 30 mL  30 mL Oral QDAY PRN Edwardo Dillon M.D.   30 mL at 09/25/19 0500    And   • bisacodyl (DULCOLAX) suppository 10 mg  10 mg Rectal QDAY PRN Edwardo Dillon M.D.   Stopped at 09/28/19 1531   • rivaroxaban (XARELTO) tablet 15 mg  15 mg Oral BID WITH MEALS Rainer Butt M.D.   15 mg at 09/30/19 1719    Followed by   • [START ON 10/14/2019] rivaroxaban (XARELTO) tablet 20 mg  20 mg Oral PM MEAL Rainer Butt M.D.       • tamsulosin (FLOMAX) capsule 0.4 mg  0.4 mg Oral AFTER BREAKFAST Rainer Butt M.D.   0.4 mg at 09/30/19 0908   • Respiratory Care per Protocol   Nebulization Continuous RT Lita Brantley M.D.       • acetaminophen (TYLENOL) tablet 650 mg  650 mg Oral Q6HRS PRN Lita Brantley M.D.   650 mg at 09/30/19 1718   • labetalol (NORMODYNE,TRANDATE) injection 10 mg  10 mg Intravenous Q4HRS PRN Edwardo Dillon M.D.   10 mg at  09/25/19 0457   • ondansetron (ZOFRAN) syringe/vial injection 4 mg  4 mg Intravenous Q4HRS PRN Lita Brantley M.D.   4 mg at 09/20/19 1905   • ondansetron (ZOFRAN ODT) dispertab 4 mg  4 mg Oral Q4HRS PRN Lita Brantley M.D.       • folic acid (FOLVITE) tablet 1 mg  1 mg Oral DAILY James Hou M.D.   1 mg at 09/30/19 0510   • multivitamin (THERAGRAN) tablet 1 Tab  1 Tab Oral DAILY James Hou M.D.   1 Tab at 09/30/19 0511     Fluids    Intake/Output Summary (Last 24 hours) at 9/30/2019 2213  Last data filed at 9/30/2019 1700  Gross per 24 hour   Intake 1480 ml   Output 3200 ml   Net -1720 ml     Laboratory          Recent Labs     09/28/19  0320 09/29/19  0508   SODIUM 138 138   POTASSIUM 4.4 4.4   CHLORIDE 105 107   CO2 22 22   BUN 22 16   CREATININE 0.88 0.78   CALCIUM 9.4 9.3     Recent Labs     09/28/19  0320 09/29/19  0508   ALTSGPT 36 35   ASTSGOT 13 13   ALKPHOSPHAT 50 50   TBILIRUBIN 0.5 0.6   GLUCOSE 101* 98     Recent Labs     09/28/19  0320 09/29/19  0508 09/30/19  0532   WBC 12.3* 12.5* 14.0*   NEUTSPOLYS 64.60 70.50 75.00*   LYMPHOCYTES 22.30 18.70* 15.70*   MONOCYTES 9.80 7.90 7.10   EOSINOPHILS 2.40 2.00 1.40   BASOPHILS 0.30 0.50 0.30   ASTSGOT 13 13  --    ALTSGPT 36 35  --    ALKPHOSPHAT 50 50  --    TBILIRUBIN 0.5 0.6  --      Recent Labs     09/28/19  0320 09/29/19  0508 09/30/19  0532   RBC 4.56* 4.50* 4.71   HEMOGLOBIN 14.2 13.6* 14.2   HEMATOCRIT 43.7 41.8* 43.8   PLATELETCT 278 276 317     Imaging  CT:    Reviewed from 9/19 and compared to 5/26/2019: persistent saddle pulm emboli with likely RML and RLL pulm infarct  Rad read:  1.  Extensive acute bilateral pulmonary emboli with saddle embolus noted, as well as findings concerning for RV strain.  2.  Probable pulmonary infarct in the lateral basal RIGHT lower lobe anterior basal RIGHT middle lobe.  Follow-up recommended to ensure resolution.  3.  Minimal RIGHT pleural effusion.  4.  Fatty infiltration of liver.    Echo:    Reviewed 5/25/2019:  Left ventricular systolic function is normal. Left ventricular ejection   fraction is visually estimated to be 55%.  Indeterminate diastolic function.  Flattened septum in systole and diastole consistent with RV pressure   and volume overload.  Moderately dilated right ventricle. Reduced right ventricular systolic   function.  Evidence of Salcedo's sign with hyperdynamic apex and free wall   dysfunction suggestive of RV strain as seen with acute pulmonary   embolism.    [facility] 9/20/2019: no DVT    Assessment/Plan  * Acute saddle pulmonary embolism (HCC)- (present on admission)  Assessment & Plan  Intermediate/high risk pulmonary embolism, hemodynamically stable with right heart strain first identified in 5/2019. Provoking factors are sedentary lifestyle, states he can spend 20+ hours in bed or longer when recovering from intoxication. Inconsistently compliant with his rivaroxaban. Patient currently on Xarelto. Lower extremity venous Doppler study for DVT is negative  Plan  - ideally the patient will need anticoagulation with no stop date since PE was unprovoked  - he is at high risk for falls and trauma given his psych history, compliance also will be an issue. All these factors need to be discussed with patient and mother when he is more stable from psychiatric standpoint   Pt is planning to relocate to the [facility] cost, if he stays in [place], we will be more that happy to see him in clinic after d.c   May need home O2 when discharged   Plans for d.c to SNF for rehab       Acute hypoxemic respiratory failure (HCC)  Assessment & Plan  Stable. Secondary to intermediate/high risk pulmonary embolus with pulmonary hypertension. History of compliance issues with rivaroxaban and falls when intoxicated  Plan  -RT/O2 protocols  -Continue to encourage mobilization, OOB, incentive spirometry  -agree with PT/OT referral, will likely need SNF placement for PT given deconditioning    ALLI (obstructive sleep  apnea)  Assessment & Plan  Patient obese and leads a sedentary life style. Mentions history of ALLI and been on CPAP but not using it.  Plan  -Outpatient sleep study   -Pulmonary follow up    Pulmonary hypertension (HCC)- (present on admission)  Assessment & Plan  Secondary to pulmonary embolus, recurrent. RVSP moderate to severely elevated in May of this year. Follow up ECHO did not calculate RVSP due to strain  Plan  - TTE after 3-4 months of therapeutic anticoagulation  - VQ scan to evaluate for CTEPH if persistently symptomatic and/or TTE remains abnormal    Pulmonary will sign off. Please call for questions      VTE:  NOAC  Ulcer: Not Indicated  Lines: None    I have performed a physical exam and reviewed and updated ROS and Plan today (9/30/2019). In review of yesterday's note (9/29/2019), there are no changes except as documented above.     Discussed patient condition and risk of morbidity and/or mortality with mother.  Gabriella Urbano. DM   Pulmonary and Critical Care

## 2019-10-02 LAB
BASOPHILS # BLD AUTO: 0.4 % (ref 0–1.8)
BASOPHILS # BLD: 0.05 K/UL (ref 0–0.12)
EOSINOPHIL # BLD AUTO: 0.11 K/UL (ref 0–0.51)
EOSINOPHIL NFR BLD: 0.8 % (ref 0–6.9)
ERYTHROCYTE [DISTWIDTH] IN BLOOD BY AUTOMATED COUNT: 61.9 FL (ref 35.9–50)
HCT VFR BLD AUTO: 43.3 % (ref 42–52)
HGB BLD-MCNC: 14 G/DL (ref 14–18)
IMM GRANULOCYTES # BLD AUTO: 0.04 K/UL (ref 0–0.11)
IMM GRANULOCYTES NFR BLD AUTO: 0.3 % (ref 0–0.9)
LYMPHOCYTES # BLD AUTO: 2.13 K/UL (ref 1–4.8)
LYMPHOCYTES NFR BLD: 15.1 % (ref 22–41)
MCH RBC QN AUTO: 30.4 PG (ref 27–33)
MCHC RBC AUTO-ENTMCNC: 32.3 G/DL (ref 33.7–35.3)
MCV RBC AUTO: 94.1 FL (ref 81.4–97.8)
MONOCYTES # BLD AUTO: 1.27 K/UL (ref 0–0.85)
MONOCYTES NFR BLD AUTO: 9 % (ref 0–13.4)
NEUTROPHILS # BLD AUTO: 10.54 K/UL (ref 1.82–7.42)
NEUTROPHILS NFR BLD: 74.4 % (ref 44–72)
NRBC # BLD AUTO: 0 K/UL
NRBC BLD-RTO: 0 /100 WBC
PLATELET # BLD AUTO: 327 K/UL (ref 164–446)
PMV BLD AUTO: 8.7 FL (ref 9–12.9)
RBC # BLD AUTO: 4.6 M/UL (ref 4.7–6.1)
WBC # BLD AUTO: 14.1 K/UL (ref 4.8–10.8)

## 2019-10-02 PROCEDURE — 97535 SELF CARE MNGMENT TRAINING: CPT

## 2019-10-02 PROCEDURE — 99231 SBSQ HOSP IP/OBS SF/LOW 25: CPT | Performed by: FAMILY MEDICINE

## 2019-10-02 PROCEDURE — 700102 HCHG RX REV CODE 250 W/ 637 OVERRIDE(OP): Performed by: INTERNAL MEDICINE

## 2019-10-02 PROCEDURE — A9270 NON-COVERED ITEM OR SERVICE: HCPCS | Performed by: STUDENT IN AN ORGANIZED HEALTH CARE EDUCATION/TRAINING PROGRAM

## 2019-10-02 PROCEDURE — 700102 HCHG RX REV CODE 250 W/ 637 OVERRIDE(OP): Performed by: STUDENT IN AN ORGANIZED HEALTH CARE EDUCATION/TRAINING PROGRAM

## 2019-10-02 PROCEDURE — A9270 NON-COVERED ITEM OR SERVICE: HCPCS | Performed by: INTERNAL MEDICINE

## 2019-10-02 PROCEDURE — A9270 NON-COVERED ITEM OR SERVICE: HCPCS | Performed by: HOSPITALIST

## 2019-10-02 PROCEDURE — 85025 COMPLETE CBC W/AUTO DIFF WBC: CPT

## 2019-10-02 PROCEDURE — A9270 NON-COVERED ITEM OR SERVICE: HCPCS | Performed by: FAMILY MEDICINE

## 2019-10-02 PROCEDURE — 700111 HCHG RX REV CODE 636 W/ 250 OVERRIDE (IP): Performed by: FAMILY MEDICINE

## 2019-10-02 PROCEDURE — 700111 HCHG RX REV CODE 636 W/ 250 OVERRIDE (IP): Performed by: HOSPITALIST

## 2019-10-02 PROCEDURE — 97110 THERAPEUTIC EXERCISES: CPT

## 2019-10-02 PROCEDURE — 770020 HCHG ROOM/CARE - TELE (206)

## 2019-10-02 PROCEDURE — 36415 COLL VENOUS BLD VENIPUNCTURE: CPT

## 2019-10-02 PROCEDURE — 97116 GAIT TRAINING THERAPY: CPT

## 2019-10-02 PROCEDURE — 700102 HCHG RX REV CODE 250 W/ 637 OVERRIDE(OP): Performed by: FAMILY MEDICINE

## 2019-10-02 PROCEDURE — 700102 HCHG RX REV CODE 250 W/ 637 OVERRIDE(OP): Performed by: HOSPITALIST

## 2019-10-02 RX ORDER — LORAZEPAM 2 MG/ML
1 INJECTION INTRAMUSCULAR ONCE
Status: COMPLETED | OUTPATIENT
Start: 2019-10-02 | End: 2019-10-02

## 2019-10-02 RX ORDER — ENEMA 19; 7 G/133ML; G/133ML
1 ENEMA RECTAL ONCE
Status: COMPLETED | OUTPATIENT
Start: 2019-10-02 | End: 2019-10-03

## 2019-10-02 RX ORDER — NYSTATIN 100000 [USP'U]/G
POWDER TOPICAL 2 TIMES DAILY
Status: DISCONTINUED | OUTPATIENT
Start: 2019-10-02 | End: 2019-10-16 | Stop reason: HOSPADM

## 2019-10-02 RX ADMIN — NYSTATIN: 100000 POWDER TOPICAL at 17:24

## 2019-10-02 RX ADMIN — SENNOSIDES, DOCUSATE SODIUM 2 TABLET: 50; 8.6 TABLET, FILM COATED ORAL at 05:51

## 2019-10-02 RX ADMIN — SULFAMETHOXAZOLE AND TRIMETHOPRIM 1 TABLET: 800; 160 TABLET ORAL at 17:21

## 2019-10-02 RX ADMIN — GABAPENTIN 300 MG: 300 CAPSULE ORAL at 02:01

## 2019-10-02 RX ADMIN — LISINOPRIL 10 MG: 10 TABLET ORAL at 05:51

## 2019-10-02 RX ADMIN — ACETAMINOPHEN 650 MG: 325 TABLET, FILM COATED ORAL at 02:01

## 2019-10-02 RX ADMIN — MAGNESIUM HYDROXIDE 30 ML: 400 SUSPENSION ORAL at 17:21

## 2019-10-02 RX ADMIN — TAMSULOSIN HYDROCHLORIDE 0.4 MG: 0.4 CAPSULE ORAL at 10:45

## 2019-10-02 RX ADMIN — SULFAMETHOXAZOLE AND TRIMETHOPRIM 1 TABLET: 800; 160 TABLET ORAL at 05:52

## 2019-10-02 RX ADMIN — QUETIAPINE FUMARATE 150 MG: 200 TABLET ORAL at 05:53

## 2019-10-02 RX ADMIN — RIVAROXABAN 15 MG: 15 TABLET, FILM COATED ORAL at 08:34

## 2019-10-02 RX ADMIN — QUETIAPINE FUMARATE 200 MG: 200 TABLET, FILM COATED ORAL at 19:45

## 2019-10-02 RX ADMIN — ACETAMINOPHEN 650 MG: 325 TABLET, FILM COATED ORAL at 16:04

## 2019-10-02 RX ADMIN — SENNOSIDES, DOCUSATE SODIUM 2 TABLET: 50; 8.6 TABLET, FILM COATED ORAL at 17:21

## 2019-10-02 RX ADMIN — ATENOLOL 100 MG: 25 TABLET ORAL at 05:51

## 2019-10-02 RX ADMIN — LORAZEPAM 1 MG: 2 INJECTION INTRAMUSCULAR; INTRAVENOUS at 23:17

## 2019-10-02 RX ADMIN — FOLIC ACID 1 MG: 1 TABLET ORAL at 05:51

## 2019-10-02 RX ADMIN — LORAZEPAM 1 MG: 2 INJECTION INTRAMUSCULAR; INTRAVENOUS at 16:23

## 2019-10-02 RX ADMIN — ACETAMINOPHEN 650 MG: 325 TABLET, FILM COATED ORAL at 23:17

## 2019-10-02 RX ADMIN — THERA TABS 1 TABLET: TAB at 05:51

## 2019-10-02 RX ADMIN — RIVAROXABAN 15 MG: 15 TABLET, FILM COATED ORAL at 17:21

## 2019-10-02 ASSESSMENT — GAIT ASSESSMENTS
DISTANCE (FEET): 50
ASSISTIVE DEVICE: FRONT WHEEL WALKER
GAIT LEVEL OF ASSIST: MINIMAL ASSIST
DEVIATION: SHUFFLED GAIT;ATAXIC

## 2019-10-02 ASSESSMENT — ENCOUNTER SYMPTOMS
MYALGIAS: 0
BLURRED VISION: 0
FEVER: 0
DIARRHEA: 0
NECK PAIN: 0
INSOMNIA: 1
BACK PAIN: 0
CHILLS: 0
WEAKNESS: 1
DIAPHORESIS: 0
HEMOPTYSIS: 0
PALPITATIONS: 0
ORTHOPNEA: 0
FOCAL WEAKNESS: 0
EYE PAIN: 0
ABDOMINAL PAIN: 0
VOMITING: 0
HALLUCINATIONS: 0
CONSTIPATION: 0
NERVOUS/ANXIOUS: 1
DOUBLE VISION: 0
TINGLING: 1
SHORTNESS OF BREATH: 0
TREMORS: 0
HEARTBURN: 0
NAUSEA: 0
SORE THROAT: 0
COUGH: 0

## 2019-10-02 ASSESSMENT — COGNITIVE AND FUNCTIONAL STATUS - GENERAL
SUGGESTED CMS G CODE MODIFIER DAILY ACTIVITY: CK
MOVING FROM LYING ON BACK TO SITTING ON SIDE OF FLAT BED: A LITTLE
EATING MEALS: A LITTLE
DRESSING REGULAR LOWER BODY CLOTHING: A LOT
WALKING IN HOSPITAL ROOM: A LITTLE
MOVING TO AND FROM BED TO CHAIR: A LITTLE
STANDING UP FROM CHAIR USING ARMS: A LITTLE
HELP NEEDED FOR BATHING: A LOT
PERSONAL GROOMING: A LITTLE
DAILY ACTIVITIY SCORE: 16
DRESSING REGULAR UPPER BODY CLOTHING: A LITTLE
CLIMB 3 TO 5 STEPS WITH RAILING: A LOT
MOBILITY SCORE: 17
TOILETING: A LITTLE
TURNING FROM BACK TO SIDE WHILE IN FLAT BAD: A LITTLE
SUGGESTED CMS G CODE MODIFIER MOBILITY: CK

## 2019-10-02 ASSESSMENT — LIFESTYLE VARIABLES: SUBSTANCE_ABUSE: 1
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
## 2019-10-02 NOTE — THERAPY
"Occupational Therapy Treatment completed with focus on ADLs, ADL transfers and patient education.  Functional Status:  SPV supine<>sit, Min A sit<>stand, Mod A functional mobility w/ FWW bathroom distance, SPV seated grooming, Min A simulated standing grooming activity that included reaching outside DONALD seated rest breaks.  Plan of Care: Will benefit from Occupational Therapy 3 times per week  Discharge Recommendations:  Equipment Will Continue to Assess for Equipment Needs. Post-acute therapy Recommend post-acute placement for additional occupational therapy services prior to discharge home. Patient can tolerate post-acute therapies at a 5x/week frequency.    See \"Rehab Therapy-Acute\" Patient Summary Report for complete documentation.     Pt seen for OT Tx session on this date. Pt appeared less anxious than during previous session and was eager to perform functional mobility. Pt continues to require increased assist for sequencing, safety, and pacing throughout session. Ed/trained pt and mother on progression of seated ADLs to standing ADLs as strength returns. Pt stood at EOB and reached outside of DONALD w/ min A and seated rest breaks after a minute. Will continue to follow for Acute OT services. Recommend post acute placement.   "

## 2019-10-02 NOTE — PROGRESS NOTES
Report received from night shift RN, assumed care of pt. Pt resting in bed at this time with mom at bedside. Reviewed patient's plan of care. Tele box on, rhythm verified. Call light within reach, bed locked and in lowest position. All fall precautions and hourly rounding in place. Will continue to monitor.

## 2019-10-02 NOTE — THERAPY
"Physical Therapy Treatment completed.   Bed Mobility:  Supine to Sit: Supervised  Transfers: Sit to Stand: Minimal Assist  Gait: Level Of Assist: Minimal Assist with Front-Wheel Walker       Plan of Care: Will benefit from Physical Therapy 3 times per week  Discharge Recommendations: Equipment: Will Continue to Assess for Equipment Needs. Recommend post-acute placement for continued physical therapy services prior to discharge home. Patient can tolerate post-acute therapies at a 5x/week frequency.        See \"Rehab Therapy-Acute\" Patient Summary Report for complete documentation.     Pt is progressing well and was able to demonstrate improved ambulation distance, activity tolerance, and strength this session. Pt was able to ambulate within room for about 50ft with occasional sitting breaks at side of bed. Pt was able to tolerate standing ther-ex along with seated ther-ex. Pt required occasional cues for correcting mechanics. Pt requires frequent cues for saftey and fww use. Pt demonstrated with 2 zheng LOB during turns and required Min A for correction. Pt able to tolerate standing dynamic balance ther-ex while reach forward and across body with UE. Pt does well with small steps during turns rather than pivoting to avoid LOB. Pt will continue to benefit from skilled PT while in house, with recommendation for post acute therapy prior to d/c home given current objective findings.   "

## 2019-10-02 NOTE — CARE PLAN
Problem: Infection  Goal: Will remain free from infection  Outcome: PROGRESSING AS EXPECTED  Note:   Standard precautions in place. Education provided to patient. Hand hygiene being preformed correctly prior to and after patient contact by staff.       Problem: Venous Thromboembolism (VTW)/Deep Vein Thrombosis (DVT) Prevention:  Goal: Patient will participate in Venous Thrombosis (VTE)/Deep Vein Thrombosis (DVT)Prevention Measures  Outcome: PROGRESSING AS EXPECTED  Note:   Patient educated on VTE prophylaxis including s pharmacologic method. Medication orders followed. Education on the importance of VTE prophylaxis due to decreased mobility while in the hospital. Verbalized understanding, no additional questions at this time.

## 2019-10-02 NOTE — PROGRESS NOTES
Bladder training initiated at 0845 this AM. Clamping manzano tube allowing to fill up with urine and having pt inform this RN if he has the sensation to pee. Each time tube fills pt reports the sensation of having to pee. Continuing to bladder train throughout shift, educated patient on attempting to remove manzano again, pt states he would like to try later tonight or tomorrow morning. Will update next RN.

## 2019-10-02 NOTE — PROGRESS NOTES
Pt and mother reported to this RN that he is having increased feelings of numbness to fingers and had approximately 30 seconds of involuntary shakes of his head, interactive with his mother during this time, states it felt like muscle spasms. Pt reports these involuntary movements has happened to him before when taking nitrous oxide. Pt anxiety is also increasing this afternoon. Non-pharmacological methods offered to decrease anxiety however refused. MD Painter paged for update, MD added new orders.

## 2019-10-02 NOTE — PROGRESS NOTES
Bedside report received from day RN. Patient's plan of care reviewed. Patient found sitting up in bed, mom at bedside. A&Ox4, anxious. VSS, on RA. No signs of distress, declines pain at this time. All needs met. Safety precautions in place. Tele box on. Bed in lowest/locked position. Bed alarm on. Call light and personal belongings within reach. Will continue to monitor.

## 2019-10-02 NOTE — DISCHARGE PLANNING
SW followed up with patient's mother, she reported insurance informed her that they do not work with [facility], reported out of pocket up front cost is $6000.00. Patient and mother are not able to afford this.   SW requested that patient's mother follow up with insurance, who let her know they can let her know who might work with her. Mother to follow up with SW.   Mother requested financial POA. SW informed her that we do not do these documents, recommended she work with legal services to follow up with this.   Informed patient and mother that psychiatry will reassess tomorrow and Dr. TIERNEY will medically reassess tomorrow as well. Mother states he is not stable yet.

## 2019-10-02 NOTE — PROGRESS NOTES
Patient refusing vitals overnight as well as morning routine labs. Patient states ok to come back at 0600 for vitals and labs. Feels very anxious and unsafe when he consistently gets woken up overnight. Mom at bedside. Will continue to monitor.

## 2019-10-02 NOTE — DISCHARGE PLANNING
LESA received information from RN, patient's mother wants to talk with LESA.  LESA zaragoza text Dr. Guillen for update on patient's anticipated medical clearance date. Informed doctor that patient has been accepted to SNF, waiting on medical clearance.   Doctor zaragoza text back, stating attending is responsible for medical clearance.   ELSA texted Dr. TIERNEY, will medically clear once psych reassess him.  Dr. Guillen reports psych will reassess tomorrow.   Texted Dr. TIERNEY to inform him of this, and Dr. TIERNEY will medically reassess tomorrow once psych see's him.

## 2019-10-02 NOTE — CARE PLAN
Problem: Safety  Goal: Will remain free from injury  Outcome: PROGRESSING AS EXPECTED  Note:   Clutter free environment, bed locked and in lowest position, bed alarm on. All belongings within reach, call light within reach. Pt calls appropriately when needing assistance.      Problem: Mobility  Goal: Risk for activity intolerance will decrease  Outcome: PROGRESSING AS EXPECTED  Note:   Pt has improved strength, pt ambulates throughout the room during the day with PT and nursing staff.

## 2019-10-02 NOTE — PROGRESS NOTES
Patient continues to have trouble sleeping with frequent nightmares and anxiety. PRN administered. Patient states nothing we are doing is helping him and he is frustrated. Spent time discussing the POC and interventions with both patient and mom. Mom is exhausted at bedside, unable to sleep while patient is awake and anxious. This RN offered to sit with patient if she needed to leave the room for some sleep, but declines as patient stated he did not want that. Non-pharm methods offered, all declined. Will continue to monitor.

## 2019-10-03 LAB
ALBUMIN SERPL BCP-MCNC: 4.1 G/DL (ref 3.2–4.9)
ALBUMIN/GLOB SERPL: 1.4 G/DL
ALP SERPL-CCNC: 57 U/L (ref 30–99)
ALT SERPL-CCNC: 45 U/L (ref 2–50)
ANION GAP SERPL CALC-SCNC: 10 MMOL/L (ref 0–11.9)
AST SERPL-CCNC: 17 U/L (ref 12–45)
BASOPHILS # BLD AUTO: 0.4 % (ref 0–1.8)
BASOPHILS # BLD: 0.04 K/UL (ref 0–0.12)
BILIRUB SERPL-MCNC: 0.4 MG/DL (ref 0.1–1.5)
BUN SERPL-MCNC: 14 MG/DL (ref 8–22)
CALCIUM SERPL-MCNC: 9.2 MG/DL (ref 8.5–10.5)
CHLORIDE SERPL-SCNC: 104 MMOL/L (ref 96–112)
CO2 SERPL-SCNC: 25 MMOL/L (ref 20–33)
CREAT SERPL-MCNC: 0.87 MG/DL (ref 0.5–1.4)
EOSINOPHIL # BLD AUTO: 0.21 K/UL (ref 0–0.51)
EOSINOPHIL NFR BLD: 2.3 % (ref 0–6.9)
ERYTHROCYTE [DISTWIDTH] IN BLOOD BY AUTOMATED COUNT: 61.2 FL (ref 35.9–50)
GLOBULIN SER CALC-MCNC: 3 G/DL (ref 1.9–3.5)
GLUCOSE SERPL-MCNC: 89 MG/DL (ref 65–99)
HCT VFR BLD AUTO: 42.8 % (ref 42–52)
HGB BLD-MCNC: 14 G/DL (ref 14–18)
IMM GRANULOCYTES # BLD AUTO: 0.04 K/UL (ref 0–0.11)
IMM GRANULOCYTES NFR BLD AUTO: 0.4 % (ref 0–0.9)
LYMPHOCYTES # BLD AUTO: 2.77 K/UL (ref 1–4.8)
LYMPHOCYTES NFR BLD: 29.8 % (ref 22–41)
MCH RBC QN AUTO: 31.3 PG (ref 27–33)
MCHC RBC AUTO-ENTMCNC: 32.7 G/DL (ref 33.7–35.3)
MCV RBC AUTO: 95.7 FL (ref 81.4–97.8)
MONOCYTES # BLD AUTO: 1.17 K/UL (ref 0–0.85)
MONOCYTES NFR BLD AUTO: 12.6 % (ref 0–13.4)
NEUTROPHILS # BLD AUTO: 5.05 K/UL (ref 1.82–7.42)
NEUTROPHILS NFR BLD: 54.5 % (ref 44–72)
NRBC # BLD AUTO: 0 K/UL
NRBC BLD-RTO: 0 /100 WBC
PLATELET # BLD AUTO: 325 K/UL (ref 164–446)
PMV BLD AUTO: 8.4 FL (ref 9–12.9)
POTASSIUM SERPL-SCNC: 4.5 MMOL/L (ref 3.6–5.5)
PROT SERPL-MCNC: 7.1 G/DL (ref 6–8.2)
RBC # BLD AUTO: 4.47 M/UL (ref 4.7–6.1)
SODIUM SERPL-SCNC: 139 MMOL/L (ref 135–145)
WBC # BLD AUTO: 9.3 K/UL (ref 4.8–10.8)

## 2019-10-03 PROCEDURE — A9270 NON-COVERED ITEM OR SERVICE: HCPCS | Performed by: INTERNAL MEDICINE

## 2019-10-03 PROCEDURE — 80053 COMPREHEN METABOLIC PANEL: CPT

## 2019-10-03 PROCEDURE — 700102 HCHG RX REV CODE 250 W/ 637 OVERRIDE(OP): Performed by: INTERNAL MEDICINE

## 2019-10-03 PROCEDURE — A9270 NON-COVERED ITEM OR SERVICE: HCPCS | Performed by: FAMILY MEDICINE

## 2019-10-03 PROCEDURE — A9270 NON-COVERED ITEM OR SERVICE: HCPCS | Performed by: STUDENT IN AN ORGANIZED HEALTH CARE EDUCATION/TRAINING PROGRAM

## 2019-10-03 PROCEDURE — 85025 COMPLETE CBC W/AUTO DIFF WBC: CPT

## 2019-10-03 PROCEDURE — 700101 HCHG RX REV CODE 250: Performed by: HOSPITALIST

## 2019-10-03 PROCEDURE — 770006 HCHG ROOM/CARE - MED/SURG/GYN SEMI*

## 2019-10-03 PROCEDURE — 99231 SBSQ HOSP IP/OBS SF/LOW 25: CPT | Performed by: FAMILY MEDICINE

## 2019-10-03 PROCEDURE — 700111 HCHG RX REV CODE 636 W/ 250 OVERRIDE (IP): Performed by: INTERNAL MEDICINE

## 2019-10-03 PROCEDURE — 770001 HCHG ROOM/CARE - MED/SURG/GYN PRIV*

## 2019-10-03 PROCEDURE — 700111 HCHG RX REV CODE 636 W/ 250 OVERRIDE (IP): Performed by: FAMILY MEDICINE

## 2019-10-03 PROCEDURE — 700102 HCHG RX REV CODE 250 W/ 637 OVERRIDE(OP): Performed by: FAMILY MEDICINE

## 2019-10-03 PROCEDURE — 700102 HCHG RX REV CODE 250 W/ 637 OVERRIDE(OP): Performed by: STUDENT IN AN ORGANIZED HEALTH CARE EDUCATION/TRAINING PROGRAM

## 2019-10-03 PROCEDURE — A9270 NON-COVERED ITEM OR SERVICE: HCPCS | Performed by: HOSPITALIST

## 2019-10-03 PROCEDURE — 36415 COLL VENOUS BLD VENIPUNCTURE: CPT

## 2019-10-03 PROCEDURE — 700102 HCHG RX REV CODE 250 W/ 637 OVERRIDE(OP): Performed by: HOSPITALIST

## 2019-10-03 RX ORDER — LORAZEPAM 2 MG/ML
1 INJECTION INTRAMUSCULAR ONCE
Status: COMPLETED | OUTPATIENT
Start: 2019-10-03 | End: 2019-10-03

## 2019-10-03 RX ADMIN — RIVAROXABAN 15 MG: 15 TABLET, FILM COATED ORAL at 17:40

## 2019-10-03 RX ADMIN — ACETAMINOPHEN 650 MG: 325 TABLET, FILM COATED ORAL at 20:42

## 2019-10-03 RX ADMIN — SENNOSIDES, DOCUSATE SODIUM 2 TABLET: 50; 8.6 TABLET, FILM COATED ORAL at 17:40

## 2019-10-03 RX ADMIN — NYSTATIN: 100000 POWDER TOPICAL at 17:50

## 2019-10-03 RX ADMIN — ATENOLOL 100 MG: 25 TABLET ORAL at 05:31

## 2019-10-03 RX ADMIN — SULFAMETHOXAZOLE AND TRIMETHOPRIM 1 TABLET: 800; 160 TABLET ORAL at 17:40

## 2019-10-03 RX ADMIN — FOLIC ACID 1 MG: 1 TABLET ORAL at 05:31

## 2019-10-03 RX ADMIN — TAMSULOSIN HYDROCHLORIDE 0.4 MG: 0.4 CAPSULE ORAL at 09:26

## 2019-10-03 RX ADMIN — ACETAMINOPHEN 650 MG: 325 TABLET, FILM COATED ORAL at 12:50

## 2019-10-03 RX ADMIN — ONDANSETRON 4 MG: 4 TABLET, ORALLY DISINTEGRATING ORAL at 20:09

## 2019-10-03 RX ADMIN — QUETIAPINE FUMARATE 150 MG: 200 TABLET ORAL at 05:31

## 2019-10-03 RX ADMIN — THERA TABS 1 TABLET: TAB at 05:32

## 2019-10-03 RX ADMIN — SODIUM PHOSPHATE 133 ML: 7; 19 ENEMA RECTAL at 10:40

## 2019-10-03 RX ADMIN — RIVAROXABAN 15 MG: 15 TABLET, FILM COATED ORAL at 09:26

## 2019-10-03 RX ADMIN — NYSTATIN: 100000 POWDER TOPICAL at 05:32

## 2019-10-03 RX ADMIN — SULFAMETHOXAZOLE AND TRIMETHOPRIM 1 TABLET: 800; 160 TABLET ORAL at 05:31

## 2019-10-03 RX ADMIN — LORAZEPAM 1 MG: 2 INJECTION INTRAMUSCULAR; INTRAVENOUS at 13:32

## 2019-10-03 RX ADMIN — QUETIAPINE FUMARATE 200 MG: 200 TABLET, FILM COATED ORAL at 17:40

## 2019-10-03 RX ADMIN — GABAPENTIN 300 MG: 300 CAPSULE ORAL at 21:14

## 2019-10-03 RX ADMIN — LISINOPRIL 10 MG: 10 TABLET ORAL at 05:31

## 2019-10-03 ASSESSMENT — ENCOUNTER SYMPTOMS
VOMITING: 0
DOUBLE VISION: 0
HEARTBURN: 0
CONSTIPATION: 0
PALPITATIONS: 0
NECK PAIN: 0
FOCAL WEAKNESS: 0
SENSORY CHANGE: 0
HALLUCINATIONS: 0
WEAKNESS: 1
SINUS PAIN: 0
ABDOMINAL PAIN: 0
BACK PAIN: 0
TREMORS: 0
TINGLING: 1
NERVOUS/ANXIOUS: 1
COUGH: 0
NAUSEA: 0
DIAPHORESIS: 0
BLOOD IN STOOL: 0
SORE THROAT: 0
ORTHOPNEA: 0
CHILLS: 0
EYE PAIN: 0
BLURRED VISION: 0
INSOMNIA: 1
HEMOPTYSIS: 0
MYALGIAS: 0
FEVER: 0
CLAUDICATION: 0
DIARRHEA: 0
SHORTNESS OF BREATH: 0

## 2019-10-03 ASSESSMENT — LIFESTYLE VARIABLES: SUBSTANCE_ABUSE: 1

## 2019-10-03 NOTE — PROGRESS NOTES
Bedside report received from day RN. Patient's plan of care reviewed. Patient found resting in bed, A&Ox4. Mom at bedside. VSS, on RA. Patient feeling anxious but states he has had a good day. No signs of distress, declines pain at this time. All needs met. Safety precautions in place. Tele box on. Bed in lowest/locked position. Bed alarm on. Call light and personal belongings within reach. Will continue to monitor.

## 2019-10-03 NOTE — CARE PLAN
Problem: Communication  Goal: The ability to communicate needs accurately and effectively will improve  Outcome: PROGRESSING AS EXPECTED   Patient educated to utilize call light. Patient and family oriented to hospital room. Patient encouraged to ask questions about plan of care. Patient effectively uses call light and is involved in POC.     Problem: Safety  Goal: Will remain free from injury  Outcome: PROGRESSING AS EXPECTED   Patient's risk for injury and falls assessed. Appropriate safety precautions in place. Patient educated to utilize call light for needs. Patient verbalizes understanding.

## 2019-10-03 NOTE — PROGRESS NOTES
Pt states a significant difference in anxiety since ativan 1mg IV given per MAR. Pt resting comfortably in bed, in no apparent distress, unlabored breathing. Will continue to monitor.

## 2019-10-03 NOTE — DISCHARGE PLANNING
LESA spoke with patient and mother to discuss other SNFs that are covered by insurance.   Mom reports [facility], [facility], [facility], and [facility] are all covered by insurance.   SW contacted [facility] to double check possible costs on patient, as mom reports out of pocket is unaffordable.   [facility] reports all but [facility] got back to him and denied (minus [facility] who has accepted).   [facility] left message with [facility].

## 2019-10-03 NOTE — DISCHARGE PLANNING
Agency/Facility Name: [facility]  Outcome: Left message, awaiting call back.    @1120  Agency/Facility Name: [facility]  Spoke To: Jaxon  Outcome: Declined due to insurance.

## 2019-10-03 NOTE — PROGRESS NOTES
"Patient complaining of left sided chest cramps and burning intermittently along with left hand numbness worsening. VSS, SR 97 on tele    Patient also complaining of abdominal bloating and constipation. Per patient, has not has a \"good BM\" in multiple days and is very uncomfortable. Requesting enema.     Patient feeling anxious, states everything time he closes his eyes to sleep he is getting nightmares. Anxiety rising and patient frustrated. Requesting ativan as that relieved his anxiety and made him feel like \"a normal person\" earlier today.     Hospitalist paged.  Second paged sent.    New orders per Dr. Marlen Pearce. Will follow appropriately and continue to monitor.   "

## 2019-10-03 NOTE — PROGRESS NOTES
Hospital Medicine Daily Progress Note    Date of Service  10/2/2019    Chief Complaint  34 y.o. male admitted 9/19/2019 with hallucinations    Hospital Course    Past medical history of depression, PTSD, anxiety, substance abuse of mushrooms, nitrous oxide, marijuana, alcohol, paroxysmal A. fib, recent diagnosis of PE May 2019.  He presented with hallucinations and had stopped taking his medications including Xarelto.  He had also fallen multiple times.  He was found hypoxic and CTA showed extensive acute bilateral pulmonary emboli with saddle embolus.  He was admitted to the ICU and felt his risk of intracranial hemorrhage outweighed submassive dose of alteplase or EKOS.  He was started on heparin infusion.  Lower extremity Doppler was negative for DVT.  TTE showed severe right heart strain.  Patient remained stabilized and transitioned to Xarelto.  Psychiatry was consulted for his hallucinations and he was started on Risperdal.  However he developed neurological changes concerning for NMS and Risperdal was stopped and he was given Cogentin with some improvement.  Psychiatry started him on Seroquel and has been titrating medication.  He was hypertensive requiring IV medications so he was started on a lower lower dose of lisinopril and his atenolol was stopped, given his right heart strain.  He developed leukocytosis and fever.  Urine culture grew coag negative staph and he was started on course of Bactrim.  Srivastava catheter was removed but he failed voiding trial and was replaced.  He is continued on Flomax.  PT OT evaluated him and recommended SNF.      Interval Problem Update  Patient says he feels much better today.  His hallucinations have improved, though he still feels very anxious.  He is off oxygen and denies shortness of breath.  He denies abdominal pain.  He feels less diaphoretic.  Mother is worried about him going to rehab since she will not be able to stay with him.  10/1: Patient became anxious when I  entered the room with the nursing staff.  Stated that does not like to many people around him.  Saturating well on room air.  Stated that he was unable to sleep well last night.  Afebrile overnight.  No issues overnight per staff.  10/2: Resting comfortably in bed.  Stated that he could not sleep sleep well last night.  Still feeling anxious.  Leukocytosis trending down.  Afebrile overnight.  Tolerating p.o. well.  No nausea or vomiting.  Had multiple small bowel movements yesterday after prune juice and stool softeners.  Consultants/Specialty  Psychiatry  Critical care  Pulmonology    Code Status  Full Code      Disposition  Case coordination to discuss discharge planning with mother  SNF once cleared by psychiatry  Will need anticoagulation clinic and follow-up with pulmonology    Review of Systems  Review of Systems   Constitutional: Positive for malaise/fatigue. Negative for chills, diaphoresis and fever.   HENT: Negative for congestion, ear discharge, ear pain, hearing loss, nosebleeds, sore throat and tinnitus.    Eyes: Negative for blurred vision, double vision and pain.   Respiratory: Negative for cough, hemoptysis and shortness of breath.    Cardiovascular: Negative for chest pain, palpitations and orthopnea.   Gastrointestinal: Negative for abdominal pain, constipation, diarrhea, heartburn, nausea and vomiting.   Genitourinary: Negative for dysuria and urgency.   Musculoskeletal: Negative for back pain, myalgias and neck pain.   Neurological: Positive for tingling and weakness. Negative for tremors and focal weakness.   Psychiatric/Behavioral: Positive for substance abuse. Negative for hallucinations. The patient is nervous/anxious and has insomnia.         Physical Exam  Temp:  [36.3 °C (97.4 °F)-36.6 °C (97.9 °F)] 36.6 °C (97.8 °F)  Pulse:  [] 88  Resp:  [20-22] 20  BP: ()/(62-77) 121/77  SpO2:  [92 %-98 %] 92 %    Physical Exam   Constitutional: He is oriented to person, place, and time. No  distress.   Obesity   HENT:   Head: Normocephalic and atraumatic.   Eyes: Pupils are equal, round, and reactive to light. Conjunctivae are normal.   Neck: No JVD present. No tracheal deviation present. No thyromegaly present.   Cardiovascular: Normal rate and regular rhythm. Exam reveals no gallop and no friction rub.   Pulmonary/Chest: He has no wheezes. He has no rales. He exhibits no tenderness.   Mildly tachypneic and grunting due to anxiety per mother, diminished breath sounds throughout   Abdominal: Soft. Bowel sounds are normal. He exhibits no distension (Mild to moderate). There is no tenderness. There is no rebound and no guarding.   Musculoskeletal: He exhibits no edema.   Neurological: He is alert and oriented to person, place, and time.   4 out of 5 strength upper and lower extremities   Skin: Skin is warm. He is not diaphoretic.   Decreased macular rash isolated to his backside.   Psychiatric: His mood appears anxious. Thought content is paranoid. He expresses impulsivity.   Nursing note and vitals reviewed.      Fluids    Intake/Output Summary (Last 24 hours) at 10/2/2019 1723  Last data filed at 10/2/2019 1400  Gross per 24 hour   Intake 840 ml   Output 1600 ml   Net -760 ml       Laboratory  Recent Labs     09/30/19  0532 10/01/19  0517 10/02/19  1132   WBC 14.0* 15.1* 14.1*   RBC 4.71 4.63* 4.60*   HEMOGLOBIN 14.2 14.5 14.0   HEMATOCRIT 43.8 43.5 43.3   MCV 93.0 94.0 94.1   MCH 30.1 31.3 30.4   MCHC 32.4* 33.3* 32.3*   RDW 61.1* 61.9* 61.9*   PLATELETCT 317 276 327   MPV 8.4* 9.1 8.7*                       Imaging  DX-CHEST-PORTABLE (1 VIEW)   Final Result         No acute cardiac or pulmonary abnormality is identified.      DX-CHEST-PORTABLE (1 VIEW)   Final Result      Mild lung base atelectasis with edema not excluded.      DX-CHEST-PORTABLE (1 VIEW)   Final Result         1.  Pulmonary edema and/or infiltrates.   2.  Cardiomegaly      MR-BRAIN-W/O   Final Result      1.  No acute intracranial  "abnormality.   2.  Sphenoid and posterior right ethmoid sinus disease.      US-EXTREMITY VENOUS LOWER BILAT   Final Result      EC-ECHOCARDIOGRAM LTD W/O CONT   Final Result      CT-CTA CHEST PULMONARY ARTERY W/ RECONS   Final Result   Addendum 1 of 1   These findings were discussed with HEATHER MELGOZA on 9/19/2019 2:16 PM.      Final      DX-CHEST-PORTABLE (1 VIEW)   Final Result      No acute cardiopulmonary abnormality.      CT-HEAD W/O   Final Result      No CT evidence of acute infarct, hemorrhage or mass. No acute intracranial injury.           Assessment/Plan  * Acute saddle pulmonary embolism (HCC)- (present on admission)  Assessment & Plan  Recurrent, in the setting of noncompliance with anticoagulation therapy  CTA PE revealing \"Extensive acute bilateral pulmonary emboli with saddle embolus noted, as well as findings concerning for RV strain\"  Recurrence likely secondary to poor compliance of medication  Heparin drip transitioned to Xarelto  Echocardiogram with RV strain, caution aggressive blood pressure changes  DVT study negative    Acute hypoxemic respiratory failure (HCC)  Assessment & Plan  Secondary acute pulmonary embolism  Wean off oxygen as tolerated    Paroxysmal atrial fibrillation (HCC)- (present on admission)  Assessment & Plan  10/1: I restarted his home dose Tenormin.  Continue Xarelto.  Rate is controlled.    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  MRI brain negative    ETOH abuse- (present on admission)  Assessment & Plan  History of, monitor for withdrawal    Acute cystitis- (present on admission)  Assessment & Plan  Urine culture grew strep epidermidis sensitive to Bactrim.  Continue for now.    Fever  Assessment & Plan  9/28 - Has had " leukocytosis, now fever 100.7. He has no specific symptoms. CXR and urine ordered  9/29 - Continue ceftriaxone, urine culture pending.  Srivastava cath was changed  9/30 -urine growing coag negative staph change to complete course of Bactrim    Weakness  Assessment & Plan  I spoke with psychiatry, who notes patient's current weakness has progressed from when he first admitted.  CPK was normal  Psychiatry stopped risperidone, had some improvement with Cogentin  Possible toxicity from the nitrous oxide.  I spoke with Dr. Byrd with neurology, he agreed with continuing with vitamin B12 supplementation.  He can have EMG testing done as outpatient after 2 weeks of stopping nitrous oxide.  PT OT ordered for reevaluation, plan for SNF    Vitamin B12 deficiency  Assessment & Plan  With reported peripheral tingling  Supplement ordered    Type 2 diabetes mellitus without complication, without long-term current use of insulin ([facility])  Assessment & Plan  New diagnosis, A1c 6.6%  Diabetes education ordered  Could be contributing to peripheral neuropathy    Slow transit constipation  Assessment & Plan  Daily Senokot and MiraLAX  Bowel regimen ordered  Enema PRN  9/26 -ongoing abdominal pain, KUB ordered  9/27 patient declines KUB today  9/28 - unable to do KUB portable apparently  9/29 - improving with enema  10/1: No BM for few days. Enema today.   10/2: Multiple small bowel movements yesterday with prune juice and stool softeners.    ALLI (obstructive sleep apnea)  Assessment & Plan  History of, pulmonary following    Morbid obesity (HCC)  Assessment & Plan  Outpatient weight loss program would be indicated    Pulmonary hypertension (HCC)- (present on admission)  Assessment & Plan  Known history of, now with recurrent PE  Echocardiogram noted    Anxiety- (present on admission)  Assessment & Plan  On Librium previously  Now uncontrolled  Psychiatry evaluating, he was placed on a hold.  Bedside sitter as needed.  9/25 risperdal stopped  for possible NMS, his anxiety is now increased  Psych adjusting seroquel and cogentin.   Dr. Sandoval to see patient tomorrow    Essential hypertension- (present on admission)  Assessment & Plan  Blood pressure has been stable.  Continue to monitor.  Plan of care discussed with multidisciplinary team during rounds.    VTE prophylaxis: xarelto

## 2019-10-03 NOTE — CARE PLAN
Problem: Bowel/Gastric:  Goal: Normal bowel function is maintained or improved  Outcome: PROGRESSING AS EXPECTED  Note:   Patient feeling bloated and constipated. Last BM 10/01/2019, multiple small stools. Bowel regime in place. Enema requested per patient. Paged hospitalist, awaiting call back. Offering non-pharmacologic interventions but all denies by patient. Will continue to monitor.      Problem: Urinary Elimination:  Goal: Ability to reestablish a normal urinary elimination pattern will improve  Outcome: PROGRESSING AS EXPECTED  Note:   Bladder training program initiated 10/02/2019 during day shift and continuing through night shift. Patient's manzano to remain in place, clamped. Patient to notified RN when he he has the urge to urinate and manzano will be unclamped while urine drains. Repeating process. Patient states he does feel the pressure and need to void when catheter is clamped. Will continue this training throughout shift.

## 2019-10-03 NOTE — PROGRESS NOTES
Received Bedside report. Assumed care at 0700. This pt is AOx4, ambulatory with x1 assist with FWW, voiding via manzano catheter with bladder training protocol, no s/s of pain. Patient and RN discussed plan of care: questions answered. Labs noted, assessment complete, patient tolerating regular diet. Tele box in place. Pt is on RA. Call light in place, fall precautions in place, patient educated on importance of calling for assistance. No additional needs at this time. VSS

## 2019-10-03 NOTE — DISCHARGE PLANNING
Received Order in Response to Request for Court Ordered Involuntary Admission from the court continuing pt until 10/10 at 0900. Sent copy to unit LSW.

## 2019-10-03 NOTE — PROGRESS NOTES
Patient refused 0000 and 0400 vitals as well as morning labs. Patient willing to have vitals taken and labs drawn at 0530 when morning medications at being administered.

## 2019-10-04 LAB
ALBUMIN SERPL BCP-MCNC: 4.5 G/DL (ref 3.2–4.9)
ALBUMIN/GLOB SERPL: 1.6 G/DL
ALP SERPL-CCNC: 63 U/L (ref 30–99)
ALT SERPL-CCNC: 45 U/L (ref 2–50)
ANION GAP SERPL CALC-SCNC: 13 MMOL/L (ref 0–11.9)
AST SERPL-CCNC: 14 U/L (ref 12–45)
BASOPHILS # BLD AUTO: 0.5 % (ref 0–1.8)
BASOPHILS # BLD: 0.05 K/UL (ref 0–0.12)
BILIRUB SERPL-MCNC: 0.6 MG/DL (ref 0.1–1.5)
BUN SERPL-MCNC: 21 MG/DL (ref 8–22)
CALCIUM SERPL-MCNC: 9 MG/DL (ref 8.5–10.5)
CHLORIDE SERPL-SCNC: 100 MMOL/L (ref 96–112)
CO2 SERPL-SCNC: 17 MMOL/L (ref 20–33)
CREAT SERPL-MCNC: 0.82 MG/DL (ref 0.5–1.4)
EOSINOPHIL # BLD AUTO: 0.03 K/UL (ref 0–0.51)
EOSINOPHIL NFR BLD: 0.3 % (ref 0–6.9)
ERYTHROCYTE [DISTWIDTH] IN BLOOD BY AUTOMATED COUNT: 60.2 FL (ref 35.9–50)
GLOBULIN SER CALC-MCNC: 2.9 G/DL (ref 1.9–3.5)
GLUCOSE SERPL-MCNC: 107 MG/DL (ref 65–99)
HCT VFR BLD AUTO: 47.8 % (ref 42–52)
HGB BLD-MCNC: 15.4 G/DL (ref 14–18)
IMM GRANULOCYTES # BLD AUTO: 0.07 K/UL (ref 0–0.11)
IMM GRANULOCYTES NFR BLD AUTO: 0.7 % (ref 0–0.9)
LYMPHOCYTES # BLD AUTO: 0.91 K/UL (ref 1–4.8)
LYMPHOCYTES NFR BLD: 8.6 % (ref 22–41)
MCH RBC QN AUTO: 30.9 PG (ref 27–33)
MCHC RBC AUTO-ENTMCNC: 32.2 G/DL (ref 33.7–35.3)
MCV RBC AUTO: 96 FL (ref 81.4–97.8)
MONOCYTES # BLD AUTO: 0.65 K/UL (ref 0–0.85)
MONOCYTES NFR BLD AUTO: 6.1 % (ref 0–13.4)
NEUTROPHILS # BLD AUTO: 8.89 K/UL (ref 1.82–7.42)
NEUTROPHILS NFR BLD: 83.8 % (ref 44–72)
NRBC # BLD AUTO: 0 K/UL
NRBC BLD-RTO: 0 /100 WBC
PLATELET # BLD AUTO: 321 K/UL (ref 164–446)
PMV BLD AUTO: 8.4 FL (ref 9–12.9)
POTASSIUM SERPL-SCNC: 4 MMOL/L (ref 3.6–5.5)
PROT SERPL-MCNC: 7.4 G/DL (ref 6–8.2)
RBC # BLD AUTO: 4.98 M/UL (ref 4.7–6.1)
SODIUM SERPL-SCNC: 130 MMOL/L (ref 135–145)
WBC # BLD AUTO: 10.6 K/UL (ref 4.8–10.8)

## 2019-10-04 PROCEDURE — A9270 NON-COVERED ITEM OR SERVICE: HCPCS | Performed by: INTERNAL MEDICINE

## 2019-10-04 PROCEDURE — 700102 HCHG RX REV CODE 250 W/ 637 OVERRIDE(OP): Performed by: HOSPITALIST

## 2019-10-04 PROCEDURE — 51798 US URINE CAPACITY MEASURE: CPT

## 2019-10-04 PROCEDURE — 97110 THERAPEUTIC EXERCISES: CPT

## 2019-10-04 PROCEDURE — 99231 SBSQ HOSP IP/OBS SF/LOW 25: CPT | Performed by: FAMILY MEDICINE

## 2019-10-04 PROCEDURE — 700102 HCHG RX REV CODE 250 W/ 637 OVERRIDE(OP): Performed by: INTERNAL MEDICINE

## 2019-10-04 PROCEDURE — 80053 COMPREHEN METABOLIC PANEL: CPT

## 2019-10-04 PROCEDURE — A9270 NON-COVERED ITEM OR SERVICE: HCPCS | Performed by: HOSPITALIST

## 2019-10-04 PROCEDURE — 85025 COMPLETE CBC W/AUTO DIFF WBC: CPT

## 2019-10-04 PROCEDURE — 770001 HCHG ROOM/CARE - MED/SURG/GYN PRIV*

## 2019-10-04 PROCEDURE — 90833 PSYTX W PT W E/M 30 MIN: CPT | Performed by: PSYCHIATRY & NEUROLOGY

## 2019-10-04 PROCEDURE — 99232 SBSQ HOSP IP/OBS MODERATE 35: CPT | Performed by: PSYCHIATRY & NEUROLOGY

## 2019-10-04 PROCEDURE — A9270 NON-COVERED ITEM OR SERVICE: HCPCS | Performed by: STUDENT IN AN ORGANIZED HEALTH CARE EDUCATION/TRAINING PROGRAM

## 2019-10-04 PROCEDURE — 700102 HCHG RX REV CODE 250 W/ 637 OVERRIDE(OP): Performed by: STUDENT IN AN ORGANIZED HEALTH CARE EDUCATION/TRAINING PROGRAM

## 2019-10-04 PROCEDURE — 97116 GAIT TRAINING THERAPY: CPT

## 2019-10-04 PROCEDURE — 36415 COLL VENOUS BLD VENIPUNCTURE: CPT

## 2019-10-04 RX ORDER — QUETIAPINE FUMARATE 100 MG/1
300 TABLET, FILM COATED ORAL EVERY EVENING
Status: DISCONTINUED | OUTPATIENT
Start: 2019-10-05 | End: 2019-10-16 | Stop reason: HOSPADM

## 2019-10-04 RX ORDER — QUETIAPINE FUMARATE 25 MG/1
100 TABLET, FILM COATED ORAL ONCE
Status: COMPLETED | OUTPATIENT
Start: 2019-10-04 | End: 2019-10-05

## 2019-10-04 RX ORDER — MICONAZOLE NITRATE 20 MG/G
CREAM TOPICAL EVERY 6 HOURS
Status: DISCONTINUED | OUTPATIENT
Start: 2019-10-04 | End: 2019-10-16 | Stop reason: HOSPADM

## 2019-10-04 RX ORDER — QUETIAPINE FUMARATE 100 MG/1
100 TABLET, FILM COATED ORAL 2 TIMES DAILY
Status: DISCONTINUED | OUTPATIENT
Start: 2019-10-05 | End: 2019-10-16 | Stop reason: HOSPADM

## 2019-10-04 RX ADMIN — ACETAMINOPHEN 650 MG: 325 TABLET, FILM COATED ORAL at 17:38

## 2019-10-04 RX ADMIN — NYSTATIN: 100000 POWDER TOPICAL at 17:25

## 2019-10-04 RX ADMIN — SULFAMETHOXAZOLE AND TRIMETHOPRIM 1 TABLET: 800; 160 TABLET ORAL at 06:18

## 2019-10-04 RX ADMIN — SULFAMETHOXAZOLE AND TRIMETHOPRIM 1 TABLET: 800; 160 TABLET ORAL at 17:25

## 2019-10-04 RX ADMIN — QUETIAPINE FUMARATE 150 MG: 200 TABLET ORAL at 06:08

## 2019-10-04 RX ADMIN — NYSTATIN: 100000 POWDER TOPICAL at 06:08

## 2019-10-04 RX ADMIN — QUETIAPINE FUMARATE 200 MG: 200 TABLET, FILM COATED ORAL at 17:25

## 2019-10-04 RX ADMIN — FOLIC ACID 1 MG: 1 TABLET ORAL at 06:09

## 2019-10-04 RX ADMIN — RIVAROXABAN 15 MG: 15 TABLET, FILM COATED ORAL at 17:27

## 2019-10-04 RX ADMIN — TAMSULOSIN HYDROCHLORIDE 0.4 MG: 0.4 CAPSULE ORAL at 09:14

## 2019-10-04 RX ADMIN — MAGNESIUM HYDROXIDE 30 ML: 400 SUSPENSION ORAL at 17:25

## 2019-10-04 RX ADMIN — POLYETHYLENE GLYCOL 3350 1 PACKET: 17 POWDER, FOR SOLUTION ORAL at 06:12

## 2019-10-04 RX ADMIN — RIVAROXABAN 15 MG: 15 TABLET, FILM COATED ORAL at 09:14

## 2019-10-04 RX ADMIN — SENNOSIDES, DOCUSATE SODIUM 2 TABLET: 50; 8.6 TABLET, FILM COATED ORAL at 17:25

## 2019-10-04 RX ADMIN — THERA TABS 1 TABLET: TAB at 06:09

## 2019-10-04 ASSESSMENT — COGNITIVE AND FUNCTIONAL STATUS - GENERAL
MOVING FROM LYING ON BACK TO SITTING ON SIDE OF FLAT BED: A LITTLE
TURNING FROM BACK TO SIDE WHILE IN FLAT BAD: A LITTLE
MOBILITY SCORE: 17
WALKING IN HOSPITAL ROOM: A LITTLE
STANDING UP FROM CHAIR USING ARMS: A LITTLE
SUGGESTED CMS G CODE MODIFIER MOBILITY: CK
CLIMB 3 TO 5 STEPS WITH RAILING: A LOT
MOVING TO AND FROM BED TO CHAIR: A LITTLE

## 2019-10-04 ASSESSMENT — ENCOUNTER SYMPTOMS
COUGH: 0
ORTHOPNEA: 0
ABDOMINAL PAIN: 0
NAUSEA: 0
NERVOUS/ANXIOUS: 1
HEARTBURN: 0
MYALGIAS: 0
FEVER: 0
FOCAL WEAKNESS: 0
WEAKNESS: 1
NECK PAIN: 0
DIARRHEA: 0
VOMITING: 0
DIAPHORESIS: 0
DOUBLE VISION: 0
TINGLING: 1
SENSORY CHANGE: 0
INSOMNIA: 1
HALLUCINATIONS: 0
PALPITATIONS: 0
SPEECH CHANGE: 0
HEMOPTYSIS: 0
BACK PAIN: 0
TREMORS: 0
BLURRED VISION: 0

## 2019-10-04 ASSESSMENT — GAIT ASSESSMENTS
GAIT LEVEL OF ASSIST: MINIMAL ASSIST
DEVIATION: SHUFFLED GAIT
DISTANCE (FEET): 10
ASSISTIVE DEVICE: FRONT WHEEL WALKER

## 2019-10-04 ASSESSMENT — LIFESTYLE VARIABLES: SUBSTANCE_ABUSE: 1

## 2019-10-04 NOTE — CARE PLAN
Problem: Safety  Goal: Will remain free from injury  Outcome: PROGRESSING AS EXPECTED  Patient's risk for injury and falls assessed. Appropriate safety precautions in place. Patient educated to utilize call light for needs. Patient verbalizes understanding.      Problem: Psychosocial Needs:  Goal: Level of anxiety will decrease  Outcome: PROGRESSING AS EXPECTED   Patient is assessed appropriately. Patient is helped to identify factors for stress and anxiety. Patient is taught to deep breathe and other forms of stress/anxiety management. Patient verbalizes understanding. Will continue to monitor.

## 2019-10-04 NOTE — PROGRESS NOTES
Bedside report received, assumed pt care. Pt resting in bed, no signs of pain or distress at this time. Mother at bedside as 1:1 sitter. POC updated and questions answered. Tele monitor on, safety precautions in place, call light in reach. Will continue to monitor.

## 2019-10-04 NOTE — DISCHARGE PLANNING
LESA received word back from [facility] unable to accept as patient is on active legal hold.     LESA texted both Dr. Sandoval and Dr. Painter to inform them that [facility] needs legal hold clearance in order to move forward with placement. Awaiting response from physicians.

## 2019-10-04 NOTE — DISCHARGE PLANNING
SW contacted [facility] to follow up on acceptance. Admission reports they declined patient.     Patient is declined at ALL SNF's in the area. SW to follow up with attending and review chart once psychiatry reassesses today.

## 2019-10-04 NOTE — PROGRESS NOTES
Patient's mom came to ask this RN for ativan for patient's muscle spasms. Patient assessed by this RN. Dr. Madina kent. Received orders to administer 1mg of ativan IV once. Medicated per MAR. Reassessed patient 15 minutes following. Patient resting comfortably.

## 2019-10-04 NOTE — PROGRESS NOTES
Hospital Medicine Daily Progress Note    Date of Service  10/3/2019    Chief Complaint  34 y.o. male admitted 9/19/2019 with hallucinations    Hospital Course    Past medical history of depression, PTSD, anxiety, substance abuse of mushrooms, nitrous oxide, marijuana, alcohol, paroxysmal A. fib, recent diagnosis of PE May 2019.  He presented with hallucinations and had stopped taking his medications including Xarelto.  He had also fallen multiple times.  He was found hypoxic and CTA showed extensive acute bilateral pulmonary emboli with saddle embolus.  He was admitted to the ICU and felt his risk of intracranial hemorrhage outweighed submassive dose of alteplase or EKOS.  He was started on heparin infusion.  Lower extremity Doppler was negative for DVT.  TTE showed severe right heart strain.  Patient remained stabilized and transitioned to Xarelto.  Psychiatry was consulted for his hallucinations and he was started on Risperdal.  However he developed neurological changes concerning for NMS and Risperdal was stopped and he was given Cogentin with some improvement.  Psychiatry started him on Seroquel and has been titrating medication.  He was hypertensive requiring IV medications so he was started on a lower lower dose of lisinopril and his atenolol was stopped, given his right heart strain.  He developed leukocytosis and fever.  Urine culture grew coag negative staph and he was started on course of Bactrim.  Srivastava catheter was removed but he failed voiding trial and was replaced.  He is continued on Flomax.  PT OT evaluated him and recommended SNF.      Interval Problem Update  Patient says he feels much better today.  His hallucinations have improved, though he still feels very anxious.  He is off oxygen and denies shortness of breath.  He denies abdominal pain.  He feels less diaphoretic.  Mother is worried about him going to rehab since she will not be able to stay with him.  10/1: Patient became anxious when I  entered the room with the nursing staff.  Stated that does not like to many people around him.  Saturating well on room air.  Stated that he was unable to sleep well last night.  Afebrile overnight.  No issues overnight per staff.  10/2: Resting comfortably in bed.  Stated that he could not sleep sleep well last night.  Still feeling anxious.  Leukocytosis trending down.  Afebrile overnight.  Tolerating p.o. well.  No nausea or vomiting.  Had multiple small bowel movements yesterday after prune juice and stool softeners.  10/3: Resting comfortably in bed.  Looks slightly anxious.  Had another episode of head shaking relieved by 1 dose of Ativan.  Patient has a lot of psychiatric concern and questions which I rely to the psychiatrist who will stop by and answer those questions for the patient.  Otherwise no new issues to report.  Consultants/Specialty  Psychiatry  Critical care  Pulmonology    Code Status  Full Code      Disposition  Case coordination to discuss discharge planning with mother  SNF once cleared by psychiatry  Will need anticoagulation clinic and follow-up with pulmonology    Review of Systems  Review of Systems   Constitutional: Positive for malaise/fatigue. Negative for chills, diaphoresis and fever.   HENT: Negative for congestion, ear discharge, ear pain, hearing loss, nosebleeds, sinus pain, sore throat and tinnitus.    Eyes: Negative for blurred vision, double vision and pain.   Respiratory: Negative for cough, hemoptysis and shortness of breath.    Cardiovascular: Negative for chest pain, palpitations, orthopnea and claudication.   Gastrointestinal: Negative for abdominal pain, blood in stool, constipation, diarrhea, heartburn, nausea and vomiting.   Genitourinary: Negative for dysuria and urgency.   Musculoskeletal: Negative for back pain, myalgias and neck pain.        Muscle spasm   Neurological: Positive for tingling and weakness. Negative for tremors, sensory change and focal weakness.    Psychiatric/Behavioral: Positive for substance abuse. Negative for hallucinations. The patient is nervous/anxious and has insomnia.         Physical Exam  Temp:  [36.2 °C (97.1 °F)-36.9 °C (98.4 °F)] 36.2 °C (97.1 °F)  Pulse:  [84-95] 85  Resp:  [18-20] 20  BP: (100-134)/(56-84) 101/61  SpO2:  [92 %-94 %] 92 %    Physical Exam   Constitutional: He is oriented to person, place, and time. No distress.   Obesity   HENT:   Head: Normocephalic and atraumatic.   Eyes: Pupils are equal, round, and reactive to light. Conjunctivae are normal.   Neck: No JVD present. No tracheal deviation present.   Cardiovascular: Normal rate and regular rhythm. Exam reveals no gallop and no friction rub.   Pulmonary/Chest: He has no wheezes. He has no rales.   Mildly tachypneic and grunting due to anxiety per mother, diminished breath sounds throughout   Abdominal: Soft. Bowel sounds are normal. He exhibits no distension (Mild to moderate). There is no tenderness.   Musculoskeletal: He exhibits no edema.   Neurological: He is alert and oriented to person, place, and time.   4 out of 5 strength upper and lower extremities   Skin: Skin is warm. He is not diaphoretic.   Decreased macular rash isolated to his backside.   Psychiatric: His mood appears anxious. Thought content is paranoid. He expresses impulsivity.   Nursing note and vitals reviewed.      Fluids    Intake/Output Summary (Last 24 hours) at 10/3/2019 1746  Last data filed at 10/3/2019 1251  Gross per 24 hour   Intake 1275 ml   Output 3400 ml   Net -2125 ml       Laboratory  Recent Labs     10/01/19  0517 10/02/19  1132 10/03/19  0522   WBC 15.1* 14.1* 9.3   RBC 4.63* 4.60* 4.47*   HEMOGLOBIN 14.5 14.0 14.0   HEMATOCRIT 43.5 43.3 42.8   MCV 94.0 94.1 95.7   MCH 31.3 30.4 31.3   MCHC 33.3* 32.3* 32.7*   RDW 61.9* 61.9* 61.2*   PLATELETCT 276 327 325   MPV 9.1 8.7* 8.4*     Recent Labs     10/03/19  0522   SODIUM 139   POTASSIUM 4.5   CHLORIDE 104   CO2 25   GLUCOSE 89   BUN 14  "  CREATININE 0.87   CALCIUM 9.2                   Imaging  DX-CHEST-PORTABLE (1 VIEW)   Final Result         No acute cardiac or pulmonary abnormality is identified.      DX-CHEST-PORTABLE (1 VIEW)   Final Result      Mild lung base atelectasis with edema not excluded.      DX-CHEST-PORTABLE (1 VIEW)   Final Result         1.  Pulmonary edema and/or infiltrates.   2.  Cardiomegaly      MR-BRAIN-W/O   Final Result      1.  No acute intracranial abnormality.   2.  Sphenoid and posterior right ethmoid sinus disease.      US-EXTREMITY VENOUS LOWER BILAT   Final Result      EC-ECHOCARDIOGRAM LTD W/O CONT   Final Result      CT-CTA CHEST PULMONARY ARTERY W/ RECONS   Final Result   Addendum 1 of 1   These findings were discussed with HEATHER MELGOZA on 9/19/2019 2:16 PM.      Final      DX-CHEST-PORTABLE (1 VIEW)   Final Result      No acute cardiopulmonary abnormality.      CT-HEAD W/O   Final Result      No CT evidence of acute infarct, hemorrhage or mass. No acute intracranial injury.           Assessment/Plan  * Acute saddle pulmonary embolism (HCC)- (present on admission)  Assessment & Plan  Recurrent, in the setting of noncompliance with anticoagulation therapy  CTA PE revealing \"Extensive acute bilateral pulmonary emboli with saddle embolus noted, as well as findings concerning for RV strain\"  Recurrence likely secondary to poor compliance of medication  Heparin drip transitioned to Xarelto  Echocardiogram with RV strain, caution aggressive blood pressure changes  DVT study negative    Acute hypoxemic respiratory failure (HCC)  Assessment & Plan  Secondary acute pulmonary embolism  Wean off oxygen as tolerated    Paroxysmal atrial fibrillation (HCC)- (present on admission)  Assessment & Plan  10/1: I restarted his home dose Tenormin.  Continue Xarelto.  Rate is controlled.    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  MRI brain negative    ETOH abuse- (present on admission)  Assessment & Plan  History of, monitor for withdrawal    Acute cystitis- (present on admission)  Assessment & Plan  Urine culture grew strep epidermidis sensitive to Bactrim.  Leucocytosis resolved     Fever  Assessment & Plan  9/28 - Has had leukocytosis, now fever 100.7. He has no specific symptoms. CXR and urine ordered  9/29 - Continue ceftriaxone, urine culture pending.  Srivastava cath was changed  9/30 -urine growing coag negative staph change to complete course of Bactrim    Weakness  Assessment & Plan  I spoke with psychiatry, who notes patient's current weakness has progressed from when he first admitted.  CPK was normal  Psychiatry stopped risperidone, had some improvement with Cogentin  Possible toxicity from the nitrous oxide.  I spoke with Dr. Byrd with neurology, he agreed with continuing with vitamin B12 supplementation.  He can have EMG testing done as outpatient after 2 weeks of stopping nitrous oxide.  PT OT ordered for reevaluation, plan for SNF    Vitamin B12 deficiency  Assessment & Plan  With reported peripheral tingling  Supplement ordered    Type 2 diabetes mellitus without complication, without long-term current use of insulin ([facility])  Assessment & Plan  New diagnosis, A1c 6.6%  Diabetes education ordered  Could be contributing to peripheral neuropathy    Slow transit constipation  Assessment & Plan  Daily Senokot and MiraLAX  Bowel regimen ordered  Enema PRN  9/26 -ongoing abdominal pain, KUB ordered  9/27 patient declines KUB today  9/28 - unable to do KUB portable apparently  9/29 - improving with enema  10/1: No BM for few days. Enema today.   10/2: Multiple small bowel movements yesterday with prune juice and stool softeners.    ALLI (obstructive sleep apnea)  Assessment & Plan  History  of, pulmonary following    Morbid obesity (HCC)  Assessment & Plan  Outpatient weight loss program would be indicated    Pulmonary hypertension (HCC)- (present on admission)  Assessment & Plan  Known history of, now with recurrent PE  Echocardiogram noted    Anxiety- (present on admission)  Assessment & Plan  On Librium previously  Now uncontrolled  Psychiatry evaluating, he was placed on a hold.  Bedside sitter as needed.  9/25 risperdal stopped for possible NMS, his anxiety is now increased  Psych adjusting seroquel and cogentin.   Dr. Sandoval to see patient tomorrow    Essential hypertension- (present on admission)  Assessment & Plan  Blood pressure has been stable.  Continue to monitor.  Plan of care discussed with multidisciplinary team during rounds.    VTE prophylaxis: xarelto

## 2019-10-04 NOTE — PROGRESS NOTES
Received Bedside report. Assumed care at 2000. This pt is AOx4, ambulatory with x1 assist & FWW, voiding adequately via manzano, reports generalized pain, medication given per MAR. Patient and RN discussed plan of care: questions answered. Labs noted, assessment complete, patient tolerating reg diet. Pt is medical. Pt is on RA. Call light in place, fall precautions in place, patient educated on importance of calling for assistance. No additional needs at this time. VSS Mother at bedside

## 2019-10-04 NOTE — CARE PLAN
Problem: Communication  Goal: The ability to communicate needs accurately and effectively will improve  Outcome: PROGRESSING AS EXPECTED  Intervention: Reorient patient to environment as needed  Flowsheets (Taken 10/4/2019 9945)  Oriented to:: All of the Following : Location of Bathroom, Visiting Policy, Unit Routine, Call Light and Bedside Controls, Bedside Rail Policy, Smoking Policy, Rights and Responsibilities, Bedside Report, and Patient Education Notebook     Problem: Urinary Elimination:  Goal: Ability to reestablish a normal urinary elimination pattern will improve  Outcome: PROGRESSING SLOWER THAN EXPECTED  Note:   Pt has manzano in place with active order for retention. Pt educated on bladder training. Pt and mother hesitant to remove manzano and begin bladder training. RN agreed to return at later time to revisit this.

## 2019-10-04 NOTE — DISCHARGE PLANNING
LESA informed Dr. Sandoval that patient is denied all SNF's. Discussed that patient would possibly qualify for Rehab at [facility]. LESA updated attending, Dr. Painter who also agreed is appropriate based on medical needs.   LESA discussed with patient and his mother. Informed them that all SNFs denied. Informed them that both Dr. Sandoval and Dr. Painter recommend now, moving forward with rehab referral.    Patient will not sign documents based on paranoia, but did agree to send referral.   Faxed to [facility] [phone]
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
## 2019-10-05 LAB
ALBUMIN SERPL BCP-MCNC: 4.1 G/DL (ref 3.2–4.9)
ALBUMIN/GLOB SERPL: 1.4 G/DL
ALP SERPL-CCNC: 59 U/L (ref 30–99)
ALT SERPL-CCNC: 37 U/L (ref 2–50)
ANION GAP SERPL CALC-SCNC: 12 MMOL/L (ref 0–11.9)
AST SERPL-CCNC: 13 U/L (ref 12–45)
BASOPHILS # BLD AUTO: 0.9 % (ref 0–1.8)
BASOPHILS # BLD: 0.06 K/UL (ref 0–0.12)
BILIRUB SERPL-MCNC: 0.4 MG/DL (ref 0.1–1.5)
BUN SERPL-MCNC: 12 MG/DL (ref 8–22)
CALCIUM SERPL-MCNC: 9.3 MG/DL (ref 8.5–10.5)
CHLORIDE SERPL-SCNC: 104 MMOL/L (ref 96–112)
CO2 SERPL-SCNC: 19 MMOL/L (ref 20–33)
CREAT SERPL-MCNC: 0.8 MG/DL (ref 0.5–1.4)
EOSINOPHIL # BLD AUTO: 0.13 K/UL (ref 0–0.51)
EOSINOPHIL NFR BLD: 2 % (ref 0–6.9)
ERYTHROCYTE [DISTWIDTH] IN BLOOD BY AUTOMATED COUNT: 59.1 FL (ref 35.9–50)
GLOBULIN SER CALC-MCNC: 3 G/DL (ref 1.9–3.5)
GLUCOSE SERPL-MCNC: 101 MG/DL (ref 65–99)
HCT VFR BLD AUTO: 46.7 % (ref 42–52)
HGB BLD-MCNC: 14.8 G/DL (ref 14–18)
IMM GRANULOCYTES # BLD AUTO: 0.04 K/UL (ref 0–0.11)
IMM GRANULOCYTES NFR BLD AUTO: 0.6 % (ref 0–0.9)
LYMPHOCYTES # BLD AUTO: 1.71 K/UL (ref 1–4.8)
LYMPHOCYTES NFR BLD: 26.7 % (ref 22–41)
MCH RBC QN AUTO: 29.7 PG (ref 27–33)
MCHC RBC AUTO-ENTMCNC: 31.7 G/DL (ref 33.7–35.3)
MCV RBC AUTO: 93.6 FL (ref 81.4–97.8)
MONOCYTES # BLD AUTO: 0.93 K/UL (ref 0–0.85)
MONOCYTES NFR BLD AUTO: 14.5 % (ref 0–13.4)
NEUTROPHILS # BLD AUTO: 3.54 K/UL (ref 1.82–7.42)
NEUTROPHILS NFR BLD: 55.3 % (ref 44–72)
NRBC # BLD AUTO: 0 K/UL
NRBC BLD-RTO: 0 /100 WBC
PLATELET # BLD AUTO: 302 K/UL (ref 164–446)
PMV BLD AUTO: 8.3 FL (ref 9–12.9)
POTASSIUM SERPL-SCNC: 4.7 MMOL/L (ref 3.6–5.5)
PROT SERPL-MCNC: 7.1 G/DL (ref 6–8.2)
RBC # BLD AUTO: 4.99 M/UL (ref 4.7–6.1)
SODIUM SERPL-SCNC: 135 MMOL/L (ref 135–145)
WBC # BLD AUTO: 6.4 K/UL (ref 4.8–10.8)

## 2019-10-05 PROCEDURE — A9270 NON-COVERED ITEM OR SERVICE: HCPCS | Performed by: FAMILY MEDICINE

## 2019-10-05 PROCEDURE — 85025 COMPLETE CBC W/AUTO DIFF WBC: CPT

## 2019-10-05 PROCEDURE — 700102 HCHG RX REV CODE 250 W/ 637 OVERRIDE(OP): Performed by: PSYCHIATRY & NEUROLOGY

## 2019-10-05 PROCEDURE — 700102 HCHG RX REV CODE 250 W/ 637 OVERRIDE(OP): Performed by: HOSPITALIST

## 2019-10-05 PROCEDURE — A9270 NON-COVERED ITEM OR SERVICE: HCPCS | Performed by: HOSPITALIST

## 2019-10-05 PROCEDURE — A9270 NON-COVERED ITEM OR SERVICE: HCPCS | Performed by: INTERNAL MEDICINE

## 2019-10-05 PROCEDURE — 51798 US URINE CAPACITY MEASURE: CPT

## 2019-10-05 PROCEDURE — 700102 HCHG RX REV CODE 250 W/ 637 OVERRIDE(OP): Performed by: INTERNAL MEDICINE

## 2019-10-05 PROCEDURE — 99231 SBSQ HOSP IP/OBS SF/LOW 25: CPT | Performed by: FAMILY MEDICINE

## 2019-10-05 PROCEDURE — 36415 COLL VENOUS BLD VENIPUNCTURE: CPT

## 2019-10-05 PROCEDURE — 80053 COMPREHEN METABOLIC PANEL: CPT

## 2019-10-05 PROCEDURE — A9270 NON-COVERED ITEM OR SERVICE: HCPCS | Performed by: PSYCHIATRY & NEUROLOGY

## 2019-10-05 PROCEDURE — 770001 HCHG ROOM/CARE - MED/SURG/GYN PRIV*

## 2019-10-05 PROCEDURE — 700102 HCHG RX REV CODE 250 W/ 637 OVERRIDE(OP): Performed by: FAMILY MEDICINE

## 2019-10-05 RX ADMIN — RIVAROXABAN 15 MG: 15 TABLET, FILM COATED ORAL at 09:39

## 2019-10-05 RX ADMIN — POLYETHYLENE GLYCOL 3350 1 PACKET: 17 POWDER, FOR SOLUTION ORAL at 05:53

## 2019-10-05 RX ADMIN — LISINOPRIL 10 MG: 10 TABLET ORAL at 05:54

## 2019-10-05 RX ADMIN — QUETIAPINE FUMARATE 100 MG: 100 TABLET ORAL at 09:39

## 2019-10-05 RX ADMIN — THERA TABS 1 TABLET: TAB at 05:54

## 2019-10-05 RX ADMIN — ATENOLOL 100 MG: 25 TABLET ORAL at 05:53

## 2019-10-05 RX ADMIN — MICONAZOLE NITRATE: 20 CREAM TOPICAL at 19:19

## 2019-10-05 RX ADMIN — FOLIC ACID 1 MG: 1 TABLET ORAL at 05:54

## 2019-10-05 RX ADMIN — QUETIAPINE FUMARATE 100 MG: 25 TABLET ORAL at 00:21

## 2019-10-05 RX ADMIN — ACETAMINOPHEN 650 MG: 325 TABLET, FILM COATED ORAL at 20:28

## 2019-10-05 RX ADMIN — ACETAMINOPHEN 650 MG: 325 TABLET, FILM COATED ORAL at 09:44

## 2019-10-05 RX ADMIN — SENNOSIDES, DOCUSATE SODIUM 2 TABLET: 50; 8.6 TABLET, FILM COATED ORAL at 05:54

## 2019-10-05 RX ADMIN — MAGNESIUM HYDROXIDE 30 ML: 400 SUSPENSION ORAL at 09:43

## 2019-10-05 RX ADMIN — SENNOSIDES, DOCUSATE SODIUM 2 TABLET: 50; 8.6 TABLET, FILM COATED ORAL at 17:39

## 2019-10-05 RX ADMIN — RIVAROXABAN 15 MG: 15 TABLET, FILM COATED ORAL at 17:40

## 2019-10-05 RX ADMIN — NYSTATIN: 100000 POWDER TOPICAL at 05:53

## 2019-10-05 RX ADMIN — TAMSULOSIN HYDROCHLORIDE 0.4 MG: 0.4 CAPSULE ORAL at 09:39

## 2019-10-05 RX ADMIN — QUETIAPINE FUMARATE 300 MG: 200 TABLET ORAL at 20:28

## 2019-10-05 RX ADMIN — NYSTATIN: 100000 POWDER TOPICAL at 19:19

## 2019-10-05 RX ADMIN — QUETIAPINE FUMARATE 100 MG: 100 TABLET ORAL at 15:29

## 2019-10-05 ASSESSMENT — ENCOUNTER SYMPTOMS
BRUISES/BLEEDS EASILY: 0
PALPITATIONS: 0
TREMORS: 0
FOCAL WEAKNESS: 1
SENSORY CHANGE: 0
NAUSEA: 0
NECK PAIN: 0
WEIGHT LOSS: 0
HEARTBURN: 0
COUGH: 0
HALLUCINATIONS: 1
ABDOMINAL PAIN: 0
FEVER: 0
ORTHOPNEA: 0
SHORTNESS OF BREATH: 0
PHOTOPHOBIA: 0
BACK PAIN: 0
DIZZINESS: 1
SENSORY CHANGE: 1
CONSTIPATION: 0
MEMORY LOSS: 1
MYALGIAS: 0
DOUBLE VISION: 0
BLURRED VISION: 0
WEAKNESS: 1
SORE THROAT: 0
HALLUCINATIONS: 0
CLAUDICATION: 0
TINGLING: 1
CHILLS: 0
HEMOPTYSIS: 0
DIARRHEA: 0
SPEECH CHANGE: 0
FOCAL WEAKNESS: 0
INSOMNIA: 1
DEPRESSION: 1
VOMITING: 0
NERVOUS/ANXIOUS: 1
DIAPHORESIS: 0

## 2019-10-05 ASSESSMENT — LIFESTYLE VARIABLES: SUBSTANCE_ABUSE: 1

## 2019-10-05 NOTE — PROGRESS NOTES
Bedside report received, assumed pt care. Pt and mother at bedside requested that they not be disturbed until 0900 as pt was up most of the night working on bladder training, which ultimately resulted in a new manzano being placed this morning by night shift RN. Pt shows no signs of distress and has no complaints of pain at this time. Bed in low/locked position, safety precautions in place, call light in reach.

## 2019-10-05 NOTE — CARE PLAN
Problem: Mobility  Goal: Risk for activity intolerance will decrease  Outcome: PROGRESSING AS EXPECTED  Note:   Pt stating he was feeling more energetic today and asked to go for a walk. RN assisted pt on short walk around room and hallway.      Problem: Psychosocial Needs:  Goal: Level of anxiety will decrease  Outcome: PROGRESSING SLOWER THAN EXPECTED  Flowsheets (Taken 10/5/2019 0800)  Patient Behaviors: Anxious;Fearful;Flat Affect;Tearful  Note:   Pt encouraged to share his feelings and become more involved in his care and recovery.

## 2019-10-05 NOTE — WOUND TEAM
Wound consult placed for evaluation for multiple wounds.  Per photos and assessment, scratch on abdomen and crusted area posterior shoulder are resolved with skin intact.  Patient continues with yeast rash bilateral groin and perineum.  Nystatin powder in place but cream might be more effective to stick to skin area.  Discussed with staff RN who agrees and order placed.  No advanced wound care needs identified.

## 2019-10-05 NOTE — PROGRESS NOTES
Report received from day shift nurse. Assessment complete. Patient A&Ox4. Patient denies any discomfort at this time. Call light and belongings within reach. Bed in low position and treaded slippers on patient. Patient resting comfortably in bed. Will continue to monitor hourly.

## 2019-10-05 NOTE — PSYCHIATRY
PSYCHIATRIC FOLLOW UP:    Reason for Admission: AMS, nitrous poisoning   Reason for consult: Psychiatric Issues, Paranoia   Requesting Physician: Edwardo Dillon M.D.    HPI:     Patient is a 34 y.o. male with history of MDD, PTSD, alcohol use disorder, and pulmonary embolism who presents to the hospital after heavy use of mushrooms and nitrous oxide resulting in paranoid delusions, AMS, B12 deficiency, paralysis which is currently all resolving.     Today he is anxious but improving. Still does much better with mom present, when mom isn't present he gets more paranoid about staff, this is improving and I'm encouraging mom to take more breaks away from bedside. He didn't sleep last night. He gets very tired after seroquel but is getting it at 6pm. Adding another dose and adjusting the schedule.     He is still retaining urine and having significant issues with constipation. This could very well be from the nitrous toxicity, discussed with family that this may resolve at a slow rate. These symptoms predated the use of seroquel or cogentin. Discussed that eventually an agent other than seroquel would be preferred as it can worsen urinary retention and constipation, but at this time other agents were causing dystonia in the patient and due to his already decreased mobility (and subsequent need for cogentin anyway) seroquel will continue to be used, these symptoms will be treated, and eventually the hope is he will be able to be tapered off the seroquel.     He is no longer rigid, even with a higher dose of antipsychotic and no cogentin. He is able to sit up on his own. Strength in his arms is improving. He is having trouble following directions during neuro exam, which is more noticeable than his strength deficits even. He is still having trouble with cognitive impairment.     He has been able to walk with a walker. He is talking more normally. He is anxious. He is starting to be able to process the experience slightly  "more than previously, which is an improvement, and doesn't look perpetually terrified and internally stimulated any more.     He is currently off oxygen.         Review of Systems:  Review of Systems   Constitutional: Positive for malaise/fatigue. Negative for chills, diaphoresis, fever and weight loss.   HENT: Negative for ear pain, hearing loss and sore throat.    Eyes: Negative for blurred vision, double vision and photophobia.   Respiratory: Negative for cough, hemoptysis and shortness of breath.    Cardiovascular: Negative for chest pain and palpitations.   Gastrointestinal: Negative for constipation, nausea and vomiting.   Genitourinary: Negative for dysuria and urgency.   Musculoskeletal: Negative for myalgias and neck pain.   Skin: Negative for itching and rash.   Neurological: Positive for dizziness, sensory change, focal weakness and weakness. Negative for speech change.   Endo/Heme/Allergies: Negative for environmental allergies. Does not bruise/bleed easily.   Psychiatric/Behavioral: Positive for depression, hallucinations, memory loss and substance abuse. Negative for suicidal ideas. The patient is nervous/anxious and has insomnia.           Psychiatric Examination: observed phenomenon:  Vitals: /99   Pulse (!) 105   Temp 36.5 °C (97.7 °F) (Temporal)   Resp 18   Ht 1.727 m (5' 7.99\")   Wt 117 kg (257 lb 15 oz)   SpO2 96%   BMI 39.23 kg/m²  Body mass index is 39.23 kg/m².    Appearance: he is no longer diaphoretic. Grooming much improved. He is shaved  Muscle Strength/Tone: psychomotor retardation. Strength improving. Tone wnl, no rigidity.   Gait/Station: able to walk with assistance and walker.   Speech: more fluent, nl tone. Sparse at times   Thought Process: more logical than he has been. Has trouble concentrating   Associations:no loose associations   Abnormal/Psychotic Thoughts (ex):denies a/vh. +delusions.   Insight/Judgement:improving   Orientation:oriented to self, place, month "   Memory:impaired   Attention/Concentration:impaired   Language:fluent   Fund of Knowledge wnl   Mood:          Anxious   Affect:         More normalized   SI/HI:   Denies   Neurological Testing:( ie clock, cube drawing, MMSE, MOCA,etc.)    Soc history:    Declined from SNFs     Lab results/tests:   Recent Results (from the past 48 hour(s))   CBC WITH DIFFERENTIAL    Collection Time: 10/04/19  9:31 AM   Result Value Ref Range    WBC 10.6 4.8 - 10.8 K/uL    RBC 4.98 4.70 - 6.10 M/uL    Hemoglobin 15.4 14.0 - 18.0 g/dL    Hematocrit 47.8 42.0 - 52.0 %    MCV 96.0 81.4 - 97.8 fL    MCH 30.9 27.0 - 33.0 pg    MCHC 32.2 (L) 33.7 - 35.3 g/dL    RDW 60.2 (H) 35.9 - 50.0 fL    Platelet Count 321 164 - 446 K/uL    MPV 8.4 (L) 9.0 - 12.9 fL    Neutrophils-Polys 83.80 (H) 44.00 - 72.00 %    Lymphocytes 8.60 (L) 22.00 - 41.00 %    Monocytes 6.10 0.00 - 13.40 %    Eosinophils 0.30 0.00 - 6.90 %    Basophils 0.50 0.00 - 1.80 %    Immature Granulocytes 0.70 0.00 - 0.90 %    Nucleated RBC 0.00 /100 WBC    Neutrophils (Absolute) 8.89 (H) 1.82 - 7.42 K/uL    Lymphs (Absolute) 0.91 (L) 1.00 - 4.80 K/uL    Monos (Absolute) 0.65 0.00 - 0.85 K/uL    Eos (Absolute) 0.03 0.00 - 0.51 K/uL    Baso (Absolute) 0.05 0.00 - 0.12 K/uL    Immature Granulocytes (abs) 0.07 0.00 - 0.11 K/uL    NRBC (Absolute) 0.00 K/uL   Comp Metabolic Panel    Collection Time: 10/04/19  9:31 AM   Result Value Ref Range    Sodium 130 (L) 135 - 145 mmol/L    Potassium 4.0 3.6 - 5.5 mmol/L    Chloride 100 96 - 112 mmol/L    Co2 17 (L) 20 - 33 mmol/L    Anion Gap 13.0 (H) 0.0 - 11.9    Glucose 107 (H) 65 - 99 mg/dL    Bun 21 8 - 22 mg/dL    Creatinine 0.82 0.50 - 1.40 mg/dL    Calcium 9.0 8.5 - 10.5 mg/dL    AST(SGOT) 14 12 - 45 U/L    ALT(SGPT) 45 2 - 50 U/L    Alkaline Phosphatase 63 30 - 99 U/L    Total Bilirubin 0.6 0.1 - 1.5 mg/dL    Albumin 4.5 3.2 - 4.9 g/dL    Total Protein 7.4 6.0 - 8.2 g/dL    Globulin 2.9 1.9 - 3.5 g/dL    A-G Ratio 1.6 g/dL   ESTIMATED GFR     Collection Time: 10/04/19  9:31 AM   Result Value Ref Range    GFR If African American >60 >60 mL/min/1.73 m 2    GFR If Non African American >60 >60 mL/min/1.73 m 2   CBC WITH DIFFERENTIAL    Collection Time: 10/05/19  5:15 AM   Result Value Ref Range    WBC 6.4 4.8 - 10.8 K/uL    RBC 4.99 4.70 - 6.10 M/uL    Hemoglobin 14.8 14.0 - 18.0 g/dL    Hematocrit 46.7 42.0 - 52.0 %    MCV 93.6 81.4 - 97.8 fL    MCH 29.7 27.0 - 33.0 pg    MCHC 31.7 (L) 33.7 - 35.3 g/dL    RDW 59.1 (H) 35.9 - 50.0 fL    Platelet Count 302 164 - 446 K/uL    MPV 8.3 (L) 9.0 - 12.9 fL    Neutrophils-Polys 55.30 44.00 - 72.00 %    Lymphocytes 26.70 22.00 - 41.00 %    Monocytes 14.50 (H) 0.00 - 13.40 %    Eosinophils 2.00 0.00 - 6.90 %    Basophils 0.90 0.00 - 1.80 %    Immature Granulocytes 0.60 0.00 - 0.90 %    Nucleated RBC 0.00 /100 WBC    Neutrophils (Absolute) 3.54 1.82 - 7.42 K/uL    Lymphs (Absolute) 1.71 1.00 - 4.80 K/uL    Monos (Absolute) 0.93 (H) 0.00 - 0.85 K/uL    Eos (Absolute) 0.13 0.00 - 0.51 K/uL    Baso (Absolute) 0.06 0.00 - 0.12 K/uL    Immature Granulocytes (abs) 0.04 0.00 - 0.11 K/uL    NRBC (Absolute) 0.00 K/uL   Comp Metabolic Panel    Collection Time: 10/05/19  5:15 AM   Result Value Ref Range    Sodium 135 135 - 145 mmol/L    Potassium 4.7 3.6 - 5.5 mmol/L    Chloride 104 96 - 112 mmol/L    Co2 19 (L) 20 - 33 mmol/L    Anion Gap 12.0 (H) 0.0 - 11.9    Glucose 101 (H) 65 - 99 mg/dL    Bun 12 8 - 22 mg/dL    Creatinine 0.80 0.50 - 1.40 mg/dL    Calcium 9.3 8.5 - 10.5 mg/dL    AST(SGOT) 13 12 - 45 U/L    ALT(SGPT) 37 2 - 50 U/L    Alkaline Phosphatase 59 30 - 99 U/L    Total Bilirubin 0.4 0.1 - 1.5 mg/dL    Albumin 4.1 3.2 - 4.9 g/dL    Total Protein 7.1 6.0 - 8.2 g/dL    Globulin 3.0 1.9 - 3.5 g/dL    A-G Ratio 1.4 g/dL   ESTIMATED GFR    Collection Time: 10/05/19  5:15 AM   Result Value Ref Range    GFR If African American >60 >60 mL/min/1.73 m 2    GFR If Non African American >60 >60 mL/min/1.73 m 2  "    DX-CHEST-PORTABLE (1 VIEW)   Final Result         No acute cardiac or pulmonary abnormality is identified.      DX-CHEST-PORTABLE (1 VIEW)   Final Result      Mild lung base atelectasis with edema not excluded.      DX-CHEST-PORTABLE (1 VIEW)   Final Result         1.  Pulmonary edema and/or infiltrates.   2.  Cardiomegaly      MR-BRAIN-W/O   Final Result      1.  No acute intracranial abnormality.   2.  Sphenoid and posterior right ethmoid sinus disease.      US-EXTREMITY VENOUS LOWER BILAT   Final Result      EC-ECHOCARDIOGRAM LTD W/O CONT   Final Result      CT-CTA CHEST PULMONARY ARTERY W/ RECONS   Final Result   Addendum 1 of 1   These findings were discussed with HEATHER MELGOZA on 9/19/2019 2:16 PM.      Final      DX-CHEST-PORTABLE (1 VIEW)   Final Result      No acute cardiopulmonary abnormality.      CT-HEAD W/O   Final Result      No CT evidence of acute infarct, hemorrhage or mass. No acute intracranial injury.               Assessment:  Nitrous oxide abuse  Nitrous oxide toxicity   Hyponatremia , mild  Paresis, improving   Encephalopathy related to nitrous abuse, improving   Psychotic disorder, improving   Urinary retention   Constipation           Plan:  legal hold: continuing legal hold at this time. I don't think he is quite ready for rehab, especially if we are seeking a rehab where he has 3 hours of PT per day and speech.     1.  Asking mother to spend some time away from him several times per day so we can address and assess the anxiety he has when he isn't with her. He says he \"hyperventiilates\" and reassure him staff can handle it if he does. If he feels short of breath we will attend to him.     2. Increasing seroquel to tid dosing to cover daytime anxiety and paranoia, and to move the evening dose back to help him sleep.     3. Recommend monitoring sodium for now    4. He is off the catheter as a trial, discuss with them this may be an ongoing problem. Discuss the psychological aspects of " urinary retention and the long term solutions if needed. Once the paranoia improves more will start seroquel taper, but this did predate any psych meds. Discussed that rehab at [facility] is a good place to be should this continue as they are able to help develop a long term plan (self-catheterization vs indwelling cath, etc) tailored to the patient.     5. Family asks about constipation and the ramifications of it were it to continue. Discuss bowel programs, which may be a solution he needs for the short term, and the psychological aspects of this as they can be difficult, time consuming, but sometimes necessary. Cosme has added levels of shame and embarrassment about this as it seems to be due to the nitrous overdose and he is distressed he has brought about symptoms affecting his bowel and bladder; addressed this with family.     6. Increasing seroquel to 100qam, 100 q afternoon, and 200 q evening. Giving additional dose to help with sleep tonight.     7.  Discussed case with SW and hospitalist, who are seeking rehab at [facility], which I believe would be a good placement for him as psych can follow him there, they have cognitive/speech which the patient will need, and they are able to manage bowel/bladder issues with the most skill.     Will follow case.

## 2019-10-05 NOTE — PROGRESS NOTES
Hospital Medicine Daily Progress Note    Date of Service  10/4/2019    Chief Complaint  34 y.o. male admitted 9/19/2019 with hallucinations    Hospital Course    Past medical history of depression, PTSD, anxiety, substance abuse of mushrooms, nitrous oxide, marijuana, alcohol, paroxysmal A. fib, recent diagnosis of PE May 2019.  He presented with hallucinations and had stopped taking his medications including Xarelto.  He had also fallen multiple times.  He was found hypoxic and CTA showed extensive acute bilateral pulmonary emboli with saddle embolus.  He was admitted to the ICU and felt his risk of intracranial hemorrhage outweighed submassive dose of alteplase or EKOS.  He was started on heparin infusion.  Lower extremity Doppler was negative for DVT.  TTE showed severe right heart strain.  Patient remained stabilized and transitioned to Xarelto.  Psychiatry was consulted for his hallucinations and he was started on Risperdal.  However he developed neurological changes concerning for NMS and Risperdal was stopped and he was given Cogentin with some improvement.  Psychiatry started him on Seroquel and has been titrating medication.  He was hypertensive requiring IV medications so he was started on a lower lower dose of lisinopril and his atenolol was stopped, given his right heart strain.  He developed leukocytosis and fever.  Urine culture grew coag negative staph and he was started on course of Bactrim.  Srivastava catheter was removed but he failed voiding trial and was replaced.  He is continued on Flomax.  PT OT evaluated him and recommended SNF.      Interval Problem Update  Patient says he feels much better today.  His hallucinations have improved, though he still feels very anxious.  He is off oxygen and denies shortness of breath.  He denies abdominal pain.  He feels less diaphoretic.  Mother is worried about him going to rehab since she will not be able to stay with him.  10/1: Patient became anxious when I  entered the room with the nursing staff.  Stated that does not like to many people around him.  Saturating well on room air.  Stated that he was unable to sleep well last night.  Afebrile overnight.  No issues overnight per staff.  10/2: Resting comfortably in bed.  Stated that he could not sleep sleep well last night.  Still feeling anxious.  Leukocytosis trending down.  Afebrile overnight.  Tolerating p.o. well.  No nausea or vomiting.  Had multiple small bowel movements yesterday after prune juice and stool softeners.  10/3: Resting comfortably in bed.  Looks slightly anxious.  Had another episode of head shaking relieved by 1 dose of Ativan.  Patient has a lot of psychiatric concern and questions which I rely to the psychiatrist who will stop by and answer those questions for the patient.  Otherwise no new issues to report.  10/4: Resting comfortably in bed.  Had large bowel movement yesterday per nursing staff.  Still getting anxious occasionally.  Requesting Ativan.  No acute distress noted.  No issues overnight per staff.  Consultants/Specialty  Psychiatry  Critical care  Pulmonology    Code Status  Full Code      Disposition  Case coordination to discuss discharge planning with mother  SNF once cleared by psychiatry  Will need anticoagulation clinic and follow-up with pulmonology    Review of Systems  Review of Systems   Constitutional: Positive for malaise/fatigue. Negative for diaphoresis and fever.   HENT: Negative for congestion, ear discharge, ear pain, hearing loss, nosebleeds and tinnitus.    Eyes: Negative for blurred vision and double vision.   Respiratory: Negative for cough and hemoptysis.    Cardiovascular: Negative for chest pain, palpitations and orthopnea.   Gastrointestinal: Negative for abdominal pain, diarrhea, heartburn, nausea and vomiting.   Genitourinary: Negative for dysuria and urgency.   Musculoskeletal: Negative for back pain, myalgias and neck pain.        Muscle spasm    Neurological: Positive for tingling and weakness. Negative for tremors, sensory change, speech change and focal weakness.   Psychiatric/Behavioral: Positive for substance abuse. Negative for hallucinations. The patient is nervous/anxious and has insomnia.         Physical Exam  Temp:  [36.3 °C (97.3 °F)-37.1 °C (98.8 °F)] 36.7 °C (98.1 °F)  Pulse:  [] 108  Resp:  [18-20] 18  BP: (105-129)/(64-76) 129/76  SpO2:  [92 %-97 %] 94 %    Physical Exam   Constitutional: He is oriented to person, place, and time. No distress.   Obesity   HENT:   Head: Normocephalic and atraumatic.   Eyes: Pupils are equal, round, and reactive to light. Conjunctivae are normal. Right eye exhibits no discharge. Left eye exhibits no discharge.   Neck: No tracheal deviation present.   Cardiovascular: Normal rate and regular rhythm. Exam reveals no gallop and no friction rub.   Pulmonary/Chest: He has no wheezes.   Mildly tachypneic and grunting due to anxiety per mother, diminished breath sounds throughout   Abdominal: Soft. Bowel sounds are normal. He exhibits no distension (Mild to moderate).   Musculoskeletal: He exhibits no deformity.   Neurological: He is alert and oriented to person, place, and time.   4 out of 5 strength upper and lower extremities   Skin: Skin is warm. He is not diaphoretic.   Decreased macular rash isolated to his backside.   Psychiatric: His mood appears anxious. Thought content is paranoid. He expresses impulsivity.   Nursing note and vitals reviewed.      Fluids    Intake/Output Summary (Last 24 hours) at 10/4/2019 1740  Last data filed at 10/4/2019 1200  Gross per 24 hour   Intake 120 ml   Output 1200 ml   Net -1080 ml       Laboratory  Recent Labs     10/02/19  1132 10/03/19  0522 10/04/19  0931   WBC 14.1* 9.3 10.6   RBC 4.60* 4.47* 4.98   HEMOGLOBIN 14.0 14.0 15.4   HEMATOCRIT 43.3 42.8 47.8   MCV 94.1 95.7 96.0   MCH 30.4 31.3 30.9   MCHC 32.3* 32.7* 32.2*   RDW 61.9* 61.2* 60.2*   PLATELETCT 327 325 321  "  MPV 8.7* 8.4* 8.4*     Recent Labs     10/03/19  0522 10/04/19  0931   SODIUM 139 130*   POTASSIUM 4.5 4.0   CHLORIDE 104 100   CO2 25 17*   GLUCOSE 89 107*   BUN 14 21   CREATININE 0.87 0.82   CALCIUM 9.2 9.0                   Imaging  DX-CHEST-PORTABLE (1 VIEW)   Final Result         No acute cardiac or pulmonary abnormality is identified.      DX-CHEST-PORTABLE (1 VIEW)   Final Result      Mild lung base atelectasis with edema not excluded.      DX-CHEST-PORTABLE (1 VIEW)   Final Result         1.  Pulmonary edema and/or infiltrates.   2.  Cardiomegaly      MR-BRAIN-W/O   Final Result      1.  No acute intracranial abnormality.   2.  Sphenoid and posterior right ethmoid sinus disease.      US-EXTREMITY VENOUS LOWER BILAT   Final Result      EC-ECHOCARDIOGRAM LTD W/O CONT   Final Result      CT-CTA CHEST PULMONARY ARTERY W/ RECONS   Final Result   Addendum 1 of 1   These findings were discussed with HEATHER MELGOZA on 9/19/2019 2:16 PM.      Final      DX-CHEST-PORTABLE (1 VIEW)   Final Result      No acute cardiopulmonary abnormality.      CT-HEAD W/O   Final Result      No CT evidence of acute infarct, hemorrhage or mass. No acute intracranial injury.           Assessment/Plan  * Acute saddle pulmonary embolism (HCC)- (present on admission)  Assessment & Plan  Recurrent, in the setting of noncompliance with anticoagulation therapy  CTA PE revealing \"Extensive acute bilateral pulmonary emboli with saddle embolus noted, as well as findings concerning for RV strain\"  Recurrence likely secondary to poor compliance of medication  Heparin drip transitioned to Xarelto  Echocardiogram with RV strain, caution aggressive blood pressure changes  DVT study negative    Acute hypoxemic respiratory failure (HCC)  Assessment & Plan  Secondary acute pulmonary embolism  Wean off oxygen as tolerated    Paroxysmal atrial fibrillation (HCC)- (present on admission)  Assessment & Plan  10/1: I restarted his home dose " Tenormin.  Continue Xarelto.  Rate is controlled.    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  MRI brain negative    ETOH abuse- (present on admission)  Assessment & Plan  History of, monitor for withdrawal    Acute cystitis- (present on admission)  Assessment & Plan  Urine culture grew strep epidermidis sensitive to Bactrim.  Leucocytosis resolved     Fever  Assessment & Plan  9/28 - Has had leukocytosis, now fever 100.7. He has no specific symptoms. CXR and urine ordered  9/29 - Continue ceftriaxone, urine culture pending.  Srivastava cath was changed  9/30 -urine growing coag negative staph change to complete course of Bactrim  Resolved.    Weakness  Assessment & Plan  I spoke with psychiatry, who notes patient's current weakness has progressed from when he first admitted.  CPK was normal  Psychiatry stopped risperidone, had some improvement with Cogentin  Possible toxicity from the nitrous oxide.  I spoke with Dr. Byrd with neurology, he agreed with continuing with vitamin B12 supplementation.  He can have EMG testing done as outpatient after 2 weeks of stopping nitrous oxide.  PT OT ordered for reevaluation, plan for SNF    Vitamin B12 deficiency  Assessment & Plan  With reported peripheral tingling  Supplement ordered    Type 2 diabetes mellitus without complication, without long-term current use of insulin ([facility])  Assessment & Plan  New diagnosis, A1c 6.6%  Diabetes education ordered  Could be contributing to peripheral neuropathy    Slow transit constipation  Assessment & Plan  Daily Senokot and MiraLAX  Bowel regimen ordered  Enema PRN  9/26 -ongoing abdominal pain, KUB ordered  9/27 patient declines KUB today  9/28 - unable to do KUB portable apparently  9/29 -  improving with enema  10/1: No BM for few days. Enema today.   10/2: Multiple small bowel movements yesterday with prune juice and stool softeners.  10/4: Had large bowel movement last night.    ALLI (obstructive sleep apnea)  Assessment & Plan  History of, pulmonary following    Morbid obesity (HCC)  Assessment & Plan  Outpatient weight loss program would be indicated    Pulmonary hypertension (HCC)- (present on admission)  Assessment & Plan  Known history of, now with recurrent PE  Echocardiogram noted    Anxiety- (present on admission)  Assessment & Plan  On Librium previously  Now uncontrolled  Psychiatry evaluating, he was placed on a hold.  Bedside sitter as needed.  9/25 risperdal stopped for possible NMS, his anxiety is now increased  Psych adjusting seroquel and cogentin.   Dr. Sandoval to see patient tomorrow    Essential hypertension- (present on admission)  Assessment & Plan  Blood pressure has been stable.  Continue to monitor.  Plan of care discussed with multidisciplinary team during rounds.    VTE prophylaxis: xarelto

## 2019-10-05 NOTE — DISCHARGE PLANNING
[facility] Rehabilitation Transitional Care Coordination     Referral from:  Dr. Painter  Facesheet indicates: Toby provider   Potential Rehab Diagnosis: Debility     Chart review indicates patient does have on going medical management and does have therapy needs to possibly meet inpatient rehab facility criteria with the goal of returning to community. With family support          Physiatry consultation pended  per protocol.  Legal hold remains. Questionable tolerance to therapy intervention.            Thank you for referral.

## 2019-10-05 NOTE — THERAPY
"Pt continues to make consistent progress toward acute PT goals. Tolerated increased ambulation distance along with seated and standing ther ex. Pt remains very motivated to work with acute PT and demonstrated less anxiety throughout todays session, even with Mom stepping out for lunch. Pt will continue to benefit from acute PT interventions progress POC as appropriate.     Physical Therapy Treatment completed.   Bed Mobility:  Supine to Sit: Supervised  Transfers: Sit to Stand: Minimal Assist  Gait: Level Of Assist: Minimal Assist with Front-Wheel Walker   5x10-15 ft    Plan of Care: Will benefit from Physical Therapy 3 times per week  Discharge Recommendations: Equipment: Will Continue to Assess for Equipment Needs.     Post-acute therapy: Recommend post-acute placement for continued physical therapy services prior to discharge home. Patient can tolerate post-acute therapies at a 5x/week frequency.   Pt may benefit from intensive post acute rehab as he has continued to progress activity tolerance, consider a physiatry consult to assist DC planning as appropriate.        See \"Rehab Therapy-Acute\" Patient Summary Report for complete documentation.       "

## 2019-10-06 LAB
ALBUMIN SERPL BCP-MCNC: 4.1 G/DL (ref 3.2–4.9)
ALBUMIN/GLOB SERPL: 1.5 G/DL
ALP SERPL-CCNC: 55 U/L (ref 30–99)
ALT SERPL-CCNC: 35 U/L (ref 2–50)
ANION GAP SERPL CALC-SCNC: 10 MMOL/L (ref 0–11.9)
AST SERPL-CCNC: 15 U/L (ref 12–45)
BILIRUB SERPL-MCNC: 0.3 MG/DL (ref 0.1–1.5)
BUN SERPL-MCNC: 10 MG/DL (ref 8–22)
CALCIUM SERPL-MCNC: 9.2 MG/DL (ref 8.5–10.5)
CHLORIDE SERPL-SCNC: 103 MMOL/L (ref 96–112)
CO2 SERPL-SCNC: 22 MMOL/L (ref 20–33)
CREAT SERPL-MCNC: 0.67 MG/DL (ref 0.5–1.4)
GLOBULIN SER CALC-MCNC: 2.7 G/DL (ref 1.9–3.5)
GLUCOSE SERPL-MCNC: 110 MG/DL (ref 65–99)
POTASSIUM SERPL-SCNC: 3.9 MMOL/L (ref 3.6–5.5)
PROT SERPL-MCNC: 6.8 G/DL (ref 6–8.2)
SODIUM SERPL-SCNC: 135 MMOL/L (ref 135–145)

## 2019-10-06 PROCEDURE — 700102 HCHG RX REV CODE 250 W/ 637 OVERRIDE(OP): Performed by: HOSPITALIST

## 2019-10-06 PROCEDURE — 700102 HCHG RX REV CODE 250 W/ 637 OVERRIDE(OP): Performed by: PSYCHIATRY & NEUROLOGY

## 2019-10-06 PROCEDURE — 36415 COLL VENOUS BLD VENIPUNCTURE: CPT

## 2019-10-06 PROCEDURE — 700102 HCHG RX REV CODE 250 W/ 637 OVERRIDE(OP): Performed by: INTERNAL MEDICINE

## 2019-10-06 PROCEDURE — 80053 COMPREHEN METABOLIC PANEL: CPT

## 2019-10-06 PROCEDURE — 99231 SBSQ HOSP IP/OBS SF/LOW 25: CPT | Performed by: FAMILY MEDICINE

## 2019-10-06 PROCEDURE — A9270 NON-COVERED ITEM OR SERVICE: HCPCS | Performed by: INTERNAL MEDICINE

## 2019-10-06 PROCEDURE — 700102 HCHG RX REV CODE 250 W/ 637 OVERRIDE(OP): Performed by: STUDENT IN AN ORGANIZED HEALTH CARE EDUCATION/TRAINING PROGRAM

## 2019-10-06 PROCEDURE — A9270 NON-COVERED ITEM OR SERVICE: HCPCS | Performed by: FAMILY MEDICINE

## 2019-10-06 PROCEDURE — 700102 HCHG RX REV CODE 250 W/ 637 OVERRIDE(OP): Performed by: FAMILY MEDICINE

## 2019-10-06 PROCEDURE — A9270 NON-COVERED ITEM OR SERVICE: HCPCS | Performed by: PSYCHIATRY & NEUROLOGY

## 2019-10-06 PROCEDURE — 85025 COMPLETE CBC W/AUTO DIFF WBC: CPT

## 2019-10-06 PROCEDURE — A9270 NON-COVERED ITEM OR SERVICE: HCPCS | Performed by: HOSPITALIST

## 2019-10-06 PROCEDURE — 770001 HCHG ROOM/CARE - MED/SURG/GYN PRIV*

## 2019-10-06 PROCEDURE — A9270 NON-COVERED ITEM OR SERVICE: HCPCS | Performed by: STUDENT IN AN ORGANIZED HEALTH CARE EDUCATION/TRAINING PROGRAM

## 2019-10-06 RX ADMIN — ANTACID TABLETS 500 MG: 500 TABLET, CHEWABLE ORAL at 09:07

## 2019-10-06 RX ADMIN — POLYETHYLENE GLYCOL 3350 1 PACKET: 17 POWDER, FOR SOLUTION ORAL at 05:53

## 2019-10-06 RX ADMIN — FOLIC ACID 1 MG: 1 TABLET ORAL at 05:53

## 2019-10-06 RX ADMIN — GABAPENTIN 300 MG: 300 CAPSULE ORAL at 00:04

## 2019-10-06 RX ADMIN — ANTACID TABLETS 500 MG: 500 TABLET, CHEWABLE ORAL at 00:04

## 2019-10-06 RX ADMIN — ATENOLOL 100 MG: 25 TABLET ORAL at 05:53

## 2019-10-06 RX ADMIN — LISINOPRIL 10 MG: 10 TABLET ORAL at 05:53

## 2019-10-06 RX ADMIN — RIVAROXABAN 15 MG: 15 TABLET, FILM COATED ORAL at 09:03

## 2019-10-06 RX ADMIN — QUETIAPINE FUMARATE 100 MG: 100 TABLET ORAL at 09:04

## 2019-10-06 RX ADMIN — ANTACID TABLETS 500 MG: 500 TABLET, CHEWABLE ORAL at 20:16

## 2019-10-06 RX ADMIN — TAMSULOSIN HYDROCHLORIDE 0.4 MG: 0.4 CAPSULE ORAL at 09:05

## 2019-10-06 RX ADMIN — QUETIAPINE FUMARATE 100 MG: 100 TABLET ORAL at 16:29

## 2019-10-06 RX ADMIN — ACETAMINOPHEN 650 MG: 325 TABLET, FILM COATED ORAL at 20:16

## 2019-10-06 RX ADMIN — MICONAZOLE NITRATE: 20 CREAM TOPICAL at 03:57

## 2019-10-06 RX ADMIN — NYSTATIN: 100000 POWDER TOPICAL at 05:53

## 2019-10-06 RX ADMIN — NYSTATIN: 100000 POWDER TOPICAL at 18:37

## 2019-10-06 RX ADMIN — QUETIAPINE FUMARATE 300 MG: 200 TABLET ORAL at 20:02

## 2019-10-06 RX ADMIN — RIVAROXABAN 15 MG: 15 TABLET, FILM COATED ORAL at 18:37

## 2019-10-06 RX ADMIN — THERA TABS 1 TABLET: TAB at 05:53

## 2019-10-06 RX ADMIN — MICONAZOLE NITRATE: 20 CREAM TOPICAL at 18:37

## 2019-10-06 RX ADMIN — MAGNESIUM HYDROXIDE 30 ML: 400 SUSPENSION ORAL at 09:06

## 2019-10-06 RX ADMIN — GABAPENTIN 300 MG: 300 CAPSULE ORAL at 21:49

## 2019-10-06 RX ADMIN — SENNOSIDES, DOCUSATE SODIUM 2 TABLET: 50; 8.6 TABLET, FILM COATED ORAL at 05:53

## 2019-10-06 ASSESSMENT — ENCOUNTER SYMPTOMS
NECK PAIN: 0
TINGLING: 1
HEARTBURN: 0
FOCAL WEAKNESS: 0
HALLUCINATIONS: 0
MYALGIAS: 0
BLURRED VISION: 0
TREMORS: 0
DIARRHEA: 0
VOMITING: 0
WEAKNESS: 1
NAUSEA: 0
INSOMNIA: 1
CONSTIPATION: 0
COUGH: 0
PALPITATIONS: 0
ABDOMINAL PAIN: 0
SENSORY CHANGE: 0
SPEECH CHANGE: 0
DOUBLE VISION: 0
CLAUDICATION: 0
HEMOPTYSIS: 0
DIAPHORESIS: 0
NERVOUS/ANXIOUS: 1
ORTHOPNEA: 0
FEVER: 0
BACK PAIN: 0

## 2019-10-06 ASSESSMENT — LIFESTYLE VARIABLES: SUBSTANCE_ABUSE: 1

## 2019-10-06 NOTE — CARE PLAN
Problem: Communication  Goal: The ability to communicate needs accurately and effectively will improve  Outcome: PROGRESSING AS EXPECTED     Problem: Safety  Goal: Will remain free from injury  Outcome: PROGRESSING AS EXPECTED  Goal: Will remain free from falls  Outcome: PROGRESSING AS EXPECTED     Problem: Venous Thromboembolism (VTW)/Deep Vein Thrombosis (DVT) Prevention:  Goal: Patient will participate in Venous Thrombosis (VTE)/Deep Vein Thrombosis (DVT)Prevention Measures  Outcome: PROGRESSING AS EXPECTED     Problem: Pain Management  Goal: Pain level will decrease to patient's comfort goal  Outcome: PROGRESSING AS EXPECTED     Problem: Mobility  Goal: Risk for activity intolerance will decrease  Outcome: PROGRESSING AS EXPECTED     Problem: Psychosocial Needs:  Goal: Level of anxiety will decrease  Outcome: PROGRESSING SLOWER THAN EXPECTED     Problem: Urinary Elimination:  Goal: Ability to reestablish a normal urinary elimination pattern will improve  Outcome: PROGRESSING SLOWER THAN EXPECTED

## 2019-10-06 NOTE — PROGRESS NOTES
Hospital Medicine Daily Progress Note    Date of Service  10/5/2019    Chief Complaint  34 y.o. male admitted 9/19/2019 with hallucinations    Hospital Course    Past medical history of depression, PTSD, anxiety, substance abuse of mushrooms, nitrous oxide, marijuana, alcohol, paroxysmal A. fib, recent diagnosis of PE May 2019.  He presented with hallucinations and had stopped taking his medications including Xarelto.  He had also fallen multiple times.  He was found hypoxic and CTA showed extensive acute bilateral pulmonary emboli with saddle embolus.  He was admitted to the ICU and felt his risk of intracranial hemorrhage outweighed submassive dose of alteplase or EKOS.  He was started on heparin infusion.  Lower extremity Doppler was negative for DVT.  TTE showed severe right heart strain.  Patient remained stabilized and transitioned to Xarelto.  Psychiatry was consulted for his hallucinations and he was started on Risperdal.  However he developed neurological changes concerning for NMS and Risperdal was stopped and he was given Cogentin with some improvement.  Psychiatry started him on Seroquel and has been titrating medication.  He was hypertensive requiring IV medications so he was started on a lower lower dose of lisinopril and his atenolol was stopped, given his right heart strain.  He developed leukocytosis and fever.  Urine culture grew coag negative staph and he was started on course of Bactrim.  Srivastava catheter was removed but he failed voiding trial and was replaced.  He is continued on Flomax.  PT OT evaluated him and recommended SNF.      Interval Problem Update  Patient says he feels much better today.  His hallucinations have improved, though he still feels very anxious.  He is off oxygen and denies shortness of breath.  He denies abdominal pain.  He feels less diaphoretic.  Mother is worried about him going to rehab since she will not be able to stay with him.  10/1: Patient became anxious when I  entered the room with the nursing staff.  Stated that does not like to many people around him.  Saturating well on room air.  Stated that he was unable to sleep well last night.  Afebrile overnight.  No issues overnight per staff.  10/2: Resting comfortably in bed.  Stated that he could not sleep sleep well last night.  Still feeling anxious.  Leukocytosis trending down.  Afebrile overnight.  Tolerating p.o. well.  No nausea or vomiting.  Had multiple small bowel movements yesterday after prune juice and stool softeners.  10/3: Resting comfortably in bed.  Looks slightly anxious.  Had another episode of head shaking relieved by 1 dose of Ativan.  Patient has a lot of psychiatric concern and questions which I rely to the psychiatrist who will stop by and answer those questions for the patient.  Otherwise no new issues to report.  10/4: Resting comfortably in bed.  Had large bowel movement yesterday per nursing staff.  Still getting anxious occasionally.  Requesting Ativan.  No acute distress noted.  No issues overnight per staff.  10/5: Resting comfortably in bed.  Psychiatry increased Seroquel dose for better control of anxiety and restlessness.  Still on legal hold.  No acute distress noted.  No issues overnight per staff.  Consultants/Specialty  Psychiatry  Critical care  Pulmonology    Code Status  Full Code      Disposition  Case coordination to discuss discharge planning with mother  SNF once cleared by psychiatry  Will need anticoagulation clinic and follow-up with pulmonology    Review of Systems  Review of Systems   Constitutional: Positive for malaise/fatigue. Negative for chills, diaphoresis and fever.   HENT: Negative for congestion, ear discharge, ear pain, hearing loss, nosebleeds and tinnitus.    Eyes: Negative for blurred vision, double vision and photophobia.   Respiratory: Negative for cough and hemoptysis.    Cardiovascular: Negative for chest pain, palpitations, orthopnea and claudication.    Gastrointestinal: Negative for abdominal pain, constipation, diarrhea, heartburn, nausea and vomiting.   Genitourinary: Negative for dysuria, frequency, hematuria and urgency.   Musculoskeletal: Negative for back pain, joint pain, myalgias and neck pain.        Muscle spasm   Neurological: Positive for tingling and weakness. Negative for tremors, sensory change, speech change and focal weakness.   Psychiatric/Behavioral: Positive for substance abuse. Negative for hallucinations. The patient is nervous/anxious and has insomnia.         Physical Exam  Temp:  [36.3 °C (97.4 °F)-36.6 °C (97.9 °F)] 36.3 °C (97.4 °F)  Pulse:  [] 77  Resp:  [14-18] 16  BP: (130-142)/(74-99) 132/74  SpO2:  [95 %-96 %] 95 %    Physical Exam   Constitutional: He is oriented to person, place, and time. No distress.   Obesity   HENT:   Head: Normocephalic and atraumatic.   Eyes: Pupils are equal, round, and reactive to light. Conjunctivae are normal. Right eye exhibits no discharge. Left eye exhibits no discharge.   Neck: No JVD present. No tracheal deviation present.   Cardiovascular: Normal rate and regular rhythm. Exam reveals no gallop and no friction rub.   Pulmonary/Chest: Effort normal. No respiratory distress. He has no wheezes.   Abdominal: Soft. Bowel sounds are normal. He exhibits no distension (Mild to moderate). There is no tenderness.   Musculoskeletal: He exhibits no deformity.   Neurological: He is alert and oriented to person, place, and time.   4 out of 5 strength upper and lower extremities   Skin: Skin is warm. He is not diaphoretic. No erythema.   Decreased macular rash isolated to his backside.   Psychiatric: His mood appears anxious. Thought content is paranoid. He expresses impulsivity.   Nursing note and vitals reviewed.      Fluids    Intake/Output Summary (Last 24 hours) at 10/5/2019 1907  Last data filed at 10/5/2019 1700  Gross per 24 hour   Intake 1360 ml   Output 4300 ml   Net -2940 ml  "      Laboratory  Recent Labs     10/03/19  0522 10/04/19  0931 10/05/19  0515   WBC 9.3 10.6 6.4   RBC 4.47* 4.98 4.99   HEMOGLOBIN 14.0 15.4 14.8   HEMATOCRIT 42.8 47.8 46.7   MCV 95.7 96.0 93.6   MCH 31.3 30.9 29.7   MCHC 32.7* 32.2* 31.7*   RDW 61.2* 60.2* 59.1*   PLATELETCT 325 321 302   MPV 8.4* 8.4* 8.3*     Recent Labs     10/03/19  0522 10/04/19  0931 10/05/19  0515   SODIUM 139 130* 135   POTASSIUM 4.5 4.0 4.7   CHLORIDE 104 100 104   CO2 25 17* 19*   GLUCOSE 89 107* 101*   BUN 14 21 12   CREATININE 0.87 0.82 0.80   CALCIUM 9.2 9.0 9.3                   Imaging  DX-CHEST-PORTABLE (1 VIEW)   Final Result         No acute cardiac or pulmonary abnormality is identified.      DX-CHEST-PORTABLE (1 VIEW)   Final Result      Mild lung base atelectasis with edema not excluded.      DX-CHEST-PORTABLE (1 VIEW)   Final Result         1.  Pulmonary edema and/or infiltrates.   2.  Cardiomegaly      MR-BRAIN-W/O   Final Result      1.  No acute intracranial abnormality.   2.  Sphenoid and posterior right ethmoid sinus disease.      US-EXTREMITY VENOUS LOWER BILAT   Final Result      EC-ECHOCARDIOGRAM LTD W/O CONT   Final Result      CT-CTA CHEST PULMONARY ARTERY W/ RECONS   Final Result   Addendum 1 of 1   These findings were discussed with HEATHER MELGOZA on 9/19/2019 2:16 PM.      Final      DX-CHEST-PORTABLE (1 VIEW)   Final Result      No acute cardiopulmonary abnormality.      CT-HEAD W/O   Final Result      No CT evidence of acute infarct, hemorrhage or mass. No acute intracranial injury.           Assessment/Plan  * Acute saddle pulmonary embolism (HCC)- (present on admission)  Assessment & Plan  Recurrent, in the setting of noncompliance with anticoagulation therapy  CTA PE revealing \"Extensive acute bilateral pulmonary emboli with saddle embolus noted, as well as findings concerning for RV strain\"  Recurrence likely secondary to poor compliance of medication  Heparin drip transitioned to Xarelto  Echocardiogram " with RV strain, caution aggressive blood pressure changes  DVT study negative    Acute hypoxemic respiratory failure (HCC)  Assessment & Plan  Secondary acute pulmonary embolism  Wean off oxygen as tolerated    Paroxysmal atrial fibrillation (HCC)- (present on admission)  Assessment & Plan  10/1: I restarted his home dose Tenormin.  Continue Xarelto.  Rate is controlled.    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  MRI brain negative  10/5: Psychiatry increased Seroquel dose.  Still on legal hold.    ETOH abuse- (present on admission)  Assessment & Plan  History of, monitor for withdrawal    Acute cystitis- (present on admission)  Assessment & Plan  Urine culture grew strep epidermidis sensitive to Bactrim.  Leucocytosis resolved   Completed course of antibiotics.    Fever  Assessment & Plan  9/28 - Has had leukocytosis, now fever 100.7. He has no specific symptoms. CXR and urine ordered  9/29 - Continue ceftriaxone, urine culture pending.  Srivastava cath was changed  9/30 -urine growing coag negative staph change to complete course of Bactrim  Resolved.    Weakness  Assessment & Plan  I spoke with psychiatry, who notes patient's current weakness has progressed from when he first admitted.  CPK was normal  Psychiatry stopped risperidone, had some improvement with Cogentin  Possible toxicity from the nitrous oxide.  I spoke with Dr. Byrd with neurology, he agreed with continuing with vitamin B12 supplementation.  He can have EMG testing done as outpatient after 2 weeks of stopping nitrous oxide.  PT OT ordered for reevaluation, plan for SNF    Vitamin B12 deficiency  Assessment & Plan  With reported peripheral tingling  Supplement ordered    Type 2 diabetes mellitus  without complication, without long-term current use of insulin ([facility])  Assessment & Plan  New diagnosis, A1c 6.6%  Diabetes education ordered  Could be contributing to peripheral neuropathy    Slow transit constipation  Assessment & Plan  Daily Senokot and MiraLAX  Bowel regimen ordered  Enema PRN  9/26 -ongoing abdominal pain, KUB ordered  9/27 patient declines KUB today  9/28 - unable to do KUB portable apparently  9/29 - improving with enema  10/1: No BM for few days. Enema today.   10/2: Multiple small bowel movements yesterday with prune juice and stool softeners.  10/4: Had large bowel movement last night.    ALLI (obstructive sleep apnea)  Assessment & Plan  History of, pulmonary following    Morbid obesity (HCC)  Assessment & Plan  Outpatient weight loss program would be indicated    Pulmonary hypertension (HCC)- (present on admission)  Assessment & Plan  Known history of, now with recurrent PE  Echocardiogram noted    Anxiety- (present on admission)  Assessment & Plan  On Librium previously  Now uncontrolled  Psychiatry evaluating, he was placed on a hold.  Bedside sitter as needed.  9/25 risperdal stopped for possible NMS, his anxiety is now increased  Psych adjusting seroquel and cogentin.   Dr. Sandoval to see patient tomorrow    Essential hypertension- (present on admission)  Assessment & Plan  Blood pressure has been stable.  Continue to monitor.  Plan of care discussed with multidisciplinary team during rounds.    VTE prophylaxis: xarelto

## 2019-10-06 NOTE — PROGRESS NOTES
"Bedside report received, assumed pt care. Pt resting in bed, with mother present at bedside. Pt appears anxious and verbalizes \"I need to get up and move.\" RN to ambulate pt. Pt and mother updated on POC and questions answered. No additional needs verbalized at this time. Safety precautions in place, and call light in reach.    "

## 2019-10-06 NOTE — CARE PLAN
Problem: Infection  Goal: Will remain free from infection  Outcome: PROGRESSING AS EXPECTED     Problem: Bowel/Gastric:  Goal: Normal bowel function is maintained or improved  Outcome: PROGRESSING AS EXPECTED  Flowsheets (Taken 10/5/2019 0500 by Nicole Fuller R.N.)  Last BM: 10/05/19  Number of Times Stooled: 1

## 2019-10-07 LAB
ALBUMIN SERPL BCP-MCNC: 4.3 G/DL (ref 3.2–4.9)
ALBUMIN/GLOB SERPL: 1.5 G/DL
ALP SERPL-CCNC: 56 U/L (ref 30–99)
ALT SERPL-CCNC: 36 U/L (ref 2–50)
ANION GAP SERPL CALC-SCNC: 13 MMOL/L (ref 0–11.9)
ANISOCYTOSIS BLD QL SMEAR: ABNORMAL
AST SERPL-CCNC: 17 U/L (ref 12–45)
BASOPHILS # BLD AUTO: 0 % (ref 0–1.8)
BASOPHILS # BLD AUTO: 1 % (ref 0–1.8)
BASOPHILS # BLD: 0 K/UL (ref 0–0.12)
BASOPHILS # BLD: 0.1 K/UL (ref 0–0.12)
BILIRUB SERPL-MCNC: 0.4 MG/DL (ref 0.1–1.5)
BUN SERPL-MCNC: 7 MG/DL (ref 8–22)
CALCIUM SERPL-MCNC: 9.1 MG/DL (ref 8.5–10.5)
CHLORIDE SERPL-SCNC: 103 MMOL/L (ref 96–112)
CO2 SERPL-SCNC: 20 MMOL/L (ref 20–33)
CREAT SERPL-MCNC: 0.71 MG/DL (ref 0.5–1.4)
EOSINOPHIL # BLD AUTO: 0.1 K/UL (ref 0–0.51)
EOSINOPHIL # BLD AUTO: 0.23 K/UL (ref 0–0.51)
EOSINOPHIL NFR BLD: 1 % (ref 0–6.9)
EOSINOPHIL NFR BLD: 2.7 % (ref 0–6.9)
ERYTHROCYTE [DISTWIDTH] IN BLOOD BY AUTOMATED COUNT: 57.8 FL (ref 35.9–50)
ERYTHROCYTE [DISTWIDTH] IN BLOOD BY AUTOMATED COUNT: 63.1 FL (ref 35.9–50)
GLOBULIN SER CALC-MCNC: 2.9 G/DL (ref 1.9–3.5)
GLUCOSE SERPL-MCNC: 94 MG/DL (ref 65–99)
HCT VFR BLD AUTO: 44.5 % (ref 42–52)
HCT VFR BLD AUTO: 49.3 % (ref 42–52)
HGB BLD-MCNC: 14.6 G/DL (ref 14–18)
HGB BLD-MCNC: 15.1 G/DL (ref 14–18)
IMM GRANULOCYTES # BLD AUTO: 0.05 K/UL (ref 0–0.11)
IMM GRANULOCYTES NFR BLD AUTO: 0.5 % (ref 0–0.9)
LYMPHOCYTES # BLD AUTO: 1.69 K/UL (ref 1–4.8)
LYMPHOCYTES # BLD AUTO: 3.08 K/UL (ref 1–4.8)
LYMPHOCYTES NFR BLD: 16.9 % (ref 22–41)
LYMPHOCYTES NFR BLD: 35.4 % (ref 22–41)
MANUAL DIFF BLD: NORMAL
MCH RBC QN AUTO: 30.2 PG (ref 27–33)
MCH RBC QN AUTO: 30.4 PG (ref 27–33)
MCHC RBC AUTO-ENTMCNC: 30.6 G/DL (ref 33.7–35.3)
MCHC RBC AUTO-ENTMCNC: 32.8 G/DL (ref 33.7–35.3)
MCV RBC AUTO: 92.7 FL (ref 81.4–97.8)
MCV RBC AUTO: 98.6 FL (ref 81.4–97.8)
METAMYELOCYTES NFR BLD MANUAL: 0.9 %
MONOCYTES # BLD AUTO: 0.3 K/UL (ref 0–0.85)
MONOCYTES # BLD AUTO: 1.16 K/UL (ref 0–0.85)
MONOCYTES NFR BLD AUTO: 11.6 % (ref 0–13.4)
MONOCYTES NFR BLD AUTO: 3.5 % (ref 0–13.4)
MORPHOLOGY BLD-IMP: NORMAL
NEUTROPHILS # BLD AUTO: 5 K/UL (ref 1.82–7.42)
NEUTROPHILS # BLD AUTO: 6.91 K/UL (ref 1.82–7.42)
NEUTROPHILS NFR BLD: 57.5 % (ref 44–72)
NEUTROPHILS NFR BLD: 69 % (ref 44–72)
NRBC # BLD AUTO: 0 K/UL
NRBC # BLD AUTO: 0 K/UL
NRBC BLD-RTO: 0 /100 WBC
NRBC BLD-RTO: 0 /100 WBC
PLATELET # BLD AUTO: 282 K/UL (ref 164–446)
PLATELET # BLD AUTO: 352 K/UL (ref 164–446)
PLATELET BLD QL SMEAR: NORMAL
PMV BLD AUTO: 8.6 FL (ref 9–12.9)
PMV BLD AUTO: 8.6 FL (ref 9–12.9)
POTASSIUM SERPL-SCNC: 3.8 MMOL/L (ref 3.6–5.5)
PROT SERPL-MCNC: 7.2 G/DL (ref 6–8.2)
RBC # BLD AUTO: 4.8 M/UL (ref 4.7–6.1)
RBC # BLD AUTO: 5 M/UL (ref 4.7–6.1)
RBC BLD AUTO: PRESENT
SODIUM SERPL-SCNC: 136 MMOL/L (ref 135–145)
WBC # BLD AUTO: 10 K/UL (ref 4.8–10.8)
WBC # BLD AUTO: 8.7 K/UL (ref 4.8–10.8)

## 2019-10-07 PROCEDURE — A9270 NON-COVERED ITEM OR SERVICE: HCPCS | Performed by: INTERNAL MEDICINE

## 2019-10-07 PROCEDURE — 85007 BL SMEAR W/DIFF WBC COUNT: CPT

## 2019-10-07 PROCEDURE — 700102 HCHG RX REV CODE 250 W/ 637 OVERRIDE(OP): Performed by: INTERNAL MEDICINE

## 2019-10-07 PROCEDURE — 97535 SELF CARE MNGMENT TRAINING: CPT

## 2019-10-07 PROCEDURE — 700102 HCHG RX REV CODE 250 W/ 637 OVERRIDE(OP): Performed by: FAMILY MEDICINE

## 2019-10-07 PROCEDURE — A9270 NON-COVERED ITEM OR SERVICE: HCPCS | Performed by: FAMILY MEDICINE

## 2019-10-07 PROCEDURE — A9270 NON-COVERED ITEM OR SERVICE: HCPCS | Performed by: PSYCHIATRY & NEUROLOGY

## 2019-10-07 PROCEDURE — 80053 COMPREHEN METABOLIC PANEL: CPT

## 2019-10-07 PROCEDURE — A9270 NON-COVERED ITEM OR SERVICE: HCPCS | Performed by: HOSPITALIST

## 2019-10-07 PROCEDURE — 99231 SBSQ HOSP IP/OBS SF/LOW 25: CPT | Performed by: FAMILY MEDICINE

## 2019-10-07 PROCEDURE — 700102 HCHG RX REV CODE 250 W/ 637 OVERRIDE(OP): Performed by: HOSPITALIST

## 2019-10-07 PROCEDURE — 85027 COMPLETE CBC AUTOMATED: CPT

## 2019-10-07 PROCEDURE — 97110 THERAPEUTIC EXERCISES: CPT

## 2019-10-07 PROCEDURE — 97530 THERAPEUTIC ACTIVITIES: CPT

## 2019-10-07 PROCEDURE — 770001 HCHG ROOM/CARE - MED/SURG/GYN PRIV*

## 2019-10-07 PROCEDURE — 700102 HCHG RX REV CODE 250 W/ 637 OVERRIDE(OP): Performed by: PSYCHIATRY & NEUROLOGY

## 2019-10-07 PROCEDURE — 36415 COLL VENOUS BLD VENIPUNCTURE: CPT

## 2019-10-07 RX ORDER — OMEPRAZOLE 20 MG/1
20 CAPSULE, DELAYED RELEASE ORAL DAILY
Status: DISCONTINUED | OUTPATIENT
Start: 2019-10-07 | End: 2019-10-16 | Stop reason: HOSPADM

## 2019-10-07 RX ADMIN — NYSTATIN: 100000 POWDER TOPICAL at 06:07

## 2019-10-07 RX ADMIN — QUETIAPINE FUMARATE 100 MG: 100 TABLET ORAL at 15:12

## 2019-10-07 RX ADMIN — FOLIC ACID 1 MG: 1 TABLET ORAL at 06:06

## 2019-10-07 RX ADMIN — RIVAROXABAN 15 MG: 15 TABLET, FILM COATED ORAL at 17:46

## 2019-10-07 RX ADMIN — ACETAMINOPHEN 650 MG: 325 TABLET, FILM COATED ORAL at 21:26

## 2019-10-07 RX ADMIN — MICONAZOLE NITRATE: 20 CREAM TOPICAL at 10:46

## 2019-10-07 RX ADMIN — ANTACID TABLETS 500 MG: 500 TABLET, CHEWABLE ORAL at 08:15

## 2019-10-07 RX ADMIN — THERA TABS 1 TABLET: TAB at 06:06

## 2019-10-07 RX ADMIN — ANTACID TABLETS 500 MG: 500 TABLET, CHEWABLE ORAL at 19:58

## 2019-10-07 RX ADMIN — ANTACID TABLETS 500 MG: 500 TABLET, CHEWABLE ORAL at 02:18

## 2019-10-07 RX ADMIN — SENNOSIDES, DOCUSATE SODIUM 2 TABLET: 50; 8.6 TABLET, FILM COATED ORAL at 06:07

## 2019-10-07 RX ADMIN — LISINOPRIL 10 MG: 10 TABLET ORAL at 06:06

## 2019-10-07 RX ADMIN — QUETIAPINE FUMARATE 100 MG: 100 TABLET ORAL at 08:44

## 2019-10-07 RX ADMIN — ATENOLOL 100 MG: 25 TABLET ORAL at 06:06

## 2019-10-07 RX ADMIN — OMEPRAZOLE 20 MG: 20 CAPSULE, DELAYED RELEASE ORAL at 10:46

## 2019-10-07 RX ADMIN — RIVAROXABAN 15 MG: 15 TABLET, FILM COATED ORAL at 08:44

## 2019-10-07 RX ADMIN — MICONAZOLE NITRATE: 20 CREAM TOPICAL at 17:49

## 2019-10-07 RX ADMIN — SENNOSIDES, DOCUSATE SODIUM 2 TABLET: 50; 8.6 TABLET, FILM COATED ORAL at 17:46

## 2019-10-07 RX ADMIN — ACETAMINOPHEN 650 MG: 325 TABLET, FILM COATED ORAL at 02:21

## 2019-10-07 RX ADMIN — MICONAZOLE NITRATE: 20 CREAM TOPICAL at 06:07

## 2019-10-07 RX ADMIN — NYSTATIN: 100000 POWDER TOPICAL at 17:49

## 2019-10-07 RX ADMIN — QUETIAPINE FUMARATE 200 MG: 200 TABLET ORAL at 19:54

## 2019-10-07 RX ADMIN — TAMSULOSIN HYDROCHLORIDE 0.4 MG: 0.4 CAPSULE ORAL at 09:17

## 2019-10-07 ASSESSMENT — GAIT ASSESSMENTS
GAIT LEVEL OF ASSIST: MINIMAL ASSIST
DEVIATION: DECREASED TOE OFF;DECREASED HEEL STRIKE
DISTANCE (FEET): 60
ASSISTIVE DEVICE: FRONT WHEEL WALKER

## 2019-10-07 ASSESSMENT — COGNITIVE AND FUNCTIONAL STATUS - GENERAL
WALKING IN HOSPITAL ROOM: A LITTLE
TURNING FROM BACK TO SIDE WHILE IN FLAT BAD: A LITTLE
DRESSING REGULAR UPPER BODY CLOTHING: A LITTLE
EATING MEALS: A LITTLE
SUGGESTED CMS G CODE MODIFIER DAILY ACTIVITY: CK
SUGGESTED CMS G CODE MODIFIER MOBILITY: CK
DAILY ACTIVITIY SCORE: 16
CLIMB 3 TO 5 STEPS WITH RAILING: A LITTLE
TOILETING: A LOT
MOVING TO AND FROM BED TO CHAIR: A LITTLE
MOBILITY SCORE: 18
DRESSING REGULAR LOWER BODY CLOTHING: A LITTLE
PERSONAL GROOMING: A LITTLE
HELP NEEDED FOR BATHING: A LOT
STANDING UP FROM CHAIR USING ARMS: A LITTLE
MOVING FROM LYING ON BACK TO SITTING ON SIDE OF FLAT BED: A LITTLE

## 2019-10-07 ASSESSMENT — ENCOUNTER SYMPTOMS
PHOTOPHOBIA: 0
INSOMNIA: 1
FOCAL WEAKNESS: 0
COUGH: 0
ORTHOPNEA: 0
DIAPHORESIS: 0
CLAUDICATION: 0
NECK PAIN: 0
MYALGIAS: 0
VOMITING: 0
NERVOUS/ANXIOUS: 1
DIARRHEA: 0
TREMORS: 0
SENSORY CHANGE: 0
BLURRED VISION: 0
HEMOPTYSIS: 0
ABDOMINAL PAIN: 0
CONSTIPATION: 1
HALLUCINATIONS: 0
HEARTBURN: 0
DOUBLE VISION: 0
PALPITATIONS: 0
TINGLING: 1
WEAKNESS: 1
FEVER: 0
NAUSEA: 0
SPEECH CHANGE: 0
BACK PAIN: 0

## 2019-10-07 ASSESSMENT — LIFESTYLE VARIABLES: SUBSTANCE_ABUSE: 1

## 2019-10-07 NOTE — THERAPY
"Occupational Therapy Treatment completed with focus on ADLs, ADL transfers, patient education and upper extremity function.  Functional Status:  SPV to don/doff socks, Mod A to don shorts, Mod A standing grooming, Min A functional mobility w/ FWW  Plan of Care: Will benefit from Occupational Therapy 3 times per week  Discharge Recommendations:  Equipment Will Continue to Assess for Equipment Needs. Post-acute therapy Recommend post-acute placement for additional occupational therapy services prior to discharge home. Patient can tolerate post-acute therapies at a 5x/week frequency. Pt would benefit from intensive skilled therapies.     See \"Rehab Therapy-Acute\" Patient Summary Report for complete documentation.     Pt continues to progress towards acute OT goals. POC updated to reflect pt's functional progress. Pt able to don/doff socks in sitting position, required Mod A to don shorts 2/2 line management and to pull time over hips. When standing at sink to perform grooming pt requires assist for standing balance, cues for safety and for sequencing tasks. Pt participated in seated TherEx to increase strength and coordination of B UE's. Pt continues to demo impaired balance, functional mobility, activity tolerance, gross and fine motor coordination as well as impaired sequencing and safety awareness. Will continue to follow for Acute OT services. Recommend post acute placement for intensive skilled therapies.   "

## 2019-10-07 NOTE — CARE PLAN
Problem: Communication  Goal: The ability to communicate needs accurately and effectively will improve  Outcome: PROGRESSING AS EXPECTED     Problem: Safety  Goal: Will remain free from injury  Outcome: PROGRESSING AS EXPECTED  Goal: Will remain free from falls  Outcome: PROGRESSING AS EXPECTED     Problem: Mobility  Goal: Risk for activity intolerance will decrease  Outcome: PROGRESSING AS EXPECTED     Problem: Psychosocial Needs:  Goal: Level of anxiety will decrease  Outcome: PROGRESSING SLOWER THAN EXPECTED     Problem: Skin Integrity  Goal: Risk for impaired skin integrity will decrease  Outcome: PROGRESSING SLOWER THAN EXPECTED

## 2019-10-07 NOTE — PROGRESS NOTES
Report received from night shift RN, assumed care of pt. Pt A&O4, reporting anxiety, pt taught breathing techniques and ambulated throughout hallway to decrease anxiety. Pt now resting in bed with mother at bedside. Plan of care discussed with pt and mother, no questions or needs at this time. No tele box on, pt medical status. Call light within reach, bed locked and in lowest position. All fall precautions and hourly rounding in place. Will continue to monitor.

## 2019-10-07 NOTE — PROGRESS NOTES
Paged psychiatry due to patient/mother request to determine if shanna is coming in to see the patient today and requests of decreasing seroquel dose at night plus adding a sleep aid. Pt/mother also reporting an increase in muscle spasms. Awaiting page back from psych for update/orders, per  can take up to one hour for page back.

## 2019-10-07 NOTE — CARE PLAN
Problem: Safety  Goal: Will remain free from falls  Outcome: PROGRESSING AS EXPECTED  Note:   Pt fall risk assessment completed, pt a high fall risk. Pt educated on fall program rationale and verbalized understanding. Bed is locked and in lowest position, non-skid socks in place. Pt refused bed alarm however 1:1 sitter at bedside and pt calls appropriately. All fall precautions and hourly rounding in place.      Problem: Infection  Goal: Will remain free from infection  Outcome: PROGRESSING AS EXPECTED  Note:   Hand hygiene completed before and after patient care. Pt educated about infection prevention and verbalized understanding. RN follows all protocols for infection prevention.

## 2019-10-07 NOTE — PROGRESS NOTES
Hospital Medicine Daily Progress Note    Date of Service  10/6/2019    Chief Complaint  34 y.o. male admitted 9/19/2019 with hallucinations    Hospital Course    Past medical history of depression, PTSD, anxiety, substance abuse of mushrooms, nitrous oxide, marijuana, alcohol, paroxysmal A. fib, recent diagnosis of PE May 2019.  He presented with hallucinations and had stopped taking his medications including Xarelto.  He had also fallen multiple times.  He was found hypoxic and CTA showed extensive acute bilateral pulmonary emboli with saddle embolus.  He was admitted to the ICU and felt his risk of intracranial hemorrhage outweighed submassive dose of alteplase or EKOS.  He was started on heparin infusion.  Lower extremity Doppler was negative for DVT.  TTE showed severe right heart strain.  Patient remained stabilized and transitioned to Xarelto.  Psychiatry was consulted for his hallucinations and he was started on Risperdal.  However he developed neurological changes concerning for NMS and Risperdal was stopped and he was given Cogentin with some improvement.  Psychiatry started him on Seroquel and has been titrating medication.  He was hypertensive requiring IV medications so he was started on a lower lower dose of lisinopril and his atenolol was stopped, given his right heart strain.  He developed leukocytosis and fever.  Urine culture grew coag negative staph and he was started on course of Bactrim.  Srivastava catheter was removed but he failed voiding trial and was replaced.  He is continued on Flomax.  PT OT evaluated him and recommended SNF.      Interval Problem Update  Patient says he feels much better today.  His hallucinations have improved, though he still feels very anxious.  He is off oxygen and denies shortness of breath.  He denies abdominal pain.  He feels less diaphoretic.  Mother is worried about him going to rehab since she will not be able to stay with him.  10/1: Patient became anxious when I  entered the room with the nursing staff.  Stated that does not like to many people around him.  Saturating well on room air.  Stated that he was unable to sleep well last night.  Afebrile overnight.  No issues overnight per staff.  10/2: Resting comfortably in bed.  Stated that he could not sleep sleep well last night.  Still feeling anxious.  Leukocytosis trending down.  Afebrile overnight.  Tolerating p.o. well.  No nausea or vomiting.  Had multiple small bowel movements yesterday after prune juice and stool softeners.  10/3: Resting comfortably in bed.  Looks slightly anxious.  Had another episode of head shaking relieved by 1 dose of Ativan.  Patient has a lot of psychiatric concern and questions which I rely to the psychiatrist who will stop by and answer those questions for the patient.  Otherwise no new issues to report.  10/4: Resting comfortably in bed.  Had large bowel movement yesterday per nursing staff.  Still getting anxious occasionally.  Requesting Ativan.  No acute distress noted.  No issues overnight per staff.  10/5: Resting comfortably in bed.  Psychiatry increased Seroquel dose for better control of anxiety and restlessness.  Still on legal hold.  No acute distress noted.  No issues overnight per staff.  10/6: Resting comfortably in bed.  Having more regular bowel movements now.  Anxiety slightly improved with increase Seroquel dose as per patient and his mother at bedside.  No acute distress noted.  No issues overnight per staff.  Consultants/Specialty  Psychiatry  Critical care  Pulmonology    Code Status  Full Code      Disposition  Case coordination to discuss discharge planning with mother  SNF once cleared by psychiatry  Will need anticoagulation clinic and follow-up with pulmonology    Review of Systems  Review of Systems   Constitutional: Positive for malaise/fatigue. Negative for diaphoresis and fever.   HENT: Negative for congestion, ear discharge, ear pain, hearing loss, nosebleeds  and tinnitus.    Eyes: Negative for blurred vision and double vision.   Respiratory: Negative for cough and hemoptysis.    Cardiovascular: Negative for chest pain, palpitations, orthopnea and claudication.   Gastrointestinal: Negative for abdominal pain, constipation, diarrhea, heartburn, nausea and vomiting.   Genitourinary: Negative for dysuria, frequency, hematuria and urgency.   Musculoskeletal: Negative for back pain, joint pain, myalgias and neck pain.        Muscle spasm   Neurological: Positive for tingling and weakness. Negative for tremors, sensory change, speech change and focal weakness.   Psychiatric/Behavioral: Positive for substance abuse. Negative for hallucinations. The patient is nervous/anxious and has insomnia.         Physical Exam  Temp:  [36.3 °C (97.3 °F)-36.4 °C (97.6 °F)] 36.3 °C (97.3 °F)  Pulse:  [] 97  Resp:  [18-28] 20  BP: (120-130)/(80-97) 120/88  SpO2:  [93 %-97 %] 97 %    Physical Exam   Constitutional: He is oriented to person, place, and time. No distress.   Obesity   HENT:   Head: Normocephalic and atraumatic.   Eyes: Pupils are equal, round, and reactive to light. Conjunctivae are normal. Right eye exhibits no discharge. Left eye exhibits no discharge.   Neck: No JVD present. No tracheal deviation present.   Cardiovascular: Normal rate and regular rhythm. Exam reveals no gallop and no friction rub.   Pulmonary/Chest: Effort normal. No respiratory distress. He has no wheezes.   Abdominal: Soft. Bowel sounds are normal. He exhibits no distension (Mild to moderate).   Musculoskeletal: He exhibits no deformity.   Neurological: He is alert and oriented to person, place, and time.   4 out of 5 strength upper and lower extremities   Skin: Skin is warm. He is not diaphoretic. No erythema.   Decreased macular rash isolated to his backside.   Psychiatric: His mood appears anxious. Thought content is paranoid. He expresses impulsivity.   Nursing note and vitals  "reviewed.      Fluids    Intake/Output Summary (Last 24 hours) at 10/6/2019 1712  Last data filed at 10/6/2019 1300  Gross per 24 hour   Intake 720 ml   Output 5025 ml   Net -4305 ml       Laboratory  Recent Labs     10/04/19  0931 10/05/19  0515   WBC 10.6 6.4   RBC 4.98 4.99   HEMOGLOBIN 15.4 14.8   HEMATOCRIT 47.8 46.7   MCV 96.0 93.6   MCH 30.9 29.7   MCHC 32.2* 31.7*   RDW 60.2* 59.1*   PLATELETCT 321 302   MPV 8.4* 8.3*     Recent Labs     10/04/19  0931 10/05/19  0515 10/06/19  0540   SODIUM 130* 135 135   POTASSIUM 4.0 4.7 3.9   CHLORIDE 100 104 103   CO2 17* 19* 22   GLUCOSE 107* 101* 110*   BUN 21 12 10   CREATININE 0.82 0.80 0.67   CALCIUM 9.0 9.3 9.2                   Imaging  DX-CHEST-PORTABLE (1 VIEW)   Final Result         No acute cardiac or pulmonary abnormality is identified.      DX-CHEST-PORTABLE (1 VIEW)   Final Result      Mild lung base atelectasis with edema not excluded.      DX-CHEST-PORTABLE (1 VIEW)   Final Result         1.  Pulmonary edema and/or infiltrates.   2.  Cardiomegaly      MR-BRAIN-W/O   Final Result      1.  No acute intracranial abnormality.   2.  Sphenoid and posterior right ethmoid sinus disease.      US-EXTREMITY VENOUS LOWER BILAT   Final Result      EC-ECHOCARDIOGRAM LTD W/O CONT   Final Result      CT-CTA CHEST PULMONARY ARTERY W/ RECONS   Final Result   Addendum 1 of 1   These findings were discussed with HEATHER MELGOZA on 9/19/2019 2:16 PM.      Final      DX-CHEST-PORTABLE (1 VIEW)   Final Result      No acute cardiopulmonary abnormality.      CT-HEAD W/O   Final Result      No CT evidence of acute infarct, hemorrhage or mass. No acute intracranial injury.           Assessment/Plan  * Acute saddle pulmonary embolism (HCC)- (present on admission)  Assessment & Plan  Recurrent, in the setting of noncompliance with anticoagulation therapy  CTA PE revealing \"Extensive acute bilateral pulmonary emboli with saddle embolus noted, as well as findings concerning for RV " "strain\"  Recurrence likely secondary to poor compliance of medication  Heparin drip transitioned to Xarelto  Echocardiogram with RV strain, caution aggressive blood pressure changes  DVT study negative    Acute hypoxemic respiratory failure (HCC)  Assessment & Plan  Secondary acute pulmonary embolism  Wean off oxygen as tolerated    Paroxysmal atrial fibrillation (HCC)- (present on admission)  Assessment & Plan  10/1: I restarted his home dose Tenormin.  Continue Xarelto.  Rate is controlled.    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  MRI brain negative  10/5: Psychiatry increased Seroquel dose.  Still on legal hold.    ETOH abuse- (present on admission)  Assessment & Plan  History of, monitor for withdrawal    Acute cystitis- (present on admission)  Assessment & Plan  Urine culture grew strep epidermidis sensitive to Bactrim.  Leucocytosis resolved   Completed course of antibiotics.    Fever  Assessment & Plan  9/28 - Has had leukocytosis, now fever 100.7. He has no specific symptoms. CXR and urine ordered  9/29 - Continue ceftriaxone, urine culture pending.  Srivastava cath was changed  9/30 -urine growing coag negative staph change to complete course of Bactrim  Resolved.    Weakness  Assessment & Plan  I spoke with psychiatry, who notes patient's current weakness has progressed from when he first admitted.  CPK was normal  Psychiatry stopped risperidone, had some improvement with Cogentin  Possible toxicity from the nitrous oxide.  I spoke with Dr. Byrd with neurology, he agreed with continuing with vitamin B12 supplementation.  He can have EMG testing done as outpatient after 2 weeks of stopping nitrous oxide.  PT OT ordered for reevaluation, plan for " SNF    Vitamin B12 deficiency  Assessment & Plan  With reported peripheral tingling  Supplement ordered    Type 2 diabetes mellitus without complication, without long-term current use of insulin ([facility])  Assessment & Plan  New diagnosis, A1c 6.6%  Diabetes education ordered  Could be contributing to peripheral neuropathy    Slow transit constipation  Assessment & Plan  Daily Senokot and MiraLAX  Bowel regimen ordered  Enema PRN  9/26 -ongoing abdominal pain, KUB ordered  9/27 patient declines KUB today  9/28 - unable to do KUB portable apparently  9/29 - improving with enema  10/1: No BM for few days. Enema today.   10/2: Multiple small bowel movements yesterday with prune juice and stool softeners.  10/4: Had large bowel movement last night.    ALLI (obstructive sleep apnea)  Assessment & Plan  History of, pulmonary following    Morbid obesity (HCC)  Assessment & Plan  Outpatient weight loss program would be indicated    Pulmonary hypertension (HCC)- (present on admission)  Assessment & Plan  Known history of, now with recurrent PE  Echocardiogram noted    Anxiety- (present on admission)  Assessment & Plan  On Librium previously  Now uncontrolled  Psychiatry evaluating, he was placed on a hold.  Bedside sitter as needed.  9/25 risperdal stopped for possible NMS, his anxiety is now increased  Psych adjusting seroquel and cogentin.   Dr. Sandoval to see patient tomorrow    Essential hypertension- (present on admission)  Assessment & Plan  Blood pressure has been stable.  Continue to monitor.  Plan of care discussed with multidisciplinary team during rounds.    VTE prophylaxis: xarelto

## 2019-10-08 LAB
ALBUMIN SERPL BCP-MCNC: 4.5 G/DL (ref 3.2–4.9)
ALBUMIN/GLOB SERPL: 1.6 G/DL
ALP SERPL-CCNC: 56 U/L (ref 30–99)
ALT SERPL-CCNC: 95 U/L (ref 2–50)
ANION GAP SERPL CALC-SCNC: 12 MMOL/L (ref 0–11.9)
AST SERPL-CCNC: 44 U/L (ref 12–45)
BASOPHILS # BLD AUTO: 0.6 % (ref 0–1.8)
BASOPHILS # BLD: 0.06 K/UL (ref 0–0.12)
BILIRUB SERPL-MCNC: 0.5 MG/DL (ref 0.1–1.5)
BUN SERPL-MCNC: 8 MG/DL (ref 8–22)
CALCIUM SERPL-MCNC: 9.1 MG/DL (ref 8.5–10.5)
CHLORIDE SERPL-SCNC: 105 MMOL/L (ref 96–112)
CO2 SERPL-SCNC: 18 MMOL/L (ref 20–33)
CREAT SERPL-MCNC: 0.67 MG/DL (ref 0.5–1.4)
EOSINOPHIL # BLD AUTO: 0.17 K/UL (ref 0–0.51)
EOSINOPHIL NFR BLD: 1.6 % (ref 0–6.9)
ERYTHROCYTE [DISTWIDTH] IN BLOOD BY AUTOMATED COUNT: 57.1 FL (ref 35.9–50)
GLOBULIN SER CALC-MCNC: 2.8 G/DL (ref 1.9–3.5)
GLUCOSE SERPL-MCNC: 94 MG/DL (ref 65–99)
HCT VFR BLD AUTO: 47 % (ref 42–52)
HGB BLD-MCNC: 15 G/DL (ref 14–18)
IMM GRANULOCYTES # BLD AUTO: 0.14 K/UL (ref 0–0.11)
IMM GRANULOCYTES NFR BLD AUTO: 1.3 % (ref 0–0.9)
LYMPHOCYTES # BLD AUTO: 2.94 K/UL (ref 1–4.8)
LYMPHOCYTES NFR BLD: 28.2 % (ref 22–41)
MCH RBC QN AUTO: 29.6 PG (ref 27–33)
MCHC RBC AUTO-ENTMCNC: 31.9 G/DL (ref 33.7–35.3)
MCV RBC AUTO: 92.7 FL (ref 81.4–97.8)
MONOCYTES # BLD AUTO: 0.94 K/UL (ref 0–0.85)
MONOCYTES NFR BLD AUTO: 9 % (ref 0–13.4)
NEUTROPHILS # BLD AUTO: 6.18 K/UL (ref 1.82–7.42)
NEUTROPHILS NFR BLD: 59.3 % (ref 44–72)
NRBC # BLD AUTO: 0 K/UL
NRBC BLD-RTO: 0 /100 WBC
PLATELET # BLD AUTO: 381 K/UL (ref 164–446)
PMV BLD AUTO: 8.4 FL (ref 9–12.9)
POTASSIUM SERPL-SCNC: 3.9 MMOL/L (ref 3.6–5.5)
PROT SERPL-MCNC: 7.3 G/DL (ref 6–8.2)
RBC # BLD AUTO: 5.07 M/UL (ref 4.7–6.1)
SODIUM SERPL-SCNC: 135 MMOL/L (ref 135–145)
WBC # BLD AUTO: 10.4 K/UL (ref 4.8–10.8)

## 2019-10-08 PROCEDURE — A9270 NON-COVERED ITEM OR SERVICE: HCPCS | Performed by: PSYCHIATRY & NEUROLOGY

## 2019-10-08 PROCEDURE — 700102 HCHG RX REV CODE 250 W/ 637 OVERRIDE(OP): Performed by: INTERNAL MEDICINE

## 2019-10-08 PROCEDURE — 700102 HCHG RX REV CODE 250 W/ 637 OVERRIDE(OP): Performed by: FAMILY MEDICINE

## 2019-10-08 PROCEDURE — 85025 COMPLETE CBC W/AUTO DIFF WBC: CPT

## 2019-10-08 PROCEDURE — 770001 HCHG ROOM/CARE - MED/SURG/GYN PRIV*

## 2019-10-08 PROCEDURE — A9270 NON-COVERED ITEM OR SERVICE: HCPCS | Performed by: INTERNAL MEDICINE

## 2019-10-08 PROCEDURE — 36415 COLL VENOUS BLD VENIPUNCTURE: CPT

## 2019-10-08 PROCEDURE — 700102 HCHG RX REV CODE 250 W/ 637 OVERRIDE(OP): Performed by: PSYCHIATRY & NEUROLOGY

## 2019-10-08 PROCEDURE — A9270 NON-COVERED ITEM OR SERVICE: HCPCS | Performed by: HOSPITALIST

## 2019-10-08 PROCEDURE — 99232 SBSQ HOSP IP/OBS MODERATE 35: CPT | Performed by: HOSPITALIST

## 2019-10-08 PROCEDURE — 700102 HCHG RX REV CODE 250 W/ 637 OVERRIDE(OP): Performed by: STUDENT IN AN ORGANIZED HEALTH CARE EDUCATION/TRAINING PROGRAM

## 2019-10-08 PROCEDURE — 700102 HCHG RX REV CODE 250 W/ 637 OVERRIDE(OP): Performed by: HOSPITALIST

## 2019-10-08 PROCEDURE — 80053 COMPREHEN METABOLIC PANEL: CPT

## 2019-10-08 PROCEDURE — A9270 NON-COVERED ITEM OR SERVICE: HCPCS | Performed by: FAMILY MEDICINE

## 2019-10-08 PROCEDURE — A9270 NON-COVERED ITEM OR SERVICE: HCPCS | Performed by: STUDENT IN AN ORGANIZED HEALTH CARE EDUCATION/TRAINING PROGRAM

## 2019-10-08 RX ADMIN — GABAPENTIN 300 MG: 300 CAPSULE ORAL at 00:53

## 2019-10-08 RX ADMIN — QUETIAPINE FUMARATE 100 MG: 100 TABLET ORAL at 08:33

## 2019-10-08 RX ADMIN — LISINOPRIL 10 MG: 10 TABLET ORAL at 06:13

## 2019-10-08 RX ADMIN — NYSTATIN: 100000 POWDER TOPICAL at 17:20

## 2019-10-08 RX ADMIN — SENNOSIDES, DOCUSATE SODIUM 2 TABLET: 50; 8.6 TABLET, FILM COATED ORAL at 06:13

## 2019-10-08 RX ADMIN — THERA TABS 1 TABLET: TAB at 06:13

## 2019-10-08 RX ADMIN — GABAPENTIN 300 MG: 300 CAPSULE ORAL at 22:59

## 2019-10-08 RX ADMIN — FOLIC ACID 1 MG: 1 TABLET ORAL at 06:13

## 2019-10-08 RX ADMIN — OMEPRAZOLE 20 MG: 20 CAPSULE, DELAYED RELEASE ORAL at 06:13

## 2019-10-08 RX ADMIN — QUETIAPINE FUMARATE 100 MG: 100 TABLET ORAL at 13:58

## 2019-10-08 RX ADMIN — MICONAZOLE NITRATE: 20 CREAM TOPICAL at 17:20

## 2019-10-08 RX ADMIN — RIVAROXABAN 15 MG: 15 TABLET, FILM COATED ORAL at 08:34

## 2019-10-08 RX ADMIN — QUETIAPINE FUMARATE 200 MG: 200 TABLET ORAL at 20:11

## 2019-10-08 RX ADMIN — NYSTATIN: 100000 POWDER TOPICAL at 06:16

## 2019-10-08 RX ADMIN — ATENOLOL 100 MG: 25 TABLET ORAL at 06:13

## 2019-10-08 RX ADMIN — RIVAROXABAN 15 MG: 15 TABLET, FILM COATED ORAL at 17:20

## 2019-10-08 RX ADMIN — MICONAZOLE NITRATE: 20 CREAM TOPICAL at 06:16

## 2019-10-08 RX ADMIN — TAMSULOSIN HYDROCHLORIDE 0.4 MG: 0.4 CAPSULE ORAL at 09:41

## 2019-10-08 ASSESSMENT — ENCOUNTER SYMPTOMS
ORTHOPNEA: 0
MYALGIAS: 0
CONSTIPATION: 0
DIAPHORESIS: 0
WEAKNESS: 1
INSOMNIA: 1
HEARTBURN: 0
NAUSEA: 0
NECK PAIN: 0
DIARRHEA: 0
PALPITATIONS: 0
CLAUDICATION: 0
PHOTOPHOBIA: 0
TREMORS: 0
DOUBLE VISION: 0
VOMITING: 0
HALLUCINATIONS: 1
SENSORY CHANGE: 0
COUGH: 0
TINGLING: 0
SPEECH CHANGE: 0
FOCAL WEAKNESS: 0
FEVER: 0
BLURRED VISION: 0
HEMOPTYSIS: 0
DEPRESSION: 1
NERVOUS/ANXIOUS: 1
BACK PAIN: 0
ABDOMINAL PAIN: 0

## 2019-10-08 ASSESSMENT — CHA2DS2 SCORE
HYPERTENSION: YES
AGE 65 TO 74: NO
SEX: MALE
PRIOR STROKE OR TIA OR THROMBOEMBOLISM: YES
CHF OR LEFT VENTRICULAR DYSFUNCTION: NO
CHA2DS2 VASC SCORE: 4
VASCULAR DISEASE: NO
AGE 75 OR GREATER: NO
DIABETES: YES

## 2019-10-08 ASSESSMENT — LIFESTYLE VARIABLES: SUBSTANCE_ABUSE: 1

## 2019-10-08 NOTE — DISCHARGE PLANNING
"SW checked in with patient and mother, \"Aga.\" Informed them that plan is for [facility], but not able to move forward unless patient is off legal hold. Patient and mother reported understanding. Mother reports patient is still not mentally stable. Patient is irritable and wants to know \"what I want.\"   SW does quick updates as to not irritate patient further.   "

## 2019-10-08 NOTE — CARE PLAN
Problem: Psychosocial Needs:  Goal: Level of anxiety will decrease  Outcome: PROGRESSING AS EXPECTED  Note:   Pt reports improved anxiety throughout the day, pt resting comfortably in bed with mother at bedside. Both pharmacologic and non-pharmacologic techniques utilized to decrease anxiety.      Problem: Mobility  Goal: Risk for activity intolerance will decrease  Outcome: PROGRESSING AS EXPECTED  Note:   Pt ambulates throughout hallway and room frequently.

## 2019-10-08 NOTE — PROGRESS NOTES
Hospital Medicine Daily Progress Note    Date of Service  10/7/2019    Chief Complaint  34 y.o. male admitted 9/19/2019 with hallucinations    Hospital Course    Past medical history of depression, PTSD, anxiety, substance abuse of mushrooms, nitrous oxide, marijuana, alcohol, paroxysmal A. fib, recent diagnosis of PE May 2019.  He presented with hallucinations and had stopped taking his medications including Xarelto.  He had also fallen multiple times.  He was found hypoxic and CTA showed extensive acute bilateral pulmonary emboli with saddle embolus.  He was admitted to the ICU and felt his risk of intracranial hemorrhage outweighed submassive dose of alteplase or EKOS.  He was started on heparin infusion.  Lower extremity Doppler was negative for DVT.  TTE showed severe right heart strain.  Patient remained stabilized and transitioned to Xarelto.  Psychiatry was consulted for his hallucinations and he was started on Risperdal.  However he developed neurological changes concerning for NMS and Risperdal was stopped and he was given Cogentin with some improvement.  Psychiatry started him on Seroquel and has been titrating medication.  He was hypertensive requiring IV medications so he was started on a lower lower dose of lisinopril and his atenolol was stopped, given his right heart strain.  He developed leukocytosis and fever.  Urine culture grew coag negative staph and he was started on course of Bactrim.  Srivastava catheter was removed but he failed voiding trial and was replaced.  He is continued on Flomax.  PT OT evaluated him and recommended SNF.      Interval Problem Update  Patient says he feels much better today.  His hallucinations have improved, though he still feels very anxious.  He is off oxygen and denies shortness of breath.  He denies abdominal pain.  He feels less diaphoretic.  Mother is worried about him going to rehab since she will not be able to stay with him.  10/1: Patient became anxious when I  entered the room with the nursing staff.  Stated that does not like to many people around him.  Saturating well on room air.  Stated that he was unable to sleep well last night.  Afebrile overnight.  No issues overnight per staff.  10/2: Resting comfortably in bed.  Stated that he could not sleep sleep well last night.  Still feeling anxious.  Leukocytosis trending down.  Afebrile overnight.  Tolerating p.o. well.  No nausea or vomiting.  Had multiple small bowel movements yesterday after prune juice and stool softeners.  10/3: Resting comfortably in bed.  Looks slightly anxious.  Had another episode of head shaking relieved by 1 dose of Ativan.  Patient has a lot of psychiatric concern and questions which I rely to the psychiatrist who will stop by and answer those questions for the patient.  Otherwise no new issues to report.  10/4: Resting comfortably in bed.  Had large bowel movement yesterday per nursing staff.  Still getting anxious occasionally.  Requesting Ativan.  No acute distress noted.  No issues overnight per staff.  10/5: Resting comfortably in bed.  Psychiatry increased Seroquel dose for better control of anxiety and restlessness.  Still on legal hold.  No acute distress noted.  No issues overnight per staff.  10/6: Resting comfortably in bed.  Having more regular bowel movements now.  Anxiety slightly improved with increase Seroquel dose as per patient and his mother at bedside.  No acute distress noted.  No issues overnight per staff.  10/7: Resting comfortably in bed.  No acute distress noted.  Still anxious as stated.  Feels stronger.  Tolerating p.o. fairly.  Having regular bowel movements but still think that he is still constipated.  No nausea or vomiting. No issues overnight per staff  Consultants/Specialty  Psychiatry  Critical care  Pulmonology    Code Status  Full Code      Disposition  Case coordination to discuss discharge planning with mother  SNF once cleared by psychiatry  Will need  anticoagulation clinic and follow-up with pulmonology    Review of Systems  Review of Systems   Constitutional: Positive for malaise/fatigue. Negative for diaphoresis and fever.   HENT: Negative for congestion, ear discharge, ear pain, hearing loss, nosebleeds and tinnitus.    Eyes: Negative for blurred vision, double vision and photophobia.   Respiratory: Negative for cough and hemoptysis.    Cardiovascular: Negative for chest pain, palpitations, orthopnea, claudication and leg swelling.   Gastrointestinal: Positive for constipation. Negative for abdominal pain, diarrhea, heartburn, nausea and vomiting.   Genitourinary: Negative for dysuria, frequency, hematuria and urgency.   Musculoskeletal: Negative for back pain, joint pain, myalgias and neck pain.        Muscle spasm   Neurological: Positive for tingling and weakness. Negative for tremors, sensory change, speech change and focal weakness.   Psychiatric/Behavioral: Positive for substance abuse. Negative for hallucinations. The patient is nervous/anxious and has insomnia.         Physical Exam  Temp:  [35.9 °C (96.7 °F)-36.4 °C (97.5 °F)] 36.2 °C (97.2 °F)  Pulse:  [] 102  Resp:  [17-20] 17  BP: (127-140)/(83-95) 133/95  SpO2:  [95 %-97 %] 97 %    Physical Exam   Constitutional: He is oriented to person, place, and time. No distress.   Obesity   HENT:   Head: Atraumatic.   Eyes: Pupils are equal, round, and reactive to light. Conjunctivae are normal. Right eye exhibits no discharge. Left eye exhibits no discharge.   Neck: No tracheal deviation present.   Cardiovascular: Normal rate and regular rhythm. Exam reveals no gallop and no friction rub.   Pulmonary/Chest: Effort normal. No respiratory distress. He has no wheezes.   Abdominal: Soft. Bowel sounds are normal. He exhibits no distension (Mild to moderate). There is no tenderness.   Musculoskeletal: He exhibits no tenderness or deformity.   Neurological: He is alert and oriented to person, place, and  time.   4 out of 5 strength upper and lower extremities   Skin: Skin is warm. He is not diaphoretic. No erythema.   Decreased macular rash isolated to his backside.   Psychiatric: His mood appears anxious. Thought content is paranoid. He expresses impulsivity.   Nursing note and vitals reviewed.      Fluids    Intake/Output Summary (Last 24 hours) at 10/7/2019 1815  Last data filed at 10/7/2019 1745  Gross per 24 hour   Intake 600 ml   Output 4650 ml   Net -4050 ml       Laboratory  Recent Labs     10/05/19  0515 10/06/19  0540 10/07/19  0555   WBC 6.4 10.0 8.7   RBC 4.99 5.00 4.80   HEMOGLOBIN 14.8 15.1 14.6   HEMATOCRIT 46.7 49.3 44.5   MCV 93.6 98.6* 92.7   MCH 29.7 30.2 30.4   MCHC 31.7* 30.6* 32.8*   RDW 59.1* 63.1* 57.8*   PLATELETCT 302 282 352   MPV 8.3* 8.6* 8.6*     Recent Labs     10/05/19  0515 10/06/19  0540 10/07/19  0920   SODIUM 135 135 136   POTASSIUM 4.7 3.9 3.8   CHLORIDE 104 103 103   CO2 19* 22 20   GLUCOSE 101* 110* 94   BUN 12 10 7*   CREATININE 0.80 0.67 0.71   CALCIUM 9.3 9.2 9.1                   Imaging  DX-CHEST-PORTABLE (1 VIEW)   Final Result         No acute cardiac or pulmonary abnormality is identified.      DX-CHEST-PORTABLE (1 VIEW)   Final Result      Mild lung base atelectasis with edema not excluded.      DX-CHEST-PORTABLE (1 VIEW)   Final Result         1.  Pulmonary edema and/or infiltrates.   2.  Cardiomegaly      MR-BRAIN-W/O   Final Result      1.  No acute intracranial abnormality.   2.  Sphenoid and posterior right ethmoid sinus disease.      US-EXTREMITY VENOUS LOWER BILAT   Final Result      EC-ECHOCARDIOGRAM LTD W/O CONT   Final Result      CT-CTA CHEST PULMONARY ARTERY W/ RECONS   Final Result   Addendum 1 of 1   These findings were discussed with HEATHER MELGOZA on 9/19/2019 2:16 PM.      Final      DX-CHEST-PORTABLE (1 VIEW)   Final Result      No acute cardiopulmonary abnormality.      CT-HEAD W/O   Final Result      No CT evidence of acute infarct, hemorrhage or  "mass. No acute intracranial injury.           Assessment/Plan  * Acute saddle pulmonary embolism (HCC)- (present on admission)  Assessment & Plan  Recurrent, in the setting of noncompliance with anticoagulation therapy  CTA PE revealing \"Extensive acute bilateral pulmonary emboli with saddle embolus noted, as well as findings concerning for RV strain\"  Recurrence likely secondary to poor compliance of medication  Heparin drip transitioned to Xarelto  Echocardiogram with RV strain, caution aggressive blood pressure changes  DVT study negative    Acute hypoxemic respiratory failure (HCC)  Assessment & Plan  Secondary acute pulmonary embolism  Wean off oxygen as tolerated  Resolved.    Paroxysmal atrial fibrillation (HCC)- (present on admission)  Assessment & Plan  10/1: I restarted his home dose Tenormin.  Continue Xarelto.  Rate is controlled.    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  MRI brain negative  10/5: Psychiatry increased Seroquel dose.  Still on legal hold.    ETOH abuse- (present on admission)  Assessment & Plan  History of, monitor for withdrawal    Acute cystitis- (present on admission)  Assessment & Plan  Urine culture grew strep epidermidis sensitive to Bactrim.  Leucocytosis resolved   Completed course of antibiotics.    Fever  Assessment & Plan  9/28 - Has had leukocytosis, now fever 100.7. He has no specific symptoms. CXR and urine ordered  9/29 - Continue ceftriaxone, urine culture pending.  Srivastava cath was changed  9/30 -urine growing coag negative staph change to complete course of Bactrim  Resolved.    Weakness  Assessment & Plan  I spoke with psychiatry, who notes patient's current weakness has progressed from when he first admitted.  CPK was " normal  Psychiatry stopped risperidone, had some improvement with Cogentin  Possible toxicity from the nitrous oxide.  I spoke with Dr. Byrd with neurology, he agreed with continuing with vitamin B12 supplementation.  He can have EMG testing done as outpatient after 2 weeks of stopping nitrous oxide.  PT OT ordered for reevaluation, plan for SNF    Vitamin B12 deficiency  Assessment & Plan  With reported peripheral tingling  Supplement ordered    Type 2 diabetes mellitus without complication, without long-term current use of insulin (HCC)  Assessment & Plan  New diagnosis, A1c 6.6%  Diabetes education ordered  Could be contributing to peripheral neuropathy    Slow transit constipation  Assessment & Plan  Daily Senokot and MiraLAX  Bowel regimen ordered  Enema PRN  9/26 -ongoing abdominal pain, KUB ordered  9/27 patient declines KUB today  9/28 - unable to do KUB portable apparently  9/29 - improving with enema  10/1: No BM for few days. Enema today.   10/2: Multiple small bowel movements yesterday with prune juice and stool softeners.  10/4: Had large bowel movement last night.  Regular bowel movements now with stool softeners.    ALLI (obstructive sleep apnea)  Assessment & Plan  History of, pulmonary following    Morbid obesity (HCC)  Assessment & Plan  Outpatient weight loss program would be indicated    Pulmonary hypertension (HCC)- (present on admission)  Assessment & Plan  Known history of, now with recurrent PE  Echocardiogram noted    Anxiety- (present on admission)  Assessment & Plan  On Librium previously  Now uncontrolled  Psychiatry evaluating, he was placed on a hold.  Bedside sitter as needed.  9/25 risperdal stopped for possible NMS, his anxiety is now increased  Psych adjusting seroquel and cogentin.   Dr. Sandoval to see patient tomorrow    Essential hypertension- (present on admission)  Assessment & Plan  Blood pressure has been stable.  Continue to monitor.  Plan of care discussed with  multidisciplinary team during rounds.    VTE prophylaxis: xarelto

## 2019-10-08 NOTE — PROGRESS NOTES
Dr. Baez rounded with this RN, patient and mother at bedside. Awaiting psych to see patient today, MD Baez to reach out to psych to round with patient.

## 2019-10-08 NOTE — THERAPY
"Pt seen for PT tx session. Felt fatigued from OT session earlier but remains very motivated to work with PT. Pt demonstrated improved gait mechanics and balance during hallway ambulation. Becomes anxious when feeling LE's fatigued; however, is able to maintain good control to complete ambulation and safely return to sitting. Pt and Mom have been working on HEP which is evident with improvement in activity tolerance and overall sterngth. PT will continue to follow while in house. Continue to recommend intensive post acute placement to optimize functional independence and safety prior to DC home.     Physical Therapy Treatment completed.   Bed Mobility:  Supine to Sit: (in chair upon PT arrival)  Transfers: Sit to Stand: Minimal Assist  Gait: Level Of Assist: Minimal Assist with Front-Wheel Walker       Plan of Care: Will benefit from Physical Therapy 5 times per week; frequency increased due to improved activity tolerance.   Discharge Recommendations: Equipment: Will Continue to Assess for Equipment Needs.   Post-acute therapy: Recommend post-acute placement for continued physical therapy services prior to discharge home. Patient can tolerate post-acute therapies at a 5x/week frequency.          See \"Rehab Therapy-Acute\" Patient Summary Report for complete documentation.       "

## 2019-10-08 NOTE — PROGRESS NOTES
Report received from night shift RN, assumed care of pt. Pt resting in bed with mother at bedside. No tele box, pt medical status. Call light within reach, bed locked and in lowest position, bed alarm on. All fall precautions and hourly rounding in place. Will continue to monitor.

## 2019-10-08 NOTE — PROGRESS NOTES
Hospital Medicine Daily Progress Note    Date of Service  10/8/2019    Chief Complaint  34 y.o. male admitted 9/19/2019 with hallucinations    Hospital Course    Past medical history of depression, PTSD, anxiety, substance abuse of mushrooms, nitrous oxide, marijuana, alcohol, paroxysmal A. fib, recent diagnosis of PE May 2019.  He presented with hallucinations and had stopped taking his medications including Xarelto.  He had also fallen multiple times.  He was found hypoxic and CTA showed extensive acute bilateral pulmonary emboli with saddle embolus.  He was admitted to the ICU and felt his risk of intracranial hemorrhage outweighed submassive dose of alteplase or EKOS.  He was started on heparin infusion.  Lower extremity Doppler was negative for DVT.  TTE showed severe right heart strain.  Patient remained stabilized and transitioned to Xarelto.  Psychiatry was consulted for his hallucinations and he was started on Risperdal.  However he developed neurological changes concerning for NMS and Risperdal was stopped and he was given Cogentin with some improvement.  Psychiatry started him on Seroquel and has been titrating medication.  He was hypertensive requiring IV medications so he was started on a lower lower dose of lisinopril and his atenolol was stopped, given his right heart strain.  He developed leukocytosis and fever.  Urine culture grew coag negative staph and he was started on course of Bactrim.  Srivastava catheter was removed but he failed voiding trial and was replaced.  He is continued on Flomax.  PT OT evaluated him and recommended SNF.      Interval Problem Update  Patient says he feels much better today.  His hallucinations have improved, though he still feels very anxious.  He is off oxygen and denies shortness of breath.  He denies abdominal pain.  He feels less diaphoretic.  Mother is worried about him going to rehab since she will not be able to stay with him.  10/1: Patient became anxious when I  entered the room with the nursing staff.  Stated that does not like to many people around him.  Saturating well on room air.  Stated that he was unable to sleep well last night.  Afebrile overnight.  No issues overnight per staff.  10/2: Resting comfortably in bed.  Stated that he could not sleep sleep well last night.  Still feeling anxious.  Leukocytosis trending down.  Afebrile overnight.  Tolerating p.o. well.  No nausea or vomiting.  Had multiple small bowel movements yesterday after prune juice and stool softeners.  10/3: Resting comfortably in bed.  Looks slightly anxious.  Had another episode of head shaking relieved by 1 dose of Ativan.  Patient has a lot of psychiatric concern and questions which I rely to the psychiatrist who will stop by and answer those questions for the patient.  Otherwise no new issues to report.  10/4: Resting comfortably in bed.  Had large bowel movement yesterday per nursing staff.  Still getting anxious occasionally.  Requesting Ativan.  No acute distress noted.  No issues overnight per staff.  10/5: Resting comfortably in bed.  Psychiatry increased Seroquel dose for better control of anxiety and restlessness.  Still on legal hold.  No acute distress noted.  No issues overnight per staff.  10/6: Resting comfortably in bed.  Having more regular bowel movements now.  Anxiety slightly improved with increase Seroquel dose as per patient and his mother at bedside.  No acute distress noted.  No issues overnight per staff.  10/7: Resting comfortably in bed.  No acute distress noted.  Still anxious as stated.  Feels stronger.  Tolerating p.o. fairly.  Having regular bowel movements but still think that he is still constipated.  No nausea or vomiting. No issues overnight per staff    10/8: seen and examined, patient has flat affect, states he wasn't able to sleep much again last night however the decreased seroquel dose helped with hallucination and delusions, mother in room and confirmed  the above  Otherwise  No complaints.  Consultants/Specialty  Psychiatry  Critical care  Pulmonology    Code Status  Full Code      Disposition  Case coordination to discuss discharge planning with mother  SNF once cleared by psychiatry  Will need anticoagulation clinic and follow-up with pulmonology    Review of Systems  Review of Systems   Constitutional: Positive for malaise/fatigue. Negative for diaphoresis and fever.   HENT: Negative for congestion, ear discharge, ear pain, hearing loss, nosebleeds and tinnitus.    Eyes: Negative for blurred vision, double vision and photophobia.   Respiratory: Negative for cough and hemoptysis.    Cardiovascular: Negative for chest pain, palpitations, orthopnea, claudication and leg swelling.   Gastrointestinal: Negative for abdominal pain, constipation, diarrhea, heartburn, nausea and vomiting.   Genitourinary: Negative for dysuria, frequency, hematuria and urgency.   Musculoskeletal: Negative for back pain, joint pain, myalgias and neck pain.        Muscle spasm   Neurological: Positive for weakness. Negative for tingling, tremors, sensory change, speech change and focal weakness.   Psychiatric/Behavioral: Positive for depression, hallucinations and substance abuse. The patient is nervous/anxious and has insomnia.         Physical Exam  Temp:  [36 °C (96.8 °F)-36.4 °C (97.6 °F)] 36 °C (96.8 °F)  Pulse:  [] 95  Resp:  [17-18] 18  BP: (117-136)/(73-95) 134/82  SpO2:  [92 %-97 %] 94 %    Physical Exam   Constitutional: He is oriented to person, place, and time. No distress.   Obesity   HENT:   Head: Atraumatic.   Eyes: Pupils are equal, round, and reactive to light. Conjunctivae are normal. Right eye exhibits no discharge. Left eye exhibits no discharge.   Neck: No tracheal deviation present.   Cardiovascular: Normal rate and regular rhythm. Exam reveals no gallop and no friction rub.   Pulmonary/Chest: Effort normal. No respiratory distress. He has no wheezes.    Abdominal: Soft. Bowel sounds are normal. He exhibits no distension. There is no tenderness.   Musculoskeletal: He exhibits no tenderness or deformity.   Neurological: He is alert and oriented to person, place, and time.   4 out of 5 strength upper and lower extremities   Skin: Skin is warm. He is not diaphoretic. No erythema.   Decreased macular rash isolated to his backside.   Psychiatric: His mood appears anxious. Thought content is paranoid. He expresses impulsivity.   Nursing note and vitals reviewed.      Fluids    Intake/Output Summary (Last 24 hours) at 10/8/2019 1142  Last data filed at 10/8/2019 0619  Gross per 24 hour   Intake 600 ml   Output 3500 ml   Net -2900 ml       Laboratory  Recent Labs     10/06/19  0540 10/07/19  0555 10/08/19  0839   WBC 10.0 8.7 10.4   RBC 5.00 4.80 5.07   HEMOGLOBIN 15.1 14.6 15.0   HEMATOCRIT 49.3 44.5 47.0   MCV 98.6* 92.7 92.7   MCH 30.2 30.4 29.6   MCHC 30.6* 32.8* 31.9*   RDW 63.1* 57.8* 57.1*   PLATELETCT 282 352 381   MPV 8.6* 8.6* 8.4*     Recent Labs     10/06/19  0540 10/07/19  0920 10/08/19  0839   SODIUM 135 136 135   POTASSIUM 3.9 3.8 3.9   CHLORIDE 103 103 105   CO2 22 20 18*   GLUCOSE 110* 94 94   BUN 10 7* 8   CREATININE 0.67 0.71 0.67   CALCIUM 9.2 9.1 9.1                   Imaging  DX-CHEST-PORTABLE (1 VIEW)   Final Result         No acute cardiac or pulmonary abnormality is identified.      DX-CHEST-PORTABLE (1 VIEW)   Final Result      Mild lung base atelectasis with edema not excluded.      DX-CHEST-PORTABLE (1 VIEW)   Final Result         1.  Pulmonary edema and/or infiltrates.   2.  Cardiomegaly      MR-BRAIN-W/O   Final Result      1.  No acute intracranial abnormality.   2.  Sphenoid and posterior right ethmoid sinus disease.      US-EXTREMITY VENOUS LOWER BILAT   Final Result      EC-ECHOCARDIOGRAM LTD W/O CONT   Final Result      CT-CTA CHEST PULMONARY ARTERY W/ RECONS   Final Result   Addendum 1 of 1   These findings were discussed with HEATHER GRANGER  "DAPRA on 9/19/2019 2:16 PM.      Final      DX-CHEST-PORTABLE (1 VIEW)   Final Result      No acute cardiopulmonary abnormality.      CT-HEAD W/O   Final Result      No CT evidence of acute infarct, hemorrhage or mass. No acute intracranial injury.           Assessment/Plan  * Acute saddle pulmonary embolism (HCC)- (present on admission)  Assessment & Plan  Recurrent, in the setting of noncompliance with anticoagulation therapy  CTA PE revealing \"Extensive acute bilateral pulmonary emboli with saddle embolus noted, as well as findings concerning for RV strain\"  Recurrence likely secondary to poor compliance of medication  Heparin drip transitioned to Xarelto  Echocardiogram with RV strain, caution aggressive blood pressure changes  DVT study negative    Acute hypoxemic respiratory failure (HCC)  Assessment & Plan  Secondary acute pulmonary embolism  Wean off oxygen as tolerated  Resolved.    Paroxysmal atrial fibrillation (HCC)- (present on admission)  Assessment & Plan   home dose Tenormin.  Continue Xarelto.  Rate is controlled.    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  MRI brain negative  10/5: Psychiatry increased Seroquel dose.  Still on legal hold.    ETOH abuse- (present on admission)  Assessment & Plan  History of, monitor for withdrawal    Acute cystitis- (present on admission)  Assessment & Plan  Urine culture grew strep epidermidis sensitive to Bactrim.  Leucocytosis resolved   Completed course of antibiotics.    Fever  Assessment & Plan  9/28 - Has had leukocytosis, now fever 100.7. He has no specific symptoms. CXR and urine ordered  9/29 - Continue ceftriaxone, urine culture pending.  Srivastava cath was changed  9/30 -urine growing coag negative staph " change to complete course of Bactrim  Resolved.    Weakness  Assessment & Plan  I spoke with psychiatry, who notes patient's current weakness has progressed from when he first admitted.  CPK was normal  Psychiatry stopped risperidone, had some improvement with Cogentin  Possible toxicity from the nitrous oxide.  I spoke with Dr. Byrd with neurology, he agreed with continuing with vitamin B12 supplementation.  He can have EMG testing done as outpatient after 2 weeks of stopping nitrous oxide.  PT OT ordered for reevaluation, plan for SNF    Vitamin B12 deficiency  Assessment & Plan  With reported peripheral tingling  Supplement ordered    Type 2 diabetes mellitus without complication, without long-term current use of insulin (HCC)  Assessment & Plan  New diagnosis, A1c 6.6%  Diabetes education ordered  Could be contributing to peripheral neuropathy    Slow transit constipation  Assessment & Plan  Daily Senokot and MiraLAX  Bowel regimen ordered      ALLI (obstructive sleep apnea)  Assessment & Plan  History of, pulmonary following    Morbid obesity (HCC)  Assessment & Plan  Outpatient weight loss program would be indicated    Pulmonary hypertension (HCC)- (present on admission)  Assessment & Plan  Known history of, now with recurrent PE  Echocardiogram noted    Anxiety- (present on admission)  Assessment & Plan  On Librium previously  Now uncontrolled  Psychiatry evaluating, he was placed on a hold.  Bedside sitter as needed.  9/25 risperdal stopped for possible NMS, his anxiety is now increased  Psych adjusting seroquel and cogentin.       Essential hypertension- (present on admission)  Assessment & Plan  Blood pressure has been stable.  Continue to monitor.  Plan of care discussed with multidisciplinary team during rounds.    VTE prophylaxis: xarelto    Continue legal hold

## 2019-10-09 LAB
ALBUMIN SERPL BCP-MCNC: 4.3 G/DL (ref 3.2–4.9)
ALBUMIN/GLOB SERPL: 1.6 G/DL
ALP SERPL-CCNC: 58 U/L (ref 30–99)
ALT SERPL-CCNC: 305 U/L (ref 2–50)
ANION GAP SERPL CALC-SCNC: 13 MMOL/L (ref 0–11.9)
ANISOCYTOSIS BLD QL SMEAR: ABNORMAL
AST SERPL-CCNC: 108 U/L (ref 12–45)
BASOPHILS # BLD AUTO: 0.9 % (ref 0–1.8)
BASOPHILS # BLD: 0.09 K/UL (ref 0–0.12)
BILIRUB SERPL-MCNC: 0.3 MG/DL (ref 0.1–1.5)
BUN SERPL-MCNC: 7 MG/DL (ref 8–22)
CALCIUM SERPL-MCNC: 9.5 MG/DL (ref 8.5–10.5)
CHLORIDE SERPL-SCNC: 108 MMOL/L (ref 96–112)
CO2 SERPL-SCNC: 19 MMOL/L (ref 20–33)
CREAT SERPL-MCNC: 0.64 MG/DL (ref 0.5–1.4)
EOSINOPHIL # BLD AUTO: 0.26 K/UL (ref 0–0.51)
EOSINOPHIL NFR BLD: 2.6 % (ref 0–6.9)
ERYTHROCYTE [DISTWIDTH] IN BLOOD BY AUTOMATED COUNT: 57.5 FL (ref 35.9–50)
GLOBULIN SER CALC-MCNC: 2.7 G/DL (ref 1.9–3.5)
GLUCOSE SERPL-MCNC: 100 MG/DL (ref 65–99)
HCT VFR BLD AUTO: 43.4 % (ref 42–52)
HGB BLD-MCNC: 14.5 G/DL (ref 14–18)
LYMPHOCYTES # BLD AUTO: 1.77 K/UL (ref 1–4.8)
LYMPHOCYTES NFR BLD: 17.7 % (ref 22–41)
MANUAL DIFF BLD: NORMAL
MCH RBC QN AUTO: 30.8 PG (ref 27–33)
MCHC RBC AUTO-ENTMCNC: 33.4 G/DL (ref 33.7–35.3)
MCV RBC AUTO: 92.1 FL (ref 81.4–97.8)
METAMYELOCYTES NFR BLD MANUAL: 0.9 %
MICROCYTES BLD QL SMEAR: ABNORMAL
MONOCYTES # BLD AUTO: 0.62 K/UL (ref 0–0.85)
MONOCYTES NFR BLD AUTO: 6.2 % (ref 0–13.4)
MORPHOLOGY BLD-IMP: NORMAL
NEUTROPHILS # BLD AUTO: 7.17 K/UL (ref 1.82–7.42)
NEUTROPHILS NFR BLD: 70.8 % (ref 44–72)
NEUTS BAND NFR BLD MANUAL: 0.9 % (ref 0–10)
NRBC # BLD AUTO: 0 K/UL
NRBC BLD-RTO: 0 /100 WBC
PLATELET # BLD AUTO: 344 K/UL (ref 164–446)
PLATELET BLD QL SMEAR: NORMAL
PMV BLD AUTO: 8.1 FL (ref 9–12.9)
POLYCHROMASIA BLD QL SMEAR: NORMAL
POTASSIUM SERPL-SCNC: 3.7 MMOL/L (ref 3.6–5.5)
PROT SERPL-MCNC: 7 G/DL (ref 6–8.2)
RBC # BLD AUTO: 4.71 M/UL (ref 4.7–6.1)
RBC BLD AUTO: PRESENT
SMUDGE CELLS BLD QL SMEAR: NORMAL
SODIUM SERPL-SCNC: 140 MMOL/L (ref 135–145)
WBC # BLD AUTO: 10 K/UL (ref 4.8–10.8)

## 2019-10-09 PROCEDURE — A9270 NON-COVERED ITEM OR SERVICE: HCPCS | Performed by: HOSPITALIST

## 2019-10-09 PROCEDURE — 36415 COLL VENOUS BLD VENIPUNCTURE: CPT

## 2019-10-09 PROCEDURE — A9270 NON-COVERED ITEM OR SERVICE: HCPCS | Performed by: FAMILY MEDICINE

## 2019-10-09 PROCEDURE — 80053 COMPREHEN METABOLIC PANEL: CPT

## 2019-10-09 PROCEDURE — 700102 HCHG RX REV CODE 250 W/ 637 OVERRIDE(OP): Performed by: INTERNAL MEDICINE

## 2019-10-09 PROCEDURE — 99222 1ST HOSP IP/OBS MODERATE 55: CPT | Performed by: PHYSICAL MEDICINE & REHABILITATION

## 2019-10-09 PROCEDURE — 700102 HCHG RX REV CODE 250 W/ 637 OVERRIDE(OP): Performed by: HOSPITALIST

## 2019-10-09 PROCEDURE — 700102 HCHG RX REV CODE 250 W/ 637 OVERRIDE(OP): Performed by: FAMILY MEDICINE

## 2019-10-09 PROCEDURE — 99232 SBSQ HOSP IP/OBS MODERATE 35: CPT | Performed by: PSYCHIATRY & NEUROLOGY

## 2019-10-09 PROCEDURE — 700102 HCHG RX REV CODE 250 W/ 637 OVERRIDE(OP): Performed by: PSYCHIATRY & NEUROLOGY

## 2019-10-09 PROCEDURE — A9270 NON-COVERED ITEM OR SERVICE: HCPCS | Performed by: PSYCHIATRY & NEUROLOGY

## 2019-10-09 PROCEDURE — 85027 COMPLETE CBC AUTOMATED: CPT

## 2019-10-09 PROCEDURE — A9270 NON-COVERED ITEM OR SERVICE: HCPCS | Performed by: INTERNAL MEDICINE

## 2019-10-09 PROCEDURE — 85007 BL SMEAR W/DIFF WBC COUNT: CPT

## 2019-10-09 PROCEDURE — 99232 SBSQ HOSP IP/OBS MODERATE 35: CPT | Performed by: HOSPITALIST

## 2019-10-09 PROCEDURE — 770001 HCHG ROOM/CARE - MED/SURG/GYN PRIV*

## 2019-10-09 RX ADMIN — NYSTATIN: 100000 POWDER TOPICAL at 06:07

## 2019-10-09 RX ADMIN — QUETIAPINE FUMARATE 200 MG: 200 TABLET ORAL at 21:07

## 2019-10-09 RX ADMIN — MICONAZOLE NITRATE: 20 CREAM TOPICAL at 06:07

## 2019-10-09 RX ADMIN — FOLIC ACID 1 MG: 1 TABLET ORAL at 06:06

## 2019-10-09 RX ADMIN — ATENOLOL 100 MG: 25 TABLET ORAL at 06:06

## 2019-10-09 RX ADMIN — SENNOSIDES, DOCUSATE SODIUM 2 TABLET: 50; 8.6 TABLET, FILM COATED ORAL at 06:06

## 2019-10-09 RX ADMIN — RIVAROXABAN 15 MG: 15 TABLET, FILM COATED ORAL at 08:41

## 2019-10-09 RX ADMIN — RIVAROXABAN 15 MG: 15 TABLET, FILM COATED ORAL at 17:16

## 2019-10-09 RX ADMIN — QUETIAPINE FUMARATE 100 MG: 100 TABLET ORAL at 08:41

## 2019-10-09 RX ADMIN — TAMSULOSIN HYDROCHLORIDE 0.4 MG: 0.4 CAPSULE ORAL at 08:41

## 2019-10-09 RX ADMIN — THERA TABS 1 TABLET: TAB at 06:06

## 2019-10-09 RX ADMIN — QUETIAPINE FUMARATE 100 MG: 100 TABLET ORAL at 13:24

## 2019-10-09 RX ADMIN — OMEPRAZOLE 20 MG: 20 CAPSULE, DELAYED RELEASE ORAL at 06:06

## 2019-10-09 RX ADMIN — LISINOPRIL 10 MG: 10 TABLET ORAL at 06:06

## 2019-10-09 RX ADMIN — MICONAZOLE NITRATE: 20 CREAM TOPICAL at 13:25

## 2019-10-09 ASSESSMENT — ENCOUNTER SYMPTOMS
DEPRESSION: 1
SPEECH CHANGE: 0
NECK PAIN: 0
DIARRHEA: 0
ABDOMINAL PAIN: 0
NAUSEA: 0
COUGH: 0
TINGLING: 0
BACK PAIN: 0
HEMOPTYSIS: 0
FOCAL WEAKNESS: 0
PALPITATIONS: 0
SENSORY CHANGE: 0
WEAKNESS: 1
CONSTIPATION: 0
NERVOUS/ANXIOUS: 1
MYALGIAS: 0
CLAUDICATION: 0
PHOTOPHOBIA: 0
DIAPHORESIS: 0
VOMITING: 0
HALLUCINATIONS: 1
TREMORS: 0
FEVER: 0
ORTHOPNEA: 0
DOUBLE VISION: 0
INSOMNIA: 1
HEARTBURN: 0
BLURRED VISION: 0

## 2019-10-09 ASSESSMENT — LIFESTYLE VARIABLES: SUBSTANCE_ABUSE: 1

## 2019-10-09 NOTE — DISCHARGE PLANNING
Anticipated Discharge Disposition: [facility]    Action: Contact[facility] letting him know legal hold has been lifted for pt. Awaiting call back for further DC planning.     Barriers to Discharge: Rehab acceptance and transport    Plan: pending call back

## 2019-10-09 NOTE — PROGRESS NOTES
Handoff report received. Assumed patient care. Patient resting in bed. Pt is mediacl. Patient not in distress, AAOX 4. Safety precautions in place. Call light and personal belongings within reach. Educated to call for assistance if needed.

## 2019-10-09 NOTE — CARE PLAN
Problem: Safety  Goal: Will remain free from injury  Outcome: PROGRESSING AS EXPECTED  Patient's risk for injury and falls assessed. Appropriate safety precautions in place. Patient educated to utilize call light for needs. Patient verbalizes understanding.       Problem: Knowledge Deficit  Goal: Knowledge of disease process/condition, treatment plan, diagnostic tests, and medications will improve  Outcome: PROGRESSING AS EXPECTED  Patient is educated of disease process and condition. Treatment team has included patient in plan of care. All medications indications and side effects are explained. Patient is encouraged to ask questions. Patient indicates understanding.

## 2019-10-09 NOTE — PROGRESS NOTES
Hospital Medicine Daily Progress Note    Date of Service  10/9/2019    Chief Complaint  34 y.o. male admitted 9/19/2019 with hallucinations    Hospital Course    Past medical history of depression, PTSD, anxiety, substance abuse of mushrooms, nitrous oxide, marijuana, alcohol, paroxysmal A. fib, recent diagnosis of PE May 2019.  He presented with hallucinations and had stopped taking his medications including Xarelto.  He had also fallen multiple times.  He was found hypoxic and CTA showed extensive acute bilateral pulmonary emboli with saddle embolus.  He was admitted to the ICU and felt his risk of intracranial hemorrhage outweighed submassive dose of alteplase or EKOS.  He was started on heparin infusion.  Lower extremity Doppler was negative for DVT.  TTE showed severe right heart strain.  Patient remained stabilized and transitioned to Xarelto.  Psychiatry was consulted for his hallucinations and he was started on Risperdal.  However he developed neurological changes concerning for NMS and Risperdal was stopped and he was given Cogentin with some improvement.  Psychiatry started him on Seroquel and has been titrating medication.  He was hypertensive requiring IV medications so he was started on a lower lower dose of lisinopril and his atenolol was stopped, given his right heart strain.  He developed leukocytosis and fever.  Urine culture grew coag negative staph and he was started on course of Bactrim.  Srivastava catheter was removed but he failed voiding trial and was replaced.  He is continued on Flomax.  PT OT evaluated him and recommended SNF.      Interval Problem Update  Patient says he feels much better today.  His hallucinations have improved, though he still feels very anxious.  He is off oxygen and denies shortness of breath.  He denies abdominal pain.  He feels less diaphoretic.  Mother is worried about him going to rehab since she will not be able to stay with him.  10/1: Patient became anxious when I  entered the room with the nursing staff.  Stated that does not like to many people around him.  Saturating well on room air.  Stated that he was unable to sleep well last night.  Afebrile overnight.  No issues overnight per staff.  10/2: Resting comfortably in bed.  Stated that he could not sleep sleep well last night.  Still feeling anxious.  Leukocytosis trending down.  Afebrile overnight.  Tolerating p.o. well.  No nausea or vomiting.  Had multiple small bowel movements yesterday after prune juice and stool softeners.  10/3: Resting comfortably in bed.  Looks slightly anxious.  Had another episode of head shaking relieved by 1 dose of Ativan.  Patient has a lot of psychiatric concern and questions which I rely to the psychiatrist who will stop by and answer those questions for the patient.  Otherwise no new issues to report.  10/4: Resting comfortably in bed.  Had large bowel movement yesterday per nursing staff.  Still getting anxious occasionally.  Requesting Ativan.  No acute distress noted.  No issues overnight per staff.  10/5: Resting comfortably in bed.  Psychiatry increased Seroquel dose for better control of anxiety and restlessness.  Still on legal hold.  No acute distress noted.  No issues overnight per staff.  10/6: Resting comfortably in bed.  Having more regular bowel movements now.  Anxiety slightly improved with increase Seroquel dose as per patient and his mother at bedside.  No acute distress noted.  No issues overnight per staff.  10/7: Resting comfortably in bed.  No acute distress noted.  Still anxious as stated.  Feels stronger.  Tolerating p.o. fairly.  Having regular bowel movements but still think that he is still constipated.  No nausea or vomiting. No issues overnight per staff    10/8: seen and examined, patient has flat affect, states he wasn't able to sleep much again last night however the decreased seroquel dose helped with hallucination and delusions, mother in room and confirmed  the above  Otherwise  No complaints.    10/9: seen and examined, patient states he is doing a little better interms of hallucinations however mom does not think so. Psych will see him today  Still on legal, needs to be off legal to go to inpatient psych facility  Consultants/Specialty  Psychiatry  Critical care  Pulmonology    Code Status  Full Code      Disposition  Case coordination to discuss discharge planning with mother  ip rehab evaluating  Will need anticoagulation clinic and follow-up with pulmonology    Review of Systems  Review of Systems   Constitutional: Positive for malaise/fatigue. Negative for diaphoresis and fever.   HENT: Negative for congestion, ear discharge, ear pain, hearing loss, nosebleeds and tinnitus.    Eyes: Negative for blurred vision, double vision and photophobia.   Respiratory: Negative for cough and hemoptysis.    Cardiovascular: Negative for chest pain, palpitations, orthopnea, claudication and leg swelling.   Gastrointestinal: Negative for abdominal pain, constipation, diarrhea, heartburn, nausea and vomiting.   Genitourinary: Negative for dysuria, frequency, hematuria and urgency.   Musculoskeletal: Negative for back pain, joint pain, myalgias and neck pain.        Muscle spasm   Neurological: Positive for weakness. Negative for tingling, tremors, sensory change, speech change and focal weakness.   Psychiatric/Behavioral: Positive for depression, hallucinations and substance abuse. The patient is nervous/anxious and has insomnia.         Physical Exam  Temp:  [36.1 °C (97 °F)-36.7 °C (98.1 °F)] 36.7 °C (98.1 °F)  Pulse:  [] 98  Resp:  [18] 18  BP: (113-132)/(72-89) 113/72  SpO2:  [96 %-100 %] 96 %    Physical Exam   Constitutional: He is oriented to person, place, and time. No distress.   Obesity   HENT:   Head: Atraumatic.   Eyes: Pupils are equal, round, and reactive to light. Conjunctivae are normal. Right eye exhibits no discharge. Left eye exhibits no discharge.   Neck:  No tracheal deviation present.   Cardiovascular: Normal rate and regular rhythm. Exam reveals no gallop and no friction rub.   Pulmonary/Chest: Effort normal. No respiratory distress. He has no wheezes.   Abdominal: Soft. Bowel sounds are normal. He exhibits no distension. There is no tenderness.   Musculoskeletal: He exhibits no tenderness or deformity.   Neurological: He is alert and oriented to person, place, and time.   4 out of 5 strength upper and lower extremities   Skin: Skin is warm. He is not diaphoretic. No erythema.   Decreased macular rash isolated to his backside.   Psychiatric: His mood appears anxious. Thought content is paranoid. He expresses impulsivity.   Nursing note and vitals reviewed.      Fluids    Intake/Output Summary (Last 24 hours) at 10/9/2019 1231  Last data filed at 10/9/2019 1117  Gross per 24 hour   Intake 480 ml   Output 8725 ml   Net -8245 ml       Laboratory  Recent Labs     10/07/19  0555 10/08/19  0839   WBC 8.7 10.4   RBC 4.80 5.07   HEMOGLOBIN 14.6 15.0   HEMATOCRIT 44.5 47.0   MCV 92.7 92.7   MCH 30.4 29.6   MCHC 32.8* 31.9*   RDW 57.8* 57.1*   PLATELETCT 352 381   MPV 8.6* 8.4*     Recent Labs     10/07/19  0920 10/08/19  0839   SODIUM 136 135   POTASSIUM 3.8 3.9   CHLORIDE 103 105   CO2 20 18*   GLUCOSE 94 94   BUN 7* 8   CREATININE 0.71 0.67   CALCIUM 9.1 9.1                   Imaging  DX-CHEST-PORTABLE (1 VIEW)   Final Result         No acute cardiac or pulmonary abnormality is identified.      DX-CHEST-PORTABLE (1 VIEW)   Final Result      Mild lung base atelectasis with edema not excluded.      DX-CHEST-PORTABLE (1 VIEW)   Final Result         1.  Pulmonary edema and/or infiltrates.   2.  Cardiomegaly      MR-BRAIN-W/O   Final Result      1.  No acute intracranial abnormality.   2.  Sphenoid and posterior right ethmoid sinus disease.      US-EXTREMITY VENOUS LOWER BILAT   Final Result      EC-ECHOCARDIOGRAM LTD W/O CONT   Final Result      CT-CTA CHEST PULMONARY ARTERY  "W/ RECONS   Final Result   Addendum 1 of 1   These findings were discussed with HEATHER MELGOZA on 9/19/2019 2:16 PM.      Final      DX-CHEST-PORTABLE (1 VIEW)   Final Result      No acute cardiopulmonary abnormality.      CT-HEAD W/O   Final Result      No CT evidence of acute infarct, hemorrhage or mass. No acute intracranial injury.           Assessment/Plan  * Acute saddle pulmonary embolism (HCC)- (present on admission)  Assessment & Plan  Recurrent, in the setting of noncompliance with anticoagulation therapy  CTA PE revealing \"Extensive acute bilateral pulmonary emboli with saddle embolus noted, as well as findings concerning for RV strain\"  Recurrence likely secondary to poor compliance of medication  Heparin drip transitioned to Xarelto  Echocardiogram with RV strain, caution aggressive blood pressure changes  DVT study negative    Acute hypoxemic respiratory failure (HCC)  Assessment & Plan  Secondary acute pulmonary embolism  Wean off oxygen as tolerated  Resolved.    Paroxysmal atrial fibrillation (HCC)- (present on admission)  Assessment & Plan   home dose Tenormin.  Continue Xarelto.  Rate is controlled.    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  MRI brain negative  10/5: Psychiatry increased Seroquel dose.   10/9Dc legal by psych today    ETOH abuse- (present on admission)  Assessment & Plan  History of, monitor for withdrawal    Acute cystitis- (present on admission)  Assessment & Plan  Urine culture grew strep epidermidis sensitive to Bactrim.  Leucocytosis resolved   Completed course of antibiotics.    Fever  Assessment & Plan  9/28 - Has had leukocytosis, now fever 100.7. He has no specific symptoms. CXR and urine ordered  9/29 - Continue " ceftriaxone, urine culture pending.  Srivastava cath was changed  9/30 -urine growing coag negative staph change to complete course of Bactrim  Resolved.    Weakness  Assessment & Plan  I spoke with psychiatry, who notes patient's current weakness has progressed from when he first admitted.  CPK was normal  Psychiatry stopped risperidone, had some improvement with Cogentin  Possible toxicity from the nitrous oxide.  I spoke with Dr. Byrd with neurology, he agreed with continuing with vitamin B12 supplementation.  He can have EMG testing done as outpatient after 2 weeks of stopping nitrous oxide.  PT OT ordered for reevaluation, IP rehab evaluating today    Vitamin B12 deficiency  Assessment & Plan  With reported peripheral tingling  Supplement ordered    Type 2 diabetes mellitus without complication, without long-term current use of insulin (HCC)  Assessment & Plan  New diagnosis, A1c 6.6%  Diabetes education ordered  Could be contributing to peripheral neuropathy    Slow transit constipation  Assessment & Plan  Daily Senokot and MiraLAX  Bowel regimen ordered      ALLI (obstructive sleep apnea)  Assessment & Plan  History of, pulmonary following    Morbid obesity (HCC)  Assessment & Plan  Outpatient weight loss program would be indicated    Pulmonary hypertension (HCC)- (present on admission)  Assessment & Plan  Known history of, now with recurrent PE  Echocardiogram noted    Anxiety- (present on admission)  Assessment & Plan  On Librium previously  Now uncontrolled  Psychiatry evaluating, he was placed on a hold.  Bedside sitter as needed.  9/25 risperdal stopped for possible NMS, his anxiety is now increased  Psych adjusting seroquel and cogentin.       Essential hypertension- (present on admission)  Assessment & Plan  Blood pressure has been stable.  Continue to monitor.  Plan of care discussed with multidisciplinary team during rounds.    VTE prophylaxis: xarelto    dc legal hold

## 2019-10-09 NOTE — CARE PLAN
Problem: Communication  Goal: The ability to communicate needs accurately and effectively will improve  Outcome: PROGRESSING AS EXPECTED  Pt and visitors educated on the use of call light, communicating with the staff on pt needs and concerns. Provided time to answer pt questions and address concerns.     Problem: Knowledge Deficit  Goal: Knowledge of disease process/condition, treatment plan, diagnostic tests, and medications will improve  Outcome: PROGRESSING AS EXPECTED  Pt educated on POC for the day, disease process, upcoming tests, and medication information. Will continue to answer questions and educate pt as necessary.

## 2019-10-09 NOTE — CONSULTS
Physical Medicine and Rehabilitation Consultation         Initial Consult      Date of Consultation: 10/9/2019  Consulting provider: Herbie Washington MD  Reason for consultation: assess for acute inpatient rehab appropriateness  LOS: 20 Day(s)    Chief complaint: Weakness, debility    HPI: The patient is a 34 y.o. RHD hand dominant male with a past medical history of depression, PTSD, anxiety paroxysmal A. fib, recent diagnosis of PE May 2019 and substance abuse of mushrooms, nitrous oxide, marijuana, alcohol;  who presented on 9/19/2019  9:35 AM with hallucinations.  Patient had stopped taking his medications including Xarelto.  Patient had fallen multiple times.  On arrival was hypoxic and CTA showed extensive acute bilateral pulmonary emboli with saddle embolus.  Patient was admitted to ICU and started on heparin infusion, eventually was transitioned to Xarelto.  TTE showed severe right heart strain.  Psychiatry was consulted for hallucinations and he was started on Risperdal and developed neurologic changes concerning for NMS so Risperdal was stopped.  Patient now on Seroquel.  Patient developed leukocytosis and fever with positive urine culture for coag negative staph and he was started on Bactrim.  Srivastava catheter removed but he failed voiding trial and has since been replaced.      The patient currently reports weakness, some paresthesias in the bilateral hands and feet causing sensation abnormalities but not painful. Recent history of constipation, recently resolved. Srivastava for urine management. Prior history of taking Librium and hydroxyzine. Endorses poor sleep. SOB when laying down. 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 Patient is able to converse intelligently with me, but mother sees some upper level cog deficits and would like a cog eval.       ROS:  Pertinent positives are listed in HPI, all other systems reviewed and are negative      Social Hx:  Patient will go home with his  mother to live with his parents in [facility]. [place], [place]. Ranch style home, single level with 2-3 PAOLA   Employment: previously in the cannabis industry   Tobacco: quit a year ago   Alcohol: yes, prior   Drugs: yes, prior     Current level of function:  The patient was evaluated by acute care Physical Therapy and Occupational Therapy; currently requiring minimal assistance for mobility and minimal assistance for ADLs.        PMH:  Past Medical History:   Diagnosis Date   • A-fib (HCC)    • Anxiety    • At risk for sleep apnea    • Depression    • ETOH abuse    • Hypertension    • Panic attack due to exceptional stress        PSH:  No past surgical history on file.    FHX:    Family History   Problem Relation Age of Onset   • Diabetes Father    • Hypertension Father    • Alcohol/Drug Brother    • Hypertension Paternal Grandmother        Medications:  Current Facility-Administered Medications   Medication Dose   • omeprazole (PRILOSEC) capsule 20 mg  20 mg   • miconazole 2%-zinc oxide (INZO) topical cream     • QUEtiapine (SEROQUEL) tablet 100 mg  100 mg   • QUEtiapine (SEROQUEL) tablet 300 mg  300 mg   • nystatin (MYCOSTATIN) powder     • atenolol (TENORMIN) tablet 100 mg  100 mg   • calcium carbonate (TUMS) chewable tab 500 mg  500 mg   • gabapentin (NEURONTIN) capsule 300 mg  300 mg   • cyanocobalamin (VITAMIN B-12) injection 1,000 mcg  1,000 mcg   • lisinopril (PRINIVIL) 10 MG tablet 10 mg  10 mg   • polyethylene glycol/lytes (MIRALAX) PACKET 1 Packet  1 Packet    And   • senna-docusate (PERICOLACE or SENOKOT S) 8.6-50 MG per tablet 2 Tab  2 Tab    And   • magnesium hydroxide (MILK OF MAGNESIA) suspension 30 mL  30 mL    And   • bisacodyl (DULCOLAX) suppository 10 mg  10 mg   • rivaroxaban (XARELTO) tablet 15 mg  15 mg    Followed by   • [START ON 10/14/2019] rivaroxaban (XARELTO) tablet 20 mg  20 mg   • tamsulosin (FLOMAX) capsule 0.4 mg  0.4 mg   • Respiratory Care per Protocol     • acetaminophen  "(TYLENOL) tablet 650 mg  650 mg   • labetalol (NORMODYNE,TRANDATE) injection 10 mg  10 mg   • ondansetron (ZOFRAN) syringe/vial injection 4 mg  4 mg   • ondansetron (ZOFRAN ODT) dispertab 4 mg  4 mg   • folic acid (FOLVITE) tablet 1 mg  1 mg   • multivitamin (THERAGRAN) tablet 1 Tab  1 Tab       Allergies:  Allergies   Allergen Reactions   • Cardizem      Bradycardia in the 30's. Reaction noted on 5/25/19 when admitted for ETOH/hypotension/tachycardia. Pt had atenolol and lisinpril same day as administration.       Physical Exam:  Vitals: /72   Pulse 98   Temp 36.7 °C (98.1 °F) (Temporal)   Resp 18   Ht 1.727 m (5' 7.99\")   Wt 116.4 kg (256 lb 9.9 oz)   SpO2 96%   Gen: NAD  Head:  NC/AT  Eyes/ Nose/ Mouth: right pupil larger, both round and reactive to light, accommodating. moist mucous membranes  Cardio: RRR, no mumurs  Pulm: CTAB, with normal respiratory effort  Abd: Soft NTND, active bowel sounds,   Ext: No peripheral edema. No calf tenderness. No clubbing/cyanosis. +dorsal pedalis pulses bilaterally.    Mental status:  A&Ox4 (person, place, date, situation) answers questions appropriately follows commands  Speech: fluent, no aphasia or dysarthria    CRANIAL NERVES:  2,3: visual acuity grossly intact, Pupils unequal, with R>L. Otherwise RRL  3,4,6: EOMI bilaterally, no nystagmus or diplopia  5: sensation intact to light touch bilaterally and symmetric  7: no facial asymmetry  8: hearing grossly intact  9,10: symmetric palate elevation  11: SCM/Trapezius strength 5/5 bilaterally  12: tongue protrudes midline    Motor:      Shoulder flexors:  Bilateral -  5/5  Elbow flexors:  Bilateral -  5/5  Wrist extensors:  Bilateral -  5/5  Elbow extensors:  Bilateral -  5/5  Finger flexors:  Bilateral -  5/5  Finger abductors:  Bilateral -  5/5  Hip flexors:  Bilateral -  5/5  Knee ext:  Bilateral -  5/5  Dorsiflexors:  Bilateral -  2/5  EHL:  Bilateral -  2/5  Plantar flexors:  Bilateral -  2/5     Sensory: "   intact to light touch through out    DTRs: 1+ in bilateral biceps, triceps, brachioradialis, 1+ in bilateral patellar and achilles tendons  No clonus at bilateral ankles  Negative babinski b/l  Negative Jones b/l     Tone: no spasticity noted, no cogwheeling noted    Labs: Reviewed and significant for   Recent Labs     10/07/19  0555 10/08/19  0839   RBC 4.80 5.07   HEMOGLOBIN 14.6 15.0   HEMATOCRIT 44.5 47.0   PLATELETCT 352 381     Recent Labs     10/07/19  0920 10/08/19  0839   SODIUM 136 135   POTASSIUM 3.8 3.9   CHLORIDE 103 105   CO2 20 18*   GLUCOSE 94 94   BUN 7* 8   CREATININE 0.71 0.67   CALCIUM 9.1 9.1     No results found for this or any previous visit (from the past 24 hour(s)).    Imaging:   MR Brain   1.  No acute intracranial abnormality.  2.  Sphenoid and posterior right ethmoid sinus disease.    ASSESSMENT:  Patient is a 34 y.o. male admitted with hallucinations, hypoxia, bilateral PEs, saddle embolus now s/p treatment with heparin, transition to Xarelto, and on Seroquel.    Rehabilitation: Impaired ADLs and mobility  Patient is a good candidate for inpatient rehab based on needs for PT, OT, and speech therapy (pending SLP eval).  Patient will also benefit from family training.  Patient has a good discharge situation which will be home with parents in [place].   Goals prior to transfer include: insurance approval.  Barriers to admission include coordination of care on discharge from inpatient rehab to [place].  Patient will need a physician in his hometown to assume care, and write for DME and medications in [place].    Debility  -Patient has developed moderate debility due to prolonged hospitalization requiring restraints at times  -Continue PT OT while in hospital  -Cog eval prior to transfer to [facility] if time allows    Sleep  - Patient endorses insomnia despite taking 300 mg of Seroquel nightly  - Doubtful that trazodone or melatonin would help this patient, appreciate psych assistance.    -Try to minimize naps during the day, fill the room with natural light, minimize interruptions at nighttime.    Bilateral Foot drop   -Unclear etiology.  No obvious reason for traumatic foot drop.  Possibly related to medication use, although this is also unclear.  -Patient will require bilateral AFOs which can be assessed for once in rehab    Bladder Management   - Continue Srivastava until urine output is less than 2.5 L at which time can consider transitioning to voiding trial/CIC  - voiding trial, if can't void in 6 hours or prn check pvr and if >500cc then ICP,if able to void then check PVR, if PVR is >200cc then ICP    Pain  -Patient denies pain, headache.  Endorses paresthesias but these are nonpainful.  Do not recommend starting gabapentin at this time    DVT Prophylaxis:   - Xarelto 15mg BID AND xarelto 20mg with PM meal     Discussed with pt and family, summarized hospitalization and care, options for next step of care  Labs reviewed   Imaging personally reviewed    Discussed with team about recommendations     Thank you for allowing us to participate in the care of this patient.     Discussed with patient about recommendations for and plan for rehabilitation as follows. Patient with multiple co-morbidities(including but not limited to psychiatric disturbance, pain management, urinary retention, hypertension); with cognitive deficits and functional deficits in mobility/self-care, and Moderate de-conditioning.     Pre-morbidly, this patient lived in a single level home with Four steps to enter, with family. The patient was evaluated by acute care Physical Therapy, Occupational Therapy and Speech Language Pathology; currently requiring minimal assistance for mobility and minimal assistance for ADLs, also with ongoing cognitive deficits.     The patient is a(n) Good candidate for an acute inpatient rehabilitation program with a coordinated program of care at an intensity and frequency not available at a lower  level of care.     Note: if patient continues to progress while waiting for medical clearance, and no longer requires 2 of of 3 therapy services (PT/OT/SLP) at a CGA/Minimal assistance level, patient will no longer need acute inpatient rehabilitation.    This recommendation is substantiated by the patient's current medical condition with intervention and assessment of medical issues requiring an acute level of care for patient's safety and maximum outcome. A coordinated program of care will be provided by an interdisciplinary team including physical therapy, occupational therapy, speech language pathology, physiatry and rehab psychology. Rehab goals include improved cognition, mobility, self-care management, strength and conditioning/endurance, pain management, bowel and bladder management, mood and affect, and safety with independent home management including caregiver training.     Estimated length of stay is approximately 14 days. Rehab potential: Very Good. Disposition: to pre-morbid independent living setting with supportive care of patient's family. We will continue to follow with you in anticipation of discharge to acute inpatient rehabilitation when medically stable to do so at the discretion of his  attending physician. Thank you for allowing us to participate in his care. Please call with any questions regarding this recommendation.    Omar Flynn, DO   Physical Medicine and Rehabilitation   10/9/2019

## 2019-10-10 LAB
ALBUMIN SERPL BCP-MCNC: 4.1 G/DL (ref 3.2–4.9)
ALBUMIN/GLOB SERPL: 1.5 G/DL
ALP SERPL-CCNC: 55 U/L (ref 30–99)
ALT SERPL-CCNC: 371 U/L (ref 2–50)
ANION GAP SERPL CALC-SCNC: 10 MMOL/L (ref 0–11.9)
AST SERPL-CCNC: 95 U/L (ref 12–45)
BASOPHILS # BLD AUTO: 0.7 % (ref 0–1.8)
BASOPHILS # BLD: 0.07 K/UL (ref 0–0.12)
BILIRUB SERPL-MCNC: 0.3 MG/DL (ref 0.1–1.5)
BUN SERPL-MCNC: 9 MG/DL (ref 8–22)
CALCIUM SERPL-MCNC: 9.2 MG/DL (ref 8.5–10.5)
CHLORIDE SERPL-SCNC: 106 MMOL/L (ref 96–112)
CO2 SERPL-SCNC: 22 MMOL/L (ref 20–33)
CREAT SERPL-MCNC: 0.65 MG/DL (ref 0.5–1.4)
EOSINOPHIL # BLD AUTO: 0.22 K/UL (ref 0–0.51)
EOSINOPHIL NFR BLD: 2.1 % (ref 0–6.9)
ERYTHROCYTE [DISTWIDTH] IN BLOOD BY AUTOMATED COUNT: 58.2 FL (ref 35.9–50)
GLOBULIN SER CALC-MCNC: 2.7 G/DL (ref 1.9–3.5)
GLUCOSE SERPL-MCNC: 91 MG/DL (ref 65–99)
HCT VFR BLD AUTO: 43.2 % (ref 42–52)
HGB BLD-MCNC: 14.4 G/DL (ref 14–18)
IMM GRANULOCYTES # BLD AUTO: 0.18 K/UL (ref 0–0.11)
IMM GRANULOCYTES NFR BLD AUTO: 1.7 % (ref 0–0.9)
LYMPHOCYTES # BLD AUTO: 3.14 K/UL (ref 1–4.8)
LYMPHOCYTES NFR BLD: 30.3 % (ref 22–41)
MCH RBC QN AUTO: 30.9 PG (ref 27–33)
MCHC RBC AUTO-ENTMCNC: 33.3 G/DL (ref 33.7–35.3)
MCV RBC AUTO: 92.7 FL (ref 81.4–97.8)
MONOCYTES # BLD AUTO: 1.07 K/UL (ref 0–0.85)
MONOCYTES NFR BLD AUTO: 10.3 % (ref 0–13.4)
NEUTROPHILS # BLD AUTO: 5.69 K/UL (ref 1.82–7.42)
NEUTROPHILS NFR BLD: 54.9 % (ref 44–72)
NRBC # BLD AUTO: 0 K/UL
NRBC BLD-RTO: 0 /100 WBC
PLATELET # BLD AUTO: 357 K/UL (ref 164–446)
PMV BLD AUTO: 8.3 FL (ref 9–12.9)
POTASSIUM SERPL-SCNC: 3.9 MMOL/L (ref 3.6–5.5)
PROT SERPL-MCNC: 6.8 G/DL (ref 6–8.2)
RBC # BLD AUTO: 4.66 M/UL (ref 4.7–6.1)
SODIUM SERPL-SCNC: 138 MMOL/L (ref 135–145)
WBC # BLD AUTO: 10.4 K/UL (ref 4.8–10.8)

## 2019-10-10 PROCEDURE — A9270 NON-COVERED ITEM OR SERVICE: HCPCS | Performed by: FAMILY MEDICINE

## 2019-10-10 PROCEDURE — 700102 HCHG RX REV CODE 250 W/ 637 OVERRIDE(OP): Performed by: INTERNAL MEDICINE

## 2019-10-10 PROCEDURE — 36415 COLL VENOUS BLD VENIPUNCTURE: CPT

## 2019-10-10 PROCEDURE — A9270 NON-COVERED ITEM OR SERVICE: HCPCS | Performed by: HOSPITALIST

## 2019-10-10 PROCEDURE — A9270 NON-COVERED ITEM OR SERVICE: HCPCS | Performed by: PSYCHIATRY & NEUROLOGY

## 2019-10-10 PROCEDURE — 700102 HCHG RX REV CODE 250 W/ 637 OVERRIDE(OP): Performed by: FAMILY MEDICINE

## 2019-10-10 PROCEDURE — 700102 HCHG RX REV CODE 250 W/ 637 OVERRIDE(OP): Performed by: PSYCHIATRY & NEUROLOGY

## 2019-10-10 PROCEDURE — 97535 SELF CARE MNGMENT TRAINING: CPT

## 2019-10-10 PROCEDURE — 80053 COMPREHEN METABOLIC PANEL: CPT

## 2019-10-10 PROCEDURE — A9270 NON-COVERED ITEM OR SERVICE: HCPCS | Performed by: INTERNAL MEDICINE

## 2019-10-10 PROCEDURE — 97530 THERAPEUTIC ACTIVITIES: CPT

## 2019-10-10 PROCEDURE — 99232 SBSQ HOSP IP/OBS MODERATE 35: CPT | Performed by: HOSPITALIST

## 2019-10-10 PROCEDURE — 700102 HCHG RX REV CODE 250 W/ 637 OVERRIDE(OP): Performed by: HOSPITALIST

## 2019-10-10 PROCEDURE — 770001 HCHG ROOM/CARE - MED/SURG/GYN PRIV*

## 2019-10-10 PROCEDURE — 85025 COMPLETE CBC W/AUTO DIFF WBC: CPT

## 2019-10-10 RX ADMIN — QUETIAPINE FUMARATE 200 MG: 200 TABLET ORAL at 21:12

## 2019-10-10 RX ADMIN — ATENOLOL 100 MG: 25 TABLET ORAL at 05:54

## 2019-10-10 RX ADMIN — QUETIAPINE FUMARATE 100 MG: 100 TABLET ORAL at 08:24

## 2019-10-10 RX ADMIN — THERA TABS 1 TABLET: TAB at 05:54

## 2019-10-10 RX ADMIN — TAMSULOSIN HYDROCHLORIDE 0.4 MG: 0.4 CAPSULE ORAL at 08:24

## 2019-10-10 RX ADMIN — OMEPRAZOLE 20 MG: 20 CAPSULE, DELAYED RELEASE ORAL at 05:54

## 2019-10-10 RX ADMIN — FOLIC ACID 1 MG: 1 TABLET ORAL at 05:54

## 2019-10-10 RX ADMIN — RIVAROXABAN 15 MG: 15 TABLET, FILM COATED ORAL at 17:09

## 2019-10-10 RX ADMIN — SENNOSIDES, DOCUSATE SODIUM 2 TABLET: 50; 8.6 TABLET, FILM COATED ORAL at 05:54

## 2019-10-10 RX ADMIN — LISINOPRIL 10 MG: 10 TABLET ORAL at 05:54

## 2019-10-10 RX ADMIN — RIVAROXABAN 15 MG: 15 TABLET, FILM COATED ORAL at 08:24

## 2019-10-10 RX ADMIN — QUETIAPINE FUMARATE 100 MG: 100 TABLET ORAL at 14:14

## 2019-10-10 ASSESSMENT — ENCOUNTER SYMPTOMS
WEIGHT LOSS: 0
SENSORY CHANGE: 0
BLURRED VISION: 0
FEVER: 0
DIZZINESS: 1
DEPRESSION: 1
NECK PAIN: 0
TREMORS: 0
CLAUDICATION: 0
BRUISES/BLEEDS EASILY: 0
NAUSEA: 0
DOUBLE VISION: 0
SENSORY CHANGE: 1
MEMORY LOSS: 1
SPEECH CHANGE: 0
COUGH: 0
DIARRHEA: 0
ORTHOPNEA: 0
FOCAL WEAKNESS: 0
MYALGIAS: 0
VOMITING: 0
INSOMNIA: 1
HEMOPTYSIS: 0
HALLUCINATIONS: 1
WEAKNESS: 1
HEARTBURN: 0
PHOTOPHOBIA: 0
FOCAL WEAKNESS: 1
TINGLING: 0
SHORTNESS OF BREATH: 0
SORE THROAT: 0
ABDOMINAL PAIN: 0
DIAPHORESIS: 0
CONSTIPATION: 0
PALPITATIONS: 0
CHILLS: 0
NERVOUS/ANXIOUS: 1
DEPRESSION: 0
BACK PAIN: 0

## 2019-10-10 ASSESSMENT — COGNITIVE AND FUNCTIONAL STATUS - GENERAL
DAILY ACTIVITIY SCORE: 18
MOVING FROM LYING ON BACK TO SITTING ON SIDE OF FLAT BED: UNABLE
DRESSING REGULAR LOWER BODY CLOTHING: A LOT
WALKING IN HOSPITAL ROOM: A LITTLE
TOILETING: A LITTLE
DRESSING REGULAR UPPER BODY CLOTHING: A LITTLE
STANDING UP FROM CHAIR USING ARMS: A LITTLE
SUGGESTED CMS G CODE MODIFIER MOBILITY: CK
PERSONAL GROOMING: A LITTLE
MOVING TO AND FROM BED TO CHAIR: A LITTLE
MOBILITY SCORE: 17
SUGGESTED CMS G CODE MODIFIER DAILY ACTIVITY: CK
CLIMB 3 TO 5 STEPS WITH RAILING: A LITTLE
HELP NEEDED FOR BATHING: A LITTLE

## 2019-10-10 ASSESSMENT — LIFESTYLE VARIABLES: SUBSTANCE_ABUSE: 1

## 2019-10-10 ASSESSMENT — GAIT ASSESSMENTS
GAIT LEVEL OF ASSIST: MINIMAL ASSIST
ASSISTIVE DEVICE: FRONT WHEEL WALKER
DISTANCE (FEET): 100
DEVIATION: DECREASED TOE OFF;DECREASED HEEL STRIKE;INCREASED BASE OF SUPPORT

## 2019-10-10 NOTE — CARE PLAN
Problem: Communication  Goal: The ability to communicate needs accurately and effectively will improve  Outcome: PROGRESSING AS EXPECTED  POC discussed with pt, Md, Rn, and social work      Problem: Knowledge Deficit  Goal: Knowledge of disease process/condition, treatment plan, diagnostic tests, and medications will improve  Outcome: PROGRESSING AS EXPECTED  Pt educated on POC for the day, disease process, upcoming tests, and medication information. Will continue to answer questions and educate pt as necessary.

## 2019-10-10 NOTE — PROGRESS NOTES
Handoff report received. Assumed patient care. Patient resting in bed with family at bedside.  Denied current pain. Patient not in distress, AAOX 4. Safety precautions in place. Call light and personal belongings within reach. Educated to call for assistance if needed.

## 2019-10-10 NOTE — PROGRESS NOTES
Per family and patient, they wish to not have any vital signs or lab draws done between the time of 2200 and 0530 to allow the patient to sleep. Will allow patient to rest throughout the night with minimal interruptions.

## 2019-10-10 NOTE — THERAPY
"Occupational Therapy Treatment completed with focus on ADLs and ADL transfers.  Functional Status:  Mod A LB dressing, Min A UB dressing, Min A functional mobility w/ FWW  Plan of Care: Will benefit from Occupational Therapy 3 times per week  Discharge Recommendations:  Equipment Will Continue to Assess for Equipment Needs. Post-acute therapy Recommend post-acute placement for additional occupational therapy services prior to discharge home. Patient can tolerate post-acute therapies at a 5x/week frequency. Pt would benefit from intensive therapies.     See \"Rehab Therapy-Acute\" Patient Summary Report for complete documentation.     Pt very calm throughout OT treatment, minimal anxiety noted. Pt donned shorts w/ mod A for line management and balance and donned shirt w/ min A for assist orienting clothing. Pt's psychosocial and environmental needs addressed during session, pt was placed in new situation, pt tolerated well and remained calm throughout. Pt's mother present at the beginning of session, however excused herself, pt did not perseverate on her being gone throughout session. Pt eager to regain prior cognitive and physical skills. Pt continues to demo impaired balance, functional mobility, activity tolerance, coping skills, and attention impacting functional independence. Recommend post acute placement. Will continue to follow.   "

## 2019-10-10 NOTE — PSYCHIATRY
"PSYCHIATRIC FOLLOW UP:    Reason for Admission: AMS, nitrous poisoning   Reason for consult: Psychiatric Issues, Paranoia   Requesting Physician: Edwardo Dillon M.D.    HPI:     Patient is a 34 y.o. male with history of MDD, PTSD, alcohol use disorder, and pulmonary embolism who presents to the hospital after heavy use of mushrooms and nitrous oxide resulting in paranoid delusions, AMS, B12 deficiency, paralysis which is currently all resolving.     Doing much better. Much stronger in BUE and also walking with walker, but frustrated because \"I really can't go that far and I get tired very easily\". Anxiety improving. Paranoia improving. No a/vh. His mom is leaving for 2-3 hours at a time and he is tolerating it well. He is not back to his baseline physically or psychiatrically or cognitively, but is considerably improved. He didn't like the 300 seroquel at night so it was decreased to 200 qhs. They felt it kept him up, which is strange. I will defer a sleepmed to rehab if that is where he is going next, and we can collaborate.     Eventually i'd like to taper the seroquel due to it's propensity to worsen urinary retention. It's unlikely he'll come off the manzano until we do this, but has helped his psychosis considerably and wasn't causing dystonia as the other antipsychotics were.     No si/hi. Psychosis no longer interfering as significantly with his care. Will d/c hold. Hopefully he will be transferred to [facility], and our team can follow him there if they desire it.       Review of Systems:  Review of Systems   Constitutional: Positive for malaise/fatigue. Negative for chills, diaphoresis, fever and weight loss.   HENT: Negative for ear pain, hearing loss and sore throat.    Eyes: Negative for blurred vision, double vision and photophobia.   Respiratory: Negative for cough, hemoptysis and shortness of breath.    Cardiovascular: Negative for chest pain and palpitations.   Gastrointestinal: Negative for " "constipation, nausea and vomiting.   Genitourinary: Negative for dysuria and urgency.   Musculoskeletal: Negative for myalgias and neck pain.   Skin: Negative for itching and rash.   Neurological: Positive for dizziness, sensory change, focal weakness (.improving) and weakness. Negative for speech change.   Endo/Heme/Allergies: Negative for environmental allergies. Does not bruise/bleed easily.   Psychiatric/Behavioral: Positive for memory loss and substance abuse. Negative for depression and suicidal ideas. The patient is nervous/anxious and has insomnia.           Psychiatric Examination: observed phenomenon:  Vitals: /81   Pulse 91   Temp 36.9 °C (98.4 °F) (Temporal)   Resp 20   Ht 1.727 m (5' 7.99\")   Wt 116.9 kg (257 lb 11.5 oz)   SpO2 96%   BMI 39.20 kg/m²  Body mass index is 39.2 kg/m².    Appearance: he is no longer diaphoretic. Grooming much improved. He is shaved  Muscle Strength/Tone: psychomotor retardation. Strength improving. Tone wnl, no rigidity.   Gait/Station: able to walk with assistance and walker.   Speech: Nl tone, rate, and volume. Not pressured. Understandable. Much more fluent  Thought Process: Logical and sequential, goal-directed   Associations:no loose associations   Abnormal/Psychotic Thoughts (ex):denies a/vh. Paranoia almost completely gone.   Insight/Judgement:improving   Orientation:oriented to self, place, month   Memory:improving   Attention/Concentration:improving   Language:fluent   Fund of Knowledge wnl   Mood:          Anxious   Affect:         More normalized   SI/HI:   Denies   Neurological Testing:( ie clock, cube drawing, MMSE, MOCA,etc.)    Soc history:    Declined from SNFs     Lab results/tests:   Recent Results (from the past 48 hour(s))   CBC WITH DIFFERENTIAL    Collection Time: 10/08/19  8:39 AM   Result Value Ref Range    WBC 10.4 4.8 - 10.8 K/uL    RBC 5.07 4.70 - 6.10 M/uL    Hemoglobin 15.0 14.0 - 18.0 g/dL    Hematocrit 47.0 42.0 - 52.0 %    MCV " 92.7 81.4 - 97.8 fL    MCH 29.6 27.0 - 33.0 pg    MCHC 31.9 (L) 33.7 - 35.3 g/dL    RDW 57.1 (H) 35.9 - 50.0 fL    Platelet Count 381 164 - 446 K/uL    MPV 8.4 (L) 9.0 - 12.9 fL    Neutrophils-Polys 59.30 44.00 - 72.00 %    Lymphocytes 28.20 22.00 - 41.00 %    Monocytes 9.00 0.00 - 13.40 %    Eosinophils 1.60 0.00 - 6.90 %    Basophils 0.60 0.00 - 1.80 %    Immature Granulocytes 1.30 (H) 0.00 - 0.90 %    Nucleated RBC 0.00 /100 WBC    Neutrophils (Absolute) 6.18 1.82 - 7.42 K/uL    Lymphs (Absolute) 2.94 1.00 - 4.80 K/uL    Monos (Absolute) 0.94 (H) 0.00 - 0.85 K/uL    Eos (Absolute) 0.17 0.00 - 0.51 K/uL    Baso (Absolute) 0.06 0.00 - 0.12 K/uL    Immature Granulocytes (abs) 0.14 (H) 0.00 - 0.11 K/uL    NRBC (Absolute) 0.00 K/uL   Comp Metabolic Panel    Collection Time: 10/08/19  8:39 AM   Result Value Ref Range    Sodium 135 135 - 145 mmol/L    Potassium 3.9 3.6 - 5.5 mmol/L    Chloride 105 96 - 112 mmol/L    Co2 18 (L) 20 - 33 mmol/L    Anion Gap 12.0 (H) 0.0 - 11.9    Glucose 94 65 - 99 mg/dL    Bun 8 8 - 22 mg/dL    Creatinine 0.67 0.50 - 1.40 mg/dL    Calcium 9.1 8.5 - 10.5 mg/dL    AST(SGOT) 44 12 - 45 U/L    ALT(SGPT) 95 (H) 2 - 50 U/L    Alkaline Phosphatase 56 30 - 99 U/L    Total Bilirubin 0.5 0.1 - 1.5 mg/dL    Albumin 4.5 3.2 - 4.9 g/dL    Total Protein 7.3 6.0 - 8.2 g/dL    Globulin 2.8 1.9 - 3.5 g/dL    A-G Ratio 1.6 g/dL   ESTIMATED GFR    Collection Time: 10/08/19  8:39 AM   Result Value Ref Range    GFR If African American >60 >60 mL/min/1.73 m 2    GFR If Non African American >60 >60 mL/min/1.73 m 2   CBC WITH DIFFERENTIAL    Collection Time: 10/09/19  2:41 PM   Result Value Ref Range    WBC 10.0 4.8 - 10.8 K/uL    RBC 4.71 4.70 - 6.10 M/uL    Hemoglobin 14.5 14.0 - 18.0 g/dL    Hematocrit 43.4 42.0 - 52.0 %    MCV 92.1 81.4 - 97.8 fL    MCH 30.8 27.0 - 33.0 pg    MCHC 33.4 (L) 33.7 - 35.3 g/dL    RDW 57.5 (H) 35.9 - 50.0 fL    Platelet Count 344 164 - 446 K/uL    MPV 8.1 (L) 9.0 - 12.9 fL     Neutrophils-Polys 70.80 44.00 - 72.00 %    Lymphocytes 17.70 (L) 22.00 - 41.00 %    Monocytes 6.20 0.00 - 13.40 %    Eosinophils 2.60 0.00 - 6.90 %    Basophils 0.90 0.00 - 1.80 %    Nucleated RBC 0.00 /100 WBC    Neutrophils (Absolute) 7.17 1.82 - 7.42 K/uL    Lymphs (Absolute) 1.77 1.00 - 4.80 K/uL    Monos (Absolute) 0.62 0.00 - 0.85 K/uL    Eos (Absolute) 0.26 0.00 - 0.51 K/uL    Baso (Absolute) 0.09 0.00 - 0.12 K/uL    NRBC (Absolute) 0.00 K/uL    Anisocytosis 2+     Microcytosis 2+    Comp Metabolic Panel    Collection Time: 10/09/19  2:41 PM   Result Value Ref Range    Sodium 140 135 - 145 mmol/L    Potassium 3.7 3.6 - 5.5 mmol/L    Chloride 108 96 - 112 mmol/L    Co2 19 (L) 20 - 33 mmol/L    Anion Gap 13.0 (H) 0.0 - 11.9    Glucose 100 (H) 65 - 99 mg/dL    Bun 7 (L) 8 - 22 mg/dL    Creatinine 0.64 0.50 - 1.40 mg/dL    Calcium 9.5 8.5 - 10.5 mg/dL    AST(SGOT) 108 (H) 12 - 45 U/L    ALT(SGPT) 305 (H) 2 - 50 U/L    Alkaline Phosphatase 58 30 - 99 U/L    Total Bilirubin 0.3 0.1 - 1.5 mg/dL    Albumin 4.3 3.2 - 4.9 g/dL    Total Protein 7.0 6.0 - 8.2 g/dL    Globulin 2.7 1.9 - 3.5 g/dL    A-G Ratio 1.6 g/dL   ESTIMATED GFR    Collection Time: 10/09/19  2:41 PM   Result Value Ref Range    GFR If African American >60 >60 mL/min/1.73 m 2    GFR If Non African American >60 >60 mL/min/1.73 m 2   DIFFERENTIAL MANUAL    Collection Time: 10/09/19  2:41 PM   Result Value Ref Range    Bands-Stabs 0.90 0.00 - 10.00 %    Metamyelocytes 0.90 %    Manual Diff Status PERFORMED    PERIPHERAL SMEAR REVIEW    Collection Time: 10/09/19  2:41 PM   Result Value Ref Range    Peripheral Smear Review see below    PLATELET ESTIMATE    Collection Time: 10/09/19  2:41 PM   Result Value Ref Range    Plt Estimation See Note    MORPHOLOGY    Collection Time: 10/09/19  2:41 PM   Result Value Ref Range    RBC Morphology Present     Polychromia 1+     Smudge Cells Few      DX-CHEST-PORTABLE (1 VIEW)   Final Result         No acute cardiac or  pulmonary abnormality is identified.      DX-CHEST-PORTABLE (1 VIEW)   Final Result      Mild lung base atelectasis with edema not excluded.      DX-CHEST-PORTABLE (1 VIEW)   Final Result         1.  Pulmonary edema and/or infiltrates.   2.  Cardiomegaly      MR-BRAIN-W/O   Final Result      1.  No acute intracranial abnormality.   2.  Sphenoid and posterior right ethmoid sinus disease.      US-EXTREMITY VENOUS LOWER BILAT   Final Result      EC-ECHOCARDIOGRAM LTD W/O CONT   Final Result      CT-CTA CHEST PULMONARY ARTERY W/ RECONS   Final Result   Addendum 1 of 1   These findings were discussed with HEATHER MELGOZA on 9/19/2019 2:16 PM.      Final      DX-CHEST-PORTABLE (1 VIEW)   Final Result      No acute cardiopulmonary abnormality.      CT-HEAD W/O   Final Result      No CT evidence of acute infarct, hemorrhage or mass. No acute intracranial injury.               Assessment:  Nitrous oxide abuse  Nitrous oxide toxicity   Hyponatremia , mild  Paresis, improving   Encephalopathy related to nitrous abuse, improving   Psychotic disorder, improving   Urinary retention   Constipation           Plan:    D/c hold.     Hopefully he will be able to transfer to [facility].     Would eventually like to taper the seroquel due to his retention (which did predate the seroquel) and see if he tolerates other antipsychotics like abilify. Previously antipsychotics caused dystonia but he was much more ill.     He isn't sleeping well at night, but is napping during the day. Will defer a new sleep med right now; will work with rehab if he goes there to find him an appropriate nighttime med.     Will follow.

## 2019-10-10 NOTE — THERAPY
"Pt seen for PT tx session. Pt is anticipating DC to post acute today and expressed frustration with waiting and the unknowns of transferring to next level of care. Spoke with pt and Mom about post acute expectations, and potential delays which may occur. Pt agreeable to ambulate with PT, tolerated 30 ft then another 70 ft with FWW; however, was limited by pain from urinary catheter. Pt remains very motivated to work with PT and continue to make progress. PT will continue to follow while in house. Continue to strongly recommend post acute placement to optimize functional independence and safety prior to DC home.     Physical Therapy Treatment completed.   Bed Mobility:  Supine to Sit: (in chair pre/post PT session)  Transfers: Sit to Stand: Minimal Assist  Gait: Level Of Assist: Minimal Assist with Front-Wheel Walker x30 ft, then 70 ft       Plan of Care: Will benefit from Physical Therapy 5 times per week  Discharge Recommendations: Equipment: Will Continue to Assess for Equipment Needs.   Post-acute therapy: Recommend post-acute placement for continued physical therapy services prior to discharge home. Patient can tolerate post-acute therapies at a 5x/week frequency.              See \"Rehab Therapy-Acute\" Patient Summary Report for complete documentation.       "

## 2019-10-10 NOTE — PROGRESS NOTES
Hospital Medicine Daily Progress Note    Date of Service  10/10/2019    Chief Complaint  34 y.o. male admitted 9/19/2019 with hallucinations    Hospital Course    Past medical history of depression, PTSD, anxiety, substance abuse of mushrooms, nitrous oxide, marijuana, alcohol, paroxysmal A. fib, recent diagnosis of PE May 2019.  He presented with hallucinations and had stopped taking his medications including Xarelto.  He had also fallen multiple times.  He was found hypoxic and CTA showed extensive acute bilateral pulmonary emboli with saddle embolus.  He was admitted to the ICU and felt his risk of intracranial hemorrhage outweighed submassive dose of alteplase or EKOS.  He was started on heparin infusion.  Lower extremity Doppler was negative for DVT.  TTE showed severe right heart strain.  Patient remained stabilized and transitioned to Xarelto.  Psychiatry was consulted for his hallucinations and he was started on Risperdal.  However he developed neurological changes concerning for NMS and Risperdal was stopped and he was given Cogentin with some improvement.  Psychiatry started him on Seroquel and has been titrating medication.  He was hypertensive requiring IV medications so he was started on a lower lower dose of lisinopril and his atenolol was stopped, given his right heart strain.  He developed leukocytosis and fever.  Urine culture grew coag negative staph and he was started on course of Bactrim.  Srivastava catheter was removed but he failed voiding trial and was replaced.  He is continued on Flomax.  PT OT evaluated him and recommended SNF.      Interval Problem Update  Patient says he feels much better today.  His hallucinations have improved, though he still feels very anxious.  He is off oxygen and denies shortness of breath.  He denies abdominal pain.  He feels less diaphoretic.  Mother is worried about him going to rehab since she will not be able to stay with him.  10/1: Patient became anxious when I  entered the room with the nursing staff.  Stated that does not like to many people around him.  Saturating well on room air.  Stated that he was unable to sleep well last night.  Afebrile overnight.  No issues overnight per staff.  10/2: Resting comfortably in bed.  Stated that he could not sleep sleep well last night.  Still feeling anxious.  Leukocytosis trending down.  Afebrile overnight.  Tolerating p.o. well.  No nausea or vomiting.  Had multiple small bowel movements yesterday after prune juice and stool softeners.  10/3: Resting comfortably in bed.  Looks slightly anxious.  Had another episode of head shaking relieved by 1 dose of Ativan.  Patient has a lot of psychiatric concern and questions which I rely to the psychiatrist who will stop by and answer those questions for the patient.  Otherwise no new issues to report.  10/4: Resting comfortably in bed.  Had large bowel movement yesterday per nursing staff.  Still getting anxious occasionally.  Requesting Ativan.  No acute distress noted.  No issues overnight per staff.  10/5: Resting comfortably in bed.  Psychiatry increased Seroquel dose for better control of anxiety and restlessness.  Still on legal hold.  No acute distress noted.  No issues overnight per staff.  10/6: Resting comfortably in bed.  Having more regular bowel movements now.  Anxiety slightly improved with increase Seroquel dose as per patient and his mother at bedside.  No acute distress noted.  No issues overnight per staff.  10/7: Resting comfortably in bed.  No acute distress noted.  Still anxious as stated.  Feels stronger.  Tolerating p.o. fairly.  Having regular bowel movements but still think that he is still constipated.  No nausea or vomiting. No issues overnight per staff    10/8: seen and examined, patient has flat affect, states he wasn't able to sleep much again last night however the decreased seroquel dose helped with hallucination and delusions, mother in room and confirmed  the above  Otherwise  No complaints.    10/9: seen and examined, patient states he is doing a little better interms of hallucinations however mom does not think so. Psych will see him today  Still on legal, needs to be off legal to go to inpatient psych facility    10/10: seen and examined, sitting in chair, eating breakfast, mother at bedside, still both have a flat affect, and very serious. States he is doing better however had some nightmares last night.   Vitals stable  Did have enough sleep last night though    PMR evaluated and he is a good candidate, however he will need to go back to pcp in [place] after rehab, insurance acceptance pending [facility]  Consultants/Specialty  Psychiatry  Critical care  Pulmonology    Code Status  Full Code      Disposition  Case coordination to discuss discharge planning with mother  Rehab once insurance approves  Will need anticoagulation clinic and follow-up with pulmonology    Review of Systems  Review of Systems   Constitutional: Positive for malaise/fatigue. Negative for diaphoresis and fever.   HENT: Negative for congestion, ear discharge, ear pain, hearing loss, nosebleeds and tinnitus.    Eyes: Negative for blurred vision, double vision and photophobia.   Respiratory: Negative for cough and hemoptysis.    Cardiovascular: Negative for chest pain, palpitations, orthopnea, claudication and leg swelling.   Gastrointestinal: Negative for abdominal pain, constipation, diarrhea, heartburn, nausea and vomiting.   Genitourinary: Negative for dysuria, frequency, hematuria and urgency.   Musculoskeletal: Negative for back pain, joint pain, myalgias and neck pain.   Neurological: Positive for weakness. Negative for tingling, tremors, sensory change, speech change and focal weakness.   Psychiatric/Behavioral: Positive for depression, hallucinations and substance abuse. The patient is nervous/anxious and has insomnia.         Nightmares        Physical Exam  Temp:  [36.4 °C (97.6  °F)-37.2 °C (98.9 °F)] 36.4 °C (97.6 °F)  Pulse:  [] 88  Resp:  [18-20] 18  BP: (123-125)/(76-89) 124/82  SpO2:  [93 %-97 %] 94 %    Physical Exam   Constitutional: He is oriented to person, place, and time. No distress.   Obesity   HENT:   Head: Atraumatic.   Eyes: Pupils are equal, round, and reactive to light. Conjunctivae are normal. Right eye exhibits no discharge. Left eye exhibits no discharge.   Neck: No tracheal deviation present.   Cardiovascular: Normal rate and regular rhythm. Exam reveals no gallop and no friction rub.   Pulmonary/Chest: Effort normal. No respiratory distress. He has no wheezes.   Abdominal: Soft. Bowel sounds are normal. He exhibits no distension. There is no tenderness.   Musculoskeletal: He exhibits no tenderness or deformity.   Neurological: He is alert and oriented to person, place, and time.   4 out of 5 strength upper and lower extremities   Skin: Skin is warm. He is not diaphoretic. No erythema.   .   Psychiatric: His mood appears not anxious. His affect is blunt. He is slowed and withdrawn. Thought content is paranoid. He does not express impulsivity.   Nursing note and vitals reviewed.      Fluids    Intake/Output Summary (Last 24 hours) at 10/10/2019 1219  Last data filed at 10/10/2019 0624  Gross per 24 hour   Intake 240 ml   Output 2600 ml   Net -2360 ml       Laboratory  Recent Labs     10/08/19  0839 10/09/19  1441   WBC 10.4 10.0   RBC 5.07 4.71   HEMOGLOBIN 15.0 14.5   HEMATOCRIT 47.0 43.4   MCV 92.7 92.1   MCH 29.6 30.8   MCHC 31.9* 33.4*   RDW 57.1* 57.5*   PLATELETCT 381 344   MPV 8.4* 8.1*     Recent Labs     10/08/19  0839 10/09/19  1441   SODIUM 135 140   POTASSIUM 3.9 3.7   CHLORIDE 105 108   CO2 18* 19*   GLUCOSE 94 100*   BUN 8 7*   CREATININE 0.67 0.64   CALCIUM 9.1 9.5                   Imaging  DX-CHEST-PORTABLE (1 VIEW)   Final Result         No acute cardiac or pulmonary abnormality is identified.      DX-CHEST-PORTABLE (1 VIEW)   Final Result     "  Mild lung base atelectasis with edema not excluded.      DX-CHEST-PORTABLE (1 VIEW)   Final Result         1.  Pulmonary edema and/or infiltrates.   2.  Cardiomegaly      MR-BRAIN-W/O   Final Result      1.  No acute intracranial abnormality.   2.  Sphenoid and posterior right ethmoid sinus disease.      US-EXTREMITY VENOUS LOWER BILAT   Final Result      EC-ECHOCARDIOGRAM LTD W/O CONT   Final Result      CT-CTA CHEST PULMONARY ARTERY W/ RECONS   Final Result   Addendum 1 of 1   These findings were discussed with HEATHER MELGOZA on 9/19/2019 2:16 PM.      Final      DX-CHEST-PORTABLE (1 VIEW)   Final Result      No acute cardiopulmonary abnormality.      CT-HEAD W/O   Final Result      No CT evidence of acute infarct, hemorrhage or mass. No acute intracranial injury.           Assessment/Plan  * Acute saddle pulmonary embolism (HCC)- (present on admission)  Assessment & Plan  Recurrent, in the setting of noncompliance with anticoagulation therapy  CTA PE revealing \"Extensive acute bilateral pulmonary emboli with saddle embolus noted, as well as findings concerning for RV strain\"  Recurrence likely secondary to poor compliance of medication  continue Xarelto  Echocardiogram with RV strain, caution aggressive blood pressure changes  DVT study negative    Acute hypoxemic respiratory failure (HCC)  Assessment & Plan  Secondary acute pulmonary embolism  Wean off oxygen as tolerated  Resolved.    Paroxysmal atrial fibrillation (HCC)- (present on admission)  Assessment & Plan   home dose Tenormin.  Continue Xarelto.  Rate is controlled.    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  MRI brain negative  10/5: Psychiatry increased Seroquel dose.      ETOH abuse- (present on " admission)  Assessment & Plan  History of, monitor for withdrawal    Acute cystitis- (present on admission)  Assessment & Plan  Urine culture grew strep epidermidis sensitive to Bactrim.  Leucocytosis resolved   Completed course of antibiotics.    Fever  Assessment & Plan  9/28 - Has had leukocytosis, now fever 100.7. He has no specific symptoms. CXR and urine ordered  9/29 - Continue ceftriaxone, urine culture pending.  Srivastava cath was changed  9/30 -urine growing coag negative staph change to complete course of Bactrim  Resolved.    Weakness  Assessment & Plan  I spoke with psychiatry, who notes patient's current weakness has progressed from when he first admitted.  CPK was normal  Psychiatry stopped risperidone, had some improvement with Cogentin  Possible toxicity from the nitrous oxide.  I spoke with Dr. Byrd with neurology, he agreed with continuing with vitamin B12 supplementation.  He can have EMG testing done as outpatient after 2 weeks of stopping nitrous oxide.  PT OT ordered for reevaluation, plans of IP rehab    Vitamin B12 deficiency  Assessment & Plan  With reported peripheral tingling  Supplement ordered    Type 2 diabetes mellitus without complication, without long-term current use of insulin (HCC)  Assessment & Plan  New diagnosis, A1c 6.6%  Diabetes education ordered  Could be contributing to peripheral neuropathy    Slow transit constipation  Assessment & Plan  Daily Senokot and MiraLAX  Bowel regimen ordered      ALLI (obstructive sleep apnea)  Assessment & Plan  History of, pulmonary following    Morbid obesity (HCC)  Assessment & Plan  Outpatient weight loss program would be indicated    Pulmonary hypertension (HCC)- (present on admission)  Assessment & Plan  Known history of, now with recurrent PE  Echocardiogram noted    Anxiety- (present on admission)  Assessment & Plan  On Librium previously  Now uncontrolled  Psychiatry evaluating, he was placed on a hold.  Bedside sitter as needed.  9/25  risperdal stopped for possible NMS, his anxiety is now increased  Psych adjusting seroquel and cogentin.       Essential hypertension- (present on admission)  Assessment & Plan  Blood pressure has been stable.  Continue to monitor.  Plan of care discussed with multidisciplinary team during rounds.    VTE prophylaxis: xarelto    Legal hold lifted  Ready for IP rehab when insurance approves

## 2019-10-10 NOTE — PREADMISSION SCREENING NOTE
Updated Pre-Screen Assessment     Name: Cosme Cabrera  MRN: [id number]  : 1985    Medical Status/ Changes:     Obie Richardson M.D.   Physician   [facility] Medicine   Progress Notes   Signed   Date of Service:  10/15/2019  8:06 PM                    []Hide copied text    []Rosa for details  Hospital Medicine Daily Progress Note     Date of Service  10/15/2019     Chief Complaint  34 y.o. male admitted 2019 with hallucination     Hospital Course    Past medical history of depression, PTSD, anxiety, substance abuse of mushrooms, nitrous oxide, marijuana, alcohol, paroxysmal A. fib, recent diagnosis of PE May 2019.  He presented with hallucinations and had stopped taking his medications including Xarelto.  He had also fallen multiple times.  He was found hypoxic and CTA showed extensive acute bilateral pulmonary emboli with saddle embolus.  He was admitted to the ICU and felt his risk of intracranial hemorrhage outweighed submassive dose of alteplase or EKOS.  He was started on heparin infusion.  Lower extremity Doppler was negative for DVT.  TTE showed severe right heart strain.  Patient remained stabilized and transitioned to Xarelto.  Psychiatry was consulted for his hallucinations and he was started on Risperdal.  However he developed neurological changes concerning for NMS and Risperdal was stopped and he was given Cogentin with some improvement.  Psychiatry started him on Seroquel and has been titrating medication.  He was hypertensive requiring IV medications so he was started on a lower lower dose of lisinopril and his atenolol was stopped, given his right heart strain.  He developed leukocytosis and fever.  Urine culture grew coag negative staph and he was started on course of Bactrim.  Srivastava catheter was removed but he failed voiding trial and was replaced. He is continued on Flomax.  PT OT evaluated him and recommended SNF.         Interval Problem Update  Patient in chair c/o tachycardia  palpitation, dizziness, no chest pain no sob, no new focal weakness, alert and oriented.  Continue having anxiety, no fever or chills.      Consultants/Specialty  Psych  Critical care  pulmonology     Code Status  Full code     Disposition  Tbd.   Needs f/u with pulm and a/c clinic.      Review of Systems  Review of Systems   Constitutional: Positive for malaise/fatigue. Negative for chills and fever.   HENT: Negative for congestion and sinus pain.    Eyes: Negative for blurred vision.   Respiratory: Negative for cough, hemoptysis, sputum production and shortness of breath.    Cardiovascular: Positive for palpitations. Negative for chest pain, orthopnea and claudication.   Gastrointestinal: Negative for heartburn, nausea and vomiting.   Genitourinary: Negative for dysuria and urgency.   Musculoskeletal: Negative for myalgias and neck pain.   Skin: Negative for itching.   Neurological: Positive for dizziness. Negative for tingling and headaches.   Endo/Heme/Allergies: Does not bruise/bleed easily.   Psychiatric/Behavioral: Negative for depression, substance abuse and suicidal ideas.         Physical Exam  Temp:  [36.2 °C (97.1 °F)-36.7 °C (98.1 °F)] 36.6 °C (97.8 °F)  Pulse:  [103-119] 103  Resp:  [16-18] 18  BP: (114-131)/(69-79) 129/72  SpO2:  [93 %-98 %] 95 %     Physical Exam   Constitutional: He is oriented to person, place, and time. He appears well-nourished. No distress.   HENT:   Head: Normocephalic and atraumatic.   Mouth/Throat: No oropharyngeal exudate.   Eyes: Conjunctivae are normal. Right eye exhibits no discharge. Left eye exhibits no discharge. No scleral icterus.   Neck: Normal range of motion. Neck supple. No JVD present.   Cardiovascular: Regular rhythm. Tachycardia present. Exam reveals no gallop.   Pulmonary/Chest: Effort normal and breath sounds normal. No stridor. No respiratory distress. He has no wheezes. He has no rales.   Abdominal: Soft. Bowel sounds are normal. He exhibits no distension.  "There is no tenderness. There is no rebound.   Musculoskeletal: Normal range of motion.   Lymphadenopathy:     He has no cervical adenopathy.   Neurological: He is alert and oriented to person, place, and time. He exhibits normal muscle tone.   Skin: Skin is dry. No erythema.   Psychiatric: He has a normal mood and affect.   Nursing note and vitals reviewed.        Fluids     Intake/Output Summary (Last 24 hours) at 10/15/2019 2006  Last data filed at 10/15/2019 1643      Gross per 24 hour   Intake 600 ml   Output 3900 ml   Net -3300 ml         Laboratory                         Imaging  DX-CHEST-PORTABLE (1 VIEW)   Final Result           No acute cardiac or pulmonary abnormality is identified.       DX-CHEST-PORTABLE (1 VIEW)   Final Result       Mild lung base atelectasis with edema not excluded.       DX-CHEST-PORTABLE (1 VIEW)   Final Result           1.  Pulmonary edema and/or infiltrates.   2.  Cardiomegaly       MR-BRAIN-W/O   Final Result       1.  No acute intracranial abnormality.   2.  Sphenoid and posterior right ethmoid sinus disease.       US-EXTREMITY VENOUS LOWER BILAT   Final Result       EC-ECHOCARDIOGRAM LTD W/O CONT   Final Result       CT-CTA CHEST PULMONARY ARTERY W/ RECONS   Final Result   Addendum 1 of 1   These findings were discussed with HEATHER MELGOZA on 9/19/2019 2:16 PM.       Final       DX-CHEST-PORTABLE (1 VIEW)   Final Result       No acute cardiopulmonary abnormality.       CT-HEAD W/O   Final Result       No CT evidence of acute infarct, hemorrhage or mass. No acute intracranial injury.             Assessment/Plan  * Acute saddle pulmonary embolism (HCC)- (present on admission)  Assessment & Plan  Recurrent, in the setting of noncompliance with anticoagulation therapy  CTA PE revealing \"Extensive acute bilateral pulmonary emboli with saddle embolus noted, as well as findings concerning for RV strain\"  Recurrence likely secondary to poor compliance of medication  continue " Xarelto  Echocardiogram with RV strain, DVT study negative     Acute hypoxemic respiratory failure (HCC)  Assessment & Plan  Secondary acute pulmonary embolism  Wean off oxygen as tolerated  Resolved.     Paroxysmal atrial fibrillation (HCC)- (present on admission)  Assessment & Plan   home dose Tenormin.  Continue Xarelto.  Rate is controlled.  ekg showing sinus tachy. Continue monitoring added prn metoprolol.      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  MRI brain negative  Legal  Hold lifted by psychiatry  Hallucination resolved.      ETOH abuse- (present on admission)  Assessment & Plan  History of, monitor for withdrawal     Acute cystitis- (present on admission)  Assessment & Plan  Urine culture grew strep epidermidis sensitive to Bactrim.  Leucocytosis resolved   Completed course of antibiotics.     Weakness  Assessment & Plan  I spoke with psychiatry, who notes patient's current weakness has progressed from when he first admitted.  CPK was normal  Psychiatry stopped risperidone, had some improvement with Cogentin  Possible toxicity from the nitrous oxide. per Dr. Byrd  neurology, he agreed with continuing with vitamin B12 supplementation.  He can have EMG testing done as outpatient after 2 weeks of stopping nitrous oxide     Legal hold off  Pending acute inpatient rehab acceptance by insurance  Stable.      Vitamin B12 deficiency  Assessment & Plan  With reported peripheral tingling  Supplement ordered  Continue monitoring     Type 2 diabetes mellitus without complication, without long-term current use of insulin ([facility])  Assessment & Plan  New diagnosis, A1c 6.6%  Diabetes education ordered  F/u bg in am        Slow transit constipation  Assessment & Plan  Daily Senokot and MiraLAX  Continue Bowel  regimen        ALLI (obstructive sleep apnea)  Assessment & Plan  History of, pulmonary following     Morbid obesity (HCC)  Assessment & Plan  Outpatient weight loss program would be indicated     Pulmonary hypertension (HCC)- (present on admission)  Assessment & Plan  Known history of, now with recurrent PE  Echocardiogram      Anxiety- (present on admission)  Assessment & Plan  On Librium previously  Psych consulted.   9/25 risperdal stopped for possible NMS, his anxiety is now increased  Psych adjusting seroquel and cogentin.   Probably causing tachycardia.      Essential hypertension- (present on admission)  Assessment & Plan  Blood pressure has been stable.  Continue monitoring           VTE prophylaxis: xarelto.      Plan of care discussed with nurse staff  Case and plan of care discussed during multidisciplinary rounds.            Functional Status/ Changes:     Pt seen for PT tx session, remains very motivated and thankful for therapy interventions. Pt demonstrated improved activity tolerance with increased gait distance. Focused on heel strike and toe off during ambualtion with improved overall gait mechanics. Tolerated UE/LE exercises well and noted improvement in ankle DF strength and thus ROM. Pt will continue to progress with acute PT interventions. Continue to stronly recommend intensive post acute placement prior to DC home.      Physical Therapy Treatment completed.   Bed Mobility:  Supine to Sit: Supervised  Transfers: Sit to Stand: Contact Guard Assist(for safety only)  Gait: Level Of Assist: Minimal Assist with Front-Wheel Walker   x70 ft, seated rest, x120 ft    Plan of Care: Will benefit from Physical Therapy 5 times per week  Discharge Recommendations: Equipment: Will Continue to Assess for Equipment Needs. Post-acute therapy: Recommend post-acute placement for continued physical therapy services prior to discharge home. Patient can tolerate post-acute therapies at a 5x/week frequency.       "  Addendum             []Hide copied text    []Garrettver for details  Occupational Therapy Treatment completed with focus on ADLs and ADL transfers.  Functional Status:  Mod A LB dressing, Min A UB dressing, Min A functional mobility w/ FWW  Plan of Care: Will benefit from Occupational Therapy 3 times per week  Discharge Recommendations:  Equipment Will Continue to Assess for Equipment Needs. Post-acute therapy Recommend post-acute placement for additional occupational therapy services prior to discharge home. Patient can tolerate post-acute therapies at a 5x/week frequency. Pt would benefit from intensive therapies.      See \"Rehab Therapy-Acute\" Patient Summary Report for complete documentation.      Pt very calm throughout OT treatment, minimal anxiety noted. Pt donned shorts w/ mod A for line management and balance and donned shirt w/ min A for assist orienting clothing. Pt's psychosocial and environmental needs addressed during session, pt was placed in new situation, pt tolerated well and remained calm throughout. Pt's mother present at the beginning of session, however excused herself, pt did not perseverate on her being gone throughout session. Pt eager to regain prior cognitive and physical skills. Pt continues to demo impaired balance, functional mobility, activity tolerance, coping skills, and attention impacting functional independence. Recommend post acute placement. Will continue to follow.               Reviewer: Jed Summers L.P.N.  Date: 10/16/2019  Time: 7:27 AM  Pre-Admission Screening Form    Patient Information:   Name: Cosme Cabrera     MRN: [id number]       : 1985      Age: 34 y.o.   Gender: male      Race: White [7]       Marital Status: Single [1]  Family Contact: Aga Cabrera        Relationship: Mother [8]  Home Phone: [phone]           Cell Phone:   Advanced Directives: None  Code Status:  FULL  Current Attending Provider: No att. providers found  Referring Physician: Dr." Madina   Physiatrist Consult: Dr. Flynn    Referral Date: 10-05-19  Primary Payor Source:  ELIDA  Secondary Payor Source:  [employer]    Medical Information:   Date of Admission to Acute Care [place]2019  Room Number: T726/00  Rehabilitation Diagnosis: 16 Debility (Non Cardiac, Non Pulmonary)  Immunization History   Administered Date(s) Administered   • Influenza Vac Subunit Quad Inj (Pf) 12/15/2018     Allergies   Allergen Reactions   • Cardizem      Bradycardia in the 30's. Reaction noted on 19 when admitted for ETOH/hypotension/tachycardia. Pt had atenolol and lisinpril same day as administration.     Past Medical History:   Diagnosis Date   • A-fib (HCC)    • Anxiety    • At risk for sleep apnea    • Depression    • ETOH abuse    • Hypertension    • Panic attack due to exceptional stress      No past surgical history on file.    History Leading to Admission, Conditions that Caused the Need for Rehab (CMS):     Dr. Brantley H&P:  Chief Complaint  Psychiatric issues      History of Presenting Illness  34 y.o. male who presented 2019 for psychiatric evaluation.      Patient has history of acute pulmonary embolism requiring hospitalization in May 2019.  Previously history of anxiety disorder, hypertension and paroxysmal atrial fibrillation requiring beta-blocker therapy.  History of alcoholism and alcohol abuse.  During previous hospitalization because of his submassive PE, he received low-dose TPA.  He was discharged on oral Xarelto.  Following discharge I see that he did follow-up with cardiology team and PCP in 2019.  I am uncertain if he maintains subsequent follow-up or not.  Today patient was brought to the emergency department because of underlying psychiatric concerns at home.  He has been using mushrooms, alcohol and marijuana.  Hospital medicine consulted because of underlying psychiatric issues and findings of submassive PE again.      Upon evaluation in the  emergency department, patient is aware of being in the hospital, aware of the month, not completely aware of the date, year.  He is intermittently shaking.  Patient reports being seen altered, having abnormal thoughts.  He is noted to have a very abnormal affect.  He reports that he has not been this goofy.  Reports that he has a PhD in organic chemistry.  He reports that he has not been compliant with his Xarelto, has been sporadically taking it and missing doses very frequently.  He has been having hallucinations at home, seeing people who have been following him around the home and trying to have sexual activity with him.  He has never had any psychiatric history in the past.  Reports that over the last few days he has been very wobbly, unable to walk straight and has had a tendency to fall.  He denies having any fever or chills.  No expressive aphasia, dysarthria.  No headaches.  He does report having chest pain, this worsens with breathing in.  Strong pleuritic component to his chest pain.  Overall a very poor historian with respect to characterizing his pain, rating it on a 10 point scale, providing further input regarding his pain.  He is noted to be hypoxemic requiring oxygen.  He denies having any cough or hemoptysis.  Reports having intermittent abdominal pain and some diarrhea for the last 2 weeks.  He does feel short of breath.  Denies using methamphetamines, intravenous drugs.     Overall a very poor historian.     I have discussed the case with the emergency room physician, consulting physician Dr. Lepe from pulmonology and critical care and with nursing staff.  It appears that this is not normal for patient, usually is a very well organized ely and this presentation is completely abnormal for him.  Because of his abnormal behavior at home, EMS was called and he was brought to the emergency department.   Assessment/Plan:  I anticipate this patient will require at least two midnights for  "appropriate medical management, necessitating inpatient admission.     Acute saddle pulmonary embolism (HCC)- (present on admission)  Assessment & Plan  Recurrent, in the setting of noncompliance with anticoagulation therapy     CTA PE revealing \"Extensive acute bilateral pulmonary emboli with saddle embolus noted, as well as findings concerning for RV strain\"     Pending echocardiogram for right ventricular function at this time     Consultation to pulmonology, critical care for evaluation of TPA, catheter guided thrombolytic therapy     Patient has been falling at home, history of paroxysmal A. fib, drug use and unable to rule out an acute stroke or traumatic/hemorrhagic process.  Uncertain regarding the benefits of administration of TPA, catheter guided TPA therapy given unable to completely assess the underlying risk associated with hemorrhage.  I would defer this decision to pulmonology/critical care team, at this time I have discussed with Dr. Lepe and no plans for administration of TPA, he would further review the case.     Meanwhile we will initiate the patient on intravenous heparin with bolus and re-bolus doses as clinically appropriate.     Patient will be admitted in critical condition to the intensive care unit  Echocardiogram, DVT duplex, MRI brain has been requested by me     For now we will avoid antihypertensive therapy     Will need age-appropriate cancer screening and hypercoagulable work-up with vascular medicine, hematology in 3 months of hospital discharge in the outpatient setting.  Lifelong anticoagulation will be recommended.     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  Q waves in inferior leads.     Likely from underlying pulmonary embolism, right ventricular strain.     Echocardiogram requested for further evaluation  Interval troponin tomorrow  Close clinical monitoring in the intensive care unit  Patient on anticoagulation     Pulmonary hypertension (HCC)- (present on admission)  Assessment & Plan  Known history of, now with recurrent PE  Echocardiogram pending     Anxiety- (present on admission)  Assessment & Plan  On Librium at home, for now will order IV as needed Ativan     Essential hypertension- (present on admission)  Assessment & Plan  Holding antihypertensive therapy in the setting of acute pulmonary embolism        VTE prophylaxis: Therapeutic anticoagulation     Dr. Flynn (Physiatry) recommendations:  ASSESSMENT:  Patient is a 34 y.o. male admitted with hallucinations, hypoxia, bilateral PEs, saddle embolus now s/p treatment with heparin, transition to Xarelto, and on Seroquel.     Rehabilitation: Impaired ADLs and mobility  Patient is a good candidate for inpatient rehab based on needs for PT, OT, and speech therapy (pending SLP eval).  Patient will also benefit from family training.  Patient has a good discharge situation which will be home with parents in [place].   Goals prior to transfer include: insurance approval.  Barriers to admission include coordination of care on discharge from inpatient rehab to [place].  Patient will need a physician in his hometown to assume care, and write for DME and medications in [place].     Debility  -Patient has developed moderate debility due to prolonged hospitalization requiring restraints at times  -Continue PT OT while in hospital  -Cog eval prior to  transfer to [facility] if time allows     Sleep  - Patient endorses insomnia despite taking 300 mg of Seroquel nightly  - Doubtful that trazodone or melatonin would help this patient, appreciate psych assistance.   -Try to minimize naps during the day, fill the room with natural light, minimize interruptions at nighttime.     Bilateral Foot drop   -Unclear etiology.  No obvious reason for traumatic foot drop.  Possibly related to medication use, although this is also unclear.  -Patient will require bilateral AFOs which can be assessed for once in rehab     Bladder Management   - Continue Srivastava until urine output is less than 2.5 L at which time can consider transitioning to voiding trial/CIC  - voiding trial, if can't void in 6 hours or prn check pvr and if >500cc then ICP,if able to void then check PVR, if PVR is >200cc then ICP     Pain  -Patient denies pain, headache.  Endorses paresthesias but these are nonpainful.  Do not recommend starting gabapentin at this time     DVT Prophylaxis:   - Xarelto 15mg BID AND xarelto 20mg with PM meal      Discussed with pt and family, summarized hospitalization and care, options for next step of care  Labs reviewed   Imaging personally reviewed    Discussed with team about recommendations      Thank you for allowing us to participate in the care of this patient.      Discussed with patient about recommendations for and plan for rehabilitation as follows. Patient with multiple co-morbidities(including but not limited to psychiatric disturbance, pain management, urinary retention, hypertension); with cognitive deficits and functional deficits in mobility/self-care, and Moderate de-conditioning.      Pre-morbidly, this patient lived in a single level home with Four steps to enter, with family. The patient was evaluated by acute care Physical Therapy, Occupational Therapy and Speech Language Pathology; currently requiring minimal assistance for mobility and minimal assistance for ADLs,  also with ongoing cognitive deficits.      The patient is a(n) Good candidate for an acute inpatient rehabilitation program with a coordinated program of care at an intensity and frequency not available at a lower level of care.      Note: if patient continues to progress while waiting for medical clearance, and no longer requires 2 of of 3 therapy services (PT/OT/SLP) at a CGA/Minimal assistance level, patient will no longer need acute inpatient rehabilitation.     This recommendation is substantiated by the patient's current medical condition with intervention and assessment of medical issues requiring an acute level of care for patient's safety and maximum outcome. A coordinated program of care will be provided by an interdisciplinary team including physical therapy, occupational therapy, speech language pathology, physiatry and rehab psychology. Rehab goals include improved cognition, mobility, self-care management, strength and conditioning/endurance, pain management, bowel and bladder management, mood and affect, and safety with independent home management including caregiver training.      Estimated length of stay is approximately 14 days. Rehab potential: Very Good. Disposition: to pre-morbid independent living setting with supportive care of patient's family. We will continue to follow with you in anticipation of discharge to acute inpatient rehabilitation when medically stable to do so at the discretion of his  attending physician. Thank you for allowing us to participate in his care. Please call with any questions regarding this recommendation.    Dr. Hou (Pulmonary) recommendations:  Assessment and Plan:     Acute saddle pulmonary embolism (HCC)- (present on admission)  Assessment & Plan  Acute submassive pulmonary embolism - no hypotension has been present  He is noncompliant with his rivaroxaban  Echocardiogram with evidence of right heart strain  Elevated troponin  He reports recent multiple  "falls and striking his head - he reports losing consciousness as a result at least once in the last couple of weeks - CT scan of the head negative for acute pathology  In my opinion, the risks of intracranial hemorrhage outweigh the benefits of a submassive dose of alteplase or catheter directed thrombolytic therapy with EKOS  Also, given his altered mental status, I feel he would have increased difficulty being compliant with proper positioning during EKOS therapy  Start full anticoagulation with heparin on the weight-based protocol  Check lower extremity venous Doppler study for DVT     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)- (present on admission)  Assessment & Plan  He has been on quite a little kunz of psychedelic mushrooms, marijuana and \"whippits\" (inhaled nitrous oxide)  Check urine drug screen  Blood alcohol negative  History of EtOH abuse - observe for evidence of alcohol withdrawal     Elevated troponin- (present on admission)  Assessment & Plan  Due to acute pulmonary embolism     ETOH abuse- (present on admission)  Assessment & Plan  History of alcohol abuse in the past with alcohol withdrawal  Supplement vitamins  Observe for evidence of withdrawal     Essential hypertension- (present on admission)  Assessment & Plan  Hold atenolol, lisinopril for now  Monitor blood pressure     This gentleman will be admitted to the hospital with an acute submassive pulmonary embolism with associated right ventricular dysfunction.  Ideally, he would receive 50 mg of IV alteplase or catheter directed thrombolysis using EKOS.  This gentleman has been on a several day kunz using psychedelic mushrooms, \"whippits\" and marijuana.  He admits to multiple falls striking his head.  He has even lost consciousness at least once in the last couple of weeks as a result of hitting his head.  Even though his CT scan of the head shows no acute pathology, I am not willing to risk his life with a fatal " intracranial hemorrhage with thrombolytic therapy.  Primum non nocere.  He will be admitted to the ICU and fully anticoagulated with heparin on the weight-based protocol.  He requires very close observation for the development of hypotension and worsening cardiovascular system dysfunction.  I have assessed and reassessed his respiratory status, blood pressure, hemodynamics, cardiovascular status and neurologic status.  He is at increased risk for worsening cardiovascular and respiratory system dysfunction.     High risk of deterioration and worsening vital organ dysfunction and death without the above critical care interventions.        Alisa Baez M.D.   Physician   [facility]   Progress Notes   Signed   Date of Service:  10/9/2019 12:31 PM                    []Hide copied text    []Rosa for details  [facility] Medicine Daily Progress Note     Date of Service  10/9/2019     Chief Complaint  34 y.o. male admitted 9/19/2019 with hallucinations     Hospital Course    Past medical history of depression, PTSD, anxiety, substance abuse of mushrooms, nitrous oxide, marijuana, alcohol, paroxysmal A. fib, recent diagnosis of PE May 2019.  He presented with hallucinations and had stopped taking his medications including Xarelto.  He had also fallen multiple times.  He was found hypoxic and CTA showed extensive acute bilateral pulmonary emboli with saddle embolus.  He was admitted to the ICU and felt his risk of intracranial hemorrhage outweighed submassive dose of alteplase or EKOS.  He was started on heparin infusion.  Lower extremity Doppler was negative for DVT.  TTE showed severe right heart strain.  Patient remained stabilized and transitioned to Xarelto.  Psychiatry was consulted for his hallucinations and he was started on Risperdal.  However he developed neurological changes concerning for NMS and Risperdal was stopped and he was given Cogentin with some improvement.  Psychiatry started him on Seroquel and  has been titrating medication.  He was hypertensive requiring IV medications so he was started on a lower lower dose of lisinopril and his atenolol was stopped, given his right heart strain.  He developed leukocytosis and fever.  Urine culture grew coag negative staph and he was started on course of Bactrim.  Srivastava catheter was removed but he failed voiding trial and was replaced.  He is continued on Flomax.  PT OT evaluated him and recommended SNF.       Interval Problem Update  Patient says he feels much better today.  His hallucinations have improved, though he still feels very anxious.  He is off oxygen and denies shortness of breath.  He denies abdominal pain.  He feels less diaphoretic.  Mother is worried about him going to rehab since she will not be able to stay with him.  10/1: Patient became anxious when I entered the room with the nursing staff.  Stated that does not like to many people around him.  Saturating well on room air.  Stated that he was unable to sleep well last night.  Afebrile overnight.  No issues overnight per staff.  10/2: Resting comfortably in bed.  Stated that he could not sleep sleep well last night.  Still feeling anxious.  Leukocytosis trending down.  Afebrile overnight.  Tolerating p.o. well.  No nausea or vomiting.  Had multiple small bowel movements yesterday after prune juice and stool softeners.  10/3: Resting comfortably in bed.  Looks slightly anxious.  Had another episode of head shaking relieved by 1 dose of Ativan.  Patient has a lot of psychiatric concern and questions which I rely to the psychiatrist who will stop by and answer those questions for the patient.  Otherwise no new issues to report.  10/4: Resting comfortably in bed.  Had large bowel movement yesterday per nursing staff.  Still getting anxious occasionally.  Requesting Ativan.  No acute distress noted.  No issues overnight per staff.  10/5: Resting comfortably in bed.  Psychiatry increased Seroquel dose for  better control of anxiety and restlessness.  Still on legal hold.  No acute distress noted.  No issues overnight per staff.  10/6: Resting comfortably in bed.  Having more regular bowel movements now.  Anxiety slightly improved with increase Seroquel dose as per patient and his mother at bedside.  No acute distress noted.  No issues overnight per staff.  10/7: Resting comfortably in bed.  No acute distress noted.  Still anxious as stated.  Feels stronger.  Tolerating p.o. fairly.  Having regular bowel movements but still think that he is still constipated.  No nausea or vomiting. No issues overnight per staff     10/8: seen and examined, patient has flat affect, states he wasn't able to sleep much again last night however the decreased seroquel dose helped with hallucination and delusions, mother in room and confirmed the above  Otherwise  No complaints.     10/9: seen and examined, patient states he is doing a little better interms of hallucinations however mom does not think so. Psych will see him today  Still on legal, needs to be off legal to go to inpatient psych facility  Consultants/Specialty  Psychiatry  Critical care  Pulmonology     Code Status  Full Code        Disposition  Case coordination to discuss discharge planning with mother  SNF once cleared by psychiatry  Will need anticoagulation clinic and follow-up with pulmonology     Review of Systems  Review of Systems   Constitutional: Positive for malaise/fatigue. Negative for diaphoresis and fever.   HENT: Negative for congestion, ear discharge, ear pain, hearing loss, nosebleeds and tinnitus.    Eyes: Negative for blurred vision, double vision and photophobia.   Respiratory: Negative for cough and hemoptysis.    Cardiovascular: Negative for chest pain, palpitations, orthopnea, claudication and leg swelling.   Gastrointestinal: Negative for abdominal pain, constipation, diarrhea, heartburn, nausea and vomiting.   Genitourinary: Negative for dysuria,  frequency, hematuria and urgency.   Musculoskeletal: Negative for back pain, joint pain, myalgias and neck pain.        Muscle spasm   Neurological: Positive for weakness. Negative for tingling, tremors, sensory change, speech change and focal weakness.   Psychiatric/Behavioral: Positive for depression, hallucinations and substance abuse. The patient is nervous/anxious and has insomnia.          Physical Exam  Temp:  [36.1 °C (97 °F)-36.7 °C (98.1 °F)] 36.7 °C (98.1 °F)  Pulse:  [] 98  Resp:  [18] 18  BP: (113-132)/(72-89) 113/72  SpO2:  [96 %-100 %] 96 %     Physical Exam   Constitutional: He is oriented to person, place, and time. No distress.   Obesity   HENT:   Head: Atraumatic.   Eyes: Pupils are equal, round, and reactive to light. Conjunctivae are normal. Right eye exhibits no discharge. Left eye exhibits no discharge.   Neck: No tracheal deviation present.   Cardiovascular: Normal rate and regular rhythm. Exam reveals no gallop and no friction rub.   Pulmonary/Chest: Effort normal. No respiratory distress. He has no wheezes.   Abdominal: Soft. Bowel sounds are normal. He exhibits no distension. There is no tenderness.   Musculoskeletal: He exhibits no tenderness or deformity.   Neurological: He is alert and oriented to person, place, and time.   4 out of 5 strength upper and lower extremities   Skin: Skin is warm. He is not diaphoretic. No erythema.   Decreased macular rash isolated to his backside.   Psychiatric: His mood appears anxious. Thought content is paranoid. He expresses impulsivity.   Nursing note and vitals reviewed.        Fluids     Intake/Output Summary (Last 24 hours) at 10/9/2019 1231  Last data filed at 10/9/2019 1117      Gross per 24 hour   Intake 480 ml   Output 8725 ml   Net -8245 ml         Laboratory       Recent Labs     10/07/19  0555 10/08/19  0839   WBC 8.7 10.4   RBC 4.80 5.07   HEMOGLOBIN 14.6 15.0   HEMATOCRIT 44.5 47.0   MCV 92.7 92.7   MCH 30.4 29.6   MCHC 32.8* 31.9*  "  RDW 57.8* 57.1*   PLATELETCT 352 381   MPV 8.6* 8.4*           Recent Labs     10/07/19  0920 10/08/19  0839   SODIUM 136 135   POTASSIUM 3.8 3.9   CHLORIDE 103 105   CO2 20 18*   GLUCOSE 94 94   BUN 7* 8   CREATININE 0.71 0.67   CALCIUM 9.1 9.1                     Imaging  DX-CHEST-PORTABLE (1 VIEW)   Final Result           No acute cardiac or pulmonary abnormality is identified.       DX-CHEST-PORTABLE (1 VIEW)   Final Result       Mild lung base atelectasis with edema not excluded.       DX-CHEST-PORTABLE (1 VIEW)   Final Result           1.  Pulmonary edema and/or infiltrates.   2.  Cardiomegaly       MR-BRAIN-W/O   Final Result       1.  No acute intracranial abnormality.   2.  Sphenoid and posterior right ethmoid sinus disease.       US-EXTREMITY VENOUS LOWER BILAT   Final Result       EC-ECHOCARDIOGRAM LTD W/O CONT   Final Result       CT-CTA CHEST PULMONARY ARTERY W/ RECONS   Final Result   Addendum 1 of 1   These findings were discussed with HEATHER MELGOZA on 9/19/2019 2:16 PM.       Final       DX-CHEST-PORTABLE (1 VIEW)   Final Result       No acute cardiopulmonary abnormality.       CT-HEAD W/O   Final Result       No CT evidence of acute infarct, hemorrhage or mass. No acute intracranial injury.             Assessment/Plan  * Acute saddle pulmonary embolism (HCC)- (present on admission)  Assessment & Plan  Recurrent, in the setting of noncompliance with anticoagulation therapy  CTA PE revealing \"Extensive acute bilateral pulmonary emboli with saddle embolus noted, as well as findings concerning for RV strain\"  Recurrence likely secondary to poor compliance of medication  Heparin drip transitioned to Xarelto  Echocardiogram with RV strain, caution aggressive blood pressure changes  DVT study negative     Acute hypoxemic respiratory failure (HCC)  Assessment & Plan  Secondary acute pulmonary embolism  Wean off oxygen as tolerated  Resolved.     Paroxysmal atrial fibrillation (HCC)- (present on " admission)  Assessment & Plan   home dose Tenormin.  Continue Xarelto.  Rate is controlled.     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  MRI brain negative  10/5: Psychiatry increased Seroquel dose.  Still on legal hold.     ETOH abuse- (present on admission)  Assessment & Plan  History of, monitor for withdrawal     Acute cystitis- (present on admission)  Assessment & Plan  Urine culture grew strep epidermidis sensitive to Bactrim.  Leucocytosis resolved   Completed course of antibiotics.     Fever  Assessment & Plan  9/28 - Has had leukocytosis, now fever 100.7. He has no specific symptoms. CXR and urine ordered  9/29 - Continue ceftriaxone, urine culture pending.  Srivastava cath was changed  9/30 -urine growing coag negative staph change to complete course of Bactrim  Resolved.     Weakness  Assessment & Plan  I spoke with psychiatry, who notes patient's current weakness has progressed from when he first admitted.  CPK was normal  Psychiatry stopped risperidone, had some improvement with Cogentin  Possible toxicity from the nitrous oxide.  I spoke with Dr. Byrd with neurology, he agreed with continuing with vitamin B12 supplementation.  He can have EMG testing done as outpatient after 2 weeks of stopping nitrous oxide.  PT OT ordered for reevaluation, plan for SNF     Vitamin B12 deficiency  Assessment & Plan  With reported peripheral tingling  Supplement ordered     Type 2 diabetes mellitus without complication, without long-term current use of insulin (HCC)  Assessment & Plan  New diagnosis, A1c 6.6%  Diabetes education ordered  Could be contributing to peripheral neuropathy     Slow transit constipation  Assessment & Plan  Daily Senokot and MiraLAX  Bowel regimen  ordered        ALLI (obstructive sleep apnea)  Assessment & Plan  History of, pulmonary following     Morbid obesity (HCC)  Assessment & Plan  Outpatient weight loss program would be indicated     Pulmonary hypertension (HCC)- (present on admission)  Assessment & Plan  Known history of, now with recurrent PE  Echocardiogram noted     Anxiety- (present on admission)  Assessment & Plan  On Librium previously  Now uncontrolled  Psychiatry evaluating, he was placed on a hold.  Bedside sitter as needed.  9/25 risperdal stopped for possible NMS, his anxiety is now increased  Psych adjusting seroquel and cogentin.         Essential hypertension- (present on admission)  Assessment & Plan  Blood pressure has been stable.  Continue to monitor.  Plan of care discussed with multidisciplinary team during rounds.     VTE prophylaxis: xarelto     Continue legal hold        Co-morbidities: See PMH  Potential Risk - Complications: Cognitive Impairment, Contractures, Deep Vein Thrombosis, Incontinence, Malnutrition, Pain, Perceptual Impairment, Pneumonia, Pressure Ulcer and Urinary Tract Infection  Level of Risk: High    Ongoing Medical Management Needed (Medical/Nursing Needs):   Patient Active Problem List    Diagnosis Date Noted   • Acute hypoxemic respiratory failure (HCC) 09/20/2019     Priority: High   • Paroxysmal atrial fibrillation (HCC) 05/26/2019     Priority: High   • Acute saddle pulmonary embolism (HCC) 05/26/2019     Priority: High   • 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) 09/19/2019     Priority: Medium   • ETOH abuse 05/26/2019     Priority: Medium   • Electrolyte abnormality 05/26/2019     Priority: Medium   • Morbid obesity (HCC) 09/20/2019     Priority: Low   • ALLI (obstructive sleep apnea) 09/20/2019     Priority: Low   • Pulmonary hypertension (HCC) 05/28/2019     Priority: Low   • Anxiety 05/27/2019     Priority: Low   • Essential hypertension 05/26/2019     Priority: Low   • LAZARO (acute  "kidney injury) ([facility]) 05/26/2019     Priority: Low   • Acute cystitis 10/01/2019   • Fever 09/28/2019   • Vitamin B12 deficiency 09/25/2019   • Weakness 09/25/2019   • Slow transit constipation 09/24/2019   • Type 2 diabetes mellitus without complication, without long-term current use of insulin ([facility]) 09/24/2019   • Anticoagulated by anticoagulation treatment 06/06/2019     A & O     Current Vital Signs:   Temperature: 36.9 °C (98.4 °F) Pulse: 91 Respiration: 20 Blood Pressure: 123/81  Weight: 116.9 kg (257 lb 11.5 oz) Height: 172.7 cm (5' 7.99\")  Pulse Oximetry: 96 % O2 (LPM): 0      Completed Laboratory Reports:  Recent Labs     10/08/19  0839 10/09/19  1441   WBC 10.4 10.0   HEMOGLOBIN 15.0 14.5   HEMATOCRIT 47.0 43.4   PLATELETCT 381 344   SODIUM 135 140   POTASSIUM 3.9 3.7   BUN 8 7*   CREATININE 0.67 0.64   ALBUMIN 4.5 4.3   GLUCOSE 94 100*     Additional Labs: Not Applicable    Prior Living Situation:   Housing / Facility: 1 Story House  Steps Into Home: 2  Steps In Home: 0  Lives with - Patient's Self Care Capacity: Parents, Alone and Unable to Care For Self  Equipment Owned: None    Prior Level of Function / Living Situation:   Physical Therapy: Prior Services: Home-Independent  Housing / Facility: 1 Story House  Steps Into Home: 2  Steps In Home: 0  Equipment Owned: None  Lives with - Patient's Self Care Capacity: Parents, Alone and Unable to Care For Self  Bed Mobility: Independent  Transfer Status: Independent  Ambulation: Independent  Assistive Devices Used: None  Stairs: Independent  Current Level of Function:   Level Of Assist: Minimal Assist  Assistive Device: Front Wheel Walker  Distance (Feet): 60(+ 15 ft following seated rest)  Deviation: Decreased Toe Off, Decreased Heel Strike  # of Stairs Climbed: 0  Weight Bearing Status: no restrictions  Skilled Intervention: Verbal Cuing, Tactile Cuing  Comments: cues to accomodate for B foot drop with increased hip flexion  Supine to Sit: (in chair upon PT " arrival)  Sit to Supine: Supervised  Scooting: Supervised  Rolling: Supervised  Skilled Intervention: Verbal Cuing  Sit to Stand: Minimal Assist  Bed, Chair, Wheelchair Transfer: Minimal Assist  Transfer Method: Stand Pivot  Skilled Intervention: Verbal Cuing  Comments: improved eccentric control with transfers  Sitting in Chair: > 30 min prior to PT session  Sitting Edge of Bed: 2 min  Standing: 10 min total  Occupational Therapy:   Self Feeding: Independent  Grooming / Hygiene: Independent  Bathing: Independent  Dressing: Independent  Toileting: Independent  Medication Management: Other (Comments)(non compliant)  Laundry: Independent  Kitchen Mobility: Independent  Finances: Independent  Home Management: Independent  Shopping: Independent  Prior Level Of Mobility: Independent Without Device in Community, Independent With Device in Home  Prior Services: Home-Independent  Housing / Facility: [address] [place] [address]  Current Level of Function:   Eating: Maximal Assist(with utensils; min A w/ built up )  Upper Body Dressing: Minimal Assist  Lower Body Dressing: Minimal Assist(donned socks w/ SPV, Mod A to don shorts)  Toileting: (declined)  Skilled Intervention: Tactile Cuing, Verbal Cuing  Comments: Pt reports he would be willing to attempt LB dressing on a later date, also discuessed performing a seated shower.   Speech Language Pathology:      Rehabilitation Prognosis/Potential: Good  Estimated Length of Stay: 14 days    Nursing:   Orientation : Oriented x 4  Srivastava in Place    Scope/Intensity of Services Recommended:  Physical Therapy: 1.5 hr / day  5 days / week. Therapeutic Interventions Required: Maximize Endurance, Mobility, Strength and Safety  Occupational Therapy: 1.5 hr / day 5 days / week. Therapeutic Interventions Required: Maximize Self Care, ADLs, IADLs and Energy Conservation  Speech & Language Pathology: Consult 5 days / week. Therapeutic Interventions Required: Maximize Cognition and  Safety  Rehabilitation Nursin/7. Therapeutic Interventions Required: Monitor Pain, Skin, Vital Signs, Intake and Output, Labs, Safety and Family Training  Rehabilitation Physician: 3 - 5 days / week. Therapeutic Interventions Required: Medical Management    Rehabilitation Goals and Plan (Expected frequency & duration of treatment in the IRF):   Return to the Community, Supervised Level of Care and Family Able to Provide  Assistance  Anticipated Date of Rehabilitation Admission: 10-10-19  Patient/Family oriented IRF level of care/facility/plan: Yes  Patient/Family willing to participate in IRF care/facility/plan: Yes  Patient able to tolerate IRF level of care proposed: Yes  Patient has potential to benefit IRF level of care proposed: Yes  Comments: Not Applicable    Special Needs or Precautions - Medical Necessity:  Safety Concerns/Precautions:  Fall Risk / High Risk for Falls, Balance, Cognition and Bed / Chair Alarm  Pain Management  IV Site: Peripheral  Current Medications:    Current Facility-Administered Medications Ordered in Epic   Medication Dose Route Frequency Provider Last Rate Last Dose   • omeprazole (PRILOSEC) capsule 20 mg  20 mg Oral DAILY Sandie Painter M.D.   20 mg at 10/10/19 0554   • miconazole 2%-zinc oxide (INZO) topical cream   Topical Q6HR Sandie Painter M.D.       • QUEtiapine (SEROQUEL) tablet 100 mg  100 mg Oral BID Sahra Hartmann M.D.   100 mg at 10/10/19 0824   • QUEtiapine (SEROQUEL) tablet 300 mg  300 mg Oral Q EVENING Sahra Hartmann M.D.   200 mg at 10/09/19 2107   • nystatin (MYCOSTATIN) powder   Topical BID Sandie Painter M.D.       • atenolol (TENORMIN) tablet 100 mg  100 mg Oral DAILY Sandie Painter M.D.   100 mg at 10/10/19 0554   • calcium carbonate (TUMS) chewable tab 500 mg  500 mg Oral TID PRN Edwardo Dillon M.D.   500 mg at 10/07/19 1958   • gabapentin (NEURONTIN) capsule 300 mg  300 mg Oral HS PRN Malou Guillen M.D.    300 mg at 10/08/19 2259   • cyanocobalamin (VITAMIN B-12) injection 1,000 mcg  1,000 mcg Intramuscular Q30 DAYS Edwardo Dillon M.D.   1,000 mcg at 09/25/19 1829   • lisinopril (PRINIVIL) 10 MG tablet 10 mg  10 mg Oral Q DAY Edwardo Dillon M.D.   10 mg at 10/10/19 0554   • polyethylene glycol/lytes (MIRALAX) PACKET 1 Packet  1 Packet Oral DAILY Edwardo Dillon M.D.   1 Packet at 10/06/19 0553    And   • senna-docusate (PERICOLACE or SENOKOT S) 8.6-50 MG per tablet 2 Tab  2 Tab Oral BID Edwardo Dillon M.D.   2 Tab at 10/10/19 0554    And   • magnesium hydroxide (MILK OF MAGNESIA) suspension 30 mL  30 mL Oral QDAY PRN Edwardo Dillon M.D.   30 mL at 10/06/19 0906    And   • bisacodyl (DULCOLAX) suppository 10 mg  10 mg Rectal QDAY PRN Edwardo Dillon M.D.   10 mg at 10/01/19 1206   • rivaroxaban (XARELTO) tablet 15 mg  15 mg Oral BID WITH MEALS Rainer Butt M.D.   15 mg at 10/10/19 0824    Followed by   • [START ON 10/14/2019] rivaroxaban (XARELTO) tablet 20 mg  20 mg Oral PM MEAL Rainer Butt M.D.       • tamsulosin (FLOMAX) capsule 0.4 mg  0.4 mg Oral AFTER BREAKFAST Rainer Butt M.D.   0.4 mg at 10/10/19 0824   • Respiratory Care per Protocol   Nebulization Continuous RT Lita Brantley M.D.       • acetaminophen (TYLENOL) tablet 650 mg  650 mg Oral Q6HRS PRN Lita Brantley M.D.   Stopped at 10/09/19 0018   • labetalol (NORMODYNE,TRANDATE) injection 10 mg  10 mg Intravenous Q4HRS PRN Edwardo Dillon M.D.   10 mg at 09/25/19 0457   • ondansetron (ZOFRAN) syringe/vial injection 4 mg  4 mg Intravenous Q4HRS PRN Lita Brantley M.D.   4 mg at 09/20/19 1905   • ondansetron (ZOFRAN ODT) dispertab 4 mg  4 mg Oral Q4HRS PRN Lita Brantley M.D.   4 mg at 10/03/19 2009   • folic acid (FOLVITE) tablet 1 mg  1 mg Oral DAILY James Hou M.D.   1 mg at 10/10/19 0562   • multivitamin (THERAGRAN) tablet 1 Tab  1 Tab Oral DAILY James Hou M.D.   1 Tab at 10/10/19 0554     No current Epic-ordered outpatient medications on  file.     substance abuse of mushrooms, nitrous oxide, marijuana, alcohol    Diet:   DIET ORDERS (From admission to next 24h)     Start     Ordered    09/21/19 1124  Diet Order Regular  ALL MEALS     Question:  Diet:  Answer:  Regular    09/21/19 1124                Anticipated Discharge Destination / Patient/Family Goal:  Destination: Home with Assistance Support System: Parents  Anticipated home health services: SLP  Previously used HH service/ provider: Not Applicable  Anticipated DME Needs: [employer]  Outpatient Services: SLP  Alternative resources to address additional identified needs:     Pre-Screen Completed: 10/10/2019 9:27 AM Jed Summers L.P.N.

## 2019-10-10 NOTE — DISCHARGE PLANNING
Received call from [facility] regarding pt's legal hold being lifted yesterday afternoon. Dr. Hartmann d/c legal hold as of 10/9 at 1306.  Removed pt from legal hold.

## 2019-10-10 NOTE — DISCHARGE PLANNING
SW checked in with patient and patient's mother. Waiting on [facility] for prior authorization. SW informed them that things look good for Rehab and goal is to get there as soon as insurance authorizes.   Patient's mother was wondering about care in [place]. [facility] informed her about Rehab requirements to have a good plan in place prior to entering rehab. Patient and mother will follow up with [facility] once they get over there.     SW contacted [facility] and admissions reports prior authorization has been submitted, timeframe to hear back can be 1-5 days. SW to inform patient and family.    SW informed family that prior authorization has been submitted. Patient reports he is anxious to go.

## 2019-10-11 LAB
ALBUMIN SERPL BCP-MCNC: 4 G/DL (ref 3.2–4.9)
ALBUMIN/GLOB SERPL: 1.4 G/DL
ALP SERPL-CCNC: 55 U/L (ref 30–99)
ALT SERPL-CCNC: 307 U/L (ref 2–50)
ANION GAP SERPL CALC-SCNC: 14 MMOL/L (ref 0–11.9)
AST SERPL-CCNC: 58 U/L (ref 12–45)
BASOPHILS # BLD AUTO: 0.9 % (ref 0–1.8)
BASOPHILS # BLD: 0.09 K/UL (ref 0–0.12)
BILIRUB SERPL-MCNC: 0.3 MG/DL (ref 0.1–1.5)
BUN SERPL-MCNC: 9 MG/DL (ref 8–22)
CALCIUM SERPL-MCNC: 9.3 MG/DL (ref 8.5–10.5)
CHLORIDE SERPL-SCNC: 106 MMOL/L (ref 96–112)
CO2 SERPL-SCNC: 20 MMOL/L (ref 20–33)
CREAT SERPL-MCNC: 0.58 MG/DL (ref 0.5–1.4)
EOSINOPHIL # BLD AUTO: 0.35 K/UL (ref 0–0.51)
EOSINOPHIL NFR BLD: 3.6 % (ref 0–6.9)
ERYTHROCYTE [DISTWIDTH] IN BLOOD BY AUTOMATED COUNT: 58.1 FL (ref 35.9–50)
GLOBULIN SER CALC-MCNC: 2.8 G/DL (ref 1.9–3.5)
GLUCOSE SERPL-MCNC: 104 MG/DL (ref 65–99)
HCT VFR BLD AUTO: 45.2 % (ref 42–52)
HGB BLD-MCNC: 15 G/DL (ref 14–18)
LYMPHOCYTES # BLD AUTO: 2.8 K/UL (ref 1–4.8)
LYMPHOCYTES NFR BLD: 28.6 % (ref 22–41)
MANUAL DIFF BLD: NORMAL
MCH RBC QN AUTO: 30.6 PG (ref 27–33)
MCHC RBC AUTO-ENTMCNC: 33.2 G/DL (ref 33.7–35.3)
MCV RBC AUTO: 92.2 FL (ref 81.4–97.8)
MONOCYTES # BLD AUTO: 0.43 K/UL (ref 0–0.85)
MONOCYTES NFR BLD AUTO: 4.4 % (ref 0–13.4)
MORPHOLOGY BLD-IMP: NORMAL
MYELOCYTES NFR BLD MANUAL: 0.9 %
NEUTROPHILS # BLD AUTO: 6.04 K/UL (ref 1.82–7.42)
NEUTROPHILS NFR BLD: 61.6 % (ref 44–72)
NRBC # BLD AUTO: 0 K/UL
NRBC BLD-RTO: 0 /100 WBC
PLATELET # BLD AUTO: 240 K/UL (ref 164–446)
PLATELET BLD QL SMEAR: NORMAL
PMV BLD AUTO: 9.1 FL (ref 9–12.9)
POTASSIUM SERPL-SCNC: 4.1 MMOL/L (ref 3.6–5.5)
PROT SERPL-MCNC: 6.8 G/DL (ref 6–8.2)
RBC # BLD AUTO: 4.9 M/UL (ref 4.7–6.1)
RBC BLD AUTO: NORMAL
SODIUM SERPL-SCNC: 140 MMOL/L (ref 135–145)
WBC # BLD AUTO: 9.8 K/UL (ref 4.8–10.8)

## 2019-10-11 PROCEDURE — 700102 HCHG RX REV CODE 250 W/ 637 OVERRIDE(OP): Performed by: INTERNAL MEDICINE

## 2019-10-11 PROCEDURE — 770001 HCHG ROOM/CARE - MED/SURG/GYN PRIV*

## 2019-10-11 PROCEDURE — 97530 THERAPEUTIC ACTIVITIES: CPT

## 2019-10-11 PROCEDURE — 85007 BL SMEAR W/DIFF WBC COUNT: CPT

## 2019-10-11 PROCEDURE — 97116 GAIT TRAINING THERAPY: CPT

## 2019-10-11 PROCEDURE — 85027 COMPLETE CBC AUTOMATED: CPT

## 2019-10-11 PROCEDURE — A9270 NON-COVERED ITEM OR SERVICE: HCPCS | Performed by: INTERNAL MEDICINE

## 2019-10-11 PROCEDURE — 99232 SBSQ HOSP IP/OBS MODERATE 35: CPT | Performed by: HOSPITALIST

## 2019-10-11 PROCEDURE — 97110 THERAPEUTIC EXERCISES: CPT

## 2019-10-11 PROCEDURE — A9270 NON-COVERED ITEM OR SERVICE: HCPCS | Performed by: HOSPITALIST

## 2019-10-11 PROCEDURE — 80053 COMPREHEN METABOLIC PANEL: CPT

## 2019-10-11 PROCEDURE — 700102 HCHG RX REV CODE 250 W/ 637 OVERRIDE(OP): Performed by: HOSPITALIST

## 2019-10-11 PROCEDURE — 36415 COLL VENOUS BLD VENIPUNCTURE: CPT

## 2019-10-11 PROCEDURE — 700102 HCHG RX REV CODE 250 W/ 637 OVERRIDE(OP): Performed by: PSYCHIATRY & NEUROLOGY

## 2019-10-11 PROCEDURE — A9270 NON-COVERED ITEM OR SERVICE: HCPCS | Performed by: PSYCHIATRY & NEUROLOGY

## 2019-10-11 PROCEDURE — A9270 NON-COVERED ITEM OR SERVICE: HCPCS | Performed by: FAMILY MEDICINE

## 2019-10-11 PROCEDURE — 700102 HCHG RX REV CODE 250 W/ 637 OVERRIDE(OP): Performed by: FAMILY MEDICINE

## 2019-10-11 RX ADMIN — ATENOLOL 100 MG: 25 TABLET ORAL at 05:08

## 2019-10-11 RX ADMIN — QUETIAPINE FUMARATE 100 MG: 100 TABLET ORAL at 08:42

## 2019-10-11 RX ADMIN — POLYETHYLENE GLYCOL 3350 1 PACKET: 17 POWDER, FOR SOLUTION ORAL at 05:09

## 2019-10-11 RX ADMIN — TAMSULOSIN HYDROCHLORIDE 0.4 MG: 0.4 CAPSULE ORAL at 08:42

## 2019-10-11 RX ADMIN — THERA TABS 1 TABLET: TAB at 05:08

## 2019-10-11 RX ADMIN — QUETIAPINE FUMARATE 100 MG: 200 TABLET ORAL at 20:14

## 2019-10-11 RX ADMIN — RIVAROXABAN 15 MG: 15 TABLET, FILM COATED ORAL at 18:02

## 2019-10-11 RX ADMIN — QUETIAPINE FUMARATE 100 MG: 100 TABLET ORAL at 15:33

## 2019-10-11 RX ADMIN — MICONAZOLE NITRATE: 20 CREAM TOPICAL at 20:16

## 2019-10-11 RX ADMIN — SENNOSIDES, DOCUSATE SODIUM 2 TABLET: 50; 8.6 TABLET, FILM COATED ORAL at 05:08

## 2019-10-11 RX ADMIN — LISINOPRIL 10 MG: 10 TABLET ORAL at 05:08

## 2019-10-11 RX ADMIN — FOLIC ACID 1 MG: 1 TABLET ORAL at 05:08

## 2019-10-11 RX ADMIN — OMEPRAZOLE 20 MG: 20 CAPSULE, DELAYED RELEASE ORAL at 05:08

## 2019-10-11 RX ADMIN — SENNOSIDES, DOCUSATE SODIUM 2 TABLET: 50; 8.6 TABLET, FILM COATED ORAL at 18:03

## 2019-10-11 RX ADMIN — RIVAROXABAN 15 MG: 15 TABLET, FILM COATED ORAL at 08:42

## 2019-10-11 ASSESSMENT — COGNITIVE AND FUNCTIONAL STATUS - GENERAL
MOVING FROM LYING ON BACK TO SITTING ON SIDE OF FLAT BED: A LITTLE
SUGGESTED CMS G CODE MODIFIER MOBILITY: CJ
CLIMB 3 TO 5 STEPS WITH RAILING: A LITTLE
STANDING UP FROM CHAIR USING ARMS: A LITTLE
MOBILITY SCORE: 20
WALKING IN HOSPITAL ROOM: A LITTLE

## 2019-10-11 ASSESSMENT — ENCOUNTER SYMPTOMS
BLURRED VISION: 0
HEARTBURN: 0
FOCAL WEAKNESS: 0
SENSORY CHANGE: 0
PALPITATIONS: 0
SPEECH CHANGE: 0
ORTHOPNEA: 0
CONSTIPATION: 0
FEVER: 0
NECK PAIN: 0
NERVOUS/ANXIOUS: 1
HALLUCINATIONS: 1
INSOMNIA: 1
PHOTOPHOBIA: 0
TINGLING: 0
HEMOPTYSIS: 0
DIARRHEA: 0
DEPRESSION: 1
ABDOMINAL PAIN: 0
CLAUDICATION: 0
DOUBLE VISION: 0
VOMITING: 0
TREMORS: 0
DIAPHORESIS: 0
WEAKNESS: 1
BACK PAIN: 0
COUGH: 0
NAUSEA: 0
MYALGIAS: 0

## 2019-10-11 ASSESSMENT — GAIT ASSESSMENTS
DEVIATION: DECREASED TOE OFF;DECREASED HEEL STRIKE
ASSISTIVE DEVICE: FRONT WHEEL WALKER
DISTANCE (FEET): 70
GAIT LEVEL OF ASSIST: MINIMAL ASSIST

## 2019-10-11 ASSESSMENT — LIFESTYLE VARIABLES: SUBSTANCE_ABUSE: 1

## 2019-10-11 NOTE — THERAPY
"Pt seen for PT tx session, remains very motivated and thankful for therapy interventions. Pt demonstrated improved activity tolerance with increased gait distance. Focused on heel strike and toe off during ambualtion with improved overall gait mechanics. Tolerated UE/LE exercises well and noted improvement in ankle DF strength and thus ROM. Pt will continue to progress with acute PT interventions. Continue to stronly recommend intensive post acute placement prior to DC home.     Physical Therapy Treatment completed.   Bed Mobility:  Supine to Sit: Supervised  Transfers: Sit to Stand: Contact Guard Assist(for safety only)  Gait: Level Of Assist: Minimal Assist with Front-Wheel Walker   x70 ft, seated rest, x120 ft    Plan of Care: Will benefit from Physical Therapy 5 times per week  Discharge Recommendations: Equipment: Will Continue to Assess for Equipment Needs. Post-acute therapy: Recommend post-acute placement for continued physical therapy services prior to discharge home. Patient can tolerate post-acute therapies at a 5x/week frequency.          See \"Rehab Therapy-Acute\" Patient Summary Report for complete documentation.       "

## 2019-10-11 NOTE — DISCHARGE PLANNING
[facility] printed out POA paperwork for patient and family. Ordered notary. Notary came and helped patient and family with paperwork. [facility] faxed to medical provider in [place] for patient. Patient signed [facility] for [place] Medical provider.     Still pending prior authorization for [facility].

## 2019-10-11 NOTE — PROGRESS NOTES
Bedside report received and pt care assumed.  Pt is asleep in bed, mother at bedside. Bed in lowest position and call light within reach.

## 2019-10-11 NOTE — CARE PLAN
Problem: Safety  Goal: Will remain free from injury  Outcome: PROGRESSING SLOWER THAN EXPECTED  Goal: Will remain free from falls  Outcome: PROGRESSING SLOWER THAN EXPECTED     Fall precautions in place. Treaded socks on pt. Appropriate signs on doorway. IV pole on same side as bathroom. Bedrails up. Bed in lowest position and locked.  Call light and phone within reach. Patient educated on importance of calling nurses before getting out of bed, verbalizes understanding.     Problem: Infection  Goal: Will remain free from infection  Outcome: PROGRESSING SLOWER THAN EXPECTED     Assess for signs and symptoms of infection. Monitor vital signs and lab values. Implement standard precautions and hand washing before and after pt contact.

## 2019-10-11 NOTE — CARE PLAN
Problem: Safety  Goal: Will remain free from injury  Outcome: PROGRESSING AS EXPECTED  Goal: Will remain free from falls  Outcome: PROGRESSING AS EXPECTED     Problem: Psychosocial Needs:  Goal: Level of anxiety will decrease  Outcome: PROGRESSING SLOWER THAN EXPECTED

## 2019-10-11 NOTE — PROGRESS NOTES
Patient is very irritable at this time. Patient's mother at the bedside. Pt. Refused to allow this RN to assess him at this time.  The patient also refused to allow this RN to check his vitals at this time.    0000-patient refused vitals at this time. Patient's mother still at the bedside.

## 2019-10-12 LAB
ALBUMIN SERPL BCP-MCNC: 3.9 G/DL (ref 3.2–4.9)
ALBUMIN/GLOB SERPL: 1.3 G/DL
ALP SERPL-CCNC: 52 U/L (ref 30–99)
ALT SERPL-CCNC: 229 U/L (ref 2–50)
ANION GAP SERPL CALC-SCNC: 10 MMOL/L (ref 0–11.9)
AST SERPL-CCNC: 37 U/L (ref 12–45)
BASOPHILS # BLD AUTO: 0.5 % (ref 0–1.8)
BASOPHILS # BLD: 0.05 K/UL (ref 0–0.12)
BILIRUB SERPL-MCNC: 0.3 MG/DL (ref 0.1–1.5)
BUN SERPL-MCNC: 11 MG/DL (ref 8–22)
CALCIUM SERPL-MCNC: 9 MG/DL (ref 8.5–10.5)
CHLORIDE SERPL-SCNC: 106 MMOL/L (ref 96–112)
CO2 SERPL-SCNC: 22 MMOL/L (ref 20–33)
CREAT SERPL-MCNC: 0.65 MG/DL (ref 0.5–1.4)
EOSINOPHIL # BLD AUTO: 0.26 K/UL (ref 0–0.51)
EOSINOPHIL NFR BLD: 2.8 % (ref 0–6.9)
ERYTHROCYTE [DISTWIDTH] IN BLOOD BY AUTOMATED COUNT: 59.7 FL (ref 35.9–50)
GLOBULIN SER CALC-MCNC: 3 G/DL (ref 1.9–3.5)
GLUCOSE SERPL-MCNC: 87 MG/DL (ref 65–99)
HCT VFR BLD AUTO: 44.1 % (ref 42–52)
HGB BLD-MCNC: 14.6 G/DL (ref 14–18)
IMM GRANULOCYTES # BLD AUTO: 0.13 K/UL (ref 0–0.11)
IMM GRANULOCYTES NFR BLD AUTO: 1.4 % (ref 0–0.9)
LYMPHOCYTES # BLD AUTO: 2.84 K/UL (ref 1–4.8)
LYMPHOCYTES NFR BLD: 30.1 % (ref 22–41)
MCH RBC QN AUTO: 31.3 PG (ref 27–33)
MCHC RBC AUTO-ENTMCNC: 33.1 G/DL (ref 33.7–35.3)
MCV RBC AUTO: 94.4 FL (ref 81.4–97.8)
MONOCYTES # BLD AUTO: 0.84 K/UL (ref 0–0.85)
MONOCYTES NFR BLD AUTO: 8.9 % (ref 0–13.4)
NEUTROPHILS # BLD AUTO: 5.33 K/UL (ref 1.82–7.42)
NEUTROPHILS NFR BLD: 56.3 % (ref 44–72)
NRBC # BLD AUTO: 0 K/UL
NRBC BLD-RTO: 0 /100 WBC
PLATELET # BLD AUTO: 324 K/UL (ref 164–446)
PMV BLD AUTO: 8.4 FL (ref 9–12.9)
POTASSIUM SERPL-SCNC: 4.2 MMOL/L (ref 3.6–5.5)
PROT SERPL-MCNC: 6.9 G/DL (ref 6–8.2)
RBC # BLD AUTO: 4.67 M/UL (ref 4.7–6.1)
SODIUM SERPL-SCNC: 138 MMOL/L (ref 135–145)
WBC # BLD AUTO: 9.5 K/UL (ref 4.8–10.8)

## 2019-10-12 PROCEDURE — 700102 HCHG RX REV CODE 250 W/ 637 OVERRIDE(OP): Performed by: INTERNAL MEDICINE

## 2019-10-12 PROCEDURE — A9270 NON-COVERED ITEM OR SERVICE: HCPCS | Performed by: FAMILY MEDICINE

## 2019-10-12 PROCEDURE — 700102 HCHG RX REV CODE 250 W/ 637 OVERRIDE(OP): Performed by: PSYCHIATRY & NEUROLOGY

## 2019-10-12 PROCEDURE — 700102 HCHG RX REV CODE 250 W/ 637 OVERRIDE(OP): Performed by: HOSPITALIST

## 2019-10-12 PROCEDURE — A9270 NON-COVERED ITEM OR SERVICE: HCPCS | Performed by: PSYCHIATRY & NEUROLOGY

## 2019-10-12 PROCEDURE — 700102 HCHG RX REV CODE 250 W/ 637 OVERRIDE(OP): Performed by: FAMILY MEDICINE

## 2019-10-12 PROCEDURE — 700102 HCHG RX REV CODE 250 W/ 637 OVERRIDE(OP): Performed by: STUDENT IN AN ORGANIZED HEALTH CARE EDUCATION/TRAINING PROGRAM

## 2019-10-12 PROCEDURE — A9270 NON-COVERED ITEM OR SERVICE: HCPCS | Performed by: INTERNAL MEDICINE

## 2019-10-12 PROCEDURE — 80053 COMPREHEN METABOLIC PANEL: CPT

## 2019-10-12 PROCEDURE — A9270 NON-COVERED ITEM OR SERVICE: HCPCS | Performed by: STUDENT IN AN ORGANIZED HEALTH CARE EDUCATION/TRAINING PROGRAM

## 2019-10-12 PROCEDURE — A9270 NON-COVERED ITEM OR SERVICE: HCPCS | Performed by: HOSPITALIST

## 2019-10-12 PROCEDURE — 36415 COLL VENOUS BLD VENIPUNCTURE: CPT

## 2019-10-12 PROCEDURE — 770001 HCHG ROOM/CARE - MED/SURG/GYN PRIV*

## 2019-10-12 PROCEDURE — 85025 COMPLETE CBC W/AUTO DIFF WBC: CPT

## 2019-10-12 PROCEDURE — 99232 SBSQ HOSP IP/OBS MODERATE 35: CPT | Performed by: HOSPITALIST

## 2019-10-12 RX ADMIN — ATENOLOL 100 MG: 25 TABLET ORAL at 05:50

## 2019-10-12 RX ADMIN — MICONAZOLE NITRATE: 20 CREAM TOPICAL at 16:44

## 2019-10-12 RX ADMIN — TAMSULOSIN HYDROCHLORIDE 0.4 MG: 0.4 CAPSULE ORAL at 08:04

## 2019-10-12 RX ADMIN — QUETIAPINE FUMARATE 100 MG: 100 TABLET ORAL at 16:43

## 2019-10-12 RX ADMIN — THERA TABS 1 TABLET: TAB at 05:51

## 2019-10-12 RX ADMIN — NYSTATIN: 100000 POWDER TOPICAL at 16:44

## 2019-10-12 RX ADMIN — LISINOPRIL 10 MG: 10 TABLET ORAL at 05:51

## 2019-10-12 RX ADMIN — RIVAROXABAN 15 MG: 15 TABLET, FILM COATED ORAL at 16:43

## 2019-10-12 RX ADMIN — OMEPRAZOLE 20 MG: 20 CAPSULE, DELAYED RELEASE ORAL at 05:51

## 2019-10-12 RX ADMIN — SENNOSIDES, DOCUSATE SODIUM 2 TABLET: 50; 8.6 TABLET, FILM COATED ORAL at 16:43

## 2019-10-12 RX ADMIN — RIVAROXABAN 15 MG: 15 TABLET, FILM COATED ORAL at 08:04

## 2019-10-12 RX ADMIN — QUETIAPINE FUMARATE 100 MG: 200 TABLET ORAL at 20:21

## 2019-10-12 RX ADMIN — SENNOSIDES, DOCUSATE SODIUM 2 TABLET: 50; 8.6 TABLET, FILM COATED ORAL at 05:51

## 2019-10-12 RX ADMIN — GABAPENTIN 300 MG: 300 CAPSULE ORAL at 00:47

## 2019-10-12 RX ADMIN — QUETIAPINE FUMARATE 100 MG: 100 TABLET ORAL at 08:04

## 2019-10-12 RX ADMIN — FOLIC ACID 1 MG: 1 TABLET ORAL at 05:51

## 2019-10-12 ASSESSMENT — ENCOUNTER SYMPTOMS
DEPRESSION: 1
FOCAL WEAKNESS: 0
SPEECH CHANGE: 0
FEVER: 0
DOUBLE VISION: 0
VOMITING: 0
BACK PAIN: 0
PALPITATIONS: 0
HEARTBURN: 0
CLAUDICATION: 0
CONSTIPATION: 0
HEMOPTYSIS: 0
MYALGIAS: 0
DIAPHORESIS: 0
TINGLING: 0
TREMORS: 0
PHOTOPHOBIA: 0
BLURRED VISION: 0
NERVOUS/ANXIOUS: 1
ORTHOPNEA: 0
COUGH: 0
DIARRHEA: 0
SENSORY CHANGE: 0
ABDOMINAL PAIN: 0
HALLUCINATIONS: 1
WEAKNESS: 1
NAUSEA: 0
NECK PAIN: 0
INSOMNIA: 1

## 2019-10-12 ASSESSMENT — LIFESTYLE VARIABLES: SUBSTANCE_ABUSE: 1

## 2019-10-12 NOTE — PROGRESS NOTES
Hospital Medicine Daily Progress Note    Date of Service  10/12/2019    Chief Complaint  34 y.o. male admitted 9/19/2019 with hallucinations    Hospital Course    Past medical history of depression, PTSD, anxiety, substance abuse of mushrooms, nitrous oxide, marijuana, alcohol, paroxysmal A. fib, recent diagnosis of PE May 2019.  He presented with hallucinations and had stopped taking his medications including Xarelto.  He had also fallen multiple times.  He was found hypoxic and CTA showed extensive acute bilateral pulmonary emboli with saddle embolus.  He was admitted to the ICU and felt his risk of intracranial hemorrhage outweighed submassive dose of alteplase or EKOS.  He was started on heparin infusion.  Lower extremity Doppler was negative for DVT.  TTE showed severe right heart strain.  Patient remained stabilized and transitioned to Xarelto.  Psychiatry was consulted for his hallucinations and he was started on Risperdal.  However he developed neurological changes concerning for NMS and Risperdal was stopped and he was given Cogentin with some improvement.  Psychiatry started him on Seroquel and has been titrating medication.  He was hypertensive requiring IV medications so he was started on a lower lower dose of lisinopril and his atenolol was stopped, given his right heart strain.  He developed leukocytosis and fever.  Urine culture grew coag negative staph and he was started on course of Bactrim.  Srivastava catheter was removed but he failed voiding trial and was replaced.  He is continued on Flomax.  PT OT evaluated him and recommended SNF.      Interval Problem Update  Patient says he feels much better today.  His hallucinations have improved, though he still feels very anxious.  He is off oxygen and denies shortness of breath.  He denies abdominal pain.  He feels less diaphoretic.  Mother is worried about him going to rehab since she will not be able to stay with him.  10/1: Patient became anxious when I  entered the room with the nursing staff.  Stated that does not like to many people around him.  Saturating well on room air.  Stated that he was unable to sleep well last night.  Afebrile overnight.  No issues overnight per staff.  10/2: Resting comfortably in bed.  Stated that he could not sleep sleep well last night.  Still feeling anxious.  Leukocytosis trending down.  Afebrile overnight.  Tolerating p.o. well.  No nausea or vomiting.  Had multiple small bowel movements yesterday after prune juice and stool softeners.  10/3: Resting comfortably in bed.  Looks slightly anxious.  Had another episode of head shaking relieved by 1 dose of Ativan.  Patient has a lot of psychiatric concern and questions which I rely to the psychiatrist who will stop by and answer those questions for the patient.  Otherwise no new issues to report.  10/4: Resting comfortably in bed.  Had large bowel movement yesterday per nursing staff.  Still getting anxious occasionally.  Requesting Ativan.  No acute distress noted.  No issues overnight per staff.  10/5: Resting comfortably in bed.  Psychiatry increased Seroquel dose for better control of anxiety and restlessness.  Still on legal hold.  No acute distress noted.  No issues overnight per staff.  10/6: Resting comfortably in bed.  Having more regular bowel movements now.  Anxiety slightly improved with increase Seroquel dose as per patient and his mother at bedside.  No acute distress noted.  No issues overnight per staff.  10/7: Resting comfortably in bed.  No acute distress noted.  Still anxious as stated.  Feels stronger.  Tolerating p.o. fairly.  Having regular bowel movements but still think that he is still constipated.  No nausea or vomiting. No issues overnight per staff    10/8: seen and examined, patient has flat affect, states he wasn't able to sleep much again last night however the decreased seroquel dose helped with hallucination and delusions, mother in room and confirmed  the above  Otherwise  No complaints.    10/9: seen and examined, patient states he is doing a little better interms of hallucinations however mom does not think so. Psych will see him today  Still on legal, needs to be off legal to go to inpatient psych facility    10/10: seen and examined, sitting in chair, eating breakfast, mother at bedside, still both have a flat affect, and very serious. States he is doing better however had some nightmares last night.   Vitals stable  Did have enough sleep last night though    PMR evaluated and he is a good candidate, however he will need to go back to pcp in [place] after rehab, insurance acceptance pending [facility]    10/11: Seen and examined this morning his mother is at bedside that the insurance is not authorized the inpatient rehab yet.  I discussed them in detail that I was out of our hands and we are continuing to work on it along with .    10/12: Seen and examined this morning patient and mother in room together.  Updated patient and mother about insurance pending rehab approval.  Mother wants to know if we can take out the IV and patient's arm however discussed with her that that is to remain in place for emergency purposes.  She understood and agreed.  She continues to say that his nightmares have increased.  However he is sleeping much better  Consultants/Specialty  Psychiatry  Critical care  Pulmonology    Code Status  Full Code      Disposition  Case coordination to discuss discharge planning with mother  Rehab once insurance approves  Will need anticoagulation clinic and follow-up with pulmonology    Review of Systems  Review of Systems   Constitutional: Positive for malaise/fatigue. Negative for diaphoresis and fever.   HENT: Negative for congestion, ear discharge, ear pain, hearing loss, nosebleeds and tinnitus.    Eyes: Negative for blurred vision, double vision and photophobia.   Respiratory: Negative for cough and hemoptysis.     Cardiovascular: Negative for chest pain, palpitations, orthopnea, claudication and leg swelling.   Gastrointestinal: Negative for abdominal pain, constipation, diarrhea, heartburn, nausea and vomiting.   Genitourinary: Negative for dysuria, frequency, hematuria and urgency.   Musculoskeletal: Negative for back pain, joint pain, myalgias and neck pain.   Neurological: Positive for weakness. Negative for tingling, tremors, sensory change, speech change and focal weakness.   Psychiatric/Behavioral: Positive for depression, hallucinations and substance abuse. The patient is nervous/anxious and has insomnia.         Nightmares        Physical Exam  Temp:  [36.1 °C (96.9 °F)-36.7 °C (98 °F)] 36.3 °C (97.3 °F)  Pulse:  [82-98] 89  Resp:  [18] 18  BP: (133-145)/(67-95) 133/75  SpO2:  [91 %-98 %] 95 %    Physical Exam   Constitutional: He is oriented to person, place, and time. No distress.   Obesity   HENT:   Head: Atraumatic.   Eyes: Pupils are equal, round, and reactive to light. Conjunctivae are normal. Right eye exhibits no discharge. Left eye exhibits no discharge.   Neck: No tracheal deviation present.   Cardiovascular: Normal rate and regular rhythm. Exam reveals no gallop and no friction rub.   Pulmonary/Chest: Effort normal. No respiratory distress. He has no wheezes.   Abdominal: Soft. Bowel sounds are normal. He exhibits no distension. There is no tenderness.   Musculoskeletal: He exhibits no tenderness or deformity.   Neurological: He is alert and oriented to person, place, and time.   4 out of 5 strength upper and lower extremities   Skin: Skin is warm. He is not diaphoretic. No erythema.   .   Psychiatric: His mood appears not anxious. His affect is blunt. He is slowed and withdrawn. Thought content is paranoid. He does not express impulsivity.   Nursing note and vitals reviewed.      Fluids    Intake/Output Summary (Last 24 hours) at 10/12/2019 1312  Last data filed at 10/12/2019 1000  Gross per 24 hour  "  Intake 240 ml   Output 2200 ml   Net -1960 ml       Laboratory  Recent Labs     10/10/19  1229 10/11/19  0848 10/12/19  1217   WBC 10.4 9.8 9.5   RBC 4.66* 4.90 4.67*   HEMOGLOBIN 14.4 15.0 14.6   HEMATOCRIT 43.2 45.2 44.1   MCV 92.7 92.2 94.4   MCH 30.9 30.6 31.3   MCHC 33.3* 33.2* 33.1*   RDW 58.2* 58.1* 59.7*   PLATELETCT 357 240 324   MPV 8.3* 9.1 8.4*     Recent Labs     10/10/19  1229 10/11/19  0848 10/12/19  1217   SODIUM 138 140 138   POTASSIUM 3.9 4.1 4.2   CHLORIDE 106 106 106   CO2 22 20 22   GLUCOSE 91 104* 87   BUN 9 9 11   CREATININE 0.65 0.58 0.65   CALCIUM 9.2 9.3 9.0                   Imaging  DX-CHEST-PORTABLE (1 VIEW)   Final Result         No acute cardiac or pulmonary abnormality is identified.      DX-CHEST-PORTABLE (1 VIEW)   Final Result      Mild lung base atelectasis with edema not excluded.      DX-CHEST-PORTABLE (1 VIEW)   Final Result         1.  Pulmonary edema and/or infiltrates.   2.  Cardiomegaly      MR-BRAIN-W/O   Final Result      1.  No acute intracranial abnormality.   2.  Sphenoid and posterior right ethmoid sinus disease.      US-EXTREMITY VENOUS LOWER BILAT   Final Result      EC-ECHOCARDIOGRAM LTD W/O CONT   Final Result      CT-CTA CHEST PULMONARY ARTERY W/ RECONS   Final Result   Addendum 1 of 1   These findings were discussed with HEATHER MELGOZA on 9/19/2019 2:16 PM.      Final      DX-CHEST-PORTABLE (1 VIEW)   Final Result      No acute cardiopulmonary abnormality.      CT-HEAD W/O   Final Result      No CT evidence of acute infarct, hemorrhage or mass. No acute intracranial injury.           Assessment/Plan  * Acute saddle pulmonary embolism (HCC)- (present on admission)  Assessment & Plan  Recurrent, in the setting of noncompliance with anticoagulation therapy  CTA PE revealing \"Extensive acute bilateral pulmonary emboli with saddle embolus noted, as well as findings concerning for RV strain\"  Recurrence likely secondary to poor compliance of medication  continue " Xarelto  Echocardiogram with RV strain, caution aggressive blood pressure changes  DVT study negative    Acute hypoxemic respiratory failure (HCC)  Assessment & Plan  Secondary acute pulmonary embolism  Wean off oxygen as tolerated  Resolved.    Paroxysmal atrial fibrillation (HCC)- (present on admission)  Assessment & Plan   home dose Tenormin.  Continue Xarelto.  Rate is controlled.    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  MRI brain negative  10/5: Psychiatry increased Seroquel dose.      ETOH abuse- (present on admission)  Assessment & Plan  History of, monitor for withdrawal    Acute cystitis- (present on admission)  Assessment & Plan  Urine culture grew strep epidermidis sensitive to Bactrim.  Leucocytosis resolved   Completed course of antibiotics.    Fever  Assessment & Plan  9/28 - Has had leukocytosis, now fever 100.7. He has no specific symptoms. CXR and urine ordered  9/29 - Continue ceftriaxone, urine culture pending.  Srivastava cath was changed  9/30 -urine growing coag negative staph change to complete course of Bactrim  Resolved.    Weakness  Assessment & Plan  I spoke with psychiatry, who notes patient's current weakness has progressed from when he first admitted.  CPK was normal  Psychiatry stopped risperidone, had some improvement with Cogentin  Possible toxicity from the nitrous oxide.  I spoke with Dr. Byrd with neurology, he agreed with continuing with vitamin B12 supplementation.  He can have EMG testing done as outpatient after 2 weeks of stopping nitrous oxide.  PT OT ordered for reevaluation, plans of IP rehab    Vitamin B12 deficiency  Assessment & Plan  With reported peripheral tingling  Supplement ordered    Type 2 diabetes mellitus  without complication, without long-term current use of insulin (HCC)  Assessment & Plan  New diagnosis, A1c 6.6%  Diabetes education ordered  Could be contributing to peripheral neuropathy    Slow transit constipation  Assessment & Plan  Daily Senokot and MiraLAX  Bowel regimen ordered      ALLI (obstructive sleep apnea)  Assessment & Plan  History of, pulmonary following    Morbid obesity (HCC)  Assessment & Plan  Outpatient weight loss program would be indicated    Pulmonary hypertension (HCC)- (present on admission)  Assessment & Plan  Known history of, now with recurrent PE  Echocardiogram noted    Anxiety- (present on admission)  Assessment & Plan  On Librium previously  Now uncontrolled  Psychiatry evaluating, he was placed on a hold.  Bedside sitter as needed.  9/25 risperdal stopped for possible NMS, his anxiety is now increased  Psych adjusting seroquel and cogentin.       Essential hypertension- (present on admission)  Assessment & Plan  Blood pressure has been stable.  Continue to monitor.  Plan of care discussed with multidisciplinary team during rounds.    VTE prophylaxis: xarelto    Legal hold lifted  Ready for IP rehab when insurance approves  No acute changes since yesterday  Transfer to medical floor today

## 2019-10-12 NOTE — CARE PLAN
Problem: Safety  Goal: Will remain free from injury  Outcome: PROGRESSING AS EXPECTED  Goal: Will remain free from falls  Outcome: PROGRESSING AS EXPECTED    Fall precautions in place. Treaded socks on pt. Appropriate signs on doorway. IV pole on same side as bathroom. Bedrails up. Bed in lowest position and locked.  Call light and phone within reach. Patient educated on importance of calling nurses before getting out of bed, verbalizes understanding.      Problem: Infection  Goal: Will remain free from infection  Outcome: PROGRESSING AS EXPECTED     Assess for signs and symptoms of infection. Monitor vital signs and lab values. Implement standard precautions and hand washing before and after pt contact.

## 2019-10-12 NOTE — PROGRESS NOTES
Pt arrived to unit around 1200, alert and oriented x4, VSS, no reports of pain. No changes from report given. Mother at bedside. Cooperative with care, safety maintained.

## 2019-10-12 NOTE — PROGRESS NOTES
Bedside report received and pt care assumed.  Pt is asleep in bed, does not appear to be in distress, and family at bedside. Bed in lowest position and call light within reach.

## 2019-10-12 NOTE — PROGRESS NOTES
Bedside report received. Pt sitting in chair in room with  Mother. A&Ox4. On RACornell Srivastava in place. Pt is medical. No complaints of pain at this time. POC discussed with mother and son and plan put in place for the evening. Bed locked and in lowest position. Call light within reach. Hourly rounding in place.

## 2019-10-12 NOTE — PROGRESS NOTES
Pt and mother did want want to be disturbed after medications. Requested to be left alone until 0600 medications. CNA notified.

## 2019-10-12 NOTE — CARE PLAN
Problem: Communication  Goal: The ability to communicate needs accurately and effectively will improve  Outcome: PROGRESSING AS EXPECTED  Note:   POC discussed with pt and mother. Plan in place. Pt and other communicate appropriately with staff. Call light within reach. Hourly rounding in place.      Problem: Safety  Goal: Will remain free from falls  Outcome: PROGRESSING AS EXPECTED  Note:   Pt and mother educated on fall risk and fall prevention. Pt has remained free from falls.

## 2019-10-12 NOTE — PROGRESS NOTES
2 RN skin check completed with Henrik RN for pt transfer.   Devices in place NA  Skin assessed under devices NA  Confirmed pressure ulcers found on NA  New potential pressure ulcers noted on NA. Wound consult placed NA  The following interventions in place hourly rounding, miconazole cream ordered for groin folds.

## 2019-10-13 PROCEDURE — 700102 HCHG RX REV CODE 250 W/ 637 OVERRIDE(OP): Performed by: PSYCHIATRY & NEUROLOGY

## 2019-10-13 PROCEDURE — A9270 NON-COVERED ITEM OR SERVICE: HCPCS | Performed by: PSYCHIATRY & NEUROLOGY

## 2019-10-13 PROCEDURE — 700102 HCHG RX REV CODE 250 W/ 637 OVERRIDE(OP): Performed by: INTERNAL MEDICINE

## 2019-10-13 PROCEDURE — 700102 HCHG RX REV CODE 250 W/ 637 OVERRIDE(OP): Performed by: FAMILY MEDICINE

## 2019-10-13 PROCEDURE — A9270 NON-COVERED ITEM OR SERVICE: HCPCS | Performed by: INTERNAL MEDICINE

## 2019-10-13 PROCEDURE — 700102 HCHG RX REV CODE 250 W/ 637 OVERRIDE(OP): Performed by: HOSPITALIST

## 2019-10-13 PROCEDURE — 770001 HCHG ROOM/CARE - MED/SURG/GYN PRIV*

## 2019-10-13 PROCEDURE — A9270 NON-COVERED ITEM OR SERVICE: HCPCS | Performed by: FAMILY MEDICINE

## 2019-10-13 PROCEDURE — A9270 NON-COVERED ITEM OR SERVICE: HCPCS | Performed by: HOSPITALIST

## 2019-10-13 PROCEDURE — 99231 SBSQ HOSP IP/OBS SF/LOW 25: CPT | Performed by: INTERNAL MEDICINE

## 2019-10-13 RX ADMIN — QUETIAPINE FUMARATE 100 MG: 100 TABLET ORAL at 08:37

## 2019-10-13 RX ADMIN — NYSTATIN: 100000 POWDER TOPICAL at 05:51

## 2019-10-13 RX ADMIN — MICONAZOLE NITRATE: 20 CREAM TOPICAL at 05:51

## 2019-10-13 RX ADMIN — SENNOSIDES, DOCUSATE SODIUM 2 TABLET: 50; 8.6 TABLET, FILM COATED ORAL at 17:27

## 2019-10-13 RX ADMIN — FOLIC ACID 1 MG: 1 TABLET ORAL at 05:52

## 2019-10-13 RX ADMIN — RIVAROXABAN 15 MG: 15 TABLET, FILM COATED ORAL at 17:27

## 2019-10-13 RX ADMIN — RIVAROXABAN 15 MG: 15 TABLET, FILM COATED ORAL at 08:38

## 2019-10-13 RX ADMIN — SENNOSIDES, DOCUSATE SODIUM 2 TABLET: 50; 8.6 TABLET, FILM COATED ORAL at 05:51

## 2019-10-13 RX ADMIN — MICONAZOLE NITRATE: 20 CREAM TOPICAL at 21:54

## 2019-10-13 RX ADMIN — LISINOPRIL 10 MG: 10 TABLET ORAL at 05:51

## 2019-10-13 RX ADMIN — ATENOLOL 100 MG: 25 TABLET ORAL at 05:51

## 2019-10-13 RX ADMIN — OMEPRAZOLE 20 MG: 20 CAPSULE, DELAYED RELEASE ORAL at 05:51

## 2019-10-13 RX ADMIN — TAMSULOSIN HYDROCHLORIDE 0.4 MG: 0.4 CAPSULE ORAL at 08:30

## 2019-10-13 RX ADMIN — THERA TABS 1 TABLET: TAB at 05:51

## 2019-10-13 RX ADMIN — QUETIAPINE FUMARATE 100 MG: 200 TABLET ORAL at 20:06

## 2019-10-13 RX ADMIN — QUETIAPINE FUMARATE 100 MG: 100 TABLET ORAL at 13:56

## 2019-10-13 ASSESSMENT — COGNITIVE AND FUNCTIONAL STATUS - GENERAL
MOVING FROM LYING ON BACK TO SITTING ON SIDE OF FLAT BED: A LITTLE
MOBILITY SCORE: 18
CLIMB 3 TO 5 STEPS WITH RAILING: A LITTLE
STANDING UP FROM CHAIR USING ARMS: A LITTLE
PERSONAL GROOMING: A LITTLE
DRESSING REGULAR LOWER BODY CLOTHING: A LITTLE
DAILY ACTIVITIY SCORE: 18
HELP NEEDED FOR BATHING: A LITTLE
WALKING IN HOSPITAL ROOM: A LITTLE
MOVING TO AND FROM BED TO CHAIR: A LITTLE
EATING MEALS: A LITTLE
SUGGESTED CMS G CODE MODIFIER DAILY ACTIVITY: CK
TURNING FROM BACK TO SIDE WHILE IN FLAT BAD: A LITTLE
SUGGESTED CMS G CODE MODIFIER MOBILITY: CK
DRESSING REGULAR UPPER BODY CLOTHING: A LITTLE
TOILETING: A LITTLE

## 2019-10-13 ASSESSMENT — ENCOUNTER SYMPTOMS
NAUSEA: 0
WEAKNESS: 1
HALLUCINATIONS: 1
INSOMNIA: 1
CONSTIPATION: 0
HEMOPTYSIS: 0
PHOTOPHOBIA: 0
ABDOMINAL PAIN: 0
TINGLING: 1
CLAUDICATION: 0
FOCAL WEAKNESS: 0
SPEECH CHANGE: 0
NECK PAIN: 0
PALPITATIONS: 0
SENSORY CHANGE: 0
DIAPHORESIS: 0
COUGH: 0
DOUBLE VISION: 0
VOMITING: 0
ORTHOPNEA: 0
TREMORS: 0
BACK PAIN: 0
MYALGIAS: 0
HEARTBURN: 0
BLURRED VISION: 0
NERVOUS/ANXIOUS: 1
FEVER: 0
DEPRESSION: 1
DIARRHEA: 0

## 2019-10-13 ASSESSMENT — LIFESTYLE VARIABLES: SUBSTANCE_ABUSE: 1

## 2019-10-13 NOTE — PROGRESS NOTES
Patient mother refused lab draw. Requesting to speak to physician before will allow further labs.

## 2019-10-13 NOTE — PROGRESS NOTES
"Assumed care at 1900. Received report from RN. Patient is a/o x4, vss on ra, up with SBA/ FWW.  Denies pain or SOB and NAD.  Denies hallucinations.  C/O nightmares and decreased sleep. Assessment complete. Labs reviewed. Patient and RN discussed plan of care. Patient questions answered. Patient needs are met at this time. Bed in lowest and locked position. Call light is within reach. Hourly rounding in place. /82   Pulse (!) 109 Comment: nurse aware  Temp 36.1 °C (97 °F) (Temporal)   Resp 18   Ht 1.727 m (5' 7.99\")   Wt 116.9 kg (257 lb 11.5 oz)   SpO2 95%   BMI 39.20 kg/m²   "

## 2019-10-13 NOTE — CARE PLAN
Problem: Knowledge Deficit  Goal: Knowledge of disease process/condition, treatment plan, diagnostic tests, and medications will improve  Outcome: PROGRESSING AS EXPECTED   Pt educated on importance of taking Xarelto as ordered and ramifications of not taking medication as prescribed.      Problem: Discharge Barriers/Planning  Goal: Patient's continuum of care needs will be met  Outcome: PROGRESSING AS EXPECTED   Pt educated on POC to dc to rehab when able.

## 2019-10-13 NOTE — PROGRESS NOTES
Hospital Medicine Daily Progress Note    Date of Service  10/13/2019    Chief Complaint  34 y.o. male admitted 9/19/2019 with hallucinations    Hospital Course    Past medical history of depression, PTSD, anxiety, substance abuse of mushrooms, nitrous oxide, marijuana, alcohol, paroxysmal A. fib, recent diagnosis of PE May 2019.  He presented with hallucinations and had stopped taking his medications including Xarelto.  He had also fallen multiple times.  He was found hypoxic and CTA showed extensive acute bilateral pulmonary emboli with saddle embolus.  He was admitted to the ICU and felt his risk of intracranial hemorrhage outweighed submassive dose of alteplase or EKOS.  He was started on heparin infusion.  Lower extremity Doppler was negative for DVT.  TTE showed severe right heart strain.  Patient remained stabilized and transitioned to Xarelto.  Psychiatry was consulted for his hallucinations and he was started on Risperdal.  However he developed neurological changes concerning for NMS and Risperdal was stopped and he was given Cogentin with some improvement.  Psychiatry started him on Seroquel and has been titrating medication.  He was hypertensive requiring IV medications so he was started on a lower lower dose of lisinopril and his atenolol was stopped, given his right heart strain.  He developed leukocytosis and fever.  Urine culture grew coag negative staph and he was started on course of Bactrim.  Srivastava catheter was removed but he failed voiding trial and was replaced.  He is continued on Flomax.  PT OT evaluated him and recommended SNF.      Interval Problem Update  Patient says he feels much better today.  His hallucinations have improved, though he still feels very anxious.  He is off oxygen and denies shortness of breath.  He denies abdominal pain.  He feels less diaphoretic.  Mother is worried about him going to rehab since she will not be able to stay with him.  10/1: Patient became anxious when I  entered the room with the nursing staff.  Stated that does not like to many people around him.  Saturating well on room air.  Stated that he was unable to sleep well last night.  Afebrile overnight.  No issues overnight per staff.  10/2: Resting comfortably in bed.  Stated that he could not sleep sleep well last night.  Still feeling anxious.  Leukocytosis trending down.  Afebrile overnight.  Tolerating p.o. well.  No nausea or vomiting.  Had multiple small bowel movements yesterday after prune juice and stool softeners.  10/3: Resting comfortably in bed.  Looks slightly anxious.  Had another episode of head shaking relieved by 1 dose of Ativan.  Patient has a lot of psychiatric concern and questions which I rely to the psychiatrist who will stop by and answer those questions for the patient.  Otherwise no new issues to report.  10/4: Resting comfortably in bed.  Had large bowel movement yesterday per nursing staff.  Still getting anxious occasionally.  Requesting Ativan.  No acute distress noted.  No issues overnight per staff.  10/5: Resting comfortably in bed.  Psychiatry increased Seroquel dose for better control of anxiety and restlessness.  Still on legal hold.  No acute distress noted.  No issues overnight per staff.  10/6: Resting comfortably in bed.  Having more regular bowel movements now.  Anxiety slightly improved with increase Seroquel dose as per patient and his mother at bedside.  No acute distress noted.  No issues overnight per staff.  10/7: Resting comfortably in bed.  No acute distress noted.  Still anxious as stated.  Feels stronger.  Tolerating p.o. fairly.  Having regular bowel movements but still think that he is still constipated.  No nausea or vomiting. No issues overnight per staff    10/8: seen and examined, patient has flat affect, states he wasn't able to sleep much again last night however the decreased seroquel dose helped with hallucination and delusions, mother in room and confirmed  the above  Otherwise  No complaints.    10/9: seen and examined, patient states he is doing a little better interms of hallucinations however mom does not think so. Psych will see him today  Still on legal, needs to be off legal to go to inpatient psych facility    10/10: seen and examined, sitting in chair, eating breakfast, mother at bedside, still both have a flat affect, and very serious. States he is doing better however had some nightmares last night.   Vitals stable  Did have enough sleep last night though    PMR evaluated and he is a good candidate, however he will need to go back to pcp in [place] after rehab, insurance acceptance pending [facility]    10/11: Seen and examined this morning his mother is at bedside that the insurance is not authorized the inpatient rehab yet.  I discussed them in detail that I was out of our hands and we are continuing to work on it along with .    10/12: Seen and examined this morning patient and mother in room together.  Updated patient and mother about insurance pending rehab approval.  Mother wants to know if we can take out the IV and patient's arm however discussed with her that that is to remain in place for emergency purposes.  She understood and agreed.  She continues to say that his nightmares have increased.  However he is sleeping much better  10/13- sleeping better. Refuses labs. Agree with pt and his mother. Will order labs only if change on clinical status. Still manzano in place. Pt would like to discontinue manzano     Consultants/Specialty  Psychiatry  Critical care  Pulmonology    Code Status  Full Code      Disposition  Case coordination to discuss discharge planning with mother  Rehab once insurance approves  Will need anticoagulation clinic and follow-up with pulmonology    Review of Systems  Review of Systems   Constitutional: Positive for malaise/fatigue. Negative for diaphoresis and fever.   HENT: Negative for congestion, ear discharge,  ear pain, hearing loss, nosebleeds and tinnitus.    Eyes: Negative for blurred vision, double vision and photophobia.   Respiratory: Negative for cough and hemoptysis.    Cardiovascular: Negative for chest pain, palpitations, orthopnea, claudication and leg swelling.   Gastrointestinal: Negative for abdominal pain, constipation, diarrhea, heartburn, nausea and vomiting.   Genitourinary: Negative for dysuria, frequency, hematuria and urgency.   Musculoskeletal: Negative for back pain, joint pain, myalgias and neck pain.   Neurological: Positive for tingling and weakness. Negative for tremors, sensory change, speech change and focal weakness.   Psychiatric/Behavioral: Positive for depression, hallucinations and substance abuse. The patient is nervous/anxious and has insomnia.         Nightmares        Physical Exam  Temp:  [36.1 °C (96.9 °F)-36.2 °C (97.1 °F)] 36.2 °C (97.1 °F)  Pulse:  [] 98  Resp:  [17-18] 18  BP: (105-138)/(65-88) 105/65  SpO2:  [94 %-97 %] 94 %    Physical Exam   Constitutional: He is oriented to person, place, and time. No distress.   Obesity   HENT:   Head: Atraumatic.   Eyes: Pupils are equal, round, and reactive to light. Conjunctivae are normal. Right eye exhibits no discharge. Left eye exhibits no discharge.   Neck: No tracheal deviation present.   Cardiovascular: Normal rate and regular rhythm. Exam reveals no gallop and no friction rub.   Pulmonary/Chest: Effort normal. No respiratory distress. He has no wheezes.   Abdominal: Soft. Bowel sounds are normal. He exhibits no distension. There is no tenderness.   Musculoskeletal: He exhibits no tenderness or deformity.   Neurological: He is alert and oriented to person, place, and time.   4 out of 5 strength upper and lower extremities   Skin: Skin is warm. He is not diaphoretic. No erythema.   .   Psychiatric: His mood appears not anxious. His affect is blunt. He is slowed and withdrawn. Thought content is paranoid. He does not express  "impulsivity.   Nursing note and vitals reviewed.      Fluids    Intake/Output Summary (Last 24 hours) at 10/13/2019 1657  Last data filed at 10/13/2019 1358  Gross per 24 hour   Intake 1074 ml   Output 3700 ml   Net -2626 ml       Laboratory  Recent Labs     10/11/19  0848 10/12/19  1217   WBC 9.8 9.5   RBC 4.90 4.67*   HEMOGLOBIN 15.0 14.6   HEMATOCRIT 45.2 44.1   MCV 92.2 94.4   MCH 30.6 31.3   MCHC 33.2* 33.1*   RDW 58.1* 59.7*   PLATELETCT 240 324   MPV 9.1 8.4*     Recent Labs     10/11/19  0848 10/12/19  1217   SODIUM 140 138   POTASSIUM 4.1 4.2   CHLORIDE 106 106   CO2 20 22   GLUCOSE 104* 87   BUN 9 11   CREATININE 0.58 0.65   CALCIUM 9.3 9.0                   Imaging  DX-CHEST-PORTABLE (1 VIEW)   Final Result         No acute cardiac or pulmonary abnormality is identified.      DX-CHEST-PORTABLE (1 VIEW)   Final Result      Mild lung base atelectasis with edema not excluded.      DX-CHEST-PORTABLE (1 VIEW)   Final Result         1.  Pulmonary edema and/or infiltrates.   2.  Cardiomegaly      MR-BRAIN-W/O   Final Result      1.  No acute intracranial abnormality.   2.  Sphenoid and posterior right ethmoid sinus disease.      US-EXTREMITY VENOUS LOWER BILAT   Final Result      EC-ECHOCARDIOGRAM LTD W/O CONT   Final Result      CT-CTA CHEST PULMONARY ARTERY W/ RECONS   Final Result   Addendum 1 of 1   These findings were discussed with HEATHER MELGOZA on 9/19/2019 2:16 PM.      Final      DX-CHEST-PORTABLE (1 VIEW)   Final Result      No acute cardiopulmonary abnormality.      CT-HEAD W/O   Final Result      No CT evidence of acute infarct, hemorrhage or mass. No acute intracranial injury.           Assessment/Plan  * Acute saddle pulmonary embolism (HCC)- (present on admission)  Assessment & Plan  Recurrent, in the setting of noncompliance with anticoagulation therapy  CTA PE revealing \"Extensive acute bilateral pulmonary emboli with saddle embolus noted, as well as findings concerning for RV strain\"  Recurrence " likely secondary to poor compliance of medication  continue Xarelto  Echocardiogram with RV strain, caution aggressive blood pressure changes  DVT study negative  Continue xarelto. Tolerating well. No signs of bleeding     Acute hypoxemic respiratory failure (HCC)  Assessment & Plan  Secondary acute pulmonary embolism  Wean off oxygen as tolerated  Resolved.    Paroxysmal atrial fibrillation (HCC)- (present on admission)  Assessment & Plan   home dose Tenormin.  Continue Xarelto.  Rate is controlled.    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  MRI brain negative  10/5: Psychiatry increased Seroquel dose.      ETOH abuse- (present on admission)  Assessment & Plan  History of, monitor for withdrawal    Acute cystitis- (present on admission)  Assessment & Plan  Urine culture grew strep epidermidis sensitive to Bactrim.  Leucocytosis resolved   Completed course of antibiotics.    Fever  Assessment & Plan  9/28 - Has had leukocytosis, now fever 100.7. He has no specific symptoms. CXR and urine ordered  9/29 - Continue ceftriaxone, urine culture pending.  Srivastava cath was changed  9/30 -urine growing coag negative staph change to complete course of Bactrim  Resolved.    Weakness  Assessment & Plan  I spoke with psychiatry, who notes patient's current weakness has progressed from when he first admitted.  CPK was normal  Psychiatry stopped risperidone, had some improvement with Cogentin  Possible toxicity from the nitrous oxide.  I spoke with Dr. Byrd with neurology, he agreed with continuing with vitamin B12 supplementation.  He can have EMG testing done as outpatient after 2 weeks of stopping nitrous oxide.  PT OT ordered for reevaluation, plans of IP rehab    Vitamin B12  deficiency  Assessment & Plan  With reported peripheral tingling  Supplement ordered    Type 2 diabetes mellitus without complication, without long-term current use of insulin (HCC)  Assessment & Plan  New diagnosis, A1c 6.6%  Diabetes education ordered  Could be contributing to peripheral neuropathy    Slow transit constipation  Assessment & Plan  Daily Senokot and MiraLAX  Bowel regimen ordered      ALLI (obstructive sleep apnea)  Assessment & Plan  History of, pulmonary following    Morbid obesity (HCC)  Assessment & Plan  Outpatient weight loss program would be indicated    Pulmonary hypertension (HCC)- (present on admission)  Assessment & Plan  Known history of, now with recurrent PE  Echocardiogram noted    Anxiety- (present on admission)  Assessment & Plan  On Librium previously  Now uncontrolled  Psychiatry evaluating, he was placed on a hold.  Bedside sitter as needed.  9/25 risperdal stopped for possible NMS, his anxiety is now increased  Psych adjusting seroquel and cogentin.       Essential hypertension- (present on admission)  Assessment & Plan  Blood pressure has been stable.  Continue to monitor.  Plan of care discussed with multidisciplinary team during rounds.    VTE prophylaxis: xarelto    Continue manzano   Continue same medications   Do not do daily labs  Medical cleared per rehab, when bed and insurance approval

## 2019-10-14 LAB
EKG IMPRESSION: NORMAL
LACTATE BLD-SCNC: 2.5 MMOL/L (ref 0.5–2)

## 2019-10-14 PROCEDURE — A9270 NON-COVERED ITEM OR SERVICE: HCPCS | Performed by: INTERNAL MEDICINE

## 2019-10-14 PROCEDURE — 93005 ELECTROCARDIOGRAM TRACING: CPT | Performed by: INTERNAL MEDICINE

## 2019-10-14 PROCEDURE — 700102 HCHG RX REV CODE 250 W/ 637 OVERRIDE(OP): Performed by: PSYCHIATRY & NEUROLOGY

## 2019-10-14 PROCEDURE — A9270 NON-COVERED ITEM OR SERVICE: HCPCS | Performed by: PSYCHIATRY & NEUROLOGY

## 2019-10-14 PROCEDURE — A9270 NON-COVERED ITEM OR SERVICE: HCPCS | Performed by: FAMILY MEDICINE

## 2019-10-14 PROCEDURE — A9270 NON-COVERED ITEM OR SERVICE: HCPCS | Performed by: STUDENT IN AN ORGANIZED HEALTH CARE EDUCATION/TRAINING PROGRAM

## 2019-10-14 PROCEDURE — 93010 ELECTROCARDIOGRAM REPORT: CPT | Performed by: INTERNAL MEDICINE

## 2019-10-14 PROCEDURE — 700102 HCHG RX REV CODE 250 W/ 637 OVERRIDE(OP): Performed by: INTERNAL MEDICINE

## 2019-10-14 PROCEDURE — 700105 HCHG RX REV CODE 258: Performed by: HOSPITALIST

## 2019-10-14 PROCEDURE — 83605 ASSAY OF LACTIC ACID: CPT

## 2019-10-14 PROCEDURE — 700102 HCHG RX REV CODE 250 W/ 637 OVERRIDE(OP): Performed by: STUDENT IN AN ORGANIZED HEALTH CARE EDUCATION/TRAINING PROGRAM

## 2019-10-14 PROCEDURE — 770001 HCHG ROOM/CARE - MED/SURG/GYN PRIV*

## 2019-10-14 PROCEDURE — A9270 NON-COVERED ITEM OR SERVICE: HCPCS | Performed by: HOSPITALIST

## 2019-10-14 PROCEDURE — 700102 HCHG RX REV CODE 250 W/ 637 OVERRIDE(OP): Performed by: FAMILY MEDICINE

## 2019-10-14 PROCEDURE — 700102 HCHG RX REV CODE 250 W/ 637 OVERRIDE(OP): Performed by: HOSPITALIST

## 2019-10-14 PROCEDURE — 36415 COLL VENOUS BLD VENIPUNCTURE: CPT

## 2019-10-14 RX ORDER — SODIUM CHLORIDE 9 MG/ML
500 INJECTION, SOLUTION INTRAVENOUS ONCE
Status: COMPLETED | OUTPATIENT
Start: 2019-10-14 | End: 2019-10-14

## 2019-10-14 RX ORDER — HYDROXYZINE 50 MG/1
50 TABLET, FILM COATED ORAL 3 TIMES DAILY PRN
Status: DISCONTINUED | OUTPATIENT
Start: 2019-10-14 | End: 2019-10-16 | Stop reason: HOSPADM

## 2019-10-14 RX ADMIN — LISINOPRIL 10 MG: 10 TABLET ORAL at 06:36

## 2019-10-14 RX ADMIN — SENNOSIDES, DOCUSATE SODIUM 2 TABLET: 50; 8.6 TABLET, FILM COATED ORAL at 17:21

## 2019-10-14 RX ADMIN — RIVAROXABAN 15 MG: 15 TABLET, FILM COATED ORAL at 08:00

## 2019-10-14 RX ADMIN — QUETIAPINE FUMARATE 100 MG: 100 TABLET ORAL at 14:28

## 2019-10-14 RX ADMIN — FOLIC ACID 1 MG: 1 TABLET ORAL at 06:36

## 2019-10-14 RX ADMIN — HYDROXYZINE HYDROCHLORIDE 50 MG: 50 TABLET, FILM COATED ORAL at 16:24

## 2019-10-14 RX ADMIN — ATENOLOL 100 MG: 25 TABLET ORAL at 06:36

## 2019-10-14 RX ADMIN — SENNOSIDES, DOCUSATE SODIUM 2 TABLET: 50; 8.6 TABLET, FILM COATED ORAL at 06:36

## 2019-10-14 RX ADMIN — QUETIAPINE FUMARATE 100 MG: 200 TABLET ORAL at 19:57

## 2019-10-14 RX ADMIN — OMEPRAZOLE 20 MG: 20 CAPSULE, DELAYED RELEASE ORAL at 06:36

## 2019-10-14 RX ADMIN — THERA TABS 1 TABLET: TAB at 06:36

## 2019-10-14 RX ADMIN — QUETIAPINE FUMARATE 100 MG: 100 TABLET ORAL at 08:00

## 2019-10-14 RX ADMIN — TAMSULOSIN HYDROCHLORIDE 0.4 MG: 0.4 CAPSULE ORAL at 08:00

## 2019-10-14 RX ADMIN — POLYETHYLENE GLYCOL 3350 1 PACKET: 17 POWDER, FOR SOLUTION ORAL at 06:34

## 2019-10-14 RX ADMIN — SODIUM CHLORIDE 500 ML: 9 INJECTION, SOLUTION INTRAVENOUS at 20:40

## 2019-10-14 RX ADMIN — GABAPENTIN 300 MG: 300 CAPSULE ORAL at 19:57

## 2019-10-14 RX ADMIN — RIVAROXABAN 20 MG: 20 TABLET, FILM COATED ORAL at 17:21

## 2019-10-14 NOTE — PROGRESS NOTES
"Patient continues to c/o anxiety. . Pt states \"I am definitely having PVCs\". RN auscultated HR regular but tachy. Dr Monae notified.   "

## 2019-10-14 NOTE — DISCHARGE PLANNING
Medical Social Work  PC to [facility], SW spoke to Alber told insurance is still pending, unsure of time frame.   SW told they will contact when they have approval.

## 2019-10-14 NOTE — PROGRESS NOTES
"Patient anxious. Reports feeling \"panic attack coming on\". Wanting to go outside. WC provided and wheeled out to healing garden by mother.   "

## 2019-10-14 NOTE — CARE PLAN
Problem: Communication  Goal: The ability to communicate needs accurately and effectively will improve  Outcome: PROGRESSING AS EXPECTED     Problem: Safety  Goal: Will remain free from injury  Outcome: PROGRESSING AS EXPECTED     Problem: Bowel/Gastric:  Goal: Normal bowel function is maintained or improved  Outcome: PROGRESSING AS EXPECTED  Goal: Will not experience complications related to bowel motility  Outcome: PROGRESSING AS EXPECTED     Problem: Urinary Elimination:  Goal: Ability to reestablish a normal urinary elimination pattern will improve  Outcome: PROGRESSING SLOWER THAN EXPECTED

## 2019-10-14 NOTE — DISCHARGE PLANNING
Medical Social Work  SW spoke to patient's mother Aga, who stated she called patient's insurance this am and was told that while they received the referral last week it was not marked urgent.  Due to this it may take up to 14 days to be approved, and if it was marked urgent would of been approved within 5 days.  Aga expressed frustration that was not done and was asking who is responsible.  Aga went on to say she was told that patient's insurance would approve immediately and her son would be going to Rehab.  Aga stated that her son is just sitting here and nothing is happening.  Patient stated he is getting weaker.     PC to [facility], Sw spoke to Alber, stated that the referral has been submitted and they are pending authorization.

## 2019-10-14 NOTE — PROGRESS NOTES
Assumed care of pt at 1900, family (mother) at bedside thru night, no c/o pain or other discomforts at that time. Meds given per MAR. Pt mother requesting that staff limit entry to room during night to assist with better sleep. CNA aware, AM meds/vitals to be done at 0530. Pt resting in bed now, call light in reach, no further needs at this time

## 2019-10-14 NOTE — THERAPY
PT tx attempted. PT tx attempted; however, pt lethargic as he was just given meds for anxiety. Discussed therapy plan moving forward. PT to follow daily at 2 pm, pt and Mom in agreement.    Adelia Mccarthy, PT, DPT Pager: [phone]

## 2019-10-15 PROBLEM — R50.9 FEVER: Status: RESOLVED | Noted: 2019-09-28 | Resolved: 2019-10-15

## 2019-10-15 LAB — EKG IMPRESSION: NORMAL

## 2019-10-15 PROCEDURE — 700102 HCHG RX REV CODE 250 W/ 637 OVERRIDE(OP): Performed by: INTERNAL MEDICINE

## 2019-10-15 PROCEDURE — 93010 ELECTROCARDIOGRAM REPORT: CPT | Performed by: INTERNAL MEDICINE

## 2019-10-15 PROCEDURE — A9270 NON-COVERED ITEM OR SERVICE: HCPCS | Performed by: FAMILY MEDICINE

## 2019-10-15 PROCEDURE — 93005 ELECTROCARDIOGRAM TRACING: CPT | Performed by: HOSPITALIST

## 2019-10-15 PROCEDURE — A9270 NON-COVERED ITEM OR SERVICE: HCPCS | Performed by: PSYCHIATRY & NEUROLOGY

## 2019-10-15 PROCEDURE — A9270 NON-COVERED ITEM OR SERVICE: HCPCS | Performed by: HOSPITALIST

## 2019-10-15 PROCEDURE — 770001 HCHG ROOM/CARE - MED/SURG/GYN PRIV*

## 2019-10-15 PROCEDURE — 700102 HCHG RX REV CODE 250 W/ 637 OVERRIDE(OP): Performed by: PSYCHIATRY & NEUROLOGY

## 2019-10-15 PROCEDURE — A9270 NON-COVERED ITEM OR SERVICE: HCPCS | Performed by: INTERNAL MEDICINE

## 2019-10-15 PROCEDURE — 99232 SBSQ HOSP IP/OBS MODERATE 35: CPT | Performed by: HOSPITALIST

## 2019-10-15 PROCEDURE — 700102 HCHG RX REV CODE 250 W/ 637 OVERRIDE(OP): Performed by: FAMILY MEDICINE

## 2019-10-15 PROCEDURE — 700102 HCHG RX REV CODE 250 W/ 637 OVERRIDE(OP): Performed by: HOSPITALIST

## 2019-10-15 RX ADMIN — RIVAROXABAN 20 MG: 20 TABLET, FILM COATED ORAL at 17:29

## 2019-10-15 RX ADMIN — ATENOLOL 100 MG: 25 TABLET ORAL at 05:50

## 2019-10-15 RX ADMIN — QUETIAPINE FUMARATE 100 MG: 100 TABLET ORAL at 14:03

## 2019-10-15 RX ADMIN — POLYETHYLENE GLYCOL 3350 1 PACKET: 17 POWDER, FOR SOLUTION ORAL at 05:50

## 2019-10-15 RX ADMIN — SENNOSIDES, DOCUSATE SODIUM 2 TABLET: 50; 8.6 TABLET, FILM COATED ORAL at 17:29

## 2019-10-15 RX ADMIN — TAMSULOSIN HYDROCHLORIDE 0.4 MG: 0.4 CAPSULE ORAL at 08:21

## 2019-10-15 RX ADMIN — ACETAMINOPHEN 650 MG: 325 TABLET, FILM COATED ORAL at 06:27

## 2019-10-15 RX ADMIN — OMEPRAZOLE 20 MG: 20 CAPSULE, DELAYED RELEASE ORAL at 05:51

## 2019-10-15 RX ADMIN — QUETIAPINE FUMARATE 100 MG: 100 TABLET ORAL at 08:21

## 2019-10-15 RX ADMIN — THERA TABS 1 TABLET: TAB at 05:51

## 2019-10-15 RX ADMIN — HYDROXYZINE HYDROCHLORIDE 50 MG: 50 TABLET, FILM COATED ORAL at 20:00

## 2019-10-15 RX ADMIN — SENNOSIDES, DOCUSATE SODIUM 2 TABLET: 50; 8.6 TABLET, FILM COATED ORAL at 05:51

## 2019-10-15 RX ADMIN — LISINOPRIL 10 MG: 10 TABLET ORAL at 05:51

## 2019-10-15 RX ADMIN — QUETIAPINE FUMARATE 100 MG: 200 TABLET ORAL at 19:57

## 2019-10-15 RX ADMIN — METOPROLOL TARTRATE 12.5 MG: 25 TABLET, FILM COATED ORAL at 14:03

## 2019-10-15 RX ADMIN — HYDROXYZINE HYDROCHLORIDE 50 MG: 50 TABLET, FILM COATED ORAL at 06:05

## 2019-10-15 RX ADMIN — HYDROXYZINE HYDROCHLORIDE 50 MG: 50 TABLET, FILM COATED ORAL at 10:48

## 2019-10-15 RX ADMIN — FOLIC ACID 1 MG: 1 TABLET ORAL at 05:51

## 2019-10-15 ASSESSMENT — ENCOUNTER SYMPTOMS
ORTHOPNEA: 0
NAUSEA: 0
BRUISES/BLEEDS EASILY: 0
CLAUDICATION: 0
FEVER: 0
CHILLS: 0
SINUS PAIN: 0
PALPITATIONS: 1
MYALGIAS: 0
VOMITING: 0
HEMOPTYSIS: 0
BLURRED VISION: 0
DEPRESSION: 0
SHORTNESS OF BREATH: 0
HEADACHES: 0
SPUTUM PRODUCTION: 0
NECK PAIN: 0
DIZZINESS: 1
COUGH: 0
HEARTBURN: 0
TINGLING: 0

## 2019-10-15 ASSESSMENT — LIFESTYLE VARIABLES: SUBSTANCE_ABUSE: 0

## 2019-10-15 NOTE — PROGRESS NOTES
"Patient sitting up in chair for meal. States that he is feeling \"a little better\". Denies feeling \"PVCs\" currently. Advised to call RN for any changes. Verbalizes understanding.   "

## 2019-10-15 NOTE — CARE PLAN
Problem: Pain Management  Goal: Pain level will decrease to patient's comfort goal  Outcome: PROGRESSING AS EXPECTED     Problem: Fluid Volume:  Goal: Will maintain balanced intake and output  Outcome: PROGRESSING AS EXPECTED     Problem: Psychosocial Needs:  Goal: Level of anxiety will decrease  Outcome: PROGRESSING AS EXPECTED     Problem: Discharge Barriers/Planning  Goal: Patient's continuum of care needs will be met  Outcome: PROGRESSING SLOWER THAN EXPECTED     Problem: Urinary Elimination:  Goal: Ability to reestablish a normal urinary elimination pattern will improve  Outcome: PROGRESSING SLOWER THAN EXPECTED

## 2019-10-15 NOTE — PROGRESS NOTES
"Assumed care of pt at 1900, family at bedside, no c/o pain or other discomforts at that time. Pt mother later informs this RN that pt is feeling very anxious. Upon entering pt room, pt appeared flat and emotionless, but stated he felt jittery and as if his heart was racing. Pulse 125. Dr notified, Lactic Acid ordered, resulted at 2.5. 500cc NS bolus given per orders, suggested to pt to try taking another seroquel to match ordered pm amount. Pt mother stated \"that's an absolute last resort.\" Education provided on this medication. Meds given per MAR. Pt resting in bed now, call light in reach, no further needs at this time    "

## 2019-10-15 NOTE — PROGRESS NOTES
"Per physician verbal ok: Srivastava catheter clamped at 1400 to start \"bladder training\".     1640-patient called with strong urgency to urinate. Unclamped Srivastava. 400ml drained. Re clamped with instructions to call with sensation to urinate and/or RN will unclamp in 4 hours. Pt verbalized understanding.   "

## 2019-10-15 NOTE — DISCHARGE PLANNING
Medical Social Work  Voice message from [facility], as of this morning they are still pending authorization.  They were also told it can take up to 15 days to get authorization. Yogi stated he trying to talk to someone higher up at [place] to see if he can get it approved sooner. Will keep SW informed of updates.

## 2019-10-15 NOTE — CARE PLAN
Problem: Safety  Goal: Will remain free from falls  Outcome: PROGRESSING AS EXPECTED  Note:   Pt remains free from injury/falls this shift. Continue to assess for risks for injury/fall and implement prevention measures as needed       Problem: Communication  Goal: The ability to communicate needs accurately and effectively will improve  Outcome: PROGRESSING SLOWER THAN EXPECTED  Note:   Pt able to effectively communicate needs to staff members, but pt mother communicates for him often. Continue to encourage communication with staff as well as anticipate possible future needs

## 2019-10-15 NOTE — THERAPY
Attempted to see pt for therapy. Pt stating high anxiety and that his heart rate was elevated above 120 so he didn't want to increase it more w/ physical therapy because it will kick him into A-Fib. Pt stating he has been working on his exercises that don't increase his heart rate too much. He states that he has been mobile as much as possible but would prefer to defer therapy today. Will continue to follow and re-attempt as able.

## 2019-10-16 ENCOUNTER — HOSPITAL ENCOUNTER (INPATIENT)
Facility: REHABILITATION | Age: 34
LOS: 14 days | DRG: 052 | End: 2019-10-30
Attending: PHYSICAL MEDICINE & REHABILITATION | Admitting: PHYSICAL MEDICINE & REHABILITATION
Payer: COMMERCIAL

## 2019-10-16 VITALS
HEART RATE: 97 BPM | HEIGHT: 68 IN | TEMPERATURE: 97.6 F | BODY MASS INDEX: 39.06 KG/M2 | RESPIRATION RATE: 16 BRPM | DIASTOLIC BLOOD PRESSURE: 78 MMHG | WEIGHT: 257.72 LBS | SYSTOLIC BLOOD PRESSURE: 116 MMHG | OXYGEN SATURATION: 91 %

## 2019-10-16 PROBLEM — E53.8 SUBACUTE COMBINED DEGENERATION OF SPINAL CORD (HCC): Status: ACTIVE | Noted: 2019-10-16

## 2019-10-16 PROBLEM — R74.01 TRANSAMINITIS: Status: ACTIVE | Noted: 2019-05-26

## 2019-10-16 PROBLEM — G32.0 SUBACUTE COMBINED DEGENERATION OF SPINAL CORD (HCC): Status: ACTIVE | Noted: 2019-10-16

## 2019-10-16 LAB
APPEARANCE UR: CLEAR
BASOPHILS # BLD AUTO: 0.3 % (ref 0–1.8)
BASOPHILS # BLD: 0.03 K/UL (ref 0–0.12)
BILIRUB UR QL STRIP.AUTO: NEGATIVE
COLOR UR: YELLOW
EOSINOPHIL # BLD AUTO: 0.19 K/UL (ref 0–0.51)
EOSINOPHIL NFR BLD: 1.7 % (ref 0–6.9)
ERYTHROCYTE [DISTWIDTH] IN BLOOD BY AUTOMATED COUNT: 58.8 FL (ref 35.9–50)
GLUCOSE UR STRIP.AUTO-MCNC: NEGATIVE MG/DL
HCT VFR BLD AUTO: 47.6 % (ref 42–52)
HGB BLD-MCNC: 15.3 G/DL (ref 14–18)
IMM GRANULOCYTES # BLD AUTO: 0.05 K/UL (ref 0–0.11)
IMM GRANULOCYTES NFR BLD AUTO: 0.4 % (ref 0–0.9)
KETONES UR STRIP.AUTO-MCNC: NEGATIVE MG/DL
LEUKOCYTE ESTERASE UR QL STRIP.AUTO: NEGATIVE
LYMPHOCYTES # BLD AUTO: 2.6 K/UL (ref 1–4.8)
LYMPHOCYTES NFR BLD: 23.2 % (ref 22–41)
MAGNESIUM SERPL-MCNC: 1.7 MG/DL (ref 1.5–2.5)
MCH RBC QN AUTO: 30.1 PG (ref 27–33)
MCHC RBC AUTO-ENTMCNC: 32.1 G/DL (ref 33.7–35.3)
MCV RBC AUTO: 93.7 FL (ref 81.4–97.8)
MICRO URNS: NORMAL
MONOCYTES # BLD AUTO: 0.61 K/UL (ref 0–0.85)
MONOCYTES NFR BLD AUTO: 5.4 % (ref 0–13.4)
NEUTROPHILS # BLD AUTO: 7.74 K/UL (ref 1.82–7.42)
NEUTROPHILS NFR BLD: 69 % (ref 44–72)
NITRITE UR QL STRIP.AUTO: NEGATIVE
NRBC # BLD AUTO: 0 K/UL
NRBC BLD-RTO: 0 /100 WBC
PH UR STRIP.AUTO: 6 [PH] (ref 5–8)
PHOSPHATE SERPL-MCNC: 3.6 MG/DL (ref 2.5–4.5)
PLATELET # BLD AUTO: 323 K/UL (ref 164–446)
PMV BLD AUTO: 8.5 FL (ref 9–12.9)
PROT UR QL STRIP: NEGATIVE MG/DL
RBC # BLD AUTO: 5.08 M/UL (ref 4.7–6.1)
RBC UR QL AUTO: NEGATIVE
SP GR UR STRIP.AUTO: 1.01
UROBILINOGEN UR STRIP.AUTO-MCNC: 0.2 MG/DL
VIT B12 SERPL-MCNC: 181 PG/ML (ref 211–911)
WBC # BLD AUTO: 11.2 K/UL (ref 4.8–10.8)

## 2019-10-16 PROCEDURE — 700102 HCHG RX REV CODE 250 W/ 637 OVERRIDE(OP): Performed by: HOSPITALIST

## 2019-10-16 PROCEDURE — 83735 ASSAY OF MAGNESIUM: CPT

## 2019-10-16 PROCEDURE — A9270 NON-COVERED ITEM OR SERVICE: HCPCS | Performed by: PSYCHIATRY & NEUROLOGY

## 2019-10-16 PROCEDURE — 770010 HCHG ROOM/CARE - REHAB SEMI PRIVAT*

## 2019-10-16 PROCEDURE — 82607 VITAMIN B-12: CPT

## 2019-10-16 PROCEDURE — 36415 COLL VENOUS BLD VENIPUNCTURE: CPT

## 2019-10-16 PROCEDURE — 99223 1ST HOSP IP/OBS HIGH 75: CPT | Performed by: PHYSICAL MEDICINE & REHABILITATION

## 2019-10-16 PROCEDURE — 700102 HCHG RX REV CODE 250 W/ 637 OVERRIDE(OP): Performed by: PSYCHIATRY & NEUROLOGY

## 2019-10-16 PROCEDURE — A9270 NON-COVERED ITEM OR SERVICE: HCPCS | Performed by: HOSPITALIST

## 2019-10-16 PROCEDURE — A9270 NON-COVERED ITEM OR SERVICE: HCPCS | Performed by: PHYSICAL MEDICINE & REHABILITATION

## 2019-10-16 PROCEDURE — A9270 NON-COVERED ITEM OR SERVICE: HCPCS | Performed by: FAMILY MEDICINE

## 2019-10-16 PROCEDURE — 84100 ASSAY OF PHOSPHORUS: CPT

## 2019-10-16 PROCEDURE — A9270 NON-COVERED ITEM OR SERVICE: HCPCS | Performed by: INTERNAL MEDICINE

## 2019-10-16 PROCEDURE — 700102 HCHG RX REV CODE 250 W/ 637 OVERRIDE(OP): Performed by: PHYSICAL MEDICINE & REHABILITATION

## 2019-10-16 PROCEDURE — 700102 HCHG RX REV CODE 250 W/ 637 OVERRIDE(OP): Performed by: INTERNAL MEDICINE

## 2019-10-16 PROCEDURE — 99239 HOSP IP/OBS DSCHRG MGMT >30: CPT | Performed by: HOSPITALIST

## 2019-10-16 PROCEDURE — 85025 COMPLETE CBC W/AUTO DIFF WBC: CPT

## 2019-10-16 PROCEDURE — 94760 N-INVAS EAR/PLS OXIMETRY 1: CPT

## 2019-10-16 PROCEDURE — 700102 HCHG RX REV CODE 250 W/ 637 OVERRIDE(OP): Performed by: FAMILY MEDICINE

## 2019-10-16 PROCEDURE — 81003 URINALYSIS AUTO W/O SCOPE: CPT

## 2019-10-16 RX ORDER — BISACODYL 10 MG
10 SUPPOSITORY, RECTAL RECTAL
Status: CANCELLED | OUTPATIENT
Start: 2019-10-16

## 2019-10-16 RX ORDER — AMOXICILLIN 250 MG
2 CAPSULE ORAL 2 TIMES DAILY
Status: CANCELLED | OUTPATIENT
Start: 2019-10-16

## 2019-10-16 RX ORDER — CYANOCOBALAMIN 1000 UG/ML
1000 INJECTION, SOLUTION INTRAMUSCULAR; SUBCUTANEOUS
Status: CANCELLED | OUTPATIENT
Start: 2019-10-25

## 2019-10-16 RX ORDER — MICONAZOLE NITRATE 20 MG/G
CREAM TOPICAL
Status: ON HOLD
Start: 2019-10-16 | End: 2019-10-29

## 2019-10-16 RX ORDER — QUETIAPINE FUMARATE 100 MG/1
100 TABLET, FILM COATED ORAL 2 TIMES DAILY
Status: DISCONTINUED | OUTPATIENT
Start: 2019-10-16 | End: 2019-10-16

## 2019-10-16 RX ORDER — CALCIUM CARBONATE 500 MG/1
500 TABLET, CHEWABLE ORAL 3 TIMES DAILY PRN
Status: DISCONTINUED | OUTPATIENT
Start: 2019-10-16 | End: 2019-10-30 | Stop reason: HOSPADM

## 2019-10-16 RX ORDER — GABAPENTIN 300 MG/1
300 CAPSULE ORAL NIGHTLY PRN
Status: DISCONTINUED | OUTPATIENT
Start: 2019-10-16 | End: 2019-10-30 | Stop reason: HOSPADM

## 2019-10-16 RX ORDER — QUETIAPINE FUMARATE 300 MG/1
300 TABLET, FILM COATED ORAL EVERY EVENING
Qty: 60 TAB | Refills: 3 | Status: ON HOLD
Start: 2019-10-16 | End: 2019-10-29

## 2019-10-16 RX ORDER — FOLIC ACID 1 MG/1
1 TABLET ORAL DAILY
Status: CANCELLED | OUTPATIENT
Start: 2019-10-17

## 2019-10-16 RX ORDER — NYSTATIN 100000 [USP'U]/G
POWDER TOPICAL
Qty: 15 G | Status: ON HOLD
Start: 2019-10-16 | End: 2019-10-29

## 2019-10-16 RX ORDER — CYANOCOBALAMIN 1000 UG/ML
1000 INJECTION, SOLUTION INTRAMUSCULAR; SUBCUTANEOUS
Status: DISCONTINUED | OUTPATIENT
Start: 2019-10-25 | End: 2019-10-30 | Stop reason: HOSPADM

## 2019-10-16 RX ORDER — ATENOLOL 25 MG/1
100 TABLET ORAL DAILY
Status: CANCELLED | OUTPATIENT
Start: 2019-10-17

## 2019-10-16 RX ORDER — OMEPRAZOLE 20 MG/1
20 CAPSULE, DELAYED RELEASE ORAL DAILY
Status: DISCONTINUED | OUTPATIENT
Start: 2019-10-17 | End: 2019-10-30 | Stop reason: HOSPADM

## 2019-10-16 RX ORDER — POLYETHYLENE GLYCOL 3350 17 G/17G
1 POWDER, FOR SOLUTION ORAL DAILY
Status: DISCONTINUED | OUTPATIENT
Start: 2019-10-17 | End: 2019-10-29

## 2019-10-16 RX ORDER — TAMSULOSIN HYDROCHLORIDE 0.4 MG/1
0.4 CAPSULE ORAL
Qty: 30 CAP | Status: ON HOLD
Start: 2019-10-16 | End: 2019-10-29

## 2019-10-16 RX ORDER — CALCIUM CARBONATE 500 MG/1
500 TABLET, CHEWABLE ORAL 3 TIMES DAILY PRN
Qty: 30 TAB | Status: ON HOLD
Start: 2019-10-16 | End: 2019-10-29

## 2019-10-16 RX ORDER — ALUMINA, MAGNESIA, AND SIMETHICONE 2400; 2400; 240 MG/30ML; MG/30ML; MG/30ML
20 SUSPENSION ORAL
Status: DISCONTINUED | OUTPATIENT
Start: 2019-10-16 | End: 2019-10-30 | Stop reason: HOSPADM

## 2019-10-16 RX ORDER — LISINOPRIL 5 MG/1
10 TABLET ORAL
Status: DISCONTINUED | OUTPATIENT
Start: 2019-10-17 | End: 2019-10-19

## 2019-10-16 RX ORDER — GABAPENTIN 300 MG/1
300 CAPSULE ORAL NIGHTLY PRN
Status: CANCELLED | OUTPATIENT
Start: 2019-10-16

## 2019-10-16 RX ORDER — QUETIAPINE FUMARATE 100 MG/1
300 TABLET, FILM COATED ORAL EVERY EVENING
Status: DISCONTINUED | OUTPATIENT
Start: 2019-10-16 | End: 2019-10-16

## 2019-10-16 RX ORDER — ACETAMINOPHEN 325 MG/1
650 TABLET ORAL EVERY 4 HOURS PRN
Status: DISCONTINUED | OUTPATIENT
Start: 2019-10-16 | End: 2019-10-30 | Stop reason: HOSPADM

## 2019-10-16 RX ORDER — LISINOPRIL 10 MG/1
10 TABLET ORAL
Status: CANCELLED | OUTPATIENT
Start: 2019-10-17

## 2019-10-16 RX ORDER — TAMSULOSIN HYDROCHLORIDE 0.4 MG/1
0.4 CAPSULE ORAL
Status: CANCELLED | OUTPATIENT
Start: 2019-10-17

## 2019-10-16 RX ORDER — POLYETHYLENE GLYCOL 3350 17 G/17G
1 POWDER, FOR SOLUTION ORAL DAILY
Status: CANCELLED | OUTPATIENT
Start: 2019-10-17

## 2019-10-16 RX ORDER — HYDROXYZINE 50 MG/1
50 TABLET, FILM COATED ORAL 3 TIMES DAILY PRN
Qty: 30 TAB | Refills: 0 | Status: ON HOLD
Start: 2019-10-16 | End: 2019-10-29 | Stop reason: SDUPTHER

## 2019-10-16 RX ORDER — FOLIC ACID 1 MG/1
1 TABLET ORAL DAILY
Status: DISCONTINUED | OUTPATIENT
Start: 2019-10-17 | End: 2019-10-30 | Stop reason: HOSPADM

## 2019-10-16 RX ORDER — HYDROXYZINE 50 MG/1
50 TABLET, FILM COATED ORAL 3 TIMES DAILY PRN
Status: CANCELLED | OUTPATIENT
Start: 2019-10-16

## 2019-10-16 RX ORDER — OMEPRAZOLE 20 MG/1
20 CAPSULE, DELAYED RELEASE ORAL DAILY
Status: CANCELLED | OUTPATIENT
Start: 2019-10-17

## 2019-10-16 RX ORDER — FOLIC ACID 1 MG/1
1 TABLET ORAL DAILY
Qty: 30 TAB | Refills: 0 | Status: ON HOLD | OUTPATIENT
Start: 2019-10-17 | End: 2019-10-29 | Stop reason: SDUPTHER

## 2019-10-16 RX ORDER — ONDANSETRON 4 MG/1
4 TABLET, ORALLY DISINTEGRATING ORAL EVERY 4 HOURS PRN
Qty: 10 TAB | Refills: 0 | Status: ON HOLD
Start: 2019-10-16 | End: 2019-10-29

## 2019-10-16 RX ORDER — QUETIAPINE FUMARATE 100 MG/1
100 TABLET, FILM COATED ORAL 2 TIMES DAILY
Qty: 60 TAB | Refills: 3 | Status: ON HOLD
Start: 2019-10-16 | End: 2019-10-29

## 2019-10-16 RX ORDER — QUETIAPINE FUMARATE 100 MG/1
100 TABLET, FILM COATED ORAL 2 TIMES DAILY
Status: CANCELLED | OUTPATIENT
Start: 2019-10-16

## 2019-10-16 RX ORDER — LISINOPRIL 10 MG/1
10 TABLET ORAL DAILY
Qty: 30 TAB | Status: ON HOLD
Start: 2019-10-17 | End: 2019-10-29

## 2019-10-16 RX ORDER — CALCIUM CARBONATE 500 MG/1
500 TABLET, CHEWABLE ORAL 3 TIMES DAILY PRN
Status: CANCELLED | OUTPATIENT
Start: 2019-10-16

## 2019-10-16 RX ORDER — ECHINACEA PURPUREA EXTRACT 125 MG
2 TABLET ORAL PRN
Status: DISCONTINUED | OUTPATIENT
Start: 2019-10-16 | End: 2019-10-30 | Stop reason: HOSPADM

## 2019-10-16 RX ORDER — OMEPRAZOLE 20 MG/1
20 CAPSULE, DELAYED RELEASE ORAL DAILY
Qty: 30 CAP
Start: 2019-10-17

## 2019-10-16 RX ORDER — ACETAMINOPHEN 325 MG/1
650 TABLET ORAL EVERY 6 HOURS PRN
Qty: 30 TAB | Refills: 0 | Status: ON HOLD
Start: 2019-10-16 | End: 2019-10-29

## 2019-10-16 RX ORDER — POLYVINYL ALCOHOL 14 MG/ML
1 SOLUTION/ DROPS OPHTHALMIC PRN
Status: DISCONTINUED | OUTPATIENT
Start: 2019-10-16 | End: 2019-10-30 | Stop reason: HOSPADM

## 2019-10-16 RX ORDER — ONDANSETRON 4 MG/1
4 TABLET, ORALLY DISINTEGRATING ORAL 4 TIMES DAILY PRN
Status: DISCONTINUED | OUTPATIENT
Start: 2019-10-16 | End: 2019-10-30 | Stop reason: HOSPADM

## 2019-10-16 RX ORDER — QUETIAPINE FUMARATE 100 MG/1
300 TABLET, FILM COATED ORAL EVERY EVENING
Status: CANCELLED | OUTPATIENT
Start: 2019-10-16

## 2019-10-16 RX ORDER — TAMSULOSIN HYDROCHLORIDE 0.4 MG/1
0.4 CAPSULE ORAL
Status: DISCONTINUED | OUTPATIENT
Start: 2019-10-17 | End: 2019-10-17

## 2019-10-16 RX ORDER — ATENOLOL 25 MG/1
100 TABLET ORAL DAILY
Status: DISCONTINUED | OUTPATIENT
Start: 2019-10-17 | End: 2019-10-30 | Stop reason: HOSPADM

## 2019-10-16 RX ORDER — BISACODYL 10 MG
10 SUPPOSITORY, RECTAL RECTAL
Status: DISCONTINUED | OUTPATIENT
Start: 2019-10-16 | End: 2019-10-30 | Stop reason: HOSPADM

## 2019-10-16 RX ORDER — QUETIAPINE FUMARATE 100 MG/1
100 TABLET, FILM COATED ORAL 3 TIMES DAILY
Status: DISCONTINUED | OUTPATIENT
Start: 2019-10-16 | End: 2019-10-20

## 2019-10-16 RX ORDER — HYDROXYZINE HYDROCHLORIDE 25 MG/1
50 TABLET, FILM COATED ORAL 3 TIMES DAILY PRN
Status: DISCONTINUED | OUTPATIENT
Start: 2019-10-16 | End: 2019-10-30 | Stop reason: HOSPADM

## 2019-10-16 RX ORDER — GABAPENTIN 300 MG/1
300 CAPSULE ORAL NIGHTLY PRN
Qty: 90 CAP | Refills: 0 | Status: ON HOLD | OUTPATIENT
Start: 2019-10-16 | End: 2019-10-29

## 2019-10-16 RX ORDER — ONDANSETRON 2 MG/ML
4 INJECTION INTRAMUSCULAR; INTRAVENOUS 4 TIMES DAILY PRN
Status: DISCONTINUED | OUTPATIENT
Start: 2019-10-16 | End: 2019-10-30 | Stop reason: HOSPADM

## 2019-10-16 RX ORDER — AMOXICILLIN 250 MG
2 CAPSULE ORAL 2 TIMES DAILY
Status: DISCONTINUED | OUTPATIENT
Start: 2019-10-16 | End: 2019-10-30 | Stop reason: HOSPADM

## 2019-10-16 RX ADMIN — OMEPRAZOLE 20 MG: 20 CAPSULE, DELAYED RELEASE ORAL at 06:03

## 2019-10-16 RX ADMIN — TAMSULOSIN HYDROCHLORIDE 0.4 MG: 0.4 CAPSULE ORAL at 09:06

## 2019-10-16 RX ADMIN — RIVAROXABAN 20 MG: 10 TABLET, FILM COATED ORAL at 17:33

## 2019-10-16 RX ADMIN — FOLIC ACID 1 MG: 1 TABLET ORAL at 06:03

## 2019-10-16 RX ADMIN — QUETIAPINE FUMARATE 100 MG: 100 TABLET ORAL at 14:40

## 2019-10-16 RX ADMIN — QUETIAPINE FUMARATE 100 MG: 100 TABLET ORAL at 06:03

## 2019-10-16 RX ADMIN — SENNOSIDES, DOCUSATE SODIUM 2 TABLET: 50; 8.6 TABLET, FILM COATED ORAL at 06:03

## 2019-10-16 RX ADMIN — ACETAMINOPHEN 650 MG: 325 TABLET, FILM COATED ORAL at 06:41

## 2019-10-16 RX ADMIN — ATENOLOL 100 MG: 25 TABLET ORAL at 06:03

## 2019-10-16 RX ADMIN — GABAPENTIN 300 MG: 300 CAPSULE ORAL at 22:50

## 2019-10-16 RX ADMIN — QUETIAPINE FUMARATE 100 MG: 100 TABLET ORAL at 19:48

## 2019-10-16 RX ADMIN — LISINOPRIL 10 MG: 10 TABLET ORAL at 06:03

## 2019-10-16 RX ADMIN — HYDROXYZINE HYDROCHLORIDE 50 MG: 25 TABLET, FILM COATED ORAL at 19:52

## 2019-10-16 RX ADMIN — SENNOSIDES AND DOCUSATE SODIUM 2 TABLET: 8.6; 5 TABLET ORAL at 19:48

## 2019-10-16 RX ADMIN — THERA TABS 1 TABLET: TAB at 06:02

## 2019-10-16 ASSESSMENT — LIFESTYLE VARIABLES
HOW MANY TIMES IN THE PAST YEAR HAVE YOU HAD 5 OR MORE DRINKS IN A DAY: 5
CONSUMPTION TOTAL: POSITIVE
HAVE PEOPLE ANNOYED YOU BY CRITICIZING YOUR DRINKING: YES
EVER HAD A DRINK FIRST THING IN THE MORNING TO STEADY YOUR NERVES TO GET RID OF A HANGOVER: YES
ALCOHOL_USE: YES
TOTAL SCORE: 4
ON A TYPICAL DAY WHEN YOU DRINK ALCOHOL HOW MANY DRINKS DO YOU HAVE: 5
HAVE YOU EVER FELT YOU SHOULD CUT DOWN ON YOUR DRINKING: YES
EVER_SMOKED: YES
TOTAL SCORE: 4
EVER FELT BAD OR GUILTY ABOUT YOUR DRINKING: YES
TOTAL SCORE: 4
AVERAGE NUMBER OF DAYS PER WEEK YOU HAVE A DRINK CONTAINING ALCOHOL: 7

## 2019-10-16 ASSESSMENT — PATIENT HEALTH QUESTIONNAIRE - PHQ9
1. LITTLE INTEREST OR PLEASURE IN DOING THINGS: NOT AT ALL
2. FEELING DOWN, DEPRESSED, IRRITABLE, OR HOPELESS: NOT AT ALL
SUM OF ALL RESPONSES TO PHQ9 QUESTIONS 1 AND 2: 0

## 2019-10-16 NOTE — DISCHARGE PLANNING
Insurance has authorized [facility]. Msg placed to Dr. [id number] medical clearance.  Case is under review by Dr. Rock.

## 2019-10-16 NOTE — DISCHARGE PLANNING
Dr. Richardson has medically cleared.  Dr. Rock has accepted.  Spoke with Cosme and Aga, Mother regarding [facility] as well the need to be able to participate/tolerate 3 hours of TX daily for a minimum of 5 days a week.  They both agreed.  Transport has been arranged for 1100.  Dr. Richardson, Brennen (SW) & Barbara (BSN) are aware.

## 2019-10-16 NOTE — DISCHARGE INSTRUCTIONS
Discharge Instructions    Discharged to other by [facility] with relative. Discharged via ambulance, hospital escort: Yes.  Special equipment needed: Not Applicable    Be sure to schedule a follow-up appointment with your primary care doctor or any specialists as instructed.     Discharge Plan:   Diet Plan: Discussed  Activity Level: Discussed  Confirmed Symptoms Management: Discussed  Medication Reconciliation Updated: Yes  Influenza Vaccine Indication: Patient Refuses    I understand that a diet low in cholesterol, fat, and sodium is recommended for good health. Unless I have been given specific instructions below for another diet, I accept this instruction as my diet prescription.   Other diet: regular    Special Instructions: None    · Is patient discharged on Warfarin / Coumadin?   No     Depression / Suicide Risk    As you are discharged from this [facility], it is important to learn how to keep safe from harming yourself.    Recognize the warning signs:  · Abrupt changes in personality, positive or negative- including increase in energy   · Giving away possessions  · Change in eating patterns- significant weight changes-  positive or negative  · Change in sleeping patterns- unable to sleep or sleeping all the time   · Unwillingness or inability to communicate  · Depression  · Unusual sadness, discouragement and loneliness  · Talk of wanting to die  · Neglect of personal appearance   · Rebelliousness- reckless behavior  · Withdrawal from people/activities they love  · Confusion- inability to concentrate     If you or a loved one observes any of these behaviors or has concerns about self-harm, here's what you can do:  · Talk about it- your feelings and reasons for harming yourself  · Remove any means that you might use to hurt yourself (examples: pills, rope, extension cords, firearm)  · Get professional help from the community (Mental Health, Substance Abuse, psychological counseling)  · Do not be  alone:Call your Safe Contact- someone whom you trust who will be there for you.  · Call your local CRISIS HOTLINE 195-0636 or 863-273-6897  · Call your local Children's Mobile Crisis Response Team [place] (725) 255-7957 or www.[facility]  · Call the toll free National Suicide Prevention Hotlines   · National Suicide Prevention Lifeline 432-424-DQIQ (9079)  · National Hope Line Network 800-SUICIDE (598-2517)      Pulmonary Embolism  A pulmonary embolism (PE) is a sudden blockage or decrease of blood flow in one lung or both lungs. Most blockages come from a blood clot that travels from the legs or the pelvis to the lungs. PE is a dangerous and potentially life-threatening condition if it is not treated right away.  What are the causes?  A pulmonary embolism occurs most commonly when a blood clot travels from one of your veins to your lungs. Rarely, PE is caused by air, fat, amniotic fluid, or part of a tumor traveling through your veins to your lungs.  What increases the risk?  A PE is more likely to develop in:  · People who smoke.  · People who are older, especially over 60 years of age.  · People who are overweight (obese).  · People who sit or lie still for a long time, such as during long-distance travel (over 4 hours), bed rest, hospitalization, or during recovery from certain medical conditions like a stroke.  · People who do not engage in much physical activity (sedentary lifestyle).  · People who have chronic breathing disorders.  · People who have a personal or family history of blood clots or blood clotting disease.  · People who have peripheral vascular disease (PVD), diabetes, or some types of cancer.  · People who have heart disease, especially if the person had a recent heart attack or has congestive heart failure.  · People who have neurological diseases that affect the legs (leg paresis).  · People who have had a traumatic injury, such as breaking a hip or leg.  · People who have recently  had major or lengthy surgery, especially on the hip, knee, or abdomen.  · People who have had a central line placed inside a large vein.  · People who take medicines that contain the hormone estrogen. These include birth control pills and hormone replacement therapy.  · Pregnancy or during childbirth or the postpartum period.  What are the signs or symptoms?  The symptoms of a PE usually start suddenly and include:  · Shortness of breath while active or at rest.  · Coughing or coughing up blood or blood-tinged mucus.  · Chest pain that is often worse with deep breaths.  · Rapid or irregular heartbeat.  · Feeling light-headed or dizzy.  · Fainting.  · Feeling anxious.  · Sweating.  There may also be pain and swelling in a leg if that is where the blood clot started.  These symptoms may represent a serious problem that is an emergency. Do not wait to see if the symptoms will go away. Get medical help right away. Call your local emergency services (911 in the [place] Do not drive yourself to the hospital.   How is this diagnosed?  Your health care provider will take a medical history and perform a physical exam. You may also have other tests, including:  · Blood tests to assess the clotting properties of your blood, assess oxygen levels in your blood, and find blood clots.  · Imaging tests, such as CT, ultrasound, MRI, X-ray, and other tests to see if you have clots anywhere in your body.  · An electrocardiogram (ECG) to look for heart strain from blood clots in the lungs.  How is this treated?  The main goals of PE treatment are:  · To stop a blood clot from growing larger.  · To stop new blood clots from forming.  The type of treatment that you receive depends on many factors, such as the cause of your PE, your risk for bleeding or developing more clots, and other medical conditions that you have. Sometimes, a combination of treatments is necessary.  This condition may be treated with:  · Medicines, including newer  oral blood thinners (anticoagulants), warfarin, low molecular weight heparins, thrombolytics, or heparins.  · Wearing compression stockings or using different types of devices.  · Surgery (rare) to remove the blood clot or to place a filter in your abdomen to stop the blood clot from traveling to your lungs.  Treatments for a PE are often divided into immediate treatment, long-term treatment (up to 3 months after PE), and extended treatment (more than 3 months after PE). Your treatment may continue for several months. This is called maintenance therapy, and it is used to prevent the forming of new blood clots. You can work with your health care provider to choose the treatment program that is best for you.  What are anticoagulants?   Anticoagulants are medicines that treat PEs. They can stop current blood clots from growing and stop new clots from forming. They cannot dissolve existing clots. Your body dissolves clots by itself over time. Anticoagulants are given by mouth, by injection, or through an IV tube.  What are thrombolytics?   Thrombolytics are clot-dissolving medicines that are used to dissolve a PE. They carry a high risk of bleeding, so they tend to be used only in severe cases or if you have very low blood pressure.  Follow these instructions at home:  If you are taking a newer oral anticoagulant:  · Take the medicine every single day at the same time each day.  · Understand what foods and drugs interact with this medicine.  · Understand that there are no regular blood tests required when using this medicine.  · Understand the side effects of this medicine, including excessive bruising or bleeding. Ask your health care provider or pharmacist about other possible side effects.  If you are taking warfarin:  · Understand how to take warfarin and know which foods can affect how warfarin works in your body.  · Understand that it is dangerous to take too much or too little warfarin. Too much warfarin  increases the risk of bleeding. Too little warfarin continues to allow the risk for blood clots.  · Follow your PT and INR blood testing schedule. The PT and INR results allow your health care provider to adjust your dose of warfarin. It is very important that you have your PT and INR tested as often as told by your health care provider.  · Avoid major changes in your diet, or tell your health care provider before you change your diet. Arrange a visit with a registered dietitian to answer your questions. Many foods, especially foods that are high in vitamin K, can interfere with warfarin and affect the PT and INR results. Eat a consistent amount of foods that are high in vitamin K, such as:  ¨ Spinach, kale, broccoli, cabbage, nasreen greens, turnip greens, [place] sprouts, peas, cauliflower, seaweed, and parsley.  ¨ Beef liver and pork liver.  ¨ Green tea.  ¨ Soybean oil.  · Tell your health care provider about any and all medicines, vitamins, and supplements that you take, including aspirin and other over-the-counter anti-inflammatory medicines. Be especially cautious with aspirin and anti-inflammatory medicines. Do not take those before you ask your health care provider if it is safe to do so. This is important because many medicines can interfere with warfarin and affect the PT and INR results.  · Do not start or stop taking any over-the-counter or prescription medicine unless your health care provider or pharmacist tells you to do so.  If you take warfarin, you will also need to do these things:  · Hold pressure over cuts for longer than usual.  · Tell your dentist and other health care providers that you are taking warfarin before you have any procedures in which bleeding may occur.  · Avoid alcohol or drink very small amounts. Tell your health care provider if you change your alcohol intake.  · Do not use tobacco products, including cigarettes, chewing tobacco, and e-cigarettes. If you need help quitting,  ask your health care provider.  · Avoid contact sports.  General instructions  · Take over-the-counter and prescription medicines only as told by your health care provider. Anticoagulant medicines can have side effects, including easy bruising and difficulty stopping bleeding. If you are prescribed an anticoagulant, you will also need to do these things:  ¨ Hold pressure over cuts for longer than usual.  ¨ Tell your dentist and other health care providers that you are taking anticoagulants before you have any procedures in which bleeding may occur.  ¨ Avoid contact sports.  · Wear a medical alert bracelet or carry a medical alert card that says you have had a PE.  · Ask your health care provider how soon you can go back to your normal activities. Stay active to prevent new blood clots from forming.  · Make sure to exercise while traveling or when you have been sitting or standing for a long period of time. It is very important to exercise. Exercise your legs by walking or by tightening and relaxing your leg muscles often. Take frequent walks.  · Wear compression stockings as told by your health care provider to help prevent more blood clots from forming.  · Do not use tobacco products, including cigarettes, chewing tobacco, and e-cigarettes. If you need help quitting, ask your health care provider.  · Keep all follow-up appointments with your health care provider. This is important.  How is this prevented?  Take these actions to decrease your risk of developing another PE:  · Exercise regularly. For at least 30 minutes every day, engage in:  ¨ Activity that involves moving your arms and legs.  ¨ Activity that encourages good blood flow through your body by increasing your heart rate.  · Exercise your arms and legs every hour during long-distance travel (over 4 hours). Drink plenty of water and avoid drinking alcohol while traveling.  · Avoid sitting or lying in bed for long periods of time without moving your  legs.  · Maintain a weight that is appropriate for your height. Ask your health care provider what weight is healthy for you.  · If you are a woman who is over 35 years of age, avoid unnecessary use of medicines that contain estrogen. These include birth control pills.  · Do not smoke, especially if you take estrogen medicines. If you need help quitting, ask your health care provider.  · If you are at very high risk for PE, wear compression stockings.  · If you recently had a PE, have regularly scheduled ultrasound testing on your legs to check for new blood clots.  If you are hospitalized, prevention measures may include:  · Early walking after surgery, as soon as your health care provider says that it is safe.  · Receiving anticoagulants to prevent blood clots. If you cannot take anticoagulants, other options may be available, such as wearing compression stockings or using different types of devices.  Get help right away if:  · You have new or increased pain, swelling, or redness in an arm or leg.  · You have numbness or tingling in an arm or leg.  · You have shortness of breath while active or at rest.  · You have chest pain.  · You have a rapid or irregular heartbeat.  · You feel light-headed or dizzy.  · You cough up blood.  · You notice blood in your vomit, bowel movement, or urine.  · You have a fever.  These symptoms may represent a serious problem that is an emergency. Do not wait to see if the symptoms will go away. Get medical help right away. Call your local emergency services (911 in the [place] Do not drive yourself to the hospital.   This information is not intended to replace advice given to you by your health care provider. Make sure you discuss any questions you have with your health care provider.  Document Released: 12/15/2001 Document Revised: 05/25/2017 Document Reviewed: 04/13/2016  Elsevier Interactive Patient Education © 2017 [employer]

## 2019-10-16 NOTE — PROGRESS NOTES
Patient being discharge to [facility] via wheelchair by transport. Discharge paper work discussed in full with patient and family member. Patient signed and given a copy. Told patient about follow up apts. IV and manzano cath were removed per MD telephone read back order.

## 2019-10-16 NOTE — CARE PLAN
Problem: Safety  Goal: Will remain free from injury  10/16/2019 0057 by Roseanna Ren RCornellN.  Outcome: PROGRESSING AS EXPECTED  Fall risk but mom at the bedside assisting with ADL    Problem: Discharge Barriers/Planning  Goal: Patient's continuum of care needs will be met  10/16/2019 0057 by Roseanna Ren R.N.  Outcome: PROGRESSING AS EXPECTED  10/16/2019 0056 by Roseanna Ren R.N.  Outcome: PROGRESSING SLOWER THAN EXPECTED  Awaiting insurance approval for rehab

## 2019-10-16 NOTE — PROGRESS NOTES
Flat affect sitting up in the chair earlier with his mom at the bedside. No bed alarm on for high fall risk; mom is at bedside and will stay the night to help with adl's.  Has been having tachycardia and MD's are aware, repeat EKG completed; no symptoms at present. Requested to take 100mg of night seroquel dose instead of ordered 300mg dose.  No urge to void after manzano has been clamped for 4 hours this evening; instant urine return through the manzano when unclamped. Mom refused for bladder training through the night so he can sleep. Stated I would clamp the manzano in the morning when his morning medication are due; agreed by patient and mom.  Cont plan of care, call light within reach and visual checks through the night    Rescheduled morning labs till this morning with medications and vital signs. Upset for being in the hospital, not sleeping well due to an uncomfortable bed and us being incompetent per lab and cna.    Clamped manzano at 06am for bladder training

## 2019-10-16 NOTE — PROGRESS NOTES
[facility] Medicine Daily Progress Note    Date of Service  10/15/2019    Chief Complaint  34 y.o. male admitted 9/19/2019 with hallucination    Hospital Course    Past medical history of depression, PTSD, anxiety, substance abuse of mushrooms, nitrous oxide, marijuana, alcohol, paroxysmal A. fib, recent diagnosis of PE May 2019.  He presented with hallucinations and had stopped taking his medications including Xarelto.  He had also fallen multiple times.  He was found hypoxic and CTA showed extensive acute bilateral pulmonary emboli with saddle embolus.  He was admitted to the ICU and felt his risk of intracranial hemorrhage outweighed submassive dose of alteplase or EKOS.  He was started on heparin infusion.  Lower extremity Doppler was negative for DVT.  TTE showed severe right heart strain.  Patient remained stabilized and transitioned to Xarelto.  Psychiatry was consulted for his hallucinations and he was started on Risperdal.  However he developed neurological changes concerning for NMS and Risperdal was stopped and he was given Cogentin with some improvement.  Psychiatry started him on Seroquel and has been titrating medication.  He was hypertensive requiring IV medications so he was started on a lower lower dose of lisinopril and his atenolol was stopped, given his right heart strain.  He developed leukocytosis and fever.  Urine culture grew coag negative staph and he was started on course of Bactrim.  Srivastava catheter was removed but he failed voiding trial and was replaced. He is continued on Flomax.  PT OT evaluated him and recommended SNF.        Interval Problem Update  Patient in chair c/o tachycardia palpitation, dizziness, no chest pain no sob, no new focal weakness, alert and oriented.  Continue having anxiety, no fever or chills.     Consultants/Specialty  Psych  Critical care  pulmonology    Code Status  Full code    Disposition  Tbd.   Needs f/u with pulm and a/c clinic.     Review of Systems  Review  of Systems   Constitutional: Positive for malaise/fatigue. Negative for chills and fever.   HENT: Negative for congestion and sinus pain.    Eyes: Negative for blurred vision.   Respiratory: Negative for cough, hemoptysis, sputum production and shortness of breath.    Cardiovascular: Positive for palpitations. Negative for chest pain, orthopnea and claudication.   Gastrointestinal: Negative for heartburn, nausea and vomiting.   Genitourinary: Negative for dysuria and urgency.   Musculoskeletal: Negative for myalgias and neck pain.   Skin: Negative for itching.   Neurological: Positive for dizziness. Negative for tingling and headaches.   Endo/Heme/Allergies: Does not bruise/bleed easily.   Psychiatric/Behavioral: Negative for depression, substance abuse and suicidal ideas.        Physical Exam  Temp:  [36.2 °C (97.1 °F)-36.7 °C (98.1 °F)] 36.6 °C (97.8 °F)  Pulse:  [103-119] 103  Resp:  [16-18] 18  BP: (114-131)/(69-79) 129/72  SpO2:  [93 %-98 %] 95 %    Physical Exam   Constitutional: He is oriented to person, place, and time. He appears well-nourished. No distress.   HENT:   Head: Normocephalic and atraumatic.   Mouth/Throat: No oropharyngeal exudate.   Eyes: Conjunctivae are normal. Right eye exhibits no discharge. Left eye exhibits no discharge. No scleral icterus.   Neck: Normal range of motion. Neck supple. No JVD present.   Cardiovascular: Regular rhythm. Tachycardia present. Exam reveals no gallop.   Pulmonary/Chest: Effort normal and breath sounds normal. No stridor. No respiratory distress. He has no wheezes. He has no rales.   Abdominal: Soft. Bowel sounds are normal. He exhibits no distension. There is no tenderness. There is no rebound.   Musculoskeletal: Normal range of motion.   Lymphadenopathy:     He has no cervical adenopathy.   Neurological: He is alert and oriented to person, place, and time. He exhibits normal muscle tone.   Skin: Skin is dry. No erythema.   Psychiatric: He has a normal mood and  "affect.   Nursing note and vitals reviewed.      Fluids    Intake/Output Summary (Last 24 hours) at 10/15/2019 2006  Last data filed at 10/15/2019 1643  Gross per 24 hour   Intake 600 ml   Output 3900 ml   Net -3300 ml       Laboratory                        Imaging  DX-CHEST-PORTABLE (1 VIEW)   Final Result         No acute cardiac or pulmonary abnormality is identified.      DX-CHEST-PORTABLE (1 VIEW)   Final Result      Mild lung base atelectasis with edema not excluded.      DX-CHEST-PORTABLE (1 VIEW)   Final Result         1.  Pulmonary edema and/or infiltrates.   2.  Cardiomegaly      MR-BRAIN-W/O   Final Result      1.  No acute intracranial abnormality.   2.  Sphenoid and posterior right ethmoid sinus disease.      US-EXTREMITY VENOUS LOWER BILAT   Final Result      EC-ECHOCARDIOGRAM LTD W/O CONT   Final Result      CT-CTA CHEST PULMONARY ARTERY W/ RECONS   Final Result   Addendum 1 of 1   These findings were discussed with HEATHER MELGOZA on 9/19/2019 2:16 PM.      Final      DX-CHEST-PORTABLE (1 VIEW)   Final Result      No acute cardiopulmonary abnormality.      CT-HEAD W/O   Final Result      No CT evidence of acute infarct, hemorrhage or mass. No acute intracranial injury.           Assessment/Plan  * Acute saddle pulmonary embolism (HCC)- (present on admission)  Assessment & Plan  Recurrent, in the setting of noncompliance with anticoagulation therapy  CTA PE revealing \"Extensive acute bilateral pulmonary emboli with saddle embolus noted, as well as findings concerning for RV strain\"  Recurrence likely secondary to poor compliance of medication  continue Xarelto  Echocardiogram with RV strain, DVT study negative    Acute hypoxemic respiratory failure (HCC)  Assessment & Plan  Secondary acute pulmonary embolism  Wean off oxygen as tolerated  Resolved.    Paroxysmal atrial fibrillation (HCC)- (present on admission)  Assessment & Plan   home dose Tenormin.  Continue Xarelto.  Rate is controlled.  ekg showing " sinus tachy. Continue monitoring added prn metoprolol.     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  MRI brain negative  Legal  Hold lifted by psychiatry  Hallucination resolved.     ETOH abuse- (present on admission)  Assessment & Plan  History of, monitor for withdrawal    Acute cystitis- (present on admission)  Assessment & Plan  Urine culture grew strep epidermidis sensitive to Bactrim.  Leucocytosis resolved   Completed course of antibiotics.    Weakness  Assessment & Plan  I spoke with psychiatry, who notes patient's current weakness has progressed from when he first admitted.  CPK was normal  Psychiatry stopped risperidone, had some improvement with Cogentin  Possible toxicity from the nitrous oxide. per Dr. Byrd  neurology, he agreed with continuing with vitamin B12 supplementation.  He can have EMG testing done as outpatient after 2 weeks of stopping nitrous oxide    Legal hold off  Pending acute inpatient rehab acceptance by insurance  Stable.     Vitamin B12 deficiency  Assessment & Plan  With reported peripheral tingling  Supplement ordered  Continue monitoring    Type 2 diabetes mellitus without complication, without long-term current use of insulin ([facility])  Assessment & Plan  New diagnosis, A1c 6.6%  Diabetes education ordered  F/u bg in am      Slow transit constipation  Assessment & Plan  Daily Senokot and MiraLAX  Continue Bowel regimen      ALLI (obstructive sleep apnea)  Assessment & Plan  History of, pulmonary following    Morbid obesity (HCC)  Assessment & Plan  Outpatient weight loss program would be indicated    Pulmonary hypertension (HCC)- (present on admission)  Assessment & Plan  Known history of, now with recurrent PE  Echocardiogram     Anxiety- (present on admission)  Assessment &  Plan  On Librium previously  Psych consulted.   9/25 risperdal stopped for possible NMS, his anxiety is now increased  Psych adjusting seroquel and cogentin.   Probably causing tachycardia.     Essential hypertension- (present on admission)  Assessment & Plan  Blood pressure has been stable.  Continue monitoring         VTE prophylaxis: xarelto.     Plan of care discussed with nurse staff  Case and plan of care discussed during multidisciplinary rounds.

## 2019-10-16 NOTE — PROGRESS NOTES
Patient admitted to facility at 1150 via wheelchair; accompanied by hospital transport.  Patient assisted to room and positioned in bed for comfort and safety; call light within reach.  Patient assisted with stowing belongings and oriented to room and facility.  Admission assessment performed and documented in computer. Patient received and reviewed education binder. Admission paperwork completed; signed copies placed in chart.  Will continue to monitor.

## 2019-10-16 NOTE — CARE PLAN
Problem: Communication  Goal: The ability to communicate needs accurately and effectively will improve  Outcome: PROGRESSING AS EXPECTED  Intervention: Educate patient and significant other/support system about the plan of care, procedures, treatments, medications and allow for questions  Flowsheets (Taken 10/16/2019 0959)  Pt & Family Have Been Educated on Methods Available to Report Concerns Related to Care, Treatment, Services, and Patient Safety Issues: Yes  Note:   Discussed treatment plan and expected discharge with patient and family.      Problem: Safety  Goal: Will remain free from falls  Outcome: PROGRESSING AS EXPECTED  Intervention: Implement fall precautions  Flowsheets (Taken 10/14/2019 1910 by Heather Osgood, R.N.)  Environmental Precautions: Treaded Slipper Socks on Patient;Personal Belongings, Wastebasket, Call Bell etc. in Easy Reach;Bed in Low Position;Communication Sign for Patients & Families;Mobility Assessed & Appropriate Sign Placed  Note:   Call light within reach. Patient and mother educated to call for assistance.

## 2019-10-16 NOTE — H&P
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 male with a past medical history of paroxysmal atrial fibrillation, hypertension, pulmonary emboli, anxiety with panic attacks, alcohol abuse; now admitted for acute inpatient rehabilitation with severe functional debility after readmission for pulmonary emboli and psychosis secondary to polysubstance abuse.      On admission the patient and medical record report he was diagnosed with acute saddle pulmonary emboli back in May 2019 and was discharged home on Xarelto with outpatient follow up recommended to determine etiology. Patient presented to ED on 9/19/2019 with hallucinations thought secondary to use of cannabis, nitrous oxide, mushrooms.  Patient had CT of the chest which showed recurrent bilateral pulmonary emboli for which he was started on a heparin drip, admitted to the ICU, and eventually transition to Xarelto.  Lower extremity Dopplers were negative for DVT.  He had an echocardiogram which showed severe right heart strain, EF 62%.  He continued to have intermittent tachycardia.    Psychiatry was consulted for his hallucinations and he was initially started on Risperdal.  He developed abnormal movements concerning for NMS, he was given Cogentin with improvement and was switched to Seroquel. He was initially on a legal hold, which has now been removed.     He had weakness and paresthesias which did improve, and was thought secondary to nitrous oxide use, and he was started on B12.     He was treated for a urinary tract infection.  He failed a voiding trial due to acute retention and was started on Flomax. His Srivastava was removed today prior to  transfer, after getting some bladder training/clamping over at acute.    Patient current reports paresthesias in his hands and feet as well as chronic weakness in his feet.  He also reports a long-standing history of anxiety for which he sees an outpatient psychiatrist Dr. Keith.  Patient would like to get off the Seroquel and he reports he has not been taking the higher dose at night. Per review of records and discussion with pharmacy is appears has been getting this dose at night, though both he and his mother insist this is not the case.    For his anxiety previously he had been treated with Lamictal which he had side effects with, was on benzos at some point, and most recently was using hydroxyzine which has been helpful, especially for his sleep. He has been tried on trazodone but he reports it gave him very lucid dreams. He was initially having nightmares when he first came to the acute hospital stay but denies any in the last 4 nights.      He denies any pain.  He denies any abdominal discomfort.  We discussed his elevated ALT and he reports a history of elevated liver enzymes he wonders if from his history of alcohol use.  He reports he has not had any alcohol since his admission back in May.    Patient was evaluated by Rehab Medicine physician and Physical Therapy and Occupational Therapy and determined to be appropriate for acute inpatient rehab and was transferred to [facility] on 10/16/2019.     With this acute therapeutic intervention, this patient hopes to improve his functional status, and return to independent living with the supportive care of family.    REVIEW OF SYSTEMS:     A complete review of systems was performed and was negative in detail with the exception of items mentioned elsewhere in this document.    PMH:  Past Medical History:   Diagnosis Date   • A-fib (HCC)    • Anxiety    • At risk for sleep apnea    • Depression    • ETOH abuse    • Hypertension    • Panic  attack due to exceptional stress        PSH:  No past surgical history on file.   Patient denies any history of surgery.     Family History   Problem Relation Age of Onset   • Diabetes Father    • Hypertension Father    • Alcohol/Drug Brother    • Hypertension Paternal Grandmother         MEDICATIONS:  Current Facility-Administered Medications   Medication Dose   • Respiratory Care per Protocol     • Pharmacy Consult Request ...Pain Management Review 1 Each  1 Each   • acetaminophen (TYLENOL) tablet 650 mg  650 mg   • artificial tears ophthalmic solution 1 Drop  1 Drop   • benzocaine-menthol (CEPACOL) lozenge 1 Lozenge  1 Lozenge   • mag hydrox-al hydrox-simeth (MAALOX PLUS ES or MYLANTA DS) suspension 20 mL  20 mL   • ondansetron (ZOFRAN ODT) dispertab 4 mg  4 mg    Or   • ondansetron (ZOFRAN) syringe/vial injection 4 mg  4 mg   • sodium chloride (OCEAN) 0.65 % nasal spray 2 Spray  2 Spray   • [START ON 10/17/2019] atenolol (TENORMIN) tablet 100 mg  100 mg   • calcium carbonate (TUMS) chewable tab 500 mg  500 mg   • [START ON 10/25/2019] cyanocobalamin (VITAMIN B-12) injection 1,000 mcg  1,000 mcg   • [START ON 10/17/2019] folic acid (FOLVITE) tablet 1 mg  1 mg   • gabapentin (NEURONTIN) capsule 300 mg  300 mg   • hydrOXYzine HCl (ATARAX) tablet 50 mg  50 mg   • [START ON 10/17/2019] lisinopril (PRINIVIL) tablet 10 mg  10 mg   • metoprolol (LOPRESSOR) tablet 12.5 mg  12.5 mg   • [START ON 10/17/2019] multivitamin (THERAGRAN) tablet 1 Tab  1 Tab   • [START ON 10/17/2019] omeprazole (PRILOSEC) capsule 20 mg  20 mg   • QUEtiapine (SEROQUEL) tablet 100 mg  100 mg   • QUEtiapine (SEROQUEL) tablet 300 mg  300 mg   • rivaroxaban (XARELTO) tablet 20 mg  20 mg   • senna-docusate (PERICOLACE or SENOKOT S) 8.6-50 MG per tablet 2 Tab  2 Tab    And   • [START ON 10/17/2019] polyethylene glycol/lytes (MIRALAX) PACKET 1 Packet  1 Packet    And   • magnesium hydroxide (MILK OF MAGNESIA) suspension 30 mL  30 mL    And   • bisacodyl  "(DULCOLAX) suppository 10 mg  10 mg   • [START ON 10/17/2019] tamsulosin (FLOMAX) capsule 0.4 mg  0.4 mg       ALLERGIES:  Cardizem    PSYCHOSOCIAL HISTORY:  Was living home alone by himself in [place].  He has a PhD in organic chemistry with a dissertation and photo electrons.  He was working for the cannabis injury and left his job in April.  He was hired to work for a company in [place], this company was bought out 2 weeks later and then his contract was not filled.      Plan for patient to go home with his mother to live in [facility]. [place], [place]. Ranch style home, single level with 2-3 PAOLA.      Tobacco: previous  Alcohol: previous  Drugs: polysubstance abuse - lewis alan    LEVEL OF FUNCTION PRIOR TO DISABILTY:  Independent    LEVEL OF FUNCTION PRIOR TO ADMISSION to [facility]:  Min assist for mobility and ADLs    CURRENT LEVEL OF FUNCTION:   Same as level of function prior to admission to [facility]    PHYSICAL EXAM:     VITAL SIGNS:   height is 1.727 m (5' 8\") and weight is 121.5 kg (267 lb 13.7 oz). His oral temperature is 36.2 °C (97.2 °F). His blood pressure is 99/65 (abnormal) and his pulse is 99. His respiration is 20.     GENERAL: No apparent distress, obese  HEENT: Normocephalic/atraumatic  CARDIAC: Regular rate and rhythm, normal S1, S2, no murmurs, no peripheral edema   LUNGS: Clear to auscultation, normal respiratory effort, on room air   ABDOMINAL: bowel sounds present, soft, nontender, nondistended and protuberant    EXTREMITIES: no edema or no calf tenderness bilaterally  MSK: No joint swelling  Psych: anxious, easily irritated    NEURO:    Mental status:  A&Ox4 (person, place, date, situation) answers questions appropriately follows commands    Motor:  Shoulder flexors:  Right -  5/5, Left -  5/5  Elbow flexors:  Right -  5/5, Left -  5/5  Elbow extensors:  Right -  5/5, Left -  5/5  Symmetrical   Hip flexors:  Right -  5/5, Left -  " 5/5  Knee ext:  Right -  5/5, Left -  5/5  Dorsiflexors:  Right -  3-4/5, Left -  3-4/5  EHL:  Right -  3/5, Left -  3/5  Plantar flexors:  Right -  4/5, Left -  4/5     Sensory:   intact to light touch through out  Decreased to vibration at bilateral great toes  Impaired to proprioception at bilateral great toes    Coordination:   intact finger to nose bilaterally  intact fine motor with fingers bilaterally    RADIOLOGY:              Results for orders placed during the hospital encounter of 09/19/19   MR-BRAIN-W/O    Impression 1.  No acute intracranial abnormality.  2.  Sphenoid and posterior right ethmoid sinus disease.                                                                                         LABS:      Recent Labs     10/16/19  0843   WBC 11.2*   RBC 5.08   HEMOGLOBIN 15.3   HEMATOCRIT 47.6   MCV 93.7   MCH 30.1   MCHC 32.1*   RDW 58.8*   PLATELETCT 323   MPV 8.5*         PRIMARY REHAB DIAGNOSIS:    This patient is a 34 y.o. 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    IMPAIRMENTS:   Cognitive  ADLs/IADLs  Mobility    SECONDARY DIAGNOSIS/MEDICAL CO-MORBIDITIES AFFECTING FUNCTION:    Subacute combined degeneration of spinal cord  Incomplete paraplegia  Polysubstance abuse  Anxiety disorder  Insomnia  Pulmonary emboli  Paroxysmal atrial fibrillation  Hypertension  Leukocytosis  Urinary retention  Neurogenic bladder  Elevated liver enzymes      RELEVANT CHANGES SINCE PREADMISSION EVALUATION:    Status unchanged    The patient's rehabilitation potential is Good  The patient's medical prognosis is good    PLAN:   Discussion and Recommendations, discussed with the patient and/or family:   1. The patient requires an acute inpatient rehabilitation program with a coordinated program of care at an intensity and frequency not available at a lower level of care. This recommendation is substantiated by the patient's medical physicians who  recommend that the patient's intervention and assessment of medical issues needs to be done at an acute level of care for patient's safety and maximum outcome.     2. A coordinated program of care will be supplied by an interdisciplinary team of physical therapy, occupational therapy, rehab physician, rehab nursing, and, if needed, speech therapy and rehab psychology. Rehab team presents a patient-specific rehabilitation and education program concentrating on prevention of future problems related to accessibility, mobility, skin, bowel, bladder, sexuality, and psychosocial and medical/surgical problems.     3. Need for Rehabilitation Physician: The rehab physician will be evaluating the patient on a multi-weekly basis to help coordinate the program of care. The rehab physician communicates between medical physicians, therapists, and nurses to maximize the patient's potential outcome. Specific areas in which the rehab physician will be providing daily assessment include the following:   A. Assessing the patient's heart rate and blood pressure response (vitals monitoring) to activity and making adjustments in medications or conservative measures as needed.   B. The rehab physician will be assessing the frequency at which the program can be increased to allow the patient to reach optimal functional outcome.   C. The rehab physician will also provide assessments in daily skin care, especially in light of patient's impairments in mobility.   D. The rehab physician will provide special expertise in understanding how to work with functional impairment and recommend appropriate interventions, compensatory techniques, and education that will facilitate the patient's outcome.     4. Rehab R.N.   The rehab RN will be working with patient to carry over in room mobility and activities of daily living when the patient is not in 3 hours of skilled therapy. Rehab nursing will be working in conjunction with rehab physician to  address all the medical issues above and continue to assess laboratory work and discuss abnormalities with the treating physicians, assess vitals, and response to activity, and discuss and report abnormalities with the rehab physician. Rehab RN will also continue daily skin care, supervise bladder/bowel program, instruct in medication administration, and ensure patient safety.     5. Therapies to treat at intensity and frequency of (may change after completion of evaluation by all therapeutic disciplines):       PT:  Physical therapy to address mobility, transfer, gait training and evaluation for adaptive equipment needs 1hour/day at least 5 days/week for the duration of the ELOS (see below)       OT:  Occupational therapy to address ADLs, self-care, home management training, functional mobility/transfers and assistive device evaluation, and community re-integration 1hour/day at least 5 days/week for the duration of the ELOS (see below).        ST/Dysphagia:  Speech therapy to address speech, language, and cognitive deficits as well as swallowing difficulties with retraining/dysphagia management and community re-integration with comprehension, expression, cognitive training 1hour/day at least 5 days/week for the duration of the ELOS (see below).     6. Medical management / Rehabilitation Issues/Adverse Potential affecting function as part of rehabilitation plan.    Subacute combined degeneration of spinal cord  Incomplete paraplegia  From whippit use  Paresthesias  Ankle/foot weakness  Urinary retention  Continue full rehab program  PT/OT/SLP, 1 hr each discipline, 5 days per week    Polysubstance abuse  Anxiety disorder  Insomnia   Consult psychiatry  Titrate down Seroquel, 100/100/300, to 100 TID, per patient request  PRN hydroxyzine  Consult Dr. Chacko, psychology  Bad reactions to Risperdal, Lamictal, trazodone    Acute Urinary retention  Neurogenic bladder  Likely due to posterior column injury  Continue  Flomax  IC for over 400 cc  Replace Srivastava if unable to urinate tonight    Appreciate the assistance of the hospitalist with his medical co-morbidities:    Pulmonary emboli  Paroxysmal atrial fibrillation  Hypertension  Leukocytosis  Elevated liver enzymes    I performed a complete drug regimen review and did not identify any potential clinically significant medication issues.    The patient's CODE STATUS was confirmed as FULL CODE on admission, with the patient and/or family at bedside.    REHABILITATION ISSUES/ADVERSE POTENTIAL:  1.  NTSCI: Patient demonstrates functional deficits in strength, balance, coordination, and ADL's. Patient is admitted to [facility] for comprehensive rehabilitation therapy as described below.   Rehabilitation nursing monitors bowel and bladder control, educates on medication administration, co-morbidities and monitors patient safety.    2.  Neurostimulants: None at this time but continue to assess daily for need to initiate should status change.    3.  DVT prophylaxis:  Patient is on Xarelto for anticoagulation upon transfer. Encourage OOB. Monitor daily for signs and symptoms of DVT including but not limited to swelling and pain to prevent the development of DVT that may interfere with therapies.    4.  Pain: No issues with pain currently / Controlled with as needed oral analgesics.    5.  Nutrition/Dysphagia: Dietician monitors nutrient intake, recommend supplements prn and provide nutrition education to pt/family to promote optimal nutrition for wound healing/recovery.     6.  Bladder/bowel:  Start bowel and bladder program as described below, to prevent constipation, urinary retention (which may lead to UTI), and urinary incontinence (which will impact upon pt's functional independence).   - TV Q3h while awake with post void bladder scans, I&O cath for PVRs >400  - up to commode after meal     7.  Skin/dermal ulcer prophylaxis: Monitor for new skin conditions with  q.2 h. turns as required to prevent the development of skin breakdown.     8.  Cognition/Behavior:  Psychologist Dr. Chacko provides adjustment counseling to illness and psychosocial barriers that may be potential barriers to rehabilitation.     9. Respiratory therapy: RT performs O2 management prn, breathing retraining, pulmonary hygiene and bronchospasm management prn to optimize participation in therapies.    Pt was seen today for 80 min, and entire time spent in face-to-face contact was >50% in counseling and coordination of care as detailed in A/P above.        GOALS/EXPECTED LEVEL OF FUNCTION BASED ON CURRENT MEDICAL AND FUNCTIONAL STATUS (may change based on patient's medical status and rate of impairment recovery):  Transfers:   Modified Independent  Mobility/Gait:   Modified Independent  ADL's:   Modified Independent  Cognition:  Modified Independent    DISPOSITION: Discharge to pre-morbid independent living setting with the supportive care of patient's family.      ELOS: 14 days    Sue Rock M.D.  Physical Medicine and Rehabilitation

## 2019-10-16 NOTE — FLOWSHEET NOTE
10/16/19 1300   Type of Assessment   Assessment Yes   Patient History   Pulmonary Diagnosis Saddle PE   Surgical Procedures no   Home O2 No   Home Treatments/Frequency No   COPD Risk Screening   Do you have a history of COPD? No   Smoking History   Have you ever smoked Yes   Have you smoked in the last 12 months No   Confirm Quit Date 10/16/17   Level Of Consciousness   Level of Consciousness Alert   Respiratory WDL   Respiratory (WDL) X   Chest Exam   Respiration 20   Pulse 97   Breath Sounds   RLL Breath Sounds Diminished   MARILEE Breath Sounds Diminished   LLL Breath Sounds Diminished   Oximetry   #Pulse Oximetry (Single Determination) Yes   Oxygen   Home O2 Use Prior To Admission? No   Pulse Oximetry 96 %   O2 Daily Delivery Respiratory  Room Air with O2 Available

## 2019-10-16 NOTE — CARE PLAN
Problem: Bowel/Gastric:  Goal: Normal bowel function is maintained or improved  Note:   Per report patient is continent of bowels. LBM 10/16/19. Bowel sounds normoactive in all four quadrants.      Problem: Urinary Elimination:  Goal: Ability to reestablish a normal urinary elimination pattern will improve  Note:   Srivastava was discontinued today around 1030 ([facility]).   1525 Pt. was unable to void (he tried standing and in a sitting position)  1530 Pt. was straight cath for 600 ml. UA was collected as ordered. Awaiting results.   1730 Pt. voided 600 ml of yellow urine with PVR 35 ml

## 2019-10-16 NOTE — DISCHARGE SUMMARY
Discharge Summary    CHIEF COMPLAINT ON ADMISSION  Chief Complaint   Patient presents with   • Psych Eval     generalized weakness, sleep deprivation, post drug use, pt is refusing to ambulate.    • Anxiety   • Drug Abuse       Reason for Admission  ems     CODE STATUS  Full Code    HPI & HOSPITAL COURSE    Past medical history of depression, PTSD, anxiety, substance abuse of mushrooms, nitrous oxide, marijuana, alcohol, paroxysmal A. fib, recent diagnosis of PE May 2019.  He presented with hallucinations and had stopped taking his medications including Xarelto.  He had also fallen multiple times.  He was found hypoxic and CTA showed extensive acute bilateral pulmonary emboli with saddle embolus.  He was admitted to the ICU and felt his risk of intracranial hemorrhage outweighed submassive dose of alteplase or EKOS.  He was started on heparin infusion.  Lower extremity Doppler was negative for DVT.  TTE showed severe right heart strain.  Patient remained stabilized and transitioned to Xarelto.  Psychiatry was consulted for his hallucinations and he was started on Risperdal.  However he developed neurological changes concerning for NMS and Risperdal was stopped and he was given Cogentin with some improvement.  Psychiatry started him on Seroquel and has been titrating medication.  He was hypertensive requiring IV medications so he was started on a lower lower dose of lisinopril and his atenolol was stopped, given his right heart strain.  He developed leukocytosis and fever.  Urine culture grew coag negative staph and he was started on course of Bactrim.  Manzano catheter was removed but he failed voiding trial and was replaced. He is continued on Flomax.  PT OT evaluated him and recommended SNF.      bp better controlled restarted on atenolol low dose lisinopril now.   Patient finished antibiotics for UTI, continue on flomax, will try to remove manzano cath before dc to rehab.   Tachycardia sinus, probably multifactorial,  added prn metoprolol, patient is on atenolol for his h/o afib needs to continue on xarelto daily.   Patient today is alert and oriented follows commands, off o2 tolerating diet, patient's family at bedside they expressed understanding of dc plan to rehab and agreed with it all questions answered.     Therefore, he is discharged in good and stable condition to an inpatient rehabilitation hospital.    The patient met 2-midnight criteria for an inpatient stay at the time of discharge.      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) POA: Yes    Slow transit constipation POA: Unknown    Type 2 diabetes mellitus without complication, without long-term current use of insulin (HCC) POA: Unknown    Vitamin B12 deficiency POA: Unknown    Weakness POA: Unknown    Acute cystitis POA: Yes    Essential hypertension POA: Yes    Anxiety POA: Yes    Pulmonary hypertension (HCC) POA: Yes    Morbid obesity (HCC) POA: Unknown    ALLI (obstructive sleep apnea) POA: Unknown  Resolved Problems:    Fever POA: Unknown      FOLLOW UP  Future Appointments   Date Time Provider Department Center   11/7/2019  7:15 AM [facility] EXAM 10 [facility]   12/6/2019  7:40 AM Shira Winkler P.A.-C. [facility] None     No follow-up provider specified.    MEDICATIONS ON DISCHARGE     Medication List      START taking these medications      Instructions   acetaminophen 325 MG Tabs  Commonly known as:  TYLENOL   Take 2 Tabs by mouth every 6 hours as needed (Mild Pain; (Pain scale 1-3); Temp greater than 100.5 F).  Dose:  650 mg     calcium carbonate 500 MG Chew  Commonly known as:  TUMS   Take 1 Tab by mouth 3 times a day  as needed (heartburn).  Dose:  500 mg     cyanocobalamin 1000 MCG Tabs  Commonly known as:  VITAMIN B12   Take 1 Tab by mouth every day.  Dose:  1,000 mcg     folic acid 1 MG Tabs  Start taking on:  10/17/2019  Commonly known as:  FOLVITE   Take 1 Tab by mouth every day.  Dose:  1 mg     gabapentin 300 MG Caps  Commonly known as:  NEURONTIN   Take 1 Cap by mouth at bedtime as needed (insomnia).  Dose:  300 mg     metoprolol 25 MG Tabs  Commonly known as:  LOPRESSOR   Take 0.5 Tabs by mouth every 8 hours as needed (for HR >100).  Dose:  12.5 mg     miconazole 2%-zinc oxide 2 % Crea topical cream   Apply twice a day to groin area     multivitamin Tabs  Start taking on:  10/17/2019   Take 1 Tab by mouth every day.  Dose:  1 Tab     nystatin powder  Commonly known as:  MYCOSTATIN   Apply twice a day to groin area     omeprazole 20 MG delayed-release capsule  Start taking on:  10/17/2019  Commonly known as:  PRILOSEC   Take 1 Cap by mouth every day.  Dose:  20 mg     ondansetron 4 MG Tbdp  Commonly known as:  ZOFRAN ODT   Take 1 Tab by mouth every four hours as needed for Nausea (give PO if no IV route available).  Dose:  4 mg     * QUEtiapine 100 MG Tabs  Commonly known as:  SEROQUEL   Take 1 Tab by mouth 2 Times a Day.  Dose:  100 mg     * quetiapine 300 MG tablet  Commonly known as:  SEROQUEL   Take 1 Tab by mouth every evening.  Dose:  300 mg     tamsulosin 0.4 MG capsule  Commonly known as:  FLOMAX   Take 1 Cap by mouth ONE-HALF HOUR AFTER BREAKFAST.  Dose:  0.4 mg         * This list has 2 medication(s) that are the same as other medications prescribed for you. Read the directions carefully, and ask your doctor or other care provider to review them with you.            CHANGE how you take these medications      Instructions   hydrOXYzine HCl 50 MG Tabs  What changed:    · medication strength  · how much to take  · when to take this  · reasons to take this  Commonly known as:  ATARAX   Take 1 Tab by mouth 3 times a  day as needed for Itching or Anxiety.  Dose:  50 mg     lisinopril 10 MG Tabs  Start taking on:  10/17/2019  What changed:    · medication strength  · how much to take  Commonly known as:  PRINIVIL   Take 1 Tab by mouth every day.  Dose:  10 mg        CONTINUE taking these medications      Instructions   atenolol 100 MG Tabs  Commonly known as:  TENORMIN   Take 100 mg by mouth every day.  Dose:  100 mg     rivaroxaban 20 MG Tabs tablet  Commonly known as:  XARELTO   Take 1 Tab by mouth with dinner.  Dose:  20 mg        STOP taking these medications    cetirizine 10 MG Tabs  Commonly known as:  ZYRTEC     chlordiazePOXIDE 25 MG Caps  Commonly known as:  LIBRIUM            Allergies  Allergies   Allergen Reactions   • Cardizem      Bradycardia in the 30's. Reaction noted on 5/25/19 when admitted for ETOH/hypotension/tachycardia. Pt had atenolol and lisinpril same day as administration.       DIET  Orders Placed This Encounter   Procedures   • Diet Order Regular     Standing Status:   Standing     Number of Occurrences:   1     Order Specific Question:   Diet:     Answer:   Regular [1]       ACTIVITY  As tolerated.  Weight bearing as tolerated    LINES, DRAINS, AND WOUNDS  This is an automated list. Peripheral IVs will be removed prior to discharge.  Peripheral IV 09/19/19 18 G Left Forearm (Active)   Site Assessment Clean;Dry 10/16/2019  9:56 AM   Dressing Type Transparent 10/16/2019  9:56 AM   Line Status Saline locked 10/16/2019  9:56 AM   Dressing Status Clean;Dry 10/16/2019  9:56 AM   Dressing Intervention N/A 10/15/2019  9:00 AM   Date Primary Tubing Changed 10/14/19 10/14/2019  7:10 PM   Infiltration Grading ([facility] 0 10/16/2019  9:56 AM   Phlebitis Scale ([facility] Only) 0 10/16/2019  9:56 AM     Urethral Catheter (Active)   Site Assessment Clean;Skin intact 10/14/2019  7:10 PM   Collection Container Standard drainage bag 10/14/2019  7:10 PM   Urinary Catheter Care Tamper Evident Seal in Place;Drainage Tube  Extended;Drainage Bag Not Overfilled;Drainage Tubing Properly Secured;Drainage Bag Below Bladder Level and Not on Floor;Cath Care Done with Soap & Water 10/14/2019  7:10 PM   Securement Method Securing device (Describe) 10/14/2019  7:10 PM   Output (mL) 1800 mL 10/16/2019  5:50 AM      Moisture Associated Skin Damage (Active)   Drainage Amount None 10/14/2019  7:10 PM   Millicent-wound Assessment Clean;Intact;Fragile;Red 10/14/2019  7:10 PM   Cleansing Approved Wound Cleanser 10/12/2019  8:20 PM   Periwound Protectant Antifungal Therapy 10/14/2019  7:10 PM   NEXT Weekly Photo (Inpatient Only) 10/09/19 10/8/2019  8:00 AM       Peripheral IV 09/19/19 18 G Left Forearm (Active)   Site Assessment Clean;Dry 10/16/2019  9:56 AM   Dressing Type Transparent 10/16/2019  9:56 AM   Line Status Saline locked 10/16/2019  9:56 AM   Dressing Status Clean;Dry 10/16/2019  9:56 AM   Dressing Intervention N/A 10/15/2019  9:00 AM   Date Primary Tubing Changed 10/14/19 10/14/2019  7:10 PM   Infiltration Grading ([facility] [place] 0 10/16/2019  9:56 AM   Phlebitis Scale ([facility] Only) 0 10/16/2019  9:56 AM           Urethral Catheter (Active)   Site Assessment Clean;Skin intact 10/14/2019  7:10 PM   Collection Container Standard drainage bag 10/14/2019  7:10 PM   Urinary Catheter Care Tamper Evident Seal in Place;Drainage Tube Extended;Drainage Bag Not Overfilled;Drainage Tubing Properly Secured;Drainage Bag Below Bladder Level and Not on Floor;Cath Care Done with Soap & Water 10/14/2019  7:10 PM   Securement Method Securing device (Describe) 10/14/2019  7:10 PM   Output (mL) 1800 mL 10/16/2019  5:50 AM        MENTAL STATUS ON TRANSFER  Level of Consciousness: Alert  Orientation : Oriented x 4  Speech: Speech Clear    CONSULTATIONS  pulmonology   psych      PROCEDURES  ekos     LABORATORY  Lab Results   Component Value Date    SODIUM 138 10/12/2019    POTASSIUM 4.2 10/12/2019    CHLORIDE 106 10/12/2019    CO2 22 10/12/2019    GLUCOSE 87 10/12/2019     BUN 11 10/12/2019    CREATININE 0.65 10/12/2019        Lab Results   Component Value Date    WBC 11.2 (H) 10/16/2019    HEMOGLOBIN 15.3 10/16/2019    HEMATOCRIT 47.6 10/16/2019    PLATELETCT 323 10/16/2019        Total time of the discharge process exceeds 40 minutes.

## 2019-10-17 PROBLEM — E55.9 VITAMIN D DEFICIENCY: Status: ACTIVE | Noted: 2019-10-17

## 2019-10-17 PROBLEM — R33.9 URINARY RETENTION: Status: ACTIVE | Noted: 2019-10-17

## 2019-10-17 LAB
25(OH)D3 SERPL-MCNC: 15 NG/ML (ref 30–100)
ALBUMIN SERPL BCP-MCNC: 3.9 G/DL (ref 3.2–4.9)
ALBUMIN/GLOB SERPL: 1.4 G/DL
ALP SERPL-CCNC: 49 U/L (ref 30–99)
ALT SERPL-CCNC: 89 U/L (ref 2–50)
ANION GAP SERPL CALC-SCNC: 12 MMOL/L (ref 0–11.9)
AST SERPL-CCNC: 25 U/L (ref 12–45)
BASOPHILS # BLD AUTO: 0.5 % (ref 0–1.8)
BASOPHILS # BLD: 0.05 K/UL (ref 0–0.12)
BILIRUB SERPL-MCNC: 0.5 MG/DL (ref 0.1–1.5)
BUN SERPL-MCNC: 15 MG/DL (ref 8–22)
CALCIUM SERPL-MCNC: 9.4 MG/DL (ref 8.5–10.5)
CHLORIDE SERPL-SCNC: 102 MMOL/L (ref 96–112)
CO2 SERPL-SCNC: 25 MMOL/L (ref 20–33)
CREAT SERPL-MCNC: 0.7 MG/DL (ref 0.5–1.4)
EOSINOPHIL # BLD AUTO: 0.22 K/UL (ref 0–0.51)
EOSINOPHIL NFR BLD: 2.1 % (ref 0–6.9)
ERYTHROCYTE [DISTWIDTH] IN BLOOD BY AUTOMATED COUNT: 59 FL (ref 35.9–50)
GLOBULIN SER CALC-MCNC: 2.7 G/DL (ref 1.9–3.5)
GLUCOSE BLD-MCNC: 78 MG/DL (ref 65–99)
GLUCOSE BLD-MCNC: 92 MG/DL (ref 65–99)
GLUCOSE SERPL-MCNC: 96 MG/DL (ref 65–99)
HCT VFR BLD AUTO: 46 % (ref 42–52)
HGB BLD-MCNC: 14.6 G/DL (ref 14–18)
IMM GRANULOCYTES # BLD AUTO: 0.06 K/UL (ref 0–0.11)
IMM GRANULOCYTES NFR BLD AUTO: 0.6 % (ref 0–0.9)
LYMPHOCYTES # BLD AUTO: 2.98 K/UL (ref 1–4.8)
LYMPHOCYTES NFR BLD: 27.9 % (ref 22–41)
MAGNESIUM SERPL-MCNC: 2 MG/DL (ref 1.5–2.5)
MCH RBC QN AUTO: 30.2 PG (ref 27–33)
MCHC RBC AUTO-ENTMCNC: 31.7 G/DL (ref 33.7–35.3)
MCV RBC AUTO: 95 FL (ref 81.4–97.8)
MONOCYTES # BLD AUTO: 0.96 K/UL (ref 0–0.85)
MONOCYTES NFR BLD AUTO: 9 % (ref 0–13.4)
NEUTROPHILS # BLD AUTO: 6.41 K/UL (ref 1.82–7.42)
NEUTROPHILS NFR BLD: 59.9 % (ref 44–72)
NRBC # BLD AUTO: 0 K/UL
NRBC BLD-RTO: 0 /100 WBC
NT-PROBNP SERPL IA-MCNC: 11 PG/ML (ref 0–125)
PLATELET # BLD AUTO: 288 K/UL (ref 164–446)
PMV BLD AUTO: 8.5 FL (ref 9–12.9)
POTASSIUM SERPL-SCNC: 4.3 MMOL/L (ref 3.6–5.5)
PROT SERPL-MCNC: 6.6 G/DL (ref 6–8.2)
RBC # BLD AUTO: 4.84 M/UL (ref 4.7–6.1)
SODIUM SERPL-SCNC: 139 MMOL/L (ref 135–145)
WBC # BLD AUTO: 10.7 K/UL (ref 4.8–10.8)

## 2019-10-17 PROCEDURE — 97535 SELF CARE MNGMENT TRAINING: CPT

## 2019-10-17 PROCEDURE — 85025 COMPLETE CBC W/AUTO DIFF WBC: CPT

## 2019-10-17 PROCEDURE — 770010 HCHG ROOM/CARE - REHAB SEMI PRIVAT*

## 2019-10-17 PROCEDURE — 97162 PT EVAL MOD COMPLEX 30 MIN: CPT

## 2019-10-17 PROCEDURE — 97166 OT EVAL MOD COMPLEX 45 MIN: CPT

## 2019-10-17 PROCEDURE — 82962 GLUCOSE BLOOD TEST: CPT

## 2019-10-17 PROCEDURE — A9270 NON-COVERED ITEM OR SERVICE: HCPCS | Performed by: HOSPITALIST

## 2019-10-17 PROCEDURE — 36415 COLL VENOUS BLD VENIPUNCTURE: CPT

## 2019-10-17 PROCEDURE — 90833 PSYTX W PT W E/M 30 MIN: CPT | Performed by: PSYCHIATRY & NEUROLOGY

## 2019-10-17 PROCEDURE — 82306 VITAMIN D 25 HYDROXY: CPT

## 2019-10-17 PROCEDURE — 83735 ASSAY OF MAGNESIUM: CPT

## 2019-10-17 PROCEDURE — 99223 1ST HOSP IP/OBS HIGH 75: CPT | Mod: GC | Performed by: PSYCHIATRY & NEUROLOGY

## 2019-10-17 PROCEDURE — 80053 COMPREHEN METABOLIC PANEL: CPT

## 2019-10-17 PROCEDURE — 700102 HCHG RX REV CODE 250 W/ 637 OVERRIDE(OP): Performed by: PHYSICAL MEDICINE & REHABILITATION

## 2019-10-17 PROCEDURE — 83880 ASSAY OF NATRIURETIC PEPTIDE: CPT

## 2019-10-17 PROCEDURE — A9270 NON-COVERED ITEM OR SERVICE: HCPCS | Performed by: PHYSICAL MEDICINE & REHABILITATION

## 2019-10-17 PROCEDURE — 99222 1ST HOSP IP/OBS MODERATE 55: CPT | Performed by: HOSPITALIST

## 2019-10-17 PROCEDURE — 97530 THERAPEUTIC ACTIVITIES: CPT

## 2019-10-17 PROCEDURE — 99233 SBSQ HOSP IP/OBS HIGH 50: CPT | Performed by: PHYSICAL MEDICINE & REHABILITATION

## 2019-10-17 PROCEDURE — 700102 HCHG RX REV CODE 250 W/ 637 OVERRIDE(OP): Performed by: HOSPITALIST

## 2019-10-17 PROCEDURE — 92523 SPEECH SOUND LANG COMPREHEN: CPT

## 2019-10-17 RX ORDER — LANOLIN ALCOHOL/MO/W.PET/CERES
3 CREAM (GRAM) TOPICAL
Status: DISCONTINUED | OUTPATIENT
Start: 2019-10-17 | End: 2019-10-30 | Stop reason: HOSPADM

## 2019-10-17 RX ORDER — TAMSULOSIN HYDROCHLORIDE 0.4 MG/1
0.4 CAPSULE ORAL
Status: DISCONTINUED | OUTPATIENT
Start: 2019-10-18 | End: 2019-10-18

## 2019-10-17 RX ORDER — THIAMINE MONONITRATE (VIT B1) 100 MG
100 TABLET ORAL DAILY
Status: DISCONTINUED | OUTPATIENT
Start: 2019-10-17 | End: 2019-10-30 | Stop reason: HOSPADM

## 2019-10-17 RX ADMIN — ATENOLOL 100 MG: 25 TABLET ORAL at 07:39

## 2019-10-17 RX ADMIN — RIVAROXABAN 20 MG: 10 TABLET, FILM COATED ORAL at 17:21

## 2019-10-17 RX ADMIN — QUETIAPINE FUMARATE 100 MG: 100 TABLET ORAL at 07:40

## 2019-10-17 RX ADMIN — THERA TABS 1 TABLET: TAB at 07:40

## 2019-10-17 RX ADMIN — SENNOSIDES AND DOCUSATE SODIUM 2 TABLET: 8.6; 5 TABLET ORAL at 20:33

## 2019-10-17 RX ADMIN — MELATONIN 3 MG: at 20:33

## 2019-10-17 RX ADMIN — TAMSULOSIN HYDROCHLORIDE 0.4 MG: 0.4 CAPSULE ORAL at 07:38

## 2019-10-17 RX ADMIN — VITAMIN D, TAB 1000IU (100/BT) 4000 UNITS: 25 TAB at 09:37

## 2019-10-17 RX ADMIN — QUETIAPINE FUMARATE 100 MG: 100 TABLET ORAL at 20:33

## 2019-10-17 RX ADMIN — QUETIAPINE FUMARATE 100 MG: 100 TABLET ORAL at 14:46

## 2019-10-17 RX ADMIN — SENNOSIDES AND DOCUSATE SODIUM 2 TABLET: 8.6; 5 TABLET ORAL at 07:40

## 2019-10-17 RX ADMIN — OMEPRAZOLE 20 MG: 20 CAPSULE, DELAYED RELEASE ORAL at 07:38

## 2019-10-17 RX ADMIN — FOLIC ACID 1 MG: 1 TABLET ORAL at 07:38

## 2019-10-17 RX ADMIN — Medication 100 MG: at 11:36

## 2019-10-17 ASSESSMENT — CHA2DS2 SCORE
AGE 65 TO 74: NO
DIABETES: YES
CHA2DS2 VASC SCORE: 4
HYPERTENSION: YES
CHF OR LEFT VENTRICULAR DYSFUNCTION: NO
VASCULAR DISEASE: NO
SEX: MALE
AGE 75 OR GREATER: NO
PRIOR STROKE OR TIA OR THROMBOEMBOLISM: YES

## 2019-10-17 ASSESSMENT — BRIEF INTERVIEW FOR MENTAL STATUS (BIMS)
BIMS SUMMARY SCORE: 15
WHAT DAY OF THE WEEK IS IT: CORRECT
ASKED TO RECALL BLUE: YES, NO CUE REQUIRED
ASKED TO RECALL SOCK: YES, NO CUE REQUIRED
ASKED TO RECALL BED: YES, NO CUE REQUIRED
INITIAL REPETITION OF BED BLUE SOCK - FIRST ATTEMPT: 3
WHAT YEAR IS IT: CORRECT
WHAT MONTH IS IT: ACCURATE WITHIN 5 DAYS

## 2019-10-17 ASSESSMENT — ENCOUNTER SYMPTOMS
COUGH: 0
CHILLS: 0
EYES NEGATIVE: 1
PALPITATIONS: 0
SHORTNESS OF BREATH: 0
FEVER: 0
VOMITING: 0
NAUSEA: 0
MUSCULOSKELETAL NEGATIVE: 1
ABDOMINAL PAIN: 0
FOCAL WEAKNESS: 1

## 2019-10-17 ASSESSMENT — ACTIVITIES OF DAILY LIVING (ADL): TOILETING: INDEPENDENT

## 2019-10-17 NOTE — CONSULTS
HOSPITAL MEDICINE CONSULTATION    Requesting Physician:  Dr. Rock    Reason for Consult:  PE, Tachycardia    History of Present Illness:  The patient is a 34-year-old  male with past medical history significant for bilateral pulmonary emboli (diagnosed in May 2019, status post low dose TPA, and placed on Xarelto), suspected sleep apnea, paroxysmal atrial fibrillation, hypertension, diabetes, anxiety disorder, polysubstance abuse (including alcohol, tetrahydrocannabinol, and mushrooms), and medical noncompliance.  He was admitted to [facility] on 9/19/19 for psychosis with hallucinations thought to be secondary to use of cannabis, nitrous oxide, and mushrooms.  The patient was also hypoxic at the time and found to have extensive bilateral pulmonary emboli with right heart strain.  He was placed on a Heparin drip.  Additionally, he had urinary retention and was treated with Bactrim for coagulase negative staphylococcus urinary tract infection.  Due to his ongoing functional debility, the patient was transferred to [facility] on 10/16/19.  Hospital Medicine consultation is requested to assist in the management of this patient's PE and tachycardia.  He currently denies chest pain, shortness of breath, or palpitations.    Review of Systems:  Review of Systems   Constitutional: Negative for chills and fever.   HENT: Negative.    Eyes: Negative.    Respiratory: Negative for cough and shortness of breath.    Cardiovascular: Negative for chest pain and palpitations.   Gastrointestinal: Negative for abdominal pain, nausea and vomiting.   Genitourinary:        Urinary retention   Musculoskeletal: Negative.    Skin: Negative for itching and rash.   Neurological: Positive for focal weakness.       Allergies:  Allergies   Allergen Reactions   • Cardizem      Bradycardia in the 30's. Reaction noted on 5/25/19 when admitted for ETOH/hypotension/tachycardia. Pt had atenolol and  lisinpril same day as administration.       Medications:    Current Facility-Administered Medications:   •  vitamin D (cholecalciferol) tablet 4,000 Units, 4,000 Units, Oral, DAILY, Sue Rock M.D., 4,000 Units at 10/17/19 0937  •  melatonin tablet 3 mg, 3 mg, Oral, QHS, Sue Rock M.D.  •  thiamine tablet 100 mg, 100 mg, Oral, DAILY, Noemy Merida M.D., 100 mg at 10/17/19 1136  •  insulin regular (HUMULIN R) injection 2-12 Units, 2-12 Units, Subcutaneous, 4X/DAY ACHS, Noemy Merida M.D.  •  [START ON 10/18/2019] tamsulosin (FLOMAX) capsule 0.4 mg, 0.4 mg, Oral, QHS, Noemy Merida M.D.  •  Respiratory Care per Protocol, , Nebulization, Continuous RT, Sue Rock M.D.  •  Pharmacy Consult Request ...Pain Management Review 1 Each, 1 Each, Other, PHARMACY TO DOSE, Sue Rock M.D.  •  acetaminophen (TYLENOL) tablet 650 mg, 650 mg, Oral, Q4HRS PRN, Sue Rock M.D.  •  artificial tears ophthalmic solution 1 Drop, 1 Drop, Both Eyes, PRN, Sue Rock M.D.  •  benzocaine-menthol (CEPACOL) lozenge 1 Lozenge, 1 Lozenge, Mouth/Throat, Q2HRS PRN, Sue Rock M.D.  •  mag hydrox-al hydrox-simeth (MAALOX PLUS ES or MYLANTA DS) suspension 20 mL, 20 mL, Oral, Q2HRS PRN, Sue Rock M.D.  •  ondansetron (ZOFRAN ODT) dispertab 4 mg, 4 mg, Oral, 4X/DAY PRN **OR** ondansetron (ZOFRAN) syringe/vial injection 4 mg, 4 mg, Intramuscular, 4X/DAY PRN, Sue Rock M.D.  •  sodium chloride (OCEAN) 0.65 % nasal spray 2 Spray, 2 Spray, Nasal, PRN, Sue Rock M.D.  •  atenolol (TENORMIN) tablet 100 mg, 100 mg, Oral, DAILY, Sue Rock M.D., 100 mg at 10/17/19 0739  •  calcium carbonate (TUMS) chewable tab 500 mg, 500 mg, Oral, TID PRN, Sue Rock M.D.  •  [START ON 10/25/2019] cyanocobalamin (VITAMIN B-12) injection 1,000 mcg, 1,000 mcg, Intramuscular, Q30 DAYS, Sue Rock M.D.  •  folic acid (FOLVITE) tablet 1 mg, 1 mg, Oral, DAILY, Sue Rock M.D., 1 mg at  10/17/19 0738  •  gabapentin (NEURONTIN) capsule 300 mg, 300 mg, Oral, HS PRN, Sue Rock M.D., 300 mg at 10/16/19 2250  •  hydrOXYzine HCl (ATARAX) tablet 50 mg, 50 mg, Oral, TID PRN, Sue Rock M.D., 50 mg at 10/16/19 1952  •  lisinopril (PRINIVIL) tablet 10 mg, 10 mg, Oral, Q DAY, Sue Rock M.D., Stopped at 10/17/19 0554  •  metoprolol (LOPRESSOR) tablet 12.5 mg, 12.5 mg, Oral, Q8HRS PRN, Sue Rock M.D.  •  multivitamin (THERAGRAN) tablet 1 Tab, 1 Tab, Oral, DAILY, Sue Rock M.D., 1 Tab at 10/17/19 0740  •  omeprazole (PRILOSEC) capsule 20 mg, 20 mg, Oral, DAILY, Sue Rock M.D., 20 mg at 10/17/19 0738  •  rivaroxaban (XARELTO) tablet 20 mg, 20 mg, Oral, PM MEAL, Sue Rock M.D., 20 mg at 10/16/19 1733  •  senna-docusate (PERICOLACE or SENOKOT S) 8.6-50 MG per tablet 2 Tab, 2 Tab, Oral, BID, 2 Tab at 10/17/19 0740 **AND** polyethylene glycol/lytes (MIRALAX) PACKET 1 Packet, 1 Packet, Oral, DAILY **AND** magnesium hydroxide (MILK OF MAGNESIA) suspension 30 mL, 30 mL, Oral, QDAY PRN **AND** bisacodyl (DULCOLAX) suppository 10 mg, 10 mg, Rectal, QDAY PRN, Sue Rock M.D.  •  QUEtiapine (SEROQUEL) tablet 100 mg, 100 mg, Oral, TID, Sue Rock M.D., 100 mg at 10/17/19 0740    Past Medical/Surgical History:  Past Medical History:   Diagnosis Date   • A-fib (HCC)    • Anxiety    • At risk for sleep apnea    • Depression    • ETOH abuse    • Hypertension    • Panic attack due to exceptional stress      History reviewed. No pertinent surgical history.    Social History:  Social History     Socioeconomic History   • Marital status: Single     Spouse name: Not on file   • Number of children: Not on file   • Years of education: Not on file   • Highest education level: Not on file   Occupational History   • Not on file   Social Needs   • Financial resource strain: Not on file   • Food insecurity:     Worry: Not on file     Inability: Not on file   •  Transportation needs:     Medical: Not on file     Non-medical: Not on file   Tobacco Use   • Smoking status: Former Smoker   • Smokeless tobacco: Never Used   Substance and Sexual Activity   • Alcohol use: Yes     Comment: quit 2 days ago. former drinker x 12 yrs   • Drug use: Yes     Types: Inhaled     Comment: marijuana   • Sexual activity: Not on file   Lifestyle   • Physical activity:     Days per week: Not on file     Minutes per session: Not on file   • Stress: Not on file   Relationships   • Social connections:     Talks on phone: Not on file     Gets together: Not on file     Attends Christian service: Not on file     Active member of club or organization: Not on file     Attends meetings of clubs or organizations: Not on file     Relationship status: Not on file   • Intimate partner violence:     Fear of current or ex partner: Not on file     Emotionally abused: Not on file     Physically abused: Not on file     Forced sexual activity: Not on file   Other Topics Concern   • Not on file   Social History Narrative   • Not on file       Family History  Family History   Problem Relation Age of Onset   • Diabetes Father    • Hypertension Father    • Alcohol/Drug Brother    • Hypertension Paternal Grandmother        Physical Examination:   Vitals:    10/16/19 1850 10/17/19 0700 10/17/19 0701 10/17/19 0739   BP: 115/78 131/88     Pulse: (!) 103 (!) 112 (!) 116 (!) 112   Resp: 20 18     Temp: 36.7 °C (98.1 °F) 36.4 °C (97.5 °F)     TempSrc: Oral Oral     SpO2: 93% 93%     Weight:       Height:           Physical Exam   Constitutional: He is oriented to person, place, and time. No distress.   HENT:   Head: Normocephalic and atraumatic.   Right Ear: External ear normal.   Left Ear: External ear normal.   Eyes: Conjunctivae and EOM are normal. Right eye exhibits no discharge. Left eye exhibits no discharge.   Neck: Normal range of motion. Neck supple. No tracheal deviation present.   Cardiovascular: Normal rate and  regular rhythm.   Pulmonary/Chest: Effort normal and breath sounds normal. No stridor. No respiratory distress. He has no wheezes.   Abdominal: Soft. Bowel sounds are normal. He exhibits no distension. There is no tenderness.   obese   Musculoskeletal: He exhibits no edema or tenderness.   Neurological: He is alert and oriented to person, place, and time.   Skin: Skin is warm and dry. He is not diaphoretic.   Vitals reviewed.      Laboratory Data:  Recent Labs     10/16/19  0843 10/17/19  0521   WBC 11.2* 10.7   RBC 5.08 4.84   HEMOGLOBIN 15.3 14.6   HEMATOCRIT 47.6 46.0   MCV 93.7 95.0   MCH 30.1 30.2   MCHC 32.1* 31.7*   RDW 58.8* 59.0*   PLATELETCT 323 288   MPV 8.5* 8.5*     Recent Labs     10/17/19  0521   SODIUM 139   POTASSIUM 4.3   CHLORIDE 102   CO2 25   GLUCOSE 96   BUN 15   CREATININE 0.70   CALCIUM 9.4       Imaging:  No orders to display       Impressions/Recommendations:  Paroxysmal atrial fibrillation (HCC)  Suspect 2/2 PE  On Atenolol for rate control  Anticoagulated on Xarelto    Acute saddle pulmonary embolism (HCC)  Has recurrent bilat PE  Echo w/ right heart strain (persistent vs recurrent)  Anticoagulated on Xarelto    ETOH abuse  Add Thiamine to MVT and Folate    Type 2 diabetes mellitus without complication, without long-term current use of insulin (HCC)  HbA1c 6.6  Start FSBS and SSI  Observe serum glucose trends    Anxiety  Management per Primary Team  Psychiatry consult pending    ALLI (obstructive sleep apnea)  RT protocol  Suggest outpt PSG if not yet done    Essential hypertension  On Atenolol and Lisinopril  Observe blood pressure trends    Vitamin D deficiency  Vit D level 15  On supplementation    Urinary retention  Will change Flomax from qam to qhs dosing to minimize diurnal orthostatic events    Full Code    Thank you for the opportunity to assist in this patient's care.  We will continue to follow along with you.

## 2019-10-17 NOTE — DISCHARGE PLANNING
CASE MANAGEMENT INITIAL ASSESSMENT    Admit Date:  10/16/2019     I met with patient and his mother to discuss role of case management / discharge planning / team conference.   Patient is a  34 y.o. male transferred from [facility]; was inpatient 9/19 to 10/16/2019. Patient participated in assessment, providing appropriate answers. Mother provided some information as well.  Patient was admitted to [facility] on 10/16/2019 with severe functional debility after readmission for pulmonary emboli and psychosis secondary to polysubstance abuse.   .  The admitting physician is Dr. Sue Rock.     Diagnosis: 10-9 other pulmonary  Pulmonary emboli (HCC)  Anxiety    Co-morbidities:   Patient Active Problem List    Diagnosis Date Noted   • Acute hypoxemic respiratory failure ([facility]) 09/20/2019     Priority: High   • Paroxysmal atrial fibrillation ([facility]) 05/26/2019     Priority: High   • Acute saddle pulmonary embolism ([facility]) 05/26/2019     Priority: High   • Substance-induced psychotic disorder with hallucinations ([facility]) 09/19/2019     Priority: Medium   • ETOH abuse 05/26/2019     Priority: Medium   • Transaminitis 05/26/2019     Priority: Medium   • Electrolyte abnormality 05/26/2019     Priority: Medium   • Morbid obesity ([facility]) 09/20/2019     Priority: Low   • ALLI (obstructive sleep apnea) 09/20/2019     Priority: Low   • Pulmonary hypertension ([facility]) 05/28/2019     Priority: Low   • Anxiety 05/27/2019     Priority: Low   • Essential hypertension 05/26/2019     Priority: Low   • LAZARO (acute kidney injury) ([facility]) 05/26/2019     Priority: Low   • Vitamin D deficiency 10/17/2019   • Urinary retention 10/17/2019   • Subacute combined degeneration of spinal cord ([facility]) 10/16/2019   • Acute cystitis 10/01/2019   • Vitamin B12 deficiency 09/25/2019   • Weakness 09/25/2019   • Slow transit constipation 09/24/2019   • Type 2 diabetes mellitus without complication, without long-term current use of insulin ([facility]) 09/24/2019    • Anticoagulated by anticoagulation treatment 06/06/2019     Prior Living Situation:  Housing / Facility: 1 Story House, Mobile Home  Lives with - Patient's Self Care Capacity: (The patient was living alone but plans to DC to parents hous)    Prior Level of Function:  Medication Management: Independent  Finances: Independent  Home Management: Independent  Shopping: Independent  Prior Level Of Mobility: Independent Without Device in Community  Driving / Transportation: Driving Independent    Support Systems:  Primary : Aga Cabrera, Mother, [phone]    Previous Services Utilized:   Equipment Owned: None  Prior Services: Home-Independent    Other Information:  Occupation (Pre-Hospital Vocational): Not Employed     Primary Payor Source: Private Insurance([employer])  Secondary Payor Source: Other (Comments)([employer])  Primary Care Practitioner : Dr. Torsten Garcia, [place], [place]  Other MDs: Dr. Hou, Pulmonary; Dr. Linares, Psychiatry    Patient / Family Goal:  Patient / Family Goal: To get out of the hospital.   DC Disposition: Plan is for patient to travel by car or plane to [place], [place], to live with his mother in her single level home. Patient was living in [place]; rented a mother-in-laws quarter and also has a 35' camper. Patient has a cat and dog. Mother is here from [place] [facility] to support patient in his recovery and get [place] back [place]. [place] going [place]. Mother stated she wishes to travel to [place] as soon as possible, with patient, after his discharge. They may be staying in his camper for a brief period which has stair access.  New PPC in  [place] - Dr. Torsten Garcia [phone]. Pending reacceptance of patient to his practice. Mother doesn't anticipate any barriers to reestablishing with Dr. Garcia.  DC Needs: Anticipate OP therapies. MD f/u appointments to include PCP, psychiatry, pulmonology. DME TBD. Patient has no DME.  Strengths: Patient seems  motivated to improve. Supportive mother.    Additional Case Management Questions:  Have you ever received case management services for yourself or a family member? no    Do you feel you have and an understanding of what services  provide? yes    Do you have any additional questions regarding case management?  no        CASE MANAGEMENT PLAN OF CARE   Individualized Goals:   1. Improve functional and cognitive status to function independently, with intermittent oversight.  2. Be able to travel for return [place]  3. [place]iatric care to be established in [place]    Barriers:   1. PMH - paroxysmal atrial fibrillation, hypertension, pulmonary emboli, anxiety with panic attacks, alcohol abuse  2. Returning to [place]. F/U specialty care to be arranged, hopefully prior to discharge. It will be helpful if patient was established with psychiatry. If not, f/u will be more challenging to arrange.     Plan:  1. Continue to follow patient through hospitalization and provide discharge planning in collaboration with patient, family, physicians and ancillary services.     2. Utilize community resources to ensure a safe discharge.

## 2019-10-17 NOTE — ASSESSMENT & PLAN NOTE
HbA1c 6.6  FSBS usually under 100, so discontinued SSI  Encourage diet and lifestyle modifications

## 2019-10-17 NOTE — PROGRESS NOTES
"Rehab Progress Note     Date of Service: 10/17/2019  Chief Complaint: anxiety    Interval Events (Subjective)    Patient seen and examined in his room today. He reports he had trouble falling asleep last night, but then slept OK. He did OK on the lower dose of seroquel. Psychiatry to come over today to do further titration.    Patient was able to urinate after the first straight cath. UA was negative.     He was then seen working with PT in the therapy gym.     He has no new complaints.     Objective:  VITAL SIGNS: /88   Pulse (!) 112   Temp 36.4 °C (97.5 °F) (Oral)   Resp 18   Ht 1.727 m (5' 8\")   Wt 121.5 kg (267 lb 13.7 oz)   SpO2 93%   BMI 40.73 kg/m²   Gen: alert, no apparent distress, obese  CV: regular rate and rhythm, no murmurs, no peripheral edema  Resp: clear to ascultation bilaterally, normal respiratory effort  GI: soft, non-tender abdomen, bowel sounds present  Neuro: notable for bilateral ankle weakness    Recent Results (from the past 72 hour(s))   EKG    Collection Time: 10/14/19  3:17 PM   Result Value Ref Range    Report       [facility]    Test Date:  2019-10-14  Pt Name:    JANA LYNN              Department: 61  MRN:        [id number]                      Room:       S634  Gender:     Male                         Technician: PAULINO  :        1985                   Requested By:ERMIAS KAPLAN  Order #:    [id number]                    Reading MD: Oswaldo Triana MD    Measurements  Intervals                                Axis  Rate:       111                          P:          54  ND:         156                          QRS:        -16  QRSD:       84                           T:          55  QT:         328  QTc:        446    Interpretive Statements  SINUS TACHYCARDIA  BORDERLINE LEFT AXIS DEVIATION  MINIMAL ST ELEVATION, INFERIOR LEADS  Compared to ECG 2019 11:30:11  Sinus rhythm no longer present      Electronically Signed On 10- 15:51:01 PDT by Oswaldo" EMEKA Triana MD     LACTIC ACID    Collection Time: 10/14/19  8:37 PM   Result Value Ref Range    Lactic Acid 2.5 (H) 0.5 - 2.0 mmol/L   EKG    Collection Time: 10/15/19  7:15 PM   Result Value Ref Range    Report       [facility]    Test Date:  2019-10-15  Pt Name:    JANA LYNN              Department: 61  MRN:        [id number]                      Room:       [facility]  Gender:     Male                         Technician: TXM  :        1985                   Requested By:DENISSE CONTRERAS  Order #:    [id number]                    Reading MD: Marija Quan    Measurements  Intervals                                Axis  Rate:       111                          P:          15  CA:         133                          QRS:        32  QRSD:       90                           T:          25  QT:         336  QTc:        457    Interpretive Statements  SINUS TACHYCARDIA  BASELINE WANDERING  Delayed R wave progression  Electronically Signed On 10- 19:36:30 PDT by Marija Quan     CBC WITH DIFFERENTIAL    Collection Time: 10/16/19  8:43 AM   Result Value Ref Range    WBC 11.2 (H) 4.8 - 10.8 K/uL    RBC 5.08 4.70 - 6.10 M/uL    Hemoglobin 15.3 14.0 - 18.0 g/dL    Hematocrit 47.6 42.0 - 52.0 %    MCV 93.7 81.4 - 97.8 fL    MCH 30.1 27.0 - 33.0 pg    MCHC 32.1 (L) 33.7 - 35.3 g/dL    RDW 58.8 (H) 35.9 - 50.0 fL    Platelet Count 323 164 - 446 K/uL    MPV 8.5 (L) 9.0 - 12.9 fL    Neutrophils-Polys 69.00 44.00 - 72.00 %    Lymphocytes 23.20 22.00 - 41.00 %    Monocytes 5.40 0.00 - 13.40 %    Eosinophils 1.70 0.00 - 6.90 %    Basophils 0.30 0.00 - 1.80 %    Immature Granulocytes 0.40 0.00 - 0.90 %    Nucleated RBC 0.00 /100 WBC    Neutrophils (Absolute) 7.74 (H) 1.82 - 7.42 K/uL    Lymphs (Absolute) 2.60 1.00 - 4.80 K/uL    Monos (Absolute) 0.61 0.00 - 0.85 K/uL    Eos (Absolute) 0.19 0.00 - 0.51 K/uL    Baso (Absolute) 0.03 0.00 - 0.12 K/uL    Immature Granulocytes (abs) 0.05 0.00 - 0.11 K/uL    NRBC  (Absolute) 0.00 K/uL   MAGNESIUM    Collection Time: 10/16/19  8:43 AM   Result Value Ref Range    Magnesium 1.7 1.5 - 2.5 mg/dL   PHOSPHORUS    Collection Time: 10/16/19  8:43 AM   Result Value Ref Range    Phosphorus 3.6 2.5 - 4.5 mg/dL   VITAMIN B12    Collection Time: 10/16/19  8:43 AM   Result Value Ref Range    Vitamin B12 -True Cobalamin 181 (L) 211 - 911 pg/mL   URINALYSIS    Collection Time: 10/16/19  3:26 PM   Result Value Ref Range    Color Yellow     Character Clear     Specific Gravity 1.012 <1.035    Ph 6.0 5.0 - 8.0    Glucose Negative Negative mg/dL    Ketones Negative Negative mg/dL    Protein Negative Negative mg/dL    Bilirubin Negative Negative    Urobilinogen, Urine 0.2 Negative    Nitrite Negative Negative    Leukocyte Esterase Negative Negative    Occult Blood Negative Negative    Micro Urine Req see below    CBC with Differential    Collection Time: 10/17/19  5:21 AM   Result Value Ref Range    WBC 10.7 4.8 - 10.8 K/uL    RBC 4.84 4.70 - 6.10 M/uL    Hemoglobin 14.6 14.0 - 18.0 g/dL    Hematocrit 46.0 42.0 - 52.0 %    MCV 95.0 81.4 - 97.8 fL    MCH 30.2 27.0 - 33.0 pg    MCHC 31.7 (L) 33.7 - 35.3 g/dL    RDW 59.0 (H) 35.9 - 50.0 fL    Platelet Count 288 164 - 446 K/uL    MPV 8.5 (L) 9.0 - 12.9 fL    Neutrophils-Polys 59.90 44.00 - 72.00 %    Lymphocytes 27.90 22.00 - 41.00 %    Monocytes 9.00 0.00 - 13.40 %    Eosinophils 2.10 0.00 - 6.90 %    Basophils 0.50 0.00 - 1.80 %    Immature Granulocytes 0.60 0.00 - 0.90 %    Nucleated RBC 0.00 /100 WBC    Neutrophils (Absolute) 6.41 1.82 - 7.42 K/uL    Lymphs (Absolute) 2.98 1.00 - 4.80 K/uL    Monos (Absolute) 0.96 (H) 0.00 - 0.85 K/uL    Eos (Absolute) 0.22 0.00 - 0.51 K/uL    Baso (Absolute) 0.05 0.00 - 0.12 K/uL    Immature Granulocytes (abs) 0.06 0.00 - 0.11 K/uL    NRBC (Absolute) 0.00 K/uL   Comp Metabolic Panel (CMP)    Collection Time: 10/17/19  5:21 AM   Result Value Ref Range    Sodium 139 135 - 145 mmol/L    Potassium 4.3 3.6 - 5.5  mmol/L    Chloride 102 96 - 112 mmol/L    Co2 25 20 - 33 mmol/L    Anion Gap 12.0 (H) 0.0 - 11.9    Glucose 96 65 - 99 mg/dL    Bun 15 8 - 22 mg/dL    Creatinine 0.70 0.50 - 1.40 mg/dL    Calcium 9.4 8.5 - 10.5 mg/dL    AST(SGOT) 25 12 - 45 U/L    ALT(SGPT) 89 (H) 2 - 50 U/L    Alkaline Phosphatase 49 30 - 99 U/L    Total Bilirubin 0.5 0.1 - 1.5 mg/dL    Albumin 3.9 3.2 - 4.9 g/dL    Total Protein 6.6 6.0 - 8.2 g/dL    Globulin 2.7 1.9 - 3.5 g/dL    A-G Ratio 1.4 g/dL   Magnesium    Collection Time: 10/17/19  5:21 AM   Result Value Ref Range    Magnesium 2.0 1.5 - 2.5 mg/dL   Vitamin D, 25-hydroxy (blood)    Collection Time: 10/17/19  5:21 AM   Result Value Ref Range    25-Hydroxy   Vitamin D 25 15 (L) 30 - 100 ng/mL   proBrain Natriuretic Peptide, NT    Collection Time: 10/17/19  5:21 AM   Result Value Ref Range    NT-proBNP 11 0 - 125 pg/mL   ESTIMATED GFR    Collection Time: 10/17/19  5:21 AM   Result Value Ref Range    GFR If African American >60 >60 mL/min/1.73 m 2    GFR If Non African American >60 >60 mL/min/1.73 m 2       Current Facility-Administered Medications   Medication Frequency   • vitamin D (cholecalciferol) tablet 4,000 Units DAILY   • melatonin tablet 3 mg QHS   • thiamine tablet 100 mg DAILY   • insulin regular (HUMULIN R) injection 2-12 Units 4X/DAY ACHS   • [START ON 10/18/2019] tamsulosin (FLOMAX) capsule 0.4 mg QHS   • Respiratory Care per Protocol Continuous RT   • Pharmacy Consult Request ...Pain Management Review 1 Each PHARMACY TO DOSE   • acetaminophen (TYLENOL) tablet 650 mg Q4HRS PRN   • artificial tears ophthalmic solution 1 Drop PRN   • benzocaine-menthol (CEPACOL) lozenge 1 Lozenge Q2HRS PRN   • mag hydrox-al hydrox-simeth (MAALOX PLUS ES or MYLANTA DS) suspension 20 mL Q2HRS PRN   • ondansetron (ZOFRAN ODT) dispertab 4 mg 4X/DAY PRN    Or   • ondansetron (ZOFRAN) syringe/vial injection 4 mg 4X/DAY PRN   • sodium chloride (OCEAN) 0.65 % nasal spray 2 Spray PRN   • atenolol  (TENORMIN) tablet 100 mg DAILY   • calcium carbonate (TUMS) chewable tab 500 mg TID PRN   • [START ON 10/25/2019] cyanocobalamin (VITAMIN B-12) injection 1,000 mcg Q30 DAYS   • folic acid (FOLVITE) tablet 1 mg DAILY   • gabapentin (NEURONTIN) capsule 300 mg HS PRN   • hydrOXYzine HCl (ATARAX) tablet 50 mg TID PRN   • lisinopril (PRINIVIL) tablet 10 mg Q DAY   • metoprolol (LOPRESSOR) tablet 12.5 mg Q8HRS PRN   • multivitamin (THERAGRAN) tablet 1 Tab DAILY   • omeprazole (PRILOSEC) capsule 20 mg DAILY   • rivaroxaban (XARELTO) tablet 20 mg PM MEAL   • senna-docusate (PERICOLACE or SENOKOT S) 8.6-50 MG per tablet 2 Tab BID    And   • polyethylene glycol/lytes (MIRALAX) PACKET 1 Packet DAILY    And   • magnesium hydroxide (MILK OF MAGNESIA) suspension 30 mL QDAY PRN    And   • bisacodyl (DULCOLAX) suppository 10 mg QDAY PRN   • QUEtiapine (SEROQUEL) tablet 100 mg TID       Orders Placed This Encounter   Procedures   • Diet Order Regular     Standing Status:   Standing     Number of Occurrences:   1     Order Specific Question:   Diet:     Answer:   Regular [1]       Assessment:  Active Hospital Problems    Diagnosis   • *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    Medical Decision Making and Plan:    Subacute combined degeneration of spinal  cord  Incomplete paraplegia  From whippit use  Paresthesias  Ankle/foot weakness  Urinary retention, improved  Continue full rehab program  PT/OT/SLP, 1 hr each discipline, 5 days per week     Polysubstance abuse  Anxiety disorder  Insomnia   Consult psychiatry, they will see patient today  Titrate down Seroquel, 100/100/300, to 100 TID, per patient request  PRN hydroxyzine  Schedule melatonin  Consult Dr. Chacko, psychology  Bad reactions to Risperdal, Lamictal, trazodone     Acute Urinary retention, improved  Neurogenic bladder  Likely due to posterior column injury  Continue Flomax  IC for over 400 cc  Replace Srivastava if unable to unable to urinate  Continue to monitor PVRs    Bowel  Meds as needed  Last BM 10/16    DVT  Xarelto    Appreciate the assistance of the hospitalist with his medical co-morbidities:     Pulmonary emboli  Paroxysmal atrial fibrillation  Hypertension  Leukocytosis, resolved  Elevated liver enzymes, improved  Vit D deficiency    Total time:  35 minutes.  I spent greater than 50% of the time for patient care, counseling, and coordination on this date, including patient face-to face time, unit/floor time with review of records/pertinent lab data and studies, as well as discussing diagnostic evaluation/work up, planned therapeutic interventions, and future disposition of care, as per the interval events/subjective and the assessment and plan as noted above.      Sue Rock M.D.   Physical Medicine and Rehabilitation

## 2019-10-17 NOTE — PROGRESS NOTES
Patient care assumed. Report received from day RN. Patient is alert and calm, resting in bed/chair with family at bedside. Call light and bedside table within reach.

## 2019-10-17 NOTE — THERAPY
10/17/19 1129   Prior Living Situation   Prior Services Home-Independent;None   Housing / Facility 1 Story House   Steps Into Home 2   Steps In Home 0   Rail None   Elevator No   Bathroom Set up Walk In Shower   Equipment Owned None   Lives with - Patient's Self Care Capacity   (The patient was living alone but plans to DC to parents hous)   IRF-ISREAL:  Prior Functioning: Everyday Activities   Self Care Independent   Indoor Mobility (Ambulation) Independent   Stairs Independent   Functional Cognition Independent   Prior Device Use None of the given options   Pain 0 - 10 Group   Therapist Pain Assessment 0   Cognition    Level of Consciousness Alert   Passive ROM Lower Body   Passive ROM Lower Body WDL   Active ROM Lower Body    Active ROM Lower Body  X   Lt Ankle Dorsiflexion Degrees 0   Rt Ankle Dorsiflexion Degrees 0   Strength Lower Body   Lower Body Strength  X   Rt Ankle Dorsiflexion Strength 3- (F-)   Lt Ankle Dorsiflexion Strength 3- (F-)   Sensation Lower Body   Lower Extremity Sensation   WDL   Lower Body Muscle Tone   Lower Body Muscle Tone  Not Tested   Balance Assessment   Sitting Balance (Static) Fair +   Sitting Balance (Dynamic) Fair +   Standing Balance (Static) Poor +   Standing Balance (Dynamic) Poor +   Weight Shift Sitting Good   Weight Shift Standing Poor   Bed Mobility    Supine to Sit Stand by Assist   Sit to Supine Stand by Assist   Sit to Stand Contact Guard Assist   Scooting Stand by Assist   Rolling Modified Independent   Neurological Concerns   Neurological Concerns No   Coordination Lower Body    Coordination Lower Body  X   Other Right Impaired   Other Left Impaired   IRF-ISREAL:  Roll Left and Right   Assistance Needed Independent   Physical Assistance Level No physical assistance or only touching/steadying assist   CARE Score 6   Discharge Goal:  Assistance Needed Independent   Discharge Goal:  Physical Assistance Level No physical assistance or only touching/steadying assist    Discharge Goal:  Score 6   IRF-ISREAL:  Sit to Lying   Assistance Needed Supervision;Adaptive equipment   Physical Assistance Level No physical assistance or only touching/steadying assist   CARE Score 4   Discharge Goal:  Assistance Needed Independent   Discharge Goal:  Physical Assistance Level No physical assistance or only touching/steadying assist   Discharge Goal:  Score 6   IRF-ISREAL:  Lying to Sitting on Side of Bed   Assistance Needed Supervision;Adaptive equipment   Physical Assistance Level No physical assistance or only touching/steadying assist   CARE Score 4   Discharge Goal:  Assistance Needed Independent   Discharge Goal:  Physical Assistance Level No physical assistance or only touching/steadying assist   Discharge Goal:  Score 6   IRF-ISREAL:  Sit to Stand   Assistance Needed Incidental touching;Verbal cues;Adaptive equipment   Physical Assistance Level Less than 25%   CARE Score 3   Discharge Goal:  Assistance Needed Independent;Adaptive equipment   Discharge Goal:  Physical Assistance Level No physical assistance or only touching/steadying assist   Discahrge Goal:  Score 6   IRF-ISREAL:  Chair/Bed-to-Chair Transfer   Assistance Needed Incidental touching;Verbal cues;Adaptive equipment   Physical Assistance Level Less than 25%   CARE Score 3   Discharge Goal:  Assistance Needed Independent;Adaptive equipment   Discharge Goal:  Physical Assistance Level No physical assistance or only touching/steadying assist   Discharge Goal:  Score 6   IRF-ISREAL:  Toilet Transfer   Reason if not Attempted Activity not applicable   CARE Score 9   Discharge Goal:  Assistance Needed Independent;Adaptive equipment   Discharge Goal:  Physical Assistance Level No physical assistance or only touching/steadying assist   Discahrge Goal:  Score 6   IRF-ISREAL:  Car Transfer   Reason if not Attempted Environmental limitations   CARE Score 10   Discharge Goal:  Assistance Needed Independent;Adaptive equipment   Discharge Goal:  Physical  Assistance Level No physical assistance or only touching/steadying assist   Discharge Goal:  Score 6   IRF ISREAL:  Walking   Does the Patient Walk? Yes   IRF ISREAL:  Walk 10 Feet   Assistance Needed Incidental touching;Verbal cues;Adaptive equipment   Physical Assistance Level Less than 25%   CARE Score 3   Discharge Goal:  Assistance Needed Independent;Adaptive equipment   Discharge Goal:  Physical Assistance Level No physical assistance or only touching/steadying assist   Discharge Goal:  Score 6   IRF-ISREAL:  Walk 50 Feet with Two Turns   Assistance Needed Incidental touching;Verbal cues;Adaptive equipment   Physical Assistance Level Less than 25%   CARE Score 3   Discharge Goal:  Assistance Needed Independent;Adaptive equipment   Discharge Goal:  Physical Assistance Level No physical assistance or only touching/steadying assist   Discharge Goal:  Score 6   IRF-ISREAL:  Walk 150 Feet   Reason if not Attempted Safety concerns   CARE Score 88   Discharge Goal:  Assistance Needed Independent;Adaptive equipment   Discharge Goal:  Physical Assistance Level No physical assistance or only touching/steadying assist   Discharge Goal:  Score 6   IRF ISREAL:  Walking 10 Feet on Uneven Surfaces   Reason if not Attempted Safety concerns   CARE Score 88   Discharge Goal:  Assistance Needed Independent;Adaptive equipment   Discharge Goal:  Physical Assistance Level No physical assistance or only touching/steadying assist   Discharge Goal:  Score 6   IRF ISREAL:  1 Step (Curb)   Assistance Needed Incidental touching;Verbal cues;Adaptive equipment   Physical Assistance Level Less than 25%   CARE Score 3   Discharge Goal:  Assistance Needed Independent;Adaptive equipment   Discharge Goal:  Physical Assistance Level No physical assistance or only touching/steadying assist   Discharge Goal:  Score 6   IRF-ISREAL:  4 Steps   Assistance Needed Incidental touching;Verbal cues;Adaptive equipment   Physical Assistance Level Less than 25%   CARE Score 3    Discharge Goal:  Assistance Needed Independent;Adaptive equipment   Discharge Goal:  Physical Assistance Level No physical assistance or only touching/steadying assist   Discharge Goal:  Score 6   IRF ISREAL:  12 Steps   Reason if not Attempted Safety concerns   CARE Score 88   Discharge Goal:  Assistance Needed Independent;Adaptive equipment   Discharge Goal:  Physical Assistance Level No physical assistance or only touching/steadying assist   Discharge Goal:  Score 6   IRF ISREAL:  Picking Up Object   Reason if not Attempted Safety concerns   CARE Score 88   Discharge Goal:  Assistance Needed Independent;Adaptive equipment   Discharge Goal:  Physical Assistance Level No physical assistance or only touching/steadying assist   Discharge Goal:  Score 6   IRF-ISREAL:  Wheel 50 Feet with Two Turns   Indicate the Type of Wheelchair or Scooter Used Manual   Assistance Needed Supervision;Incidental touching   Physical Assistance Level Less than 25%   CARE Score 3   Discharge Goal:  Assistance Needed Independent   Discharge Goal:  Physical Assistance Level No physical assistance or only touching/steadying assist   Discharge Goal:  Score 6   IRF-ISREAL:  Wheel 150 Feet   Indicate the Type of Wheelchair or Scooter Used Manual   Assistance Needed Incidental touching;Supervision   Physical Assistance Level Less than 25%   CARE Score 3   Discharge Goal:  Assistance Needed Independent   Discharge Goal:  Physical Assistance Level No physical assistance or only touching/steadying assist   Discharge Goal:  Score 6   Problem List    Problems Impaired Bed Mobility;Impaired Transfers;Impaired Ambulation;Functional ROM Deficit;Functional Strength Deficit;Impaired Balance;Impaired Coordination;Decreased Activity Tolerance   Precautions   Precautions Fall Risk   Current Discharge Plan   Current Discharge Plan Temporarily go to Family /  Friend's Home  (Discharge to parents' home)   Interdisciplinary Plan of Care Collaboration   IDT Collaboration  with  Occupational Therapist   Patient Position at End of Therapy Seated;Family / Friend in Room   Collaboration Comments Current level of function, patient's family member may supervise bed/wheelchair/toilet transfers   Benefit   Therapy Benefit Patient Would Benefit from Inpatient Rehabilitation Physical Therapy to Maximize Functional [place] with ADLs, IADLs and Mobility.   PT Total Time Spent   PT Individual Total Time Spent (Mins) 60   PT Charge Group   Charges Yes   PT Therapeutic Activities 1   PT Evaluation PT Evaluation Mod   Physical Therapy   Initial Evaluation     Patient Name: Cosme Cabrera  Age:  34 y.o., Sex:  male  Medical Record #: [id number]  Today's Date: 10/17/2019     Subjective    The patient agreed to PT evaluation     Objective    The patient participated in the PT evaluation for almost all of the components.  A few of them were not done for safety considerations.    FIM Bed/Chair/Wheelchair Transfers Score: 5 - Standby Prompting/Supervision or Set-up  Bed/Chair/Wheelchair Transfers Description:  Increased time, Supervision for safety, Adaptive equipment(UE support bedrails or FWW)    FIM Walking Score:  2 - Max Assistance  Walking Description:  Extra time, Supervision for safety, Verbal cueing, Walker, Safety concerns( FT FWW, CGA gait belt, lower extremity instability)    FIM Wheelchair Score:  5 - Standby Prompting/Supervision or Set-up  Wheelchair Description:  Extra time    FIM Stairs Score:  2 - Max Assistance  Stairs Description:  Extra time, Verbal cueing, Supervision for safety, Ascends/descends 4 to 11 steps, Hand rails(Up forward down backward, step-to pattern)    Assessment  Patient is 34 y.o. male with a diagnosis of pulmonary emboli, anxiety, psychosis due to polysubstance abuse, intermittent tachycardia.  Additional factors influencing patient status / progress (ie: cognitive factors, co-morbidities, social support, etc): Impaired bed mobility, transfers, gait,  functional strength, balance and coordination, fall risk.      Plan  Recommend Physical Therapy  minutes per day 5-7 days per week for 3 weeks for the following treatments:  PT Gait Training, PT Therapeutic Exercises, PT Neuro Re-Ed/Balance, PT Therapeutic Activity and PT Evaluation.    Goals:  Long term and short term goals have been discussed with patient and they are in agreement.    Physical Therapy Problems     Problem: Mobility     Dates: Start: 10/17/19       Description:     Goal: STG-Within one week, patient will ascend and descend four to six stairs     Dates: Start: 10/17/19       Description: 1) Individualized goal: Up/down 4-6 stairs, bilateral handrails CGA 1 week  2) Interventions:  PT Gait Training, PT Therapeutic Exercises, PT Neuro Re-Ed/Balance and PT Therapeutic Activity             Goal: STG-Within one week, patient will     Dates: Start: 10/17/19       Description: 1) Individualized goal: Gait fww  FT, 1 week  2) Interventions:  PT Gait Training, PT Therapeutic Exercises, PT Neuro Re-Ed/Balance and PT Therapeutic Activity                   Problem: Mobility Transfers     Dates: Start: 10/17/19       Description:     Goal: STG-Within one week, patient will transfer bed to chair     Dates: Start: 10/17/19       Description: 1) Individualized goal: Transfer bed to/from chair FWW or bedrails supervision 1 week  2) Interventions:  PT Gait Training, PT Therapeutic Exercises, PT Neuro Re-Ed/Balance and PT Therapeutic Activity                   Problem: PT-Long Term Goals     Dates: Start: 10/17/19       Description:     Goal: LTG-By discharge, patient will ambulate     Dates: Start: 10/17/19       Description: 1) Individualized goal: Gait fww/spc 300-400 FT modified independent at discharge  2) Interventions:  PT Gait Training, PT Therapeutic Exercises, PT Neuro Re-Ed/Balance and PT Therapeutic Activity             Goal: LTG-By discharge, patient will transfer one surface to another      Dates: Start: 10/17/19       Description: 1) Individualized goal: Transfer one surface to another FWW/SPC modified independent at discharge  2) Interventions:  PT Gait Training, PT Therapeutic Exercises, PT Neuro Re-Ed/Balance and PT Therapeutic Activity             Goal: LTG-By discharge, patient will ambulate up/down 4-6 stairs     Dates: Start: 10/17/19       Description: 1) Individualized goal: Up/down 4-6 stairs modified independent at discharge  2) Interventions:  PT Gait Training, PT Therapeutic Exercises, PT Neuro Re-Ed/Balance and PT Therapeutic Activity             Goal: LTG-By discharge, patient will transfer in/out of a car     Dates: Start: 10/17/19       Description: 1) Individualized goal: Car transfer FWW/SPC modified independent at discharge  2) Interventions:  PT Gait Training, PT Therapeutic Exercises, PT Neuro Re-Ed/Balance and PT Therapeutic Activity

## 2019-10-17 NOTE — THERAPY
"Speech Language Pathology   Initial Assessment     Patient Name: Cosme Cabrera  AGE:  34 y.o., SEX:  male  Medical Record #: [id number]  Today's Date: 10/17/2019     Subjective    Patient reluctant to participate.  \" I don't know why I have to do this\".  But did participate with cues and encouragement.  Patient refused to complete an executive function task. \"This makes me anxious, this is a trick test.  I'm done.\" Declined services.     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)   Receptive Language / Auditory Comprehension   Receptive Language / Auditory Comprehension WDL   Expressive Language   Expressive Language (WDL) WDL   Reading Comprehension    Reading Comprehension (WDL) WDL   Cognition   Cognitive-Linguistic (WDL) X   Moderate Attention Supervision (5)   Complex Attention Minimal (4)   Orientation  Within Functional Limits (6-7)   Prospective Memory Supervision (5)   Insight into Deficits Minimal (4)   Planning / Decision Making Moderate (3)   Executive Functioning / Organization Minimal (4)   IRF-ISREAL:  Swallowing/Nutritional Status   Swallowing/Nutritional Status Regular food   Outcome Measures   Outcome Measures Utilized SCCAN   SCCAN (Scales of Cognitive and Communicative Ability for Neurorehabilitation)   Oral Expression - Raw Score 19   Oral Expression - Scale Performance Score 100   Orientation - Raw Score 12   Orientation - Scale Performance Score 100   Memory - Raw Score 18 "   Memory - Scale Performance Score 95   Speech Comprehension - Raw Score 13   Speech Comprehension - Scale Performance Score 100   Reading Comprehension - Raw Score 12   Reading Comprehension - Scale Performance Score 100   Writing - Raw Score 7   Writing - Scale Performance Score 100   Attention - Raw Score 15   Attention - Scale Performance Score 94   Problem Solving - Raw Score 22   Problem Solving - Scale Performance Score 96   SCCAN Total Raw Score 92   SCCAN Degree of Severity Typical Functioning   Problem List   Problem List Attention Deficit;Executive Function Deficit   Current Discharge Plan   Current Discharge Plan Temporarily go to Family /  Friend's Home   Benefit   Therapy Benefit Patient would benefit from Inpatient Rehab Speech-Language Pathology to address above identified deficits.   Speech Language Pathologist Assigned   Assigned SLP / Pager # D/C ST   SLP Total Time Spent   SLP Individual Total Time Spent (Mins) 60   SLP Charge Group   Charges Yes   SLP Speech Language Evaluation Speech Sound Language Comprehension       FIM Eating Score:     Eating Description:       FIM Comprehension Score:  6 - Modified Independent  Comprehension Description:  (extra time)    FIM Expression Score:  7 - Independent  Expression Description:       FIM Social Interaction Score:  5 - Stand-by Prompting/Supervision or Set-up  Social Interaction Description:  Increased time, Verbal cues    FIM Problem Solving Score:  6 - Modified Independent  Problem Solving Description:  Verbal cueing, Therapy schedule, Increased time    FIM Memory Score:  5 - Standby Prompting/Supervision or Set-up  Memory Description:  Medication, Therapy schedule, Bed/chair alarm    Assessment    Patient is 34 y.o. male with a diagnosis of functional debility after readmission for PE's, subacute degeneration of spinal cord, incomplete paraplegia. Patient had previously reported some change in mentation per physician report.  Additional factors  "influencing patient status/progress (ie: cognitive factors, co-morbidities, social support, etc): Anxiety with history of panic attacks and potential psychosocial barriers, history of substance abuse. 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 He plans to temporarily return home with his mother upon discharge from this facility.     Patient completed SCCAN with a raw total score of 92 out of a maximum score of 94, characteristic of typical function.  He described missing 2 items \" because I wasn't paying attention\".  And reported a history of ADD.  Attempted administration of executive function non standardized task.  Patient did not perform anticipatory planning elements of task initially.  When he realized that this would impact his performance he refused to continue.   He was able to verbally identify task challenges and a partial plan to improve out come but did not fully complete.  Patient demonstrates mild executive function impairment and could benefit from intervention.  However, he reports that this makes him too anxious and was not receptive to ST intervention services.    Plan  D/C ST services per patient's desire.  Recommend reassessment if cognitive linguisitic deficits persist and patient desires to persue intervention.      "

## 2019-10-17 NOTE — CARE PLAN
Problem: Communication  Goal: The ability to communicate needs accurately and effectively will improve  Outcome: PROGRESSING AS EXPECTED  Note:   Patient care assumed. Report received from day RN. Patient is alert and calm, resting in bed/chair with family at bedside. Call light and bedside table within reach.      Problem: Safety  Goal: Will remain free from injury  Outcome: PROGRESSING AS EXPECTED  Note:   Pt uses call light consistently and appropriately. Waits for assistance does not attempt self transfer this shift. Able to verbalize needs.   Goal: Will remain free from falls  Note:         Problem: Bowel/Gastric:  Goal: Normal bowel function is maintained or improved  Outcome: PROGRESSING AS EXPECTED  Note:   Patient having regular bowel movements; last BM 10/15.  Denies s/s constipation; bowel meds available if needed.  Will continue to monitor.

## 2019-10-17 NOTE — CARE PLAN
Problem: Safety  Goal: Will remain free from injury  Outcome: PROGRESSING AS EXPECTED  Note:   Pt uses call light consistently and appropriately. Waits for assistance does not attempt self transfer this shift. Able to verbalize needs.      Problem: Urinary Elimination:  Goal: Ability to reestablish a normal urinary elimination pattern will improve  Outcome: PROGRESSING AS EXPECTED  Note:   Pt is voiding moderate to large amounts of clear yellow urine at the toilet. Pt did refuse bladder scan today.

## 2019-10-17 NOTE — ASSESSMENT & PLAN NOTE
Has recurrent bilat PE  Echo w/ right heart strain (persistent vs recurrent)  Anticoagulated on Xarelto

## 2019-10-17 NOTE — THERAPY
"Occupational Therapy   Initial Evaluation     Patient Name: Cosme Cabrera  Age:  34 y.o., Sex:  male  Medical Record #: [id number]  Today's Date: 10/17/2019     Subjective    Patient received up in w/c, agreeable to OT evaluation     Objective       10/17/19 0701   Prior Living Situation   Prior Services Home-Independent   Housing / Facility 1 Story House   Steps Into Home 2   Steps In Home 0   Rail None   Elevator No   Bathroom Set up Walk In Shower  (has both walk-in and tub shower with glass doors)   Equipment Owned   (built in bench in walk in shower)   Lives with - Patient's Self Care Capacity   (pt was living alone prior, plans to d/c to parent's house)   Comments Pt plans to d/c to parents' house in [place], info is based on parents' home setup   Prior Level of ADL Function   Self Feeding Independent   Grooming / Hygiene Independent   Bathing Independent   Dressing Independent   Toileting Independent   Prior Level of IADL Function   Medication Management Independent   Laundry Independent   Kitchen Mobility Independent   Finances Independent   Home Management Independent   Shopping Independent   Prior Level Of Mobility Independent Without Device in Community   Driving / Transportation Driving Independent   Occupation (Pre-Hospital Vocational) Employed Full Time  (employed in [employer] industry, has PHD in chemistry)   Leisure Interests Television  (enjoys the outdoors, hiking, snowshoeing)   IRF-ISREAL:  Prior Functioning: Everyday Activities   Self Care Independent   Indoor Mobility (Ambulation) Independent   Stairs Independent   Functional Cognition Independent   Prior Device Use None of the given options   Vitals   Pulse (!) 116   Vitals Comments  at rest, up to 120 with activity   Cognition    Level of Consciousness Alert   Comments Appears grossly WFL, pt reports prior \"brain fog\" but feels that he has cleared. Will monitor for higher level deficits. Patient self-reports impaired attention 2/2 \"ADD\" " "diagnosis as well as baseline anxiety   IRF-ISREAL:  Cognitive Pattern Assessment   Cognitive Pattern Assessment Used BIMS   IRF-ISREAL:  Brief Interview for Mental Status (BIMS)   Repetition of Three Words (First Attempt) 3   Temporal Orientation: Able to Report the Correct Year Correct   Temporal Orientation: Able to Report the Correct Month Accurate within 5 days   Temporal Orientation: Able to Report the Correct Day of the Week Correct   Able to Recall \"Sock\" Yes, no cue required   Able to Recall \"Blue\" Yes, no cue required   Able to Recall \"Bed\" Yes, no cue required   BIMS Summary Score 15   Vision Screen   Vision Not tested  (wears glasses, glasses with most recent rx are broken)   Passive ROM Upper Body   Passive ROM Upper Body WDL   Active ROM Upper Body   Active ROM Upper Body  WDL   Dominant Hand Right   Strength Upper Body   Upper Body Strength  WDL   Sensation Upper Body   Upper Extremity Sensation  Not Tested   Comments Not formally tested, reports numbness in bilateral hands   Upper Body Muscle Tone   Upper Body Muscle Tone  X   Coordination Upper Body   Coordination X   Fine Motor Coordination impaired bilaterally   IRF-ISREAL:  Eating   Assistance Needed Set-up / clean-up   [place] Physical Assistance Level No physical assistance or only touching/steadying assist   CARE Score 5   Discharge Goal:  Assistance Needed Independent   Discharge Goal:  Physical Assistance Level No physical assistance or only touching/steadying assist   Discharge Goal:  Score 6   IRF-ISREAL:  Oral Hygiene   Assistance Needed Set-up / clean-up   Physical Assistance Level No physical assistance   CARE Score 5   Discharge Goal:  Assistance Needed Independent   Discharge Goal:  Physical Assistance Level No physical assistance or only touching/steadying assist   Discharge Goal:  Score 6   IRF-ISREAL:  Shower/Bathe Self   Assistance Needed Supervision   Physical Assistance Level No physical assistance or only touching/steadying assist   CARE Score " 4   Discharge Goal:  Assistance Needed Adaptive equipment   Discharge Goal:  Physical Assistance Level No physical assistance or only touching/steadying assist   Discharge Goal Score 6   IRF-ISREAL:  Upper Body Dressing   Assistance Needed Set-up / clean-up   Physical Assistance Level No physical assistance or only touching/steadying assist   CARE Score 5   Discharge Goal:  Assistance Needed Independent   Discharge Goal:  Physical Assistance Level No physical assistance or only touching/steadying assist   Dischage Goal:  Score 6   IRF-ISREAL:  Lower Body Dressing   Assistance Needed Incidental touching   Physical Assistance Level No physical assistance or only touching/steadying assist   CARE Score 4   Discharge Goal:  Assistance Needed Independent   Discharge Goal:  Physical Assistance Level No physical assistance or only touching/steadying assist   Discharge Goal:  Score 6   IRF ISREAL:  Putting On/Taking Off Footwear   Assistance Needed Set-up / clean-up   Physical Assistance Level No physical assistance or only touching/steadying assist   CARE Score 5   Discharge Goal:  Assistance Needed Independent   Discharge Goal:  Physical Assistance Level No physical assistance or only touching/steadying assist   Discharge Goal:  Score 6   IRF-ISREAL:  Toileting Hygiene   Reason if not Attempted Refused to perform   CARE Score 7   Discharge Goal:  Assistance Needed Independent   Discharge Goal:  Physical Assistance Level No physical assistance or only touching/steadying assist   Discharge Goal:  Score 6   IRF-ISREAL:  Toilet Transfer   Assistance Needed Physical assistance   Physical Assistance Level Less than 25%   CARE Score 3   Discharge Goal:  Assistance Needed Adaptive equipment   Discharge Goal:  Physical Assistance Level No physical assistance or only touching/steadying assist   Discahrge Goal:  Score 6   IRF-ISREAL:  Hearing, Speech, and Vision   Expression of Ideas and Wants 4   Understanding Verbal and Non-Verbal Content 4    Problem List   Problem List Decreased Active Daily Living Skills;Decreased Homemaking Skills;Decreased Functional Mobility;Decreased Activity Tolerance;Impaired Coordination Right Upper Extremity;Impaired Coordination Left Upper Extremity;Impaired Sensation Right Upper Extremity;Impaired Sensation Left Upper Extremity;Impaired Postural Control / Balance   Precautions   Precautions Fall Risk   Current Discharge Plan   Current Discharge Plan   (d/c to parents' house in [place])   Benefit    Therapy Benefit Patient Would Benefit from Inpatient Rehab Occupational Therapy to Maximize [place] with ADLs, IADLs and Functional Mobility.   Interdisciplinary Plan of Care Collaboration   IDT Collaboration with  Physical Therapist   Patient Position at End of Therapy Seated  (in dining room with mother)   Collaboration Comments re: pt status, mobility   Equipment Needs   Assistive Device / DME Shower Chair;Grab Bars In Shower / Tub;Grab Bars By Toilet;Front-Wheel Walker   OT Total Time Spent   OT Individual Total Time Spent (Mins) 60   OT Charge Group   Charges Yes   OT Self Care / ADL 1   OT Evaluation OT Evaluation Mod       FIM Eating Score:  5 - Standby Prompting/Supervision or Set-up  Eating Description:  (setup, cutting assist)    FIM Grooming Score:  5 - Standby Prompting/Supervision or Set-up  Grooming Description:  Set-up of equipment(setup seated at sink)    FIM Bathing Score:  5 - Standby Prompting/Supervision or Set-up  Bathing Description:  Grab bar, Hand held shower, Supervision for safety, Increased time, Verbal cueing, Tub bench(setup, increased time, supervision in seated position)    FIM Upper Body Dressin - Standby Prompting/Supervision or Set-up  Upper Body Dressing Description:  Set-up of equipment    FIM Lower Body Dressing Score:  4 - Minimal Assistance  Lower Body Dressing Description:  (CGA standing balance, increased time)    FIM Toileting Body Dressing:   Declined need during session  Toileting  Description:       FIM Bed/Chair/Wheelchair Transfers Score:    Bed/Chair/Wheelchair Transfers Description:       FIM Toilet Transfer Score:  4 - Minimal Assistance  Toilet Transfer Description:  (min A w/ FWW, sup 2nd trial with grab bar)    FIM Tub/Shower Transfers Score:  4 - Minimal Assistance  Tub/Shower Transfers Description:  Grab bar, Increased time, Supervision for safety(Min A with FWW into bathroom and transition into shower, sup via grab bar post-shower from shower chair to w/c)    Assessment  Patient is 34 y.o. male with a past medical history of paroxysmal atrial fibrillation, hypertension, pulmonary emboli, anxiety with panic attacks, alcohol abuse; now admitted for acute inpatient rehabilitation with severe functional debility after readmission for pulmonary emboli and psychosis secondary to polysubstance abuse.      At baseline patient is independent with ADLs, IADLs, and functional mobility, at time of OT evaluation patient presents below baseline requiring min A to supervision for mobility and ADLs, he is primarily limited by LE weakness and instability during unsupported standing necessitating use of FWW or grab bars, decreased sensation and coordination in bilateral hands, and decreased functional strength/endurance.     Plan  Recommend Occupational Therapy  minutes per day 5-7 days per week for 14 days for the following treatments:  OT Orthotics Training, OT E Stim Attended, OT Group Therapy, OT Self Care/ADL, OT Cognitive Skill Dev, OT Community Reintegration, OT Neuro Re-Ed/Balance, OT Sensory Int Techniques, OT Therapeutic Activity, OT Evaluation and OT Therapeutic Exercise.    Goals:  Long term and short term goals have been discussed with patient and mother and they are in agreement.    Occupational Therapy Goals     Problem: Dressing     Dates: Start: 10/17/19       Description:     Goal: STG-Within one week, patient will dress LB     Dates: Start: 10/17/19       Description: 1)  Individualized Goal:  With setup  2) Interventions:  OT Orthotics Training, OT E Stim Attended, OT Group Therapy, OT Self Care/ADL, OT Cognitive Skill Dev, OT Community Reintegration, OT Neuro Re-Ed/Balance, OT Sensory Int Techniques, OT Therapeutic Activity, OT Evaluation and OT Therapeutic Exercise                   Problem: Functional Transfers     Dates: Start: 10/17/19       Description:     Goal: STG-Within one week, patient will     Dates: Start: 10/17/19       Description: 1) Individualized Goal:  ambulate to/from bathroom and transfer on/off regular toilet with supervision using FWW  2) Interventions:  OT Orthotics Training, OT E Stim Attended, OT Group Therapy, OT Self Care/ADL, OT Cognitive Skill Dev, OT Community Reintegration, OT Neuro Re-Ed/Balance, OT Sensory Int Techniques, OT Therapeutic Activity, OT Evaluation and OT Therapeutic Exercise                 Problem: IADL's     Dates: Start: 10/17/19       Description:     Goal: STG-Within one week, patient will access kitchen area     Dates: Start: 10/17/19       Description: 1) Individualized Goal:  With supervision using FWW or counter for support  2) Interventions:  OT Orthotics Training, OT E Stim Attended, OT Group Therapy, OT Self Care/ADL, OT Cognitive Skill Dev, OT Community Reintegration, OT Neuro Re-Ed/Balance, OT Sensory Int Techniques, OT Therapeutic Activity, OT Evaluation and OT Therapeutic Exercise                 Problem: OT Long Term Goals     Dates: Start: 10/17/19       Description:     Goal: LTG-By discharge, patient will complete basic self care tasks     Dates: Start: 10/17/19       Description: 1) Individualized Goal:  With modified independence   2) Interventions:  OT Orthotics Training, OT E Stim Attended, OT Group Therapy, OT Self Care/ADL, OT Cognitive Skill Dev, OT Community Reintegration, OT Neuro Re-Ed/Balance, OT Sensory Int Techniques, OT Therapeutic Activity, OT Evaluation and OT Therapeutic Exercise           Goal:  LTG-By discharge, patient will perform bathroom transfers     Dates: Start: 10/17/19       Description: 1) Individualized Goal:  With modified independence   2) Interventions:  OT Orthotics Training, OT E Stim Attended, OT Group Therapy, OT Self Care/ADL, OT Cognitive Skill Dev, OT Community Reintegration, OT Neuro Re-Ed/Balance, OT Sensory Int Techniques, OT Therapeutic Activity, OT Evaluation and OT Therapeutic Exercise             Goal: LTG-By discharge, patient will complete basic home management     Dates: Start: 10/17/19       Description: 1) Individualized Goal:  With modified independence   2) Interventions:  OT Orthotics Training, OT E Stim Attended, OT Group Therapy, OT Self Care/ADL, OT Cognitive Skill Dev, OT Community Reintegration, OT Neuro Re-Ed/Balance, OT Sensory Int Techniques, OT Therapeutic Activity, OT Evaluation and OT Therapeutic Exercise

## 2019-10-18 LAB — GLUCOSE BLD-MCNC: 93 MG/DL (ref 65–99)

## 2019-10-18 PROCEDURE — 700102 HCHG RX REV CODE 250 W/ 637 OVERRIDE(OP): Performed by: HOSPITALIST

## 2019-10-18 PROCEDURE — 770010 HCHG ROOM/CARE - REHAB SEMI PRIVAT*

## 2019-10-18 PROCEDURE — 97110 THERAPEUTIC EXERCISES: CPT

## 2019-10-18 PROCEDURE — 97530 THERAPEUTIC ACTIVITIES: CPT

## 2019-10-18 PROCEDURE — 82962 GLUCOSE BLOOD TEST: CPT

## 2019-10-18 PROCEDURE — 700102 HCHG RX REV CODE 250 W/ 637 OVERRIDE(OP): Performed by: PHYSICAL MEDICINE & REHABILITATION

## 2019-10-18 PROCEDURE — 99232 SBSQ HOSP IP/OBS MODERATE 35: CPT | Performed by: HOSPITALIST

## 2019-10-18 PROCEDURE — 99233 SBSQ HOSP IP/OBS HIGH 50: CPT | Performed by: PHYSICAL MEDICINE & REHABILITATION

## 2019-10-18 PROCEDURE — A9270 NON-COVERED ITEM OR SERVICE: HCPCS | Performed by: HOSPITALIST

## 2019-10-18 PROCEDURE — A9270 NON-COVERED ITEM OR SERVICE: HCPCS | Performed by: PHYSICAL MEDICINE & REHABILITATION

## 2019-10-18 PROCEDURE — 97116 GAIT TRAINING THERAPY: CPT

## 2019-10-18 RX ORDER — DULOXETIN HYDROCHLORIDE 20 MG/1
20 CAPSULE, DELAYED RELEASE ORAL DAILY
Status: DISCONTINUED | OUTPATIENT
Start: 2019-10-18 | End: 2019-10-24

## 2019-10-18 RX ADMIN — VITAMIN D, TAB 1000IU (100/BT) 4000 UNITS: 25 TAB at 08:16

## 2019-10-18 RX ADMIN — THERA TABS 1 TABLET: TAB at 08:15

## 2019-10-18 RX ADMIN — SENNOSIDES AND DOCUSATE SODIUM 2 TABLET: 8.6; 5 TABLET ORAL at 08:15

## 2019-10-18 RX ADMIN — HYDROXYZINE HYDROCHLORIDE 50 MG: 25 TABLET, FILM COATED ORAL at 19:53

## 2019-10-18 RX ADMIN — OMEPRAZOLE 20 MG: 20 CAPSULE, DELAYED RELEASE ORAL at 08:15

## 2019-10-18 RX ADMIN — ATENOLOL 100 MG: 25 TABLET ORAL at 08:15

## 2019-10-18 RX ADMIN — Medication 100 MG: at 08:16

## 2019-10-18 RX ADMIN — SENNOSIDES AND DOCUSATE SODIUM 2 TABLET: 8.6; 5 TABLET ORAL at 19:48

## 2019-10-18 RX ADMIN — QUETIAPINE FUMARATE 100 MG: 100 TABLET ORAL at 14:40

## 2019-10-18 RX ADMIN — QUETIAPINE FUMARATE 100 MG: 100 TABLET ORAL at 19:48

## 2019-10-18 RX ADMIN — MELATONIN 3 MG: at 19:48

## 2019-10-18 RX ADMIN — RIVAROXABAN 20 MG: 10 TABLET, FILM COATED ORAL at 17:31

## 2019-10-18 RX ADMIN — DULOXETINE HYDROCHLORIDE 20 MG: 20 CAPSULE, DELAYED RELEASE ORAL at 10:56

## 2019-10-18 RX ADMIN — FOLIC ACID 1 MG: 1 TABLET ORAL at 08:15

## 2019-10-18 RX ADMIN — QUETIAPINE FUMARATE 100 MG: 100 TABLET ORAL at 08:16

## 2019-10-18 ASSESSMENT — ENCOUNTER SYMPTOMS
COUGH: 0
MUSCULOSKELETAL NEGATIVE: 1
FEVER: 0
NERVOUS/ANXIOUS: 1
EYES NEGATIVE: 1
CHILLS: 0
VOMITING: 0
PALPITATIONS: 0
FOCAL WEAKNESS: 1
SHORTNESS OF BREATH: 0
NAUSEA: 0
ABDOMINAL PAIN: 0

## 2019-10-18 ASSESSMENT — LIFESTYLE VARIABLES: SUBSTANCE_ABUSE: 1

## 2019-10-18 ASSESSMENT — PATIENT HEALTH QUESTIONNAIRE - PHQ9
SUM OF ALL RESPONSES TO PHQ9 QUESTIONS 1 AND 2: 0
2. FEELING DOWN, DEPRESSED, IRRITABLE, OR HOPELESS: NOT AT ALL
1. LITTLE INTEREST OR PLEASURE IN DOING THINGS: NOT AT ALL

## 2019-10-18 NOTE — PROGRESS NOTES
"Rehab Progress Note     Date of Service: 10/18/2019  Chief Complaint: anxiety    Interval Events (Subjective)    Patient seen and examined in the therapy gym today.  His mother is present.  We discussed the recommendations of psychiatry which were to continue on the Seroquel for now and to start low-dose Cymbalta.  Patient has been able to void and would like to stop using the Flomax.  He thinks that he slept okay last night.  He has no new complaints.  We discussed the results of his admission labs which did show a low vitamin D as well as very mildly increased ALT which is improved.    Objective:  VITAL SIGNS: /79   Pulse 100   Temp 36.5 °C (97.7 °F) (Temporal)   Resp 15   Ht 1.727 m (5' 8\")   Wt 121.5 kg (267 lb 13.7 oz)   SpO2 92%   BMI 40.73 kg/m²   Gen: alert, no apparent distress  CV: regular rate and rhythm, no murmurs, no peripheral edema  Resp: clear to ascultation bilaterally, normal respiratory effort  GI: soft, non-tender abdomen, bowel sounds present  Neuro: notable for bilateral ankle weakness  Psych: Anxious, flat affect    Recent Results (from the past 72 hour(s))   EKG    Collection Time: 10/15/19  7:15 PM   Result Value Ref Range    Report       [facility]    Test Date:  2019-10-15  Pt Name:    JANA LYNN              Department: 61  MRN:        [id number]                      Room:       [facility]  Gender:     Male                         Technician: TXM  :        1985                   Requested By:DENISSE CONTRERAS  Order #:    [id number]                    Reading MD: Marija Quan    Measurements  Intervals                                Axis  Rate:       111                          P:          15  CT:         133                          QRS:        32  QRSD:       90                           T:          25  QT:         336  QTc:        457    Interpretive Statements  SINUS TACHYCARDIA  BASELINE WANDERING  Delayed R wave progression  Electronically Signed On " 10- 19:36:30 PDT by Marija Quan     CBC WITH DIFFERENTIAL    Collection Time: 10/16/19  8:43 AM   Result Value Ref Range    WBC 11.2 (H) 4.8 - 10.8 K/uL    RBC 5.08 4.70 - 6.10 M/uL    Hemoglobin 15.3 14.0 - 18.0 g/dL    Hematocrit 47.6 42.0 - 52.0 %    MCV 93.7 81.4 - 97.8 fL    MCH 30.1 27.0 - 33.0 pg    MCHC 32.1 (L) 33.7 - 35.3 g/dL    RDW 58.8 (H) 35.9 - 50.0 fL    Platelet Count 323 164 - 446 K/uL    MPV 8.5 (L) 9.0 - 12.9 fL    Neutrophils-Polys 69.00 44.00 - 72.00 %    Lymphocytes 23.20 22.00 - 41.00 %    Monocytes 5.40 0.00 - 13.40 %    Eosinophils 1.70 0.00 - 6.90 %    Basophils 0.30 0.00 - 1.80 %    Immature Granulocytes 0.40 0.00 - 0.90 %    Nucleated RBC 0.00 /100 WBC    Neutrophils (Absolute) 7.74 (H) 1.82 - 7.42 K/uL    Lymphs (Absolute) 2.60 1.00 - 4.80 K/uL    Monos (Absolute) 0.61 0.00 - 0.85 K/uL    Eos (Absolute) 0.19 0.00 - 0.51 K/uL    Baso (Absolute) 0.03 0.00 - 0.12 K/uL    Immature Granulocytes (abs) 0.05 0.00 - 0.11 K/uL    NRBC (Absolute) 0.00 K/uL   MAGNESIUM    Collection Time: 10/16/19  8:43 AM   Result Value Ref Range    Magnesium 1.7 1.5 - 2.5 mg/dL   PHOSPHORUS    Collection Time: 10/16/19  8:43 AM   Result Value Ref Range    Phosphorus 3.6 2.5 - 4.5 mg/dL   VITAMIN B12    Collection Time: 10/16/19  8:43 AM   Result Value Ref Range    Vitamin B12 -True Cobalamin 181 (L) 211 - 911 pg/mL   URINALYSIS    Collection Time: 10/16/19  3:26 PM   Result Value Ref Range    Color Yellow     Character Clear     Specific Gravity 1.012 <1.035    Ph 6.0 5.0 - 8.0    Glucose Negative Negative mg/dL    Ketones Negative Negative mg/dL    Protein Negative Negative mg/dL    Bilirubin Negative Negative    Urobilinogen, Urine 0.2 Negative    Nitrite Negative Negative    Leukocyte Esterase Negative Negative    Occult Blood Negative Negative    Micro Urine Req see below    CBC with Differential    Collection Time: 10/17/19  5:21 AM   Result Value Ref Range    WBC 10.7 4.8 - 10.8 K/uL    RBC 4.84  4.70 - 6.10 M/uL    Hemoglobin 14.6 14.0 - 18.0 g/dL    Hematocrit 46.0 42.0 - 52.0 %    MCV 95.0 81.4 - 97.8 fL    MCH 30.2 27.0 - 33.0 pg    MCHC 31.7 (L) 33.7 - 35.3 g/dL    RDW 59.0 (H) 35.9 - 50.0 fL    Platelet Count 288 164 - 446 K/uL    MPV 8.5 (L) 9.0 - 12.9 fL    Neutrophils-Polys 59.90 44.00 - 72.00 %    Lymphocytes 27.90 22.00 - 41.00 %    Monocytes 9.00 0.00 - 13.40 %    Eosinophils 2.10 0.00 - 6.90 %    Basophils 0.50 0.00 - 1.80 %    Immature Granulocytes 0.60 0.00 - 0.90 %    Nucleated RBC 0.00 /100 WBC    Neutrophils (Absolute) 6.41 1.82 - 7.42 K/uL    Lymphs (Absolute) 2.98 1.00 - 4.80 K/uL    Monos (Absolute) 0.96 (H) 0.00 - 0.85 K/uL    Eos (Absolute) 0.22 0.00 - 0.51 K/uL    Baso (Absolute) 0.05 0.00 - 0.12 K/uL    Immature Granulocytes (abs) 0.06 0.00 - 0.11 K/uL    NRBC (Absolute) 0.00 K/uL   Comp Metabolic Panel (CMP)    Collection Time: 10/17/19  5:21 AM   Result Value Ref Range    Sodium 139 135 - 145 mmol/L    Potassium 4.3 3.6 - 5.5 mmol/L    Chloride 102 96 - 112 mmol/L    Co2 25 20 - 33 mmol/L    Anion Gap 12.0 (H) 0.0 - 11.9    Glucose 96 65 - 99 mg/dL    Bun 15 8 - 22 mg/dL    Creatinine 0.70 0.50 - 1.40 mg/dL    Calcium 9.4 8.5 - 10.5 mg/dL    AST(SGOT) 25 12 - 45 U/L    ALT(SGPT) 89 (H) 2 - 50 U/L    Alkaline Phosphatase 49 30 - 99 U/L    Total Bilirubin 0.5 0.1 - 1.5 mg/dL    Albumin 3.9 3.2 - 4.9 g/dL    Total Protein 6.6 6.0 - 8.2 g/dL    Globulin 2.7 1.9 - 3.5 g/dL    A-G Ratio 1.4 g/dL   Magnesium    Collection Time: 10/17/19  5:21 AM   Result Value Ref Range    Magnesium 2.0 1.5 - 2.5 mg/dL   Vitamin D, 25-hydroxy (blood)    Collection Time: 10/17/19  5:21 AM   Result Value Ref Range    25-Hydroxy   Vitamin D 25 15 (L) 30 - 100 ng/mL   proBrain Natriuretic Peptide, NT    Collection Time: 10/17/19  5:21 AM   Result Value Ref Range    NT-proBNP 11 0 - 125 pg/mL   ESTIMATED GFR    Collection Time: 10/17/19  5:21 AM   Result Value Ref Range    GFR If  >60 >60  mL/min/1.73 m 2    GFR If Non African American >60 >60 mL/min/1.73 m 2   ACCU-CHEK GLUCOSE    Collection Time: 10/17/19  5:20 PM   Result Value Ref Range    Glucose - Accu-Ck 78 65 - 99 mg/dL   ACCU-CHEK GLUCOSE    Collection Time: 10/17/19  8:30 PM   Result Value Ref Range    Glucose - Accu-Ck 92 65 - 99 mg/dL   ACCU-CHEK GLUCOSE    Collection Time: 10/18/19  8:03 AM   Result Value Ref Range    Glucose - Accu-Ck 93 65 - 99 mg/dL       Current Facility-Administered Medications   Medication Frequency   • DULoxetine (CYMBALTA) capsule 20 mg DAILY   • vitamin D (cholecalciferol) tablet 4,000 Units DAILY   • melatonin tablet 3 mg QHS   • thiamine tablet 100 mg DAILY   • Respiratory Care per Protocol Continuous RT   • Pharmacy Consult Request ...Pain Management Review 1 Each PHARMACY TO DOSE   • acetaminophen (TYLENOL) tablet 650 mg Q4HRS PRN   • artificial tears ophthalmic solution 1 Drop PRN   • benzocaine-menthol (CEPACOL) lozenge 1 Lozenge Q2HRS PRN   • mag hydrox-al hydrox-simeth (MAALOX PLUS ES or MYLANTA DS) suspension 20 mL Q2HRS PRN   • ondansetron (ZOFRAN ODT) dispertab 4 mg 4X/DAY PRN    Or   • ondansetron (ZOFRAN) syringe/vial injection 4 mg 4X/DAY PRN   • sodium chloride (OCEAN) 0.65 % nasal spray 2 Spray PRN   • atenolol (TENORMIN) tablet 100 mg DAILY   • calcium carbonate (TUMS) chewable tab 500 mg TID PRN   • [START ON 10/25/2019] cyanocobalamin (VITAMIN B-12) injection 1,000 mcg Q30 DAYS   • folic acid (FOLVITE) tablet 1 mg DAILY   • gabapentin (NEURONTIN) capsule 300 mg HS PRN   • hydrOXYzine HCl (ATARAX) tablet 50 mg TID PRN   • lisinopril (PRINIVIL) tablet 10 mg Q DAY   • metoprolol (LOPRESSOR) tablet 12.5 mg Q8HRS PRN   • multivitamin (THERAGRAN) tablet 1 Tab DAILY   • omeprazole (PRILOSEC) capsule 20 mg DAILY   • rivaroxaban (XARELTO) tablet 20 mg PM MEAL   • senna-docusate (PERICOLACE or SENOKOT S) 8.6-50 MG per tablet 2 Tab BID    And   • polyethylene glycol/lytes (MIRALAX) PACKET 1 Packet DAILY     And   • magnesium hydroxide (MILK OF MAGNESIA) suspension 30 mL QDAY PRN    And   • bisacodyl (DULCOLAX) suppository 10 mg QDAY PRN   • QUEtiapine (SEROQUEL) tablet 100 mg TID       Orders Placed This Encounter   Procedures   • Diet Order Regular     Standing Status:   Standing     Number of Occurrences:   1     Order Specific Question:   Diet:     Answer:   Regular [1]       Assessment:  Active Hospital Problems    Diagnosis   • *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)     This patient is a 34 y.o. 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    Therapy update 10/18/2019  Supervision eating  Supervision grooming  Supervision bathing  Supervision upper body dressing  Min assist lower body dressing  Min assist toileting  Supervisionbladder  Supervision bowel  Supervision bed/chair transfer  Min assist toilet transfer  Min assist tub/shower transfer  Max assist ambulation  Supervision wheelchair propulsion  Max assist stairs  Modified Independent comprehension  Independent expression  Supervision social interaction  Modified Independent problem solving  Supervision memory    We will have his first weekly conference next Monday to pick a discharge date.      Medical Decision Making and Plan:    Subacute combined degeneration of spinal cord  Incomplete paraplegia  From whippit use  Paresthesias  Ankle/foot weakness  Urinary  retention, resolved  Continue full rehab program  PT/OT/SLP, 1 hr each discipline, 5 days per week     Polysubstance abuse  Anxiety disorder  Insomnia   Consult psychiatry  Continue Seroquel, 100 mg TID, psychiatry to titrate down  Starting Cymbalta 20 mg daily  PRN hydroxyzine  Scheduled melatonin  Consult Dr. Chacko, psychology  Bad reactions to Risperdal, Lamictal, trazodone     Acute Urinary retention, resolve  Neurogenic bladder  Likely due to posterior column injury  Discontinue Flomax  IC for over 400 cc  Replace Srivastava if unable to unable to urinate  Continue to monitor with bladder scans    Bowel  Meds as needed  Last BM 10/17    DVT  Xarelto    Appreciate the assistance of the hospitalist with his medical co-morbidities:     Pulmonary emboli  Right heart strain  Paroxysmal atrial fibrillation  Hypertension  Leukocytosis, resolved  Elevated liver enzymes, improved  Vit D deficiency    Total time:  38 minutes.  I spent greater than 50% of the time for patient care, counseling, and coordination on this date, including patient face-to face time, unit/floor time with review of records/pertinent lab data and studies, as well as discussing diagnostic evaluation/work up, planned therapeutic interventions, and future disposition of care, as per the interval events/subjective and the assessment and plan as noted above.    I have performed a physical exam, reviewed and updated ROS, as well as the assessment and plan /today 10/18/2019. In review of note from 10/17/2019 there are no new changes except as documented above.        Sue Rock M.D.   Physical Medicine and Rehabilitation
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
## 2019-10-18 NOTE — THERAPY
10/18/19 1529   Precautions   Precautions Fall Risk;Other (See Comments)   Comments Bilateral dorsiflexion weakness, anxiety   Pain 0 - 10 Group   Therapist Pain Assessment 0   Cognition    Cognition / Consciousness WDL   Level of Consciousness Alert   Sitting Lower Body Exercises   Ankle Pumps 2 sets of 15;Bilateral   Hip Flexion 2 sets of 15;Bilateral   Hip Abduction 2 sets of 15;Bilateral   Hip Adduction 2 sets of 15;Bilateral   Long Arc Quad 2 sets of 15;Bilateral   Hamstring Curl 2 sets of 15;Bilateral   PT Total Time Spent   PT Individual Total Time Spent (Mins) 30   PT Charge Group   PT Therapeutic Exercise 2   Physical Therapy   Daily Treatment     Patient Name: Cosme Cabrera  Age:  34 y.o., Sex:  male  Medical Record #: [id number]  Today's Date: 10/18/2019     Precautions  Precautions: Fall Risk, Other (See Comments)  Comments: Bilateral dorsiflexion weakness, anxiety    Subjective    The patient was up in his chair in the gym and he agreed to PT.  He indicated that his legs were very tired from the days activities.     Objective    Due to lower extremity fatigue gait training was not part of the session.  He agreed to seated lateral lower extremity therapeutic exercise 2 sets of 15 as indicated above.        Assessment    The patient`s lower extremities were fatigued this afternoon so it was unsafe for him to attempt walking or other standing activities.  The fatigue and weakness was evident as he participated in the lower extremity exercises sitting in the chair.    Plan    Bed mobility and transfer training, safety education, therapeutic exercise upper body and lower body, gait training
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
## 2019-10-18 NOTE — CARE PLAN
Problem: Safety  Goal: Will remain free from injury  Outcome: PROGRESSING AS EXPECTED  Note:   Reinforced safety precautions to patient. Encouraged use of call light when assistance is needed.     Problem: Urinary Elimination:  Goal: Ability to reestablish a normal urinary elimination pattern will improve  Note:   Pt is continent. Post void scans dc'd.

## 2019-10-18 NOTE — REHAB-DIETARY IDT TEAM NOTE
Dietary   Nutrition  Dietary Problems (Active)      There are no active problems.            NUTRITION SERVICES: BMI - Pt with BMI >40 (=Body mass index is 40.73 kg/m².), morbid obesity. Weight loss counseling not appropriate in acute care setting. RECOMMEND - Referral to outpatient nutrition services for weight management after D/C.       Section completed by:  Fay Jaquez R.D.

## 2019-10-18 NOTE — THERAPY
"Occupational Therapy  Daily Treatment     Patient Name: Cosme Cabrera  Age:  34 y.o., Sex:  male  Medical Record #: [id number]  Today's Date: 10/18/2019     Precautions  Precautions: (P) Fall Risk         Subjective    \"My pinkie and ring finger don't really work, I don't think they did before\"     Objective       10/18/19 0901   Precautions   Precautions Fall Risk   Hand Strengthening   Hand Strengthening Theraputty (Comment on Resistance)   Comment issued theraputty medium firm (red) and educated on therex to promote manual dexterity, focus on gross grasp, finger extension, isolated and sustained pinch. Issued 4 coins of varying sizes and educated on option for removing from theraputty as well as picking up and working on finger <> palm translation   OT Total Time Spent   OT Individual Total Time Spent (Mins) 30   OT Charge Group   Charges Yes   OT Therapy Activity 2     Assessment    Patient educated on HEP for progression of fine motor control and manual dexterity, able to return demo therex appropriately, demos most significant dysfunction with sustained pinch, in-hand manipulation, and finger <> palm translation.     Plan    Progress standing balance/tolerance, LE strength, UE therex program, FM control, functional mobility with FWW. Mother cleared to assist patient with ADLs in room, will continue to monitor progression    "

## 2019-10-18 NOTE — REHAB-PHARMACY IDT TEAM NOTE
Pharmacy   Pharmacy  Antibiotics/Antifungals/Antivirals:  Medication:      Active Orders (From admission, onward)    None        Route:        NA  Stop Date:  NA  Reason:      NA  Antihypertensives/Cardiac:  Medication:    Orders (72h ago, onward)     Start     Ordered    10/17/19 0900  atenolol (TENORMIN) tablet 100 mg  DAILY      10/16/19 1144    10/17/19 0600  lisinopril (PRINIVIL) tablet 10 mg  Q DAY      10/16/19 1144    10/16/19 1144  metoprolol (LOPRESSOR) tablet 12.5 mg  EVERY 8 HOURS PRN      10/16/19 1144              Patient Vitals for the past 24 hrs:   BP Pulse   10/18/19 1500 110/77 85   10/18/19 0659 127/79 100   10/18/19 0600 119/78 --   10/18/19 0532 119/78 (!) 113   10/17/19 1835 113/77 (!) 110     Anticoagulation:  Medication: Xarelto    Other key medications: A review of the medication list reveals no issues at this time. Patient is currently on antihypertensive(s). Recommend home blood pressure monitoring/recording if antihypertensive(s) regimen(s) continue.    Section completed by: Bart Garcia [facility]

## 2019-10-18 NOTE — REHAB-CM IDT TEAM NOTE
Case Management    DC Planning  DC destination/dispostion:  Plan is for patient to travel by car or plane to [place], [place], to live with his mother in her single level home. Patient was living in [place]; rented a mother-in-laws quarter and also has a 35' camper.  Mother is here from [facility] to support patient in his recovery and get him back to [place]. They are going to move the camper [place] too. Mother stated she wishes to travel to [place] as soon as possible, with patient, after his discharge. They may be staying in his camper for a brief period which has stair access.    Referrals: None at this time.     DC Needs: New PC in  [place] - Dr. Torsten Garcia [phone]. Pending reacceptance of patient to his practice.   Barriers to discharge: Functional deficits in strength, balance, coordination, and ADL's.    Strengths: Return[place] to [place]. F/U therapies, MD appointments to be arranged.     Section completed by:  Amber Pinzon R.N.

## 2019-10-18 NOTE — CARE PLAN
Problem: Bowel/Gastric:  Goal: Normal bowel function is maintained or improved  Intervention: Educate patient and significant other/support system about diet, fluid intake, medications and activity to promote bowel function  Note:   Pt reports having regular bm's at this time. Took all scheduled hs meds. No s/s of distress.      Problem: Knowledge Deficit  Goal: Knowledge of disease process/condition, treatment plan, diagnostic tests, and medications will improve  Intervention: Assess knowledge level of disease process/condition, treatment plan, diagnostic tests, and medications  Note:   Pt unhappy w/having to have blood sugar checked. Explained finger sticks are scheduled for before meals and at bedtime. Important for the MD to have the information so they can follow trends. Allowed finger stick. Pt did not require insulin coverage.

## 2019-10-18 NOTE — THERAPY
10/18/19 1029   Precautions   Precautions Fall Risk;Other (See Comments)   Comments Bilateral dorsiflexion weakness, anxiety   Pain 0 - 10 Group   Therapist Pain Assessment 0   Cognition    Cognition / Consciousness WDL   Level of Consciousness Alert   Sitting Lower Body Exercises   Nustep Resistance Level 3  (56 steps per minute, 10 minutes)   Bed Mobility    Supine to Sit Stand by Assist   Sit to Supine Stand by Assist   Sit to Stand Stand by Assist   Scooting Stand by Assist   PT Total Time Spent   PT Individual Total Time Spent (Mins) 60   PT Charge Group   Charges Yes   PT Gait Training 2   PT Therapeutic Exercise 1   PT Therapeutic Activities 1   Physical Therapy   Daily Treatment     Patient Name: Cosme Cabrera  Age:  34 y.o., Sex:  male  Medical Record #: [id number]  Today's Date: 10/18/2019     Precautions  Precautions: Fall Risk, Other (See Comments)  Comments: Bilateral dorsiflexion weakness, anxiety    Subjective    The patient came down to the gym ready for PT.     Objective    The patient participated in gait training with a FW W and tolerated 110 to 220 FT SBA, gait belt.  He utilized the NuStep with the information indicated above.  Adjustments were made to the wheelchair for comfort with the addition of a backrest cushion and a lower profile cushion to sit on so that his feet reach the floor for propulsion and steering.    FIM Bed/Chair/Wheelchair Transfers Score: 5 - Standby Prompting/Supervision or Set-up  Bed/Chair/Wheelchair Transfers Description:       FIM Walking Score:  2 - Max Assistance  Walking Description:  Safety concerns, Supervision for safety, Extra time, Walker(110-220 FT, FWW, SBA)    FIM Wheelchair Score:  5 - Standby Prompting/Supervision or Set-up  Wheelchair Description:       FIM Stairs Score:  2 - Max Assistance  Stairs Description:         Assessment    The patient participated in gait training, sequencing sit to/from stand, NuStep.  He had one incident in the beginning  "of the session where he felt anxious with symptoms of sweating and feeling \"out of place\".  The symptoms did not last very long only approximately a minute or 2.  He also has bilateral dorsiflexion weakness.    Plan    Safety education, gait training, therapeutic exercise for strength and endurance    "

## 2019-10-18 NOTE — PROGRESS NOTES
Received patient during shift change, report rec'd from day shift RN. Resting in bed, VS stable on room air. Mother in room, questions answered. Per report continent of B&B, min assist for transfers. A&O x 4, able to make needs known. Bed in low position, call light within reach.

## 2019-10-19 PROCEDURE — 700102 HCHG RX REV CODE 250 W/ 637 OVERRIDE(OP): Performed by: HOSPITALIST

## 2019-10-19 PROCEDURE — 770010 HCHG ROOM/CARE - REHAB SEMI PRIVAT*

## 2019-10-19 PROCEDURE — A9270 NON-COVERED ITEM OR SERVICE: HCPCS | Performed by: PHYSICAL MEDICINE & REHABILITATION

## 2019-10-19 PROCEDURE — A9270 NON-COVERED ITEM OR SERVICE: HCPCS | Performed by: HOSPITALIST

## 2019-10-19 PROCEDURE — 99232 SBSQ HOSP IP/OBS MODERATE 35: CPT | Performed by: PHYSICAL MEDICINE & REHABILITATION

## 2019-10-19 PROCEDURE — 97110 THERAPEUTIC EXERCISES: CPT

## 2019-10-19 PROCEDURE — 99231 SBSQ HOSP IP/OBS SF/LOW 25: CPT | Performed by: HOSPITALIST

## 2019-10-19 PROCEDURE — 700102 HCHG RX REV CODE 250 W/ 637 OVERRIDE(OP): Performed by: PHYSICAL MEDICINE & REHABILITATION

## 2019-10-19 PROCEDURE — 97116 GAIT TRAINING THERAPY: CPT

## 2019-10-19 PROCEDURE — 97530 THERAPEUTIC ACTIVITIES: CPT

## 2019-10-19 RX ORDER — LISINOPRIL 5 MG/1
10 TABLET ORAL
Status: DISCONTINUED | OUTPATIENT
Start: 2019-10-20 | End: 2019-10-19

## 2019-10-19 RX ORDER — LISINOPRIL 5 MG/1
10 TABLET ORAL
Status: DISCONTINUED | OUTPATIENT
Start: 2019-10-20 | End: 2019-10-30 | Stop reason: HOSPADM

## 2019-10-19 RX ADMIN — QUETIAPINE FUMARATE 100 MG: 100 TABLET ORAL at 14:04

## 2019-10-19 RX ADMIN — VITAMIN D, TAB 1000IU (100/BT) 4000 UNITS: 25 TAB at 07:56

## 2019-10-19 RX ADMIN — THERA TABS 1 TABLET: TAB at 07:57

## 2019-10-19 RX ADMIN — ATENOLOL 100 MG: 25 TABLET ORAL at 07:55

## 2019-10-19 RX ADMIN — QUETIAPINE FUMARATE 100 MG: 100 TABLET ORAL at 07:58

## 2019-10-19 RX ADMIN — OMEPRAZOLE 20 MG: 20 CAPSULE, DELAYED RELEASE ORAL at 07:56

## 2019-10-19 RX ADMIN — HYDROXYZINE HYDROCHLORIDE 50 MG: 25 TABLET, FILM COATED ORAL at 19:23

## 2019-10-19 RX ADMIN — SENNOSIDES AND DOCUSATE SODIUM 2 TABLET: 8.6; 5 TABLET ORAL at 07:57

## 2019-10-19 RX ADMIN — DULOXETINE HYDROCHLORIDE 20 MG: 20 CAPSULE, DELAYED RELEASE ORAL at 07:57

## 2019-10-19 RX ADMIN — MELATONIN 3 MG: at 19:25

## 2019-10-19 RX ADMIN — ACETAMINOPHEN 650 MG: 325 TABLET, FILM COATED ORAL at 07:53

## 2019-10-19 RX ADMIN — FOLIC ACID 1 MG: 1 TABLET ORAL at 07:57

## 2019-10-19 RX ADMIN — Medication 100 MG: at 07:58

## 2019-10-19 RX ADMIN — LISINOPRIL 10 MG: 5 TABLET ORAL at 05:53

## 2019-10-19 RX ADMIN — QUETIAPINE FUMARATE 100 MG: 100 TABLET ORAL at 19:23

## 2019-10-19 RX ADMIN — RIVAROXABAN 20 MG: 10 TABLET, FILM COATED ORAL at 17:45

## 2019-10-19 ASSESSMENT — ENCOUNTER SYMPTOMS
ABDOMINAL PAIN: 0
MUSCULOSKELETAL NEGATIVE: 1
VOMITING: 0
COUGH: 0
NERVOUS/ANXIOUS: 1
NAUSEA: 0
FOCAL WEAKNESS: 1
EYES NEGATIVE: 1
FEVER: 0
CHILLS: 0
SHORTNESS OF BREATH: 0
PALPITATIONS: 0

## 2019-10-19 ASSESSMENT — LIFESTYLE VARIABLES: SUBSTANCE_ABUSE: 1

## 2019-10-19 NOTE — PROGRESS NOTES
"Rehab Progress Note     Date of Service: 10/19/2019  Chief Complaint: anxiety    Interval Events (Subjective)    Patient seen and examined in his room this morning.  He is requesting that he gets his lisinopril.  He reports he is pushing himself in therapy and was able to do 3 laps today with physical therapy.  He reports to me that he forgot to tell the psychiatrist Dr. Pinto's about his reaction to Wellbutrin which she was treated with in the past.  He titrated himself off as he felt like it made his anxiety worse.  Patient's mood is much improved today.  He really denies any depression and just reports he is anxious.  He was happy was able to visit with his cat today.  He states he is very eager to progress so that he can be discharged as soon as possible.  Reports he is continuing to urinate without need for catheterization and he reports his urinary flow has improved.    Objective:  VITAL SIGNS: /84   Pulse 99   Temp 37.1 °C (98.8 °F) (Oral)   Resp 17   Ht 1.727 m (5' 8\")   Wt 121.5 kg (267 lb 13.7 oz)   SpO2 94%   BMI 40.73 kg/m²   Gen: alert, no apparent distress, obese  CV: regular rate and rhythm, no murmurs, no peripheral edema  Resp: clear to ascultation bilaterally, normal respiratory effort  GI: soft, non-tender abdomen, bowel sounds present  Neuro: notable for anxious, flat affect    Recent Results (from the past 72 hour(s))   URINALYSIS    Collection Time: 10/16/19  3:26 PM   Result Value Ref Range    Color Yellow     Character Clear     Specific Gravity 1.012 <1.035    Ph 6.0 5.0 - 8.0    Glucose Negative Negative mg/dL    Ketones Negative Negative mg/dL    Protein Negative Negative mg/dL    Bilirubin Negative Negative    Urobilinogen, Urine 0.2 Negative    Nitrite Negative Negative    Leukocyte Esterase Negative Negative    Occult Blood Negative Negative    Micro Urine Req see below    CBC with Differential    Collection Time: 10/17/19  5:21 AM   Result Value Ref Range    WBC 10.7 " 4.8 - 10.8 K/uL    RBC 4.84 4.70 - 6.10 M/uL    Hemoglobin 14.6 14.0 - 18.0 g/dL    Hematocrit 46.0 42.0 - 52.0 %    MCV 95.0 81.4 - 97.8 fL    MCH 30.2 27.0 - 33.0 pg    MCHC 31.7 (L) 33.7 - 35.3 g/dL    RDW 59.0 (H) 35.9 - 50.0 fL    Platelet Count 288 164 - 446 K/uL    MPV 8.5 (L) 9.0 - 12.9 fL    Neutrophils-Polys 59.90 44.00 - 72.00 %    Lymphocytes 27.90 22.00 - 41.00 %    Monocytes 9.00 0.00 - 13.40 %    Eosinophils 2.10 0.00 - 6.90 %    Basophils 0.50 0.00 - 1.80 %    Immature Granulocytes 0.60 0.00 - 0.90 %    Nucleated RBC 0.00 /100 WBC    Neutrophils (Absolute) 6.41 1.82 - 7.42 K/uL    Lymphs (Absolute) 2.98 1.00 - 4.80 K/uL    Monos (Absolute) 0.96 (H) 0.00 - 0.85 K/uL    Eos (Absolute) 0.22 0.00 - 0.51 K/uL    Baso (Absolute) 0.05 0.00 - 0.12 K/uL    Immature Granulocytes (abs) 0.06 0.00 - 0.11 K/uL    NRBC (Absolute) 0.00 K/uL   Comp Metabolic Panel (CMP)    Collection Time: 10/17/19  5:21 AM   Result Value Ref Range    Sodium 139 135 - 145 mmol/L    Potassium 4.3 3.6 - 5.5 mmol/L    Chloride 102 96 - 112 mmol/L    Co2 25 20 - 33 mmol/L    Anion Gap 12.0 (H) 0.0 - 11.9    Glucose 96 65 - 99 mg/dL    Bun 15 8 - 22 mg/dL    Creatinine 0.70 0.50 - 1.40 mg/dL    Calcium 9.4 8.5 - 10.5 mg/dL    AST(SGOT) 25 12 - 45 U/L    ALT(SGPT) 89 (H) 2 - 50 U/L    Alkaline Phosphatase 49 30 - 99 U/L    Total Bilirubin 0.5 0.1 - 1.5 mg/dL    Albumin 3.9 3.2 - 4.9 g/dL    Total Protein 6.6 6.0 - 8.2 g/dL    Globulin 2.7 1.9 - 3.5 g/dL    A-G Ratio 1.4 g/dL   Magnesium    Collection Time: 10/17/19  5:21 AM   Result Value Ref Range    Magnesium 2.0 1.5 - 2.5 mg/dL   Vitamin D, 25-hydroxy (blood)    Collection Time: 10/17/19  5:21 AM   Result Value Ref Range    25-Hydroxy   Vitamin D 25 15 (L) 30 - 100 ng/mL   proBrain Natriuretic Peptide, NT    Collection Time: 10/17/19  5:21 AM   Result Value Ref Range    NT-proBNP 11 0 - 125 pg/mL   ESTIMATED GFR    Collection Time: 10/17/19  5:21 AM   Result Value Ref Range    GFR If  African American >60 >60 mL/min/1.73 m 2    GFR If Non African American >60 >60 mL/min/1.73 m 2   ACCU-CHEK GLUCOSE    Collection Time: 10/17/19  5:20 PM   Result Value Ref Range    Glucose - Accu-Ck 78 65 - 99 mg/dL   ACCU-CHEK GLUCOSE    Collection Time: 10/17/19  8:30 PM   Result Value Ref Range    Glucose - Accu-Ck 92 65 - 99 mg/dL   ACCU-CHEK GLUCOSE    Collection Time: 10/18/19  8:03 AM   Result Value Ref Range    Glucose - Accu-Ck 93 65 - 99 mg/dL       Current Facility-Administered Medications   Medication Frequency   • [START ON 10/20/2019] lisinopril (PRINIVIL) tablet 10 mg Q DAY   • DULoxetine (CYMBALTA) capsule 20 mg DAILY   • vitamin D (cholecalciferol) tablet 4,000 Units DAILY   • melatonin tablet 3 mg QHS   • thiamine tablet 100 mg DAILY   • Respiratory Care per Protocol Continuous RT   • Pharmacy Consult Request ...Pain Management Review 1 Each PHARMACY TO DOSE   • acetaminophen (TYLENOL) tablet 650 mg Q4HRS PRN   • artificial tears ophthalmic solution 1 Drop PRN   • benzocaine-menthol (CEPACOL) lozenge 1 Lozenge Q2HRS PRN   • mag hydrox-al hydrox-simeth (MAALOX PLUS ES or MYLANTA DS) suspension 20 mL Q2HRS PRN   • ondansetron (ZOFRAN ODT) dispertab 4 mg 4X/DAY PRN    Or   • ondansetron (ZOFRAN) syringe/vial injection 4 mg 4X/DAY PRN   • sodium chloride (OCEAN) 0.65 % nasal spray 2 Spray PRN   • atenolol (TENORMIN) tablet 100 mg DAILY   • calcium carbonate (TUMS) chewable tab 500 mg TID PRN   • [START ON 10/25/2019] cyanocobalamin (VITAMIN B-12) injection 1,000 mcg Q30 DAYS   • folic acid (FOLVITE) tablet 1 mg DAILY   • gabapentin (NEURONTIN) capsule 300 mg HS PRN   • hydrOXYzine HCl (ATARAX) tablet 50 mg TID PRN   • metoprolol (LOPRESSOR) tablet 12.5 mg Q8HRS PRN   • multivitamin (THERAGRAN) tablet 1 Tab DAILY   • omeprazole (PRILOSEC) capsule 20 mg DAILY   • rivaroxaban (XARELTO) tablet 20 mg PM MEAL   • senna-docusate (PERICOLACE or SENOKOT S) 8.6-50 MG per tablet 2 Tab BID    And   • polyethylene  glycol/lytes (MIRALAX) PACKET 1 Packet DAILY    And   • magnesium hydroxide (MILK OF MAGNESIA) suspension 30 mL QDAY PRN    And   • bisacodyl (DULCOLAX) suppository 10 mg QDAY PRN   • QUEtiapine (SEROQUEL) tablet 100 mg TID       Orders Placed This Encounter   Procedures   • Diet Order Regular     Standing Status:   Standing     Number of Occurrences:   1     Order Specific Question:   Diet:     Answer:   Regular [1]       Assessment:  Active Hospital Problems    Diagnosis   • *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    We will have his first weekly conference next Monday to pick a discharge date.      Medical Decision Making and Plan:    Subacute combined degeneration of spinal cord  Incomplete paraplegia  From whippit use  Paresthesias  Ankle/foot weakness  Urinary retention, resolved  Continue full rehab program  PT/OT, 1.5 hr each discipline, 5 days per week     Polysubstance abuse  Anxiety disorder, stable  Insomnia, improved  Consult psychiatry, appreciate assistance  Continue Seroquel, 100 mg TID, psychiatry to titrate down  Started Cymbalta 20 mg daily  PRN hydroxyzine  Scheduled melatonin  Consult Dr. 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  I spent greater than 50% of the time for patient care, counseling, and coordination on this date, including patient face-to face time, unit/floor time with review of records/pertinent lab data and studies, as well as discussing diagnostic evaluation/work up, planned therapeutic interventions, and future disposition of care, as per the interval events/subjective and the assessment and plan as noted above.    I have performed a physical exam, reviewed and updated ROS, as well as the assessment and plan today 10/19/2019. In review of note from 10/18/2019 there are no new changes except as documented above.    Sue Rock M.D.   Physical Medicine and Rehabilitation

## 2019-10-19 NOTE — THERAPY
10/19/19 0929   Precautions   Precautions Fall Risk;Other (See Comments)   Comments Bilateral dorsiflexion weakness, anxiety   Pain 0 - 10 Group   Therapist Pain Assessment 0   Cognition    Cognition / Consciousness WDL   Level of Consciousness Alert   Sitting Lower Body Exercises   Ankle Pumps 2 sets of 15;Bilateral   Hip Flexion 3 sets of 15;Bilateral   Hip Abduction 3 sets of 15;Bilateral   Hip Adduction 3 sets of 15;Bilateral   Long Arc Quad 3 sets of 15;Bilateral   Hamstring Curl 3 sets of 15;Bilateral   Nustep Resistance Level 3  (50 steps per minute, 8 minutes)   Standing Lower Body Exercises   Step Up 1 set of 10;Right   Step Down 1 set of 10;Right   PT Total Time Spent   PT Individual Total Time Spent (Mins) 60   PT Charge Group   PT Gait Training 1   PT Therapeutic Exercise 3   Physical Therapy   Daily Treatment     Patient Name: Cosme Cabrera  Age:  34 y.o., Sex:  male  Medical Record #: [id number]  Today's Date: 10/19/2019     Precautions  Precautions: Fall Risk, Other (See Comments)  Comments: Bilateral dorsiflexion weakness, anxiety    Subjective    The patient agreed to PT.  He said he could feel the fatigue in his legs from yesterday's activities.     Objective    The patient participated in gait training with a FWW.  He tolerated 330 FT and 220 FT with intermittent CGA.  He attempted to step up/step down 1 set of 10.  However, he was unable to use the left leg for step up/step down.  Patient also utilized the NuStep and he did seated bilateral lower extremity therapeutic exercise indicated above.    FIM Bed/Chair/Wheelchair Transfers Score: 5 - Standby Prompting/Supervision or Set-up  Bed/Chair/Wheelchair Transfers Description:       FIM Walking Score:  5 - Standby Prompting/Supervision or Set-up  Walking Description:  Extra time, Verbal cueing, Supervision for safety, Walker(330 FT, 220 FT, FWW, intermittent CGA)    FIM Wheelchair Score:  5 - Standby Prompting/Supervision or Set-up  Wheelchair  Description:       FIM Stairs Score:  2 - Max Assistance  Stairs Description:         Assessment    The patient is making progress in tolerance for activity, gait, safety awareness and orientation.    Plan    Safety education, therapeutic exercise, gait training FWW as tolerated, NuStep, lower extremity therapeutic exercise

## 2019-10-19 NOTE — PROGRESS NOTES
Hospital Medicine Daily Progress Note      Chief Complaint  PE, Tachycardia    Interval Problem Update  Pt seen and examined in dining room, reports he had a better night w/ a new nurse.    Review of Systems  Review of Systems   Constitutional: Negative for chills and fever.   HENT: Negative.    Eyes: Negative.    Respiratory: Negative for cough and shortness of breath.    Cardiovascular: Negative for chest pain and palpitations.   Gastrointestinal: Negative for abdominal pain, nausea and vomiting.   Musculoskeletal: Negative.    Skin: Negative for itching and rash.   Neurological: Positive for focal weakness.   Psychiatric/Behavioral: Positive for substance abuse. The patient is nervous/anxious.         Physical Exam  Temp:  [36.3 °C (97.4 °F)-37.1 °C (98.8 °F)] 37.1 °C (98.8 °F)  Pulse:  [] 99  Resp:  [17-20] 17  BP: (110-132)/(66-84) 132/84  SpO2:  [94 %-95 %] 94 %    Physical Exam   Constitutional: He is oriented to person, place, and time. No distress.   HENT:   Head: Normocephalic and atraumatic.   Right Ear: External ear normal.   Left Ear: External ear normal.   Eyes: Conjunctivae and EOM are normal. Right eye exhibits no discharge. Left eye exhibits no discharge.   Neck: Normal range of motion. Neck supple. No tracheal deviation present.   Cardiovascular: Normal rate and regular rhythm.   Pulmonary/Chest: Effort normal and breath sounds normal. No stridor. No respiratory distress. He has no wheezes.   Abdominal: Soft. Bowel sounds are normal. He exhibits no distension. There is no tenderness.   obese   Musculoskeletal: He exhibits no edema or tenderness.   Neurological: He is alert and oriented to person, place, and time.   Skin: Skin is warm and dry. He is not diaphoretic.   Vitals reviewed.      Fluids    Intake/Output Summary (Last 24 hours) at 10/19/2019 1352  Last data filed at 10/19/2019 0800  Gross per 24 hour   Intake 1440 ml   Output --   Net 1440 ml       Laboratory  Recent Labs      10/17/19  0521   WBC 10.7   RBC 4.84   HEMOGLOBIN 14.6   HEMATOCRIT 46.0   MCV 95.0   MCH 30.2   MCHC 31.7*   RDW 59.0*   PLATELETCT 288   MPV 8.5*     Recent Labs     10/17/19  0521   SODIUM 139   POTASSIUM 4.3   CHLORIDE 102   CO2 25   GLUCOSE 96   BUN 15   CREATININE 0.70   CALCIUM 9.4                   Assessment/Plan  Acute saddle pulmonary embolism (HCC)- (present on admission)  Assessment & Plan  Has recurrent bilat PE  Echo w/ right heart strain (persistent vs recurrent)  Anticoagulated on Xarelto    Paroxysmal atrial fibrillation (HCC)- (present on admission)  Assessment & Plan  Suspect 2/2 PE  On Atenolol for rate control  Anticoagulated on Xarelto    ETOH abuse- (present on admission)  Assessment & Plan  On Thiamine, MVT, and Folate supplements    Urinary retention  Assessment & Plan  Now off Flomax    Vitamin D deficiency  Assessment & Plan  Vit D level 15  On supplementation    Type 2 diabetes mellitus without complication, without long-term current use of insulin ([facility])- (present on admission)  Assessment & Plan  HbA1c 6.6  FSBS usually 100, so discontinued SSI  Encourage diet and lifestyle modifications    ALLI (obstructive sleep apnea)- (present on admission)  Assessment & Plan  RT protocol  Suggest outpt PSG if not yet done    Anxiety- (present on admission)  Assessment & Plan  Management per Primary Team  Psychiatry consult pending    Essential hypertension- (present on admission)  Assessment & Plan  On Atenolol and Lisinopril  Observe blood pressure trends     Full Code    Patient seen and examined.  Chart reviewed.  Will check F/U morning labs.

## 2019-10-19 NOTE — CARE PLAN
Problem: Safety  Goal: Will remain free from injury  Outcome: PROGRESSING AS EXPECTED  Note:   Pt uses call light consistently and appropriately. Waits for assistance does not attempt self transfer this shift. Able to verbalize needs.      Problem: Bowel/Gastric:  Goal: Normal bowel function is maintained or improved  Outcome: PROGRESSING AS EXPECTED  Note:   Patient having regular bowel movements; last BM 10/18.  Denies s/s constipation; bowel meds available if needed.  Will continue to monitor.      Problem: Urinary Elimination:  Goal: Ability to reestablish a normal urinary elimination pattern will improve  Outcome: PROGRESSING AS EXPECTED  Note:   Patient voiding adequate amounts of clear yellow urine.  Retention less than 400 ml. Denies flank pain or dysuria; afebrile.  Will continue to monitor.

## 2019-10-19 NOTE — CARE PLAN
Problem: Communication  Goal: The ability to communicate needs accurately and effectively will improve  Intervention: Reorient patient to environment as needed  Note:   Received pt awake alert sitting at the wheelchair, mother at bedside . Pt with flat affect but able to verbalize needs. Requested for morning meds be given prior to breakfast. Pt encouraged verbalize needs . Meds indications reviewed and explained, verbalized understanding.     Problem: Safety  Goal: Will remain free from falls  Intervention: Implement fall precautions  Note:   Safety measures enforced , bed at lowest level, bedside table, TV and call light within easy reach. Reinforced to call for assistance at all times.      Problem: Respiratory:  Goal: Respiratory status will improve  Intervention: Assess and monitor pulmonary status  Note:   Respiration unlabored with ease in breathing , on room air , denies shortness of breathing.     Problem: Psychosocial Needs:  Goal: Level of anxiety will decrease  Intervention: Identify and develop with patient strategies to cope with anxiety triggers  Note:   Pt calm with flat affect , mother at bedside assisting with care. Pt encouraged to verbalize needs and call for assistance at all times.

## 2019-10-19 NOTE — THERAPY
"Occupational Therapy  Daily Treatment     Patient Name: Cosme Cabrera  Age:  34 y.o., Sex:  male  Medical Record #: [id number]  Today's Date: 10/19/2019     Precautions  Precautions: Fall Risk, Other (See Comments)  Comments: Bilateral dorsiflexion weakness, anxiety         Subjective    \"I've noticed it is a lot harder to tie my shoes and to use my ipad to play games or text with. I can do it, but I have to use a lot more auto-correct.\"    \"I feel like everything I do here is a test. I've already said no to Speech Therapy. If I can read my chemistry textbooks I'm fine.\"     Objective      Pt dealt deck of cards x2 with difficulty putting cards on top of each other. Pt with difficulty stacking  pieces in stacks of 4. Pt played game of checkers for [facility]. Pt worked on in hand manipulation by flipping  piece 3x in hand on each move. Pt had difficulty remembering to flip  piece. While playing, OTR noticed impairments to mental flexibility and problem solving as described below. Pt also worked on theraputty exercises and was able to recall exercises learned in last session. He stated he has not been doing them as a HEP yet, because he feels he does not have enough time.  strength was measured. See below.    R Hand  (lb) :   Trial 1: 65  Trial 2: 61  Trial 3: 63  Av      L Hand  (lb) :  Trial 1: 75  Trial 2: 80  Trial 3: 62   Av.3       10/19/19 1401   Cognition    Comments Pt had difficulty with problem solving and mental flexibility for game of checkers. He often would not notice moves that would advance him and chose move prior to opponent's turn without using mental flexibility to see that a better move was now available.    Fine Motor / Dexterity    Comments  Dealing cards x2, shuffling cards, in hand manipulation of checkers pieces, Pulling coins out of theraputty   OT Total Time Spent   OT Individual Total Time Spent (Mins) 60   OT Charge Group   OT Therapy Activity 2   OT " Therapeutic Exercise  2           Assessment    Pt had tired affect at start of session. Pt stated he put all of his energy in to PT today and could not use his legs the rest of the day. Pt agreeable to working on fine motor and .  strength measured. Dominant hand has been impacted with  strength being significantly less than norms for pt's age and gender. Pt able to recall his theraputty exercises. Pt with decreased speed in shuffling and dealing deck of cards. Pt did well with brain teaser cards. Pt, however, had difficulty with playing the game of checkers. Game was originally chosen for [facility] with added task of flipping  piece 3 times with each move. Pt had difficulty with mental flexibility and problem solving required for checkers. Pt was visibly upset when feedback was given regarding cognition. Pt became defensive. Pt does not seem to have insight in to his cognitive changes.     Plan    Progress standing balance/tolerance, LE strength, UE therex program, FM control, functional mobility with FWW. Mother cleared to assist patient with ADLs in room, will continue to monitor progression    Pt would benefit from working on higher level cognition such as problem solving and mental flexibility, however, pt becomes defensive and upset when receiving feedback.

## 2019-10-20 LAB
ANION GAP SERPL CALC-SCNC: 10 MMOL/L (ref 0–11.9)
BUN SERPL-MCNC: 16 MG/DL (ref 8–22)
CALCIUM SERPL-MCNC: 9.5 MG/DL (ref 8.5–10.5)
CHLORIDE SERPL-SCNC: 103 MMOL/L (ref 96–112)
CO2 SERPL-SCNC: 27 MMOL/L (ref 20–33)
CREAT SERPL-MCNC: 0.66 MG/DL (ref 0.5–1.4)
ERYTHROCYTE [DISTWIDTH] IN BLOOD BY AUTOMATED COUNT: 56.9 FL (ref 35.9–50)
GLUCOSE SERPL-MCNC: 102 MG/DL (ref 65–99)
HCT VFR BLD AUTO: 46.3 % (ref 42–52)
HGB BLD-MCNC: 14.6 G/DL (ref 14–18)
MCH RBC QN AUTO: 29.9 PG (ref 27–33)
MCHC RBC AUTO-ENTMCNC: 31.5 G/DL (ref 33.7–35.3)
MCV RBC AUTO: 94.9 FL (ref 81.4–97.8)
PLATELET # BLD AUTO: 285 K/UL (ref 164–446)
PMV BLD AUTO: 8.8 FL (ref 9–12.9)
POTASSIUM SERPL-SCNC: 4.1 MMOL/L (ref 3.6–5.5)
RBC # BLD AUTO: 4.88 M/UL (ref 4.7–6.1)
SODIUM SERPL-SCNC: 140 MMOL/L (ref 135–145)
WBC # BLD AUTO: 9.4 K/UL (ref 4.8–10.8)

## 2019-10-20 PROCEDURE — A9270 NON-COVERED ITEM OR SERVICE: HCPCS | Performed by: PHYSICAL MEDICINE & REHABILITATION

## 2019-10-20 PROCEDURE — 99233 SBSQ HOSP IP/OBS HIGH 50: CPT | Performed by: PHYSICAL MEDICINE & REHABILITATION

## 2019-10-20 PROCEDURE — 99231 SBSQ HOSP IP/OBS SF/LOW 25: CPT | Performed by: HOSPITALIST

## 2019-10-20 PROCEDURE — 36415 COLL VENOUS BLD VENIPUNCTURE: CPT

## 2019-10-20 PROCEDURE — A9270 NON-COVERED ITEM OR SERVICE: HCPCS | Performed by: HOSPITALIST

## 2019-10-20 PROCEDURE — 97110 THERAPEUTIC EXERCISES: CPT

## 2019-10-20 PROCEDURE — 770010 HCHG ROOM/CARE - REHAB SEMI PRIVAT*

## 2019-10-20 PROCEDURE — 85027 COMPLETE CBC AUTOMATED: CPT

## 2019-10-20 PROCEDURE — 97116 GAIT TRAINING THERAPY: CPT

## 2019-10-20 PROCEDURE — 80048 BASIC METABOLIC PNL TOTAL CA: CPT

## 2019-10-20 PROCEDURE — 700102 HCHG RX REV CODE 250 W/ 637 OVERRIDE(OP): Performed by: HOSPITALIST

## 2019-10-20 PROCEDURE — 700102 HCHG RX REV CODE 250 W/ 637 OVERRIDE(OP): Performed by: PHYSICAL MEDICINE & REHABILITATION

## 2019-10-20 RX ORDER — QUETIAPINE FUMARATE 100 MG/1
100 TABLET, FILM COATED ORAL 2 TIMES DAILY
Status: DISCONTINUED | OUTPATIENT
Start: 2019-10-20 | End: 2019-10-25

## 2019-10-20 RX ADMIN — Medication 100 MG: at 08:47

## 2019-10-20 RX ADMIN — DULOXETINE HYDROCHLORIDE 20 MG: 20 CAPSULE, DELAYED RELEASE ORAL at 08:52

## 2019-10-20 RX ADMIN — OMEPRAZOLE 20 MG: 20 CAPSULE, DELAYED RELEASE ORAL at 08:47

## 2019-10-20 RX ADMIN — MELATONIN 3 MG: at 20:01

## 2019-10-20 RX ADMIN — VITAMIN D, TAB 1000IU (100/BT) 4000 UNITS: 25 TAB at 08:47

## 2019-10-20 RX ADMIN — RIVAROXABAN 20 MG: 10 TABLET, FILM COATED ORAL at 17:30

## 2019-10-20 RX ADMIN — SENNOSIDES AND DOCUSATE SODIUM 2 TABLET: 8.6; 5 TABLET ORAL at 20:01

## 2019-10-20 RX ADMIN — QUETIAPINE FUMARATE 100 MG: 100 TABLET ORAL at 20:01

## 2019-10-20 RX ADMIN — LISINOPRIL 10 MG: 5 TABLET ORAL at 08:46

## 2019-10-20 RX ADMIN — FOLIC ACID 1 MG: 1 TABLET ORAL at 08:48

## 2019-10-20 RX ADMIN — THERA TABS 1 TABLET: TAB at 08:48

## 2019-10-20 RX ADMIN — QUETIAPINE FUMARATE 100 MG: 100 TABLET ORAL at 08:46

## 2019-10-20 RX ADMIN — HYDROXYZINE HYDROCHLORIDE 50 MG: 25 TABLET, FILM COATED ORAL at 20:01

## 2019-10-20 RX ADMIN — ATENOLOL 100 MG: 25 TABLET ORAL at 08:47

## 2019-10-20 RX ADMIN — SENNOSIDES AND DOCUSATE SODIUM 2 TABLET: 8.6; 5 TABLET ORAL at 08:47

## 2019-10-20 ASSESSMENT — ENCOUNTER SYMPTOMS
SHORTNESS OF BREATH: 0
CHILLS: 0
ABDOMINAL PAIN: 0
NERVOUS/ANXIOUS: 1
COUGH: 0
MUSCULOSKELETAL NEGATIVE: 1
NAUSEA: 0
FEVER: 0
FOCAL WEAKNESS: 1
EYES NEGATIVE: 1
PALPITATIONS: 0
VOMITING: 0

## 2019-10-20 ASSESSMENT — PATIENT HEALTH QUESTIONNAIRE - PHQ9
2. FEELING DOWN, DEPRESSED, IRRITABLE, OR HOPELESS: NOT AT ALL
1. LITTLE INTEREST OR PLEASURE IN DOING THINGS: NOT AT ALL
SUM OF ALL RESPONSES TO PHQ9 QUESTIONS 1 AND 2: 0

## 2019-10-20 ASSESSMENT — LIFESTYLE VARIABLES: SUBSTANCE_ABUSE: 1

## 2019-10-20 NOTE — THERAPY
"Physical Therapy   Daily Treatment     Patient Name: Cosme Cabrera  Age:  34 y.o., Sex:  male  Medical Record #: [id number]  Today's Date: 10/20/2019     Precautions  Precautions: (P) Fall Risk  Comments: (P) Bilateral dorsiflexion weakness, anxiety    Subjective    Patient reports \"it feels good to walk\"     Objective       10/20/19 0831   Precautions   Precautions Fall Risk   Comments Bilateral dorsiflexion weakness, anxiety   PT Total Time Spent   PT Individual Total Time Spent (Mins) 30   PT Charge Group   PT Gait Training 1   PT Therapeutic Exercise 1       FIM Bed/Chair/Wheelchair Transfers Score: 5 - Standby Prompting/Supervision or Set-up  Bed/Chair/Wheelchair Transfers Description:  (SBA SPT w/c to/from Nu-step)    FIM Walking Score:  4 - Minimal Assistance  Walking Description:  (200ft FWW CGA)    Assessment    Ambulation distance limited by LE fatigue; eliminated w/c follow    Plan    Continue to progress ambulation distance; assess FWW safety for in-room ambulation    "

## 2019-10-20 NOTE — PROGRESS NOTES
Hospital Medicine Daily Progress Note      Chief Complaint  PE, Tachycardia    Interval Problem Update  Pt seen and examined during therapies.  Labs reviewed.    Review of Systems  Review of Systems   Constitutional: Negative for chills and fever.   HENT: Negative.    Eyes: Negative.    Respiratory: Negative for cough and shortness of breath.    Cardiovascular: Negative for chest pain and palpitations.   Gastrointestinal: Negative for abdominal pain, nausea and vomiting.   Musculoskeletal: Negative.    Skin: Negative for itching and rash.   Neurological: Positive for focal weakness.   Psychiatric/Behavioral: Positive for substance abuse. The patient is nervous/anxious.         Physical Exam  Temp:  [36.5 °C (97.7 °F)-37 °C (98.6 °F)] 36.7 °C (98 °F)  Pulse:  [] 98  Resp:  [18] 18  BP: (115-134)/(78-86) 121/80  SpO2:  [94 %] 94 %    Physical Exam   Constitutional: He is oriented to person, place, and time. No distress.   HENT:   Head: Normocephalic and atraumatic.   Right Ear: External ear normal.   Left Ear: External ear normal.   Eyes: Conjunctivae and EOM are normal. Right eye exhibits no discharge. Left eye exhibits no discharge.   Neck: Normal range of motion. Neck supple. No tracheal deviation present.   Cardiovascular: Normal rate and regular rhythm.   Pulmonary/Chest: Effort normal and breath sounds normal. No stridor. No respiratory distress. He has no wheezes.   Abdominal: Soft. Bowel sounds are normal. He exhibits no distension. There is no tenderness.   obese   Musculoskeletal: He exhibits no edema or tenderness.   Neurological: He is alert and oriented to person, place, and time.   Skin: Skin is warm and dry. He is not diaphoretic.   Vitals reviewed.      Fluids    Intake/Output Summary (Last 24 hours) at 10/20/2019 1144  Last data filed at 10/20/2019 0900  Gross per 24 hour   Intake 940 ml   Output --   Net 940 ml       Laboratory  Recent Labs     10/20/19  0511   WBC 9.4   RBC 4.88   HEMOGLOBIN  14.6   HEMATOCRIT 46.3   MCV 94.9   MCH 29.9   MCHC 31.5*   RDW 56.9*   PLATELETCT 285   MPV 8.8*     Recent Labs     10/20/19  0511   SODIUM 140   POTASSIUM 4.1   CHLORIDE 103   CO2 27   GLUCOSE 102*   BUN 16   CREATININE 0.66   CALCIUM 9.5                   Assessment/Plan  Acute saddle pulmonary embolism (HCC)- (present on admission)  Assessment & Plan  Has recurrent bilat PE  Echo w/ right heart strain (persistent vs recurrent)  Anticoagulated on Xarelto    Paroxysmal atrial fibrillation (HCC)- (present on admission)  Assessment & Plan  Suspect 2/2 PE  On Atenolol for rate control  Anticoagulated on Xarelto    ETOH abuse- (present on admission)  Assessment & Plan  On Thiamine, MVT, and Folate supplements    Urinary retention  Assessment & Plan  Now off Flomax    Vitamin D deficiency  Assessment & Plan  Vit D level 15  On supplementation    Type 2 diabetes mellitus without complication, without long-term current use of insulin ([facility])- (present on admission)  Assessment & Plan  HbA1c 6.6  FSBS usually under 100, so discontinued SSI  Encourage diet and lifestyle modifications    ALLI (obstructive sleep apnea)- (present on admission)  Assessment & Plan  RT protocol  Suggest outpt PSG if not yet done    Anxiety- (present on admission)  Assessment & Plan  Management per Primary Team  Psychiatry consult pending    Essential hypertension- (present on admission)  Assessment & Plan  On Atenolol and Lisinopril  Observe blood pressure trends     Full Code    Patient seen and examined.  Chart reviewed.  Assessment and Plan as above.  No changes today.

## 2019-10-20 NOTE — CARE PLAN
Problem: Communication  Goal: The ability to communicate needs accurately and effectively will improve  Outcome: PROGRESSING AS EXPECTED  Note:   Patient able to verbalize needs.  Will continue to monitor.      Problem: Safety  Goal: Will remain free from injury  Outcome: PROGRESSING AS EXPECTED  Note:   Pt uses call light consistently and appropriately. Waits for assistance does not attempt self transfer this shift. Able to verbalize needs.      Problem: Bowel/Gastric:  Goal: Normal bowel function is maintained or improved  Outcome: PROGRESSING AS EXPECTED  Note:   Patient having regular bowel movements; last BM 10/19.  Denies s/s constipation; bowel meds available if needed.  Will continue to monitor.

## 2019-10-20 NOTE — PROGRESS NOTES
"Rehab Progress Note     Date of Service: 10/20/2019  Chief Complaint: anxiety    Interval Events (Subjective)    Patient seen and examined in his room today.  He reports therapy is going quite well.  He continues to have weakness in his ankles.  He and his mother have questions about whether this will be a barrier to discharge.  He is hoping to get out of rehab by the end of the month return to [place].  He does have a doctor that he previously seen as his primary care doctor back in [place] that the mother has reached out to to see if he will resume care of the patient.  Patient would like to start titrating the Seroquel so we will go down to twice a day.  He is tolerating the Cymbalta without any side effects.  He reports he is urinating and moving his bowels now without any issues.  He reports he is sleeping well.  No new complaints.    Objective:  VITAL SIGNS: /80   Pulse 98   Temp 36.7 °C (98 °F) (Oral)   Resp 18   Ht 1.727 m (5' 8\")   Wt 118 kg (260 lb 3.2 oz)   SpO2 94%   BMI 39.56 kg/m²   Gen: alert, no apparent distress, obese  CV: regular rate and rhythm, no murmurs, no peripheral edema  Resp: clear to ascultation bilaterally, normal respiratory effort  GI: soft, non-tender abdomen, bowel sounds present  Neuro: notable for bilateral ankle weakness  Psych: flat affect, anxious    Recent Results (from the past 72 hour(s))   ACCU-CHEK GLUCOSE    Collection Time: 10/17/19  5:20 PM   Result Value Ref Range    Glucose - Accu-Ck 78 65 - 99 mg/dL   ACCU-CHEK GLUCOSE    Collection Time: 10/17/19  8:30 PM   Result Value Ref Range    Glucose - Accu-Ck 92 65 - 99 mg/dL   ACCU-CHEK GLUCOSE    Collection Time: 10/18/19  8:03 AM   Result Value Ref Range    Glucose - Accu-Ck 93 65 - 99 mg/dL   CBC WITHOUT DIFFERENTIAL    Collection Time: 10/20/19  5:11 AM   Result Value Ref Range    WBC 9.4 4.8 - 10.8 K/uL    RBC 4.88 4.70 - 6.10 M/uL    Hemoglobin 14.6 14.0 - 18.0 g/dL    Hematocrit 46.3 42.0 - 52.0 %    " MCV 94.9 81.4 - 97.8 fL    MCH 29.9 27.0 - 33.0 pg    MCHC 31.5 (L) 33.7 - 35.3 g/dL    RDW 56.9 (H) 35.9 - 50.0 fL    Platelet Count 285 164 - 446 K/uL    MPV 8.8 (L) 9.0 - 12.9 fL   Basic Metabolic Panel    Collection Time: 10/20/19  5:11 AM   Result Value Ref Range    Sodium 140 135 - 145 mmol/L    Potassium 4.1 3.6 - 5.5 mmol/L    Chloride 103 96 - 112 mmol/L    Co2 27 20 - 33 mmol/L    Glucose 102 (H) 65 - 99 mg/dL    Bun 16 8 - 22 mg/dL    Creatinine 0.66 0.50 - 1.40 mg/dL    Calcium 9.5 8.5 - 10.5 mg/dL    Anion Gap 10.0 0.0 - 11.9   ESTIMATED GFR    Collection Time: 10/20/19  5:11 AM   Result Value Ref Range    GFR If African American >60 >60 mL/min/1.73 m 2    GFR If Non African American >60 >60 mL/min/1.73 m 2       Current Facility-Administered Medications   Medication Frequency   • QUEtiapine (SEROQUEL) tablet 100 mg BID   • lisinopril (PRINIVIL) tablet 10 mg Q DAY   • DULoxetine (CYMBALTA) capsule 20 mg DAILY   • vitamin D (cholecalciferol) tablet 4,000 Units DAILY   • melatonin tablet 3 mg QHS   • thiamine tablet 100 mg DAILY   • Respiratory Care per Protocol Continuous RT   • Pharmacy Consult Request ...Pain Management Review 1 Each PHARMACY TO DOSE   • acetaminophen (TYLENOL) tablet 650 mg Q4HRS PRN   • artificial tears ophthalmic solution 1 Drop PRN   • benzocaine-menthol (CEPACOL) lozenge 1 Lozenge Q2HRS PRN   • mag hydrox-al hydrox-simeth (MAALOX PLUS ES or MYLANTA DS) suspension 20 mL Q2HRS PRN   • ondansetron (ZOFRAN ODT) dispertab 4 mg 4X/DAY PRN    Or   • ondansetron (ZOFRAN) syringe/vial injection 4 mg 4X/DAY PRN   • sodium chloride (OCEAN) 0.65 % nasal spray 2 Spray PRN   • atenolol (TENORMIN) tablet 100 mg DAILY   • calcium carbonate (TUMS) chewable tab 500 mg TID PRN   • [START ON 10/25/2019] cyanocobalamin (VITAMIN B-12) injection 1,000 mcg Q30 DAYS   • folic acid (FOLVITE) tablet 1 mg DAILY   • gabapentin (NEURONTIN) capsule 300 mg HS PRN   • hydrOXYzine HCl (ATARAX) tablet 50 mg TID PRN    • metoprolol (LOPRESSOR) tablet 12.5 mg Q8HRS PRN   • multivitamin (THERAGRAN) tablet 1 Tab DAILY   • omeprazole (PRILOSEC) capsule 20 mg DAILY   • rivaroxaban (XARELTO) tablet 20 mg PM MEAL   • senna-docusate (PERICOLACE or SENOKOT S) 8.6-50 MG per tablet 2 Tab BID    And   • polyethylene glycol/lytes (MIRALAX) PACKET 1 Packet DAILY    And   • magnesium hydroxide (MILK OF MAGNESIA) suspension 30 mL QDAY PRN    And   • bisacodyl (DULCOLAX) suppository 10 mg QDAY PRN       Orders Placed This Encounter   Procedures   • Diet Order Regular     Standing Status:   Standing     Number of Occurrences:   1     Order Specific Question:   Diet:     Answer:   Regular [1]       Assessment:  Active Hospital Problems    Diagnosis   • *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    Therapy update 10/20/2019  Supervision eating  Supervision grooming  Supervision bathing  Supervision upper body dressing  Min assist lower body dressing  Min assist toileting  Supervisionbladder  Supervision bowel  Supervision bed/chair transfer  Min assist toilet transfer  Min assist tub/shower transfer  Min assist ambulation  Supervision wheelchair propulsion  Max assist stairs  Modified Independent comprehension  Independent expression  Supervision social interaction  Modified  Independent problem solving  Supervision memory    We will have his first weekly conference tomorrow to pick a discharge date.      Medical Decision Making and Plan:    Subacute combined degeneration of spinal cord  Incomplete paraplegia  From whippit use  Paresthesias, continues  Ankle/foot weakness, continues  Urinary retention, resolved  Continue full rehab program  PT/OT, 1.5 hr each discipline, 5 days per week     Polysubstance abuse  Anxiety disorder, stable  Insomnia, resolved  Consult psychiatry, appreciate assistance  Continue Seroquel, 100 mg TID --> BID, continue to titrated down  Started Cymbalta 20 mg daily  PRN hydroxyzine  Scheduled melatonin  Consult Dr. Chacko, psychology  Bad reactions to Risperdal, Lamictal, trazodone, Wellbutrin     Acute Urinary retention, resolved  Neurogenic bladder  Likely due to posterior column injury  Discontinue Flomax  IC for over 400 cc  Replace Srivastava if unable to unable to urinate  Continue to monitor with as needed bladder scans    Bowel  Meds as needed  Last BM 10/18    DVT  Xarelto    Appreciate the assistance of the hospitalist with his medical co-morbidities:     Pulmonary emboli  Right heart strain  Paroxysmal atrial fibrillation  Sinus tachycardia  Hypertension  Leukocytosis, resolved  Elevated liver enzymes, improved  Vit D deficiency    Total time:  35 minutes.  I spent greater than 50% of the time for patient care, counseling, and coordination on this date, including patient face-to face time, unit/floor time with review of records/pertinent lab data and studies, as well as discussing diagnostic evaluation/work up, planned therapeutic interventions, and future disposition of care, as per the interval events/subjective and the assessment and plan as noted above.    Entire time today spent discussing discharge options, titration down of Seroquel.    Sue Rock M.D.   Physical Medicine and Rehabilitation

## 2019-10-20 NOTE — PROGRESS NOTES
Patient care assumed. Report received from day RN. Patient is alert and calm, resting in bed with family at bedside. Call light and bedside table within reach.

## 2019-10-20 NOTE — THERAPY
Occupational Therapy  Daily Treatment     Patient Name: Cosem Cabrera  Age:  34 y.o., Sex:  male  Medical Record #: [id number]  Today's Date: 10/20/2019     Precautions  Precautions: Fall Risk  Comments: Bilateral dorsiflexion weakness, anxiety         Subjective    Pt alert and cooperative, seated in w/c upon arrival     Objective       10/20/19 1231   Sitting Upper Body Exercises   Lat Pull 3 sets of 15;Bilateral;Weight (See Comments for lbs)  (30# equalizer)   Lat Press 3 sets of 15;Bilateral;Weight (See Comments for lbs)  (35# rickshaw)   Bilateral Row 3 sets of 15;Bilateral;Weight (See Comments for lbs)  (45# equalizer)   Tricep Press 3 sets of 15;Bilateral;Weight (See Comments for lbs)  (20# rickshaw)   Interdisciplinary Plan of Care Collaboration   Patient Position at End of Therapy Seated;Family / Friend in Room   OT Total Time Spent   OT Individual Total Time Spent (Mins) 30   OT Charge Group   OT Therapeutic Exercise  2       Assessment    Pt completed UB therex to the best of their abilities with no complaints of pain    Plan    Cont overall strength/endurance and standing balance to improve ADL's and functional mobility

## 2019-10-21 PROCEDURE — 700102 HCHG RX REV CODE 250 W/ 637 OVERRIDE(OP): Performed by: PHYSICAL MEDICINE & REHABILITATION

## 2019-10-21 PROCEDURE — 99231 SBSQ HOSP IP/OBS SF/LOW 25: CPT | Performed by: HOSPITALIST

## 2019-10-21 PROCEDURE — 770010 HCHG ROOM/CARE - REHAB SEMI PRIVAT*

## 2019-10-21 PROCEDURE — 97110 THERAPEUTIC EXERCISES: CPT

## 2019-10-21 PROCEDURE — 99233 SBSQ HOSP IP/OBS HIGH 50: CPT | Performed by: PHYSICAL MEDICINE & REHABILITATION

## 2019-10-21 PROCEDURE — 97530 THERAPEUTIC ACTIVITIES: CPT

## 2019-10-21 PROCEDURE — 97116 GAIT TRAINING THERAPY: CPT

## 2019-10-21 PROCEDURE — 700102 HCHG RX REV CODE 250 W/ 637 OVERRIDE(OP): Performed by: HOSPITALIST

## 2019-10-21 PROCEDURE — A9270 NON-COVERED ITEM OR SERVICE: HCPCS | Performed by: HOSPITALIST

## 2019-10-21 PROCEDURE — A9270 NON-COVERED ITEM OR SERVICE: HCPCS | Performed by: PHYSICAL MEDICINE & REHABILITATION

## 2019-10-21 RX ADMIN — THERA TABS 1 TABLET: TAB at 08:00

## 2019-10-21 RX ADMIN — LISINOPRIL 10 MG: 5 TABLET ORAL at 08:12

## 2019-10-21 RX ADMIN — Medication 100 MG: at 08:00

## 2019-10-21 RX ADMIN — QUETIAPINE FUMARATE 100 MG: 100 TABLET ORAL at 19:47

## 2019-10-21 RX ADMIN — QUETIAPINE FUMARATE 100 MG: 100 TABLET ORAL at 08:00

## 2019-10-21 RX ADMIN — SENNOSIDES AND DOCUSATE SODIUM 2 TABLET: 8.6; 5 TABLET ORAL at 07:59

## 2019-10-21 RX ADMIN — RIVAROXABAN 20 MG: 10 TABLET, FILM COATED ORAL at 17:32

## 2019-10-21 RX ADMIN — FOLIC ACID 1 MG: 1 TABLET ORAL at 07:59

## 2019-10-21 RX ADMIN — OMEPRAZOLE 20 MG: 20 CAPSULE, DELAYED RELEASE ORAL at 07:59

## 2019-10-21 RX ADMIN — MELATONIN 3 MG: at 19:47

## 2019-10-21 RX ADMIN — DULOXETINE HYDROCHLORIDE 20 MG: 20 CAPSULE, DELAYED RELEASE ORAL at 08:00

## 2019-10-21 RX ADMIN — VITAMIN D, TAB 1000IU (100/BT) 4000 UNITS: 25 TAB at 07:59

## 2019-10-21 RX ADMIN — ATENOLOL 100 MG: 25 TABLET ORAL at 08:12

## 2019-10-21 RX ADMIN — HYDROXYZINE HYDROCHLORIDE 50 MG: 25 TABLET, FILM COATED ORAL at 19:48

## 2019-10-21 ASSESSMENT — LIFESTYLE VARIABLES: SUBSTANCE_ABUSE: 1

## 2019-10-21 ASSESSMENT — ENCOUNTER SYMPTOMS
COUGH: 0
SHORTNESS OF BREATH: 0
NERVOUS/ANXIOUS: 1
MUSCULOSKELETAL NEGATIVE: 1
VOMITING: 0
PALPITATIONS: 0
FOCAL WEAKNESS: 1
EYES NEGATIVE: 1
FEVER: 0
ABDOMINAL PAIN: 0
CHILLS: 0
NAUSEA: 0

## 2019-10-21 NOTE — THERAPY
Physical Therapy   Daily Treatment     Patient Name: Cosme Cabrera  Age:  34 y.o., Sex:  male  Medical Record #: [id number]  Today's Date: 10/21/2019     Precautions  Precautions: Fall Risk  Comments: (P) Bilateral lower extremity weakness, anxiety    Subjective    Pt greeted in therapy gym ready for PT     Objective       10/21/19 1331   Precautions   Precautions Fall Risk   Comments Bilateral lower extremity weakness, anxiety   Vitals   Pulse (!) 103   Vitals Comments HR monitored throughout LE exercise session, 103-106 bpm   Sitting Lower Body Exercises   Nustep Resistance Level 3;Time (See Comments)  (B LE/UE x 15' )   Bed Mobility    Sit to Stand Supervised   Interdisciplinary Plan of Care Collaboration   IDT Collaboration with  Family / Caregiver;Physical Therapist   Patient Position at End of Therapy Seated   Collaboration Comments Family present in therapy session, PT:POC/Tx idea   PT Total Time Spent   PT Individual Total Time Spent (Mins) 30   PT Charge Group   PT Therapeutic Exercise 1   PT Therapeutic Activities 1   Supervising Physical Therapist Julian Montoya       Transfer wc <> stepper SBA    Assessment    Pt tolerated 15' on recumbent stepper using B LE/UE on L3 for a total distance of 0.60mi and 70spm avg.    Plan    Therapeutic exercise, NuStep, gait training, bed mobility on a normal flat bed, safety education

## 2019-10-21 NOTE — REHAB-COLLABORATIVE ONGOING IDT TEAM NOTE
Weekly Interdisciplinary Team Conference Note    Weekly Interdisciplinary Team Conference # 1  Date:  10/21/2019    Clinicians present and reporting at team conference include the following:   MD: Sue Rock MD   RN:  Princess Donna RN    PT:   Dima Montoya PT, MPT, MEd  OT:  Rosmery Munoz MS, OTR/L    ST:  Not Applicable.   CM:  Amber Pinzon RN [facility]  REC:  Not Applicable  RT:  Not Applicable  RPh:  Bart Garcia [facility]  Other:   None  All reporting clinicians have a working knowledge of this patient's plan of care.    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) 09/19/2019     Priority: Medium   • ETOH abuse 05/26/2019     Priority: Medium   • Transaminitis 05/26/2019     Priority: Medium   • Electrolyte abnormality 05/26/2019     Priority: Medium   • Morbid obesity (HCC) 09/20/2019     Priority: Low   • ALLI (obstructive sleep apnea) 09/20/2019     Priority: Low   • Pulmonary hypertension (Former[facility]) 05/28/2019     Priority: Low   • Anxiety 05/27/2019     Priority: Low   • Essential hypertension 05/26/2019     Priority: Low   • LAZARO (acute kidney injury) ([facility]) 05/26/2019     Priority: Low   • Vitamin D deficiency 10/17/2019   • Urinary retention 10/17/2019   • 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 **         Nursing  Diet/Nutrition:  Regular and Thin Liquids  Medication Administration:  Whole with Liquid Wash  % consumed at meals in last 24 hours:  Consumed % of meals per documentation.  Meal/Snack Consumption for the past 24 hrs:   Oral Nutrition   10/20/19 1758 Dinner;Between 25-50% Consumed   10/20/19 1222 Lunch;Between % Consumed   10/20/19 0900 Between 25-50% Consumed     Snack schedule:  HS  Supplement:  Other: N/A  Appetite:  Fair  Fluid Intake/Output in past 24 hours: In: 860 [P.O.:860]  Out: - Pt up to the toilet to void.  Admit Weight:  Weight: 121.5 kg (267 lb 13.7 oz)  Weight Last 7 Days   [118 kg (260 lb 3.2 oz)-121.5 kg (267 lb 13.7 oz)] 118 kg (260 lb 3.2 oz) (10/20 1000)    Pain Issues:    Location:  --  --         Severity:  Denies   Scheduled pain medications:  None     PRN pain medications used in last 24 hours:  None   Non Pharmacologic Interventions:  Denies Pain  Effectiveness of pain management plan:  Denies Pain  Bowel:    Bowel Assist Initial Score:  5 - Standby Prompting/Supervision or Set-up  Bowel Assist Current Score:  5 - Standby Prompting/Supervision or Set-up  Bowl Accidents in last 7 days:     Last bowel movement: 10/20/19(Per pt.)  Stool Description: Medium, Brown, Formed     Usual bowel pattern:  daily  Scheduled bowel medications:  senna-docusate (PERICOLACE or SENOKOT S)  and polyethylene glycol/lytes (MIRALAX)   PRN bowel medications used in last 24 hours:  None  Nursing Interventions:  Increased time, Scheduled medication, Positioning on commode/toilet, Supervision  Effectiveness of bowel program:   good=regular, predictable, controlled emptying of bowel  Bladder:    Bladder Assist Initial Score:  5 - Standby Prompting/Supervision or Set-up  Bladder Assist Current Score:  5 - Standby Prompting/Supervision or Set-up  Bladder Accidents in last 7 days:     PVR range for past 24-48 hours: -- ()  Intermittent Catheterization:  N/A  Medications affecting bladder:  None     Time void schedule/voiding pattern:  Voiding every 2-4 hours  Interventions:  Increased time, Urinal, Emptying of device, Time void patient initiated  Effectiveness of bladder training:  Good=regular, predictable, emptying of bladder, patient initiates time voiding    Sleep/wake cycle:   Average hours slept:  Sleeps 4-6 hours without waking  Sleep medication usage:  Other Melatonin 3mg.    Patient/Family Training/Education:  Fall Prevention, General Self Care, Medication Management and Safe Transfers  Discharge Supply Recommendations:  Other: Adaptive equipment.  Strengths: Supportive family and Pleasant and cooperative   Barriers:   Generalized weakness and Limited mobility       Nursing Problems     Problem: Bowel/Gastric:     Description:     Goal: Normal bowel function is maintained or improved     Description:           Goal: Will not experience complications related to bowel motility     Description:                 Problem: Communication     Description:     Goal: The ability to communicate needs accurately and effectively will improve     Description:                 Problem: Discharge Barriers/Planning     Description:     Goal: Patient's continuum of care needs will be met     Description:                 Problem: Infection     Description:     Goal: Will remain free from infection     Description:                 Problem: Knowledge Deficit     Description:     Goal: Knowledge of disease process/condition, treatment plan, diagnostic tests, and medications will improve     Description:           Goal: Knowledge of the prescribed therapeutic regimen will improve     Description:                 Problem: Pain Management     Description:     Goal: Pain level will decrease to patient's comfort goal     Description:                 Problem: Psychosocial Needs:     Description:     Goal: Level of anxiety will decrease     Description:                 Problem: Respiratory:     Description:     Goal: Respiratory  status will improve     Description:                 Problem: Safety     Description:     Goal: Will remain free from injury     Description:           Goal: Will remain free from falls     Description:                 Problem: Skin Integrity     Description:     Goal: Risk for impaired skin integrity will decrease     Description:                 Problem: Urinary Elimination:     Description:     Goal: Ability to reestablish a normal urinary elimination pattern will improve     Description:                 Problem: Venous Thromboembolism (VTW)/Deep Vein Thrombosis (DVT) Prevention:     Description:     Goal: Patient will participate in Venous Thrombosis (VTE)/Deep Vein Thrombosis (DVT)Prevention Measures     Description:                        Long Term Goals:   At discharge patient will be able to function safely at home and in the community with support.    Section completed by:  ОЛЕГ [email or web address]           Mobility  Bed mobility:   Supervision supine to/from sit  Bed /Chair/Wheelchair Transfer Initial:  5 - Standby Prompting/Supervision   Bed /Chair/Wheelchair Transfer Current:  5 - Standby Prompting/Supervision      Bed/Chair/Wheelchair Transfer Description:  Increased time, Supervision for safety, Adaptive equipment(Bedrails or FWW)  Walk Initial:  2 - Max Assistance  Walk Current:  5 - Standby Prompting/Supervision FWW  FT   Walk Description:  Extra time, Supervision for safety, Walker(FWW intermittent  FT)  Wheelchair Initial:  5 - Standby Prompting/Supervision or Set-up  Wheelchair Current:  6 - Modified Independent   Wheelchair Description:  Extra time  Stairs Initial:  2 - Max Assistance  Stairs Current: 2 - Max Assistance   CGA   Stairs Description: Extra time, Verbal cueing, Supervision for safety, Hand rails, Ascends/descends 4 to 11 steps  Patient/Family Training/Education: In progress  DME/DC Recommendations: TBD  Strengths:  Independent PLOF, Making steady progress towards  goals, Manages pain appropriately, Motivated for self care and independence, Pleasant and cooperative, Supportive family and Willingly participates in therapeutic activities  Barriers:   Other: Impaired strength bilateral lower extremities, impaired standing balance, gait, fall risk, impaired tolerance for activity  # of short term goals set= 3  # of short term goals met= 3  Physical Therapy Problems     Problem: PT-Long Term Goals     Dates: Start: 10/17/19       Description:     Goal: LTG-By discharge, patient will ambulate     Dates: Start: 10/17/19       Description: 1) Individualized goal: Gait fww/spc 300-400 FT modified independent at discharge  2) Interventions:  PT Gait Training, PT Therapeutic Exercises, PT Neuro Re-Ed/Balance and PT Therapeutic Activity             Goal: LTG-By discharge, patient will transfer one surface to another     Dates: Start: 10/17/19       Description: 1) Individualized goal: Transfer one surface to another FWW/SPC modified independent at discharge  2) Interventions:  PT Gait Training, PT Therapeutic Exercises, PT Neuro Re-Ed/Balance and PT Therapeutic Activity             Goal: LTG-By discharge, patient will ambulate up/down 4-6 stairs     Dates: Start: 10/17/19       Description: 1) Individualized goal: Up/down 4-6 stairs modified independent at discharge  2) Interventions:  PT Gait Training, PT Therapeutic Exercises, PT Neuro Re-Ed/Balance and PT Therapeutic Activity             Goal: LTG-By discharge, patient will transfer in/out of a car     Dates: Start: 10/17/19       Description: 1) Individualized goal: Car transfer FWW/SPC modified independent at discharge  2) Interventions:  PT Gait Training, PT Therapeutic Exercises, PT Neuro Re-Ed/Balance and PT Therapeutic Activity                           Section completed by:  ALBERTINA Prajapati    Activities of Daily Living  Eating Initial:  5 - Standby Prompting/Supervision or Set-up  Eating Current:  6 - Modified  Independent   Eating Description:  Increased time  Grooming Initial:  5 - Standby Prompting/Supervision or Set-up  Grooming Current:  5 - Standby Prompting/Supervision or Set-up   Grooming Description:  Supervision for safety(in standing)  Bathing Initial:  5 - Standby Prompting/Supervision or Set-up  Bathing Current:  5 - Standby Prompting/Supervision or Set-up   Bathing Description:  Grab bar(per patient and mother - completes in seated position)  Upper Body Dressing Initial:  5 - Standby Prompting/Supervision or Set-up  Upper Body Dressing Current:  5 - Standby Prompting/Supervision or Set-up   Upper Body Dressing Description:  Set-up of equipment  Lower Body Dressing Initial:  4 - Minimal Assistance  Lower Body Dressing Current:  5 - Standby Prompting/Supervsion or Set-up   Lower Body Dressing Description:  5 - Standby Prompting/Supervsion or Set-up  Toileting Initial:  5 - Standby Prompting/Supervision or Set-up  Toileting Current:  5 - Standby Prompting/Supervision or Set-up   Toileting Description:  Grab bar, Supervision for safety  Toilet Transfer Initial:  5 - Standby Prompting/Supervision or Set-up  Toilet Transfer Current:  4 - Minimal Assistance   Toilet Transfer Description:  4 - Minimal Assistance  Tub / Shower Transfer Initial:  4 - Minimal Assistance  Tub / Shower Transfer Current:  4 - Minimal Assistance   Tub / Shower Transfer Description:  (per pt and mother completes with supervision using GB from w/c. Dry run completed using FWW with Min A)  IADL:  Not yet addressed   Family Training/Education:  Ongoing, mom present often and supportive, cleared to assist with ADLs in-room   DME/DC Recommendations:  Shower chair, grab bars in shower and next to toilet, FWW, may need w/c for community mobility     Strengths:  Able to follow instructions, Independent PLOF, Making steady progress towards goals, Manages pain appropriately, Motivated for self care and independence, Pleasant and cooperative,  Supportive family and Willingly participates in therapeutic activities  Barriers:  Decreased endurance, Generalized weakness, Limited mobility, Poor balance and Other: LE weakness, impaired FM control bilaterally, low frustration tolerance     # of short term goals set= 3    # of short term goals met= 1     Occupational Therapy Goals     Problem: Functional Transfers     Dates: Start: 10/17/19       Description:     Goal: STG-Within one week, patient will     Dates: Start: 10/17/19       Description: 1) Individualized Goal:  ambulate to/from bathroom and transfer on/off regular toilet with supervision using FWW  2) Interventions:  OT Orthotics Training, OT E Stim Attended, OT Group Therapy, OT Self Care/ADL, OT Cognitive Skill Dev, OT Community Reintegration, OT Neuro Re-Ed/Balance, OT Sensory Int Techniques, OT Therapeutic Activity, OT Evaluation and OT Therapeutic Exercise     Note:     Goal Note filed on 10/21/19 1303 by Rosmery Munoz MS,OTR/L    Min A for mobility with FWW                         Problem: IADL's     Dates: Start: 10/17/19       Description:     Goal: STG-Within one week, patient will access kitchen area     Dates: Start: 10/17/19       Description: 1) Individualized Goal:  With supervision using FWW or counter for support  2) Interventions:  OT Orthotics Training, OT E Stim Attended, OT Group Therapy, OT Self Care/ADL, OT Cognitive Skill Dev, OT Community Reintegration, OT Neuro Re-Ed/Balance, OT Sensory Int Techniques, OT Therapeutic Activity, OT Evaluation and OT Therapeutic Exercise     Note:     Goal Note filed on 10/21/19 1303 by Rosmery Munoz MS,OTR/L    Low standing tolerance during OT sessions limiting participation in functional mobility                        Problem: OT Long Term Goals     Dates: Start: 10/17/19       Description:     Goal: LTG-By discharge, patient will complete basic self care tasks     Dates: Start: 10/17/19       Description: 1) Individualized Goal:  With modified  independence   2) Interventions:  OT Orthotics Training, OT E Stim Attended, OT Group Therapy, OT Self Care/ADL, OT Cognitive Skill Dev, OT Community Reintegration, OT Neuro Re-Ed/Balance, OT Sensory Int Techniques, OT Therapeutic Activity, OT Evaluation and OT Therapeutic Exercise           Goal: LTG-By discharge, patient will perform bathroom transfers     Dates: Start: 10/17/19       Description: 1) Individualized Goal:  With modified independence   2) Interventions:  OT Orthotics Training, OT E Stim Attended, OT Group Therapy, OT Self Care/ADL, OT Cognitive Skill Dev, OT Community Reintegration, OT Neuro Re-Ed/Balance, OT Sensory Int Techniques, OT Therapeutic Activity, OT Evaluation and OT Therapeutic Exercise             Goal: LTG-By discharge, patient will complete basic home management     Dates: Start: 10/17/19       Description: 1) Individualized Goal:  With modified independence   2) Interventions:  OT Orthotics Training, OT E Stim Attended, OT Group Therapy, OT Self Care/ADL, OT Cognitive Skill Dev, OT Community Reintegration, OT Neuro Re-Ed/Balance, OT Sensory Int Techniques, OT Therapeutic Activity, OT Evaluation and OT Therapeutic Exercise                         Section completed by:  Rosmery Munoz MS,OTR/L          Nutrition  Dietary Problems (Active)      There are no active problems.            NUTRITION SERVICES: BMI - Pt with BMI >40 (=Body mass index is 40.73 kg/m².), morbid obesity. Weight loss counseling not appropriate in acute care setting. RECOMMEND - Referral to outpatient nutrition services for weight management after D/C.       Section completed by:  Fay Jaquez R.D.    REHAB-Pharmacy IDT Team Note by Bart Garcia RPH at 10/18/2019  4:12 PM  Version 1 of 1    Author:  Bart Garcia RPH Service:  -- Author Type:  Pharmacist    Filed:  10/18/2019  4:13 PM Date of Service:  10/18/2019  4:12 PM Status:  Signed    :  Bart Garcia RPH (Pharmacist)         Pharmacy    Pharmacy  Antibiotics/Antifungals/Antivirals:  Medication:      Active Orders (From admission, onward)    None        Route:        NA  Stop Date:  NA  Reason:      NA  Antihypertensives/Cardiac:  Medication:    Orders (72h ago, onward)     Start     Ordered    10/17/19 0900  atenolol (TENORMIN) tablet 100 mg  DAILY      10/16/19 1144    10/17/19 0600  lisinopril (PRINIVIL) tablet 10 mg  Q DAY      10/16/19 1144    10/16/19 1144  metoprolol (LOPRESSOR) tablet 12.5 mg  EVERY 8 HOURS PRN      10/16/19 1144              Patient Vitals for the past 24 hrs:   BP Pulse   10/18/19 1500 110/77 85   10/18/19 0659 127/79 100   10/18/19 0600 119/78 --   10/18/19 0532 119/78 (!) 113   10/17/19 1835 113/77 (!) 110     Anticoagulation:  Medication: Xarelto    Other key medications: A review of the medication list reveals no issues at this time. Patient is currently on antihypertensive(s). Recommend home blood pressure monitoring/recording if antihypertensive(s) regimen(s) continue.    Section completed by: Bart Garcia [facility][AW.[email or web address]]     Attribution Key     AW.1 - Bart Garcia [facility] on 10/18/2019  4:12 PM                  DC Planning  DC destination/dispostion:  Plan is for patient to travel by car or plane to [place], [place], to live with his mother in her single level home. Patient was living in [place]; rented a mother-in-laws quarter and also has a 35' camper.  Mother is here from [facility] to support patient in his recovery and get [place] back to [place]. They are going to move the camper [place]. Mother stated she wishes to travel to [place] as soon as possible, with patient, after his discharge. They may be staying in his camper for a brief period which has stair access.    Referrals: None at this time.     DC Needs: New PC in  [place] - Dr. Torsten Garcia [phone]. Pending reacceptance of patient to his practice.   Barriers to discharge: Functional deficits in strength, balance, coordination, and ADL's.    Strengths: Return[place] to [place]. F/U  therapies, MD appointments to be arranged.     Section completed by:  Amber Pinzon R.N.      Physician Summary  Sue Rock MD participated and led team conference discussion.

## 2019-10-21 NOTE — REHAB-PT IDT TEAM NOTE
Physical Therapy   Mobility  Bed mobility:   Supervision supine to/from sit  Bed /Chair/Wheelchair Transfer Initial:  5 - Standby Prompting/Supervision   Bed /Chair/Wheelchair Transfer Current:  5 - Standby Prompting/Supervision      Bed/Chair/Wheelchair Transfer Description:  Increased time, Supervision for safety, Adaptive equipment(Bedrails or FWW)  Walk Initial:  2 - Max Assistance  Walk Current:  5 - Standby Prompting/Supervision FWW  FT   Walk Description:  Extra time, Supervision for safety, Walker(FWW intermittent  FT)  Wheelchair Initial:  5 - Standby Prompting/Supervision or Set-up  Wheelchair Current:  6 - Modified Independent   Wheelchair Description:  Extra time  Stairs Initial:  2 - Max Assistance  Stairs Current: 2 - Max Assistance   CGA   Stairs Description: Extra time, Verbal cueing, Supervision for safety, Hand rails, Ascends/descends 4 to 11 steps  Patient/Family Training/Education: In progress  DME/DC Recommendations: TBD  Strengths:  Independent PLOF, Making steady progress towards goals, Manages pain appropriately, Motivated for self care and independence, Pleasant and cooperative, Supportive family and Willingly participates in therapeutic activities  Barriers:   Other: Impaired strength bilateral lower extremities, impaired standing balance, gait, fall risk, impaired tolerance for activity  # of short term goals set= 3  # of short term goals met= 3  Physical Therapy Problems     Problem: PT-Long Term Goals     Dates: Start: 10/17/19       Description:     Goal: LTG-By discharge, patient will ambulate     Dates: Start: 10/17/19       Description: 1) Individualized goal: Gait fww/spc 300-400 FT modified independent at discharge  2) Interventions:  PT Gait Training, PT Therapeutic Exercises, PT Neuro Re-Ed/Balance and PT Therapeutic Activity             Goal: LTG-By discharge, patient will transfer one surface to another     Dates: Start: 10/17/19       Description: 1)  Individualized goal: Transfer one surface to another FWW/SPC modified independent at discharge  2) Interventions:  PT Gait Training, PT Therapeutic Exercises, PT Neuro Re-Ed/Balance and PT Therapeutic Activity             Goal: LTG-By discharge, patient will ambulate up/down 4-6 stairs     Dates: Start: 10/17/19       Description: 1) Individualized goal: Up/down 4-6 stairs modified independent at discharge  2) Interventions:  PT Gait Training, PT Therapeutic Exercises, PT Neuro Re-Ed/Balance and PT Therapeutic Activity             Goal: LTG-By discharge, patient will transfer in/out of a car     Dates: Start: 10/17/19       Description: 1) Individualized goal: Car transfer FWW/SPC modified independent at discharge  2) Interventions:  PT Gait Training, PT Therapeutic Exercises, PT Neuro Re-Ed/Balance and PT Therapeutic Activity                           Section completed by:  ALBERTINA Prajapati

## 2019-10-21 NOTE — REHAB-NURSING IDT TEAM NOTE
Nursing   Nursing  Diet/Nutrition:  Regular and Thin Liquids  Medication Administration:  Whole with Liquid Wash  % consumed at meals in last 24 hours:  Consumed % of meals per documentation.  Meal/Snack Consumption for the past 24 hrs:   Oral Nutrition   10/20/19 1758 Dinner;Between 25-50% Consumed   10/20/19 1222 Lunch;Between % Consumed   10/20/19 0900 Between 25-50% Consumed     Snack schedule:  HS  Supplement:  Other: N/A  Appetite:  Fair  Fluid Intake/Output in past 24 hours: In: 860 [P.O.:860]  Out: - Pt up to the toilet to void.  Admit Weight:  Weight: 121.5 kg (267 lb 13.7 oz)  Weight Last 7 Days   [118 kg (260 lb 3.2 oz)-121.5 kg (267 lb 13.7 oz)] 118 kg (260 lb 3.2 oz) (10/20 1000)    Pain Issues:    Location:  --  --         Severity:  Denies   Scheduled pain medications:  None     PRN pain medications used in last 24 hours:  None   Non Pharmacologic Interventions:  Denies Pain  Effectiveness of pain management plan:  Denies Pain  Bowel:    Bowel Assist Initial Score:  5 - Standby Prompting/Supervision or Set-up  Bowel Assist Current Score:  5 - Standby Prompting/Supervision or Set-up  Bowl Accidents in last 7 days:     Last bowel movement: 10/20/19(Per pt.)  Stool Description: Medium, Brown, Formed     Usual bowel pattern:  daily  Scheduled bowel medications:  senna-docusate (PERICOLACE or SENOKOT S)  and polyethylene glycol/lytes (MIRALAX)   PRN bowel medications used in last 24 hours:  None  Nursing Interventions:  Increased time, Scheduled medication, Positioning on commode/toilet, Supervision  Effectiveness of bowel program:   good=regular, predictable, controlled emptying of bowel  Bladder:    Bladder Assist Initial Score:  5 - Standby Prompting/Supervision or Set-up  Bladder Assist Current Score:  5 - Standby Prompting/Supervision or Set-up  Bladder Accidents in last 7 days:     PVR range for past 24-48 hours: -- ()  Intermittent Catheterization:  N/A  Medications affecting bladder:   None    Time void schedule/voiding pattern:  Voiding every 2-4 hours  Interventions:  Increased time, Urinal, Emptying of device, Time void patient initiated  Effectiveness of bladder training:  Good=regular, predictable, emptying of bladder, patient initiates time voiding    Sleep/wake cycle:   Average hours slept:  Sleeps 4-6 hours without waking  Sleep medication usage:  Other Melatonin 3mg.    Patient/Family Training/Education:  Fall Prevention, General Self Care, Medication Management and Safe Transfers  Discharge Supply Recommendations:  Other: Adaptive equipment.  Strengths: Supportive family and Pleasant and cooperative   Barriers:   Generalized weakness and Limited mobility       Nursing Problems     Problem: Bowel/Gastric:     Description:     Goal: Normal bowel function is maintained or improved     Description:           Goal: Will not experience complications related to bowel motility     Description:                 Problem: Communication     Description:     Goal: The ability to communicate needs accurately and effectively will improve     Description:                 Problem: Discharge Barriers/Planning     Description:     Goal: Patient's continuum of care needs will be met     Description:                 Problem: Infection     Description:     Goal: Will remain free from infection     Description:                 Problem: Knowledge Deficit     Description:     Goal: Knowledge of disease process/condition, treatment plan, diagnostic tests, and medications will improve     Description:           Goal: Knowledge of the prescribed therapeutic regimen will improve     Description:                 Problem: Pain Management     Description:     Goal: Pain level will decrease to patient's comfort goal     Description:                 Problem: Psychosocial Needs:     Description:     Goal: Level of anxiety will decrease     Description:                 Problem: Respiratory:     Description:     Goal:  Respiratory status will improve     Description:                 Problem: Safety     Description:     Goal: Will remain free from injury     Description:           Goal: Will remain free from falls     Description:                 Problem: Skin Integrity     Description:     Goal: Risk for impaired skin integrity will decrease     Description:                 Problem: Urinary Elimination:     Description:     Goal: Ability to reestablish a normal urinary elimination pattern will improve     Description:                 Problem: Venous Thromboembolism (VTW)/Deep Vein Thrombosis (DVT) Prevention:     Description:     Goal: Patient will participate in Venous Thrombosis (VTE)/Deep Vein Thrombosis (DVT)Prevention Measures     Description:                        Long Term Goals:   At discharge patient will be able to function safely at home and in the community with support.    Section completed by:  Kiara Morgan L.P.N.2       Read and reviewed by: Princess Donna R.N.

## 2019-10-21 NOTE — THERAPY
"Occupational Therapy  Daily Treatment     Patient Name: Cosme Cabrera  Age:  34 y.o., Sex:  male  Medical Record #: [id number]  Today's Date: 10/21/2019     Precautions  Precautions: (P) Fall Risk  Comments: Bilateral dorsiflexion weakness, anxiety    Safety   Comments: (P) Pt cleared to be Mod-I at w/c level for toileting and bed <> w/c txs, mom cleared to assist with showering    Subjective    \"I'm feeling better than this morning\"     Objective       10/21/19 1401   Precautions   Precautions Fall Risk   Safety    Comments Pt cleared to be Mod-I at w/c level for toileting and bed <> w/c txs, mom cleared to assist with showering   Sitting Upper Body Exercises   Sitting Upper Body Exercises Yes   Upper Extremity Bike Level 6 Resistance  (motomed; 5 min clockwise, 5 min counter, 5 min clockwise)   Other Exercise Seated core strength: modified sit-ups 2x20 with 7# weight, core twists 2x20 wth 7# weight, hip flexion 2x20 reps each LE, modified side crunch 2x20 each side   OT Total Time Spent   OT Individual Total Time Spent (Mins) 60   OT Charge Group   OT Therapy Activity 2   OT Therapeutic Exercise  2     In // bars:  Standing without UE support:  -bilateral reach up/out/back/down + unilaterally across midline 3x with seated rest break in between  -with 4# ball alternating unilateral UE shoulder press 2x10 reps with seated rest break in between  -with 4# ball alternating unilateral cross body reach 2x10 reps with seated rest break in between  With bilateral UE support:  -walking fwd/back 1x5 reps, seated rest break, 1x3 reps    Assessment    Pt tolerated session well, demos improved standing balance and tolerance from end of last week though continues to require seated rest breaks every 2-3 minutes due to LE fatigue and weakness. Endurance improving during UE therex and core    Plan    Continue to progress standing balance/tolerance, general strength and endurance toward increased safety and independence with ADLs. " Continue to address FM coordination deficits, functional cognition, outing

## 2019-10-21 NOTE — THERAPY
10/21/19 1029   Precautions   Precautions Fall Risk;Other (See Comments)   Comments Bilateral lower extremity weakness, anxiety   Pain 0 - 10 Group   Therapist Pain Assessment 0   Cognition    Cognition / Consciousness WDL   Level of Consciousness Alert   Sitting Lower Body Exercises   Ankle Pumps 3 sets of 15;Bilateral   Hip Flexion 3 sets of 15;Bilateral   Hip Abduction 3 sets of 15;Bilateral   Hip Adduction 3 sets of 15;Bilateral   Long Arc Quad 3 sets of 15;Bilateral   Hamstring Curl 3 sets of 15;Bilateral   Bed Mobility    Supine to Sit Supervised   Sit to Supine Supervised   Sit to Stand Supervised   Scooting Supervised   Rolling Modified Independent   PT Total Time Spent   PT Individual Total Time Spent (Mins) 60   PT Charge Group   PT Gait Training 2   PT Therapeutic Exercise 2   Physical Therapy   Daily Treatment     Patient Name: Cosme Cabrera  Age:  34 y.o., Sex:  male  Medical Record #: [id number]  Today's Date: 10/21/2019     Precautions  Precautions: Fall Risk  Comments: Bilateral dorsiflexion weakness, anxiety    Subjective    The patient was ready for PT.  After walking 220 FT, he said his legs are very tired today.     Objective    The patient participated in gait training and tolerated 220 FT x1 and 110 FT x2 SBA, FWW.  He also participated in seated bilateral lower extremity therapeutic exercise 3 sets of 15 and wheelchair mobility.    FIM Bed/Chair/Wheelchair Transfers Score: 5 - Standby Prompting/Supervision or Set-up  Bed/Chair/Wheelchair Transfers Description:  Increased time, Supervision for safety, Adaptive equipment(Bedrails or FWW)    FIM Walking Score:  5 - Standby Prompting/Supervision or Set-up  Walking Description:  Extra time, Supervision for safety, Walker(FWW intermittent  FT)    FIM Wheelchair Score:  6 - Modified Independent  Wheelchair Description:  Extra time    FIM Stairs Score:  2 - Max Assistance  Stairs Description:  Extra time, Verbal cueing, Supervision for  safety, Hand rails, Ascends/descends 4 to 11 steps      Assessment    The patient is motivated to participate in therapy in order to improve his strength, balance, mobility and gait.  However, due to the impairments in his nervous system his legs in particular are fatigued.    Plan    Therapeutic exercise, NuStep, gait training, bed mobility on a normal flat bed, safety education

## 2019-10-21 NOTE — PROGRESS NOTES
Received bedside shift report from Hina JAMES RN regarding patient and assumed care. Patient awake, calm and stable, currently positioned in bed for comfort and safety; call light within reach. Denies pain or discomfort at this time. Will continue to monitor.

## 2019-10-21 NOTE — THERAPY
"Occupational Therapy  Daily Treatment     Patient Name: Cosme Cabrera  Age:  34 y.o., Sex:  male  Medical Record #: [id number]  Today's Date: 10/21/2019     Precautions  Precautions: (P) Fall Risk  Comments: (P) Bilateral dorsiflexion weakness, anxiety         Subjective    \"I didn't get a break over the weekend. I'm really feeling it\"     Objective       10/21/19 1101   Precautions   Precautions Fall Risk   Comments Bilateral dorsiflexion weakness, anxiety   Cognition    Comments Req min cues for problem solving during knot tying activity. Impaired ability to sustain attention to task, low frustration tolerance   Fine Motor / Dexterity    Comments  Pt completed knot tying (x10 knots) with increasing difficulty, able to manage FM components with increased time   OT Total Time Spent   OT Individual Total Time Spent (Mins) 30   OT Charge Group   OT Therapy Activity 2     Assessment    Pt reporting feeling fatigued from weekend therapy, agreeable to progression of FM coordination skills. He was able to manage knot tying activity given increased time for FM manipulation, required min cues for problem solving though was often able to work through more complex knots after 25% initiation from OT. Thelma impaired attention and low frustration tolerance during activity.     Plan    Continue to progress standing balance/tolerance, general strength and endurance toward increased safety and independence with ADLs. Continue to address FM coordination deficits, functional cognition    "

## 2019-10-21 NOTE — CARE PLAN
Problem: Bowel/Gastric:  Goal: Normal bowel function is maintained or improved  Note:   Pt is continent of bowels. Last BM documented 10/21/19. Pt is receiving scheduled senna and miralax. Refused miralax this morning.      Problem: Urinary Elimination:  Goal: Ability to reestablish a normal urinary elimination pattern will improve  Note:   Pt is continent of bladder.     Problem: Pain Management  Goal: Pain level will decrease to patient's comfort goal  Note:   Pt appears calm and comfortable. Denies pain at this time.

## 2019-10-21 NOTE — PROGRESS NOTES
Hospital Medicine Daily Progress Note      Chief Complaint  PE, Tachycardia    Interval Problem Update  No new problems.    Review of Systems  Review of Systems   Constitutional: Negative for chills and fever.   HENT: Negative.    Eyes: Negative.    Respiratory: Negative for cough and shortness of breath.    Cardiovascular: Negative for chest pain and palpitations.   Gastrointestinal: Negative for abdominal pain, nausea and vomiting.   Musculoskeletal: Negative.    Skin: Negative for itching and rash.   Neurological: Positive for focal weakness.   Psychiatric/Behavioral: Positive for substance abuse. The patient is nervous/anxious.         Physical Exam  Temp:  [36.3 °C (97.3 °F)-36.4 °C (97.5 °F)] 36.4 °C (97.5 °F)  Pulse:  [] 105  Resp:  [18] 18  BP: ()/(66-84) 125/84  SpO2:  [96 %] 96 %    Physical Exam   Constitutional: He is oriented to person, place, and time. No distress.   HENT:   Head: Normocephalic and atraumatic.   Right Ear: External ear normal.   Left Ear: External ear normal.   Eyes: Conjunctivae and EOM are normal. Right eye exhibits no discharge. Left eye exhibits no discharge.   Neck: Normal range of motion. Neck supple. No tracheal deviation present.   Cardiovascular: Normal rate and regular rhythm.   Pulmonary/Chest: Effort normal and breath sounds normal. No stridor. No respiratory distress. He has no wheezes.   Abdominal: Soft. Bowel sounds are normal. He exhibits no distension. There is no tenderness.   obese   Musculoskeletal: He exhibits no edema or tenderness.   Neurological: He is alert and oriented to person, place, and time.   Skin: Skin is warm and dry. He is not diaphoretic.   Vitals reviewed.      Fluids    Intake/Output Summary (Last 24 hours) at 10/21/2019 1500  Last data filed at 10/21/2019 1300  Gross per 24 hour   Intake 780 ml   Output --   Net 780 ml       Laboratory  Recent Labs     10/20/19  0511   WBC 9.4   RBC 4.88   HEMOGLOBIN 14.6   HEMATOCRIT 46.3   MCV 94.9    MCH 29.9   MCHC 31.5*   RDW 56.9*   PLATELETCT 285   MPV 8.8*     Recent Labs     10/20/19  0511   SODIUM 140   POTASSIUM 4.1   CHLORIDE 103   CO2 27   GLUCOSE 102*   BUN 16   CREATININE 0.66   CALCIUM 9.5                   Assessment/Plan  Acute saddle pulmonary embolism (HCC)- (present on admission)  Assessment & Plan  Has recurrent bilat PE  Echo w/ right heart strain (persistent vs recurrent)  Anticoagulated on Xarelto    Paroxysmal atrial fibrillation (HCC)- (present on admission)  Assessment & Plan  Suspect 2/2 PE  On Atenolol for rate control  Anticoagulated on Xarelto    ETOH abuse- (present on admission)  Assessment & Plan  On Thiamine, MVT, and Folate supplements    Urinary retention  Assessment & Plan  Now off Flomax    Vitamin D deficiency  Assessment & Plan  Vit D level 15  On supplementation    Type 2 diabetes mellitus without complication, without long-term current use of insulin ([facility])- (present on admission)  Assessment & Plan  HbA1c 6.6  FSBS usually under 100, so discontinued SSI  Encourage diet and lifestyle modifications    ALLI (obstructive sleep apnea)- (present on admission)  Assessment & Plan  RT protocol  Suggest outpt PSG if not yet done    Anxiety- (present on admission)  Assessment & Plan  Management per Primary Team  Psychiatry consult pending    Essential hypertension- (present on admission)  Assessment & Plan  On Atenolol and Lisinopril  Observe blood pressure trends     Full Code    Patient seen and examined.  Chart reviewed.  Assessment and Plan as above.  No changes today.

## 2019-10-21 NOTE — CARE PLAN
Problem: Communication  Goal: The ability to communicate needs accurately and effectively will improve  Note:   Encouraged to make needs known to staff and to use call light for staff assist.      Problem: Safety  Goal: Will remain free from falls  Note:   Call light kept within reach and encouraged to use for assistance and with toileting needs. Encouraged to call staff and to wait for staff before transfers. vMother is cleared for transfers.

## 2019-10-21 NOTE — PROGRESS NOTES
"Rehab Progress Note     Date of Service: 10/21/2019  Chief Complaint: anxiety    Interval Events (Subjective)    Patient seen and examined in the therapy gym today. He states yesterday prior to his night time dose of seroquel he had paresthesias in his chest wall and bilateral hands. For this reason he doesn't want to change or titrate down dose of seroquel. Also doesn't want to go back to three times daily.    Advised patient we will discuss his discharge date today. He prefers to drive to [place] rather than fly so that he can get his car and his animals there.    He is emptying his bladder and moving his bowels. He has no new complaints.     Objective:  VITAL SIGNS: /84   Pulse (!) 105   Temp 36.4 °C (97.5 °F) (Temporal)   Resp 18   Ht 1.727 m (5' 8\")   Wt 118 kg (260 lb 3.2 oz)   SpO2 96%   BMI 39.56 kg/m²   Gen: alert, no apparent distress, obese  CV: regular rate and rhythm, no murmurs, no peripheral edema  Resp: clear to ascultation bilaterally, normal respiratory effort  GI: soft, non-tender abdomen, bowel sounds present  Neuro: notable for weak ankle dorsiflexors bilaterally  Psych: flat affect, anxious    Recent Results (from the past 72 hour(s))   CBC WITHOUT DIFFERENTIAL    Collection Time: 10/20/19  5:11 AM   Result Value Ref Range    WBC 9.4 4.8 - 10.8 K/uL    RBC 4.88 4.70 - 6.10 M/uL    Hemoglobin 14.6 14.0 - 18.0 g/dL    Hematocrit 46.3 42.0 - 52.0 %    MCV 94.9 81.4 - 97.8 fL    MCH 29.9 27.0 - 33.0 pg    MCHC 31.5 (L) 33.7 - 35.3 g/dL    RDW 56.9 (H) 35.9 - 50.0 fL    Platelet Count 285 164 - 446 K/uL    MPV 8.8 (L) 9.0 - 12.9 fL   Basic Metabolic Panel    Collection Time: 10/20/19  5:11 AM   Result Value Ref Range    Sodium 140 135 - 145 mmol/L    Potassium 4.1 3.6 - 5.5 mmol/L    Chloride 103 96 - 112 mmol/L    Co2 27 20 - 33 mmol/L    Glucose 102 (H) 65 - 99 mg/dL    Bun 16 8 - 22 mg/dL    Creatinine 0.66 0.50 - 1.40 mg/dL    Calcium 9.5 8.5 - 10.5 mg/dL    Anion Gap 10.0 0.0 - " 11.9   ESTIMATED GFR    Collection Time: 10/20/19  5:11 AM   Result Value Ref Range    GFR If African American >60 >60 mL/min/1.73 m 2    GFR If Non African American >60 >60 mL/min/1.73 m 2       Current Facility-Administered Medications   Medication Frequency   • QUEtiapine (SEROQUEL) tablet 100 mg BID   • lisinopril (PRINIVIL) tablet 10 mg Q DAY   • DULoxetine (CYMBALTA) capsule 20 mg DAILY   • vitamin D (cholecalciferol) tablet 4,000 Units DAILY   • melatonin tablet 3 mg QHS   • thiamine tablet 100 mg DAILY   • Respiratory Care per Protocol Continuous RT   • Pharmacy Consult Request ...Pain Management Review 1 Each PHARMACY TO DOSE   • acetaminophen (TYLENOL) tablet 650 mg Q4HRS PRN   • artificial tears ophthalmic solution 1 Drop PRN   • benzocaine-menthol (CEPACOL) lozenge 1 Lozenge Q2HRS PRN   • mag hydrox-al hydrox-simeth (MAALOX PLUS ES or MYLANTA DS) suspension 20 mL Q2HRS PRN   • ondansetron (ZOFRAN ODT) dispertab 4 mg 4X/DAY PRN    Or   • ondansetron (ZOFRAN) syringe/vial injection 4 mg 4X/DAY PRN   • sodium chloride (OCEAN) 0.65 % nasal spray 2 Spray PRN   • atenolol (TENORMIN) tablet 100 mg DAILY   • calcium carbonate (TUMS) chewable tab 500 mg TID PRN   • [START ON 10/25/2019] cyanocobalamin (VITAMIN B-12) injection 1,000 mcg Q30 DAYS   • folic acid (FOLVITE) tablet 1 mg DAILY   • gabapentin (NEURONTIN) capsule 300 mg HS PRN   • hydrOXYzine HCl (ATARAX) tablet 50 mg TID PRN   • metoprolol (LOPRESSOR) tablet 12.5 mg Q8HRS PRN   • multivitamin (THERAGRAN) tablet 1 Tab DAILY   • omeprazole (PRILOSEC) capsule 20 mg DAILY   • rivaroxaban (XARELTO) tablet 20 mg PM MEAL   • senna-docusate (PERICOLACE or SENOKOT S) 8.6-50 MG per tablet 2 Tab BID    And   • polyethylene glycol/lytes (MIRALAX) PACKET 1 Packet DAILY    And   • magnesium hydroxide (MILK OF MAGNESIA) suspension 30 mL QDAY PRN    And   • bisacodyl (DULCOLAX) suppository 10 mg QDAY PRN       Orders Placed This Encounter   Procedures   • Diet Order  Regular     Standing Status:   Standing     Number of Occurrences:   1     Order Specific Question:   Diet:     Answer:   Regular [1]       Assessment:  Active Hospital Problems    Diagnosis   • *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    I led and attended the weekly conference today, and agree with the IDT conference documentation and plan of care as noted below.    Date of conference: 10/21/2019    Goals and barriers: See IDT note.    Biggest barriers: leg weakness, impaired standing tolerance, mild fine motor deficits, impaired balance    Goals in next week: increased ambulation distance, improved    Therapy update 10/21/2019  Supervision eating  Supervision grooming  Supervision bathing  Supervision upper body dressing  Min assist lower body dressing  Min assist toileting  Supervisionbladder  Supervision bowel  Supervision bed/chair transfer  Min assist toilet transfer  Min assist tub/shower transfer  Min assist ambulation  Supervision wheelchair propulsion  Max assist stairs  Modified Independent comprehension  Independent expression  Supervision social interaction  Modified Independent problem solving  Supervision memory    CM/social support: Plan is for patient to travel by car or plane to [place], [place], to live with his mother in her single  level home. Patient was living in [place]; rented a mother-in-laws quarter and also has a 35' camper.  Mother is here from [facility] to support patient in his recovery and get him back to [place]. They are going to move the camper to [place] too. Mother stated she wishes to travel to [place] as soon as possible, with patient, after his discharge. They may be staying in his camper for a brief period which has stair access.    Anticipated DC date: 10/31/2019     Equip: MATHEUS, JAZZ    Follow up: PCP      Medical Decision Making and Plan:    Subacute combined degeneration of spinal cord  Incomplete paraplegia  From whippit use  Paresthesias, continues  Ankle/foot weakness, continues  Urinary retention, resolved  Continue full rehab program  PT/OT, 1.5 hr each discipline, 5 days per week     Polysubstance abuse  Anxiety disorder, stable  Insomnia, resolved  Consult psychiatry, appreciate assistance  Continue Seroquel, 100 mg TID --> BID 10/20, continue to titrated down  Started Cymbalta 20 mg daily 10/18  PRN hydroxyzine  Scheduled melatonin  Consult Dr. Chacko, psychology  Bad reactions to Risperdal, Lamictal, trazodone, Wellbutrin     Acute Urinary retention, resolved  Neurogenic bladder  Likely due to posterior column injury  Discontinued Flomax  IC for over 400 cc  Replace Srivastava if unable to unable to urinate  Continue to monitor with as needed bladder scans    Bowel  Meds as needed  Last  10/21    DVT  Xarelto    Appreciate the assistance of the hospitalist with his medical co-morbidities:     Pulmonary emboli  Right heart strain  Paroxysmal atrial fibrillation  Sinus tachycardia  Hypertension  Leukocytosis, resolved  Elevated liver enzymes, improved  Vit D deficiency    Total time:  40 minutes.  I spent greater than 50% of the time for patient care, counseling, and coordination on this date, including patient face-to face time, unit/floor time with review of records/pertinent lab data and studies, as well as discussing diagnostic  evaluation/work up, planned therapeutic interventions, and future disposition of care, as per the interval events/subjective and the assessment and plan as noted above.      Sue Rock M.D.   Physical Medicine and Rehabilitation

## 2019-10-21 NOTE — REHAB-OT IDT TEAM NOTE
Occupational Therapy   Activities of Daily Living  Eating Initial:  5 - Standby Prompting/Supervision or Set-up  Eating Current:  6 - Modified Independent   Eating Description:  Increased time  Grooming Initial:  5 - Standby Prompting/Supervision or Set-up  Grooming Current:  5 - Standby Prompting/Supervision or Set-up   Grooming Description:  Supervision for safety(in standing)  Bathing Initial:  5 - Standby Prompting/Supervision or Set-up  Bathing Current:  5 - Standby Prompting/Supervision or Set-up   Bathing Description:  Grab bar(per patient and mother - completes in seated position)  Upper Body Dressing Initial:  5 - Standby Prompting/Supervision or Set-up  Upper Body Dressing Current:  5 - Standby Prompting/Supervision or Set-up   Upper Body Dressing Description:  Set-up of equipment  Lower Body Dressing Initial:  4 - Minimal Assistance  Lower Body Dressing Current:  5 - Standby Prompting/Supervsion or Set-up   Lower Body Dressing Description:  5 - Standby Prompting/Supervsion or Set-up  Toileting Initial:  5 - Standby Prompting/Supervision or Set-up  Toileting Current:  5 - Standby Prompting/Supervision or Set-up   Toileting Description:  Grab bar, Supervision for safety  Toilet Transfer Initial:  5 - Standby Prompting/Supervision or Set-up  Toilet Transfer Current:  4 - Minimal Assistance   Toilet Transfer Description:  4 - Minimal Assistance  Tub / Shower Transfer Initial:  4 - Minimal Assistance  Tub / Shower Transfer Current:  4 - Minimal Assistance   Tub / Shower Transfer Description:  (per pt and mother completes with supervision using GB from w/c. Dry run completed using FWW with Min A)  IADL:  Not yet addressed   Family Training/Education:  Ongoing, mom present often and supportive, cleared to assist with ADLs in-room   DME/DC Recommendations:  Shower chair, grab bars in shower and next to toilet, FWW, may need w/c for community mobility     Strengths:  Able to follow instructions, Independent PLOF,  Making steady progress towards goals, Manages pain appropriately, Motivated for self care and independence, Pleasant and cooperative, Supportive family and Willingly participates in therapeutic activities  Barriers:  Decreased endurance, Generalized weakness, Limited mobility, Poor balance and Other: LE weakness, impaired FM control bilaterally, low frustration tolerance     # of short term goals set= 3    # of short term goals met= 1     Occupational Therapy Goals     Problem: Functional Transfers     Dates: Start: 10/17/19       Description:     Goal: STG-Within one week, patient will     Dates: Start: 10/17/19       Description: 1) Individualized Goal:  ambulate to/from bathroom and transfer on/off regular toilet with supervision using FWW  2) Interventions:  OT Orthotics Training, OT E Stim Attended, OT Group Therapy, OT Self Care/ADL, OT Cognitive Skill Dev, OT Community Reintegration, OT Neuro Re-Ed/Balance, OT Sensory Int Techniques, OT Therapeutic Activity, OT Evaluation and OT Therapeutic Exercise     Note:     Goal Note filed on 10/21/19 1303 by Rosmery Munoz MS,OTR/L    Min A for mobility with FWW                         Problem: IADL's     Dates: Start: 10/17/19       Description:     Goal: STG-Within one week, patient will access kitchen area     Dates: Start: 10/17/19       Description: 1) Individualized Goal:  With supervision using FWW or counter for support  2) Interventions:  OT Orthotics Training, OT E Stim Attended, OT Group Therapy, OT Self Care/ADL, OT Cognitive Skill Dev, OT Community Reintegration, OT Neuro Re-Ed/Balance, OT Sensory Int Techniques, OT Therapeutic Activity, OT Evaluation and OT Therapeutic Exercise     Note:     Goal Note filed on 10/21/19 1303 by Rosmery Munoz MS,OTR/L    Low standing tolerance during OT sessions limiting participation in functional mobility                        Problem: OT Long Term Goals     Dates: Start: 10/17/19       Description:     Goal: LTG-By  discharge, patient will complete basic self care tasks     Dates: Start: 10/17/19       Description: 1) Individualized Goal:  With modified independence   2) Interventions:  OT Orthotics Training, OT E Stim Attended, OT Group Therapy, OT Self Care/ADL, OT Cognitive Skill Dev, OT Community Reintegration, OT Neuro Re-Ed/Balance, OT Sensory Int Techniques, OT Therapeutic Activity, OT Evaluation and OT Therapeutic Exercise           Goal: LTG-By discharge, patient will perform bathroom transfers     Dates: Start: 10/17/19       Description: 1) Individualized Goal:  With modified independence   2) Interventions:  OT Orthotics Training, OT E Stim Attended, OT Group Therapy, OT Self Care/ADL, OT Cognitive Skill Dev, OT Community Reintegration, OT Neuro Re-Ed/Balance, OT Sensory Int Techniques, OT Therapeutic Activity, OT Evaluation and OT Therapeutic Exercise             Goal: LTG-By discharge, patient will complete basic home management     Dates: Start: 10/17/19       Description: 1) Individualized Goal:  With modified independence   2) Interventions:  OT Orthotics Training, OT E Stim Attended, OT Group Therapy, OT Self Care/ADL, OT Cognitive Skill Dev, OT Community Reintegration, OT Neuro Re-Ed/Balance, OT Sensory Int Techniques, OT Therapeutic Activity, OT Evaluation and OT Therapeutic Exercise                         Section completed by:  Rosmery Munoz MS,OTR/L

## 2019-10-21 NOTE — CARE PLAN
Problem: Functional Transfers  Goal: STG-Within one week, patient will  Description  1) Individualized Goal:  ambulate to/from bathroom and transfer on/off regular toilet with supervision using FWW  2) Interventions:  OT Orthotics Training, OT E Stim Attended, OT Group Therapy, OT Self Care/ADL, OT Cognitive Skill Dev, OT Community Reintegration, OT Neuro Re-Ed/Balance, OT Sensory Int Techniques, OT Therapeutic Activity, OT Evaluation and OT Therapeutic Exercise   Outcome: PROGRESSING AS EXPECTED  Note:   Min A for mobility with FWW      Problem: IADL's  Goal: STG-Within one week, patient will access kitchen area  Description  1) Individualized Goal:  With supervision using FWW or counter for support  2) Interventions:  OT Orthotics Training, OT E Stim Attended, OT Group Therapy, OT Self Care/ADL, OT Cognitive Skill Dev, OT Community Reintegration, OT Neuro Re-Ed/Balance, OT Sensory Int Techniques, OT Therapeutic Activity, OT Evaluation and OT Therapeutic Exercise   Outcome: PROGRESSING AS EXPECTED  Note:   Low standing tolerance during OT sessions limiting participation in functional mobility

## 2019-10-22 PROCEDURE — 97530 THERAPEUTIC ACTIVITIES: CPT

## 2019-10-22 PROCEDURE — 97110 THERAPEUTIC EXERCISES: CPT

## 2019-10-22 PROCEDURE — 770010 HCHG ROOM/CARE - REHAB SEMI PRIVAT*

## 2019-10-22 PROCEDURE — 97116 GAIT TRAINING THERAPY: CPT

## 2019-10-22 PROCEDURE — 700102 HCHG RX REV CODE 250 W/ 637 OVERRIDE(OP): Performed by: HOSPITALIST

## 2019-10-22 PROCEDURE — 700102 HCHG RX REV CODE 250 W/ 637 OVERRIDE(OP): Performed by: PHYSICAL MEDICINE & REHABILITATION

## 2019-10-22 PROCEDURE — 99232 SBSQ HOSP IP/OBS MODERATE 35: CPT | Performed by: HOSPITALIST

## 2019-10-22 PROCEDURE — 99232 SBSQ HOSP IP/OBS MODERATE 35: CPT | Performed by: PHYSICAL MEDICINE & REHABILITATION

## 2019-10-22 PROCEDURE — A9270 NON-COVERED ITEM OR SERVICE: HCPCS | Performed by: PHYSICAL MEDICINE & REHABILITATION

## 2019-10-22 PROCEDURE — A9270 NON-COVERED ITEM OR SERVICE: HCPCS | Performed by: HOSPITALIST

## 2019-10-22 RX ADMIN — VITAMIN D, TAB 1000IU (100/BT) 4000 UNITS: 25 TAB at 08:16

## 2019-10-22 RX ADMIN — RIVAROXABAN 20 MG: 10 TABLET, FILM COATED ORAL at 17:35

## 2019-10-22 RX ADMIN — MELATONIN 3 MG: at 20:01

## 2019-10-22 RX ADMIN — OMEPRAZOLE 20 MG: 20 CAPSULE, DELAYED RELEASE ORAL at 08:17

## 2019-10-22 RX ADMIN — Medication 100 MG: at 08:16

## 2019-10-22 RX ADMIN — QUETIAPINE FUMARATE 100 MG: 100 TABLET ORAL at 20:01

## 2019-10-22 RX ADMIN — ATENOLOL 100 MG: 25 TABLET ORAL at 08:16

## 2019-10-22 RX ADMIN — ACETAMINOPHEN 650 MG: 325 TABLET, FILM COATED ORAL at 13:32

## 2019-10-22 RX ADMIN — SENNOSIDES AND DOCUSATE SODIUM 2 TABLET: 8.6; 5 TABLET ORAL at 08:16

## 2019-10-22 RX ADMIN — QUETIAPINE FUMARATE 100 MG: 100 TABLET ORAL at 08:19

## 2019-10-22 RX ADMIN — FOLIC ACID 1 MG: 1 TABLET ORAL at 08:17

## 2019-10-22 RX ADMIN — DULOXETINE HYDROCHLORIDE 20 MG: 20 CAPSULE, DELAYED RELEASE ORAL at 08:16

## 2019-10-22 RX ADMIN — HYDROXYZINE HYDROCHLORIDE 50 MG: 25 TABLET, FILM COATED ORAL at 20:01

## 2019-10-22 RX ADMIN — THERA TABS 1 TABLET: TAB at 08:17

## 2019-10-22 RX ADMIN — LISINOPRIL 10 MG: 5 TABLET ORAL at 08:16

## 2019-10-22 ASSESSMENT — ENCOUNTER SYMPTOMS
CHILLS: 0
NERVOUS/ANXIOUS: 0
VOMITING: 0
NAUSEA: 0
DIARRHEA: 0
ABDOMINAL PAIN: 0
SHORTNESS OF BREATH: 0
FEVER: 0

## 2019-10-22 NOTE — THERAPY
10/22/19 1029   Precautions   Precautions Fall Risk;Other (See Comments)   Comments Bilateral lower extremity weakness, anxiety   Pain 0 - 10 Group   Therapist Pain Assessment 0   Cognition    Cognition / Consciousness WDL   Level of Consciousness Alert   PT Total Time Spent   PT Individual Total Time Spent (Mins) 30   PT Charge Group   PT Gait Training 2   Physical Therapy   Daily Treatment     Patient Name: Cosme Cabrera  Age:  34 y.o., Sex:  male  Medical Record #: [id number]  Today's Date: 10/22/2019     Precautions  Precautions: Fall Risk, Other (See Comments)  Comments: Bilateral lower extremity weakness, anxiety    Subjective    The patient came to the gym in his chair and he agreed to PT.  He said he slept well last night.     Objective    The patient participated in gait training with a FWW.  He tolerated 420 FT, 115 FT and 230 FT SBA.    FIM Bed/Chair/Wheelchair Transfers Score: 5 - Standby Prompting/Supervision or Set-up  Bed/Chair/Wheelchair Transfers Description:       FIM Walking Score:  5 - Standby Prompting/Supervision or Set-up  Walking Description:  Extra time, Supervision for safety, Walker(430 FT, 115  FT, FWW SBA)    FIM Wheelchair Score:  6 - Modified Independent  Wheelchair Description:       FIM Stairs Score:  2 - Max Assistance  Stairs Description:         Assessment    The patient continues to demonstrate improvement in gait quality and related endurance with his distance ranging from 230-420 FT.    Plan    Safety education, therapeutic exercise, gait training, endurance training, transfer training on a normal flat bed, 4-6 stairs

## 2019-10-22 NOTE — CARE PLAN
Problem: Bowel/Gastric:  Goal: Normal bowel function is maintained or improved  Note:   Pt is continent with bowel. Refused scheduled Miralax this morning. Denies N/V, denies constipation/diarrhea. Pt is also on scheduled senna.      Problem: Urinary Elimination:  Goal: Ability to reestablish a normal urinary elimination pattern will improve  Note:   Pt reports no difficulty in urination and is continent. Pt mentioned he agreed for a bladder scan last night and it was low.

## 2019-10-22 NOTE — DOCUMENTATION QUERY
[facility]                                                                       Query Response Note      PATIENT:               JANA LYNN  ACCT #:                  [id number]  MRN:                     [id number]  :                      1985  ADMIT DATE:       2019 9:35 AM  DISCH DATE:        10/16/2019 11:27 AM  RESPONDING  PROVIDER #:        [id number]           QUERY TEXT:    Patient developed acute cystitis, urine cultures positive for Strep epidermidis, and the indwelling urinary catheter was replaced. Can the relationship between the acute cystitis and the indwelling urinary catheter be further clarified?    NOTE:  If an appropriate response is not listed below, please respond with a new note.     The patient's Clinical Indicators include:  Consult note 10/9/2019-Patient developed leukocytosis and fever with positive urine culture for coag negative staph and he was started on Bactrim. Srivastava catheter removed but he failed voiding trial and has since been replaced.  progress note 10/13- Fever -has had leukocytosis, now fever 100.7. he has had no specific symptoms. CXR and urine ordered.    -continue ceftriaxone, urine culture pending. Srivastava has been changed  Acute cystitis-urine culture grew strep epidermidis sensitive Bactrim. completed course  Options provided:   -- Acute cystitis is due to indwelling urinary catheter   -- Acute cystitis is not due to indwelling urinary catheter   -- Unable to determine      Query created by: Randi Elliott on 10/22/2019 4:17 PM    RESPONSE TEXT:    Acute cystitis is not due to indwelling urinary catheter          Electronically signed by:  DENISSE CONTRERAS 10/22/2019 4:27 PM

## 2019-10-22 NOTE — THERAPY
10/22/19 1359   Precautions   Precautions Fall Risk;Other (See Comments)   Comments Bilateral lower extremity weakness, anxiety   Cognition    Cognition / Consciousness WDL   Level of Consciousness Alert   Sitting Lower Body Exercises   Ankle Pumps 3 sets of 15;Bilateral   Hip Flexion 3 sets of 15;Bilateral   Hip Abduction 3 sets of 15;Bilateral   Hip Adduction 3 sets of 15;Bilateral   Long Arc Quad 3 sets of 15;Bilateral   Hamstring Curl 3 sets of 15;Bilateral   Nustep Resistance Level 3  (54 steps per minute, 10 minutes)   PT Total Time Spent   PT Individual Total Time Spent (Mins) 60   PT Charge Group   PT Therapeutic Exercise 3   PT Therapeutic Activities 1   Physical Therapy   Daily Treatment     Patient Name: Cosme Cabrera  Age:  34 y.o., Sex:  male  Medical Record #: [id number]  Today's Date: 10/22/2019     Precautions  Precautions: Fall Risk, Other (See Comments)  Comments: Bilateral lower extremity weakness, anxiety    Subjective    The patient agreed to PT.  He said he had sore muscles all over his body from the exercise and walking.     Objective    The patient participated in going up and down 7 stairs with bilateral handrails.  He also participated in utilizing the NuStep and seated bilateral lower extremity therapeutic exercise 3 sets of 15.    FIM Bed/Chair/Wheelchair Transfers Score: 5 - Standby Prompting/Supervision or Set-up  Bed/Chair/Wheelchair Transfers Description:       FIM Walking Score:  5 - Standby Prompting/Supervision or Set-up  Walking Description:  Extra time, Supervision for safety, Walker(430 FT, 115  FT, FWW SBA)    FIM Wheelchair Score:  6 - Modified Independent  Wheelchair Description:       FIM Stairs Score:  2 - Max Assistance  Stairs Description:  Extra time, Verbal cueing, Supervision for safety, Hand rails, Ascends/descends 4 to 11 steps(Up/down 7 stairs, step-to pattern)      Assessment    The patient participated in going up and down 7 stairs, seated lower  extremity therapeutic exercise and the NuStep.  He was limited this afternoon by complaints of sore muscles which he attributed to exercise and walking.    Plan    Safety education, up/down stairs, transfer training with a regular flat bed, gait training, therapeutic exercise

## 2019-10-22 NOTE — DISCHARGE PLANNING
Met with patient and his mother following Team Conference to relay results, anticipated discharge date of 10/31/2019. We discussed the delay in therapies when he moves to [place]. Choice obtained for outpatient therapies if recommended - 1st - [address], knowing that it will be dependent on patient's insurance and if they are participating providers.   Patient chose [facility] for DME, again knowing this is dependent on insurance and if a participating provider.   Opportunity for questions and discussion provided.

## 2019-10-22 NOTE — PROGRESS NOTES
Hospital Medicine Daily Progress Note      Chief Complaint:  Hypertension  Afib/Tachycardia  PE    Interval History:  No significant events or changes since last visit    Review of Systems  Review of Systems   Constitutional: Negative for chills and fever.   Respiratory: Negative for shortness of breath.    Cardiovascular: Negative for chest pain.   Gastrointestinal: Negative for abdominal pain, diarrhea, nausea and vomiting.   Psychiatric/Behavioral: The patient is not nervous/anxious.         Physical Exam  Temp:  [36.4 °C (97.5 °F)-36.8 °C (98.2 °F)] 36.8 °C (98.2 °F)  Pulse:  [] 98  Resp:  [18] 18  BP: (108-125)/(66-84) 108/66  SpO2:  [94 %] 94 %    Physical Exam   Constitutional: He is oriented to person, place, and time. He appears well-nourished.   HENT:   Head: Atraumatic.   Eyes: Pupils are equal, round, and reactive to light. Conjunctivae are normal.   Neck: Normal range of motion. Neck supple.   Cardiovascular: Normal rate, regular rhythm, S1 normal and S2 normal.   No murmur heard.  Pulmonary/Chest: Effort normal. He has no wheezes. He has no rhonchi. He has no rales.   Abdominal: Soft. He exhibits no distension. There is no tenderness.   Musculoskeletal: He exhibits no edema.   Neurological: He is alert and oriented to person, place, and time. No sensory deficit.   Skin: Skin is warm and dry. No rash noted. No cyanosis.   Psychiatric: He has a normal mood and affect. His behavior is normal.   Nursing note and vitals reviewed.      Fluids    Intake/Output Summary (Last 24 hours) at 10/22/2019 0749  Last data filed at 10/21/2019 1948  Gross per 24 hour   Intake 942 ml   Output --   Net 942 ml       Laboratory  Recent Labs     10/20/19  0511   WBC 9.4   RBC 4.88   HEMOGLOBIN 14.6   HEMATOCRIT 46.3   MCV 94.9   MCH 29.9   MCHC 31.5*   RDW 56.9*   PLATELETCT 285   MPV 8.8*     Recent Labs     10/20/19  0511   SODIUM 140   POTASSIUM 4.1   CHLORIDE 103   CO2 27   GLUCOSE 102*   BUN 16   CREATININE 0.66    CALCIUM 9.5                   Assessment/Plan  Acute saddle pulmonary embolism (HCC)- (present on admission)  Assessment & Plan  Has recurrent bilat PE  Echo showed right heart strain (persistent vs recurrent)  On Xarelto    Paroxysmal atrial fibrillation (HCC)- (present on admission)  Assessment & Plan  HR occ rises up  Suspect 2nd to PE  On Atenolol: 100 mg daily  On Xarelto  Cont to monitor    ETOH abuse- (present on admission)  Assessment & Plan  On Thiamine, MVI, and Folate supplements    Vitamin D deficiency  Assessment & Plan  Vit D: 15  On supplements    Type 2 diabetes mellitus without complication, without long-term current use of insulin (HCC)- (present on admission)  Assessment & Plan  Hba1c: 6.6 (9/19)  Newly diagnosed  Off accuchecks  Encourage diet and lifestyle modifications  Note: pt in somewhat denial and states that his a1c was up because he ate some fast food prior to hospital    ALLI (obstructive sleep apnea)- (present on admission)  Assessment & Plan  RT protocol  Suggest out patient sleep study    Anxiety- (present on admission)  Assessment & Plan  On Seroquel    Essential hypertension- (present on admission)  Assessment & Plan  BP ok  On Atenolol: 100 mg daily  On Lisinopril: 10 mg daily  Monitor

## 2019-10-22 NOTE — PROGRESS NOTES
Received bedside shift report from Mary DC. RN regarding patient and assumed care. Patient awake, calm and stable, currently positioned in bed for comfort and safety; call light within reach. Denies pain or discomfort at this time. Will continue to monitor.

## 2019-10-22 NOTE — THERAPY
"Occupational Therapy  Daily Treatment     Patient Name: Cosme Cabrera  Age:  34 y.o., Sex:  male  Medical Record #: [id number]  Today's Date: 10/22/2019     Precautions  Precautions: (P) Fall Risk, Other (See Comments)  Comments: Bilateral lower extremity weakness, anxiety    Safety   Comments: Pt cleared to be Mod-I at w/c level for toileting and bed <> w/c txs, mom cleared to assist with showering    Subjective    \"My legs are jello\"     Objective       10/22/19 1401   Precautions   Precautions Fall Risk;Other (See Comments)   Balance   Comments Pt tolerated functional mobility outside with FWW on uneven terrain with Min A, min verbal cues for attention to sidewalk edge.    OT Total Time Spent   OT Individual Total Time Spent (Mins) 30   OT Charge Group   OT Therapy Activity 2     Assessment    Pt tolerated functional mobility outside over varying terrain, min cues for pacing and attention to edge and bumps in sidewalk with good carryover within session. Limited tolerance for time on his feet this session.     Plan    Continue to progress standing balance/tolerance, general strength and endurance toward increased safety and independence with ADLs. Continue to address FM coordination deficits, functional cognition. Mobility outside and car transfer in prep for outing later this week    "

## 2019-10-22 NOTE — PROGRESS NOTES
"Rehab Progress Note     Date of Service: 10/22/2019  Chief Complaint: anxiety    Interval Events (Subjective)    Patient seen and examined in his room today. He had a friend from [employer] visit last night, so his mood is good today. He did feel slightly anxious just prior to taking the nightime dose of Seroquel, but doesn't feel this in the morning for some reason. His bladder/PVR last night was only 17. He is moving his bowels. He is in agreement with the discharge date of the 31st. He has no new complaints today.     Psychiatry to come by and see the patient today. Advised them we decreased the Seroquel to BID.       Objective:  VITAL SIGNS: /83   Pulse (!) 103   Temp 36.6 °C (97.8 °F) (Temporal)   Resp 18   Ht 1.727 m (5' 8\")   Wt 118 kg (260 lb 3.2 oz)   SpO2 94%   BMI 39.56 kg/m²   Gen: alert, no apparent distress. obese  CV: regular rate and rhythm, no murmurs, no peripheral edema  Resp: clear to ascultation bilaterally, normal respiratory effort  GI: soft, non-tender abdomen, bowel sounds present  Neuro: notable for weak ankles  Psych: improved mood today, smiling    Recent Results (from the past 72 hour(s))   CBC WITHOUT DIFFERENTIAL    Collection Time: 10/20/19  5:11 AM   Result Value Ref Range    WBC 9.4 4.8 - 10.8 K/uL    RBC 4.88 4.70 - 6.10 M/uL    Hemoglobin 14.6 14.0 - 18.0 g/dL    Hematocrit 46.3 42.0 - 52.0 %    MCV 94.9 81.4 - 97.8 fL    MCH 29.9 27.0 - 33.0 pg    MCHC 31.5 (L) 33.7 - 35.3 g/dL    RDW 56.9 (H) 35.9 - 50.0 fL    Platelet Count 285 164 - 446 K/uL    MPV 8.8 (L) 9.0 - 12.9 fL   Basic Metabolic Panel    Collection Time: 10/20/19  5:11 AM   Result Value Ref Range    Sodium 140 135 - 145 mmol/L    Potassium 4.1 3.6 - 5.5 mmol/L    Chloride 103 96 - 112 mmol/L    Co2 27 20 - 33 mmol/L    Glucose 102 (H) 65 - 99 mg/dL    Bun 16 8 - 22 mg/dL    Creatinine 0.66 0.50 - 1.40 mg/dL    Calcium 9.5 8.5 - 10.5 mg/dL    Anion Gap 10.0 0.0 - 11.9   ESTIMATED GFR    Collection Time: " 10/20/19  5:11 AM   Result Value Ref Range    GFR If African American >60 >60 mL/min/1.73 m 2    GFR If Non African American >60 >60 mL/min/1.73 m 2       Current Facility-Administered Medications   Medication Frequency   • QUEtiapine (SEROQUEL) tablet 100 mg BID   • lisinopril (PRINIVIL) tablet 10 mg Q DAY   • DULoxetine (CYMBALTA) capsule 20 mg DAILY   • vitamin D (cholecalciferol) tablet 4,000 Units DAILY   • melatonin tablet 3 mg QHS   • thiamine tablet 100 mg DAILY   • Respiratory Care per Protocol Continuous RT   • Pharmacy Consult Request ...Pain Management Review 1 Each PHARMACY TO DOSE   • acetaminophen (TYLENOL) tablet 650 mg Q4HRS PRN   • artificial tears ophthalmic solution 1 Drop PRN   • benzocaine-menthol (CEPACOL) lozenge 1 Lozenge Q2HRS PRN   • mag hydrox-al hydrox-simeth (MAALOX PLUS ES or MYLANTA DS) suspension 20 mL Q2HRS PRN   • ondansetron (ZOFRAN ODT) dispertab 4 mg 4X/DAY PRN    Or   • ondansetron (ZOFRAN) syringe/vial injection 4 mg 4X/DAY PRN   • sodium chloride (OCEAN) 0.65 % nasal spray 2 Spray PRN   • atenolol (TENORMIN) tablet 100 mg DAILY   • calcium carbonate (TUMS) chewable tab 500 mg TID PRN   • [START ON 10/25/2019] cyanocobalamin (VITAMIN B-12) injection 1,000 mcg Q30 DAYS   • folic acid (FOLVITE) tablet 1 mg DAILY   • gabapentin (NEURONTIN) capsule 300 mg HS PRN   • hydrOXYzine HCl (ATARAX) tablet 50 mg TID PRN   • metoprolol (LOPRESSOR) tablet 12.5 mg Q8HRS PRN   • multivitamin (THERAGRAN) tablet 1 Tab DAILY   • omeprazole (PRILOSEC) capsule 20 mg DAILY   • rivaroxaban (XARELTO) tablet 20 mg PM MEAL   • senna-docusate (PERICOLACE or SENOKOT S) 8.6-50 MG per tablet 2 Tab BID    And   • polyethylene glycol/lytes (MIRALAX) PACKET 1 Packet DAILY    And   • magnesium hydroxide (MILK OF MAGNESIA) suspension 30 mL QDAY PRN    And   • bisacodyl (DULCOLAX) suppository 10 mg QDAY PRN       Orders Placed This Encounter   Procedures   • Diet Order Regular     Standing Status:   Standing      Number of Occurrences:   1     Order Specific Question:   Diet:     Answer:   Regular [1]       Assessment:  Active Hospital Problems    Diagnosis   • *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    I led and attended the weekly conference, and agree with the IDT conference documentation and plan of care as noted below.    Date of conference: 10/21/2019    Goals and barriers: See IDT note.    Biggest barriers: leg weakness, impaired standing tolerance, mild fine motor deficits, impaired balance    Goals in next week: increased ambulation distance, improved    CM/social support: Plan is for patient to travel by car or plane to [place], [place], to live with his mother in her single level home. Patient was living in [place]; rented a mother-in-laws quarter and also has a 35' camper.  Mother is here from [facility] to support patient in his recovery and get [place] back [place]. Mother stated she wishes to travel to [place] as soon as possible, with patient, after his discharge. They may be staying in his camper for a brief period which has stair access.    Anticipated DC date: 10/31/2019     Equip: MATHEUS, JAZZ    Follow up: PCP      Medical Decision Making and Plan:    Subacute combined degeneration of spinal cord  Incomplete  paraplegia, improved  From whippit use  Paresthesias, continues  Ankle/foot weakness, continues  Urinary retention, resolved  Continue full rehab program  PT/OT, 1.5 hr each discipline, 5 days per week     Polysubstance abuse  Anxiety disorder, stable  Insomnia, resolved  Consult psychiatry, appreciate assistance  Continue Seroquel, 100 mg TID --> BID 10/20, continue to titrated down  Started Cymbalta 20 mg daily 10/18  PRN hydroxyzine  Scheduled melatonin  Consult Dr. Chacko, psychology  Bad reactions to Risperdal, Lamictal, trazodone, Wellbutrin     Acute Urinary retention, resolved  Neurogenic bladder  Likely due to posterior column injury  Discontinued Flomax  IC for over 400 cc  Replace Srivastava if unable to unable to urinate  Continue to monitor with as needed bladder scans  Last PVR 17    Bowel  Meds as needed  Last BM 10/21    DVT  Xarelto    Appreciate the assistance of the hospitalist with his medical co-morbidities:     Pulmonary emboli  Right heart strain  Paroxysmal atrial fibrillation  Sinus tachycardia  Hypertension  Leukocytosis, resolved  Elevated liver enzymes, improved  Vit D deficiency    Total time:  26 minutes.  I spent greater than 50% of the time for patient care, counseling, and coordination on this date, including patient face-to face time, unit/floor time with review of records/pertinent lab data and studies, as well as discussing diagnostic evaluation/work up, planned therapeutic interventions, and future disposition of care, as per the interval events/subjective and the assessment and plan as noted above.    I have performed a physical exam, reviewed and updated ROS, as well as the assessment and plan today 10/22/2019. In review of note from 10/21/2019 there are no new changes except as documented above.        Sue Rock M.D.   Physical Medicine and Rehabilitation

## 2019-10-23 PROCEDURE — 700102 HCHG RX REV CODE 250 W/ 637 OVERRIDE(OP): Performed by: PHYSICAL MEDICINE & REHABILITATION

## 2019-10-23 PROCEDURE — 700102 HCHG RX REV CODE 250 W/ 637 OVERRIDE(OP): Performed by: HOSPITALIST

## 2019-10-23 PROCEDURE — 97116 GAIT TRAINING THERAPY: CPT

## 2019-10-23 PROCEDURE — A9270 NON-COVERED ITEM OR SERVICE: HCPCS | Performed by: HOSPITALIST

## 2019-10-23 PROCEDURE — 97110 THERAPEUTIC EXERCISES: CPT

## 2019-10-23 PROCEDURE — 97530 THERAPEUTIC ACTIVITIES: CPT

## 2019-10-23 PROCEDURE — 97112 NEUROMUSCULAR REEDUCATION: CPT

## 2019-10-23 PROCEDURE — 99232 SBSQ HOSP IP/OBS MODERATE 35: CPT | Performed by: HOSPITALIST

## 2019-10-23 PROCEDURE — 770010 HCHG ROOM/CARE - REHAB SEMI PRIVAT*

## 2019-10-23 PROCEDURE — A9270 NON-COVERED ITEM OR SERVICE: HCPCS | Performed by: PHYSICAL MEDICINE & REHABILITATION

## 2019-10-23 PROCEDURE — 99231 SBSQ HOSP IP/OBS SF/LOW 25: CPT | Performed by: PHYSICAL MEDICINE & REHABILITATION

## 2019-10-23 RX ADMIN — LISINOPRIL 10 MG: 5 TABLET ORAL at 08:05

## 2019-10-23 RX ADMIN — HYDROXYZINE HYDROCHLORIDE 50 MG: 25 TABLET, FILM COATED ORAL at 20:37

## 2019-10-23 RX ADMIN — OMEPRAZOLE 20 MG: 20 CAPSULE, DELAYED RELEASE ORAL at 08:06

## 2019-10-23 RX ADMIN — ACETAMINOPHEN 650 MG: 325 TABLET, FILM COATED ORAL at 08:06

## 2019-10-23 RX ADMIN — Medication 100 MG: at 08:06

## 2019-10-23 RX ADMIN — QUETIAPINE FUMARATE 100 MG: 100 TABLET ORAL at 08:05

## 2019-10-23 RX ADMIN — MELATONIN 3 MG: at 20:30

## 2019-10-23 RX ADMIN — FOLIC ACID 1 MG: 1 TABLET ORAL at 08:06

## 2019-10-23 RX ADMIN — QUETIAPINE FUMARATE 100 MG: 100 TABLET ORAL at 20:31

## 2019-10-23 RX ADMIN — RIVAROXABAN 20 MG: 10 TABLET, FILM COATED ORAL at 17:29

## 2019-10-23 RX ADMIN — VITAMIN D, TAB 1000IU (100/BT) 4000 UNITS: 25 TAB at 08:05

## 2019-10-23 RX ADMIN — THERA TABS 1 TABLET: TAB at 08:06

## 2019-10-23 RX ADMIN — SENNOSIDES AND DOCUSATE SODIUM 2 TABLET: 8.6; 5 TABLET ORAL at 08:05

## 2019-10-23 RX ADMIN — DULOXETINE HYDROCHLORIDE 20 MG: 20 CAPSULE, DELAYED RELEASE ORAL at 08:06

## 2019-10-23 RX ADMIN — ATENOLOL 100 MG: 25 TABLET ORAL at 08:05

## 2019-10-23 ASSESSMENT — ENCOUNTER SYMPTOMS
PALPITATIONS: 0
NAUSEA: 0
DIZZINESS: 0
FEVER: 0
HALLUCINATIONS: 0
VOMITING: 0
SHORTNESS OF BREATH: 0
BLURRED VISION: 0
HEADACHES: 0

## 2019-10-23 NOTE — CARE PLAN
Problem: Bowel/Gastric:  Goal: Normal bowel function is maintained or improved  Note:   Pt is continent with bowel. Refused scheduled Miralax this morning. Denies N/V, denies constipation/diarrhea. Pt is also on scheduled senna. Last BM documented 10/21/19.     Problem: Urinary Elimination:  Goal: Ability to reestablish a normal urinary elimination pattern will improve  Note:   Pt reports no difficulty in urination and is continent. Pt mentioned he agreed for a bladder scan last night and it was low.

## 2019-10-23 NOTE — THERAPY
Occupational Therapy  Daily Treatment     Patient Name: Cosme Cabrera  Age:  34 y.o., Sex:  male  Medical Record #: [id number]  Today's Date: 10/23/2019     Precautions  Precautions: (P) Fall Risk  Comments: Bilateral lower extremity weakness, anxiety    Safety   Comments: Pt cleared to be Mod-I at w/c level for toileting and bed <> w/c txs, mom cleared to assist with showering    Subjective    Pt received seated in room, reports feeling better     Objective       10/23/19 1431   Precautions   Precautions Fall Risk   Balance   Comments in // bars; walking fwd/back x5 reps and side stepping x2 reps w/ intermittent UE support, turing 90 degrees L/R w/ intermittent UE support, standing balloon volleyball 3x5 minutes with UE support as needed   OT Total Time Spent   OT Individual Total Time Spent (Mins) 30   OT Charge Group   Charges Yes   OT Neuromuscular Re-education / Balance 2     Assessment    Pt continues with steady progress, tolerating 30 min session focused on standing balance and tolerance, frequent LOB but good reflexes for self-correction using UE support, able to self-monitor and request breaks as needed.     Plan    Outing Friday to get haircut, cooking task Thursday, continue to progress standing balance/tolerance, functional mobility household and community distances with FWW

## 2019-10-23 NOTE — THERAPY
10/23/19 0929   Precautions   Precautions Fall Risk;Other (See Comments)   Comments Bilateral lower extremity weakness, anxiety   Pain 0 - 10 Group   Location Leg   Location Orientation Right;Left;Posterior;Lower   Therapist Pain Assessment 3;4;Prior to Activity;During Activity   Cognition    Level of Consciousness Alert   Sitting Lower Body Exercises   Ankle Pumps 3 sets of 15;Bilateral   Hip Flexion 3 sets of 15;Bilateral   Hip Abduction 3 sets of 15;Bilateral   Hip Adduction 3 sets of 15;Bilateral   Long Arc Quad 3 sets of 15;Bilateral   Hamstring Curl 3 sets of 15;Bilateral   Bed Mobility    Supine to Sit Supervised   Sit to Supine Supervised   Sit to Stand Supervised   Scooting Supervised   PT Total Time Spent   PT Individual Total Time Spent (Mins) 60   PT Charge Group   PT Gait Training 1   PT Therapeutic Exercise 2   PT Therapeutic Activities 1   Physical Therapy   Daily Treatment     Patient Name: Cosme Cabrera  Age:  34 y.o., Sex:  male  Medical Record #: [id number]  Today's Date: 10/23/2019     Precautions  Precautions: Fall Risk, Other (See Comments)  Comments: Bilateral lower extremity weakness, anxiety    Subjective    The patient agreed to PT.  He said his lower leg muscles hurt.     Objective    The patient participated in practicing on the stairs up/down 6 with bilateral handrails.  He also participated in gait training with a FWW,  FT x1, 115 FT x2.  He also did seated bilateral lower extremity therapeutic exercise 3 sets of 15 as indicated above.    FIM Bed/Chair/Wheelchair Transfers Score: 5 - Standby Prompting/Supervision or Set-up  Bed/Chair/Wheelchair Transfers Description:       FIM Walking Score:  2 - Max Assistance  Walking Description:  Extra time, Verbal cueing, Supervision for safety, Walker(230 FT, 115 FT x2, FWW, intermittent CGA)    FIM Wheelchair Score:  6 - Modified Independent  Wheelchair Description:       FIM Stairs Score:  2 - Max Assistance  Stairs Description:   Verbal cueing, Supervision for safety, Extra time, Hand rails(Up/down 6 stairs, bilateral handrails, CGA)      Assessment    The patient participates consistently in therapy with limitations caused by painful muscles and impaired strength.    Plan    Bed mobility and transfer training, gait training FWW as tolerated, safety education, up/down 6 stairs stepping down backward

## 2019-10-23 NOTE — PROGRESS NOTES
"Rehab Progress Note     Date of Service: 10/23/2019  Chief Complaint: anxiety    Interval Events (Subjective)    She is seen and examined in his room today.  He has no new complaints.  He was able to ambulate outside yesterday with therapy.  Dr. Rubio the psychologist met with him today.  Plan for occupational therapy outing to get a haircut this week.  Patient would like to decrease his Seroquel to nightly starting on Friday.  Patient and mother have questions about his prognosis for recovery in terms of his impaired sensation and weakness in his legs.  Advised it is quite variable. He has no new complaints.      Objective:  VITAL SIGNS: /75   Pulse 96   Temp 36.5 °C (97.7 °F) (Oral)   Resp 18   Ht 1.727 m (5' 8\")   Wt 118 kg (260 lb 3.2 oz)   SpO2 93%   BMI 39.56 kg/m²   Gen: alert, no apparent distress  CV: regular rate and rhythm, no murmurs, no peripheral edema  Resp: clear to ascultation bilaterally, normal respiratory effort  GI: soft, non-tender abdomen, bowel sounds present  Neuro: notable for weak ankles bilaterally    No results found for this or any previous visit (from the past 72 hour(s)).    Current Facility-Administered Medications   Medication Frequency   • QUEtiapine (SEROQUEL) tablet 100 mg BID   • lisinopril (PRINIVIL) tablet 10 mg Q DAY   • DULoxetine (CYMBALTA) capsule 20 mg DAILY   • vitamin D (cholecalciferol) tablet 4,000 Units DAILY   • melatonin tablet 3 mg QHS   • thiamine tablet 100 mg DAILY   • Respiratory Care per Protocol Continuous RT   • Pharmacy Consult Request ...Pain Management Review 1 Each PHARMACY TO DOSE   • acetaminophen (TYLENOL) tablet 650 mg Q4HRS PRN   • artificial tears ophthalmic solution 1 Drop PRN   • benzocaine-menthol (CEPACOL) lozenge 1 Lozenge Q2HRS PRN   • mag hydrox-al hydrox-simeth (MAALOX PLUS ES or MYLANTA DS) suspension 20 mL Q2HRS PRN   • ondansetron (ZOFRAN ODT) dispertab 4 mg 4X/DAY PRN    Or   • ondansetron (ZOFRAN) syringe/vial injection " 4 mg 4X/DAY PRN   • sodium chloride (OCEAN) 0.65 % nasal spray 2 Spray PRN   • atenolol (TENORMIN) tablet 100 mg DAILY   • calcium carbonate (TUMS) chewable tab 500 mg TID PRN   • [START ON 10/25/2019] cyanocobalamin (VITAMIN B-12) injection 1,000 mcg Q30 DAYS   • folic acid (FOLVITE) tablet 1 mg DAILY   • gabapentin (NEURONTIN) capsule 300 mg HS PRN   • hydrOXYzine HCl (ATARAX) tablet 50 mg TID PRN   • metoprolol (LOPRESSOR) tablet 12.5 mg Q8HRS PRN   • multivitamin (THERAGRAN) tablet 1 Tab DAILY   • omeprazole (PRILOSEC) capsule 20 mg DAILY   • rivaroxaban (XARELTO) tablet 20 mg PM MEAL   • senna-docusate (PERICOLACE or SENOKOT S) 8.6-50 MG per tablet 2 Tab BID    And   • polyethylene glycol/lytes (MIRALAX) PACKET 1 Packet DAILY    And   • magnesium hydroxide (MILK OF MAGNESIA) suspension 30 mL QDAY PRN    And   • bisacodyl (DULCOLAX) suppository 10 mg QDAY PRN       Orders Placed This Encounter   Procedures   • Diet Order Regular     Standing Status:   Standing     Number of Occurrences:   1     Order Specific Question:   Diet:     Answer:   Regular [1]       Assessment:  Active Hospital Problems    Diagnosis   • *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    I led and attended the weekly conference, and agree with the IDT conference documentation and  plan of care as noted below.    Date of conference: 10/21/2019    Goals and barriers: See IDT note.    Biggest barriers: leg weakness, impaired standing tolerance, mild fine motor deficits, impaired balance    Goals in next week: increased ambulation distance, improved    CM/social support: Plan is for patient to travel by car or plane to [place], [place], to live with his mother in her single level home. Patient was living in [place]; rented a mother-in-laws quarter and also has a 35' camper.  Mother is here from [facility] to support patient in his [place] and get [place] back [place] going [place] Mother stated she wishes to travel to [place] as soon as possible, with patient, after his discharge. They may be staying in his camper for a brief period which has stair access.    Anticipated DC date: 10/31/2019     Equip: JAZZ JARVIS    Follow up: PCP      Medical Decision Making and Plan:    Subacute combined degeneration of spinal cord  Incomplete paraplegia, improved  From whippit use  Paresthesias, continues  Ankle/foot weakness, continues  Urinary retention, resolved  Continue full rehab program  PT/OT, 1.5 hr each discipline, 5 days per week     Polysubstance abuse  Anxiety disorder, stable  Insomnia, resolved  Consult psychiatry, appreciate assistance  Continue Seroquel, 100 mg TID --> BID 10/20, continue to titrate down - will go to nightly on Friday  Started Cymbalta 20 mg daily 10/18  PRN hydroxyzine  Scheduled melatonin  Consult Dr. Chacko, psychology  Bad reactions to Risperdal, Lamictal, trazodone, Wellbutrin     Acute Urinary retention, resolved  Neurogenic bladder  Likely due to posterior column injury  Discontinued Flomax  IC for over 400 cc  Replace Srivastava if unable to unable to urinate  Continue to monitor with as needed bladder scans  Last PVR 17    Bowel  Meds as needed  Last BM 10/21    DVT  Xarelto    Appreciate the assistance of the hospitalist with his medical  co-morbidities:     Pulmonary emboli  Right heart strain  Paroxysmal atrial fibrillation  Sinus tachycardia  Hypertension  Leukocytosis, resolved  Elevated liver enzymes, improved  Vit D deficiency    Total time:  17 minutes.  I spent greater than 50% of the time for patient care, counseling, and coordination on this date, including patient face-to face time, unit/floor time with review of records/pertinent lab data and studies, as well as discussing diagnostic evaluation/work up, planned therapeutic interventions, and future disposition of care, as per the interval events/subjective and the assessment and plan as noted above.    I have performed a physical exam, reviewed and updated ROS, as well as the assessment and plan today 10/23/2019. In review of note from 10/22/2019 there are no new changes except as documented above.    Sue Rock M.D.   Physical Medicine and Rehabilitation

## 2019-10-23 NOTE — THERAPY
"Occupational Therapy  Daily Treatment     Patient Name: Cosme Cabrera  Age:  34 y.o., Sex:  male  Medical Record #: [id number]  Today's Date: 10/23/2019     Precautions  Precautions: Fall Risk  Comments: Bilateral lower extremity weakness, anxiety    Safety   Comments: Pt cleared to be Mod-I at w/c level for toileting and bed <> w/c txs, mom cleared to assist with showering    Subjective    \"I'm feeling pretty tired today. All my muscles are sore\"     Objective       10/23/19 1031   Precautions   Precautions Fall Risk   Sitting Upper Body Exercises   Upper Extremity Bike Level 3 Resistance  (motomed; 5 min clockwise, 5 min counter, 5 min clockwise)   Other Exercise Seated core strength: modified sit-ups 2x20 with 7# weight, core twists 2x20 wth 7# weight, hip flexion 2x20 reps each LE, modified side crunch 2x20 each side   Sitting Lower Body Exercises   Sit to Stand   (2x5 from mat without UE support)   OT Total Time Spent   OT Individual Total Time Spent (Mins) 60   OT Charge Group   OT Therapy Activity 2   OT Therapeutic Exercise  2     Completed car transfer into passenger seat of his car with use of FWW with SBA, min mobility in parking lot to/from door of car. Pt declined further outdoor mobility due to LE fatigue.     Assessment    Pt reporting increased fatigue this session, car transfer completed in prep for outing tomorrow, discussed mobility needs for outing and pt feeling confident that he can manage and wants to go. Pt completed UE therex, core strengthening, and sit to stands toward increased overall strength and stability during functional activity, demos continued improvement in stability during sit to stands w/o UE support.     Plan    Outing tomorrow to get haircut, cooking task Friday, continue to progress standing balance/tolerance, functional mobility household and community distances with FWW    "

## 2019-10-23 NOTE — PROGRESS NOTES
Hospital Medicine Daily Progress Note      Chief Complaint:  Hypertension  Afib/Tachycardia  PE    Interval History:  No significant events or changes since last visit    Review of Systems  Review of Systems   Constitutional: Negative for fever.   Eyes: Negative for blurred vision.   Respiratory: Negative for shortness of breath.    Cardiovascular: Negative for palpitations.   Gastrointestinal: Negative for nausea and vomiting.   Neurological: Negative for dizziness and headaches.   Psychiatric/Behavioral: Negative for hallucinations.        Physical Exam  Temp:  [36.5 °C (97.7 °F)-36.6 °C (97.8 °F)] 36.6 °C (97.8 °F)  Pulse:  [] 92  Resp:  [18] 18  BP: (103-128)/(70-83) 121/71  SpO2:  [94 %-96 %] 96 %    Physical Exam   HENT:   Mouth/Throat: Oropharynx is clear and moist.   Eyes: No scleral icterus.   Cardiovascular: Normal rate and regular rhythm.   No murmur heard.  Pulmonary/Chest: Effort normal and breath sounds normal. No stridor.   Abdominal: Soft. He exhibits no distension. There is no tenderness.   Musculoskeletal: He exhibits no edema.   Skin: Skin is warm and dry. No rash noted. He is not diaphoretic.   Psychiatric: His behavior is normal.   Nursing note and vitals reviewed.      Fluids    Intake/Output Summary (Last 24 hours) at 10/23/2019 0747  Last data filed at 10/22/2019 2001  Gross per 24 hour   Intake 1320 ml   Output --   Net 1320 ml       Laboratory                        Assessment/Plan  Acute saddle pulmonary embolism (HCC)- (present on admission)  Assessment & Plan  Has recurrent bilat PE  Echo showed right heart strain (persistent vs recurrent)  On Xarelto    Paroxysmal atrial fibrillation (HCC)- (present on admission)  Assessment & Plan  HR occ rises up a little  Suspect 2nd to PE  On Atenolol: 100 mg daily  On Xarelto  Cont to monitor    ETOH abuse- (present on admission)  Assessment & Plan  On Thiamine, MVI, and Folate supplements    Vitamin D deficiency  Assessment & Plan  Vit D:  15  On supplements    Type 2 diabetes mellitus without complication, without long-term current use of insulin ([facility])- (present on admission)  Assessment & Plan  Hba1c: 6.6 (9/19)  Newly diagnosed  Likely pre-diabetes at this point  Off accuchecks  Encourage diet and lifestyle modifications  Note: pt in somewhat denial and states that his a1c was up because he ate some fast food prior to hospital    ALLI (obstructive sleep apnea)- (present on admission)  Assessment & Plan  RT protocol  Suggest out patient sleep study    Anxiety- (present on admission)  Assessment & Plan  On Seroquel    Essential hypertension- (present on admission)  Assessment & Plan  BP ok  On Atenolol: 100 mg daily  On Lisinopril: 10 mg daily  Monitor

## 2019-10-23 NOTE — THERAPY
10/23/19 1259   Precautions   Precautions Fall Risk  (Due to lower extremity weakness and muscular pain)   Comments Bilateral lower extremity weakness, anxiety   Pain 0 - 10 Group   Therapist Pain Assessment 0   Cognition    Cognition / Consciousness WDL   Level of Consciousness Alert   Sitting Lower Body Exercises   Nustep Resistance Level 3  (69 steps per minute, 15 minutes)   Bed Mobility    Supine to Sit Supervised   Sit to Supine Supervised   Sit to Stand Supervised   Scooting Supervised   PT Total Time Spent   PT Individual Total Time Spent (Mins) 30   PT Charge Group   PT Gait Training 1   PT Therapeutic Exercise 1   Physical Therapy   Daily Treatment     Patient Name: Cosme Cabrera  Age:  34 y.o., Sex:  male  Medical Record #: [id number]  Today's Date: 10/23/2019     Precautions  Precautions: Fall Risk(Due to lower extremity weakness and muscular pain)  Comments: Bilateral lower extremity weakness, anxiety    Subjective    The patient came to the gym modified independent in the wheelchair ready for PT.     Objective    The patient participated in gait training with a FWW, intermittent CGA.  He tolerated 220 FT x2.  He also utilized the NuStep with the information indicated above.    FIM Bed/Chair/Wheelchair Transfers Score: 5 - Standby Prompting/Supervision or Set-up  Bed/Chair/Wheelchair Transfers Description:       FIM Walking Score:  5 - Standby Prompting/Supervision or Set-up  Walking Description:  Supervision for safety, Verbal cueing, Walker, Extra time(220 FT x2, FWW, intermittent CGA)    FIM Wheelchair Score:  6 - Modified Independent  Wheelchair Description:  Extra time    FIM Stairs Score:  2 - Max Assistance  Stairs Description:  Verbal cueing, Supervision for safety, Extra time, Hand rails(Up/down 6 stairs, bilateral handrails, CGA)      Assessment    The patient said that the muscle pain he had this morning has resolved so he was able to participate in gait training for 220 FT x2.  He  followed this with Judi.  Muscle pain and weakness are his primary barriers to improving gait quality and distance.    Plan    Safety education, therapeutic exercise, gait training, standing tolerance, transfer training, up/down 6 stairs

## 2019-10-24 PROCEDURE — 97530 THERAPEUTIC ACTIVITIES: CPT

## 2019-10-24 PROCEDURE — 700102 HCHG RX REV CODE 250 W/ 637 OVERRIDE(OP): Performed by: HOSPITALIST

## 2019-10-24 PROCEDURE — 97110 THERAPEUTIC EXERCISES: CPT

## 2019-10-24 PROCEDURE — 99232 SBSQ HOSP IP/OBS MODERATE 35: CPT | Performed by: HOSPITALIST

## 2019-10-24 PROCEDURE — A9270 NON-COVERED ITEM OR SERVICE: HCPCS | Performed by: HOSPITALIST

## 2019-10-24 PROCEDURE — 99231 SBSQ HOSP IP/OBS SF/LOW 25: CPT | Performed by: PHYSICAL MEDICINE & REHABILITATION

## 2019-10-24 PROCEDURE — 97112 NEUROMUSCULAR REEDUCATION: CPT

## 2019-10-24 PROCEDURE — 700102 HCHG RX REV CODE 250 W/ 637 OVERRIDE(OP): Performed by: PHYSICAL MEDICINE & REHABILITATION

## 2019-10-24 PROCEDURE — 770010 HCHG ROOM/CARE - REHAB SEMI PRIVAT*

## 2019-10-24 PROCEDURE — 97116 GAIT TRAINING THERAPY: CPT

## 2019-10-24 PROCEDURE — A9270 NON-COVERED ITEM OR SERVICE: HCPCS | Performed by: PHYSICAL MEDICINE & REHABILITATION

## 2019-10-24 RX ORDER — DULOXETIN HYDROCHLORIDE 30 MG/1
30 CAPSULE, DELAYED RELEASE ORAL DAILY
Status: DISCONTINUED | OUTPATIENT
Start: 2019-10-25 | End: 2019-10-30 | Stop reason: HOSPADM

## 2019-10-24 RX ADMIN — QUETIAPINE FUMARATE 100 MG: 100 TABLET ORAL at 08:13

## 2019-10-24 RX ADMIN — QUETIAPINE FUMARATE 100 MG: 100 TABLET ORAL at 21:10

## 2019-10-24 RX ADMIN — DULOXETINE HYDROCHLORIDE 20 MG: 20 CAPSULE, DELAYED RELEASE ORAL at 08:13

## 2019-10-24 RX ADMIN — VITAMIN D, TAB 1000IU (100/BT) 4000 UNITS: 25 TAB at 08:12

## 2019-10-24 RX ADMIN — ACETAMINOPHEN 650 MG: 325 TABLET, FILM COATED ORAL at 06:07

## 2019-10-24 RX ADMIN — THERA TABS 1 TABLET: TAB at 08:13

## 2019-10-24 RX ADMIN — HYDROXYZINE HYDROCHLORIDE 50 MG: 25 TABLET, FILM COATED ORAL at 21:11

## 2019-10-24 RX ADMIN — SENNOSIDES AND DOCUSATE SODIUM 2 TABLET: 8.6; 5 TABLET ORAL at 08:12

## 2019-10-24 RX ADMIN — ATENOLOL 100 MG: 25 TABLET ORAL at 08:13

## 2019-10-24 RX ADMIN — RIVAROXABAN 20 MG: 10 TABLET, FILM COATED ORAL at 17:37

## 2019-10-24 RX ADMIN — FOLIC ACID 1 MG: 1 TABLET ORAL at 08:13

## 2019-10-24 RX ADMIN — Medication 100 MG: at 08:13

## 2019-10-24 RX ADMIN — LISINOPRIL 10 MG: 5 TABLET ORAL at 08:13

## 2019-10-24 RX ADMIN — MELATONIN 3 MG: at 21:10

## 2019-10-24 RX ADMIN — OMEPRAZOLE 20 MG: 20 CAPSULE, DELAYED RELEASE ORAL at 08:12

## 2019-10-24 ASSESSMENT — ENCOUNTER SYMPTOMS
COUGH: 0
DIZZINESS: 0
DIARRHEA: 0
BLURRED VISION: 0
NERVOUS/ANXIOUS: 0
FEVER: 0

## 2019-10-24 NOTE — CARE PLAN
Problem: Bowel/Gastric:  Goal: Normal bowel function is maintained or improved  Note:      Problem: Bowel/Gastric:  Goal: Normal bowel function is maintained or improved  Note:   Pt is continent with bowel. Refused scheduled Miralax this morning. Denies N/V, denies constipation/diarrhea. Pt is also on scheduled senna. Last BM documented 10/23/19.                   Problem: Urinary Elimination:  Goal: Ability to reestablish a normal urinary elimination pattern will improve  Note:   Pt reports no difficulty in urination and is continent. Pt mentioned he agreed for a bladder scan last night and it was low.

## 2019-10-24 NOTE — THERAPY
10/24/19 1529   Precautions   Precautions Fall Risk   Pain 0 - 10 Group   Therapist Pain Assessment 0   Cognition    Cognition / Consciousness WDL   Level of Consciousness Alert   Sitting Lower Body Exercises   Nustep Resistance Level 4  (76 steps per minute, 15 minutes)   PT Total Time Spent   PT Individual Total Time Spent (Mins) 30   PT Charge Group   PT Therapeutic Exercise 1   PT Therapeutic Activities 1   Physical Therapy   Daily Treatment     Patient Name: Cosme Cabrera  Age:  34 y.o., Sex:  male  Medical Record #: [id number]  Today's Date: 10/24/2019     Precautions  Precautions: Fall Risk  Comments: Bilateral lower extremity weakness, anxiety    Subjective    The patient came to the gym for PT independently in his wheelchair.  His initial request was to do recovery from the floor.     Objective    The patient participated in full recovery and was able to get up from the floor onto a mat without assistance.  He participated in gait training for 220 FT with the FWW.  He also utilized the NuStep as summarized above.        Assessment    The patient shows consistent motivation particularly when he has no issues with pain or fatigue.    Plan    Therapeutic exercise, gait training, stairs, car transfer, safety education
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
## 2019-10-24 NOTE — PROGRESS NOTES
"Rehab Progress Note     Date of Service: 10/24/2019  Chief Complaint: anxiety    Interval Events (Subjective)    Patient seen and examined in his room this morning.  Reports he continues to sleep well at night.  He wants to know if he would benefit from more time here so that he could potentially discharge with a cane instead of a walker.  Advised we will discuss this at next week's weekly conference.  He is urinating without any issues and moving his bowels.  He denies any pain.  No new complaints.      Objective:  VITAL SIGNS: /72   Pulse 92   Temp 36.7 °C (98 °F) (Oral)   Resp 16   Ht 1.727 m (5' 8\")   Wt 118 kg (260 lb 3.2 oz)   SpO2 92%   BMI 39.56 kg/m²   Gen: alert, no apparent distress, obese  CV: regular rate and rhythm, no murmurs, no peripheral edema  Resp: clear to ascultation bilaterally, normal respiratory effort  GI: soft, non-tender abdomen, bowel sounds present  Neuro: notable for weak dorsi flexors bilaterally      No results found for this or any previous visit (from the past 72 hour(s)).    Current Facility-Administered Medications   Medication Frequency   • QUEtiapine (SEROQUEL) tablet 100 mg BID   • lisinopril (PRINIVIL) tablet 10 mg Q DAY   • DULoxetine (CYMBALTA) capsule 20 mg DAILY   • vitamin D (cholecalciferol) tablet 4,000 Units DAILY   • melatonin tablet 3 mg QHS   • thiamine tablet 100 mg DAILY   • Respiratory Care per Protocol Continuous RT   • Pharmacy Consult Request ...Pain Management Review 1 Each PHARMACY TO DOSE   • acetaminophen (TYLENOL) tablet 650 mg Q4HRS PRN   • artificial tears ophthalmic solution 1 Drop PRN   • benzocaine-menthol (CEPACOL) lozenge 1 Lozenge Q2HRS PRN   • mag hydrox-al hydrox-simeth (MAALOX PLUS ES or MYLANTA DS) suspension 20 mL Q2HRS PRN   • ondansetron (ZOFRAN ODT) dispertab 4 mg 4X/DAY PRN    Or   • ondansetron (ZOFRAN) syringe/vial injection 4 mg 4X/DAY PRN   • sodium chloride (OCEAN) 0.65 % nasal spray 2 Spray PRN   • atenolol (TENORMIN) " tablet 100 mg DAILY   • calcium carbonate (TUMS) chewable tab 500 mg TID PRN   • [START ON 10/25/2019] cyanocobalamin (VITAMIN B-12) injection 1,000 mcg Q30 DAYS   • folic acid (FOLVITE) tablet 1 mg DAILY   • gabapentin (NEURONTIN) capsule 300 mg HS PRN   • hydrOXYzine HCl (ATARAX) tablet 50 mg TID PRN   • metoprolol (LOPRESSOR) tablet 12.5 mg Q8HRS PRN   • multivitamin (THERAGRAN) tablet 1 Tab DAILY   • omeprazole (PRILOSEC) capsule 20 mg DAILY   • rivaroxaban (XARELTO) tablet 20 mg PM MEAL   • senna-docusate (PERICOLACE or SENOKOT S) 8.6-50 MG per tablet 2 Tab BID    And   • polyethylene glycol/lytes (MIRALAX) PACKET 1 Packet DAILY    And   • magnesium hydroxide (MILK OF MAGNESIA) suspension 30 mL QDAY PRN    And   • bisacodyl (DULCOLAX) suppository 10 mg QDAY PRN       Orders Placed This Encounter   Procedures   • Diet Order Regular     Standing Status:   Standing     Number of Occurrences:   1     Order Specific Question:   Diet:     Answer:   Regular [1]       Assessment:  Active Hospital Problems    Diagnosis   • *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    I led and attended the weekly conference, and agree with the IDT conference documentation and plan of care as noted below.    Date of conference: 10/21/2019    Goals and barriers: See IDT  note.    Biggest barriers: leg weakness, impaired standing tolerance, mild fine motor deficits, impaired balance    Goals in next week: increased ambulation distance, improved    Admission FIM 89 --> 91    CM/social support: Plan is for patient to travel by car or plane to [place], [place], to live with his mother in her single level home. Patient was living in [place]; rented a mother-in-laws quarter and also has a 35' camper.  Mother is here from [facility] to support patient in his recovery and get him back to [place]. [place] are going to move the camper [place]. Mother stated she wishes to travel to [place] as soon as possible, with patient, after his discharge. They may be staying in his camper for a brief period which has stair access.    Anticipated DC date: 10/31/2019     Equip: JA[employer]ARVIS    Follow up: PCP      Medical Decision Making and Plan:    Subacute combined degeneration of spinal cord  Incomplete paraplegia, improved  From whippit use  Paresthesias, continues  Ankle/foot weakness, continues  Urinary retention, resolved  Continue full rehab program  PT/OT, 1.5 hr each discipline, 5 days per week     Polysubstance abuse  Anxiety disorder, stable  Insomnia, resolved  Consult psychiatry, appreciate assistance  Continue Seroquel, 100 mg TID --> BID 10/20, continue to titrate down - will go to nightly on Friday  Started Cymbalta 20 mg daily 10/18  PRN hydroxyzine  Scheduled melatonin  Consult Dr. Chacko, psychology  Bad reactions to Risperdal, Lamictal, trazodone, Wellbutrin     Acute Urinary retention, resolved  Neurogenic bladder  Likely due to posterior column injury  Discontinued Flomax  IC for over 400 cc  Replace Srivastava if unable to unable to urinate  Continue to monitor with as needed bladder scans  Last PVR 17    Bowel  Meds as needed  Last BM 10/21    DVT  Xarelto    Appreciate the assistance of the hospitalist with his medical co-morbidities:     Pulmonary emboli  Right heart strain  Paroxysmal atrial  fibrillation  Sinus tachycardia, stable  Hypertension  Leukocytosis, resolved  Elevated liver enzymes, improved  Vit D deficiency    Total time:  15 minutes.  I spent greater than 50% of the time for patient care, counseling, and coordination on this date, including patient face-to face time, unit/floor time with review of records/pertinent lab data and studies, as well as discussing diagnostic evaluation/work up, planned therapeutic interventions, and future disposition of care, as per the interval events/subjective and the assessment and plan as noted above.    I have performed a physical exam, reviewed and updated ROS, as well as the assessment and plan today 10/24/2019. In review of note from 10/23/2019 there are no new changes except as documented above.        Sue Rock M.D.   Physical Medicine and Rehabilitation

## 2019-10-24 NOTE — CARE PLAN
2035 Complained of anxiety, atarax given per prn order.   Patient in room with his mother at this time.

## 2019-10-24 NOTE — DISCHARGE PLANNING
Call placed to [facility]. Message left requesting update on continued stay auth. Updated clinical information was faxed on 10/22/2019.

## 2019-10-24 NOTE — THERAPY
"Occupational Therapy  Daily Treatment     Patient Name: Cosme Cabrera  Age:  34 y.o., Sex:  male  Medical Record #: [id number]  Today's Date: 10/24/2019     Precautions  Precautions: (P) Fall Risk  Comments: Bilateral lower extremity weakness, anxiety    Safety   Comments: Pt cleared to be Mod-I at w/c level for toileting and bed <> w/c txs, mom cleared to assist with showering    Subjective    \"I want to work on balance\"     Objective       10/24/19 1431   Precautions   Precautions Fall Risk   Balance   Comments standing at rebounder; 4x25 reps with wide DONALD, 2x25 reps staggered stance RLE in front, 2x25 reps staggered stance LLE fwd. CGA for stability, LOB x4 requiring mod A for recovery with 2 therapist assisted sits to chair behind him. No UE support, no LE buckling   OT Total Time Spent   OT Individual Total Time Spent (Mins) 30   OT Charge Group   OT Neuromuscular Re-education / Balance 2     Assessment    Pt reporting increase in LLE stability and decreased in hip pain relative to AM session, tolerated standing balance activity with no UE support with improved stability requiring primarily CGA, did have LOB x4 requiring mod A for 2 episodes and therapist assisted sit on the other 2. Demonstrates decreased stability with RLE fwd in staggered stance relative to other positions.     Plan    Outing tomorrow    "

## 2019-10-24 NOTE — THERAPY
"Occupational Therapy  Daily Treatment     Patient Name: Cosme Cabrera  Age:  34 y.o., Sex:  male  Medical Record #: [id number]  Today's Date: 10/24/2019     Precautions  Precautions: (P) Fall Risk  Comments: (P) Bilateral lower extremity weakness, anxiety    Safety   Comments: Pt cleared to be Mod-I at w/c level for toileting and bed <> w/c txs, mom cleared to assist with showering    Subjective    \"My left leg is feeling off today\"     Objective       10/24/19 0831   Precautions   Precautions Fall Risk   Comments Bilateral lower extremity weakness, anxiety   IADL Treatments   Kitchen Mobility Education Pt requiring CGA for kitchen mobility with FWW, min verbal cues for positioning to optimize safety with good carryover during session. Able to utilize both counter support and FWW as needed for stability.   Meal Preparation Completed multi-step cooking task (deviled eggs) with no cues for sequencing/organization, required consistent seated rest breaks due to LE weakness.    Comments Pt able to tolerate 7-8 minutes on his feet at a time. Completed final 15 min of task from seated position due to LE weakness/fatigue. Educated on energy conservation, safe home setup, and importance of self-monitoring.    OT Total Time Spent   OT Individual Total Time Spent (Mins) 60   OT Charge Group   OT Therapy Activity 4     Assessment    Pt completed meal prep activity with CGA for mobility/standing components with use of FWW and counter support, min cues for positioning. Tolerated 7-8 minutes at a time on his feet before requiring seated rest break. Reporting L hip pain from sleeping position last night, tendency to favor RLE during standing tasks this session.     Plan    Outing Friday to get haircut, continue to progress standing balance/tolerance, functional mobility household and community distances with FWW  "

## 2019-10-24 NOTE — REHAB-CM IDT TEAM NOTE
Case Management    DC Planning  DC destination/dispostion:  Plan is for patient to travel by car or plane to [place], [place], to live with his mother in her single level home.    Referrals: Oleg rondon, MATHEUS. DMV placard application faxed to [facility].    DC Needs: PCP in [place] has accepted patient. Possible outpatient therapy to be ordered.     Barriers to discharge:  Functional deficits.    Strengths: Supportive mother. Will have family support in [place].     Section completed by:  Amber Pinzon R.N.      Physical Therapy Evaluation    Visit Count: 1  Plan of Care Dates: Initial: 8/31/2018 Through: 11/23/2018  Insurance Information: [facility]  Next Referring Provider Visit: 9/4/18    Referred by: Sidney Joshi DO  Medical Diagnosis (from order): M16.11 Primary osteoarthritis of right hip Treatment Diagnosis: Hip Symptoms with Pain, Impaired Posture, Impaired Joint Mobility, Impaired Range of Motion, Impaired Motor Function/Muscle Performance and Movement Coordination Impairments  Insurance: 1. [employer]  2. N/A   [facility]  Co pay=none  Co Ins=80/20  Visits=50 OT/PT/ST Combined  Ded $ 750  OOP $ 5000/$489 Met  Auth=No    Date of Surgery: 8/21/18; Surgery performed: right total hip arthroplsaty; Physician Guidelines yes  Diagnosis Precautions: none  Chart reviewed: Relevant co-morbidities, allergies, tests and medications: knee surgery, thrombocytopenia    SUBJECTIVE   Description of Problem/Mechanism of Injury: Patient states severe arthritis led to him having the right total hip arthroplasty on 8/21/18.  Just recently had his follow up to remove the bandage in which they stated the incision was healing well.  States pain is uncontrolled, feeling it more into the thigh region and needs to take a couple hydrocodone.  Currently using walker for community ambulation, and has progressed to both a cane and no assistive device at times pending pain level.  Does have steps to access second floor bedroom and bathroom and is having no trouble using one railing.  Currently ambulates with walker about 1 block comfortably, and can stand about 30 minutes at the longest.  Sleeping very interrupted, about 4 hours in the bed and has to shift to recliner.  Does have his wife's assistance for self-cares and donning lower body clothing.    Pain:  Intensity: Now: 6/10; Best: 2/10 (when hydrocodone is working); Worst: 8/10 (in the last 2 weeks)  Location: anterior-lateral thigh and into knee  Quality/Description: Ache  Relieving/Alleviating  factors: rest, ice, prescribed medication, elevation    Function:  Limitations and exacerbating factors (patient reported): pain, difficulty with driving, walking quickly as required to cross a street/exit a building rapidly, working as supervisor, sometimes working machines, increasing standing and ambulation tolerance, donning lower body clothing, ambulating with assistive device  Prior level (patient reported): independent with all activities of daily living and instrumental activities of daily living, pain free, driving, ambulating without assistive device, able to sleep without disruption from pain, able to work, ambulating without assistive device    Prior Treatment: no therapies in the past year for current condition. Hospitalization, home health services or skilled nursing facility in the last 30 days: No, per patient.    Home Environment/Social Support: Patient lives with significant other and children, in a 2 story home.  Patient has consistent assist from family/friends.      Safety:  Do you feel safe at home, work and/or school? yes, per patient  Falls: balance history in last year (< or equal to 2 falls/near falls and/or reasons) does not indicate further testing    Patient Goals/Concerns:  To be able to walk dog about 2 miles per day, be pain free, go back to the gym    OBJECTIVE   Posture/Observation:  Presents with wife for evaluation  Forward flexed posture for ambulation and standing in 2ww  Bandage recently changed but opened superiorly (instructed to reinforce area with medical tape at home)  No exudate preseten    Gait Analysis:  Ambulates with forward flexed posture with 2ww, decreased maria luz, decreased step length on left, early heel off, limited heel strike    Range of Motion (degrees)   Norm Left Right   Date  Initial Initial   Hip Flexion 110-120  90 ~75   Hip Extension 10-15 NT Neutral   Hip Abduction 30-50  NT NT   Hip Adduction 30 NT NT   Hip Internal Rotation 30-40  5 Neutral   Hip  External Rotation 40-60  20 ~5-10   standard testing positions unless otherwise noted; Key: ranges are reported in active range of motion unless noted as AA=active assistive or P=passive range of motion, * denotes pain   Comments: All motions within functional/normal limits except as noted.    Strength (out of 5)   Left Right   Date Initial Initial   Hip Flexion  4*   Hip Extension NT NT   Hip Abduction     Hip Gluteus Medius NT 3+*   Hip Adduction     Hip Internal Rotation  NT   Hip External Rotation  NT   Knee Flexion     Knee Extension     Ankle Dorsiflexion     Ankle Plantar flexion     standard testing positions unless otherwise noted, key: * denotes pain  Comments: Gross muscle strength within functional/normal limits except as noted.    Muscle Flexibility:  Hamstring - Left: moderate tightness  Iliopsoas - Right: moderate tightness  Iliotibial Band - Right: moderate restriction  Rectus Femoris - Right: moderate tightness     Palpation:  Moderate restrictions noted along hamstring muscle belly, proximal to distal IT band in all divisions    Joint Play Assessment:  Limited hip IR, posterior capsular tightness bilaterally (right > left)  Restrictions noted with hip flexion   Limited hip ER bilaterally, hip extension to neutral on right    Special Tests:  *MUSCLE  90/90 Hamstring Test: Positive left for hamstring tightness: -30 degrees  Yanna Test: Positive right for iliotibial band tightness    Functional Tests:  Sit To Stand: increased flexion at hip with forward trunk lean to complete standing transition  Stairs: Ambulates safely with \"up with good, down with bad\" with use of 1 railing        Outcome Measures:   Lower Extremity Functional Scale: LEFS Calculated Total: 13 (0=extreme difficulty; 80=no difficulty)     Initial Treatment   Initial evaluation completed.    Manual Therapy:   Foam roller to IT band, middle to distal in all divisions     Therapeutic Activity:  Patient educated on components from  examination, and reason for IT band restrictions secondary to impaired LE kinematics resulting from surgery.  Patient instructed to continue with hospital HEP with addition of new exercises this date to address flexibility limitations, and continue icing/elevating frequently for pain control.      Patient educated on the importance of attending therapy sessions routinely and understands that he may have impaired mobility and possibly increased pain if he does not attend therapy or do his HEP as prescribed.      Initial Home Program:  * above denotes home program issuance as well  8/31/18:  Hospital HEP, standing hamstring stretch 3x30 sec (B), rolling out IT band 3 min/2xday    Plan for next session: Warm up on Recumbent Sci fit, joint mobs with mulligan belt at hip, hamstring/hip flexibility, PROM of hip, assess balance, gait training with cane    ASSESSMENT   57 year old male presents to therapy with significant decline in prior level of function due to signs and symptoms consistent with being s/p right total hip arthoplasty on 8/21/18, presenting typically with ROM and strength deficits, as well as deficits in flexibility and gait mechanics with assistive device with a decline in overall PLOF from pre-surgical status.    Outcome:    Benefit from skilled therapy: yes   Rehab potential is good due to positive factors age, family support, motivation level, healthy lifestyle, post - surgical  and negative factors pain level, activity tolerance    Predicted patient presentation: Low (stable) - Patient comorbidities and complexities, as defined above, will have little effect on progress for prescribed plan of care.  Plan of care to increase strength/stability, increase range of motion, decrease pain, increase scar tissue mobility, improve balance/proprioception, reduce falls/fall risk, improve muscle coordination, improve joint mobility, improve safety at home and in community, improve activity tolerance, improve  quality of gait, improve activities of daily living and instrumental activites of daily living, improve work tolerance to address functional limitations listed above.    Goals:  To be obtained by end of this plan of care:  1. Patient independent with modified and progressed home exercise program.  2. Patient will decrease involved hip pain to 0-1/10  to aid in improving ambulation tolerance with least restrictive or no assistive device to return to PLOF.  3. Patient will increase involved hip active range of motion in all directions by 10-15° to aid in improving stair mechanics and functional mobility with decreased discomfort.  4. Patient will increase involved hip strength to 4+/5 to aid in ambulation on varying terrains with decreased discomfort when taking dog on walks  5. Patient will be able to ascend and descend 1 flight of stairs using reciprocal pattern without  pain/difficulty.  6. Lower Extremity Functional Scale: Patient will complete form to reflect an improved score from initial score of 13 to greater than or equal to 50 (0=extreme difficulty; 80=no difficulty) to indicate pt reported improvement in function/disability/impairment (minimal detectable change: 9 points).     PLAN   Frequency/Duration: 3 times per week for 12 weeks with tapering as the patient progresses  Skilled training and instruction for the following interventions:  Activities of Daily Living/Self Care (03217)  Gait Training (04963)  Manual Therapy (22934)  Neuromuscular Re-Education (85408)  Therapeutic Activity (22507)  Therapeutic Exercise (04246)    The plan of care and goals were established with the patient who concurs.  Patient has been given attendance policy at time of initial evaluation.    Patient Education:  Who will be receiving education: patient  Are they ready to learn: yes  Preferred learning style: written, verbal, demonstration  Barriers to learning: no barriers apparent at this time   Result of initial outlined  education: Verbalizes understanding and Needs reinforcement    THERAPY DAILY BILLING   Primary Insurance: [facility]  Secondary Insurance: N/A    Evaluation Procedures:  Physical Therapy Evaluation: Low Complexity    Timed Procedures:  Manual Therapy, 5 minutes  Therapeutic Activity, 5 minutes    Untimed Procedures:  No untimed codes were used on this date of service    Total Treatment Time: 40 minutes    The referring provider's electronic or written signature on the evaluation authorizes the therapy plan of care.   Electronically sent for referring provider signature

## 2019-10-24 NOTE — PROGRESS NOTES
Hospital Medicine Daily Progress Note      Chief Complaint:  Hypertension  Afib/Tachycardia  PE    Interval History:  No significant events or changes since last visit    Review of Systems  Review of Systems   Constitutional: Negative for fever.   Eyes: Negative for blurred vision.   Respiratory: Negative for cough.    Cardiovascular: Negative for chest pain.   Gastrointestinal: Negative for diarrhea.   Musculoskeletal: Negative for joint pain.   Neurological: Negative for dizziness.   Psychiatric/Behavioral: The patient is not nervous/anxious.         Physical Exam  Temp:  [36.5 °C (97.7 °F)-37 °C (98.6 °F)] 36.7 °C (98 °F)  Pulse:  [] 92  Resp:  [16-18] 16  BP: (113-122)/(66-75) 122/72  SpO2:  [92 %-94 %] 92 %    Physical Exam   Constitutional: No distress.   HENT:   Mouth/Throat: No oropharyngeal exudate.   Eyes: EOM are normal.   Neck: No JVD present. No tracheal deviation present.   Cardiovascular: Normal rate and regular rhythm.   No murmur heard.  Pulmonary/Chest: Effort normal and breath sounds normal.   Abdominal: Soft. Bowel sounds are normal.   Musculoskeletal: He exhibits no edema.   Skin: Skin is warm and dry. No rash noted.   Psychiatric: His behavior is normal.   Nursing note and vitals reviewed.      Fluids    Intake/Output Summary (Last 24 hours) at 10/24/2019 0823  Last data filed at 10/23/2019 1900  Gross per 24 hour   Intake 360 ml   Output --   Net 360 ml       Laboratory                        Assessment/Plan  Acute saddle pulmonary embolism (HCC)- (present on admission)  Assessment & Plan  Has recurrent bilat PE  Echo showed right heart strain (persistent vs recurrent)  On Xarelto    Paroxysmal atrial fibrillation (HCC)- (present on admission)  Assessment & Plan  HR occ rises up a little  Suspect 2nd to PE  On Atenolol: 100 mg daily  On Xarelto  Cont to monitor    ETOH abuse- (present on admission)  Assessment & Plan  On Thiamine, MVI, and Folate supplements    Vitamin D  deficiency  Assessment & Plan  Vit D: 15  On supplements    Type 2 diabetes mellitus without complication, without long-term current use of insulin (HCC)- (present on admission)  Assessment & Plan  Hba1c: 6.6 (9/19)  Newly diagnosed  Likely pre-diabetes at this point  Off accuchecks  Encourage diet and lifestyle modifications  Note: pt in somewhat denial and states that his a1c was up because he ate some fast food prior to hospital    ALLI (obstructive sleep apnea)- (present on admission)  Assessment & Plan  RT protocol  Suggest out patient sleep study    Anxiety- (present on admission)  Assessment & Plan  On Seroquel    Essential hypertension- (present on admission)  Assessment & Plan  BP ok  On Atenolol: 100 mg daily  On Lisinopril: 10 mg daily  Monitor

## 2019-10-24 NOTE — THERAPY
10/24/19 1359   Precautions   Precautions Fall Risk;Other (See Comments)   Comments Bilateral lower extremity weakness, anxiety   Pain 0 - 10 Group   Therapist Pain Assessment 0   Cognition    Cognition / Consciousness WDL   Level of Consciousness Alert   Sitting Lower Body Exercises   Ankle Pumps 3 sets of 15;Bilateral   Hip Flexion 3 sets of 15;Bilateral   Hip Abduction 3 sets of 15;Bilateral   Hip Adduction 3 sets of 15;Bilateral   Long Arc Quad 3 sets of 15;Bilateral   Hamstring Curl 3 sets of 15;Bilateral   Bed Mobility    Supine to Sit Independent   Sit to Supine Independent   Sit to Stand Supervised   Scooting Independent   Rolling Modified Independent   PT Total Time Spent   PT Individual Total Time Spent (Mins) 60   PT Charge Group   PT Gait Training 2   PT Therapeutic Exercise 2   Physical Therapy   Daily Treatment     Patient Name: Cosme Cabrera  Age:  34 y.o., Sex:  male  Medical Record #: [id number]  Today's Date: 10/24/2019     Precautions  Precautions: Fall Risk  Comments: Bilateral lower extremity weakness, anxiety    Subjective    The patient was up and ready for PT.  He came to the gym independently.     Objective    The patient participated in gait training using a FWW outside.  He tolerated 170 FT x1, 150 FT x4.  He also participated in seated bilateral lower extremity therapeutic exercise 3 sets of 15.    FIM Bed/Chair/Wheelchair Transfers Score: 5 - Standby Prompting/Supervision or Set-up  Bed/Chair/Wheelchair Transfers Description:  Supervision for safety    FIM Walking Score:  5 - Standby Prompting/Supervision or Set-up  Walking Description:  Supervision for safety, Verbal cueing, Extra time, Walker(Gait training outside  FT x1, 155 FT x4)    FIM Wheelchair Score:  6 - Modified Independent  Wheelchair Description:       FIM Stairs Score:  2 - Max Assistance  Stairs Description:         Assessment    The patient has consistently demonstrated his motivation to recover and discharged  to home.  He comes to the gym in the wheelchair independently, asks for specific activities in addition to the planned activities for the session.  Plan    Safety education, gait training as tolerated, therapeutic exercise, stairs, car transfer

## 2019-10-25 PROCEDURE — 99231 SBSQ HOSP IP/OBS SF/LOW 25: CPT | Performed by: HOSPITALIST

## 2019-10-25 PROCEDURE — 700102 HCHG RX REV CODE 250 W/ 637 OVERRIDE(OP): Performed by: PHYSICAL MEDICINE & REHABILITATION

## 2019-10-25 PROCEDURE — 700111 HCHG RX REV CODE 636 W/ 250 OVERRIDE (IP): Performed by: PHYSICAL MEDICINE & REHABILITATION

## 2019-10-25 PROCEDURE — 97110 THERAPEUTIC EXERCISES: CPT

## 2019-10-25 PROCEDURE — 99232 SBSQ HOSP IP/OBS MODERATE 35: CPT | Performed by: PHYSICAL MEDICINE & REHABILITATION

## 2019-10-25 PROCEDURE — A9270 NON-COVERED ITEM OR SERVICE: HCPCS | Performed by: HOSPITALIST

## 2019-10-25 PROCEDURE — 770010 HCHG ROOM/CARE - REHAB SEMI PRIVAT*

## 2019-10-25 PROCEDURE — A9270 NON-COVERED ITEM OR SERVICE: HCPCS | Performed by: STUDENT IN AN ORGANIZED HEALTH CARE EDUCATION/TRAINING PROGRAM

## 2019-10-25 PROCEDURE — 97116 GAIT TRAINING THERAPY: CPT

## 2019-10-25 PROCEDURE — 97112 NEUROMUSCULAR REEDUCATION: CPT

## 2019-10-25 PROCEDURE — 700102 HCHG RX REV CODE 250 W/ 637 OVERRIDE(OP): Performed by: STUDENT IN AN ORGANIZED HEALTH CARE EDUCATION/TRAINING PROGRAM

## 2019-10-25 PROCEDURE — 700102 HCHG RX REV CODE 250 W/ 637 OVERRIDE(OP): Performed by: HOSPITALIST

## 2019-10-25 PROCEDURE — A9270 NON-COVERED ITEM OR SERVICE: HCPCS | Performed by: PHYSICAL MEDICINE & REHABILITATION

## 2019-10-25 PROCEDURE — 97537 COMMUNITY/WORK REINTEGRATION: CPT

## 2019-10-25 RX ORDER — QUETIAPINE FUMARATE 100 MG/1
100 TABLET, FILM COATED ORAL EVERY EVENING
Status: COMPLETED | OUTPATIENT
Start: 2019-10-25 | End: 2019-10-27

## 2019-10-25 RX ADMIN — RIVAROXABAN 20 MG: 10 TABLET, FILM COATED ORAL at 17:32

## 2019-10-25 RX ADMIN — OMEPRAZOLE 20 MG: 20 CAPSULE, DELAYED RELEASE ORAL at 08:41

## 2019-10-25 RX ADMIN — LISINOPRIL 10 MG: 5 TABLET ORAL at 07:57

## 2019-10-25 RX ADMIN — MELATONIN 3 MG: at 20:42

## 2019-10-25 RX ADMIN — POLYETHYLENE GLYCOL 3350 1 PACKET: 17 POWDER, FOR SOLUTION ORAL at 08:09

## 2019-10-25 RX ADMIN — Medication 100 MG: at 08:08

## 2019-10-25 RX ADMIN — QUETIAPINE FUMARATE 100 MG: 100 TABLET ORAL at 21:00

## 2019-10-25 RX ADMIN — VITAMIN D, TAB 1000IU (100/BT) 4000 UNITS: 25 TAB at 08:39

## 2019-10-25 RX ADMIN — THERA TABS 1 TABLET: TAB at 08:41

## 2019-10-25 RX ADMIN — HYDROXYZINE HYDROCHLORIDE 50 MG: 25 TABLET, FILM COATED ORAL at 20:42

## 2019-10-25 RX ADMIN — ATENOLOL 100 MG: 25 TABLET ORAL at 08:39

## 2019-10-25 RX ADMIN — DULOXETINE HYDROCHLORIDE 30 MG: 30 CAPSULE, DELAYED RELEASE ORAL at 08:40

## 2019-10-25 RX ADMIN — FOLIC ACID 1 MG: 1 TABLET ORAL at 08:40

## 2019-10-25 RX ADMIN — SENNOSIDES AND DOCUSATE SODIUM 2 TABLET: 8.6; 5 TABLET ORAL at 08:08

## 2019-10-25 RX ADMIN — CYANOCOBALAMIN 1000 MCG: 1000 INJECTION, SOLUTION INTRAMUSCULAR; SUBCUTANEOUS at 17:28

## 2019-10-25 ASSESSMENT — PATIENT HEALTH QUESTIONNAIRE - PHQ9
SUM OF ALL RESPONSES TO PHQ9 QUESTIONS 1 AND 2: 0
1. LITTLE INTEREST OR PLEASURE IN DOING THINGS: NOT AT ALL
2. FEELING DOWN, DEPRESSED, IRRITABLE, OR HOPELESS: NOT AT ALL

## 2019-10-25 ASSESSMENT — ENCOUNTER SYMPTOMS
CHILLS: 0
FEVER: 0
DIARRHEA: 0
NAUSEA: 0
SHORTNESS OF BREATH: 0
NERVOUS/ANXIOUS: 0
VOMITING: 0
ABDOMINAL PAIN: 0

## 2019-10-25 NOTE — THERAPY
"Occupational Therapy  Daily Treatment     Patient Name: Cosme Cabrera  Age:  34 y.o., Sex:  male  Medical Record #: [id number]  Today's Date: 10/25/2019     Precautions  Precautions: (P) Fall Risk  Comments: Bilateral lower extremity weakness, anxiety    Safety   Comments: Pt cleared to be Mod-I at w/c level for toileting and bed <> w/c txs, mom cleared to assist with showering    Subjective    \"Its nice to be outside\"     Objective       10/25/19 1231   Precautions   Precautions Fall Risk   Sitting Upper Body Exercises   Side Arm Raise 2 sets of 15  (4# dumbells)   Shoulder Press 2 sets of 15  (7# dumbells)   Bilateral Row 2 sets of 15  (7# dumbells)   Bicep Curls 2 sets of 15  (7# dumbells)   Other Exercise fwd punches 2x15 each side with 4# dumbells   Balance   Comments ladderball x3 rounds standing w/ intermittent UE support, reaching back to retreive gamepieces on chair behind him and tossing to ladder in front of him, SBA for stability   OT Total Time Spent   OT Individual Total Time Spent (Mins) 30   OT Charge Group   OT Neuromuscular Re-education / Balance 1   OT Therapeutic Exercise  1     Assessment    Pt continues with progressive improvements in standing balance and tolerance, still relies on UE support for dynamic movement. Remains motivated and participatory    Plan    continue to progress standing balance/tolerance with reduction of UE support, functional mobility household and community distances. UE therex and core strength program every other day. Pt completing ADLs w/ assist from mother as needed in room.     "

## 2019-10-25 NOTE — THERAPY
10/25/19 1459   Precautions   Precautions Fall Risk   Comments Bilateral lower extremity weakness, anxiety   Pain 0 - 10 Group   Therapist Pain Assessment 0   Cognition    Cognition / Consciousness WDL   Level of Consciousness Alert   Sitting Lower Body Exercises   Ankle Pumps 3 sets of 15;Bilateral   Hip Flexion 3 sets of 15;Bilateral   Hip Abduction 3 sets of 15;Bilateral   Hip Adduction 3 sets of 15;Bilateral   Long Arc Quad 3 sets of 15;Bilateral   Hamstring Curl 3 sets of 15;Bilateral   Bed Mobility    Supine to Sit Independent   Sit to Supine Independent   Sit to Stand Modified Independent   Scooting Independent   PT Total Time Spent   PT Individual Total Time Spent (Mins) 60   PT Charge Group   PT Gait Training 2   PT Therapeutic Exercise 2   Physical Therapy   Daily Treatment     Patient Name: Cosme Cabrera  Age:  34 y.o., Sex:  male  Medical Record #: [id number]  Today's Date: 10/25/2019     Precautions  Precautions: Fall Risk  Comments: Bilateral lower extremity weakness, anxiety    Subjective    The patient was ready for PT.  He asked if it would be okay to walk with a walker to his meals.     Objective    The patient participated in gait training with a fww and tolerated 330 FT and 220 FT x2.  He followed this with seated bilateral lower extremity therapeutic exercise 3 sets of 15 as indicated above.  The patient is cleared to walk with a walker to and from his meals and in the building.    FIM Bed/Chair/Wheelchair Transfers Score: 6 - Modified Independent  Bed/Chair/Wheelchair Transfers Description:  Increased time(FWW, modified independent)    FIM Walking Score:  6 - Modified Independent  Walking Description:  Extra time, Walker(330 FT, 220 FT x2, FWW)    FIM Wheelchair Score:  6 - Modified Independent  Wheelchair Description:       FIM Stairs Score:  2 - Max Assistance  Stairs Description:         Assessment    The patient continues to make very good progress.  He showed a lot of initiative today  by requesting to walk with a walker to/from his meals and to be able to walk in the halls independently.  He is very conscientious about safety and monitoring how he feels when he is participating in therapy.  He said his goal is to be able to walk when he leaves the hospital and not have to have a wheelchair as a back-up.    Plan    Therapeutic exercise, gait training, safety education, up/down stairs, NuStep, gait outside

## 2019-10-25 NOTE — REHAB-PHARMACY IDT TEAM NOTE
Pharmacy   Pharmacy  Antibiotics/Antifungals/Antivirals:  Medication:      Active Orders (From admission, onward)    None        Route:        NA  Stop Date:  NA  Reason:      NA  Antihypertensives/Cardiac:  Medication:    Orders (72h ago, onward)     Start     Ordered    10/20/19 0800  lisinopril (PRINIVIL) tablet 10 mg  Q DAY      10/19/19 1144    10/17/19 0900  atenolol (TENORMIN) tablet 100 mg  DAILY      10/16/19 1144    10/16/19 1144  metoprolol (LOPRESSOR) tablet 12.5 mg  EVERY 8 HOURS PRN      10/16/19 1144              Patient Vitals for the past 24 hrs:   BP Pulse   10/25/19 0808 136/98 100   10/25/19 0800 136/98 (!) 113   10/25/19 0757 136/98 --   10/24/19 1900 127/81 (!) 102   10/24/19 1400 108/74 97     Anticoagulation:  Medication: Xarelto      Other key medications: A review of the medication list reveals no issues at this time. Patient is currently on antihypertensive(s). Recommend home blood pressure monitoring/recording if antihypertensive(s) regimen(s) continue.    Section completed by: Bart Garcia [facility]

## 2019-10-25 NOTE — PROGRESS NOTES
"Rehab Progress Note     Date of Service: 10/25/2019  Chief Complaint: anxiety    Interval Events (Subjective)    Patient seen and examined outside today. He had a therapeutic outing today with therapy to get a haircut. His dog is here visiting. He refused his am Seroquel, and is feeling fine. We discussed going down to just nightly for 3 nights, then discontinuing.     He had some increased paresthesias in his hands today, he thinks it was due to anxiety about going on his outing.     He is moving his bladder and bowel. He is sleeping OK. He doesn't think he will need a WC when he leaves rehab, at least, that is his goal.       Objective:  VITAL SIGNS: /98   Pulse 100   Temp 36.2 °C (97.2 °F) (Temporal)   Resp 18   Ht 1.727 m (5' 8\")   Wt 118 kg (260 lb 3.2 oz)   SpO2 94%   BMI 39.56 kg/m²   Gen: alert, no apparent distress  CV: regular rate and rhythm, no murmurs, no peripheral edema  Resp: clear to ascultation bilaterally, normal respiratory effort  GI: soft, non-tender abdomen, bowel sounds present  Neuro: notable for bilateral ankle weakness      No results found for this or any previous visit (from the past 72 hour(s)).    Current Facility-Administered Medications   Medication Frequency   • QUEtiapine (SEROQUEL) tablet 100 mg Q EVENING   • DULoxetine (CYMBALTA) capsule 30 mg DAILY   • lisinopril (PRINIVIL) tablet 10 mg Q DAY   • vitamin D (cholecalciferol) tablet 4,000 Units DAILY   • melatonin tablet 3 mg QHS   • thiamine tablet 100 mg DAILY   • Respiratory Care per Protocol Continuous RT   • Pharmacy Consult Request ...Pain Management Review 1 Each PHARMACY TO DOSE   • acetaminophen (TYLENOL) tablet 650 mg Q4HRS PRN   • artificial tears ophthalmic solution 1 Drop PRN   • benzocaine-menthol (CEPACOL) lozenge 1 Lozenge Q2HRS PRN   • mag hydrox-al hydrox-simeth (MAALOX PLUS ES or MYLANTA DS) suspension 20 mL Q2HRS PRN   • ondansetron (ZOFRAN ODT) dispertab 4 mg 4X/DAY PRN    Or   • ondansetron " (ZOFRAN) syringe/vial injection 4 mg 4X/DAY PRN   • sodium chloride (OCEAN) 0.65 % nasal spray 2 Spray PRN   • atenolol (TENORMIN) tablet 100 mg DAILY   • calcium carbonate (TUMS) chewable tab 500 mg TID PRN   • cyanocobalamin (VITAMIN B-12) injection 1,000 mcg Q30 DAYS   • folic acid (FOLVITE) tablet 1 mg DAILY   • gabapentin (NEURONTIN) capsule 300 mg HS PRN   • hydrOXYzine HCl (ATARAX) tablet 50 mg TID PRN   • metoprolol (LOPRESSOR) tablet 12.5 mg Q8HRS PRN   • multivitamin (THERAGRAN) tablet 1 Tab DAILY   • omeprazole (PRILOSEC) capsule 20 mg DAILY   • rivaroxaban (XARELTO) tablet 20 mg PM MEAL   • senna-docusate (PERICOLACE or SENOKOT S) 8.6-50 MG per tablet 2 Tab BID    And   • polyethylene glycol/lytes (MIRALAX) PACKET 1 Packet DAILY    And   • magnesium hydroxide (MILK OF MAGNESIA) suspension 30 mL QDAY PRN    And   • bisacodyl (DULCOLAX) suppository 10 mg QDAY PRN       Orders Placed This Encounter   Procedures   • Diet Order Regular     Standing Status:   Standing     Number of Occurrences:   1     Order Specific Question:   Diet:     Answer:   Regular [1]       Assessment:  Active Hospital Problems    Diagnosis   • *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    I led and attended the weekly conference, and agree with the IDT conference  documentation and plan of care as noted below.    Date of conference: 10/21/2019    Goals and barriers: See IDT note.    Biggest barriers: leg weakness, impaired standing tolerance, mild fine motor deficits, impaired balance    Goals in next week: increased ambulation distance, improved    Admission FIM 89 --> 91    CM/social support: Plan is for patient to travel by car or plane to [place], [place], to live with his mother in her single level home. Patient was living in [place]; rented a mother-in-laws quarter and also has a 35' camper.  Mother is here from [facility] to support patient in his recovery and get [place] back to [place]. [place] going [place] move the [place]. Mother stated she wishes to travel to [place] as soon as possible, with patient, after his discharge. They may be staying in his camper for a brief period which has stair access.    Anticipated DC date: 10/31/2019     Equip: MICHAELW, JAZZ    Follow up: PCP      Medical Decision Making and Plan:    Subacute combined degeneration of spinal cord  Incomplete paraplegia, improved  From whippit use  Paresthesias, continues  Ankle/foot weakness, continues  Urinary retention, resolved  Continue full rehab program  PT/OT, 1.5 hr each discipline, 5 days per week     Polysubstance abuse  Anxiety disorder, stable  Insomnia, resolved  Consult psychiatry, appreciate assistance  Continue Seroquel, 100 mg TID --> BID 10/20, QHS 10/25 for 3 more days  Started Cymbalta 20 mg daily 10/18, increased to 30 mg on 10/24 (by psych)  PRN hydroxyzine  Scheduled melatonin  Consult Dr. Chacko, psychology  Bad reactions to Risperdal, Lamictal, trazodone, Wellbutrin     Acute Urinary retention, resolved  Neurogenic bladder  Likely due to posterior column injury  Discontinued Flomax  IC for over 400 cc  Replace Srivastava if unable to unable to urinate  Continue to monitor with as needed bladder scans  Last PVR 17    Bowel  Meds as needed  Last BM 10/24    DVT  Xarelto    Appreciate the assistance of  the hospitalist with his medical co-morbidities:     Pulmonary emboli  Right heart strain  Paroxysmal atrial fibrillation  Sinus tachycardia, stable  Hypertension  Leukocytosis, resolved  Elevated liver enzymes, improved  Vit D deficiency    Total time:  26 minutes.  I spent greater than 50% of the time for patient care, counseling, and coordination on this date, including patient face-to face time, unit/floor time with review of records/pertinent lab data and studies, as well as discussing diagnostic evaluation/work up, planned therapeutic interventions, and future disposition of care, as per the interval events/subjective and the assessment and plan as noted above.    I have performed a physical exam, reviewed and updated ROS, as well as the assessment and plan today 10/25/2019. In review of note from 10/24/2019 there are no new changes except as documented above.        Sue Rock M.D.   Physical Medicine and Rehabilitation

## 2019-10-25 NOTE — THERAPY
Occupational Therapy  Daily Treatment     Patient Name: Cosme Cabrera  Age:  34 y.o., Sex:  male  Medical Record #: [id number]  Today's Date: 10/25/2019     Precautions  Precautions: (P) Fall Risk  Comments: Bilateral lower extremity weakness, anxiety    Safety   Comments: Pt cleared to be Mod-I at w/c level for toileting and bed <> w/c txs, mom cleared to assist with showering    Subjective    Pt received in room, ready for outing     Objective       10/25/19 0931   Precautions   Precautions Fall Risk   OT Total Time Spent   OT Individual Total Time Spent (Mins) 90   OT Charge Group   Charges Yes   OT Community Reintegration 6     Pt participated in community outing to get haircut and visit the park. He completed transfer in/out of passenger side of his car x2, up/down curb step to enter salon, on/off swivel chair for haircut with good control, and mobility on sidewalk up/down gradual incline. He completed functional mobility with FWW in park over uneven terrain including rough pavement, smooth pavement, grass, and over curb x1 with close SBA, min verbal cues for FWW positioning during mobility through grass.     Assessment    Pt tolerated session well with focus on mobility in community setting. He continues to be limited by LE weakness limiting walking distance, balance continues to improve and patient is starting to rely less on UE support. He demos good ability to self-monitor and initiate breaks as needed.     Plan    continue to progress standing balance/tolerance with reduction of UE support, functional mobility household and community distances. UE therex and core strength program every other day. Pt completing ADLs w/ assist from mother as needed in room.

## 2019-10-25 NOTE — PROGRESS NOTES
Hospital Medicine Daily Progress Note      Chief Complaint:  Hypertension  Afib/Tachycardia  PE    Interval History:  No significant events or changes since last visit    Review of Systems  Review of Systems   Constitutional: Negative for chills and fever.   Respiratory: Negative for shortness of breath.    Cardiovascular: Negative for chest pain.   Gastrointestinal: Negative for abdominal pain, diarrhea, nausea and vomiting.   Psychiatric/Behavioral: The patient is not nervous/anxious.         Physical Exam  Temp:  [36.4 °C (97.6 °F)-36.7 °C (98 °F)] 36.7 °C (98 °F)  Pulse:  [] 102  Resp:  [16-20] 20  BP: (108-127)/(74-81) 127/81  SpO2:  [93 %] 93 %    Physical Exam   Constitutional: He appears well-nourished.   HENT:   Head: Atraumatic.   Eyes: Pupils are equal, round, and reactive to light. Conjunctivae are normal.   Neck: Normal range of motion. Neck supple.   Cardiovascular: Normal rate and regular rhythm.   Pulmonary/Chest: Effort normal and breath sounds normal.   Abdominal: Soft. Bowel sounds are normal.   Skin: Skin is warm and dry.   Psychiatric: His behavior is normal.   Nursing note and vitals reviewed.      Fluids    Intake/Output Summary (Last 24 hours) at 10/25/2019 0754  Last data filed at 10/24/2019 1800  Gross per 24 hour   Intake 1020 ml   Output --   Net 1020 ml       Laboratory                        Assessment/Plan  Acute saddle pulmonary embolism (HCC)- (present on admission)  Assessment & Plan  Has recurrent bilat PE  Echo showed right heart strain (persistent vs recurrent)  On Xarelto    Paroxysmal atrial fibrillation (HCC)- (present on admission)  Assessment & Plan  HR occ rises up a little  Suspect 2nd to PE  On Atenolol: 100 mg daily  On Xarelto  Cont to monitor    ETOH abuse- (present on admission)  Assessment & Plan  On Thiamine, MVI, and Folate supplements    Vitamin D deficiency  Assessment & Plan  Vit D: 15  On supplements    Type 2 diabetes mellitus without complication,  without long-term current use of insulin (HCC)- (present on admission)  Assessment & Plan  Hba1c: 6.6 (9/19)  Newly diagnosed  Likely pre-diabetes at this point  Currently not on any diabetic meds  Off accuchecks  Encourage diet and lifestyle modifications  Note: pt in somewhat denial and states that his a1c was up because he ate some fast food prior to hospital    ALLI (obstructive sleep apnea)- (present on admission)  Assessment & Plan  RT protocol  Suggest out patient sleep study    Anxiety- (present on admission)  Assessment & Plan  On Seroquel    Essential hypertension- (present on admission)  Assessment & Plan  BP ok  On Atenolol: 100 mg daily  On Lisinopril: 10 mg daily  Monitor

## 2019-10-26 PROCEDURE — 700102 HCHG RX REV CODE 250 W/ 637 OVERRIDE(OP): Performed by: HOSPITALIST

## 2019-10-26 PROCEDURE — 97112 NEUROMUSCULAR REEDUCATION: CPT

## 2019-10-26 PROCEDURE — A9270 NON-COVERED ITEM OR SERVICE: HCPCS | Performed by: HOSPITALIST

## 2019-10-26 PROCEDURE — 700102 HCHG RX REV CODE 250 W/ 637 OVERRIDE(OP): Performed by: PHYSICAL MEDICINE & REHABILITATION

## 2019-10-26 PROCEDURE — 99232 SBSQ HOSP IP/OBS MODERATE 35: CPT | Performed by: HOSPITALIST

## 2019-10-26 PROCEDURE — A9270 NON-COVERED ITEM OR SERVICE: HCPCS | Performed by: STUDENT IN AN ORGANIZED HEALTH CARE EDUCATION/TRAINING PROGRAM

## 2019-10-26 PROCEDURE — 770010 HCHG ROOM/CARE - REHAB SEMI PRIVAT*

## 2019-10-26 PROCEDURE — 700102 HCHG RX REV CODE 250 W/ 637 OVERRIDE(OP): Performed by: STUDENT IN AN ORGANIZED HEALTH CARE EDUCATION/TRAINING PROGRAM

## 2019-10-26 PROCEDURE — A9270 NON-COVERED ITEM OR SERVICE: HCPCS | Performed by: PHYSICAL MEDICINE & REHABILITATION

## 2019-10-26 PROCEDURE — 97530 THERAPEUTIC ACTIVITIES: CPT

## 2019-10-26 RX ADMIN — HYDROXYZINE HYDROCHLORIDE 50 MG: 25 TABLET, FILM COATED ORAL at 20:19

## 2019-10-26 RX ADMIN — MELATONIN 3 MG: at 20:19

## 2019-10-26 RX ADMIN — ATENOLOL 100 MG: 25 TABLET ORAL at 08:41

## 2019-10-26 RX ADMIN — LISINOPRIL 10 MG: 5 TABLET ORAL at 08:41

## 2019-10-26 RX ADMIN — FOLIC ACID 1 MG: 1 TABLET ORAL at 09:41

## 2019-10-26 RX ADMIN — QUETIAPINE FUMARATE 100 MG: 100 TABLET ORAL at 20:19

## 2019-10-26 RX ADMIN — SENNOSIDES AND DOCUSATE SODIUM 2 TABLET: 8.6; 5 TABLET ORAL at 09:41

## 2019-10-26 RX ADMIN — RIVAROXABAN 20 MG: 10 TABLET, FILM COATED ORAL at 17:44

## 2019-10-26 RX ADMIN — DULOXETINE HYDROCHLORIDE 30 MG: 30 CAPSULE, DELAYED RELEASE ORAL at 09:41

## 2019-10-26 RX ADMIN — Medication 100 MG: at 09:41

## 2019-10-26 RX ADMIN — THERA TABS 1 TABLET: TAB at 09:41

## 2019-10-26 RX ADMIN — OMEPRAZOLE 20 MG: 20 CAPSULE, DELAYED RELEASE ORAL at 09:41

## 2019-10-26 RX ADMIN — VITAMIN D, TAB 1000IU (100/BT) 4000 UNITS: 25 TAB at 09:41

## 2019-10-26 ASSESSMENT — ENCOUNTER SYMPTOMS
FEVER: 0
PALPITATIONS: 0
SHORTNESS OF BREATH: 0
NAUSEA: 0
HALLUCINATIONS: 0
HEADACHES: 0
BLURRED VISION: 0
DIZZINESS: 0
VOMITING: 0

## 2019-10-26 NOTE — CARE PLAN
Problem: Safety  Goal: Will remain free from falls  Outcome: PROGRESSING AS EXPECTED  Note:   Patient uses call light consistently and appropriately this shift for assistance.  Mother is at bedside. Able to verbalize needs.  Will continue to monitor.       Problem: Infection  Goal: Will remain free from infection  Outcome: PROGRESSING AS EXPECTED  Note:   Patient remains free from s/s infection; afebrile.  Will continue to monitor.

## 2019-10-26 NOTE — CARE PLAN
Problem: Safety  Goal: Will remain free from injury  Outcome: PROGRESSING AS EXPECTED  Note:   Patient has remained free of injury during this shift. Uses call light appropriately to get assistance and does not test physical limitations. Demonstrates safe transfer techniques. Asks questions about medications and procedures appropriately.       Problem: Infection  Goal: Will remain free from infection  Outcome: PROGRESSING AS EXPECTED  Note:   Patient does not present signs and symptoms of infection such as fever, inflammation, purulent drainage, change in LOC, turbid urine or loose stools.

## 2019-10-26 NOTE — PROGRESS NOTES
DATE OF SERVICE:  10/25/2019    The  patient said he is doing better at followup.  He said he is gaining in   strength.  The patient's mood remains depressed and anxious, however.  This is   a chronic problem for him.  He reported no significant symptoms of panic,   however, over the past several days.  The patient was given a book to read.    The book outlines the treatment approach to manage his anxiety symptoms that   he has not attempted before.  He has had a number of failing experiences in   the past with various psychologists, counselors and psychiatrists.       ____________________________________     SAMIRA QURESHI, PHD    ROMELIA / DANNIE    DD:  10/26/2019 12:28:46  DT:  10/26/2019 16:11:22    D#:  [id number]  Job#:  [id number]

## 2019-10-26 NOTE — THERAPY
"Physical Therapy   Daily Treatment     Patient Name: Cosme Cabrera  Age:  34 y.o., Sex:  male  Medical Record #: [id number]  Today's Date: 10/26/2019     Precautions  Precautions: Fall Risk  Comments: Bilateral lower extremity weakness, anxiety    Subjective    \"I want to work on stairs\"     Objective       10/26/19 0831   Precautions   Precautions Fall Risk   Comments Bilateral lower extremity weakness, anxiety   Interdisciplinary Plan of Care Collaboration   Patient Position at End of Therapy Seated;Family / Friend in Room;Tray Table within Reach;Call Light within Reach   PT Total Time Spent   PT Individual Total Time Spent (Mins) 60   PT Charge Group   PT Neuromuscular Re-Education / Balance 2   PT Therapeutic Activities 2     Balance activities in // bars: \"obstacle course\" stepping over 2 bolsters, onto 6\" step, and onto air-ex pad; CGA- SBA with light  on bars, 4x each direction with seated rest breaks between. Rocker board forward/ backward and lateral with SBA and intermittent UE support. Ring toss while standing on air-ex pad using R arm to toss and light UE support on L rail, 4 min and 3 min with seated rest break between.     FIM Walking Score:  6 - Modified Independent  Walking Description:  Walker, Extra time(gym > room, mod I with FWW)    FIM Stairs Score:  4 - Minimal Assistance  Stairs Description:  Extra time, Verbal cueing, Supervision for safety, Requires incidental assist, Hand rails(18, 12, and 6 4\" steps with 2 HRs and SBA, 8 6\" steps with 2HRs and CGA )      Assessment    Pt required frequent rest breaks throughout session, however very motivated and self driven for stair training.     Plan    Therapeutic exercise, gait training, safety education, up/down stairs, NuStep, gait outside       "

## 2019-10-26 NOTE — PROGRESS NOTES
Hospital Medicine Daily Progress Note      Chief Complaint:  Hypertension  Afib/Tachycardia  PE    Interval History:  No significant events or changes since last visit    Review of Systems  Review of Systems   Constitutional: Negative for fever.   Eyes: Negative for blurred vision.   Respiratory: Negative for shortness of breath.    Cardiovascular: Negative for palpitations.   Gastrointestinal: Negative for nausea and vomiting.   Neurological: Negative for dizziness and headaches.   Psychiatric/Behavioral: Negative for hallucinations.        Physical Exam  Temp:  [36.2 °C (97.2 °F)-36.8 °C (98.3 °F)] 36.8 °C (98.3 °F)  Pulse:  [] 63  Resp:  [18] 18  BP: (132-136)/(71-98) 132/71  SpO2:  [94 %] 94 %    Physical Exam   HENT:   Mouth/Throat: Oropharynx is clear and moist.   Eyes: No scleral icterus.   Cardiovascular: Normal rate and regular rhythm.   Pulmonary/Chest: Effort normal. No stridor. He has no wheezes. He has no rales.   Abdominal: Soft. Bowel sounds are normal.   Skin: Skin is warm and dry. He is not diaphoretic.   Psychiatric: His behavior is normal.   Nursing note and vitals reviewed.      Fluids    Intake/Output Summary (Last 24 hours) at 10/26/2019 0757  Last data filed at 10/25/2019 2042  Gross per 24 hour   Intake 840 ml   Output --   Net 840 ml       Laboratory                        Assessment/Plan  Acute saddle pulmonary embolism (HCC)- (present on admission)  Assessment & Plan  Has recurrent bilat PE  Echo showed right heart strain (persistent vs recurrent)  On Xarelto    Paroxysmal atrial fibrillation (HCC)- (present on admission)  Assessment & Plan  HR generally ok but occ rises up a little  Suspect 2nd to PE  On Atenolol: 100 mg daily  On Xarelto  Cont to monitor    ETOH abuse- (present on admission)  Assessment & Plan  On Thiamine, MVI, and Folate supplements    Vitamin D deficiency  Assessment & Plan  Vit D: 15  On supplements    Type 2 diabetes mellitus without complication, without  long-term current use of insulin (HCC)- (present on admission)  Assessment & Plan  Hba1c: 6.6 (9/19)  Newly diagnosed  Likely pre-diabetes at this point  Currently not on any diabetic meds  Off accuchecks  Encourage diet and lifestyle modifications  Note: pt in somewhat denial and states that his a1c was up because he ate some fast food prior to hospital    ALLI (obstructive sleep apnea)- (present on admission)  Assessment & Plan  RT protocol  Suggest out patient sleep study    Anxiety- (present on admission)  Assessment & Plan  On Seroquel    Essential hypertension- (present on admission)  Assessment & Plan  BP ok  On Atenolol: 100 mg daily  On Lisinopril: 10 mg daily  Monitor

## 2019-10-26 NOTE — THERAPY
Physical Therapy   Daily Treatment     Patient Name: Cosme Cabrera  Age:  34 y.o., Sex:  male  Medical Record #: [id number]  Today's Date: 10/25/2019     Precautions  Precautions: Fall Risk  Comments: Bilateral lower extremity weakness, anxiety    Subjective    Pt reports he is tired from walking a lot earlier, agreeable to exercise this session      Objective       10/25/19 1700   Cosignature   Documentation Review   (All values should be filed at 1301 not 1700)   Standing Lower Body Exercises   Hamstring Curl 2 sets of 15;Bilateral    Hip Extension 2 sets of 15;Bilateral    Hip Abduction 2 sets of 15;Bilateral   Marching 2 sets of 15   Heel Rise 2 sets of 15;Bilateral   Toe Rise 2 sets of 15;Bilateral   Mini Squat 2 sets of 15;Partial   Other Exercises VCs and demo for form and technique   Interdisciplinary Plan of Care Collaboration   Patient Position at End of Therapy Seated;Call Light within Reach;Tray Table within Reach   PT Total Time Spent   PT Individual Total Time Spent (Mins) 30   PT Charge Group   PT Therapeutic Exercise 2       Assessment    Pt demos quick fatigue with exercise requiring seated rest breaks but able to complete sets with rest breaks    Plan    Therapeutic exercise, gait training, safety education, up/down stairs, NuStep, gait outside

## 2019-10-26 NOTE — PROGRESS NOTES
1921 received report from day shift nurse and assume patient care. Pt is calm and stable . Denies any pain at this time. No s/sx of distress. Safety precautions in place. Call light with in reach. Will continue to monitor.

## 2019-10-27 PROCEDURE — A9270 NON-COVERED ITEM OR SERVICE: HCPCS | Performed by: PHYSICAL MEDICINE & REHABILITATION

## 2019-10-27 PROCEDURE — 99231 SBSQ HOSP IP/OBS SF/LOW 25: CPT | Performed by: HOSPITALIST

## 2019-10-27 PROCEDURE — A9270 NON-COVERED ITEM OR SERVICE: HCPCS | Performed by: HOSPITALIST

## 2019-10-27 PROCEDURE — 700102 HCHG RX REV CODE 250 W/ 637 OVERRIDE(OP): Performed by: PHYSICAL MEDICINE & REHABILITATION

## 2019-10-27 PROCEDURE — 770010 HCHG ROOM/CARE - REHAB SEMI PRIVAT*

## 2019-10-27 PROCEDURE — 700102 HCHG RX REV CODE 250 W/ 637 OVERRIDE(OP): Performed by: STUDENT IN AN ORGANIZED HEALTH CARE EDUCATION/TRAINING PROGRAM

## 2019-10-27 PROCEDURE — 700102 HCHG RX REV CODE 250 W/ 637 OVERRIDE(OP): Performed by: HOSPITALIST

## 2019-10-27 PROCEDURE — A9270 NON-COVERED ITEM OR SERVICE: HCPCS | Performed by: STUDENT IN AN ORGANIZED HEALTH CARE EDUCATION/TRAINING PROGRAM

## 2019-10-27 RX ADMIN — QUETIAPINE FUMARATE 100 MG: 100 TABLET ORAL at 20:25

## 2019-10-27 RX ADMIN — ATENOLOL 100 MG: 25 TABLET ORAL at 07:53

## 2019-10-27 RX ADMIN — LISINOPRIL 10 MG: 5 TABLET ORAL at 07:53

## 2019-10-27 RX ADMIN — THERA TABS 1 TABLET: TAB at 07:53

## 2019-10-27 RX ADMIN — HYDROXYZINE HYDROCHLORIDE 50 MG: 25 TABLET, FILM COATED ORAL at 20:25

## 2019-10-27 RX ADMIN — MELATONIN 3 MG: at 20:25

## 2019-10-27 RX ADMIN — FOLIC ACID 1 MG: 1 TABLET ORAL at 07:53

## 2019-10-27 RX ADMIN — OMEPRAZOLE 20 MG: 20 CAPSULE, DELAYED RELEASE ORAL at 07:53

## 2019-10-27 RX ADMIN — RIVAROXABAN 20 MG: 10 TABLET, FILM COATED ORAL at 17:45

## 2019-10-27 RX ADMIN — VITAMIN D, TAB 1000IU (100/BT) 4000 UNITS: 25 TAB at 07:53

## 2019-10-27 RX ADMIN — SENNOSIDES AND DOCUSATE SODIUM 2 TABLET: 8.6; 5 TABLET ORAL at 07:53

## 2019-10-27 RX ADMIN — Medication 100 MG: at 07:53

## 2019-10-27 RX ADMIN — DULOXETINE HYDROCHLORIDE 30 MG: 30 CAPSULE, DELAYED RELEASE ORAL at 07:53

## 2019-10-27 ASSESSMENT — ENCOUNTER SYMPTOMS
COUGH: 0
DIARRHEA: 0
NERVOUS/ANXIOUS: 0
FEVER: 0
BLURRED VISION: 0
DIZZINESS: 0

## 2019-10-27 NOTE — PROGRESS NOTES
1922 received report from day shift nurse and assume patient care. Pt is calm and stable . Denies any pain at this time. Mother at bedside. No s/sx of distress. Safety precautions in place. Call light with in reach. Will continue to monitor.

## 2019-10-27 NOTE — CARE PLAN
Problem: Safety  Goal: Will remain free from falls  Outcome: PROGRESSING AS EXPECTED  Note:   Patient has remained free of falls during this shift. Uses call light appropriately to get help with transfers and does not attempt to transfer without assistance. Best-Fermin fall assessment completed (see flow sheets). Patient is a minimal fall risk.      Problem: Respiratory:  Goal: Respiratory status will improve  Outcome: PROGRESSING AS EXPECTED  Note:   Patient does not have any signs of respiratory distress during this shift. Patient is currently on room air.

## 2019-10-27 NOTE — PROGRESS NOTES
Hospital Medicine Daily Progress Note      Chief Complaint:  Hypertension  Afib/Tachycardia  PE    Interval History:  No significant events or changes since last visit    Review of Systems  Review of Systems   Constitutional: Negative for fever.   Eyes: Negative for blurred vision.   Respiratory: Negative for cough.    Cardiovascular: Negative for chest pain.   Gastrointestinal: Negative for diarrhea.   Musculoskeletal: Negative for joint pain.   Neurological: Negative for dizziness.   Psychiatric/Behavioral: The patient is not nervous/anxious.         Physical Exam  Temp:  [36.1 °C (96.9 °F)-37 °C (98.6 °F)] 37 °C (98.6 °F)  Pulse:  [] 95  Resp:  [12-18] 18  BP: (116-149)/(74-94) 132/88  SpO2:  [93 %-100 %] 94 %    Physical Exam   Constitutional: No distress.   HENT:   Mouth/Throat: No oropharyngeal exudate.   Neck: No JVD present. No tracheal deviation present.   Cardiovascular: Normal rate and regular rhythm.   Pulmonary/Chest: Effort normal. He has no wheezes. He has no rales.   Abdominal: Soft. He exhibits no distension. There is no tenderness.   Skin: Skin is warm and dry.   Psychiatric: His behavior is normal.   Nursing note and vitals reviewed.      Fluids    Intake/Output Summary (Last 24 hours) at 10/27/2019 0807  Last data filed at 10/26/2019 1230  Gross per 24 hour   Intake 1070 ml   Output --   Net 1070 ml       Laboratory                        Assessment/Plan  Acute saddle pulmonary embolism (HCC)- (present on admission)  Assessment & Plan  Has recurrent bilat PE  Echo showed right heart strain (persistent vs recurrent)  On Xarelto    Paroxysmal atrial fibrillation (HCC)- (present on admission)  Assessment & Plan  HR generally ok but occ rises up   Suspect 2nd to PE  On Atenolol: 100 mg daily  On Xarelto  Cont to monitor    ETOH abuse- (present on admission)  Assessment & Plan  On Thiamine, MVI, and Folate supplements    Vitamin D deficiency  Assessment & Plan  Vit D: 15  On supplements    Type  2 diabetes mellitus without complication, without long-term current use of insulin (HCC)- (present on admission)  Assessment & Plan  Hba1c: 6.6 (9/19)  Newly diagnosed  Likely pre-diabetes at this point  Currently not on any diabetic meds  Off accuchecks  Encourage diet and lifestyle modifications  Note: pt in somewhat denial and states that his a1c was up because he ate some fast food prior to hospital    ALLI (obstructive sleep apnea)- (present on admission)  Assessment & Plan  RT protocol  Suggest out patient sleep study    Anxiety- (present on admission)  Assessment & Plan  On Seroquel    Essential hypertension- (present on admission)  Assessment & Plan  BP ok  On Atenolol: 100 mg daily  On Lisinopril: 10 mg daily  Monitor

## 2019-10-27 NOTE — CARE PLAN
Problem: Safety  Goal: Will remain free from falls  Outcome: PROGRESSING AS EXPECTED  Note:   Patient uses call light consistently and appropriately this shift.  Waits for assistance when needed and does not attempt self transfer.  Able to verbalize needs.  Will continue to monitor.       Problem: Bowel/Gastric:  Goal: Normal bowel function is maintained or improved  Outcome: PROGRESSING AS EXPECTED  Flowsheets  Taken 10/26/2019 2020 by Kaykay Lovelace RNEIL  Last BM: 10/26/19 (pr patient)  Taken 10/26/2019 0841 by Rehan Aiken R.NCornell  Number of Times Stooled: 0  Note:   Patient was encouraged to drink a lot of fluids to prevent constipation.

## 2019-10-28 PROCEDURE — 700102 HCHG RX REV CODE 250 W/ 637 OVERRIDE(OP): Performed by: PHYSICAL MEDICINE & REHABILITATION

## 2019-10-28 PROCEDURE — 770010 HCHG ROOM/CARE - REHAB SEMI PRIVAT*

## 2019-10-28 PROCEDURE — 97537 COMMUNITY/WORK REINTEGRATION: CPT

## 2019-10-28 PROCEDURE — 700102 HCHG RX REV CODE 250 W/ 637 OVERRIDE(OP): Performed by: STUDENT IN AN ORGANIZED HEALTH CARE EDUCATION/TRAINING PROGRAM

## 2019-10-28 PROCEDURE — A9270 NON-COVERED ITEM OR SERVICE: HCPCS | Performed by: PHYSICAL MEDICINE & REHABILITATION

## 2019-10-28 PROCEDURE — A9270 NON-COVERED ITEM OR SERVICE: HCPCS | Performed by: HOSPITALIST

## 2019-10-28 PROCEDURE — A9270 NON-COVERED ITEM OR SERVICE: HCPCS | Performed by: STUDENT IN AN ORGANIZED HEALTH CARE EDUCATION/TRAINING PROGRAM

## 2019-10-28 PROCEDURE — 99231 SBSQ HOSP IP/OBS SF/LOW 25: CPT | Performed by: HOSPITALIST

## 2019-10-28 PROCEDURE — 97110 THERAPEUTIC EXERCISES: CPT

## 2019-10-28 PROCEDURE — 700102 HCHG RX REV CODE 250 W/ 637 OVERRIDE(OP): Performed by: HOSPITALIST

## 2019-10-28 PROCEDURE — 99233 SBSQ HOSP IP/OBS HIGH 50: CPT | Performed by: PHYSICAL MEDICINE & REHABILITATION

## 2019-10-28 PROCEDURE — 97112 NEUROMUSCULAR REEDUCATION: CPT

## 2019-10-28 PROCEDURE — 97116 GAIT TRAINING THERAPY: CPT

## 2019-10-28 RX ORDER — QUETIAPINE FUMARATE 100 MG/1
TABLET, FILM COATED ORAL
Status: COMPLETED
Start: 2019-10-28 | End: 2019-10-28

## 2019-10-28 RX ORDER — QUETIAPINE FUMARATE 100 MG/1
100 TABLET, FILM COATED ORAL
Status: ACTIVE | OUTPATIENT
Start: 2019-10-28 | End: 2019-10-29

## 2019-10-28 RX ADMIN — ATENOLOL 100 MG: 25 TABLET ORAL at 08:08

## 2019-10-28 RX ADMIN — MELATONIN 3 MG: at 22:17

## 2019-10-28 RX ADMIN — FOLIC ACID 1 MG: 1 TABLET ORAL at 08:08

## 2019-10-28 RX ADMIN — SENNOSIDES AND DOCUSATE SODIUM 2 TABLET: 8.6; 5 TABLET ORAL at 08:08

## 2019-10-28 RX ADMIN — VITAMIN D, TAB 1000IU (100/BT) 4000 UNITS: 25 TAB at 08:07

## 2019-10-28 RX ADMIN — LISINOPRIL 10 MG: 5 TABLET ORAL at 08:09

## 2019-10-28 RX ADMIN — DULOXETINE HYDROCHLORIDE 30 MG: 30 CAPSULE, DELAYED RELEASE ORAL at 08:08

## 2019-10-28 RX ADMIN — HYDROXYZINE HYDROCHLORIDE 50 MG: 25 TABLET, FILM COATED ORAL at 22:15

## 2019-10-28 RX ADMIN — THERA TABS 1 TABLET: TAB at 08:07

## 2019-10-28 RX ADMIN — OMEPRAZOLE 20 MG: 20 CAPSULE, DELAYED RELEASE ORAL at 08:09

## 2019-10-28 RX ADMIN — Medication 100 MG: at 08:08

## 2019-10-28 RX ADMIN — RIVAROXABAN 20 MG: 10 TABLET, FILM COATED ORAL at 17:23

## 2019-10-28 ASSESSMENT — ENCOUNTER SYMPTOMS
NAUSEA: 0
ABDOMINAL PAIN: 0
SHORTNESS OF BREATH: 0
DIARRHEA: 0
NERVOUS/ANXIOUS: 0
CHILLS: 0
VOMITING: 0
FEVER: 0

## 2019-10-28 NOTE — PROGRESS NOTES
Hospital Medicine Daily Progress Note      Chief Complaint:  Hypertension  Afib/Tachycardia  PE    Interval History:  No significant events or changes since last visit    Review of Systems  Review of Systems   Constitutional: Negative for chills and fever.   Respiratory: Negative for shortness of breath.    Cardiovascular: Negative for chest pain.   Gastrointestinal: Negative for abdominal pain, diarrhea, nausea and vomiting.   Psychiatric/Behavioral: The patient is not nervous/anxious.         Physical Exam  Temp:  [36.4 °C (97.6 °F)] 36.4 °C (97.6 °F)  Pulse:  [] 70  Resp:  [19] 19  BP: (116-120)/(69-76) 116/69    Physical Exam   Constitutional: He is oriented to person, place, and time. He appears well-nourished.   HENT:   Head: Atraumatic.   Eyes: Pupils are equal, round, and reactive to light. Conjunctivae are normal.   Neck: Normal range of motion. Neck supple.   Cardiovascular: Normal rate, regular rhythm, S1 normal and S2 normal.   No murmur heard.  Pulmonary/Chest: Effort normal. He has no wheezes. He has no rhonchi. He has no rales.   Abdominal: Soft. He exhibits no distension. There is no tenderness.   Musculoskeletal: He exhibits no edema.   Neurological: He is alert and oriented to person, place, and time. No sensory deficit.   Skin: Skin is warm and dry. No rash noted. No cyanosis.   Psychiatric: He has a normal mood and affect. His behavior is normal.   Nursing note and vitals reviewed.      Fluids    Intake/Output Summary (Last 24 hours) at 10/28/2019 0821  Last data filed at 10/27/2019 2025  Gross per 24 hour   Intake 1200 ml   Output --   Net 1200 ml       Laboratory                        Assessment/Plan  Acute saddle pulmonary embolism (HCC)- (present on admission)  Assessment & Plan  Has recurrent bilat PE  Echo showed right heart strain (persistent vs recurrent)  On Xarelto    Paroxysmal atrial fibrillation (HCC)- (present on admission)  Assessment & Plan  HR generally ok but occ rises up    Suspect 2nd to PE  On Atenolol: 100 mg daily  On Xarelto  Cont to monitor    ETOH abuse- (present on admission)  Assessment & Plan  On Thiamine, MVI, and Folate supplements    Vitamin D deficiency  Assessment & Plan  Vit D: 15  On supplements    Type 2 diabetes mellitus without complication, without long-term current use of insulin (HCC)- (present on admission)  Assessment & Plan  Hba1c: 6.6 (9/19)  Newly diagnosed  Likely pre-diabetes at this point  Currently not on any diabetic meds  Off accuchecks  Encourage diet and lifestyle modifications  Note: pt in somewhat denial and states that his a1c was up because he ate some fast food prior to hospital    ALLI (obstructive sleep apnea)- (present on admission)  Assessment & Plan  RT protocol  Suggest out patient sleep study    Anxiety- (present on admission)  Assessment & Plan  On Seroquel    Essential hypertension- (present on admission)  Assessment & Plan  BP ok  On Atenolol: 100 mg daily  On Lisinopril: 10 mg daily  Monitor

## 2019-10-28 NOTE — REHAB-OT IDT TEAM NOTE
Occupational Therapy   Activities of Daily Living  Eating Initial:  5 - Standby Prompting/Supervision or Set-up  Eating Current:  7 - Independent   Eating Description:  Increased time  Grooming Initial:  5 - Standby Prompting/Supervision or Set-up  Grooming Current:  7 - Independent   Grooming Description:  Supervision for safety(in standing)  Bathing Initial:  5 - Standby Prompting/Supervision or Set-up  Bathing Current:  6 - Modified Independent   Bathing Description:  Grab bar, Tub bench, Increased time, Hand held shower(completes in seated)  Upper Body Dressing Initial:  5 - Standby Prompting/Supervision or Set-up  Upper Body Dressing Current:  7 - Independent   Upper Body Dressing Description:  Set-up of equipment  Lower Body Dressing Initial:  4 - Minimal Assistance  Lower Body Dressing Current:  6 - Modified Independent   Lower Body Dressing Description:  6 - Modified Independent  Toileting Initial:  5 - Standby Prompting/Supervision or Set-up  Toileting Current:  6 - Modified Independent   Toileting Description:  Grab bar, Supervision for safety  Toilet Transfer Initial:  5 - Standby Prompting/Supervision or Set-up  Toilet Transfer Current:  6 - Modified Independent   Toilet Transfer Description:  6 - Modified Independent  Tub / Shower Transfer Initial:  4 - Minimal Assistance  Tub / Shower Transfer Current:  5 - Standby Prompting/Supervision or Set-up   Tub / Shower Transfer Description:  Grab bar  IADL:  SBA to complete meal prep using FWW and counter for support   Family Training/Education:  Mother present often, ongoing education on safety, DME recommendations   DME/DC Recommendations:  Grab bar placement in shower, pt has built in shower chair, FWW, possible need for w/c for community mobility     Strengths:  Able to follow instructions, Alert and oriented, Good carryover of learning, Independent PLOF, Making steady progress towards goals, Manages pain appropriately, Motivated for self care and  independence, Pleasant and cooperative, Supportive family and Willingly participates in therapeutic activities  Barriers:  Generalized weakness, Limited mobility, Poor balance and Other: LE weakness, mild FM impairments     # of short term goals set= 3    # of short term goals met= 3     Occupational Therapy Goals     Problem: OT Long Term Goals     Dates: Start: 10/17/19       Description:     Goal: LTG-By discharge, patient will complete basic self care tasks     Dates: Start: 10/17/19       Description: 1) Individualized Goal:  With modified independence   2) Interventions:  OT Orthotics Training, OT E Stim Attended, OT Group Therapy, OT Self Care/ADL, OT Cognitive Skill Dev, OT Community Reintegration, OT Neuro Re-Ed/Balance, OT Sensory Int Techniques, OT Therapeutic Activity, OT Evaluation and OT Therapeutic Exercise           Goal: LTG-By discharge, patient will perform bathroom transfers     Dates: Start: 10/17/19       Description: 1) Individualized Goal:  With modified independence   2) Interventions:  OT Orthotics Training, OT E Stim Attended, OT Group Therapy, OT Self Care/ADL, OT Cognitive Skill Dev, OT Community Reintegration, OT Neuro Re-Ed/Balance, OT Sensory Int Techniques, OT Therapeutic Activity, OT Evaluation and OT Therapeutic Exercise             Goal: LTG-By discharge, patient will complete basic home management     Dates: Start: 10/17/19       Description: 1) Individualized Goal:  With modified independence   2) Interventions:  OT Orthotics Training, OT E Stim Attended, OT Group Therapy, OT Self Care/ADL, OT Cognitive Skill Dev, OT Community Reintegration, OT Neuro Re-Ed/Balance, OT Sensory Int Techniques, OT Therapeutic Activity, OT Evaluation and OT Therapeutic Exercise                         Section completed by:  Rosmery Munoz MS,OTR/L

## 2019-10-28 NOTE — PROGRESS NOTES
1916 received report from day shift nurse and assume patient care. Pt is calm and stable . Denies any pain at this time. No s/sx of distress. Mother at bedside. Safety precautions in place. Call light with in reach. Will continue to monitor.

## 2019-10-28 NOTE — CARE PLAN
Problem: Safety  Goal: Will remain free from falls  Outcome: PROGRESSING AS EXPECTED  Note:   Patient uses call light consistently and appropriately this shift. Mother is at bedside. Able to verbalize needs.  Will continue to monitor.       Problem: Bowel/Gastric:  Goal: Normal bowel function is maintained or improved  Outcome: PROGRESSING AS EXPECTED  Flowsheets  Taken 10/27/2019 2350 by Ino Mendoza RNEIL  Last BM: 10/27/19  Taken 10/27/2019 0753 by Rehan Aiken, R.NCornell  Number of Times Stooled: 0  Note:   Patient is moving his bowels daily to 2 days. Encouraged to drink a lot of fluid. Will continue to monitor.

## 2019-10-28 NOTE — THERAPY
10/28/19 0929   Precautions   Precautions Fall Risk;Other (See Comments)   Comments Bilateral lower extremity weakness, anxiety   Pain 0 - 10 Group   Therapist Pain Assessment 0   Cognition    Cognition / Consciousness WDL   Sitting Lower Body Exercises   Ankle Pumps 3 sets of 15;Bilateral   Hip Flexion 3 sets of 15;Bilateral   Hip Abduction 3 sets of 15;Bilateral   Hip Adduction 3 sets of 15;Bilateral   Long Arc Quad 3 sets of 15;Bilateral   Hamstring Curl 3 sets of 15;Bilateral   Standing Lower Body Exercises   Hip Flexion 1 set of 15;Bilateral   Hip Extension 1 set of 15;Bilateral    Hip Abduction 1 set of 15;Bilateral   Mini Squat 1 set of 15;Partial   Bed Mobility    Supine to Sit Independent   Sit to Supine Independent   Sit to Stand Modified Independent   Scooting Independent   Rolling Independent   PT Total Time Spent   PT Individual Total Time Spent (Mins) 60   PT Charge Group   PT Gait Training 2   PT Therapeutic Exercise 2   Physical Therapy   Daily Treatment     Patient Name: Cosme Cabrera  Age:  34 y.o., Sex:  male  Medical Record #: [id number]  Today's Date: 10/28/2019     Precautions  Precautions: Fall Risk, Other (See Comments)  Comments: Bilateral lower extremity weakness, anxiety    Subjective    The patient was up in his chair ready for PT.  He said he walked a lot during the weekend.     Objective    The patient participated in gait training with a fww and tolerated 670 FT, 350 FT and 225 FT.  He also participated in seated bilateral lower extremity therapeutic exercise and standing exercises as indicated above.    FIM Bed/Chair/Wheelchair Transfers Score: 6 - Modified Independent  Bed/Chair/Wheelchair Transfers Description:  Increased time(FWW)    FIM Walking Score:  6 - Modified Independent  Walking Description:  Walker, Extra time( FT, 350 FT, 225 FT)    FIM Wheelchair Score:  6 - Modified Independent  Wheelchair Description:       FIM Stairs Score:  2 - Max Assistance  Stairs  Description:         Assessment    The patient is making consistent progress in mobility, transfers, gait, strength, endurance and safety.  He will require the use of a walker when he discharges to go home.  No wheelchair is needed.    Plan  Continue therapeutic exercise, gait training, stairs, other functional standing activities

## 2019-10-28 NOTE — DISCHARGE PLANNING
Spoke with patient's mother re: the following:  [employer] disabled persons' placard - providing completed application as mother is going to [facility] today for appointment for another matter and will obtain these if she can.  Outpatient therapies in [place] - she will work to find a place that takes [employer] and provide name and number.   PCP appointment - she will make the appointment.     Received message from Harriet at [employer] with approval of 8 more days to 10/31/19.

## 2019-10-28 NOTE — THERAPY
10/28/19 1329   Precautions   Precautions Fall Risk   Comments Bilateral lower extremity weakness, anxiety   Pain 0 - 10 Group   Therapist Pain Assessment 0   Bed Mobility    Supine to Sit Independent   Sit to Supine Independent   Sit to Stand Modified Independent   Scooting Independent   Rolling Independent   PT Total Time Spent   PT Individual Total Time Spent (Mins) 30   PT Charge Group   PT Gait Training 2   Physical Therapy   Daily Treatment     Patient Name: Cosme Cabrera  Age:  34 y.o., Sex:  male  Medical Record #: [id number]  Today's Date: 10/28/2019     Precautions  Precautions: Fall Risk  Comments: Bilateral lower extremity weakness, anxiety    Subjective    The patient agreed to PT.  He said his legs were getting tired this afternoon because he walked a lot today.     Objective    The patient participated in gait training with a FWW and tolerated 390 FT, 330 FT x2        Assessment    The patient has demonstrated consistent ability in endurance, strength, safety awareness.  He is unable to safely walk without upper extremity support due to his lower extremity weakness.    Plan    Therapeutic exercise, gait training, tolerance for activity, stairs, static and dynamic standing balance

## 2019-10-28 NOTE — REHAB-NURSING IDT TEAM NOTE
Nursing   Nursing  Diet/Nutrition:  Regular, Thin Liquid  Medication Administration:  Whole with Liquid Wash  % consumed at meals in last 24 hours:  Consumed % of meals per documentation.  Meal/Snack Consumption for the past 24 hrs:   Oral Nutrition   10/27/19 1803 Dinner;Between % Consumed   10/27/19 1200 Lunch;Between % Consumed   10/27/19 0831 Between 50-75% Consumed     Snack schedule:  HS  Supplement:  Other: none  Appetite:  Good  Fluid Intake/Output in past 24 hours: In: 1200 [P.O.:1200]  Out: -   Admit Weight:  Weight: 121.5 kg (267 lb 13.7 oz)  Weight Last 7 Days       Pain Issues:    Location:  --  --         Severity:  Denies   Scheduled pain medications:  None     PRN pain medications used in last 24 hours:  None   Non Pharmacologic Interventions:  rest  Effectiveness of pain management plan:  good=patient states acceptable comfort after interventions    Bowel:    Bowel Assist Initial Score:  5 - Standby Prompting/Supervision or Set-up  Bowel Assist Current Score:  5 - Standby Prompting/Supervision or Set-up  Bowl Accidents in last 7 days:     Last bowel movement: 10/27/19  Stool Description: Large(per patient)     Usual bowel pattern:  daily  Scheduled bowel medications:  senna-docusate (PERICOLACE or SENOKOT S) Miralax  PRN bowel medications used in last 24 hours:  None  Nursing Interventions:  Increased time, Scheduled medication, Supervision, Set-up  Effectiveness of bowel program:   good=regular, predictable, controlled emptying of bowel  Bladder:    Bladder Assist Initial Score:  5 - Standby Prompting/Supervision or Set-up  Bladder Assist Current Score:  5 - Standby Prompting/Supervision or Set-up  Bladder Accidents in last 7 days:     PVR range for past 24-48 hours: -- ()  Intermittent Catheterization:  N/A  Medications affecting bladder:  None    Time void schedule/voiding pattern:  Voiding every 2-4 hours  Interventions:  Increased time, Supervision, Emptying of  device  Effectiveness of bladder training:  Good=regular, predictable, emptying of bladder, patient initiates time voiding    Srivastvaa:    Type:     Patient Lines/Drains/Airways Status    Active Catheter     None              Date placed:          Justification:Neurogenic bladder    Diabetes:  Blood Sugar Frequency:  None    Range of BS for last 48 hours:       Scheduled diabetic medications:  None  Sliding scale usage in past 24 hours:  No  Total Short acting insulin in the past 24 hours:  None  Total Long acting insulin in the past 24 hours:  None    Wound:         Patient Lines/Drains/Airways Status    Active Current Wounds     None                   Interventions:  N/A      Wound Vac Location:  Not applicable  Dressing last changed:  Not applicable  Pump Mode Pressure Setting       Description of drainage:  Not applicable    Sleep/wake cycle:   Average hours slept:  Sleeps greater than 6 hours without waking  Sleep medication usage:  Other Melatonin 3mg    Patient/Family Training/Education:  Fall Prevention, General Self Care, Safe Transfers and Safety  Discharge Supply Recommendations:  Blood Pressure Monitor  Strengths: Alert and oriented, Supportive family and Manages pain appropriately   Barriers:   Hypertension and Other:  Anxiety       Nursing Problems     Problem: Bowel/Gastric:     Description:     Goal: Normal bowel function is maintained or improved     Description:     Flowsheet:     Taken at 10/26/19 2020    Last BM 10/26/19 by  Kaykay Lovelace R.N. Comment:  pr patient    Taken at 10/26/19 0841    Number of Times Stooled 0 by  Rehan Aiken, R.N.                Goal: Will not experience complications related to bowel motility     Description:                 Problem: Communication     Description:     Goal: The ability to communicate needs accurately and effectively will improve     Description:                 Problem: Discharge Barriers/Planning     Description:     Goal: Patient's  continuum of care needs will be met     Description:                 Problem: Infection     Description:     Goal: Will remain free from infection     Description:                 Problem: Knowledge Deficit     Description:     Goal: Knowledge of disease process/condition, treatment plan, diagnostic tests, and medications will improve     Description:           Goal: Knowledge of the prescribed therapeutic regimen will improve     Description:                 Problem: Pain Management     Description:     Goal: Pain level will decrease to patient's comfort goal     Description:                 Problem: Psychosocial Needs:     Description:     Goal: Level of anxiety will decrease     Description:                 Problem: Respiratory:     Description:     Goal: Respiratory status will improve     Description:                 Problem: Safety     Description:     Goal: Will remain free from injury     Description:           Goal: Will remain free from falls     Description:                 Problem: Skin Integrity     Description:     Goal: Risk for impaired skin integrity will decrease     Description:                 Problem: Urinary Elimination:     Description:     Goal: Ability to reestablish a normal urinary elimination pattern will improve     Description:                 Problem: Venous Thromboembolism (VTW)/Deep Vein Thrombosis (DVT) Prevention:     Description:     Goal: Patient will participate in Venous Thrombosis (VTE)/Deep Vein Thrombosis (DVT)Prevention Measures     Description:                        Long Term Goals:   At discharge patient will be able to function safely at home and in the community with support.    Section completed by:  Ino Mendoza R.N.      Reviewed by Ivy Austin R.N.

## 2019-10-28 NOTE — THERAPY
Occupational Therapy  Daily Treatment     Patient Name: Cosme Cabrera  Age:  34 y.o., Sex:  male  Medical Record #: [id number]  Today's Date: 10/28/2019     Precautions  Precautions: (P) Fall Risk  Comments: Bilateral lower extremity weakness, anxiety    Safety   Comments: Pt cleared to be Mod-I at w/c level for toileting and bed <> w/c txs, mom cleared to assist with showering    Subjective    Pt agreeable to OT     Objective       10/28/19 1101   Precautions   Precautions Fall Risk   Standing Upper Body Exercises   Standing Upper Body Exercises Yes   Shoulder Press 2 sets of 15;Right;Left  (7# dumbell)   Bicep Curls 2 sets of 15  (7# dumbell)   Other Exercises fwd punches with 4# dumbells 2x15   Comments completed in standing, performed unilaterally to enable UE support as needed for stability. Required seated rest breaks between sets   Balance   Comments Functional mobility room > gym with FWW, 5 laps in // bars with intermittent UE support with SBA   OT Total Time Spent   OT Individual Total Time Spent (Mins) 30   OT Charge Group   OT Neuromuscular Re-education / Balance 2     Assessment    Pt tolerated session well, continues to be limited by LE weakness limiting standing tolerance and stability during functional standing tasks without UE support. Remains motivated and participatory.     Plan    continue to progress standing balance/tolerance with reduction of UE support, functional mobility household and community distances. UE therex and core strength program every other day. Pt completing ADLs w/ assist from mother as needed in room.

## 2019-10-28 NOTE — PROGRESS NOTES
"Rehab Progress Note     Date of Service: 10/28/2019  Chief Complaint: anxiety    Interval Events (Subjective)    Patient seen and examined in his room today. The weekend went well. He was able to walk around the gym today 6 times, which was the longest he has been able to do thus far. He would like to take the Seroquel for one more night. He noticed that the dose of Cymbalta was increased - advised this was done by psychiatry. He has no new complaints. He feels he will be ready for discharge on Thursday. His dad has arrived and is planning on driving back with a trailer.     Patient has goals for this year, including returning to [employer] at a local [employer]. He's planning on going to [place] after he returns to [place] with his parents.       Objective:  VITAL SIGNS: /69   Pulse 70   Temp 36.4 °C (97.6 °F) (Oral)   Resp 19   Ht 1.727 m (5' 8\")   Wt 118 kg (260 lb 3.2 oz)   SpO2 94%   BMI 39.56 kg/m²   Gen: alert, no apparent distress  CV: regular rate and rhythm, no murmurs, no peripheral edema  Resp: clear to ascultation bilaterally, normal respiratory effort  GI: soft, non-tender abdomen, bowel sounds present  Neuro: notable for bilateral ankle weakness    No results found for this or any previous visit (from the past 72 hour(s)).    Current Facility-Administered Medications   Medication Frequency   • QUEtiapine (SEROQUEL) tablet 100 mg ONCE AT 2100   • DULoxetine (CYMBALTA) capsule 30 mg DAILY   • lisinopril (PRINIVIL) tablet 10 mg Q DAY   • vitamin D (cholecalciferol) tablet 4,000 Units DAILY   • melatonin tablet 3 mg QHS   • thiamine tablet 100 mg DAILY   • Respiratory Care per Protocol Continuous RT   • Pharmacy Consult Request ...Pain Management Review 1 Each PHARMACY TO DOSE   • acetaminophen (TYLENOL) tablet 650 mg Q4HRS PRN   • artificial tears ophthalmic solution 1 Drop PRN   • benzocaine-menthol (CEPACOL) lozenge 1 Lozenge Q2HRS PRN   • mag hydrox-al hydrox-simeth (MAALOX " PLUS ES or MYLANTA DS) suspension 20 mL Q2HRS PRN   • ondansetron (ZOFRAN ODT) dispertab 4 mg 4X/DAY PRN    Or   • ondansetron (ZOFRAN) syringe/vial injection 4 mg 4X/DAY PRN   • sodium chloride (OCEAN) 0.65 % nasal spray 2 Spray PRN   • atenolol (TENORMIN) tablet 100 mg DAILY   • calcium carbonate (TUMS) chewable tab 500 mg TID PRN   • cyanocobalamin (VITAMIN B-12) injection 1,000 mcg Q30 DAYS   • folic acid (FOLVITE) tablet 1 mg DAILY   • gabapentin (NEURONTIN) capsule 300 mg HS PRN   • hydrOXYzine HCl (ATARAX) tablet 50 mg TID PRN   • metoprolol (LOPRESSOR) tablet 12.5 mg Q8HRS PRN   • multivitamin (THERAGRAN) tablet 1 Tab DAILY   • omeprazole (PRILOSEC) capsule 20 mg DAILY   • rivaroxaban (XARELTO) tablet 20 mg PM MEAL   • senna-docusate (PERICOLACE or SENOKOT S) 8.6-50 MG per tablet 2 Tab BID    And   • polyethylene glycol/lytes (MIRALAX) PACKET 1 Packet DAILY    And   • magnesium hydroxide (MILK OF MAGNESIA) suspension 30 mL QDAY PRN    And   • bisacodyl (DULCOLAX) suppository 10 mg QDAY PRN       Orders Placed This Encounter   Procedures   • Diet Order Regular     Standing Status:   Standing     Number of Occurrences:   1     Order Specific Question:   Diet:     Answer:   Regular [1]       Assessment:  Active Hospital Problems    Diagnosis   • *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 male admitted for acute inpatient rehabilitation with Subacute  combined degeneration of spinal cord (HCC).    I led and attended the weekly conference, and agree with the IDT conference documentation and plan of care as noted below.    Date of conference: 10/28/2019    Goals and barriers: See IDT note.    Biggest barriers: mild ankle weakness, anxiety, impaired balance    Therapy update 10/28/2019  Independent eating  Independent grooming  Modified Independent bathing  Independent upper body dressing  Modified Independent lower body dressing  Modified Independent toileting  Supervisionbladder  Supervision bowel  Modified Independent bed/chair transfer  Modified Independent toilet transfer  Supervision tub/shower transfer  Modified Independent ambulation  Modified Independent wheelchair propulsion  Min assist stairs  Independent comprehension  Independent expression  Modified Independent social interaction  Modified Independent problem solving  Independent memory    Admission FIM 89 --> 91    CM/social support: Plan is for patient to travel by car or plane to [place], [place], to live with his mother in her single level home. Patient was living in [place]; rented a mother-in-laws quarter and also has a 35' camper.  Mother is here from [facility] to support patient in his recovery and get him back to [place]. [place] are going to move the camper to [place] too. Mother stated she wishes to travel to [place] as soon as possible, with patient, after his discharge. They may be staying in his camper for a brief period which has stair access.    Anticipated DC date: 10/30/2019    Outpatient: PT     Equip: FWW    Follow up: PCP      Medical Decision Making and Plan:    Subacute combined degeneration of spinal cord  Incomplete paraplegia, improved  From whippit use  Paresthesias, continues  Ankle/foot weakness, continues  Urinary retention, resolved  Continue full rehab program  PT/OT, 1.5 hr each discipline, 5 days per week     Polysubstance abuse  Anxiety disorder, stable  Insomnia, resolved  Consult  psychiatry, appreciate assistance  Continue Seroquel, 100 mg TID --> BID 10/20, QHS 10/25 for 3 more days, last dose tonight  Started Cymbalta 20 mg daily 10/18, increased to 30 mg on 10/24 (by psych)  PRN hydroxyzine  Scheduled melatonin  Consult Dr. Chacko, psychology, appreciate assistance  Bad reactions to Risperdal, Lamictal, trazodone, Wellbutrin     Acute Urinary retention, resolved  Neurogenic bladder  Likely due to posterior column injury  Discontinued Flomax  IC for over 400 cc  Replace Srivastava if unable to unable to urinate  Continue to monitor with as needed bladder scans  Last PVR 17    Bowel  Meds as needed  Last BM 10/27    DVT  Xarelto    Appreciate the assistance of the hospitalist with his medical co-morbidities:     Pulmonary emboli  Right heart strain  Paroxysmal atrial fibrillation  Sinus tachycardia, stable/improved  Hypertension  Leukocytosis, resolved  Elevated liver enzymes, improved  Vit D deficiency    Total time:  40 minutes.  I spent greater than 50% of the time for patient care, counseling, and coordination on this date, including patient face-to face time, unit/floor time with review of records/pertinent lab data and studies, as well as discussing diagnostic evaluation/work up, planned therapeutic interventions, and future disposition of care, as per the interval events/subjective and the assessment and plan as noted above.    Sue Rock M.D.   Physical Medicine and Rehabilitation

## 2019-10-28 NOTE — THERAPY
"Occupational Therapy  Daily Treatment     Patient Name: Cosme Cabrera  Age:  34 y.o., Sex:  male  Medical Record #: [id number]  Today's Date: 10/28/2019     Precautions  Precautions: (P) Fall Risk  Comments: Bilateral lower extremity weakness, anxiety    Safety   Comments: Pt cleared to be Mod-I at w/c level for toileting and bed <> w/c txs, mom cleared to assist with showering    Subjective    \"My legs are toast\"     Objective       10/28/19 1401   Precautions   Precautions Fall Risk   Sitting Upper Body Exercises   Side Arm Raise 2 sets of 15  (4# dumbells)   Bilateral Row 2 sets of 15  ( 7# dumbells)   Other Exercise modified sit-ups 2x20 with 7# weight, core twists 2x20 wth 7# weight, hip flexion 2x20 reps each LE, modified side crunch 2x20 each side   Sitting Lower Body Exercises   Sit to Stand   (1x4, too fatigued to continue)   Balance   Comments standing at rebounder using 6# ball; 1x40 reps feet even, 1x40 reps RLE fwd, 1x40 reps LLE fwd, 1x40 reps RLE fwd. Two LOB requiring therapist assisted sit to chair behind him, balance improved with progression of activity   OT Total Time Spent   OT Individual Total Time Spent (Mins) 60   OT Charge Group   OT Community Reintegration 1   OT Neuromuscular Re-education / Balance 2   OT Therapeutic Exercise  1     Pt walked with FWW from room > dining room, demonstrated ability to transfer in/out of chair w/ arms and w/o arms, then walked to public bathroom and managed in/out of main door and stall door with Mod I using FWW in prep for outing tonight with family.     Assessment    Pt limited this session by cumulative LE fatigue but participated to best of ability with consistent rest breaks. Overall his standing tolerance/endurance is improving, he continues to rely on UE support for stability during dynamic tasks with LOB x2 during standing balance activity.     Plan    continue to progress standing balance/tolerance with reduction of UE support, functional mobility " household and community distances. Issue theraband + UE HEP. Pt completing ADLs w/ assist from mother as needed in room.

## 2019-10-28 NOTE — REHAB-PT IDT TEAM NOTE
"Physical Therapy   Mobility  Bed mobility:   Independent supine to/from sit  Bed /Chair/Wheelchair Transfer Initial:  5 - Standby Prompting/Supervision or Set-up  Bed /Chair/Wheelchair Transfer Current:  6 - Modified Independent FWW   Bed/Chair/Wheelchair Transfer Description:  Increased time(FWW)  Walk Initial:  2 - Max Assistance  Walk Current:  6 - Modified Independent  FWW   Walk Description:  Walker, Extra time( FT, 350 FT, 225 FT)  Wheelchair Initial:  5 - Standby Prompting/Supervision or Set-up  Wheelchair Current:  6 - Modified Independent   Wheelchair Description:  Extra time  Stairs Initial:  2 - Max Assistance  Stairs Current: 4 - Minimal Assistance   Stairs Description: Extra time, Verbal cueing, Supervision for safety, Requires incidental assist, Hand rails(18, 12, and 6 4\" steps with 2 HRs and SBA, 8 6\" steps with 2HRs and CGA )  Patient/Family Training/Education: In progress  DME/DC Recommendations:  FWW  Strengths:  Independent PLOF, Making steady progress towards goals, Manages pain appropriately, Motivated for self care and independence, Pleasant and cooperative, Supportive family and Willingly participates in therapeutic activities  Barriers:   Other: Impaired bilateral lower extremity strength, standing balance, endurance, gait, fall risk  # of short term goals set= 3  # of short term goals met= 3  Physical Therapy Problems     Problem: PT-Long Term Goals     Dates: Start: 10/17/19       Description:     Goal: LTG-By discharge, patient will ambulate     Dates: Start: 10/17/19       Description: 1) Individualized goal: Gait fww/spc 300-400 FT modified independent at discharge  2) Interventions:  PT Gait Training, PT Therapeutic Exercises, PT Neuro Re-Ed/Balance and PT Therapeutic Activity             Goal: LTG-By discharge, patient will transfer one surface to another     Dates: Start: 10/17/19       Description: 1) Individualized goal: Transfer one surface to another FWW/SPC modified " independent at discharge  2) Interventions:  PT Gait Training, PT Therapeutic Exercises, PT Neuro Re-Ed/Balance and PT Therapeutic Activity             Goal: LTG-By discharge, patient will ambulate up/down 4-6 stairs     Dates: Start: 10/17/19       Description: 1) Individualized goal: Up/down 4-6 stairs modified independent at discharge  2) Interventions:  PT Gait Training, PT Therapeutic Exercises, PT Neuro Re-Ed/Balance and PT Therapeutic Activity             Goal: LTG-By discharge, patient will transfer in/out of a car     Dates: Start: 10/17/19       Description: 1) Individualized goal: Car transfer FWW/SPC modified independent at discharge  2) Interventions:  PT Gait Training, PT Therapeutic Exercises, PT Neuro Re-Ed/Balance and PT Therapeutic Activity                           Section completed by:  ALBERTINA Prajapati

## 2019-10-28 NOTE — CARE PLAN
Problem: Safety  Goal: Will remain free from injury  Note:   Pt cleared to be Mod-I at w/c level for toileting and bed <> w/c txs, mom cleared to assist with showering.        Problem: Pain Management  Goal: Pain level will decrease to patient's comfort goal  Note:   Pt. denied pain during this shift. No signs of distress or discomfort noted. Will monitor.

## 2019-10-28 NOTE — REHAB-COLLABORATIVE ONGOING IDT TEAM NOTE
Weekly Interdisciplinary Team Conference Note    Weekly Interdisciplinary Team Conference # 2  Date:  10/28/2019    Clinicians present and reporting at team conference include the following:   MD: Sue Rock MD   RN:  Ivy Austin RN    PT:   Dima Montoya PT, MPT, MEd  OT:  Rosmery Munoz MS, OTR/L    ST:  Not Applicable.   CM:  Amber Pinzon RN,   REC:  Not Applicable  RT:  Not Applicable  RPh:  Bart Garcia, [facility]  Other:   Not Applicable  All reporting clinicians have a working knowledge of this patient's plan of care.    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) 09/19/2019     Priority: Medium   • ETOH abuse 05/26/2019     Priority: Medium   • Transaminitis 05/26/2019     Priority: Medium   • Electrolyte abnormality 05/26/2019     Priority: Medium   • Morbid obesity (HCC) 09/20/2019     Priority: Low   • ALLI (obstructive sleep apnea) 09/20/2019     Priority: Low   • Pulmonary hypertension ([facility]) 05/28/2019     Priority: Low   • Anxiety 05/27/2019     Priority: Low   • Essential hypertension 05/26/2019     Priority: Low   • LAZARO (acute kidney injury) ([facility]) 05/26/2019     Priority: Low   • Vitamin D deficiency 10/17/2019   • Urinary retention 10/17/2019   • 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 **         Nursing  Diet/Nutrition:  Regular, Thin Liquid  Medication Administration:  Whole with Liquid Wash  % consumed at meals in last 24 hours:  Consumed % of meals per documentation.  Meal/Snack Consumption for the past 24 hrs:   Oral Nutrition   10/27/19 1803 Dinner;Between % Consumed   10/27/19 1200 Lunch;Between % Consumed   10/27/19 0831 Between 50-75% Consumed     Snack schedule:  HS  Supplement:  Other: none  Appetite:  Good  Fluid Intake/Output in past 24 hours: In: 1200 [P.O.:1200]  Out: -   Admit Weight:  Weight: 121.5 kg (267 lb 13.7 oz)  Weight Last 7 Days       Pain Issues:    Location:  --  --         Severity:  Denies   Scheduled pain medications:  None     PRN pain medications used in last 24 hours:  None   Non Pharmacologic Interventions:  rest  Effectiveness of pain management plan:  good=patient states acceptable comfort after interventions    Bowel:    Bowel Assist Initial Score:  5 - Standby Prompting/Supervision or Set-up  Bowel Assist Current Score:  5 - Standby Prompting/Supervision or Set-up  Bowl Accidents in last 7 days:     Last bowel movement: 10/27/19  Stool Description: Large(per patient)     Usual bowel pattern:  daily  Scheduled bowel medications:  senna-docusate (PERICOLACE or SENOKOT S) Miralax  PRN bowel medications used in last 24 hours:  None  Nursing Interventions:  Increased time, Scheduled medication, Supervision, Set-up  Effectiveness of bowel program:   good=regular, predictable, controlled emptying of bowel  Bladder:    Bladder Assist Initial Score:  5 - Standby Prompting/Supervision or Set-up  Bladder Assist Current Score:  5 - Standby Prompting/Supervision or Set-up  Bladder Accidents in last 7 days:     PVR range for past 24-48 hours: -- ()  Intermittent Catheterization:  N/A  Medications affecting bladder:  None    Time void schedule/voiding pattern:  Voiding every 2-4 hours  Interventions:  Increased time, Supervision, Emptying of device  Effectiveness  of bladder training:  Good=regular, predictable, emptying of bladder, patient initiates time voiding    Srivastava:    Type:     Patient Lines/Drains/Airways Status    Active Catheter     None              Date placed:          Justification:Neurogenic bladder    Diabetes:  Blood Sugar Frequency:  None    Range of BS for last 48 hours:       Scheduled diabetic medications:  None  Sliding scale usage in past 24 hours:  No  Total Short acting insulin in the past 24 hours:  None  Total Long acting insulin in the past 24 hours:  None    Wound:         Patient Lines/Drains/Airways Status    Active Current Wounds     None                   Interventions:  N/A      Wound Vac Location:  Not applicable  Dressing last changed:  Not applicable  Pump Mode Pressure Setting       Description of drainage:  Not applicable    Sleep/wake cycle:   Average hours slept:  Sleeps greater than 6 hours without waking  Sleep medication usage:  Other Melatonin 3mg    Patient/Family Training/Education:  Fall Prevention, General Self Care, Safe Transfers and Safety  Discharge Supply Recommendations:  Blood Pressure Monitor  Strengths: Alert and oriented, Supportive family and Manages pain appropriately   Barriers:   Hypertension and Other:  Anxiety       Nursing Problems     Problem: Bowel/Gastric:     Description:     Goal: Normal bowel function is maintained or improved     Description:     Flowsheet:     Taken at 10/26/19 2020    Last BM 10/26/19 by  Kaykay Lovelace R.N. Comment:  pr patient    Taken at 10/26/19 0841    Number of Times Stooled 0 by  Rehan Aiken, R.N.                Goal: Will not experience complications related to bowel motility     Description:                 Problem: Communication     Description:     Goal: The ability to communicate needs accurately and effectively will improve     Description:                 Problem: Discharge Barriers/Planning     Description:     Goal: Patient's continuum of care needs will  be met     Description:                 Problem: Infection     Description:     Goal: Will remain free from infection     Description:                 Problem: Knowledge Deficit     Description:     Goal: Knowledge of disease process/condition, treatment plan, diagnostic tests, and medications will improve     Description:           Goal: Knowledge of the prescribed therapeutic regimen will improve     Description:                 Problem: Pain Management     Description:     Goal: Pain level will decrease to patient's comfort goal     Description:                 Problem: Psychosocial Needs:     Description:     Goal: Level of anxiety will decrease     Description:                 Problem: Respiratory:     Description:     Goal: Respiratory status will improve     Description:                 Problem: Safety     Description:     Goal: Will remain free from injury     Description:           Goal: Will remain free from falls     Description:                 Problem: Skin Integrity     Description:     Goal: Risk for impaired skin integrity will decrease     Description:                 Problem: Urinary Elimination:     Description:     Goal: Ability to reestablish a normal urinary elimination pattern will improve     Description:                 Problem: Venous Thromboembolism (VTW)/Deep Vein Thrombosis (DVT) Prevention:     Description:     Goal: Patient will participate in Venous Thrombosis (VTE)/Deep Vein Thrombosis (DVT)Prevention Measures     Description:                        Long Term Goals:   At discharge patient will be able to function safely at home and in the community with support.    Section completed by:  Ino Mendoza R.N.      Reviewed by Ivy Austin R.N.       Mobility  Bed mobility:   Independent supine to/from sit  Bed /Chair/Wheelchair Transfer Initial:  5 - Standby Prompting/Supervision or Set-up  Bed /Chair/Wheelchair Transfer Current:  6 - Modified Independent FWW   Bed/Chair/Wheelchair  "Transfer Description:  Increased time(FWW)  Walk Initial:  2 - Max Assistance  Walk Current:  6 - Modified Independent  FWW   Walk Description:  Walker, Extra time( FT, 350 FT, 225 FT)  Wheelchair Initial:  5 - Standby Prompting/Supervision or Set-up  Wheelchair Current:  6 - Modified Independent   Wheelchair Description:  Extra time  Stairs Initial:  2 - Max Assistance  Stairs Current: 4 - Minimal Assistance   Stairs Description: Extra time, Verbal cueing, Supervision for safety, Requires incidental assist, Hand rails(18, 12, and 6 4\" steps with 2 HRs and SBA, 8 6\" steps with 2HRs and CGA )  Patient/Family Training/Education: In progress  DME/DC Recommendations:  FWW  Strengths:  Independent PLOF, Making steady progress towards goals, Manages pain appropriately, Motivated for self care and independence, Pleasant and cooperative, Supportive family and Willingly participates in therapeutic activities  Barriers:   Other: Impaired bilateral lower extremity strength, standing balance, endurance, gait, fall risk  # of short term goals set= 3  # of short term goals met= 3  Physical Therapy Problems     Problem: PT-Long Term Goals     Dates: Start: 10/17/19       Description:     Goal: LTG-By discharge, patient will ambulate     Dates: Start: 10/17/19       Description: 1) Individualized goal: Gait fww/spc 300-400 FT modified independent at discharge  2) Interventions:  PT Gait Training, PT Therapeutic Exercises, PT Neuro Re-Ed/Balance and PT Therapeutic Activity             Goal: LTG-By discharge, patient will transfer one surface to another     Dates: Start: 10/17/19       Description: 1) Individualized goal: Transfer one surface to another FWW/SPC modified independent at discharge  2) Interventions:  PT Gait Training, PT Therapeutic Exercises, PT Neuro Re-Ed/Balance and PT Therapeutic Activity             Goal: LTG-By discharge, patient will ambulate up/down 4-6 stairs     Dates: Start: 10/17/19       " Description: 1) Individualized goal: Up/down 4-6 stairs modified independent at discharge  2) Interventions:  PT Gait Training, PT Therapeutic Exercises, PT Neuro Re-Ed/Balance and PT Therapeutic Activity             Goal: LTG-By discharge, patient will transfer in/out of a car     Dates: Start: 10/17/19       Description: 1) Individualized goal: Car transfer FWW/SPC modified independent at discharge  2) Interventions:  PT Gait Training, PT Therapeutic Exercises, PT Neuro Re-Ed/Balance and PT Therapeutic Activity                           Section completed by:  ALBERTINA Prajapati    Activities of Daily Living  Eating Initial:  5 - Standby Prompting/Supervision or Set-up  Eating Current:  7 - Independent   Eating Description:  Increased time  Grooming Initial:  5 - Standby Prompting/Supervision or Set-up  Grooming Current:  7 - Independent   Grooming Description:  Supervision for safety(in standing)  Bathing Initial:  5 - Standby Prompting/Supervision or Set-up  Bathing Current:  6 - Modified Independent   Bathing Description:  Grab bar, Tub bench, Increased time, Hand held shower(completes in seated)  Upper Body Dressing Initial:  5 - Standby Prompting/Supervision or Set-up  Upper Body Dressing Current:  7 - Independent   Upper Body Dressing Description:  Set-up of equipment  Lower Body Dressing Initial:  4 - Minimal Assistance  Lower Body Dressing Current:  6 - Modified Independent   Lower Body Dressing Description:  6 - Modified Independent  Toileting Initial:  5 - Standby Prompting/Supervision or Set-up  Toileting Current:  6 - Modified Independent   Toileting Description:  Grab bar, Supervision for safety  Toilet Transfer Initial:  5 - Standby Prompting/Supervision or Set-up  Toilet Transfer Current:  6 - Modified Independent   Toilet Transfer Description:  6 - Modified Independent  Tub / Shower Transfer Initial:  4 - Minimal Assistance  Tub / Shower Transfer Current:  5 - Standby Prompting/Supervision or  Set-up   Tub / Shower Transfer Description:  Grab bar  IADL:  SBA to complete meal prep using FWW and counter for support   Family Training/Education:  Mother present often, ongoing education on safety, DME recommendations   DME/DC Recommendations:  Grab bar placement in shower, pt has built in shower chair, FWW, possible need for w/c for community mobility     Strengths:  Able to follow instructions, Alert and oriented, Good carryover of learning, Independent PLOF, Making steady progress towards goals, Manages pain appropriately, Motivated for self care and independence, Pleasant and cooperative, Supportive family and Willingly participates in therapeutic activities  Barriers:  Generalized weakness, Limited mobility, Poor balance and Other: LE weakness, mild FM impairments     # of short term goals set= 3    # of short term goals met= 3     Occupational Therapy Goals     Problem: OT Long Term Goals     Dates: Start: 10/17/19       Description:     Goal: LTG-By discharge, patient will complete basic self care tasks     Dates: Start: 10/17/19       Description: 1) Individualized Goal:  With modified independence   2) Interventions:  OT Orthotics Training, OT E Stim Attended, OT Group Therapy, OT Self Care/ADL, OT Cognitive Skill Dev, OT Community Reintegration, OT Neuro Re-Ed/Balance, OT Sensory Int Techniques, OT Therapeutic Activity, OT Evaluation and OT Therapeutic Exercise           Goal: LTG-By discharge, patient will perform bathroom transfers     Dates: Start: 10/17/19       Description: 1) Individualized Goal:  With modified independence   2) Interventions:  OT Orthotics Training, OT E Stim Attended, OT Group Therapy, OT Self Care/ADL, OT Cognitive Skill Dev, OT Community Reintegration, OT Neuro Re-Ed/Balance, OT Sensory Int Techniques, OT Therapeutic Activity, OT Evaluation and OT Therapeutic Exercise             Goal: LTG-By discharge, patient will complete basic home management     Dates: Start: 10/17/19        Description: 1) Individualized Goal:  With modified independence   2) Interventions:  OT Orthotics Training, OT E Stim Attended, OT Group Therapy, OT Self Care/ADL, OT Cognitive Skill Dev, OT Community Reintegration, OT Neuro Re-Ed/Balance, OT Sensory Int Techniques, OT Therapeutic Activity, OT Evaluation and OT Therapeutic Exercise                         Section completed by:  Rosmery Munoz, MS,OTR/L          Nutrition  Dietary Problems (Active)      There are no active problems.            NUTRITION SERVICES: BMI - Pt with BMI >40 (=Body mass index is 40.73 kg/m².), morbid obesity. Weight loss counseling not appropriate in acute care setting. RECOMMEND - Referral to outpatient nutrition services for weight management after D/C.       Section completed by:  Fay Jaquez R.D.    REHAB-Pharmacy IDT Team Note by Bart Garcia RPH at 10/25/2019 10:27 AM  Version 1 of 1    Author:  Bart Garcia RPH Service:  -- Author Type:  Pharmacist    Filed:  10/25/2019 10:28 AM Date of Service:  10/25/2019 10:27 AM Status:  Signed    :  Bart Garcia RPH (Pharmacist)         Pharmacy   Pharmacy  Antibiotics/Antifungals/Antivirals:  Medication:      Active Orders (From admission, onward)    None        Route:        NA  Stop Date:  NA  Reason:      NA  Antihypertensives/Cardiac:  Medication:    Orders (72h ago, onward)     Start     Ordered    10/20/19 0800  lisinopril (PRINIVIL) tablet 10 mg  Q DAY      10/19/19 1144    10/17/19 0900  atenolol (TENORMIN) tablet 100 mg  DAILY      10/16/19 1144    10/16/19 1144  metoprolol (LOPRESSOR) tablet 12.5 mg  EVERY 8 HOURS PRN      10/16/19 1144              Patient Vitals for the past 24 hrs:   BP Pulse   10/25/19 0808 136/98 100   10/25/19 0800 136/98 (!) 113   10/25/19 0757 136/98 --   10/24/19 1900 127/81 (!) 102   10/24/19 1400 108/74 97     Anticoagulation:  Medication: Xarelto      Other key medications: A review of the medication list reveals no issues at  this time. Patient is currently on antihypertensive(s). Recommend home blood pressure monitoring/recording if antihypertensive(s) regimen(s) continue.    Section completed by: Bart Garcia [facility][[email or web address]]     Attribution Key     AW.1 - Bart Garcia [facility] on 10/25/2019 10:27 AM                  DC Planning  DC destination/dispostion:  Plan is for patient to travel by car or plane to [place], [place], to live with his mother in her single level home.    Referrals: Oleg rondon, MATHEUS. On license of [facility] placard application faxed to [facility] license of [facility].    DC Needs: PCP in [place] has accepted patient. Possible outpatient therapy to be ordered.     Barriers to discharge:  Functional deficits.    Strengths: Supportive mother. Will have family support in [place].     Section completed by:  Amber Pinzon R.N.      Physician Summary  Sue Rock MD participated and led team conference discussion.

## 2019-10-29 PROBLEM — R33.9 URINARY RETENTION: Status: RESOLVED | Noted: 2019-10-17 | Resolved: 2019-10-29

## 2019-10-29 PROBLEM — K59.01 SLOW TRANSIT CONSTIPATION: Status: RESOLVED | Noted: 2019-09-24 | Resolved: 2019-10-29

## 2019-10-29 LAB
ERYTHROCYTE [DISTWIDTH] IN BLOOD BY AUTOMATED COUNT: 50.8 FL (ref 35.9–50)
HCT VFR BLD AUTO: 47.8 % (ref 42–52)
HGB BLD-MCNC: 15.9 G/DL (ref 14–18)
MCH RBC QN AUTO: 30.9 PG (ref 27–33)
MCHC RBC AUTO-ENTMCNC: 33.3 G/DL (ref 33.7–35.3)
MCV RBC AUTO: 93 FL (ref 81.4–97.8)
PLATELET # BLD AUTO: 350 K/UL (ref 164–446)
PMV BLD AUTO: 8.6 FL (ref 9–12.9)
RBC # BLD AUTO: 5.14 M/UL (ref 4.7–6.1)
WBC # BLD AUTO: 8.8 K/UL (ref 4.8–10.8)

## 2019-10-29 PROCEDURE — 97116 GAIT TRAINING THERAPY: CPT

## 2019-10-29 PROCEDURE — 97530 THERAPEUTIC ACTIVITIES: CPT

## 2019-10-29 PROCEDURE — 97112 NEUROMUSCULAR REEDUCATION: CPT

## 2019-10-29 PROCEDURE — 97110 THERAPEUTIC EXERCISES: CPT

## 2019-10-29 PROCEDURE — 700102 HCHG RX REV CODE 250 W/ 637 OVERRIDE(OP): Performed by: STUDENT IN AN ORGANIZED HEALTH CARE EDUCATION/TRAINING PROGRAM

## 2019-10-29 PROCEDURE — 99232 SBSQ HOSP IP/OBS MODERATE 35: CPT | Performed by: HOSPITALIST

## 2019-10-29 PROCEDURE — A9270 NON-COVERED ITEM OR SERVICE: HCPCS | Performed by: STUDENT IN AN ORGANIZED HEALTH CARE EDUCATION/TRAINING PROGRAM

## 2019-10-29 PROCEDURE — 85027 COMPLETE CBC AUTOMATED: CPT

## 2019-10-29 PROCEDURE — A9270 NON-COVERED ITEM OR SERVICE: HCPCS | Performed by: PHYSICAL MEDICINE & REHABILITATION

## 2019-10-29 PROCEDURE — A9270 NON-COVERED ITEM OR SERVICE: HCPCS | Performed by: HOSPITALIST

## 2019-10-29 PROCEDURE — 700102 HCHG RX REV CODE 250 W/ 637 OVERRIDE(OP): Performed by: PHYSICAL MEDICINE & REHABILITATION

## 2019-10-29 PROCEDURE — 700102 HCHG RX REV CODE 250 W/ 637 OVERRIDE(OP): Performed by: HOSPITALIST

## 2019-10-29 PROCEDURE — 97535 SELF CARE MNGMENT TRAINING: CPT

## 2019-10-29 PROCEDURE — 770010 HCHG ROOM/CARE - REHAB SEMI PRIVAT*

## 2019-10-29 PROCEDURE — 99232 SBSQ HOSP IP/OBS MODERATE 35: CPT | Performed by: PHYSICAL MEDICINE & REHABILITATION

## 2019-10-29 RX ORDER — CYANOCOBALAMIN (VITAMIN B-12) 1000 MCG
1000 TABLET ORAL DAILY
Qty: 30 TAB | COMMUNITY
Start: 2019-10-29

## 2019-10-29 RX ORDER — FOLIC ACID 1 MG/1
1 TABLET ORAL DAILY
Qty: 30 TAB | Refills: 0 | COMMUNITY
Start: 2019-10-29

## 2019-10-29 RX ORDER — HYDROXYZINE 50 MG/1
50 TABLET, FILM COATED ORAL 3 TIMES DAILY PRN
Qty: 90 TAB | Refills: 0 | Status: SHIPPED | OUTPATIENT
Start: 2019-10-29

## 2019-10-29 RX ORDER — ACETAMINOPHEN 325 MG/1
650 TABLET ORAL EVERY 4 HOURS PRN
Qty: 30 TAB | Refills: 0 | COMMUNITY
Start: 2019-10-29

## 2019-10-29 RX ORDER — MELATONIN
1000 DAILY
Qty: 30 TAB | COMMUNITY
Start: 2019-10-30

## 2019-10-29 RX ORDER — AMOXICILLIN 250 MG
2 CAPSULE ORAL 2 TIMES DAILY
Qty: 30 TAB | Refills: 0 | COMMUNITY
Start: 2019-10-29

## 2019-10-29 RX ORDER — DULOXETIN HYDROCHLORIDE 30 MG/1
30 CAPSULE, DELAYED RELEASE ORAL DAILY
Qty: 30 CAP | Refills: 0 | Status: SHIPPED | OUTPATIENT
Start: 2019-10-30

## 2019-10-29 RX ORDER — LANOLIN ALCOHOL/MO/W.PET/CERES
3 CREAM (GRAM) TOPICAL
COMMUNITY
Start: 2019-10-29

## 2019-10-29 RX ORDER — LISINOPRIL 10 MG/1
10 TABLET ORAL DAILY
Qty: 30 TAB | Refills: 0 | Status: SHIPPED | OUTPATIENT
Start: 2019-10-30

## 2019-10-29 RX ORDER — LANOLIN ALCOHOL/MO/W.PET/CERES
100 CREAM (GRAM) TOPICAL DAILY
Qty: 30 TAB | Refills: 0 | COMMUNITY
Start: 2019-10-30

## 2019-10-29 RX ADMIN — RIVAROXABAN 20 MG: 10 TABLET, FILM COATED ORAL at 17:29

## 2019-10-29 RX ADMIN — SENNOSIDES AND DOCUSATE SODIUM 2 TABLET: 8.6; 5 TABLET ORAL at 07:55

## 2019-10-29 RX ADMIN — ACETAMINOPHEN 650 MG: 325 TABLET, FILM COATED ORAL at 21:22

## 2019-10-29 RX ADMIN — DULOXETINE HYDROCHLORIDE 30 MG: 30 CAPSULE, DELAYED RELEASE ORAL at 07:55

## 2019-10-29 RX ADMIN — MELATONIN 3 MG: at 21:22

## 2019-10-29 RX ADMIN — OMEPRAZOLE 20 MG: 20 CAPSULE, DELAYED RELEASE ORAL at 07:55

## 2019-10-29 RX ADMIN — LISINOPRIL 10 MG: 5 TABLET ORAL at 07:55

## 2019-10-29 RX ADMIN — ATENOLOL 100 MG: 25 TABLET ORAL at 07:54

## 2019-10-29 RX ADMIN — THERA TABS 1 TABLET: TAB at 07:55

## 2019-10-29 RX ADMIN — VITAMIN D, TAB 1000IU (100/BT) 4000 UNITS: 25 TAB at 07:54

## 2019-10-29 RX ADMIN — ACETAMINOPHEN 650 MG: 325 TABLET, FILM COATED ORAL at 07:55

## 2019-10-29 RX ADMIN — Medication 100 MG: at 07:55

## 2019-10-29 RX ADMIN — HYDROXYZINE HYDROCHLORIDE 50 MG: 25 TABLET, FILM COATED ORAL at 21:21

## 2019-10-29 RX ADMIN — FOLIC ACID 1 MG: 1 TABLET ORAL at 07:55

## 2019-10-29 ASSESSMENT — ENCOUNTER SYMPTOMS
NAUSEA: 0
SHORTNESS OF BREATH: 0
VOMITING: 0
ABDOMINAL PAIN: 0
EYES NEGATIVE: 1
PALPITATIONS: 0
FEVER: 0
CHILLS: 0
COUGH: 0

## 2019-10-29 NOTE — PROGRESS NOTES
Patient refused to have his labs drawn this morning.  States he wants to talk to the doctor prior to having labs drawn.

## 2019-10-29 NOTE — PROGRESS NOTES
Hospital Medicine Daily Progress Note      Chief Complaint:  PE, Afib, HTN    Interval History:  Pt admits to being a little nervous about pending discharge tomorrow.    Review of Systems  Review of Systems   Constitutional: Negative for chills and fever.   HENT: Negative.    Eyes: Negative.    Respiratory: Negative for cough and shortness of breath.    Cardiovascular: Negative for chest pain and palpitations.   Gastrointestinal: Negative for abdominal pain, nausea and vomiting.   Genitourinary: Negative.    Skin: Negative for itching and rash.        Physical Exam  Temp:  [36.8 °C (98.2 °F)] 36.8 °C (98.2 °F)  Pulse:  [109] 109  Resp:  [18] 18  BP: (134)/(97) 134/97  SpO2:  [94 %] 94 %    Physical Exam   Constitutional: He is oriented to person, place, and time. No distress.   HENT:   Head: Normocephalic and atraumatic.   Right Ear: External ear normal.   Left Ear: External ear normal.   Eyes: Conjunctivae and EOM are normal. Left eye exhibits no discharge.   Neck: Normal range of motion. Neck supple. No tracheal deviation present.   Cardiovascular: Normal rate, regular rhythm, S1 normal and S2 normal.   Pulmonary/Chest: Effort normal and breath sounds normal. No stridor. No respiratory distress. He has no wheezes. He has no rhonchi.   Abdominal: Soft. Bowel sounds are normal. He exhibits no distension. There is no tenderness.   Musculoskeletal: He exhibits no edema or tenderness.   Neurological: He is alert and oriented to person, place, and time. No sensory deficit.   Skin: Skin is warm and dry. He is not diaphoretic. No cyanosis.   Nursing note and vitals reviewed.      Fluids  No intake or output data in the 24 hours ending 10/29/19 1239    Laboratory                        Assessment/Plan  Acute saddle pulmonary embolism (HCC)- (present on admission)  Assessment & Plan  Has recurrent bilat PE  Echo w/ right heart strain (persistent vs recurrent)  Anticoagulated on Xarelto    Paroxysmal atrial fibrillation  (HCC)- (present on admission)  Assessment & Plan  Suspect 2/2 PE  On Atenolol for rate control  Anticoagulated on Xarelto    ETOH abuse- (present on admission)  Assessment & Plan  On Thiamine, MVI, and Folate supplements    Vitamin D deficiency  Assessment & Plan  Vit D level 15  On supplementation    Type 2 diabetes mellitus without complication, without long-term current use of insulin ([facility])- (present on admission)  Assessment & Plan  HbA1c 6.6  FSBS usually under 100, so discontinued SSI  Encourage diet and lifestyle modifications    ALLI (obstructive sleep apnea)- (present on admission)  Assessment & Plan  RT protocol  Suggest outpt PSG if not yet done    Essential hypertension- (present on admission)  Assessment & Plan  On Atenolol and Lisinopril  Observe blood pressure trends    Full Code

## 2019-10-29 NOTE — PROGRESS NOTES
"Rehab Progress Note     Date of Service: 10/29/2019  Chief Complaint: anxiety    Interval Events (Subjective)    Patient seen and examined in the therapy gym today. He didn't take the seroquel last night, and had a hard time sleeping because he was woken up for his nightly medications, then couldn't go back to sleep.    His discharge date has been moved up to tomorrow after therapy as they are leaving very early Thursday am to return to [place].    Patient feels ready for discharge. We discussed what meds he will need at discharge - lisinopril and Cymbalta.    He has no new complaints.     Objective:  VITAL SIGNS: /97   Pulse (!) 109   Temp 36.8 °C (98.2 °F) (Oral)   Resp 18   Ht 1.727 m (5' 8\")   Wt 118 kg (260 lb 3.2 oz)   SpO2 94%   BMI 39.56 kg/m²   Gen: alert, no apparent distress  CV: regular rate and rhythm, no murmurs, no peripheral edema  Resp: clear to ascultation bilaterally, normal respiratory effort  GI: soft, non-tender abdomen, bowel sounds present  Neuro: notable for mild bilateral ankle weakness    No results found for this or any previous visit (from the past 72 hour(s)).    Current Facility-Administered Medications   Medication Frequency   • DULoxetine (CYMBALTA) capsule 30 mg DAILY   • lisinopril (PRINIVIL) tablet 10 mg Q DAY   • vitamin D (cholecalciferol) tablet 4,000 Units DAILY   • melatonin tablet 3 mg QHS   • thiamine tablet 100 mg DAILY   • Respiratory Care per Protocol Continuous RT   • Pharmacy Consult Request ...Pain Management Review 1 Each PHARMACY TO DOSE   • acetaminophen (TYLENOL) tablet 650 mg Q4HRS PRN   • artificial tears ophthalmic solution 1 Drop PRN   • benzocaine-menthol (CEPACOL) lozenge 1 Lozenge Q2HRS PRN   • mag hydrox-al hydrox-simeth (MAALOX PLUS ES or MYLANTA DS) suspension 20 mL Q2HRS PRN   • ondansetron (ZOFRAN ODT) dispertab 4 mg 4X/DAY PRN    Or   • ondansetron (ZOFRAN) syringe/vial injection 4 mg 4X/DAY PRN   • sodium chloride (OCEAN) 0.65 % nasal " spray 2 Spray PRN   • atenolol (TENORMIN) tablet 100 mg DAILY   • calcium carbonate (TUMS) chewable tab 500 mg TID PRN   • cyanocobalamin (VITAMIN B-12) injection 1,000 mcg Q30 DAYS   • folic acid (FOLVITE) tablet 1 mg DAILY   • gabapentin (NEURONTIN) capsule 300 mg HS PRN   • hydrOXYzine HCl (ATARAX) tablet 50 mg TID PRN   • metoprolol (LOPRESSOR) tablet 12.5 mg Q8HRS PRN   • multivitamin (THERAGRAN) tablet 1 Tab DAILY   • omeprazole (PRILOSEC) capsule 20 mg DAILY   • rivaroxaban (XARELTO) tablet 20 mg PM MEAL   • senna-docusate (PERICOLACE or SENOKOT S) 8.6-50 MG per tablet 2 Tab BID    And   • polyethylene glycol/lytes (MIRALAX) PACKET 1 Packet DAILY    And   • magnesium hydroxide (MILK OF MAGNESIA) suspension 30 mL QDAY PRN    And   • bisacodyl (DULCOLAX) suppository 10 mg QDAY PRN       Orders Placed This Encounter   Procedures   • Diet Order Regular     Standing Status:   Standing     Number of Occurrences:   1     Order Specific Question:   Diet:     Answer:   Regular [1]       Assessment:  Active Hospital Problems    Diagnosis   • *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    I led and attended the weekly conference, and agree with the IDT conference documentation and plan of care as noted below.    Date of conference: 10/28/2019    Goals and  barriers: See IDT note.    Biggest barriers: mild ankle weakness, anxiety, impaired balance    Admission FIM 89 --> 91    CM/social support: Plan is for patient to travel by car or plane to [place], [place], to live with his mother in her single level home. Patient was living in [facility]; rented a mother-in-laws quarter and also has a 35' camper.  Mother is here from [facility] to support patient in his recovery and get him back to [place]. They are going to move the camper to [place] too. Mother stated she wishes to travel to [place] as soon as possible, with patient, after his discharge. They may be staying in his camper for a brief period which has stair access.    Anticipated DC date: 10/30/2019    Outpatient: PT     Equip: FWW    Follow up: PCP      Medical Decision Making and Plan:    Subacute combined degeneration of spinal cord  Incomplete paraplegia, improved  From whippit use  Paresthesias, continues  Ankle/foot weakness, continues  Urinary retention, resolved  Continue full rehab program  PT/OT, 1.5 hr each discipline, 5 days per week     Polysubstance abuse  Anxiety disorder, stable  Insomnia, resolved  Consult psychiatry, appreciate assistance  Tapered off Seroquel  Started Cymbalta 20 mg daily 10/18, increased to 30 mg on 10/24 (by psych)  PRN hydroxyzine  Scheduled melatonin  Consult Dr. Chacko, psychology, appreciate assistance  Bad reactions to Risperdal, Lamictal, trazodone, Wellbutrin     Acute Urinary retention, resolved  Neurogenic bladder  Likely due to posterior column injury  Discontinued Flomax  IC for over 400 cc  Replace Srivastava if unable to unable to urinate  Continue to monitor with as needed bladder scans  Last PVR 17    Bowel  Meds as needed  Last BM 10/27    DVT  Xarelto    Appreciate the assistance of the hospitalist with his medical co-morbidities:     Pulmonary emboli  Right heart strain  Paroxysmal atrial fibrillation  Sinus tachycardia, stable/improved  Hypertension  Leukocytosis, resolved  Elevated  liver enzymes, improved  Vit D deficiency    Total time:  26 minutes.  I spent greater than 50% of the time for patient care, counseling, and coordination on this date, including patient face-to face time, unit/floor time with review of records/pertinent lab data and studies, as well as discussing diagnostic evaluation/work up, planned therapeutic interventions, and future disposition of care, as per the interval events/subjective and the assessment and plan as noted above.    I have performed a physical exam, reviewed and updated ROS, as well as the assessment and plan today 10/29/2019. In review of note from 10/28/2019 there are no new changes except as documented above.          Sue Rock M.D.   Physical Medicine and Rehabilitation

## 2019-10-29 NOTE — DISCHARGE PLANNING
Case Management Discharge Instructions  Discharge Date October 30, 2019      Discharge Location: Home with Outpatient Services     Agency Name / Address / Phone:   [facility]   [address] [place] [address] [phone]    Outpatient Services: Physical Therapy     DME Provider / Phone: Oleg [phone]     Medical Equipment Ordered: Front Wheel Walker     Follow-up Information:     Cosme Garcia DO  [address] [place] [address]  [phone]  On 11/7/2019  Primary care, Thursday, Nov. 7, 2019 @11:30AM. Discharge summary will be faxed.

## 2019-10-29 NOTE — DISCHARGE PLANNING
Met with patient following Team Conference, relayed progress and move up of discharge date (per patient request) to 10/30/2019. Patient no longer needs wc. He has progressed beyond expectations, when wc was ordered.   Patient informed me that they are leaving town on Thursday AM. His father is here for the ride back with patient and his mother. They have rented an airbnb so they won't be staying in patient's rv at discharge as originally thought.   Informed me that his mother was able to get the [employer] for disabled persons' parking at [facility] today.  Patient anticipates arriving in [place] Monday. I will f/u to make a PCP appointment.

## 2019-10-29 NOTE — THERAPY
Physical Therapy   Daily Treatment     Patient Name: Cosme Cabrera  Age:  34 y.o., Sex:  male  Medical Record #: [id number]  Today's Date: 10/29/2019     Precautions  Precautions: (P) Fall Risk  Comments: (P) mod I on unit    Subjective    Patient feels prepared for discharge; says he is moving to [place] with parents and plans to find a new job     Objective       10/29/19 1531   Precautions   Precautions Fall Risk   Comments mod I on unit   Interdisciplinary Plan of Care Collaboration   IDT Collaboration with  Physical Therapist   Collaboration Comments prepare for d/c   PT Total Time Spent   PT Individual Total Time Spent (Mins) 30   PT Charge Group   PT Gait Training 1   PT Therapeutic Activities 1       FIM Bed/Chair/Wheelchair Transfers Score: 6 - Modified Independent  Bed/Chair/Wheelchair Transfers Description:       FIM Walking Score:  6 - Modified Independent  Walking Description:  (200ft x 2 with FWW distant SPV; appropriate for mod I ambulation status)    FIM Stairs Score:  2 - Max Assistance  Stairs Description:  (up/down 6 4in stairs using handrails SBA, foot-to-foot pattern)      Assessment    Patient attempts to practice short-distance ambulation with no AD, limited by right leg weakness after approx 30ft    Plan    Prepare for d/c home with family 10/30

## 2019-10-29 NOTE — THERAPY
Physical Therapy   Daily Treatment     Patient Name: Cosme Cabrera  Age:  34 y.o., Sex:  male  Medical Record #: [id number]  Today's Date: 10/29/2019     Precautions  Precautions: (P) Fall Risk  Comments: (P) Mod I on unit    Subjective    I am sore today. I did a lot yesterday. I am not independent on the unit with my walker.     Objective     10/29/19 1031   Precautions   Precautions Fall Risk   Comments Mod I on unit   Pain   Intervention Distraction   Pain 0 - 10 Group   Location Knee   Location Orientation Right   Description Aching   Comfort Goal Comfort with Movement   Therapist Pain Assessment During Activity  (stair training)   Cognition    Level of Consciousness Alert   Comments requires cues to stay on track with conversation   Standing Lower Body Exercises   Other Exercises trampoline bounce back with 6 lb ball, 30-35 reps x 3, CGA for stability; Standing balloon toss x 3 sessions, 2-4 min/ea with fww for UE support as needed.   Neuro-Muscular Treatments   Neuro-Muscular Treatments Sequencing;Verbal Cuing;Postural Facilitation   Comments Gait training with SPC with railing for support as needed, 30 ft x 6; VC for reciprocal arm swing with cane, erect posture, slow gait speed.   Interdisciplinary Plan of Care Collaboration   IDT Collaboration with  Occupational Therapist   Patient Position at End of Therapy Seated  (at lunch)   Collaboration Comments CLOF: Gabby on unit   PT Total Time Spent   PT Individual Total Time Spent (Mins) 60   PT Charge Group   PT Gait Training 2   PT Therapeutic Exercise 1   PT Neuromuscular Re-Education / Balance 1     Gait training with fww, spc; dynamic balance activities to challenge core stability and LE strength.    FIM Walking Score:  6 - Modified Independent  Walking Description:  Walker, Extra time(350 ft with fww, multiple corners negotiated, able to talk and walk without SOB)    FIM Stairs Score:  2 - Max Assistance  Stairs Description:  Hand rails, Supervision for  "safety(6 4\" steps completed with single railing and SPC, declined further steps due to R knee pain)      Assessment    Good gait tolerance with fww. Limited tolerance and CGA required with SPC use. Good standing/core stability with trampoline ball toss. Patient reporting he did too much exercsies yesterday and his legs are sore. Limited stair ability due to R knee pain. Heavy reliance on FWW at this time due to fear of LE giving out.     Plan    DC tomorrow after therapy. Continue therapeutic exercise, gait training, tolerance for activity, stairs, static and dynamic standing balance     "

## 2019-10-29 NOTE — THERAPY
Occupational Therapy  Daily Treatment     Patient Name: Cosme Cabrera  Age:  34 y.o., Sex:  male  Medical Record #: [id number]  Today's Date: 10/29/2019     Precautions  Precautions: (P) Fall Risk  Comments: (P) Mod I on unit    Safety   Comments: Pt cleared to be Mod-I at w/c level for toileting and bed <> w/c txs, mom cleared to assist with showering    Subjective    Pt agreeable to OT     Objective       10/29/19 1331   Precautions   Precautions Fall Risk   Comments Mod I on unit   Balance   Comments balloon volley standing in // bars for 15 min with rest breaks x2   OT Total Time Spent   OT Individual Total Time Spent (Mins) 30   OT Charge Group   OT Neuromuscular Re-education / Balance 2     Functional mobility in hallway using intermittent support on railing at side, CGA with gait belt.     Assessment    Pt seen to progress standing balance/mobility with reduction of UE support. Demos consistent improvements in standing balance w/o UE support with no noted LOB during standing activity this session. Ambulation with reduction of UE support is slow and guarded but no LOB.     Plan    continue to progress standing balance/tolerance with reduction of UE support, functional mobility household and community distances.

## 2019-10-29 NOTE — THERAPY
"Occupational Therapy  Daily Treatment     Patient Name: Cosme Cabrera  Age:  34 y.o., Sex:  male  Medical Record #: [id number]  Today's Date: 10/29/2019     Precautions  Precautions: (P) Fall Risk  Comments: (P) Mod I on unit    Safety   Comments: Pt cleared to be Mod-I at w/c level for toileting and bed <> w/c txs, mom cleared to assist with showering    Subjective    \"I don't think I'll be doing that for awhile\" re: getting in/out of bathtub     Objective       10/29/19 0931   Precautions   Precautions Fall Risk   Comments Mod I on unit   Sitting Upper Body Exercises   Sitting Upper Body Exercises Yes   Chest Press 1 set of 15;Bilateral;Medium Resistance Theraband   Front Arm Raise 1 set of 15;Medium Resistance Theraband   Bilateral Row 1 set of 15;Medium Resistance Theraband   Bicep Curls 1 set of 15;Right ;Left;Medium Resistance Theraband   Tricep Press 1 set of 15;Medium Resistance Theraband   Elbow Extension 1 set of 15;Medium Resistance Theraband   Other Exercise scap squeezes 1x15   OT Total Time Spent   OT Individual Total Time Spent (Mins) 60   OT Charge Group   OT Self Care / ADL 1   OT Therapy Activity 2   OT Therapeutic Exercise  1     Pt ambulated to gym with use of side-rail and SBA. Completed side stepping without UE support in front of mat x5 reps.     Pt transferred in/out of tub, SBA for stepping in/out w/o grab bars, Mod I for stepping in/out with grab bars. Min A for tx from standing to sitting inside of tub, mod A to move from laying in tub to standing position. SBA for stepping in/out of walk-in shower.     Assessment    Pt tolerated session well, discussed home bathroom plan trialing both step in shower and bathtub, pt agreed that he will need to hold off on taking baths for awhile but was able to step in/out of bathtub using grab bar with modified independence, pt prefers this as he would not have to share bathroom with parents, agreeable to obtaining shower chair and having grab bar x2 " installed for safety in/out. Pt was able to return demo theraband UE HEP and issued printed instructions for use on trip home, discussed use of band vs weights. Pt overall continues with steady improvements, is demonstrating increased stability w/o less UE support though still requires close SBA.     Plan    continue to progress standing balance/tolerance with reduction of UE support, functional mobility household and community distances.

## 2019-10-29 NOTE — CARE PLAN
Problem: Safety  Goal: Will remain free from falls  Note:   Pt. Is Mod I on the unit using FWW. Orange wristband in place.      Problem: Pain Management  Goal: Pain level will decrease to patient's comfort goal  Note:   Pt. denies pain or discomfort during this shift.

## 2019-10-30 VITALS
HEART RATE: 102 BPM | WEIGHT: 260.2 LBS | BODY MASS INDEX: 39.43 KG/M2 | TEMPERATURE: 98.1 F | RESPIRATION RATE: 18 BRPM | OXYGEN SATURATION: 94 % | DIASTOLIC BLOOD PRESSURE: 76 MMHG | SYSTOLIC BLOOD PRESSURE: 132 MMHG | HEIGHT: 68 IN

## 2019-10-30 PROCEDURE — 97530 THERAPEUTIC ACTIVITIES: CPT

## 2019-10-30 PROCEDURE — A9270 NON-COVERED ITEM OR SERVICE: HCPCS | Performed by: PHYSICAL MEDICINE & REHABILITATION

## 2019-10-30 PROCEDURE — A9270 NON-COVERED ITEM OR SERVICE: HCPCS | Performed by: HOSPITALIST

## 2019-10-30 PROCEDURE — 700102 HCHG RX REV CODE 250 W/ 637 OVERRIDE(OP): Performed by: HOSPITALIST

## 2019-10-30 PROCEDURE — 99231 SBSQ HOSP IP/OBS SF/LOW 25: CPT | Performed by: HOSPITALIST

## 2019-10-30 PROCEDURE — 700102 HCHG RX REV CODE 250 W/ 637 OVERRIDE(OP): Performed by: PHYSICAL MEDICINE & REHABILITATION

## 2019-10-30 PROCEDURE — 700102 HCHG RX REV CODE 250 W/ 637 OVERRIDE(OP): Performed by: STUDENT IN AN ORGANIZED HEALTH CARE EDUCATION/TRAINING PROGRAM

## 2019-10-30 PROCEDURE — 97110 THERAPEUTIC EXERCISES: CPT

## 2019-10-30 PROCEDURE — 97112 NEUROMUSCULAR REEDUCATION: CPT

## 2019-10-30 PROCEDURE — A9270 NON-COVERED ITEM OR SERVICE: HCPCS | Performed by: STUDENT IN AN ORGANIZED HEALTH CARE EDUCATION/TRAINING PROGRAM

## 2019-10-30 PROCEDURE — 97116 GAIT TRAINING THERAPY: CPT

## 2019-10-30 PROCEDURE — 99239 HOSP IP/OBS DSCHRG MGMT >30: CPT | Performed by: PHYSICAL MEDICINE & REHABILITATION

## 2019-10-30 PROCEDURE — 97535 SELF CARE MNGMENT TRAINING: CPT

## 2019-10-30 RX ADMIN — DULOXETINE HYDROCHLORIDE 30 MG: 30 CAPSULE, DELAYED RELEASE ORAL at 08:24

## 2019-10-30 RX ADMIN — HYDROXYZINE HYDROCHLORIDE 50 MG: 25 TABLET, FILM COATED ORAL at 13:19

## 2019-10-30 RX ADMIN — VITAMIN D, TAB 1000IU (100/BT) 4000 UNITS: 25 TAB at 08:24

## 2019-10-30 RX ADMIN — THERA TABS 1 TABLET: TAB at 08:25

## 2019-10-30 RX ADMIN — SENNOSIDES AND DOCUSATE SODIUM 2 TABLET: 8.6; 5 TABLET ORAL at 08:24

## 2019-10-30 RX ADMIN — Medication 100 MG: at 08:25

## 2019-10-30 RX ADMIN — ATENOLOL 100 MG: 25 TABLET ORAL at 08:24

## 2019-10-30 RX ADMIN — FOLIC ACID 1 MG: 1 TABLET ORAL at 08:25

## 2019-10-30 RX ADMIN — OMEPRAZOLE 20 MG: 20 CAPSULE, DELAYED RELEASE ORAL at 08:25

## 2019-10-30 RX ADMIN — LISINOPRIL 10 MG: 5 TABLET ORAL at 08:25

## 2019-10-30 ASSESSMENT — ACTIVITIES OF DAILY LIVING (ADL)
TOILET_TRANSFER_LEVEL_OF_ASSIST: ABLE TO COMPLETE TOILET TRANSFER WITHOUT ASSIST
TOILETING_LEVEL_OF_ASSIST: ABLE TO COMPLETE TOILETING WITHOUT ASSIST
SHOWER_TRANSFER_LEVEL_OF_ASSIST: ABLE TO COMPLETE SHOWER TRANSFER WITHOUT ASSIST

## 2019-10-30 ASSESSMENT — ENCOUNTER SYMPTOMS
COUGH: 0
FEVER: 0
EYES NEGATIVE: 1
VOMITING: 0
CHILLS: 0
PALPITATIONS: 0
ABDOMINAL PAIN: 0
NAUSEA: 0
SHORTNESS OF BREATH: 0

## 2019-10-30 NOTE — THERAPY
Occupational Therapy  Daily Treatment     Patient Name: Cosme Cabrera  Age:  34 y.o., Sex:  male  Medical Record #: [id number]  Today's Date: 10/30/2019     Precautions  Precautions: (P) Fall Risk  Comments: mod I on unit    Safety   Comments: Pt cleared to be Mod-I at w/c level for toileting and bed <> w/c txs, mom cleared to assist with showering    Subjective    Pt agreeable     Objective       10/30/19 0701   Precautions   Precautions Fall Risk   Sitting Upper Body Exercises   Other Exercise modified sit-ups 2x20 with 7# weight, core twists 2x20 wth 7# weight, hip flexion 2x20 reps each LE, modified side crunch 2x20 each side   Standing Upper Body Exercises   Chest Press 2 sets of 15  (6# medicine ball)   Shoulder Press 2 sets of 15  (6# medicine ball)   Other Exercises with 6# medicine ball; side twists 2x15   Comments requires min A for side twists, intermittent use of UE support for balance   OT Total Time Spent   OT Individual Total Time Spent (Mins) 60   OT Charge Group   OT Neuromuscular Re-education / Balance 2   OT Therapeutic Exercise  2     In hallway:  Functional mobility 50 ft x3 with intermittent UE support on side rail    In // bars:   Walking over bolsters x2 and stepping on/off therex foam pad x10 laps with intermittent UE support  Walking over bolster x1 and stepping on/off therex foam pad and picking up cone from ground with intermittent UE support x10 laps    Assessment    Pt tolerated session well with continued focus on reduction of UE support, frequent LOB with higher level balance activity requiring frequent UE support for self-recovery. Continues to be limited overall by LE weakness, impaired standing balance w/o UE support, limited standing tolerance. Pt able to verbalize therex plan for continued progression at d/c.     Plan    Prep for d/c tomorrow with support from family as needed

## 2019-10-30 NOTE — CARE PLAN
Problem: Safety  Goal: Will remain free from injury  Outcome: PROGRESSING AS EXPECTED    Pt uses call light consistently and appropriately. Waits for assistance does not attempt self transfer this shift. Able to verbalize needs.       Problem: Urinary Elimination:  Goal: Ability to reestablish a normal urinary elimination pattern will improve  Outcome: PROGRESSING AS EXPECTED    Patient is continent of bladder this shift.  Will continue to monitor.       Problem: Skin Integrity  Goal: Risk for impaired skin integrity will decrease  Outcome: PROGRESSING AS EXPECTED    Skin intact no open areas noted.

## 2019-10-30 NOTE — THERAPY
10/30/19 0929   Precautions   Precautions Fall Risk  (Due to lower extremity weakness)   Comments Modified independent FWW on the unit   Pain 0 - 10 Group   Location Knee   Location Orientation Right  (Medial)   Therapist Pain Assessment 3;During Activity   Cognition    Cognition / Consciousness WDL   Level of Consciousness Alert   Sitting Lower Body Exercises   Ankle Pumps 3 sets of 15;Bilateral   Hip Flexion 3 sets of 15;Bilateral   Hip Abduction 3 sets of 15;Bilateral   Hip Adduction 3 sets of 15;Bilateral   Long Arc Quad 3 sets of 15;Bilateral   Hamstring Curl 3 sets of 15;Bilateral   Bed Mobility    Supine to Sit Independent   Sit to Supine Independent   Sit to Stand Modified Independent   Scooting Independent   Rolling Independent   PT Total Time Spent   PT Individual Total Time Spent (Mins) 60   PT Charge Group   PT Gait Training 1   PT Therapeutic Exercise 2   PT Therapeutic Activities 1   Physical Therapy   Daily Treatment     Patient Name: Cosme Cabrera  Age:  34 y.o., Sex:  male  Medical Record #: [id number]  Today's Date: 10/30/2019     Precautions  Precautions: Fall Risk  Comments: Mod I on unit (with FWW)    Subjective    The patient walked to the gym FWW modified independent.     Objective    The patient participated in gait training FWW modified independent 350 FT x2 he also went up/down a curb using a walker and up/down 12 stairs using bilateral handrails.    FIM Bed/Chair/Wheelchair Transfers Score: 6 - Modified Independent  Bed/Chair/Wheelchair Transfers Description:  Increased time    FIM Walking Score:  6 - Modified Independent  Walking Description:  Extra time, Walker( FT x2, modified independent)    FIM Wheelchair Score:  6 - Modified Independent  Wheelchair Description:       FIM Stairs Score:  6 - Modified Independent  Stairs Description:  Extra time, Hand rails      Assessment    The patient has made excellent progress throughout his stay in the rehabilitation hospital.  He is  modified independent for mobility, transfers, gait, stairs with handrails, curbs with a front wheel walker.  He will be discharging to his parents home in [place] for a period of time that he needs assistance.  He will continue with physical therapy.    Plan    Discharge to parents home in [place].

## 2019-10-30 NOTE — THERAPY
"Occupational Therapy  Daily Treatment     Patient Name: Cosme Cabrera  Age:  34 y.o., Sex:  male  Medical Record #: [id number]  Today's Date: 10/30/2019     Precautions  Precautions: (P) Fall Risk  Comments: (P) Mod I on unit (with FWW)    Safety   Comments: Pt cleared to be Mod-I at w/c level for toileting and bed <> w/c txs, mom cleared to assist with showering    Subjective    \"I'm really tired today. Three hours of therapy in the morning is a lot.\"    \"I don't want to do anymore. I'm just ready to go home\" patient stated after declining to participate in [employer] activity.     Objective     10/30/19 1001   Precautions   Precautions Fall Risk   Comments Mod I on unit   (with FWW)   Cognition    Level of Consciousness Alert   IADL Treatments   Meal Preparation Pt completed multi-step baking task in standing with rest breaks following ~5 minutes of standing activity due to fatigue and decreased BLE strength. No cues required for sequencing task.     Interdisciplinary Plan of Care Collaboration   Patient Position at End of Therapy Seated  (in room)   OT Total Time Spent   OT Individual Total Time Spent (Mins) 60   OT Charge Group   OT Self Care / ADL 2   OT Neuromuscular Re-education / Balance 2     FIM Walking Score:  5 - Standby Prompting/Supervision or Set-up  Walking Description:  Walker, Extra time, Supervision for safety(Patient performed functional mobility with FWW  (~275' and ~200'x 2) with supervision to mod I . Supervision provided toward end of session due to increased fatigue/decreased endurance)    Assessment    Patient tolerated OT session fair with focus on IADLs, standing balance/tolerance and functional mobility. Limited by fatigue and decreased BLE strength. Limited interest this session with participating in additional therapeutic activities/exercises and adamantly expressed he is ready to d/c home. Standing tolerance during baking activity limited to ~5 minute increments. Patient continues to " require UE support to maintain standing balance (primarily used walker and counter during IADL session).      Plan    Pt to d/c home today

## 2019-10-30 NOTE — DISCHARGE PLANNING
Outpatient therapy referral sent to [facility] per choice form.  Per Nieves, referral received. They do not contact insurance until the day before appointment.  CM aware.

## 2019-10-30 NOTE — PROGRESS NOTES
[facility] Medicine Daily Progress Note      Chief Complaint:  PE, Afib, HTN    Interval History:  Pt seen and examined in dining room.  Denies new complaints.    Review of Systems  Review of Systems   Constitutional: Negative for chills and fever.   HENT: Negative.    Eyes: Negative.    Respiratory: Negative for cough and shortness of breath.    Cardiovascular: Negative for chest pain and palpitations.   Gastrointestinal: Negative for abdominal pain, nausea and vomiting.   Genitourinary: Negative.    Skin: Negative for itching and rash.        Physical Exam  Temp:  [36.7 °C (98.1 °F)] 36.7 °C (98.1 °F)  Pulse:  [] 102  Resp:  [18] 18  BP: (132-136)/(76-87) 132/76  SpO2:  [94 %] 94 %    Physical Exam   Constitutional: He is oriented to person, place, and time. No distress.   HENT:   Head: Normocephalic and atraumatic.   Right Ear: External ear normal.   Left Ear: External ear normal.   Eyes: Conjunctivae and EOM are normal. Right eye exhibits no discharge. Left eye exhibits no discharge.   Neck: Normal range of motion. Neck supple. No tracheal deviation present.   Cardiovascular: Normal rate, regular rhythm, S1 normal and S2 normal.   Pulmonary/Chest: Effort normal and breath sounds normal. No stridor. No respiratory distress. He has no wheezes. He has no rhonchi.   Abdominal: Soft. Bowel sounds are normal. He exhibits no distension. There is no tenderness.   Musculoskeletal: He exhibits no edema or tenderness.   Neurological: He is alert and oriented to person, place, and time. No sensory deficit.   Skin: Skin is warm and dry. He is not diaphoretic. No cyanosis.   Vitals reviewed.      Fluids    Intake/Output Summary (Last 24 hours) at 10/30/2019 1019  Last data filed at 10/30/2019 0828  Gross per 24 hour   Intake 720 ml   Output --   Net 720 ml       Laboratory  Recent Labs     10/29/19  1314   WBC 8.8   RBC 5.14   HEMOGLOBIN 15.9   HEMATOCRIT 47.8   MCV 93.0   MCH 30.9   MCHC 33.3*   RDW 50.8*   PLATELETCT  350   MPV 8.6*                       Assessment/Plan  Acute saddle pulmonary embolism (HCC)- (present on admission)  Assessment & Plan  Has recurrent bilat PE  Echo w/ right heart strain (persistent vs recurrent)  Anticoagulated on Xarelto    Paroxysmal atrial fibrillation (HCC)- (present on admission)  Assessment & Plan  Suspect 2/2 PE  On Atenolol for rate control  Anticoagulated on Xarelto    ETOH abuse- (present on admission)  Assessment & Plan  On Thiamine, MVI, and Folate supplements    Vitamin D deficiency- (present on admission)  Assessment & Plan  Vit D level 15  On supplementation    Type 2 diabetes mellitus without complication, without long-term current use of insulin (HCC)- (present on admission)  Assessment & Plan  HbA1c 6.6  FSBS usually under 100, so discontinued SSI  Encourage diet and lifestyle modifications    ALLI (obstructive sleep apnea)- (present on admission)  Assessment & Plan  RT protocol  Suggest outpt PSG if not yet done    Essential hypertension- (present on admission)  Assessment & Plan  Blood pressure controlled on Atenolol and Lisinopril    Full Code

## 2019-10-30 NOTE — DISCHARGE SUMMARY
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)   • ETOH abuse   • Transaminitis   • Morbid obesity (HCC)   • ALLI (obstructive sleep apnea)   • Pulmonary hypertension (HCC)   • Anxiety   • Essential hypertension   • Vitamin D deficiency   • Vitamin B12 deficiency   • Weakness   • Type 2 diabetes mellitus without complication, without long-term current use of insulin (HCC)       Discharge Diagnosis:  Active Hospital Problems    Diagnosis   • *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 male with a past medical history of paroxysmal atrial fibrillation, hypertension, pulmonary emboli, anxiety with panic attacks, alcohol abuse; now admitted for acute inpatient rehabilitation with severe functional debility after readmission for pulmonary emboli and psychosis secondary to polysubstance abuse.       On admission the patient and medical record report he was diagnosed with acute saddle pulmonary emboli back in May 2019 and was discharged home on Xarelto with outpatient follow up recommended to determine etiology. Patient presented to ED on 9/19/2019 with hallucinations thought secondary to use of  cannabis, nitrous oxide, mushrooms. Head CT and MRI negative. Patient had CT of the chest which showed recurrent bilateral pulmonary emboli for which he was started on a heparin drip, admitted to the ICU, and eventually transition to Xarelto.  Lower extremity Dopplers were negative for DVT.  He had an echocardiogram which showed severe right heart strain, EF 62%.  He continued to have intermittent tachycardia.     Psychiatry was consulted for his hallucinations and he was initially started on Risperdal.  He developed abnormal movements concerning for NMS, he was given Cogentin with improvement and was switched to Seroquel. He was initially on a legal hold, which has now been removed.      He had weakness and paresthesias which did improve, and was thought secondary to nitrous oxide use, and he was started on B12.      He was treated for a urinary tract infection.  He failed a voiding trial due to acute retention and was started on Flomax. His Srivastava was removed today prior to transfer, after getting some bladder training/clamping over at acute.     Patient current reports paresthesias in his hands and feet as well as chronic weakness in his feet.  He also reports a long-standing history of anxiety for which he sees an outpatient psychiatrist Dr. Keith.  Patient would like to get off the Seroquel and he reports he has not been taking the higher dose at night. Per review of records and discussion with pharmacy is appears has been getting this dose at night, though both he and his mother insist this is not the case.     For his anxiety previously he had been treated with Lamictal which he had side effects with, was on benzos at some point, and most recently was using hydroxyzine which has been helpful, especially for his sleep. He has been tried on trazodone but he reports it gave him very lucid dreams. He was initially having nightmares when he first came to the acute hospital stay but denies any in the last 4  nights.       He denies any pain.  He denies any abdominal discomfort.  We discussed his elevated ALT and he reports a history of elevated liver enzymes he wonders if from his history of alcohol use.  He reports he has not had any alcohol since his admission back in May.     Patient was evaluated by Rehab Medicine physician and Physical Therapy and Occupational Therapy and determined to be appropriate for acute inpatient rehab and was transferred to [facility] on 10/16/2019.     Rehab Hospital Course    Subacute combined degeneration of spinal cord  Incomplete paraplegia, improved  From whippit use  Paresthesias, continues  Ankle/foot weakness, continues  Urinary retention, resolved     Polysubstance abuse  Anxiety disorder, stable  Insomnia, resolved  Consult psychiatry, appreciate assistance  Tapered off Seroquel  Started Cymbalta 20 mg daily 10/18, increased to 30 mg on 10/24 (by psych)  PRN hydroxyzine, given RX at discharge  Scheduled melatonin  Consulted Dr. Chacko, psychology, appreciate assistance     Acute Urinary retention, resolved  Neurogenic bladder  Likely due to posterior column injury  Discontinued Flomax     Bowel  Meds as needed  Last BM 10/27     DVT  Xarelto     Appreciated the assistance of the hospitalist with his medical co-morbidities:     Pulmonary emboli, on Xarelto  Right heart strain, stable  Paroxysmal atrial fibrillation, in sinus  Sinus tachycardia, stable/improved  Hypertension, stable  Leukocytosis, resolved  Elevated liver enzymes, improved  Vit D deficiency, supplemented  Vit B12 deficiency, supplemented    Radiology    9/19/2019 1:20 PM    HISTORY/REASON FOR EXAM:  Shortness of breath; with hx submassive PE in June--noncompliant with xarelto    TECHNIQUE/EXAM DESCRIPTION:  CT angiogram scan for pulmonary embolism with contrast, with reconstructions.    1.25 mm helical sections were obtained from the lung apices through the lung bases following the rapid bolus  administration of 60 mL of Omnipaque 350 nonionic contrast. Thin-section overlapping reconstruction interval was utilized. Coronal   reconstructions were generated from the axial data. MIP post processing was performed and utilized for the interpretation.    Low dose optimization technique was utilized for this CT exam including automated exposure control and adjustment of the mA and/or kV according to patient size.    COMPARISON: 5/26/2019    FINDINGS:  Pulmonary Embolism: Yes.    Main Pulmonary Arteries: Filling defect within LEFT and RIGHT main pulmonary arteries, with saddle embolus present.    Segmental branches: Filling defects within upper and lower lobe pulmonary arteries bilaterally, as well as branch vessels.    Subsegmental branches: As above.    Only if positive for PE:    RV diameter: 4.5 cm.    LV diameter: 4.5 cm.    RV/LV ratio: 1 (Greater than 0.9 is abnormal.)    Additional Comments: None.    Lungs: Wedge-shaped parenchymal density at the anterolateral RIGHT lower lobe abutting the major fissure.  Irregular nodular density in the RIGHT anterior costophrenic sulcus measuring 13 mm.    Pleura: Small RIGHT pleural fluid collection.    Nodes: No enlarged lymph nodes.    Additional findings: Liver shows diffuse low attenuation.   Impression       1.  Extensive acute bilateral pulmonary emboli with saddle embolus noted, as well as findings concerning for RV strain.  2.  Probable pulmonary infarct in the lateral basal RIGHT lower lobe anterior basal RIGHT middle lobe.  Follow-up recommended to ensure resolution.  3.  Minimal RIGHT pleural effusion.  4.  Fatty infiltration of liver.     Lab Results   Component Value Date/Time    SODIUM 140 10/20/2019 05:11 AM    POTASSIUM 4.1 10/20/2019 05:11 AM    CHLORIDE 103 10/20/2019 05:11 AM    CO2 27 10/20/2019 05:11 AM    GLUCOSE 102 (H) 10/20/2019 05:11 AM    BUN 16 10/20/2019 05:11 AM    CREATININE 0.66 10/20/2019 05:11 AM      Lab Results   Component Value  Date/Time    WBC 8.8 10/29/2019 01:14 PM    RBC 5.14 10/29/2019 01:14 PM    HEMOGLOBIN 15.9 10/29/2019 01:14 PM    HEMATOCRIT 47.8 10/29/2019 01:14 PM    MCV 93.0 10/29/2019 01:14 PM    MCH 30.9 10/29/2019 01:14 PM    MCHC 33.3 (L) 10/29/2019 01:14 PM    MPV 8.6 (L) 10/29/2019 01:14 PM    NEUTSPOLYS 59.90 10/17/2019 05:21 AM    LYMPHOCYTES 27.90 10/17/2019 05:21 AM    MONOCYTES 9.00 10/17/2019 05:21 AM    EOSINOPHILS 2.10 10/17/2019 05:21 AM    BASOPHILS 0.50 10/17/2019 05:21 AM    ANISOCYTOSIS 2+ 10/09/2019 02:41 PM      Results for JANA LYNN (MRN [id number]) as of 10/30/2019 12:15   Ref. Range 10/17/2019 05:21   AST(SGOT) Latest Ref Range: 12 - 45 U/L 25   ALT(SGPT) Latest Ref Range: 2 - 50 U/L 89 (H)   Alkaline Phosphatase Latest Ref Range: 30 - 99 U/L 49   Total Bilirubin Latest Ref Range: 0.1 - 1.5 mg/dL 0.5   Albumin Latest Ref Range: 3.2 - 4.9 g/dL 3.9   Total Protein Latest Ref Range: 6.0 - 8.2 g/dL 6.6   Globulin Latest Ref Range: 1.9 - 3.5 g/dL 2.7       Functional Status at Discharge  Eatin - Independent  Eating Description:  Increased time  Groomin - Independent  Grooming Description:  Supervision for safety(in standing)  Bathin - Modified Independent  Bathing Description:  Grab bar, Tub bench(completes in seated)  Upper Body Dressin - Independent  Upper Body Dressing Description:  Set-up of equipment  Lower Body Dressin - Modified Independent  Lower Body Dressing Description:  6 - Modified Independent  Discharge Location : Home  Patient Discharging with Assist of: Family   Level of Supervision Required: Intermittent Supervision  Recommended Equipment for Discharge: Front-Wheeled Walker;Shower Chair;Grab Bars in Tub / Shower  Recommended Services Upon Discharge: No Follow-Up Occupational Therapy Recommended  Long Term Goals Met: 3  Long Term Goals Not Met: 0  Criteria for Termination of Services: Maximum Function Achieved for Inpatient Rehabilitation  Comments: Pt has made  steady progress during ARU stay and is now completing ADLs and functional mobility at modified independent level. Continues to be limited by lower extremity weaknesss impacting standing balance/tolerance and necessitating upper extremity support during functional standing tasks and mobility.   Walk:  5 - Standby Prompting/Supervision or Set-up  Distance Walked:  Walks a minimum of 150 feet  Walk Description:  Walker, Extra time, Supervision for safety(Patient performed functional mobility with FWW  (~275' and ~200'x 2) with supervision to mod I . Supervision provided toward end of session due to increased fatigue/decreased endurance)  Wheelchair:  6 - Modified Independent  Distance Propelled:  Propels a minimum of 150 feet   Wheelchair Description:  Extra time  Stairs 2 - Max Assistance  Stairs Description(up/down 6 4in stairs using handrails SBA, foot-to-foot pattern)  Discharge Location: Relative / Friend's Home  Patient Discharging with Assist of: Family  Level of Supervision Required Upon Discharge: No Supervision  Recommended Equipment for Discharge: Front-Wheeled Walker  Recommeded Services Upon Discharge: Home Health Physical Therapy;Outpatient Physical Therapy  Long Term Goals Met: The patient met all long-term goals for mobility, transfers, gait, endurance and safety awareness  Long Term Goals Not Met: Met all long-term goals  Criteria for Termination of Services: Maximum Function Achieved for Inpatient Rehabilitation  Comprehension Mode:  Auditory  Comprehension:  7 - Independent  Comprehension Description:  (increased time)  Expression Mode:  Vocal  Expression:  7 - Independent  Expression Description:     Social Interaction:  6 - Modified Independent  Social Interaction Description:  Medication  Problem Solvin - Independent  Problem Solving Description:  Increased time  Memory:  7 - Independent  Memory Description:  Therapy schedule       Discharge Medication:     Medication List      START taking  these medications      Instructions   DULoxetine 30 MG Cpep  Commonly known as:  CYMBALTA   Take 1 Cap by mouth every day.  Dose:  30 mg     melatonin 3 MG Tabs   Take 1 Tab by mouth every bedtime.  Dose:  3 mg     senna-docusate 8.6-50 MG Tabs  Commonly known as:  PERICOLACE or SENOKOT S   Take 2 Tabs by mouth 2 Times a Day.  Dose:  2 Tab     thiamine 100 MG tablet  Commonly known as:  THIAMINE   Take 1 Tab by mouth every day.  Dose:  100 mg     vitamin D3 (cholecalciferol) 1000 UNIT Tabs   Take 1 Tab by mouth every day.  Dose:  1,000 Units        CHANGE how you take these medications      Instructions   acetaminophen 325 MG Tabs  What changed:    · when to take this  · reasons to take this  Commonly known as:  TYLENOL   Take 2 Tabs by mouth every four hours as needed.  Dose:  650 mg     B-12 1000 MCG Tabs  What changed:  Another medication with the same name was removed. Continue taking this medication, and follow the directions you see here.   Take 1,000 mcg by mouth every day.  Dose:  1,000 mcg        CONTINUE taking these medications      Instructions   atenolol 100 MG Tabs  Commonly known as:  TENORMIN   Take 100 mg by mouth every day.  Dose:  100 mg     folic acid 1 MG Tabs  Commonly known as:  FOLVITE   Take 1 Tab by mouth every day.  Dose:  1 mg     hydrOXYzine HCl 50 MG Tabs  Commonly known as:  ATARAX   Take 1 Tab by mouth 3 times a day as needed for Itching or Anxiety.  Dose:  50 mg     lisinopril 10 MG Tabs  Commonly known as:  PRINIVIL   Take 1 Tab by mouth every day.  Dose:  10 mg     multivitamin Tabs   Take 1 Tab by mouth every day.  Dose:  1 Tab     omeprazole 20 MG delayed-release capsule  Commonly known as:  PRILOSEC   Take 1 Cap by mouth every day.  Dose:  20 mg     rivaroxaban 20 MG Tabs tablet  Commonly known as:  XARELTO   Take 1 Tab by mouth with dinner.  Dose:  20 mg        STOP taking these medications    calcium carbonate 500 MG Chew  Commonly known as:  TUMS     gabapentin 300 MG  Caps  Commonly known as:  NEURONTIN     metoprolol 25 MG Tabs  Commonly known as:  LOPRESSOR     miconazole 2%-zinc oxide 2 % Crea topical cream     nystatin powder  Commonly known as:  MYCOSTATIN     ondansetron 4 MG Tbdp  Commonly known as:  ZOFRAN ODT     QUEtiapine 100 MG Tabs  Commonly known as:  SEROQUEL     quetiapine 300 MG tablet  Commonly known as:  SEROQUEL     tamsulosin 0.4 MG capsule  Commonly known as:  FLOMAX          Discharge Location: Home with Outpatient Services     Agency Name / Address / Phone:   [facility]   [address] [place] [address] [phone]     Outpatient Services: Physical Therapy     DME Provider / Phone: [facility] [phone]     Medical Equipment Ordered: Front Wheel Walker     Follow-up Information:     Cosme Garcia DO  [address] [place] [address]  [phone]  On 11/7/2019  Primary care, Thursday, Nov. 7, 2019 @11:30AM. Discharge summary will be faxed.     Condition on Discharge:  Good.    More than 35 minutes was spent on discharging this patient, including face-to-face time, prescription management, and the dictation of this note.

## 2019-10-30 NOTE — PROGRESS NOTES
Patient discharged to home per order.  Discharge instructions reviewed with patient, he verbalize understanding and signed copies placed in chart.  Patient has all belongings; signed copy of form in chart.  Patient left facility at 1420 via FWW accompanied by rehab staff and patient's mother.

## 2019-10-30 NOTE — DISCHARGE INSTRUCTIONS
[facility] NURSING DISCHARGE INSTRUCTIONS    Blood Pressure: 132/76  Weight: 118 kg (260 lb 3.2 oz)  Nursing recommendations for Cosme Cabrera at time of discharge are as follows:  Client verbalized understanding of all discharge instructions and prescriptions.     Review all your home medications and newly ordered medications with your doctor and/or pharmacist. Follow medication instructions as directed by your doctor and/or pharmacist.    Pain Management:   Discharge Pain Medication Instructions:  Comfort Goal: Comfort with Movement  Notify your primary care provider if pain is unrelieved with these measures, if the pain is new, or increased in intensity.    Discharge Skin Characteristics:    Discharge Skin Exam: Clear     Skin / Wound Care Instructions: Please contact your primary care physician for any change in skin integrity.     If You Have Surgical Incisions / Wounds:  Monitor surgical site(s) for signs of increased swelling, redness or symptoms of drainage from the site or fever as this could indicate signs and symptoms of infection. If these symptoms are noted, notifiy your primary care provider.      Discharge Safety Instructions: No Supervision Needed     Discharge Safety Concerns: No Concerns Noted  The interdisciplinary team has made recommendation that you do not require supervision in the house due to weakness  Anti-embolic stockings are not required to increase circulation to the lower extremities.    Discharge Diet:       Discharge Liquids:    Discharge Bowel Function: Continent  Please contact your primary care physician for any changes in bowel habits.  Discharge Bowel Program:    Discharge Bladder Function: Continent  Discharge Urinary Devices: None      Nursing Discharge Plan:   Influenza Vaccine Indication: Not indicated: Previously immunized this influenza season and > 8 years of age    Case Management Discharge Instructions:   Discharge Location: Home with  Outpatient Services  Agency Name/Address/Phone: [facility] [address] [place] [address] [phone]  Home Health:    Outpatient Services: Physical Therapy  DME Provider/Phone: Oleg [phone]  Medical Equipment Ordered: Front Wheel Walker  Prescription Faxed to:        Discharge Medication Instructions:  Below are the medications your physician expects you to take upon discharge:          Pulmonary Embolism  A pulmonary embolism (PE) is a sudden blockage or decrease of blood flow in one lung or both lungs. Most blockages come from a blood clot that travels from the legs or the pelvis to the lungs. PE is a dangerous and potentially life-threatening condition if it is not treated right away.  What are the causes?  A pulmonary embolism occurs most commonly when a blood clot travels from one of your veins to your lungs. Rarely, PE is caused by air, fat, amniotic fluid, or part of a tumor traveling through your veins to your lungs.  What increases the risk?  A PE is more likely to develop in:  · People who smoke.  · People who are older, especially over 60 years of age.  · People who are overweight (obese).  · People who sit or lie still for a long time, such as during long-distance travel (over 4 hours), bed rest, hospitalization, or during recovery from certain medical conditions like a stroke.  · People who do not engage in much physical activity (sedentary lifestyle).  · People who have chronic breathing disorders.  · People who have a personal or family history of blood clots or blood clotting disease.  · People who have peripheral vascular disease (PVD), diabetes, or some types of cancer.  · People who have heart disease, especially if the person had a recent heart attack or has congestive heart failure.  · People who have neurological diseases that affect the legs (leg paresis).  · People who have had a traumatic injury, such as breaking a hip or leg.  · People who have  recently had major or lengthy surgery, especially on the hip, knee, or abdomen.  · People who have had a central line placed inside a large vein.  · People who take medicines that contain the hormone estrogen. These include birth control pills and hormone replacement therapy.  · Pregnancy or during childbirth or the postpartum period.  What are the signs or symptoms?  The symptoms of a PE usually start suddenly and include:  · Shortness of breath while active or at rest.  · Coughing or coughing up blood or blood-tinged mucus.  · Chest pain that is often worse with deep breaths.  · Rapid or irregular heartbeat.  · Feeling light-headed or dizzy.  · Fainting.  · Feeling anxious.  · Sweating.  There may also be pain and swelling in a leg if that is where the blood clot started.  These symptoms may represent a serious problem that is an emergency. Do not wait to see if the symptoms will go away. Get medical help right away. Call your local emergency services (911 in the [place] Do not drive yourself to the hospital.   How is this diagnosed?  Your health care provider will take a medical history and perform a physical exam. You may also have other tests, including:  · Blood tests to assess the clotting properties of your blood, assess oxygen levels in your blood, and find blood clots.  · Imaging tests, such as CT, ultrasound, MRI, X-ray, and other tests to see if you have clots anywhere in your body.  · An electrocardiogram (ECG) to look for heart strain from blood clots in the lungs.  How is this treated?  The main goals of PE treatment are:  · To stop a blood clot from growing larger.  · To stop new blood clots from forming.  The type of treatment that you receive depends on many factors, such as the cause of your PE, your risk for bleeding or developing more clots, and other medical conditions that you have. Sometimes, a combination of treatments is necessary.  This condition may be treated with:  · Medicines, including  newer oral blood thinners (anticoagulants), warfarin, low molecular weight heparins, thrombolytics, or heparins.  · Wearing compression stockings or using different types of devices.  · Surgery (rare) to remove the blood clot or to place a filter in your abdomen to stop the blood clot from traveling to your lungs.  Treatments for a PE are often divided into immediate treatment, long-term treatment (up to 3 months after PE), and extended treatment (more than 3 months after PE). Your treatment may continue for several months. This is called maintenance therapy, and it is used to prevent the forming of new blood clots. You can work with your health care provider to choose the treatment program that is best for you.  What are anticoagulants?   Anticoagulants are medicines that treat PEs. They can stop current blood clots from growing and stop new clots from forming. They cannot dissolve existing clots. Your body dissolves clots by itself over time. Anticoagulants are given by mouth, by injection, or through an IV tube.  What are thrombolytics?   Thrombolytics are clot-dissolving medicines that are used to dissolve a PE. They carry a high risk of bleeding, so they tend to be used only in severe cases or if you have very low blood pressure.  Follow these instructions at home:  If you are taking a newer oral anticoagulant:  · Take the medicine every single day at the same time each day.  · Understand what foods and drugs interact with this medicine.  · Understand that there are no regular blood tests required when using this medicine.  · Understand the side effects of this medicine, including excessive bruising or bleeding. Ask your health care provider or pharmacist about other possible side effects.  If you are taking warfarin:  · Understand how to take warfarin and know which foods can affect how warfarin works in your body.  · Understand that it is dangerous to take too much or too little warfarin. Too much warfarin  increases the risk of bleeding. Too little warfarin continues to allow the risk for blood clots.  · Follow your PT and INR blood testing schedule. The PT and INR results allow your health care provider to adjust your dose of warfarin. It is very important that you have your PT and INR tested as often as told by your health care provider.  · Avoid major changes in your diet, or tell your health care provider before you change your diet. Arrange a visit with a registered dietitian to answer your questions. Many foods, especially foods that are high in vitamin K, can interfere with warfarin and affect the PT and INR results. Eat a consistent amount of foods that are high in vitamin K, such as:  ¨ Spinach, kale, broccoli, cabbage, nasreen greens, turnip greens, [place] sprouts, peas, cauliflower, seaweed, and parsley.  ¨ Beef liver and pork liver.  ¨ Green tea.  ¨ Soybean oil.  · Tell your health care provider about any and all medicines, vitamins, and supplements that you take, including aspirin and other over-the-counter anti-inflammatory medicines. Be especially cautious with aspirin and anti-inflammatory medicines. Do not take those before you ask your health care provider if it is safe to do so. This is important because many medicines can interfere with warfarin and affect the PT and INR results.  · Do not start or stop taking any over-the-counter or prescription medicine unless your health care provider or pharmacist tells you to do so.  If you take warfarin, you will also need to do these things:  · Hold pressure over cuts for longer than usual.  · Tell your dentist and other health care providers that you are taking warfarin before you have any procedures in which bleeding may occur.  · Avoid alcohol or drink very small amounts. Tell your health care provider if you change your alcohol intake.  · Do not use tobacco products, including cigarettes, chewing tobacco, and e-cigarettes. If you need help quitting,  ask your health care provider.  · Avoid contact sports.  General instructions  · Take over-the-counter and prescription medicines only as told by your health care provider. Anticoagulant medicines can have side effects, including easy bruising and difficulty stopping bleeding. If you are prescribed an anticoagulant, you will also need to do these things:  ¨ Hold pressure over cuts for longer than usual.  ¨ Tell your dentist and other health care providers that you are taking anticoagulants before you have any procedures in which bleeding may occur.  ¨ Avoid contact sports.  · Wear a medical alert bracelet or carry a medical alert card that says you have had a PE.  · Ask your health care provider how soon you can go back to your normal activities. Stay active to prevent new blood clots from forming.  · Make sure to exercise while traveling or when you have been sitting or standing for a long period of time. It is very important to exercise. Exercise your legs by walking or by tightening and relaxing your leg muscles often. Take frequent walks.  · Wear compression stockings as told by your health care provider to help prevent more blood clots from forming.  · Do not use tobacco products, including cigarettes, chewing tobacco, and e-cigarettes. If you need help quitting, ask your health care provider.  · Keep all follow-up appointments with your health care provider. This is important.  How is this prevented?  Take these actions to decrease your risk of developing another PE:  · Exercise regularly. For at least 30 minutes every day, engage in:  ¨ Activity that involves moving your arms and legs.  ¨ Activity that encourages good blood flow through your body by increasing your heart rate.  · Exercise your arms and legs every hour during long-distance travel (over 4 hours). Drink plenty of water and avoid drinking alcohol while traveling.  · Avoid sitting or lying in bed for long periods of time without moving your  legs.  · Maintain a weight that is appropriate for your height. Ask your health care provider what weight is healthy for you.  · If you are a woman who is over 35 years of age, avoid unnecessary use of medicines that contain estrogen. These include birth control pills.  · Do not smoke, especially if you take estrogen medicines. If you need help quitting, ask your health care provider.  · If you are at very high risk for PE, wear compression stockings.  · If you recently had a PE, have regularly scheduled ultrasound testing on your legs to check for new blood clots.  If you are hospitalized, prevention measures may include:  · Early walking after surgery, as soon as your health care provider says that it is safe.  · Receiving anticoagulants to prevent blood clots. If you cannot take anticoagulants, other options may be available, such as wearing compression stockings or using different types of devices.  Get help right away if:  · You have new or increased pain, swelling, or redness in an arm or leg.  · You have numbness or tingling in an arm or leg.  · You have shortness of breath while active or at rest.  · You have chest pain.  · You have a rapid or irregular heartbeat.  · You feel light-headed or dizzy.  · You cough up blood.  · You notice blood in your vomit, bowel movement, or urine.  · You have a fever.  These symptoms may represent a serious problem that is an emergency. Do not wait to see if the symptoms will go away. Get medical help right away. Call your local emergency services (911 in the [place] Do not drive yourself to the hospital.   This information is not intended to replace advice given to you by your health care provider. Make sure you discuss any questions you have with your health care provider.  Document Released: 12/15/2001 Document Revised: 05/25/2017 Document Reviewed: 04/13/2016  Elsevier Interactive Patient Education © 2017 Elsevier Inc[employer]      Physical Therapy Discharge Instructions for  Cosme Cabrera    10/30/2019    Weight Bearing Status - Patient Should: Bear Weight as Tolerated Right Leg, Bear Weight as Tolerated Left Leg  Level of Assist Required for Ambulation: No Assist on Flat Surfaces, No Assist on Curbs, No Assist on Stairs  Distance Patient May Ambulate: 300-600 FT or more as tolerated  Device Recommended for Ambulation: Front-Wheeled Walker  Level of Assist Required to Propel Wheelchair: (Not applicable)  Level of Assist Required for Transfers: Requires No Assist  Device Recommended for Transfers: Front-Wheeled Walker  Home Exercise Program: Refer to Home Exercise Program Handout for Details  Prosthesis / Orthosis Recommendation / Location: No Prosthesis  or Orthosis Recommended   Torsten, congratulations on meeting all of your goals and going home.  It has been great to know you and help you during your recovery.  Best wishes for continued success, Julian HOLT, MEd      Rivaroxaban oral tablets  What is this medicine?  RIVAROXABAN (ri va MEÑO a ban) is an anticoagulant (blood thinner). It is used to treat blood clots in the lungs or in the veins. It is also used after knee or hip surgeries to prevent blood clots. It is also used to lower the chance of stroke in people with a medical condition called atrial fibrillation.  This medicine may be used for other purposes; ask your health care provider or pharmacist if you have questions.  COMMON BRAND NAME(S): Xarelto, Xarelto Starter Pack  What should I tell my health care provider before I take this medicine?  They need to know if you have any of these conditions:  -bleeding disorders  -bleeding in the brain  -blood in your stools (black or tarry stools) or if you have blood in your vomit  -history of stomach bleeding  -kidney disease  -liver disease  -low blood counts, like low white cell, platelet, or red cell counts  -recent or planned spinal or epidural procedure  -take medicines that treat or prevent blood clots  -an unusual or  allergic reaction to rivaroxaban, other medicines, foods, dyes, or preservatives  -pregnant or trying to get pregnant  -breast-feeding  How should I use this medicine?  Take this medicine by mouth with a glass of water. Follow the directions on the prescription label. Take your medicine at regular intervals. Do not take it more often than directed. Do not stop taking except on your doctor's advice. Stopping this medicine may increase your risk of a blood clot. Be sure to refill your prescription before you run out of medicine.  If you are taking this medicine after hip or knee replacement surgery, take it with or without food. If you are taking this medicine for atrial fibrillation, take it with your evening meal. If you are taking this medicine to treat blood clots, take it with food at the same time each day. If you are unable to swallow your tablet, you may crush the tablet and mix it in applesauce. Then, immediately eat the applesauce. You should eat more food right after you eat the applesauce containing the crushed tablet.  Talk to your pediatrician regarding the use of this medicine in children. Special care may be needed.  Overdosage: If you think you have taken too much of this medicine contact a poison control center or emergency room at once.  NOTE: This medicine is only for you. Do not share this medicine with others.  What if I miss a dose?  If you take your medicine once a day and miss a dose, take the missed dose as soon as you remember. If you take your medicine twice a day and miss a dose, take the missed dose immediately. In this instance, 2 tablets may be taken at the same time. The next day you should take 1 tablet twice a day as directed.  What may interact with this medicine?  Do not take this medicine with any of the following medications:  -defibrotide  This medicine may also interact with the following medications:  -aspirin and aspirin-like medicines  -certain antibiotics like erythromycin,  azithromycin, and clarithromycin  -certain medicines for fungal infections like ketoconazole and itraconazole  -certain medicines for irregular heart beat like amiodarone, quinidine, dronedarone  -certain medicines for seizures like carbamazepine, phenytoin  -certain medicines that treat or prevent blood clots like warfarin, enoxaparin, and dalteparin  -conivaptan  -diltiazem  -felodipine  -indinavir  -lopinavir; ritonavir  -NSAIDS, medicines for pain and inflammation, like ibuprofen or naproxen  -ranolazine  -rifampin  -ritonavir  -SNRIs, medicines for depression, like desvenlafaxine, duloxetine, levomilnacipran, venlafaxine  -SSRIs, medicines for depression, like citalopram, escitalopram, fluoxetine, fluvoxamine, paroxetine, sertraline  -Lindsey's wort  -verapamil  This list may not describe all possible interactions. Give your health care provider a list of all the medicines, herbs, non-prescription drugs, or dietary supplements you use. Also tell them if you smoke, drink alcohol, or use illegal drugs. Some items may interact with your medicine.  What should I watch for while using this medicine?  Visit your doctor or health care professional for regular checks on your progress.  Notify your doctor or health care professional and seek emergency treatment if you develop breathing problems; changes in vision; chest pain; severe, sudden headache; pain, swelling, warmth in the leg; trouble speaking; sudden numbness or weakness of the face, arm or leg. These can be signs that your condition has gotten worse.  If you are going to have surgery or other procedure, tell your doctor that you are taking this medicine.  What side effects may I notice from receiving this medicine?  Side effects that you should report to your doctor or health care professional as soon as possible:  -allergic reactions like skin rash, itching or hives, swelling of the face, lips, or tongue  -back pain  -redness, blistering, peeling or  loosening of the skin, including inside the mouth  -signs and symptoms of bleeding such as bloody or black, tarry stools; red or dark-brown urine; spitting up blood or brown material that looks like coffee grounds; red spots on the skin; unusual bruising or bleeding from the eye, gums, or nose  Side effects that usually do not require medical attention (report to your doctor or health care professional if they continue or are bothersome):  -dizziness  -muscle pain  This list may not describe all possible side effects. Call your doctor for medical advice about side effects. You may report side effects to FDA at 8-320-GAW-3548.  Where should I keep my medicine?  Keep out of the reach of children.  Store at room temperature between 15 and 30 degrees C (59 and 86 degrees F). Throw away any unused medicine after the expiration date.  NOTE: This sheet is a summary. It may not cover all possible information. If you have questions about this medicine, talk to your doctor, pharmacist, or health care provider.  © 2018 Elsevier/Gold Standard (2017-09-06 16:29:33)    Occupational Therapy Discharge Instructions for Cosme Cabrera    10/30/2019    Level of Assist Required for Eating: Able to Complete Eating without Assist  Level of Assist Required for Grooming: Able to Complete Grooming without Assist  Level of Assist Required for Dressing: Able to Complete Dressing without Assist  Level of Assist Required for Toileting: Able to Complete Toileting without Assist  Level of Assist Required for Toilet Transfer: Able to Complete Toilet Transfer without Assist  Level of Assist Required for Bathing: Able to Complete Bathing without Assist  Equipment for Bathing: Shower Chair, Grab Bars in Tub / Shower  Level of Assist Required for Shower Transfer: Able to Complete Shower Transfer without Assist  Level of Assist Required for Home Mgmt: Able to Complete Home Management without Assist  Level of Assist Required for Meal Prep: Able to  Complete Meal Preparation without Assist  Driving: Please Contact Physician Prior to Driving  Home Exercise Program: Refer to Home Exercise Program Handout for Details  Comments: It was great working with you Torsten! Enjoy the trip home and keep up the good work. -BAO BotelloR/L

## 2019-10-31 NOTE — DISCHARGE PLANNING
Case management Summary:   Met with patient, his mother and father prior to discharge.   Reviewed all follow up appointments.   Referral made to [facility]. They have received referral, will contact insurance before patient's appointment to ensure they are participating with Toby patient's insurance.   [facility] has delivered a FWW to patient.  The wc initially ordered was no longer needed by patient.   They are leaving on 10/31/2019 for their trip to [place] and anticipate being there Monday, Nov. 4, 2019.   During hospitalization, I have provided support and education and have been available for questions and information during hours of operation, communicated with therapy team and MD along with providing links/resources  to outside services.    Patient verbalizes agreement with all plans and has an understanding of the next steps within the post acute services.     Individualized Goals:   1. Improve functional and cognitive status to function independently, with intermittent oversight.  2. Be able to travel for return to [place]  3. Psychiatric care to be established in [place]    Outcome:   1. Met  2. Met  3. To be arranged by patient or his mother

## 2019-11-05 NOTE — CONSULTS
DATE OF SERVICE:  10/23/2019    BEHAVIORAL MEDICINE EVALUATION    BRIEF HISTORY OF PRESENTING COMPLAINTS:  The patient is a 34-year-old white   single male who is referred for a behavioral medicine evaluation by Dr. Rock.  The patient was transferred to rehab from acute where [place]   [place] [facility] with hallucinations that were thought to be   secondary to the use of cannabis, nitrous oxide and mushrooms.  The patient   also showed a recurrent bilateral pulmonary emboli on CT of the chest.  The   patient was started on heparin drip, but was eventually transitioned to   Xarelto.  The patient was also started on Risperdal for his hallucinations.    Eventually, he was stabilized acutely.  He was then sent to rehab to address   his general debility.    PAST MEDICAL HISTORY:  Significant for atrial fibrillation, sleep apnea,   polysubstance abuse, and hypertension.    PSYCHOLOGICAL STATUS:  MENTAL STATUS EXAMINATION:  The patient is a well-nourished, morbidly obese   male of short to medium stature who appeared older than his stated age of 34.    At presentation, the patient was alert.  He was sitting on his bed in his   hospital room when approached.  The patient oriented fairly well to my   presence.  The patient was kempt in appearance.  He was dressed in casual   street attire.  There was nothing overly remarkable about his appearance.  The   patient's manner of presentation was cooperative.    The patient was grossly oriented to time, place, and person.  His language was   logical and goal oriented, and his speech was normal for rate and rhythm.    The patient's concentration and memory functioning seemed slightly diminished.    The patient's affect was constricted, stable, and mildly intense.  He related   only marginally well.  His mood appeared depressed and anxious, but   appropriate to the context.    There was no evidence of delusional or perceptual disturbances including   visual  hallucinations.  Also, no unusual motor or pain behavior were noted.    SPECIFIC BEHAVIORAL COMPLAINTS:  The patient admitted to symptoms of   generalized mood disturbance.  He reported in the past several days he has   felt restless, tense and keyed up, depressed, worried, and disappointed in   himself.  He also admitted to feelings of nervousness and discouragement about   the future.  He reported feeling irritable.  The patient denied any strong   feelings of guilt, worthlessness, or hopelessness.  He reported no thoughts of   wanting to die.    Other problems mentioned by the patient included intermittent sleep   disturbance, problems managing his day-to-day stressors, low levels of energy,   diminished appetite, and pain.  The patient also reported significant history   of social anxiety, intermittent panic and problems with polysubstance abuse.    The patient denied any tobacco abuse.  He said he quit smoking approximately a   year-and-a-half ago.    PSYCHIATRIC HISTORY:  The patient reported a history of seeing various mental   health professionals including psychiatrist and psychologist for a long   history of anxiety and depression.  The patient has been treated with mostly   medications.  He has received some counseling.  The patient reported his   history of significant anxiety dates back to high school when he was   overwhelmed with social anxiety.  This is the first time he felt symptoms of   panic.  He was treated with Valium.    PSYCHOMETRIC TESTING:  The patient was administered 2 psychometric tests and 1   screening instrument.  The PS/PC-R revealed symptoms of generalized mood   disturbance.  His CDR survey showed no problems with level of consciousness or   attention.  His thinking was somewhat impaired for reasoning.  The patient   also showed poor insight and limitation in scope of his thinking.  No   perceptual problems are noted, although the patient has experienced recent   problems during  his acute hospitalization with visual hallucinations.  No   speech problems are noted except his speech was sparse.  No significant memory   disturbance was noted.  The patient reports some limitations in behavioral   activation and basic self-care.  Emotionally, the patient admitted to symptoms   of generalized mood disturbance.  Finally, the patient reported loss of   vigor, intermittent sleep disturbance, diminished appetite and some pain.    The patient was screened for any risk of suicide based on history of suicide   attempts, acute suicidal ideation, severe hopelessness, attraction to death,   family history of suicide and an acute overuse of alcohol.  The patient is   considered at some risk.  He will be further evaluated.    SOCIAL HISTORY:  The patient is trained as a .  He was working for a   marijuana grower until recently.  He has a Ph.D. in chemistry.  The patient is   single.  He was living alone.    IMPRESSION:  Polysubstance abuse/dependence; social anxiety; history of panic;   persistent depressive disorder.    RECOMMENDATIONS:  The patient will be followed for status and supportive care.       ____________________________________     SAMIRA QURESHI, PHD    ROMELIA / DANNIE    DD:  11/05/2019 12:43:26  DT:  11/05/2019 15:21:09    D#:  [id number]  Job#:  [id number]

## 2022-04-11 NOTE — PROGRESS NOTES
[facility] Department of Neurology  Inpatient Continuous video-Electroencephalography Report    Acquisition Details:  Electroencephalography was acquired using a minimum of 21 channels on an [employer] system v 8.5.1 with electrode placement according to the standard International 10-20 system following ACNS (American Clinical Neurophysiology Society) guidelines for Long-Term Video EEG monitoring.  Anterior temporal T1 and T2 electrodes were utilized whenever possible.   The [employer]K automated spike & seizure detections were all reviewed in detail, in addition to extensive portions of raw EEG.    Daily Updates (from 7:00 AM until 7:00 AM):  Day 2: 4/10/2022 – 4/11/2022  Pertinent medications:   Awake Background:  continuous, with predominantly alpha > theta frequencies.  Symmetry:  Frequent (10-49%) right greater than left temporal delta frequency slowing.  Organization: Appropriate anterior-posterior gradient.  Posterior Dominant Rhythm: 10 Hz symmetric, well-organized, and well-modulated.  Voltage:  Normal (20+ uV)  Variability: Yes. 		Reactivity: Yes.  N2 sleep: symmetric, synchronous sleep spindles/K-complexes.  Spontaneous Activity:  No epileptiform discharges.  Periodic/rhythmic activity: None.  Events:  No electrographic seizures or significant clinical events.  Provocations:  Hyperventilation and Photic stimulation: not performed.    Daily Summary:    1)	Right greater than left temporal focal slowing, indicating focal cerebral dysfunction.  2)	Mild diffuse background slowing, indicating a mild degree of diffuse or multifocal cerebral dysfunction.  3)	No epileptiform activity and no significant clinical events occurred.    Benjamin Waterman DO  CNP Fellow    Xochilt Lopez MD  Attending Neurologist, [facility] Epilepsy Program   Monitor Summary:  SR/ST   .18/.08/.36

## 2022-08-03 NOTE — PROGRESS NOTES
MS:   SR-ST    0.16/0.08/0.34    12 Hour Chart Check     0 = understands/communicates without difficulty

## 2023-02-24 NOTE — PROGRESS NOTES
12 hour chart check    Path Notes (To The Dermatopathologist): Please check margins.  Notch at 9 o clock

## 2023-03-08 NOTE — PROGRESS NOTES
Hospital Medicine Daily Progress Note    Date of Service  10/11/2019    Chief Complaint  34 y.o. male admitted 9/19/2019 with hallucinations    Hospital Course    Past medical history of depression, PTSD, anxiety, substance abuse of mushrooms, nitrous oxide, marijuana, alcohol, paroxysmal A. fib, recent diagnosis of PE May 2019.  He presented with hallucinations and had stopped taking his medications including Xarelto.  He had also fallen multiple times.  He was found hypoxic and CTA showed extensive acute bilateral pulmonary emboli with saddle embolus.  He was admitted to the ICU and felt his risk of intracranial hemorrhage outweighed submassive dose of alteplase or EKOS.  He was started on heparin infusion.  Lower extremity Doppler was negative for DVT.  TTE showed severe right heart strain.  Patient remained stabilized and transitioned to Xarelto.  Psychiatry was consulted for his hallucinations and he was started on Risperdal.  However he developed neurological changes concerning for NMS and Risperdal was stopped and he was given Cogentin with some improvement.  Psychiatry started him on Seroquel and has been titrating medication.  He was hypertensive requiring IV medications so he was started on a lower lower dose of lisinopril and his atenolol was stopped, given his right heart strain.  He developed leukocytosis and fever.  Urine culture grew coag negative staph and he was started on course of Bactrim.  Srivastava catheter was removed but he failed voiding trial and was replaced.  He is continued on Flomax.  PT OT evaluated him and recommended SNF.      Interval Problem Update  Patient says he feels much better today.  His hallucinations have improved, though he still feels very anxious.  He is off oxygen and denies shortness of breath.  He denies abdominal pain.  He feels less diaphoretic.  Mother is worried about him going to rehab since she will not be able to stay with him.  10/1: Patient became anxious when I  entered the room with the nursing staff.  Stated that does not like to many people around him.  Saturating well on room air.  Stated that he was unable to sleep well last night.  Afebrile overnight.  No issues overnight per staff.  10/2: Resting comfortably in bed.  Stated that he could not sleep sleep well last night.  Still feeling anxious.  Leukocytosis trending down.  Afebrile overnight.  Tolerating p.o. well.  No nausea or vomiting.  Had multiple small bowel movements yesterday after prune juice and stool softeners.  10/3: Resting comfortably in bed.  Looks slightly anxious.  Had another episode of head shaking relieved by 1 dose of Ativan.  Patient has a lot of psychiatric concern and questions which I rely to the psychiatrist who will stop by and answer those questions for the patient.  Otherwise no new issues to report.  10/4: Resting comfortably in bed.  Had large bowel movement yesterday per nursing staff.  Still getting anxious occasionally.  Requesting Ativan.  No acute distress noted.  No issues overnight per staff.  10/5: Resting comfortably in bed.  Psychiatry increased Seroquel dose for better control of anxiety and restlessness.  Still on legal hold.  No acute distress noted.  No issues overnight per staff.  10/6: Resting comfortably in bed.  Having more regular bowel movements now.  Anxiety slightly improved with increase Seroquel dose as per patient and his mother at bedside.  No acute distress noted.  No issues overnight per staff.  10/7: Resting comfortably in bed.  No acute distress noted.  Still anxious as stated.  Feels stronger.  Tolerating p.o. fairly.  Having regular bowel movements but still think that he is still constipated.  No nausea or vomiting. No issues overnight per staff    10/8: seen and examined, patient has flat affect, states he wasn't able to sleep much again last night however the decreased seroquel dose helped with hallucination and delusions, mother in room and confirmed  the above  Otherwise  No complaints.    10/9: seen and examined, patient states he is doing a little better interms of hallucinations however mom does not think so. Psych will see him today  Still on legal, needs to be off legal to go to inpatient psych facility    10/10: seen and examined, sitting in chair, eating breakfast, mother at bedside, still both have a flat affect, and very serious. States he is doing better however had some nightmares last night.   Vitals stable  Did have enough sleep last night though    PMR evaluated and he is a good candidate, however he will need to go back to pcp in [place] after rehab, insurance acceptance pending [facility]    10/11: Seen and examined this morning his mother is at bedside that the insurance is not authorized the inpatient rehab yet.  I discussed them in detail that I was out of our hands and we are continuing to work on it along with .  Consultants/Specialty  Psychiatry  Critical care  Pulmonology    Code Status  Full Code      Disposition  Case coordination to discuss discharge planning with mother  Rehab once insurance approves  Will need anticoagulation clinic and follow-up with pulmonology    Review of Systems  Review of Systems   Constitutional: Positive for malaise/fatigue. Negative for diaphoresis and fever.   HENT: Negative for congestion, ear discharge, ear pain, hearing loss, nosebleeds and tinnitus.    Eyes: Negative for blurred vision, double vision and photophobia.   Respiratory: Negative for cough and hemoptysis.    Cardiovascular: Negative for chest pain, palpitations, orthopnea, claudication and leg swelling.   Gastrointestinal: Negative for abdominal pain, constipation, diarrhea, heartburn, nausea and vomiting.   Genitourinary: Negative for dysuria, frequency, hematuria and urgency.   Musculoskeletal: Negative for back pain, joint pain, myalgias and neck pain.   Neurological: Positive for weakness. Negative for tingling, tremors,  sensory change, speech change and focal weakness.   Psychiatric/Behavioral: Positive for depression, hallucinations and substance abuse. The patient is nervous/anxious and has insomnia.         Nightmares        Physical Exam  Temp:  [36.3 °C (97.4 °F)-37.7 °C (99.9 °F)] 36.7 °C (98.1 °F)  Pulse:  [87-97] 88  Resp:  [16-20] 18  BP: (124-147)/(85-95) 124/87  SpO2:  [94 %-96 %] 94 %    Physical Exam   Constitutional: He is oriented to person, place, and time. No distress.   Obesity   HENT:   Head: Atraumatic.   Eyes: Pupils are equal, round, and reactive to light. Conjunctivae are normal. Right eye exhibits no discharge. Left eye exhibits no discharge.   Neck: No tracheal deviation present.   Cardiovascular: Normal rate and regular rhythm. Exam reveals no gallop and no friction rub.   Pulmonary/Chest: Effort normal. No respiratory distress. He has no wheezes.   Abdominal: Soft. Bowel sounds are normal. He exhibits no distension. There is no tenderness.   Musculoskeletal: He exhibits no tenderness or deformity.   Neurological: He is alert and oriented to person, place, and time.   4 out of 5 strength upper and lower extremities   Skin: Skin is warm. He is not diaphoretic. No erythema.   .   Psychiatric: His mood appears not anxious. His affect is blunt. He is slowed and withdrawn. Thought content is paranoid. He does not express impulsivity.   Nursing note and vitals reviewed.      Fluids    Intake/Output Summary (Last 24 hours) at 10/11/2019 1310  Last data filed at 10/10/2019 2100  Gross per 24 hour   Intake 100 ml   Output 1350 ml   Net -1250 ml       Laboratory  Recent Labs     10/09/19  1441 10/10/19  1229 10/11/19  0848   WBC 10.0 10.4 9.8   RBC 4.71 4.66* 4.90   HEMOGLOBIN 14.5 14.4 15.0   HEMATOCRIT 43.4 43.2 45.2   MCV 92.1 92.7 92.2   MCH 30.8 30.9 30.6   MCHC 33.4* 33.3* 33.2*   RDW 57.5* 58.2* 58.1*   PLATELETCT 344 357 240   MPV 8.1* 8.3* 9.1     Recent Labs     10/09/19  1441 10/10/19  1229 10/11/19  0846  "  SODIUM 140 138 140   POTASSIUM 3.7 3.9 4.1   CHLORIDE 108 106 106   CO2 19* 22 20   GLUCOSE 100* 91 104*   BUN 7* 9 9   CREATININE 0.64 0.65 0.58   CALCIUM 9.5 9.2 9.3                   Imaging  DX-CHEST-PORTABLE (1 VIEW)   Final Result         No acute cardiac or pulmonary abnormality is identified.      DX-CHEST-PORTABLE (1 VIEW)   Final Result      Mild lung base atelectasis with edema not excluded.      DX-CHEST-PORTABLE (1 VIEW)   Final Result         1.  Pulmonary edema and/or infiltrates.   2.  Cardiomegaly      MR-BRAIN-W/O   Final Result      1.  No acute intracranial abnormality.   2.  Sphenoid and posterior right ethmoid sinus disease.      US-EXTREMITY VENOUS LOWER BILAT   Final Result      EC-ECHOCARDIOGRAM LTD W/O CONT   Final Result      CT-CTA CHEST PULMONARY ARTERY W/ RECONS   Final Result   Addendum 1 of 1   These findings were discussed with HEATHER MELGOZA on 9/19/2019 2:16 PM.      Final      DX-CHEST-PORTABLE (1 VIEW)   Final Result      No acute cardiopulmonary abnormality.      CT-HEAD W/O   Final Result      No CT evidence of acute infarct, hemorrhage or mass. No acute intracranial injury.           Assessment/Plan  * Acute saddle pulmonary embolism (HCC)- (present on admission)  Assessment & Plan  Recurrent, in the setting of noncompliance with anticoagulation therapy  CTA PE revealing \"Extensive acute bilateral pulmonary emboli with saddle embolus noted, as well as findings concerning for RV strain\"  Recurrence likely secondary to poor compliance of medication  continue Xarelto  Echocardiogram with RV strain, caution aggressive blood pressure changes  DVT study negative    Acute hypoxemic respiratory failure (HCC)  Assessment & Plan  Secondary acute pulmonary embolism  Wean off oxygen as tolerated  Resolved.    Paroxysmal atrial fibrillation (HCC)- (present on admission)  Assessment & Plan   home dose Tenormin.  Continue Xarelto.  Rate is controlled.    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  MRI brain negative  10/5: Psychiatry increased Seroquel dose.      ETOH abuse- (present on admission)  Assessment & Plan  History of, monitor for withdrawal    Acute cystitis- (present on admission)  Assessment & Plan  Urine culture grew strep epidermidis sensitive to Bactrim.  Leucocytosis resolved   Completed course of antibiotics.    Fever  Assessment & Plan  9/28 - Has had leukocytosis, now fever 100.7. He has no specific symptoms. CXR and urine ordered  9/29 - Continue ceftriaxone, urine culture pending.  Srivastava cath was changed  9/30 -urine growing coag negative staph change to complete course of Bactrim  Resolved.    Weakness  Assessment & Plan  I spoke with psychiatry, who notes patient's current weakness has progressed from when he first admitted.  CPK was normal  Psychiatry stopped risperidone, had some improvement with Cogentin  Possible toxicity from the nitrous oxide.  I spoke with Dr. Byrd with neurology, he agreed with continuing with vitamin B12 supplementation.  He can have EMG testing done as outpatient after 2 weeks of stopping nitrous oxide.  PT OT ordered for reevaluation, plans of IP rehab    Vitamin B12 deficiency  Assessment & Plan  With reported peripheral tingling  Supplement ordered    Type 2 diabetes mellitus without complication, without long-term current use of insulin ([facility])  Assessment & Plan  New diagnosis, A1c 6.6%  Diabetes education ordered  Could be contributing to peripheral neuropathy    Slow transit constipation  Assessment & Plan  Daily Senokot and MiraLAX  Bowel regimen ordered      ALLI (obstructive sleep apnea)  Assessment & Plan  History of, pulmonary following    Morbid obesity ([facility])  Assessment & Plan  Outpatient  weight loss program would be indicated    Pulmonary hypertension (HCC)- (present on admission)  Assessment & Plan  Known history of, now with recurrent PE  Echocardiogram noted    Anxiety- (present on admission)  Assessment & Plan  On Librium previously  Now uncontrolled  Psychiatry evaluating, he was placed on a hold.  Bedside sitter as needed.  9/25 risperdal stopped for possible NMS, his anxiety is now increased  Psych adjusting seroquel and cogentin.       Essential hypertension- (present on admission)  Assessment & Plan  Blood pressure has been stable.  Continue to monitor.  Plan of care discussed with multidisciplinary team during rounds.    VTE prophylaxis: xarelto    Legal hold lifted  Ready for IP rehab when insurance approves  No acute changes since yesterday   No

## 2023-04-25 NOTE — THERAPY
"Occupational Therapy  Daily Treatment     Patient Name: Cosme Cabrera  Age:  34 y.o., Sex:  male  Medical Record #: [id number]  Today's Date: 10/22/2019     Precautions  Precautions: (P) Fall Risk, Other (See Comments)  Comments: Bilateral lower extremity weakness, anxiety    Safety   Comments: Pt cleared to be Mod-I at w/c level for toileting and bed <> w/c txs, mom cleared to assist with showering    Subjective    \"I'm pushing myself more I think\"     Objective       10/22/19 1031   Precautions   Precautions Fall Risk;Other (See Comments)   Sitting Upper Body Exercises   Sitting Upper Body Exercises Yes   Side Arm Raise 2 sets of 15  (4# dumbells)   Shoulder Press 2 sets of 15  (7# dumbells)   Bilateral Row 2 sets of 15  (7# dumbells)   Bicep Curls 2 sets of 15  (7# dumbells)   Pronation / Supination 2 sets of 15  (4# dumbells)   Wrist Flexion / Extension 2 sets of 15  (4# dumbells)   Other Exercise fwd punches 2x15 each side with 4# dumbells   Sitting Lower Body Exercises   Sit to Stand   (4x5 from mat without UE support, progressively lower surface)   Balance   Comments in // bars; balloon volleyball for 25 min with consistent seated rest breaks   OT Total Time Spent   OT Individual Total Time Spent (Mins) 60   OT Charge Group   OT Therapy Activity 2   OT Therapeutic Exercise  2       Assessment    Pt continues with steady progress, able to tolerate 30 min on his feet directly after PT session. Continues with frequent LOB when challenged with standing activity w/o UE support but demos good reaction time to use UE support on // bars for safety as needed, balanced progressed during session though standing tolerance decreased requiring more frequent rest breaks.     Plan    Functional mobility outside this afternoon, car transfer tomorrow in prep for possible outing Thursday. Continue to progress standing balance/tolerance, general strength and endurance toward increased safety and independence with ADLs. Continue " Please let the patient know that most recent lab work in terms of renal parameters are stable  Will discuss further at the upcoming visit, let me know if they have any questions or concerns      Thanks to address FM coordination deficits, functional cognition

## 2024-02-28 NOTE — PROGRESS NOTES
-- CST Phone (Incoming) Yudi Smith (Self) [phone] (M)          Alternative phone number: [phone]    Can a detailed message be left? Yes    Message Turnaround:     Is it Working Hours? Yes - Working Hours                      12 hour chart check
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
